# Patient Record
Sex: FEMALE | Race: BLACK OR AFRICAN AMERICAN | Employment: OTHER | URBAN - METROPOLITAN AREA
[De-identification: names, ages, dates, MRNs, and addresses within clinical notes are randomized per-mention and may not be internally consistent; named-entity substitution may affect disease eponyms.]

---

## 2017-02-20 RX ORDER — METOLAZONE 10 MG/1
TABLET ORAL
Qty: 30 TAB | Refills: 5 | Status: SHIPPED | OUTPATIENT
Start: 2017-02-20 | End: 2017-03-09 | Stop reason: SDUPTHER

## 2017-02-21 RX ORDER — AMIODARONE HYDROCHLORIDE 200 MG/1
TABLET ORAL
Qty: 30 TAB | Refills: 3 | Status: SHIPPED | OUTPATIENT
Start: 2017-02-21 | End: 2017-03-09 | Stop reason: SDUPTHER

## 2017-02-21 RX ORDER — METOLAZONE 10 MG/1
TABLET ORAL
Qty: 30 TAB | Refills: 5 | Status: SHIPPED | OUTPATIENT
Start: 2017-02-21 | End: 2017-03-09 | Stop reason: SDUPTHER

## 2017-03-09 ENCOUNTER — OFFICE VISIT (OUTPATIENT)
Dept: CARDIOLOGY CLINIC | Age: 82
End: 2017-03-09

## 2017-03-09 VITALS — SYSTOLIC BLOOD PRESSURE: 122 MMHG | DIASTOLIC BLOOD PRESSURE: 59 MMHG | HEART RATE: 81 BPM | HEIGHT: 65 IN

## 2017-03-09 DIAGNOSIS — I11.0 HYPERTENSIVE HEART DISEASE WITH CHF (CONGESTIVE HEART FAILURE) (HCC): ICD-10-CM

## 2017-03-09 DIAGNOSIS — Z95.0 S/P PLACEMENT OF CARDIAC PACEMAKER: ICD-10-CM

## 2017-03-09 DIAGNOSIS — I50.32 DIASTOLIC CHF, CHRONIC (HCC): Primary | ICD-10-CM

## 2017-03-09 DIAGNOSIS — Z79.899 HIGH RISK MEDICATION USE: ICD-10-CM

## 2017-03-09 DIAGNOSIS — I48.92 PAROXYSMAL ATRIAL FLUTTER (HCC): ICD-10-CM

## 2017-03-09 NOTE — PROGRESS NOTES
1. Have you been to the ER, urgent care clinic since your last visit? Hospitalized since your last visit? No    2. Have you seen or consulted any other health care providers outside of the [address] [place] [address] since your last visit? Include any pap smears or colon screening. No    3. Since your last visit, have you had any of the following symptoms? None    4. Have you had any blood work, X-rays or cardiac testing? T4 and LFT no completed     5. Where do you normally have your labs drawn?   [facility]    Medications confirmed by patient's medications bottles    Patient did not bring carelink box today to check

## 2017-03-09 NOTE — MR AVS SNAPSHOT
Visit Information Date & Time Provider Department Dept. Phone Encounter #  
 3/9/2017 11:30 AM Jim Shaikh MD [facility] [address] [place] [address] [id number] Follow-up Instructions Return in about 3 months (around 6/9/2017). Your Appointments 6/12/2017 10:30 AM  
ESTABLISHED PATIENT with Jim Shaikh MD  
[facility]) Appt Note: 3 months with medtronic ck [address] [facility], [address], [address] [place] [address] Upcoming Health Maintenance Date Due DTaP/Tdap/Td series (1 - Tdap) 6/26/1952 ZOSTER VACCINE AGE 60> 6/26/1991 GLAUCOMA SCREENING Q2Y 6/26/1996 OSTEOPOROSIS SCREENING (DEXA) 6/26/1996 Pneumococcal 65+ Low/Medium Risk (1 of 2 - PCV13) 6/26/1996 MEDICARE YEARLY EXAM 6/26/1996 INFLUENZA AGE 9 TO ADULT 8/1/2016 Allergies as of 3/9/2017  Review Complete On: 3/9/2017 By: Jim Shaikh MD  
 No Known Allergies Current Immunizations  Reviewed on 5/9/2016 No immunizations on file. Not reviewed this visit You Were Diagnosed With   
  
 Codes Comments Diastolic CHF, chronic (HCC)    -  Primary ICD-10-CM: I50.32 
ICD-9-CM: 428.32, 428.0 stable 
edema better Paroxysmal atrial flutter (HCC)     ICD-10-CM: I48.92 
ICD-9-CM: 427.32 stable 
on asa and amiodarone Hypertensive heart disease with CHF (congestive heart failure) ([facility] [facility]     ICD-10-CM: I11.0 ICD-9-CM: 402.91, 428.0 stable High risk medication use     ICD-10-CM: Z79.899 ICD-9-CM: V58.69 amiodarone for af  
 S/P placement of cardiac pacemaker     ICD-10-CM: Z95.0 ICD-9-CM: V45.01 needs check-discussed with son Vitals BP Pulse Height(growth percentile) Smoking Status 122/59 81 5' 5\" (1.651 m) Never Smoker Vitals History Preferred Pharmacy Pharmacy Name Phone [facility] [address] [phone] Your Updated Medication List  
  
   
This list is accurate as of: 3/9/17 11:34 AM.  Always use your most recent med list.  
  
  
  
  
 amiodarone 200 mg tablet Commonly known as:  CORDARONE Take 1 Tab by mouth daily. amitriptyline 25 mg tablet Commonly known as:  ELAVIL Take  by mouth nightly. aspirin 81 mg tablet Take 81 mg by mouth daily. furosemide 40 mg tablet Commonly known as:  LASIX Take 1 Tab by mouth two (2) times a day. isosorbide mononitrate ER 60 mg CR tablet Commonly known as:  IMDUR Take 1 Tab by mouth every morning. metOLazone 10 mg tablet Commonly known as:  Laurian Anil Take 1 Tab by mouth daily. Follow-up Instructions Return in about 3 months (around 6/9/2017). To-Do List   
 03/09/2017 Lab:  HEPATIC FUNCTION PANEL   
  
 03/09/2017 Lab:  T4, FREE   
  
 03/16/2017 Lab:  TSH 3RD GENERATION Please provide this summary of care documentation to your next provider. Your primary care clinician is listed as Ronak Hernandez. If you have any questions after today's visit, please call [phone].

## 2017-03-09 NOTE — LETTER
Hernandez Pages 
6/26/1931 
 
3/9/2017 Dear Boaz Serrano MD 
 
I had the pleasure of evaluating  Ms. Carmelita Krueger in office today. Below are the relevant portions of my assessment and plan of care. ICD-10-CM ICD-9-CM 1. Diastolic CHF, chronic (HCC) I50.32 428.32   
  428.0   
 stable 
edema better 2. Paroxysmal atrial flutter (HCC) I48.92 427.32   
 stable 
on asa and amiodarone 3. Hypertensive heart disease with CHF (congestive heart failure) (HCC) I11.0 402.91   
  428.0   
 stable 4. High risk medication use Z79.899 V58.69 TSH 3RD GENERATION  
   T4, FREE  
   HEPATIC FUNCTION PANEL  
 amiodarone for af  
5. S/P placement of cardiac pacemaker Z95.0 V45.01   
 needs check-discussed with son Current Outpatient Prescriptions Medication Sig Dispense Refill  metOLazone (ZAROXOLYN) 10 mg tablet Take 1 Tab by mouth daily. 30 Tab 6  furosemide (LASIX) 40 mg tablet Take 1 Tab by mouth two (2) times a day. 60 Tab 6  
 amitriptyline (ELAVIL) 25 mg tablet Take  by mouth nightly.  isosorbide mononitrate ER (IMDUR) 60 mg CR tablet Take 1 Tab by mouth every morning. 30 Tab 5  
 amiodarone (CORDARONE) 200 mg tablet Take 1 Tab by mouth daily. 90 Tab 4  
 aspirin 81 mg tablet Take 81 mg by mouth daily. Orders Placed This Encounter  TSH 3RD GENERATION Standing Status:   Future Standing Expiration Date:   4/8/2017  T4, FREE Standing Status:   Future Standing Expiration Date:   3/10/2018  
 HEPATIC FUNCTION PANEL Standing Status:   Future Standing Expiration Date:   9/7/2017 If you have questions, please do not hesitate to call me. I look forward to following Ms. Carmelita Krueger along with you. Sincerely, Maggie Graff MD

## 2017-03-09 NOTE — PROGRESS NOTES
HISTORY OF PRESENT ILLNESS  Mike Kessler is a 80 y.o. female. HPI Comments: Patient with r chf,had persistent junctional ryhtm ,atrial flutter ,pacemaker,feels better   Sob better    Palpitations    The history is provided by the patient. This is a chronic problem. The problem occurs constantly. Associated symptoms include lower extremity edema and shortness of breath. Pertinent negatives include no fever, no chest pain, no claudication, no orthopnea, no PND, no nausea, no vomiting, no dizziness, no weakness, no cough, no hemoptysis and no sputum production. Her past medical history is significant for hypertension. CHF   The history is provided by the patient. This is a chronic problem. The problem occurs constantly. The problem has been gradually improving. Associated symptoms include shortness of breath. Pertinent negatives include no chest pain. The symptoms are aggravated by exertion. Hypertension   The history is provided by the patient. This is a chronic problem. The problem occurs constantly. The problem has not changed since onset. Associated symptoms include shortness of breath. Pertinent negatives include no chest pain. Shortness of Breath   The history is provided by the patient. This is a recurrent problem. The problem occurs intermittently. The problem has been gradually improving. Pertinent negatives include no fever, no cough, no sputum production, no hemoptysis, no wheezing, no PND, no orthopnea, no chest pain, no vomiting, no rash, no leg swelling and no claudication. Precipitated by: exertion. Associated medical issues include heart failure. Leg Swelling   The history is provided by the patient. This is a chronic problem. The problem occurs daily. The problem has been gradually improving. Associated symptoms include shortness of breath. Pertinent negatives include no chest pain. Review of Systems   Constitutional: Negative for chills and fever. HENT: Negative for nosebleeds. Eyes: Negative for blurred vision and double vision. Respiratory: Positive for shortness of breath. Negative for cough, hemoptysis, sputum production and wheezing. Cardiovascular: Negative for chest pain, palpitations, orthopnea, claudication, leg swelling and PND. Gastrointestinal: Negative for heartburn, nausea and vomiting. Musculoskeletal: Negative for myalgias. Skin: Negative for rash. Neurological: Negative for dizziness and weakness. Endo/Heme/Allergies: Does not bruise/bleed easily. Family History   Problem Relation Age of Onset    Arrhythmia Neg Hx     Asthma Neg Hx     Clotting Disorder Neg Hx     Fainting Neg Hx     Heart Attack Neg Hx     High Cholesterol Neg Hx     Pacemaker Neg Hx     Stroke Neg Hx        Past Medical History:   Diagnosis Date    Acute on chronic diastolic heart failure (HCC)     Benign hypertensive heart disease without heart failure     Better controlled, stable    Chronic diastolic heart failure (HCC)     Breathing and edema is improving    Congestive heart failure (HCC)     HTN (hypertension)     Hypercholesteremia     [facility] 6/18/2014    followed by dr Yue Pierce     Obesity, unspecified     Patient has weight loss Discussed diet ad fluid restriction    Other and unspecified hyperlipidemia     F/u per pmd    Tricuspid valve disorders, 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    tr with moderate pulmonary htn        Past Surgical History:   Procedure Laterality Date    HX HYSTERECTOMY         No Known Allergies    Current Outpatient Prescriptions   Medication Sig    metOLazone (ZAROXOLYN) 10 mg tablet Take 1 Tab by mouth daily.  furosemide (LASIX) 40 mg tablet Take 1 Tab by mouth two (2) times a day.  amitriptyline (ELAVIL) 25 mg tablet Take  by mouth nightly.  isosorbide mononitrate ER (IMDUR) 60 mg CR tablet Take 1 Tab by mouth every morning.     amiodarone (CORDARONE) 200 mg tablet Take 1 Tab by mouth daily.    aspirin 81 mg tablet Take 81 mg by mouth daily. No current facility-administered medications for this visit. Visit Vitals    /59    Pulse 81    Ht 5' 5\" (1.651 m)         Physical Exam   Constitutional: She is oriented to person, place, and time. She appears well-developed and well-nourished. HENT:   Head: Normocephalic and atraumatic. Eyes: Conjunctivae are normal.   Neck: Neck supple. No JVD present. No tracheal deviation present. No thyromegaly present. Cardiovascular: Normal rate and regular rhythm. PMI is not displaced. Exam reveals no gallop and no decreased pulses. Murmur heard. Holosystolic murmur is present  at the lower left sternal border  Pulmonary/Chest: No respiratory distress. She has no wheezes. She has no rales. She exhibits no tenderness. Abdominal: Soft. There is no tenderness. Musculoskeletal: She exhibits edema (mild). Neurological: She is alert and oriented to person, place, and time. Skin: Skin is warm. Psychiatric: She has a normal mood and affect. CARDIOLOGY STUDIES 7/1/2011 1/1/2008 1/1/2005   Myocardial Perfusion Scan Result - - PEST, NORMAL   Echocardiogram - Complete Result EF 53% LVH, NORMAL EF, DD ENLARGED LA -   Ms. Kevan Kocher has a reminder for a \"due or due soon\" health maintenance. I have asked that she contact her primary care provider for follow-up on this health maintenance. SUMMARY:4/2014  Left ventricle: Ejection fraction was estimated to be 60 %. No obvious  wall motion abnormalities identified in the views obtained. There was mild  concentric hypertrophy. Features were consistent with a pseudonormal left  ventricular filling pattern, with concomitant abnormal relaxation and  increased filling pressure (grade 2 diastolic dysfunction). Tricuspid valve: There was moderate regurgitation. Pulmonary artery  systolic pressure: 50 mmHg. 11/2015:stress test  1. Normal perfusion scan.   2. Normal wall motion and ejection fraction. 3. Gated images reveal normal wall motion and ejection fraction is  calculated at 59%. 12/07/2015  Pacer check   Noted with A Flutter. D/w daughter on phone  Assessment       ICD-10-CM AHB-0-OL    1. Diastolic CHF, chronic (HCC) I50.32 428.32      428.0     stable  edema better   2. Paroxysmal atrial flutter (HCC) I48.92 427.32     stable  on asa and amiodarone   3. Hypertensive heart disease with CHF (congestive heart failure) (HCC) I11.0 402.91      428.0     stable   4. High risk medication use Z79.899 V58.69 TSH 3RD GENERATION      T4, FREE      HEPATIC FUNCTION PANEL    amiodarone for af   5. S/P placement of cardiac pacemaker Z95.0 V45.01     needs check-discussed with son   continue amiodarone to maintain sr,risk of anticoagulation high and would not anticoagulate-discussed with family  I have discussed risk benefit and option of use of amiodarone for arrythmia. Risk of toxicity with medication are informed. Patient will require careful monitoring. Lasix and metolazone used as needed-  Medications Discontinued During This Encounter   Medication Reason    amiodarone (CORDARONE) 200 mg tablet Duplicate Order    metOLazone (ZAROXOLYN) 10 mg tablet Duplicate Order    metOLazone (ZAROXOLYN) 10 mg tablet Duplicate Order    potassium chloride (KLOR-CON) 20 mEq packet Not A Current Medication    hydroxychloroquine (PLAQUENIL) 200 mg tablet Not A Current Medication    meloxicam (MOBIC) 7.5 mg tablet Not A Current Medication       Orders Placed This Encounter    TSH 3RD GENERATION     Standing Status:   Future     Standing Expiration Date:   4/8/2017    T4, FREE     Standing Status:   Future     Standing Expiration Date:   3/10/2018    HEPATIC FUNCTION PANEL     Standing Status:   Future     Standing Expiration Date:   9/7/2017       Follow-up Disposition:  Return in about 3 months (around 6/9/2017).

## 2017-03-20 RX ORDER — ISOSORBIDE MONONITRATE 60 MG/1
TABLET, EXTENDED RELEASE ORAL
Qty: 30 TAB | Refills: 3 | Status: SHIPPED | OUTPATIENT
Start: 2017-03-20 | End: 2017-03-22 | Stop reason: SDUPTHER

## 2017-03-22 RX ORDER — ISOSORBIDE MONONITRATE 60 MG/1
TABLET, EXTENDED RELEASE ORAL
Qty: 30 TAB | Refills: 3 | Status: SHIPPED | OUTPATIENT
Start: 2017-03-22 | End: 2017-06-12 | Stop reason: SDUPTHER

## 2017-06-12 ENCOUNTER — OFFICE VISIT (OUTPATIENT)
Dept: CARDIOLOGY CLINIC | Age: 82
End: 2017-06-12

## 2017-06-12 VITALS — HEART RATE: 75 BPM | DIASTOLIC BLOOD PRESSURE: 68 MMHG | SYSTOLIC BLOOD PRESSURE: 117 MMHG

## 2017-06-12 DIAGNOSIS — I11.0 HYPERTENSIVE HEART DISEASE WITH CHF (CONGESTIVE HEART FAILURE) (HCC): ICD-10-CM

## 2017-06-12 DIAGNOSIS — I48.92 PAROXYSMAL ATRIAL FLUTTER (HCC): ICD-10-CM

## 2017-06-12 DIAGNOSIS — I50.32 DIASTOLIC CHF, CHRONIC (HCC): Primary | ICD-10-CM

## 2017-06-12 DIAGNOSIS — Z95.0 S/P PLACEMENT OF CARDIAC PACEMAKER: ICD-10-CM

## 2017-06-12 DIAGNOSIS — Z79.899 HIGH RISK MEDICATION USE: ICD-10-CM

## 2017-06-12 RX ORDER — METOLAZONE 10 MG/1
10 TABLET ORAL DAILY
Qty: 30 TAB | Refills: 6 | Status: SHIPPED | OUTPATIENT
Start: 2017-06-12 | End: 2018-01-16 | Stop reason: SDUPTHER

## 2017-06-12 RX ORDER — FUROSEMIDE 40 MG/1
40 TABLET ORAL 2 TIMES DAILY
Qty: 60 TAB | Refills: 6 | Status: SHIPPED | OUTPATIENT
Start: 2017-06-12 | End: 2018-04-04 | Stop reason: SDUPTHER

## 2017-06-12 NOTE — MR AVS SNAPSHOT
Visit Information Date & Time Provider Department Dept. Phone Encounter #  
 6/12/2017 10:30 AM Katie Hdz MD [facility] [address] [place] [address] [id number] Follow-up Instructions Return in about 6 months (around 12/12/2017). Your Appointments 6/12/2017 10:30 AM  
ESTABLISHED PATIENT with Katie Hdz MD  
[facility]) Appt Note: 3 months with medtronic ck/ post labs [address] [facility], [address] [facility] [address]  
[address], [address] 9/18/2017  8:00 AM  
[facility] [place] [facility] Appt Note: carelink [address] [facility], [address] [facility] [address]  
[phone]  
  
   
 [address] [facility], [address] [place] [address]  
  
    
 12/11/2017  9:15 AM  
Office Visit with Katie Hdz MD  
[facility]) Appt Note: [address] [facility], [address] [facility] [address]  
[address], [address] Upcoming Health Maintenance Date Due DTaP/Tdap/Td series (1 - Tdap) 6/26/1952 ZOSTER VACCINE AGE 60> 6/26/1991 GLAUCOMA SCREENING Q2Y 6/26/1996 OSTEOPOROSIS SCREENING (DEXA) 6/26/1996 Pneumococcal 65+ Low/Medium Risk (1 of 2 - PCV13) 6/26/1996 MEDICARE YEARLY EXAM 6/26/1996 INFLUENZA AGE 9 TO ADULT 8/1/2017 Allergies as of 6/12/2017  Review Complete On: 6/12/2017 By: Katie Hdz MD  
 No Known Allergies Current Immunizations  Reviewed on 5/9/2016 No immunizations on file. Not reviewed this visit You Were Diagnosed With   
  
 Codes Comments Diastolic CHF, chronic (HCC)    -  Primary ICD-10-CM: I50.32 
ICD-9-CM: 428.32, 428.0 inctease edema 
advised restarting furosemide Paroxysmal atrial flutter (HCC)     ICD-10-CM: I48.92 
ICD-9-CM: 427.32 stable Hypertensive heart disease with CHF (congestive heart failure) ([facility]     ICD-10-CM: I11.0 ICD-9-CM: 402.91, 428.0 bp stable S/P placement of cardiac pacemaker     ICD-10-CM: Z95.0 ICD-9-CM: V45.01 normal function High risk medication use     ICD-10-CM: Z79.899 ICD-9-CM: V58.69 on amiodarone Vitals BP Pulse Smoking Status 117/68 75 Never Smoker Preferred Pharmacy Pharmacy Name Phone [facility] [address], [address] [phone] Your Updated Medication List  
  
   
This list is accurate as of: 6/12/17 10:09 AM.  Always use your most recent med list.  
  
  
  
  
 amiodarone 200 mg tablet Commonly known as:  CORDARONE Take 1 Tab by mouth daily. amitriptyline 25 mg tablet Commonly known as:  ELAVIL Take  by mouth nightly. aspirin 81 mg tablet Take 81 mg by mouth daily. furosemide 40 mg tablet Commonly known as:  LASIX Take 1 Tab by mouth two (2) times a day. isosorbide mononitrate ER 60 mg CR tablet Commonly known as:  IMDUR Take 1 Tab by mouth every morning. metOLazone 10 mg tablet Commonly known as:  Lavenia Kussmaul Take 1 Tab by mouth daily. Prescriptions Sent to Pharmacy Refills  
 furosemide (LASIX) 40 mg tablet 6 Sig: Take 1 Tab by mouth two (2) times a day. Class: Normal  
 Pharmacy: [facility], [facility] Ph #: [phone] Route: Oral  
 metOLazone (ZAROXOLYN) 10 mg tablet 6 Sig: Take 1 Tab by mouth daily. Class: Normal  
 Pharmacy: [facility], [address] Ph #: [phone] Route: Oral  
  
We Performed the Following INTERROGATION DEV ELISSA,IN PERSON, WITH DR ROMORPT [78559 CPT(R)] Follow-up Instructions Return in about 6 months (around 12/12/2017). Please provide this summary of care documentation to your next provider. Your primary care clinician is listed as Carmelita Paige. If you have any questions after today's visit, please call [phone].

## 2017-06-12 NOTE — LETTER
Daniela Camara 
6/26/1931 6/12/2017 Dear Alden Guardado MD 
 
I had the pleasure of evaluating  Ms. Carol Pablo in office today. Below are the relevant portions of my assessment and plan of care. ICD-10-CM ICD-9-CM 1. Diastolic CHF, chronic (HCC) I50.32 428.32   
  428.0   
 inctease edema 
advised restarting furosemide 2. Paroxysmal atrial flutter (HCC) I48.92 427.32   
 stable 3. Hypertensive heart disease with CHF (congestive heart failure) (HCC) I11.0 402.91   
  428.0   
 bp stable 4. S/P placement of cardiac pacemaker Z95.0 V45.01 INTERROGATION DEV EVAL,IN PERSON, WITH DR ROMO,RPT  
 normal function 5. High risk medication use Z79.899 V58.69   
 on amiodarone Current Outpatient Prescriptions Medication Sig Dispense Refill  furosemide (LASIX) 40 mg tablet Take 1 Tab by mouth two (2) times a day. 60 Tab 6  
 metOLazone (ZAROXOLYN) 10 mg tablet Take 1 Tab by mouth daily. 30 Tab 6  
 amitriptyline (ELAVIL) 25 mg tablet Take  by mouth nightly.  isosorbide mononitrate ER (IMDUR) 60 mg CR tablet Take 1 Tab by mouth every morning. 30 Tab 5  
 amiodarone (CORDARONE) 200 mg tablet Take 1 Tab by mouth daily. 90 Tab 4  
 aspirin 81 mg tablet Take 81 mg by mouth daily. Orders Placed This Encounter  INTERROGATION DEV EVAL,IN PERSON, WITH DR ROMO,RPT Order Specific Question:   Reason for Exam: Answer:   ppm  
 furosemide (LASIX) 40 mg tablet Sig: Take 1 Tab by mouth two (2) times a day. Dispense:  60 Tab Refill:  6  
 metOLazone (ZAROXOLYN) 10 mg tablet Sig: Take 1 Tab by mouth daily. Dispense:  30 Tab Refill:  6 If you have questions, please do not hesitate to call me. I look forward to following Ms. Carol Pablo along with you. Sincerely, Ana Srivastava MD

## 2017-06-12 NOTE — PROGRESS NOTES
HISTORY OF PRESENT ILLNESS  Jackson Smith is a 80 y.o. female. HPI Comments: Patient with r chf,had persistent junctional ryhtm ,atrial flutter ,pacemaker,feels better   Sob better    Palpitations    The history is provided by the patient. This is a chronic problem. The problem occurs constantly. Associated symptoms include lower extremity edema and shortness of breath. Pertinent negatives include no fever, no chest pain, no claudication, no orthopnea, no PND, no nausea, no vomiting, no dizziness, no weakness, no cough, no hemoptysis and no sputum production. Her past medical history is significant for hypertension. CHF   The history is provided by the patient. This is a chronic problem. The problem occurs constantly. The problem has been gradually improving. Associated symptoms include shortness of breath. Pertinent negatives include no chest pain. The symptoms are aggravated by exertion. Hypertension   The history is provided by the patient. This is a chronic problem. The problem occurs constantly. The problem has not changed since onset. Associated symptoms include shortness of breath. Pertinent negatives include no chest pain. Shortness of Breath   The history is provided by the patient. This is a recurrent problem. The problem occurs intermittently. The problem has been gradually improving. Pertinent negatives include no fever, no cough, no sputum production, no hemoptysis, no wheezing, no PND, no orthopnea, no chest pain, no vomiting, no rash, no leg swelling and no claudication. Precipitated by: exertion. Associated medical issues include heart failure. Leg Swelling   The history is provided by the patient. This is a chronic problem. The problem occurs daily. The problem has been gradually improving. Associated symptoms include shortness of breath. Pertinent negatives include no chest pain. Review of Systems   Constitutional: Negative for chills and fever. HENT: Negative for nosebleeds. Eyes: Negative for blurred vision and double vision. Respiratory: Positive for shortness of breath. Negative for cough, hemoptysis, sputum production and wheezing. Cardiovascular: Negative for chest pain, palpitations, orthopnea, claudication, leg swelling and PND. Gastrointestinal: Negative for heartburn, nausea and vomiting. Musculoskeletal: Negative for myalgias. Skin: Negative for rash. Neurological: Negative for dizziness and weakness. Endo/Heme/Allergies: Does not bruise/bleed easily. Family History   Problem Relation Age of Onset    Arrhythmia Neg Hx     Asthma Neg Hx     Clotting Disorder Neg Hx     Fainting Neg Hx     Heart Attack Neg Hx     High Cholesterol Neg Hx     Pacemaker Neg Hx     Stroke Neg Hx        Past Medical History:   Diagnosis Date    Acute on chronic diastolic heart failure (HCC)     Benign hypertensive heart disease without heart failure     Better controlled, stable    Chronic diastolic heart failure (HCC)     Breathing and edema is improving    Congestive heart failure (HCC)     HTN (hypertension)     Hypercholesteremia     Lupus (systemic lupus erythematosus) ([facility] 6/18/2014    followed by dr Med Floyd     Obesity, unspecified     Patient has weight loss Discussed diet ad fluid restriction    Other and unspecified hyperlipidemia     F/u per pmd    Tricuspid valve disorders, 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  metOLazone (ZAROXOLYN) 10 mg tablet Take 1 Tab by mouth daily.  amitriptyline (ELAVIL) 25 mg tablet Take  by mouth nightly.  isosorbide mononitrate ER (IMDUR) 60 mg CR tablet Take 1 Tab by mouth every morning.     amiodarone (CORDARONE) 200 mg tablet Take 1 Tab by mouth daily.    aspirin 81 mg tablet Take 81 mg by mouth daily. No current facility-administered medications for this visit. Visit Vitals    /68    Pulse 75         Physical Exam   Constitutional: She is oriented to person, place, and time. She appears well-developed and well-nourished. HENT:   Head: Normocephalic and atraumatic. Eyes: Conjunctivae are normal.   Neck: Neck supple. No JVD present. No tracheal deviation present. No thyromegaly present. Cardiovascular: Normal rate and regular rhythm. PMI is not displaced. Exam reveals no gallop and no decreased pulses. Murmur heard. Holosystolic murmur is present  at the lower left sternal border  Pulmonary/Chest: No respiratory distress. She has no wheezes. She has no rales. She exhibits no tenderness. Abdominal: Soft. There is no tenderness. Musculoskeletal: She exhibits edema (mild). Neurological: She is alert and oriented to person, place, and time. Skin: Skin is warm. Psychiatric: She has a normal mood and affect. CARDIOLOGY STUDIES 7/1/2011 1/1/2008 1/1/2005   Myocardial Perfusion Scan Result - - PEST, NORMAL   Echocardiogram - Complete Result EF 53% LVH, NORMAL EF, DD ENLARGED LA -   Ms. Raeann Jones has a reminder for a \"due or due soon\" health maintenance. I have asked that she contact her primary care provider for follow-up on this health maintenance. SUMMARY:4/2014  Left ventricle: Ejection fraction was estimated to be 60 %. No obvious  wall motion abnormalities identified in the views obtained. There was mild  concentric hypertrophy. Features were consistent with a pseudonormal left  ventricular filling pattern, with concomitant abnormal relaxation and  increased filling pressure (grade 2 diastolic dysfunction). Tricuspid valve: There was moderate regurgitation. Pulmonary artery  systolic pressure: 50 mmHg. 11/2015:stress test  1. Normal perfusion scan. 2. Normal wall motion and ejection fraction.   3. Gated images reveal normal wall motion and ejection fraction is  calculated at 59%. 12/07/2015  Pacer check   Noted with A Flutter. D/w daughter on phone  Assessment       ICD-10-CM [id number]    1. Diastolic CHF, chronic (HCC) I50.32 428.32      428.0     inctease edema  advised restarting furosemide   2. Paroxysmal atrial flutter (HCC) I48.92 427.32     stable   3. Hypertensive heart disease with CHF (congestive heart failure) (HCC) I11.0 402.91      428.0     bp stable   4. S/P placement of cardiac pacemaker Z95.0 V45.01 INTERROGATION CALEB BOWERS,IN PERSON, WITH DR ROMO,RPT    normal function   5. High risk medication use Z79.899 V58.69     on amiodarone   continue amiodarone to maintain sr,risk of anticoagulation high and would not anticoagulate-discussed with family  I have discussed risk benefit and option of use of amiodarone for arrythmia. Risk of toxicity with medication are informed. Patient will require careful monitoring. Lasix and metolazone used as needed-  Medications Discontinued During This Encounter   Medication Reason    isosorbide mononitrate ER (IMDUR) 60 mg CR tablet Duplicate Order    furosemide (LASIX) 40 mg tablet Reorder    metOLazone (ZAROXOLYN) 10 mg tablet Reorder       Orders Placed This Encounter    INTERROGATION CALEB BOWERS,IN PERSON, WITH DR ROMO,RPT     Order Specific Question:   Reason for Exam:     Answer:   ppm    furosemide (LASIX) 40 mg tablet     Sig: Take 1 Tab by mouth two (2) times a day. Dispense:  60 Tab     Refill:  6    metOLazone (ZAROXOLYN) 10 mg tablet     Sig: Take 1 Tab by mouth daily. Dispense:  30 Tab     Refill:  6       Follow-up Disposition:  Return in about 6 months (around 12/12/2017).

## 2017-06-22 DIAGNOSIS — I11.0 HYPERTENSIVE HEART DISEASE WITH CHF (CONGESTIVE HEART FAILURE) (HCC): ICD-10-CM

## 2017-06-22 DIAGNOSIS — I10 ESSENTIAL HYPERTENSION: ICD-10-CM

## 2017-06-22 DIAGNOSIS — E78.00 HYPERCHOLESTEREMIA: Primary | ICD-10-CM

## 2017-06-22 DIAGNOSIS — I48.92 PAROXYSMAL ATRIAL FLUTTER (HCC): ICD-10-CM

## 2017-06-22 RX ORDER — AMIODARONE HYDROCHLORIDE 200 MG/1
TABLET ORAL
Qty: 30 TAB | Refills: 6 | Status: SHIPPED | OUTPATIENT
Start: 2017-06-22 | End: 2018-01-02 | Stop reason: SDUPTHER

## 2017-06-23 ENCOUNTER — HOSPITAL ENCOUNTER (OUTPATIENT)
Dept: LAB | Age: 82
Discharge: HOME | End: 2017-06-23
Payer: MEDICARE

## 2017-06-23 DIAGNOSIS — N08 LUPOID NEPHRITIS (HCC): ICD-10-CM

## 2017-06-23 DIAGNOSIS — L93.0 LUPUS ERYTHEMATOSUS: ICD-10-CM

## 2017-06-23 DIAGNOSIS — M32.10 LUPOID NEPHRITIS (HCC): ICD-10-CM

## 2017-06-23 DIAGNOSIS — M13.0 UNSPECIFIED POLYARTHROPATHY OR POLYARTHRITIS, SITE UNSPECIFIED: ICD-10-CM

## 2017-06-23 LAB
ALBUMIN SERPL BCP-MCNC: 3.6 G/DL (ref 3.4–5)
ALBUMIN/GLOB SERPL: 1.2 {RATIO} (ref 0.8–1.7)
ALP SERPL-CCNC: 69 U/L (ref 45–117)
ALT SERPL-CCNC: 19 U/L (ref 13–56)
ANION GAP BLD CALC-SCNC: 5 MMOL/L (ref 3–18)
APPEARANCE UR: CLEAR
AST SERPL W P-5'-P-CCNC: 26 U/L (ref 15–37)
BACTERIA URNS QL MICRO: ABNORMAL /HPF
BILIRUB SERPL-MCNC: 0.7 MG/DL (ref 0.2–1)
BILIRUB UR QL: NEGATIVE
BUN SERPL-MCNC: 51 MG/DL (ref 7–18)
BUN/CREAT SERPL: 26 (ref 12–20)
CALCIUM SERPL-MCNC: 8.7 MG/DL (ref 8.5–10.1)
CHLORIDE SERPL-SCNC: 96 MMOL/L (ref 100–108)
CK SERPL-CCNC: 315 U/L (ref 26–192)
CO2 SERPL-SCNC: 40 MMOL/L (ref 21–32)
COLOR UR: YELLOW
CREAT SERPL-MCNC: 1.96 MG/DL (ref 0.6–1.3)
CRP SERPL-MCNC: <0.3 MG/DL (ref 0–0.3)
EPITH CASTS URNS QL MICRO: ABNORMAL /LPF (ref 0–5)
ERYTHROCYTE [SEDIMENTATION RATE] IN BLOOD: 19 MM/HR (ref 0–30)
GLOBULIN SER CALC-MCNC: 3 G/DL (ref 2–4)
GLUCOSE SERPL-MCNC: 90 MG/DL (ref 74–99)
GLUCOSE UR STRIP.AUTO-MCNC: NEGATIVE MG/DL
HGB UR QL STRIP: NEGATIVE
KETONES UR QL STRIP.AUTO: NEGATIVE MG/DL
LEUKOCYTE ESTERASE UR QL STRIP.AUTO: ABNORMAL
NITRITE UR QL STRIP.AUTO: NEGATIVE
PH UR STRIP: 8 [PH] (ref 5–8)
PLATELET # BLD AUTO: 156 K/UL (ref 135–420)
POTASSIUM SERPL-SCNC: 2.8 MMOL/L (ref 3.5–5.5)
PROT SERPL-MCNC: 6.6 G/DL (ref 6.4–8.2)
PROT UR STRIP-MCNC: NEGATIVE MG/DL
RBC #/AREA URNS HPF: ABNORMAL /HPF (ref 0–5)
SODIUM SERPL-SCNC: 141 MMOL/L (ref 136–145)
SP GR UR REFRACTOMETRY: 1.01 (ref 1–1.03)
URATE SERPL-MCNC: 9.7 MG/DL (ref 2.6–7.2)
UROBILINOGEN UR QL STRIP.AUTO: 1 EU/DL (ref 0.2–1)
WBC URNS QL MICRO: ABNORMAL /HPF (ref 0–4)

## 2017-06-23 PROCEDURE — 85652 RBC SED RATE AUTOMATED: CPT | Performed by: SPECIALIST

## 2017-06-23 PROCEDURE — 85049 AUTOMATED PLATELET COUNT: CPT | Performed by: SPECIALIST

## 2017-06-23 PROCEDURE — 86140 C-REACTIVE PROTEIN: CPT | Performed by: SPECIALIST

## 2017-06-23 PROCEDURE — 36415 COLL VENOUS BLD VENIPUNCTURE: CPT | Performed by: SPECIALIST

## 2017-06-23 PROCEDURE — 81001 URINALYSIS AUTO W/SCOPE: CPT | Performed by: SPECIALIST

## 2017-06-23 PROCEDURE — 84550 ASSAY OF BLOOD/URIC ACID: CPT | Performed by: SPECIALIST

## 2017-06-23 PROCEDURE — 86225 DNA ANTIBODY NATIVE: CPT | Performed by: SPECIALIST

## 2017-06-23 PROCEDURE — 82550 ASSAY OF CK (CPK): CPT | Performed by: SPECIALIST

## 2017-06-23 PROCEDURE — 80053 COMPREHEN METABOLIC PANEL: CPT | Performed by: SPECIALIST

## 2017-06-24 LAB
CENTROMERE B AB SER-ACNC: <0.2 AI (ref 0–0.9)
CHROMATIN AB SERPL-ACNC: <0.2 AI (ref 0–0.9)
DSDNA AB SER-ACNC: <1 IU/ML (ref 0–9)
ENA JO1 AB SER-ACNC: <0.2 AI (ref 0–0.9)
ENA RNP AB SER-ACNC: <0.2 AI (ref 0–0.9)
ENA SCL70 AB SER-ACNC: <0.2 AI (ref 0–0.9)
ENA SM AB SER-ACNC: <0.2 AI (ref 0–0.9)
ENA SS-A AB SER-ACNC: <0.2 AI (ref 0–0.9)
ENA SS-B AB SER-ACNC: <0.2 AI (ref 0–0.9)
SEE BELOW, 164869: NORMAL

## 2017-08-24 RX ORDER — ISOSORBIDE MONONITRATE 60 MG/1
60 TABLET, EXTENDED RELEASE ORAL
Qty: 30 TAB | Refills: 6 | Status: SHIPPED | OUTPATIENT
Start: 2017-08-24 | End: 2018-03-19 | Stop reason: SDUPTHER

## 2018-01-03 RX ORDER — AMIODARONE HYDROCHLORIDE 200 MG/1
200 TABLET ORAL DAILY
Qty: 15 TAB | Refills: 0 | Status: SHIPPED | OUTPATIENT
Start: 2018-01-03 | End: 2018-01-22 | Stop reason: SDUPTHER

## 2018-01-16 RX ORDER — METOLAZONE 10 MG/1
TABLET ORAL
Qty: 30 TAB | Refills: 5 | Status: SHIPPED | OUTPATIENT
Start: 2018-01-16 | End: 2018-07-16 | Stop reason: SDUPTHER

## 2018-01-22 ENCOUNTER — TELEPHONE (OUTPATIENT)
Dept: CARDIOLOGY CLINIC | Age: 83
End: 2018-01-22

## 2018-01-22 RX ORDER — AMIODARONE HYDROCHLORIDE 200 MG/1
TABLET ORAL
Qty: 15 TAB | Refills: 0 | Status: SHIPPED | OUTPATIENT
Start: 2018-01-22 | End: 2018-02-06 | Stop reason: SDUPTHER

## 2018-01-22 NOTE — TELEPHONE ENCOUNTER
Tried to contact patient to set up an appt and to inform her that she needs to have some blood work done. Patient's phone number has been disconnected. Will try to call pharmacy to see if they have current number.

## 2018-01-22 NOTE — TELEPHONE ENCOUNTER
[facility] gave us phone number for son [phone]    We were able to make an appt for a follow up to be seen in office 1/24/18 at 9:30

## 2018-01-24 ENCOUNTER — OFFICE VISIT (OUTPATIENT)
Dept: CARDIOLOGY CLINIC | Age: 83
End: 2018-01-24

## 2018-01-24 VITALS
HEIGHT: 65 IN | WEIGHT: 132 LBS | DIASTOLIC BLOOD PRESSURE: 58 MMHG | HEART RATE: 73 BPM | BODY MASS INDEX: 21.99 KG/M2 | SYSTOLIC BLOOD PRESSURE: 116 MMHG

## 2018-01-24 DIAGNOSIS — I10 ESSENTIAL HYPERTENSION: ICD-10-CM

## 2018-01-24 DIAGNOSIS — I48.92 PAROXYSMAL ATRIAL FLUTTER (HCC): ICD-10-CM

## 2018-01-24 DIAGNOSIS — I50.32 DIASTOLIC CHF, CHRONIC (HCC): Primary | ICD-10-CM

## 2018-01-24 DIAGNOSIS — E78.00 HYPERCHOLESTEREMIA: ICD-10-CM

## 2018-01-24 DIAGNOSIS — Z95.0 S/P PLACEMENT OF CARDIAC PACEMAKER: ICD-10-CM

## 2018-01-24 DIAGNOSIS — Z79.899 HIGH RISK MEDICATION USE: ICD-10-CM

## 2018-01-24 NOTE — MR AVS SNAPSHOT
[address] [facility] [address] 
 
 
 [address] [facility], [address] [facility] [address] 
[phone] Patient: Evans Jones MRN: [id number] Visit Information Date & Time Provider Department Dept. Phone Encounter #  
 1/24/2018 12:15 PM Zena Martínez MD [facility] [address] [place] [address] [id number] Follow-up Instructions Return in about 6 months (around 7/24/2018) for carelink setup/transmission. Your Appointments 1/24/2018 12:15 PM  
ESTABLISHED PATIENT with Zena Martínez MD  
[facility]) Appt Note: 7 month up follow up/ appt made with son/NEED PH#; r/s; insurance confirmed, did not have cards [address] [facility] [facility] [address]  
[address], [address] [address]  
  
    
 6/25/2018  9:30 AM  
PROCEDURE with Zena Martínez MD  
[facility]) Appt Note: medtronic ck [address] [facility] [facility] [address]  
[address], [address] [address] [facility] Upcoming Health Maintenance Date Due DTaP/Tdap/Td series (1 - Tdap) 6/26/1952 ZOSTER VACCINE AGE 60> 4/26/1991 GLAUCOMA SCREENING Q2Y 6/26/1996 OSTEOPOROSIS SCREENING (DEXA) 6/26/1996 Pneumococcal 65+ Low/Medium Risk (1 of 2 - PCV13) 6/26/1996 MEDICARE YEARLY EXAM 6/26/1996 Influenza Age 5 to Adult 8/1/2017 Allergies as of 1/24/2018  Review Complete On: 1/24/2018 By: Zena Martínez MD  
 No Known Allergies Current Immunizations  Reviewed on 5/9/2016 No immunizations on file. Not reviewed this visit You Were Diagnosed With   
  
 Codes Comments Diastolic CHF, chronic (HCC)    -  Primary ICD-10-CM: I50.32 
ICD-9-CM: 428.32, 428.0 stable 
limited activity Essential hypertension     ICD-10-CM: I10 
ICD-9-CM: 401.9 controlled  Hypercholesteremia     ICD-10-CM: E78.00 
 ICD-9-CM: 272.0 stable Paroxysmal atrial flutter (HCC)     ICD-10-CM: I48.92 
ICD-9-CM: 427.32 stable S/P placement of cardiac pacemaker     ICD-10-CM: Z95.0 ICD-9-CM: V45.01 needs carelink High risk medication use     ICD-10-CM: Z79.899 ICD-9-CM: V58.69 Vitals BP Pulse Height(growth percentile) Weight(growth percentile) BMI Smoking Status 116/58 73 5' 5\" (1.651 m) 132 lb (59.9 kg) 21.97 kg/m2 Never Smoker Vitals History BMI and BSA Data Body Mass Index Body Surface Area  
 21.97 kg/m 2 1.66 m 2 Preferred Pharmacy Pharmacy Name Phone [facility], [address] [phone] Your Updated Medication List  
  
   
This list is accurate as of: 1/24/18 11:36 AM.  Always use your most recent med list.  
  
  
  
  
 amiodarone 200 mg tablet Commonly known as:  CORDARONE  
TAKE ONE TABLET EVERY DAY (PLEASE MAKE APPOINTMENT PAST DUE FOR FOLLOWUP)  
  
 amitriptyline 25 mg tablet Commonly known as:  ELAVIL Take  by mouth nightly. aspirin 81 mg tablet Take 81 mg by mouth daily. furosemide 40 mg tablet Commonly known as:  LASIX Take 1 Tab by mouth two (2) times a day. isosorbide mononitrate ER 60 mg CR tablet Commonly known as:  IMDUR Take 1 Tab by mouth every morning. metOLazone 10 mg tablet Commonly known as:  ZAROXOLYN  
TAKE ONE TABLET EVERY DAY Follow-up Instructions Return in about 6 months (around 7/24/2018) for carelink setup/transmission. To-Do List   
 01/24/2018 Lab:  HEPATIC FUNCTION PANEL   
  
 01/24/2018 Lab:  T4, FREE   
  
 01/31/2018 Lab:  TSH 3RD GENERATION Introducing \[facility]\"" & HEALTH SERVICES! Dear Oziel Medina: Thank you for requesting a [employer] account. Our records indicate that you have previously registered for a [employer] account but its currently inactive. Please call our [employer] support line at [phone]. Additional Information If you have questions, please visit the Frequently Asked Questions section of the [email or web address] website at [email or web address] Remember, [email or web address] is NOT to be used for urgent needs. For medical emergencies, dial 911. Now available from your iPhone and Android! Please provide this summary of care documentation to your next provider. Your primary care clinician is listed as Ceci. If you have any questions after today's visit, please call [phone].

## 2018-01-24 NOTE — LETTER
Matt Ledezma 
6/26/1931 1/24/2018 Dear Fabi Simmons MD 
 
I had the pleasure of evaluating  Ms. Jf Benitez in office today. Below are the relevant portions of my assessment and plan of care. ICD-10-CM ICD-9-CM 1. Diastolic CHF, chronic (HCC) I50.32 428.32   
  428.0   
 stable 
limited activity 2. Essential hypertension I10 401.9   
 controlled 3. Hypercholesteremia E78.00 272.0   
 stable 4. Paroxysmal atrial flutter (HCC) I48.92 427.32   
 stable 5. S/P placement of cardiac pacemaker Z95.0 V45.01   
 needs carelink 6. High risk medication use Z79.899 V58.69 HEPATIC FUNCTION PANEL  
   T4, FREE  
   TSH 3RD GENERATION Current Outpatient Prescriptions Medication Sig Dispense Refill  amiodarone (CORDARONE) 200 mg tablet TAKE ONE TABLET EVERY DAY (PLEASE MAKE APPOINTMENT PAST DUE FOR FOLLOWUP) 15 Tab 0  
 metOLazone (ZAROXOLYN) 10 mg tablet TAKE ONE TABLET EVERY DAY 30 Tab 5  
 isosorbide mononitrate ER (IMDUR) 60 mg CR tablet Take 1 Tab by mouth every morning. 30 Tab 6  furosemide (LASIX) 40 mg tablet Take 1 Tab by mouth two (2) times a day. 60 Tab 6  
 aspirin 81 mg tablet Take 81 mg by mouth daily.  amitriptyline (ELAVIL) 25 mg tablet Take  by mouth nightly. Orders Placed This Encounter  HEPATIC FUNCTION PANEL Standing Status:   Future Standing Expiration Date:   7/25/2018  T4, FREE Standing Status:   Future Standing Expiration Date:   1/25/2019  
 TSH 3RD GENERATION Standing Status:   Future Standing Expiration Date:   2/23/2018 If you have questions, please do not hesitate to call me. I look forward to following Ms. Jf Benitez along with you. Sincerely, Judy Og MD

## 2018-01-24 NOTE — PROGRESS NOTES
HISTORY OF PRESENT ILLNESS  Damaso Jimenez is a 80 y.o. female. HPI Comments: Patient with r chf,had persistent junctional ryhtm ,atrial flutter ,pacemaker,feels better   Sob better    Palpitations    The history is provided by the patient. This is a chronic problem. The problem occurs constantly. Associated symptoms include lower extremity edema. Pertinent negatives include no fever, no chest pain, no claudication, no orthopnea, no PND, no abdominal pain, no nausea, no vomiting, no headaches, no dizziness, no weakness, no cough, no hemoptysis, no shortness of breath and no sputum production. Her past medical history is significant for hypertension. CHF   The history is provided by the patient. This is a chronic problem. The problem occurs constantly. The problem has been gradually improving. Pertinent negatives include no chest pain, no abdominal pain, no headaches and no shortness of breath. The symptoms are aggravated by exertion. Hypertension   The history is provided by the patient. This is a chronic problem. The problem occurs constantly. The problem has not changed since onset. Pertinent negatives include no chest pain, no abdominal pain, no headaches and no shortness of breath. Shortness of Breath   The history is provided by the patient. This is a recurrent problem. The problem occurs intermittently. The problem has been gradually improving. Associated symptoms include leg swelling. Pertinent negatives include no fever, no headaches, no cough, no sputum production, no hemoptysis, no wheezing, no PND, no orthopnea, no chest pain, no vomiting, no abdominal pain, no rash and no claudication. Precipitated by: exertion. Associated medical issues include heart failure. Leg Swelling   The history is provided by the patient. This is a chronic problem. The problem occurs daily. The problem has been gradually improving.  Pertinent negatives include no chest pain, no abdominal pain, no headaches and no shortness of breath. Review of Systems   Constitutional: Negative for chills and fever. HENT: Negative for nosebleeds. Eyes: Negative for blurred vision and double vision. Respiratory: Negative for cough, hemoptysis, sputum production, shortness of breath and wheezing. Cardiovascular: Positive for leg swelling. Negative for chest pain, palpitations, orthopnea, claudication and PND. Gastrointestinal: Negative for abdominal pain, heartburn, nausea and vomiting. Musculoskeletal: Negative for myalgias. Skin: Negative for rash. Neurological: Negative for dizziness, weakness and headaches. Endo/Heme/Allergies: Does not bruise/bleed easily.      Family History   Problem Relation Age of Onset    Arrhythmia Neg Hx     Asthma Neg Hx     Clotting Disorder Neg Hx     Fainting Neg Hx     Heart Attack Neg Hx     High Cholesterol Neg Hx     Pacemaker Neg Hx     Stroke Neg Hx        Past Medical History:   Diagnosis Date    Acute on chronic diastolic heart failure (HCC)     Benign hypertensive heart disease without heart failure     Better controlled, stable    Chronic diastolic heart failure (HCC)     Breathing and edema is improving    Congestive heart failure (HCC)     HTN (hypertension)     Hypercholesteremia     [facility]18/2014    followed by dr Gabi Andino     Obesity, unspecified     Patient has weight loss Discussed diet ad fluid restriction    Other and unspecified hyperlipidemia     F/u per pmd    Tricuspid valve disorders, 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  furosemide (LASIX) 40 mg tablet Take 1 Tab by mouth two (2) times a day.  aspirin 81 mg tablet Take 81 mg by mouth daily.  amitriptyline (ELAVIL) 25 mg tablet Take  by mouth nightly. No current facility-administered medications for this visit. Visit Vitals    /58    Pulse 73    Ht 5' 5\" (1.651 m)    Wt 59.9 kg (132 lb)    BMI 21.97 kg/m2         Physical Exam   Constitutional: She is oriented to person, place, and time. She appears well-developed and well-nourished. HENT:   Head: Normocephalic and atraumatic. Eyes: Conjunctivae are normal.   Neck: Neck supple. No JVD present. No tracheal deviation present. No thyromegaly present. Cardiovascular: Normal rate and regular rhythm. PMI is not displaced. Exam reveals no gallop and no decreased pulses. Murmur heard. Holosystolic murmur is present  at the lower left sternal border  Pulmonary/Chest: No respiratory distress. She has no wheezes. She has no rales. She exhibits no tenderness. Abdominal: Soft. There is no tenderness. Musculoskeletal: She exhibits edema (mild). Neurological: She is alert and oriented to person, place, and time. Skin: Skin is warm. Psychiatric: She has a normal mood and affect. CARDIOLOGY STUDIES 7/1/2011 1/1/2008 1/1/2005   Myocardial Perfusion Scan Result - - PEST, NORMAL   Echocardiogram - Complete Result EF 53% LVH, NORMAL EF, DD ENLARGED LA -   Some recent data might be hidden   Ms. Austin Aguirre has a reminder for a \"due or due soon\" health maintenance. I have asked that she contact her primary care provider for follow-up on this health maintenance. SUMMARY:4/2014  Left ventricle: Ejection fraction was estimated to be 60 %. No obvious  wall motion abnormalities identified in the views obtained. There was mild  concentric hypertrophy.  Features were consistent with a pseudonormal left  ventricular filling pattern, with concomitant abnormal relaxation and  increased filling pressure (grade 2 diastolic dysfunction). Tricuspid valve: There was moderate regurgitation. Pulmonary artery  systolic pressure: 50 mmHg. 11/2015:stress test  1. Normal perfusion scan. 2. Normal wall motion and ejection fraction. 3. Gated images reveal normal wall motion and ejection fraction is  calculated at 59%. 12/07/2015  Pacer check   Noted with A Flutter. D/w daughter on phone  Assessment       ICD-10-CM [id number]    1. Diastolic CHF, chronic (HCC) I50.32 428.32      428.0     stable  limited activity   2. Essential hypertension I10 401.9     controlled   3. Hypercholesteremia E78.00 272.0     stable   4. Paroxysmal atrial flutter (HCC) I48.92 427.32     stable   5. S/P placement of cardiac pacemaker Z95.0 V45.01     needs carelink   6. High risk medication use Z79.899 V58.69 HEPATIC FUNCTION PANEL      T4, FREE      TSH 3RD GENERATION   continue amiodarone to maintain sr,risk of anticoagulation high and would not anticoagulate-discussed with family  I have discussed risk benefit and option of use of amiodarone for arrythmia. Risk of toxicity with medication are informed. Patient will require careful monitoring. Lasix and metolazone used as needed-  There are no discontinued medications. Orders Placed This Encounter    HEPATIC FUNCTION PANEL     Standing Status:   Future     Standing Expiration Date:   7/25/2018    T4, FREE     Standing Status:   Future     Standing Expiration Date:   1/25/2019    TSH 3RD GENERATION     Standing Status:   Future     Standing Expiration Date:   2/23/2018       Follow-up Disposition:  Return in about 6 months (around 7/24/2018) for carelink setup/transmission.

## 2018-01-24 NOTE — PROGRESS NOTES
1. Have you been to the ER, urgent care clinic since your last visit? Hospitalized since your last visit? No    2. Have you seen or consulted any other health care providers outside of the [address] [place] [address] since your last visit? Include any pap smears or colon screening.  Yes Where: PCP Routine  / Dr Tono Price Routine

## 2018-02-07 RX ORDER — AMIODARONE HYDROCHLORIDE 200 MG/1
TABLET ORAL
Qty: 15 TAB | Refills: 0 | Status: SHIPPED | OUTPATIENT
Start: 2018-02-07 | End: 2018-02-17 | Stop reason: SDUPTHER

## 2018-02-19 RX ORDER — AMIODARONE HYDROCHLORIDE 200 MG/1
TABLET ORAL
Qty: 15 TAB | Refills: 0 | Status: SHIPPED | OUTPATIENT
Start: 2018-02-19 | End: 2018-11-02 | Stop reason: SDUPTHER

## 2018-02-19 RX ORDER — AMIODARONE HYDROCHLORIDE 200 MG/1
TABLET ORAL
Qty: 15 TAB | Refills: 0 | Status: SHIPPED | OUTPATIENT
Start: 2018-02-19 | End: 2018-03-08 | Stop reason: SDUPTHER

## 2018-02-20 ENCOUNTER — HOSPITAL ENCOUNTER (OUTPATIENT)
Dept: LAB | Age: 83
Discharge: HOME | End: 2018-02-20
Payer: MEDICARE

## 2018-02-20 DIAGNOSIS — M13.0 POLYARTHROPATHY: ICD-10-CM

## 2018-02-20 DIAGNOSIS — L93.0 LUPUS ERYTHEMATOSUS: ICD-10-CM

## 2018-02-20 DIAGNOSIS — M32.0 DRUG-INDUCED SYSTEMIC LUPUS ERYTHEMATOSUS (HCC): ICD-10-CM

## 2018-02-20 LAB
ALBUMIN SERPL-MCNC: 3.9 G/DL (ref 3.4–5)
ALBUMIN/GLOB SERPL: 1.3 {RATIO} (ref 0.8–1.7)
ALP SERPL-CCNC: 63 U/L (ref 45–117)
ALT SERPL-CCNC: 16 U/L (ref 13–56)
ANION GAP SERPL CALC-SCNC: 8 MMOL/L (ref 3–18)
APPEARANCE UR: CLEAR
AST SERPL-CCNC: 27 U/L (ref 15–37)
BACTERIA URNS QL MICRO: ABNORMAL /HPF
BASOPHILS # BLD: 0 K/UL (ref 0–0.1)
BASOPHILS NFR BLD: 0 % (ref 0–2)
BILIRUB SERPL-MCNC: 0.5 MG/DL (ref 0.2–1)
BILIRUB UR QL: NEGATIVE
BUN SERPL-MCNC: 66 MG/DL (ref 7–18)
BUN/CREAT SERPL: 32 (ref 12–20)
CALCIUM SERPL-MCNC: 9.3 MG/DL (ref 8.5–10.1)
CHLORIDE SERPL-SCNC: 95 MMOL/L (ref 100–108)
CK SERPL-CCNC: 379 U/L (ref 26–192)
CO2 SERPL-SCNC: 39 MMOL/L (ref 21–32)
COLOR UR: YELLOW
CREAT SERPL-MCNC: 2.08 MG/DL (ref 0.6–1.3)
CRP SERPL-MCNC: <0.3 MG/DL (ref 0–0.3)
DIFFERENTIAL METHOD BLD: ABNORMAL
EOSINOPHIL # BLD: 0.2 K/UL (ref 0–0.4)
EOSINOPHIL NFR BLD: 4 % (ref 0–5)
EPITH CASTS URNS QL MICRO: ABNORMAL /LPF (ref 0–5)
ERYTHROCYTE [DISTWIDTH] IN BLOOD BY AUTOMATED COUNT: 14 % (ref 11.6–14.5)
ERYTHROCYTE [SEDIMENTATION RATE] IN BLOOD: 36 MM/HR (ref 0–30)
GLOBULIN SER CALC-MCNC: 2.9 G/DL (ref 2–4)
GLUCOSE SERPL-MCNC: 89 MG/DL (ref 74–99)
GLUCOSE UR STRIP.AUTO-MCNC: NEGATIVE MG/DL
HCT VFR BLD AUTO: 33 % (ref 35–45)
HGB BLD-MCNC: 10.7 G/DL (ref 12–16)
HGB UR QL STRIP: NEGATIVE
KETONES UR QL STRIP.AUTO: NEGATIVE MG/DL
LEUKOCYTE ESTERASE UR QL STRIP.AUTO: ABNORMAL
LYMPHOCYTES # BLD: 1.6 K/UL (ref 0.9–3.6)
LYMPHOCYTES NFR BLD: 29 % (ref 21–52)
MCH RBC QN AUTO: 29.6 PG (ref 24–34)
MCHC RBC AUTO-ENTMCNC: 32.4 G/DL (ref 31–37)
MCV RBC AUTO: 91.4 FL (ref 74–97)
MONOCYTES # BLD: 0.8 K/UL (ref 0.05–1.2)
MONOCYTES NFR BLD: 14 % (ref 3–10)
NEUTS SEG # BLD: 2.8 K/UL (ref 1.8–8)
NEUTS SEG NFR BLD: 53 % (ref 40–73)
NITRITE UR QL STRIP.AUTO: NEGATIVE
PH UR STRIP: 7 [PH] (ref 5–8)
PLATELET # BLD AUTO: 149 K/UL (ref 135–420)
PMV BLD AUTO: 9.3 FL (ref 9.2–11.8)
POTASSIUM SERPL-SCNC: 2.7 MMOL/L (ref 3.5–5.5)
PROT SERPL-MCNC: 6.8 G/DL (ref 6.4–8.2)
PROT UR STRIP-MCNC: NEGATIVE MG/DL
RBC # BLD AUTO: 3.61 M/UL (ref 4.2–5.3)
RBC #/AREA URNS HPF: NEGATIVE /HPF (ref 0–5)
SODIUM SERPL-SCNC: 142 MMOL/L (ref 136–145)
SP GR UR REFRACTOMETRY: 1.01 (ref 1–1.03)
UROBILINOGEN UR QL STRIP.AUTO: 0.2 EU/DL (ref 0.2–1)
WBC # BLD AUTO: 5.4 K/UL (ref 4.6–13.2)
WBC URNS QL MICRO: ABNORMAL /HPF (ref 0–4)

## 2018-02-20 PROCEDURE — 85025 COMPLETE CBC W/AUTO DIFF WBC: CPT | Performed by: SPECIALIST

## 2018-02-20 PROCEDURE — 85652 RBC SED RATE AUTOMATED: CPT | Performed by: SPECIALIST

## 2018-02-20 PROCEDURE — 81001 URINALYSIS AUTO W/SCOPE: CPT | Performed by: SPECIALIST

## 2018-02-20 PROCEDURE — 86140 C-REACTIVE PROTEIN: CPT | Performed by: SPECIALIST

## 2018-02-20 PROCEDURE — 82550 ASSAY OF CK (CPK): CPT | Performed by: SPECIALIST

## 2018-02-20 PROCEDURE — 80053 COMPREHEN METABOLIC PANEL: CPT | Performed by: SPECIALIST

## 2018-02-20 PROCEDURE — 36415 COLL VENOUS BLD VENIPUNCTURE: CPT | Performed by: SPECIALIST

## 2018-02-20 RX ORDER — AMIODARONE HYDROCHLORIDE 200 MG/1
TABLET ORAL
Qty: 15 TAB | Refills: 0 | Status: SHIPPED | OUTPATIENT
Start: 2018-02-20 | End: 2018-07-23 | Stop reason: SDUPTHER

## 2018-03-20 RX ORDER — ISOSORBIDE MONONITRATE 60 MG/1
TABLET, EXTENDED RELEASE ORAL
Qty: 30 TAB | Refills: 5 | Status: SHIPPED | OUTPATIENT
Start: 2018-03-20 | End: 2018-10-03 | Stop reason: SDUPTHER

## 2018-04-04 RX ORDER — FUROSEMIDE 40 MG/1
TABLET ORAL
Qty: 60 TAB | Refills: 3 | Status: SHIPPED | OUTPATIENT
Start: 2018-04-04 | End: 2018-10-12 | Stop reason: SDUPTHER

## 2018-04-17 ENCOUNTER — HOSPITAL ENCOUNTER (OUTPATIENT)
Dept: LAB | Age: 83
Discharge: HOME | End: 2018-04-17
Payer: MEDICARE

## 2018-04-17 DIAGNOSIS — M13.0 POLYARTHROPATHY: ICD-10-CM

## 2018-04-17 DIAGNOSIS — M65.9 SYNOVITIS AND TENOSYNOVITIS, UNSPECIFIED: ICD-10-CM

## 2018-04-17 DIAGNOSIS — M12.219: ICD-10-CM

## 2018-04-17 LAB
APPEARANCE UR: CLEAR
BACTERIA URNS QL MICRO: ABNORMAL /HPF
BASOPHILS # BLD: 0 K/UL (ref 0–0.06)
BASOPHILS NFR BLD: 0 % (ref 0–2)
BILIRUB UR QL: NEGATIVE
COLOR UR: YELLOW
DIFFERENTIAL METHOD BLD: ABNORMAL
EOSINOPHIL # BLD: 0.1 K/UL (ref 0–0.4)
EOSINOPHIL NFR BLD: 2 % (ref 0–5)
EPITH CASTS URNS QL MICRO: ABNORMAL /LPF (ref 0–5)
ERYTHROCYTE [DISTWIDTH] IN BLOOD BY AUTOMATED COUNT: 14.1 % (ref 11.6–14.5)
ERYTHROCYTE [SEDIMENTATION RATE] IN BLOOD: 31 MM/HR (ref 0–30)
GLUCOSE UR STRIP.AUTO-MCNC: NEGATIVE MG/DL
HCT VFR BLD AUTO: 34 % (ref 35–45)
HGB BLD-MCNC: 11.2 G/DL (ref 12–16)
HGB UR QL STRIP: NEGATIVE
KETONES UR QL STRIP.AUTO: NEGATIVE MG/DL
LEUKOCYTE ESTERASE UR QL STRIP.AUTO: ABNORMAL
LYMPHOCYTES # BLD: 1.2 K/UL (ref 0.9–3.6)
LYMPHOCYTES NFR BLD: 14 % (ref 21–52)
MCH RBC QN AUTO: 29.6 PG (ref 24–34)
MCHC RBC AUTO-ENTMCNC: 32.9 G/DL (ref 31–37)
MCV RBC AUTO: 89.7 FL (ref 74–97)
MONOCYTES # BLD: 1.1 K/UL (ref 0.05–1.2)
MONOCYTES NFR BLD: 12 % (ref 3–10)
NEUTS SEG # BLD: 6.3 K/UL (ref 1.8–8)
NEUTS SEG NFR BLD: 72 % (ref 40–73)
NITRITE UR QL STRIP.AUTO: NEGATIVE
PH UR STRIP: 7 [PH] (ref 5–8)
PLATELET # BLD AUTO: 152 K/UL (ref 135–420)
PMV BLD AUTO: 9.4 FL (ref 9.2–11.8)
PROT UR STRIP-MCNC: NEGATIVE MG/DL
RBC # BLD AUTO: 3.79 M/UL (ref 4.2–5.3)
RBC #/AREA URNS HPF: NEGATIVE /HPF (ref 0–5)
SP GR UR REFRACTOMETRY: 1.01 (ref 1–1.03)
UROBILINOGEN UR QL STRIP.AUTO: 0.2 EU/DL (ref 0.2–1)
WBC # BLD AUTO: 8.7 K/UL (ref 4.6–13.2)
WBC URNS QL MICRO: ABNORMAL /HPF (ref 0–4)

## 2018-04-17 PROCEDURE — 85025 COMPLETE CBC W/AUTO DIFF WBC: CPT | Performed by: SPECIALIST

## 2018-04-17 PROCEDURE — 36415 COLL VENOUS BLD VENIPUNCTURE: CPT | Performed by: SPECIALIST

## 2018-04-17 PROCEDURE — 86140 C-REACTIVE PROTEIN: CPT | Performed by: SPECIALIST

## 2018-04-17 PROCEDURE — 85652 RBC SED RATE AUTOMATED: CPT | Performed by: SPECIALIST

## 2018-04-17 PROCEDURE — 81001 URINALYSIS AUTO W/SCOPE: CPT | Performed by: SPECIALIST

## 2018-04-18 LAB — CRP SERPL-MCNC: 3.2 MG/DL (ref 0–0.3)

## 2018-04-28 ENCOUNTER — HOSPITAL ENCOUNTER (OUTPATIENT)
Dept: GENERAL RADIOLOGY | Age: 83
Discharge: HOME | End: 2018-04-28
Payer: MEDICARE

## 2018-04-28 DIAGNOSIS — M12.219: ICD-10-CM

## 2018-04-28 DIAGNOSIS — M79.609 PAIN IN LIMB: ICD-10-CM

## 2018-04-28 DIAGNOSIS — M13.0 POLYARTHROPATHY: ICD-10-CM

## 2018-04-28 DIAGNOSIS — L93.0 LUPUS ERYTHEMATOSUS: ICD-10-CM

## 2018-04-28 PROCEDURE — 73030 X-RAY EXAM OF SHOULDER: CPT

## 2018-05-04 ENCOUNTER — HOSPITAL ENCOUNTER (OUTPATIENT)
Dept: VASCULAR SURGERY | Age: 83
Discharge: HOME | End: 2018-05-04
Attending: SPECIALIST
Payer: MEDICARE

## 2018-05-04 DIAGNOSIS — M79.609 PAIN IN LIMB: ICD-10-CM

## 2018-05-04 PROCEDURE — 93970 EXTREMITY STUDY: CPT

## 2018-05-04 NOTE — PROCEDURES
[facility]  *** FINAL REPORT ***    Name: Joe Charles  MRN: [id number]    Outpatient  : 1931  HIS Order #: [id number]  [address] [facility] Visit #: [id number]  Date: 04 May 2018    TYPE OF TEST: Peripheral Venous Testing    REASON FOR TEST  Pain in limb, Limb swelling    Right Leg:-  Deep venous thrombosis:           No  Superficial venous thrombosis:    No  Deep venous insufficiency:        Not examined  Superficial venous insufficiency: Not examined    Left Leg:-  Deep venous thrombosis:           No  Superficial venous thrombosis:    No  Deep venous insufficiency:        Not examined  Superficial venous insufficiency: Not examined      INTERPRETATION/FINDINGS  Duplex images were obtained using 2-D gray scale, color flow, and  spectral Doppler analysis. Right leg :  1. Deep veins visualized include the common femoral, femoral,  popliteal, posterior tibial and peroneal veins. 2. No evidence of deep venous thrombosis detected in the veins  visualized. 3. Superficial veins visualized include the great saphenous vein. 4. No evidence of superficial thrombosis detected. 5. Normal multiphasic flow in the posterior tibial artery. Left leg :  1. Deep veins visualized include the common femoral, femoral,  popliteal, posterior tibial and peroneal veins. 2. No evidence of deep venous thrombosis detected in the veins  visualized. 3. Superficial veins visualized include the great saphenous vein. 4. No evidence of superficial thrombosis detected. 5. Normal multiphasic flow in the posterior tibial artery. ADDITIONAL COMMENTS    I have personally reviewed the data relevant to the interpretation of  this  study. TECHNOLOGIST: VINEET Holbrook RVS  Signed: 2018 05:22 PM    PHYSICIAN: Tristan Forman.  Maria Esther Victoria MD  Signed: 2018 08:57 AM

## 2018-07-16 RX ORDER — METOLAZONE 10 MG/1
10 TABLET ORAL DAILY
Qty: 30 TAB | Refills: 2 | Status: SHIPPED | OUTPATIENT
Start: 2018-07-16 | End: 2018-09-22 | Stop reason: SDUPTHER

## 2018-07-17 ENCOUNTER — TELEPHONE (OUTPATIENT)
Dept: CARDIOLOGY CLINIC | Age: 83
End: 2018-07-17

## 2018-07-17 DIAGNOSIS — I50.22 CHRONIC SYSTOLIC CONGESTIVE HEART FAILURE (HCC): Primary | ICD-10-CM

## 2018-07-20 ENCOUNTER — OFFICE VISIT (OUTPATIENT)
Dept: SURGERY | Age: 83
End: 2018-07-20

## 2018-07-20 VITALS
TEMPERATURE: 98.1 F | SYSTOLIC BLOOD PRESSURE: 122 MMHG | DIASTOLIC BLOOD PRESSURE: 60 MMHG | RESPIRATION RATE: 18 BRPM | HEART RATE: 70 BPM | OXYGEN SATURATION: 98 %

## 2018-07-20 DIAGNOSIS — R10.33 PERIUMBILICAL ABDOMINAL PAIN: Primary | ICD-10-CM

## 2018-07-20 DIAGNOSIS — K43.9 VENTRAL HERNIA WITHOUT OBSTRUCTION OR GANGRENE: ICD-10-CM

## 2018-07-20 RX ORDER — MELOXICAM 7.5 MG/1
TABLET ORAL
COMMUNITY
Start: 2018-07-09 | End: 2018-12-31

## 2018-07-20 RX ORDER — PREDNISONE 5 MG/1
TABLET ORAL
COMMUNITY
End: 2018-12-31

## 2018-07-20 RX ORDER — MEMANTINE HYDROCHLORIDE 10 MG/1
10 TABLET ORAL 2 TIMES DAILY
COMMUNITY
Start: 2018-07-10

## 2018-07-20 NOTE — PROGRESS NOTES
General Surgery Consult      Tabby Ross  Admit date: (Not on file)    MRN: [id number]     : 1931     Age: 80 y.o. Attending Physician: Luis Gaffney MD [facility]      History of Present Illness: Tabby Ross is a 80 y.o. female was referred for evaluation of a large ventral hernia the patient had been present for many years, probably 2-3 decades if not more according to her son and daughter. They stated that over the last couple years the hernia has been increasing in size. the patient has multiple comorbidities including congestive heart failure, atrial fibrillation, cardiac pacemaker, congestive heart failure and anasarca. So it was thought at one point that the swelling is secondary to fluids. The patient is complaining of some abdominal pain. The pain is on and off. The patient is using a wheelchair for movement. Her pain is worse with movement. There is no history of strangulation or incarceration. There is no history of nausea and vomiting. She had a CT scan in  that did show a small ventral and umbilical hernia.      Patient Active Problem List    Diagnosis Date Noted    Diastolic CHF, chronic ([address] 2016    High risk medication use 2016    Acute on chronic diastolic (congestive) heart failure ([address] 2016    Paroxysmal atrial flutter ([address] 2016    S/P placement of cardiac pacemaker 2015    Hypertensive heart disease with CHF (congestive heart failure) ([address] 2015    Anasarca 2015    Tricuspid valve disorders, non-rheumatic 2014    Lupus (systemic lupus erythematosus) ([address] 2014    HTN (hypertension)     Hypercholesteremia      Past Medical History:   Diagnosis Date    Acute on chronic diastolic heart failure (HCC)     Arthritis     Benign hypertensive heart disease without heart failure     Better controlled, stable    Chronic diastolic heart failure (HCC)     Breathing and edema is improving    [facility]     HTN (hypertension)     Hypercholesteremia     Lup[facility]ic lupus [facility]mat[facility]) [facility] 6/18/2014    followed by dr Patricia Stack     Obesity, unspecified     Patient has weight loss Discussed diet ad fluid restriction    Other and unspecified hyperlipidemia     F/u per pmd    Tricuspid valve disorders, 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    tr with moderate pulmonary htn       Past Surgical History:   Procedure Laterality Date    HX HYSTERECTOMY      PACEMAKER PROCEDURE        Social History   Substance Use Topics    Smoking status: Never Smoker    Smokeless tobacco: Never Used    Alcohol use No      History   Smoking Status    Never Smoker   Smokeless Tobacco    Never Used     Family History   Problem Relation Age of Onset    Arrhythmia Neg Hx     Asthma Neg Hx     Clotting Disorder Neg Hx     Fainting Neg Hx     Heart Attack Neg Hx     High Cholesterol Neg Hx     Pacemaker Neg Hx     Stroke Neg Hx       Current Outpatient Prescriptions   Medication Sig    meloxicam (MOBIC) 7.5 mg tablet     memantine (NAMENDA) 10 mg tablet     predniSONE (DELTASONE) 5 mg tablet Take  by mouth.  metOLazone (ZAROXOLYN) 10 mg tablet Take 1 Tab by mouth daily.  furosemide (LASIX) 40 mg tablet TAKE ONE TABLET TWO TIMES A DAY    isosorbide mononitrate ER (IMDUR) 60 mg CR tablet TAKE ONE TABLET EVERY MORNING    amiodarone (CORDARONE) 200 mg tablet TAKE ONE TABLET EVERY DAY (MAKE AN APPT)    amitriptyline (ELAVIL) 25 mg tablet Take  by mouth nightly.  amiodarone (CORDARONE) 200 mg tablet TAKE ONE TABLET BY MOUTH EVERY DAY PLEASE MAKE APPOINTMENT    amiodarone (CORDARONE) 200 mg tablet TAKE ONE TABLET BY MOUTH EVERY DAY PLEASE MAKE APPOINTMENT    aspirin 81 mg tablet Take 81 mg by mouth daily. No current facility-administered medications for this visit.        No Known Allergies     Review of Systems:  Constitutional: negative  Eyes: negative  Ears, Nose, Mouth, Throat, and Face: negative  Respiratory: negative  Cardiovascular: negative  Gastrointestinal: positive for abdominal pain  Genitourinary:negative  Integument/Breast: negative  Hematologic/Lymphatic: negative  Musculoskeletal:negative  Neurological: negative  Behavioral/Psychiatric: negative  Endocrine: negative  Allergic/Immunologic: negative    Objective:     Visit Vitals    /60    Pulse 70    Temp 98.1 °F (36.7 °C)    Resp 18    SpO2 98%       Physical Exam:      General:  in no apparent distress, alert, normal vitals, anicteric and cooperative   Eyes:  conjunctivae and sclerae normal, pupils equal, round, reactive to light   Throat & Neck: no erythema or exudates noted and neck supple and symmetrical; no palpable masses   Lungs:   clear to auscultation bilaterally   Heart:  Regular rate and rhythm   Abdomen:   rounded, soft, nontender, nondistended, no masses or organomegaly. There is a large ventral hernia that is mildly tender to palpation but reducible.     Extremities: extremities normal, atraumatic, no cyanosis or edema   Skin: Normal.       Imaging and Lab Review:     CBC:   Lab Results   Component Value Date/Time    WBC 8.7 04/17/2018 10:41 AM    RBC 3.79 (L) 04/17/2018 10:41 AM    HGB 11.2 (L) 04/17/2018 10:41 AM    HCT 34.0 (L) 04/17/2018 10:41 AM    PLATELET 466 35/41/3271 10:41 AM     BMP:   Lab Results   Component Value Date/Time    Glucose 89 02/20/2018 12:18 PM    Glucose 88 03/17/2016 11:15 AM    Sodium 142 02/20/2018 12:18 PM    Potassium 2.7 (LL) 02/20/2018 12:18 PM    Chloride 95 (L) 02/20/2018 12:18 PM    CO2 39 (H) 02/20/2018 12:18 PM    BUN 66 (H) 02/20/2018 12:18 PM    Creatinine 2.08 (H) 02/20/2018 12:18 PM    Calcium 9.3 02/20/2018 12:18 PM     CMP:  Lab Results   Component Value Date/Time    Glucose 89 02/20/2018 12:18 PM    Glucose 88 03/17/2016 11:15 AM    Sodium 142 02/20/2018 12:18 PM    Potassium 2.7 (LL) 02/20/2018 12:18 PM    Chloride 95 (L) 02/20/2018 12:18 PM    CO2 39 (H) 02/20/2018 12:18 PM    BUN 66 (H) 02/20/2018 12:18 PM    Creatinine 2.08 (H) 02/20/2018 12:18 PM    Calcium 9.3 02/20/2018 12:18 PM    Anion gap 8 02/20/2018 12:18 PM    BUN/Creatinine ratio 32 (H) 02/20/2018 12:18 PM    Alk. phosphatase 63 02/20/2018 12:18 PM    Protein, total 6.8 02/20/2018 12:18 PM    Albumin 3.9 02/20/2018 12:18 PM    Globulin 2.9 02/20/2018 12:18 PM    A-G Ratio 1.3 02/20/2018 12:18 PM       No results found for this or any previous visit (from the past 24 hour(s)). images and reports reviewed    Assessment:   Luda Dang is a 80 y.o. female is presenting with a picture of large ventral hernia. I Discussed the possibility of incarceration, strangulation, enlargement in size over time, and the risk of emergency surgery in the face of strangulation. I also discussed the use of prosthetic materials (mesh), including the risk of infection. Also discussed the risk of surgery including recurrence and the possible need for reoperation and removal of mesh if used, possibility of postoperative small bowel injury, obstruction or ileus, and the risks of general anesthetic. I explained to the the patient, her son and her daughter that she is at very high risk for surgery and giving her medical condition and her age, I am advising against the surgery. The family agreed to hold on the surgery. Plan:     Patient is currently too high risk for the surgery. The risk/benefit is better for observation vs. Surgery.   If the cardiology and PCP team decide that she is not at risk for major complication from the general anesthesia, then I will consider robotic ventral hernia repair with mesh  No need for any current surgical intervention  Follow-up as needed    Please call me if you have any questions (cell phone: [phone])     Signed By: Jono Vang MD     July 20, 2018

## 2018-07-23 ENCOUNTER — OFFICE VISIT (OUTPATIENT)
Dept: CARDIOLOGY CLINIC | Age: 83
End: 2018-07-23

## 2018-07-23 VITALS — HEIGHT: 65 IN | HEART RATE: 70 BPM | SYSTOLIC BLOOD PRESSURE: 122 MMHG | DIASTOLIC BLOOD PRESSURE: 56 MMHG

## 2018-07-23 DIAGNOSIS — Z79.899 HIGH RISK MEDICATION USE: ICD-10-CM

## 2018-07-23 DIAGNOSIS — I48.92 PAROXYSMAL ATRIAL FLUTTER (HCC): ICD-10-CM

## 2018-07-23 DIAGNOSIS — Z95.0 S/P PLACEMENT OF CARDIAC PACEMAKER: ICD-10-CM

## 2018-07-23 DIAGNOSIS — I36.9 TRICUSPID VALVE DISORDERS, NON-RHEUMATIC: ICD-10-CM

## 2018-07-23 DIAGNOSIS — I50.32 DIASTOLIC CHF, CHRONIC (HCC): Primary | ICD-10-CM

## 2018-07-23 DIAGNOSIS — I50.32 HYPERTENSIVE HEART DISEASE WITH CHRONIC DIASTOLIC CONGESTIVE HEART FAILURE (HCC): ICD-10-CM

## 2018-07-23 DIAGNOSIS — I11.0 HYPERTENSIVE HEART DISEASE WITH CHRONIC DIASTOLIC CONGESTIVE HEART FAILURE (HCC): ICD-10-CM

## 2018-07-23 NOTE — PROGRESS NOTES
HISTORY OF PRESENT ILLNESS  Oliverio Oconnell is a 80 y.o. female. HPI Comments: Patient with  chf,had persistent junctional ryhtm ,atrial flutter , s/p pacemaker,feels better   Sob better    Pacemaker Check   The history is provided by the patient. This is a new problem. Pertinent negatives include no chest pain, no abdominal pain, no headaches and no shortness of breath. CHF   The history is provided by the patient. This is a chronic problem. The problem occurs constantly. The problem has been gradually improving. Pertinent negatives include no chest pain, no abdominal pain, no headaches and no shortness of breath. The symptoms are aggravated by exertion. Palpitations    The history is provided by the patient. This is a chronic problem. The problem occurs constantly. Associated symptoms include lower extremity edema. Pertinent negatives include no fever, no chest pain, no claudication, no orthopnea, no PND, no abdominal pain, no nausea, no vomiting, no headaches, no dizziness, no weakness, no cough, no hemoptysis, no shortness of breath and no sputum production. Her past medical history is significant for hypertension. Hypertension   The history is provided by the patient. This is a chronic problem. The problem occurs constantly. The problem has not changed since onset. Pertinent negatives include no chest pain, no abdominal pain, no headaches and no shortness of breath. Shortness of Breath   The history is provided by the patient. This is a recurrent problem. The problem occurs intermittently. The problem has been gradually improving. Associated symptoms include leg swelling. Pertinent negatives include no fever, no headaches, no cough, no sputum production, no hemoptysis, no wheezing, no PND, no orthopnea, no chest pain, no vomiting, no abdominal pain, no rash and no claudication. Precipitated by: exertion. Associated medical issues include heart failure.    Leg Swelling   The history is provided by the patient. This is a chronic problem. The problem occurs daily. The problem has been gradually improving. Pertinent negatives include no chest pain, no abdominal pain, no headaches and no shortness of breath. Review of Systems   Constitutional: Negative for chills and fever. HENT: Negative for nosebleeds. Eyes: Negative for blurred vision and double vision. Respiratory: Negative for cough, hemoptysis, sputum production, shortness of breath and wheezing. Cardiovascular: Positive for leg swelling. Negative for chest pain, palpitations, orthopnea, claudication and PND. Gastrointestinal: Negative for abdominal pain, heartburn, nausea and vomiting. Musculoskeletal: Negative for myalgias. Skin: Negative for rash. Neurological: Negative for dizziness, weakness and headaches. Endo/Heme/Allergies: Does not bruise/bleed easily.      Family History   Problem Relation Age of Onset    Arrhythmia Neg Hx     Asthma Neg Hx     Clotting Disorder Neg Hx     Fainting Neg Hx     Heart Attack Neg Hx     High Cholesterol Neg Hx     Pacemaker Neg Hx     Stroke Neg Hx        Past Medical History:   Diagnosis Date    Acute on chronic diastolic heart failure (HCC)     Arthritis     Benign hypertensive heart disease without heart failure     Better controlled, stable    Chronic diastolic heart failure (HCC)     Breathing and edema is improving    Congestive heart failure (HCC)     HTN (hypertension)     Hypercholesteremia     Lupus [facility]ic lupus erythematosus) [facility] 6/18/2014    followed by dr Ayaka Torres     Obesity, unspecified     Patient has weight loss Discussed diet ad fluid restriction    Other and unspecified hyperlipidemia     F/u per pmd    Tricuspid valve disorders, 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    tr with moderate pulmonary htn        Past Surgical History:   Procedure Laterality Date    HX HYSTERECTOMY      PACEMAKER PROCEDURE         No Known Allergies    Current Outpatient Prescriptions   Medication Sig    meloxicam (MOBIC) 7.5 mg tablet     memantine (NAMENDA) 10 mg tablet     predniSONE (DELTASONE) 5 mg tablet Take  by mouth.  metOLazone (ZAROXOLYN) 10 mg tablet Take 1 Tab by mouth daily.  furosemide (LASIX) 40 mg tablet TAKE ONE TABLET TWO TIMES A DAY    isosorbide mononitrate ER (IMDUR) 60 mg CR tablet TAKE ONE TABLET EVERY MORNING    amiodarone (CORDARONE) 200 mg tablet TAKE ONE TABLET BY MOUTH EVERY DAY PLEASE MAKE APPOINTMENT    amitriptyline (ELAVIL) 25 mg tablet Take  by mouth nightly.  aspirin 81 mg tablet Take 81 mg by mouth daily. No current facility-administered medications for this visit. Visit Vitals    /56    Pulse 70    Ht 5' 5\" (1.651 m)         Physical Exam   Constitutional: She is oriented to person, place, and time. She appears well-developed and well-nourished. HENT:   Head: Normocephalic and atraumatic. Eyes: Conjunctivae are normal.   Neck: Neck supple. No JVD present. No tracheal deviation present. No thyromegaly present. Cardiovascular: Normal rate and regular rhythm. PMI is not displaced. Exam reveals no gallop and no decreased pulses. Murmur heard. Holosystolic murmur is present  at the lower left sternal border  Pulmonary/Chest: No respiratory distress. She has no wheezes. She has no rales. She exhibits no tenderness. Abdominal: Soft. There is no tenderness. Musculoskeletal: She exhibits edema (mild). Neurological: She is alert and oriented to person, place, and time. Skin: Skin is warm. Psychiatric: She has a normal mood and affect. CARDIOLOGY STUDIES 7/1/2011 1/1/2008 1/1/2005   Myocardial Perfusion Scan Result - - PEST, NORMAL   Echocardiogram - Complete Result EF 53% LVH, NORMAL EF, DD ENLARGED LA -   Some recent data might be hidden   Ms. Jayda Mayen has a reminder for a \"due or due soon\" health maintenance.  I have asked that she contact her primary care provider for follow-up on this health maintenance. SUMMARY:4/2014  Left ventricle: Ejection fraction was estimated to be 60 %. No obvious  wall motion abnormalities identified in the views obtained. There was mild  concentric hypertrophy. Features were consistent with a pseudonormal left  ventricular filling pattern, with concomitant abnormal relaxation and  increased filling pressure (grade 2 diastolic dysfunction). Tricuspid valve: There was moderate regurgitation. Pulmonary artery  systolic pressure: 50 mmHg. 11/2015:stress test  1. Normal perfusion scan. 2. Normal wall motion and ejection fraction. 3. Gated images reveal normal wall motion and ejection fraction is  calculated at 59%. 12/07/2015  Pacer check   Noted with A Flutter. D/w daughter on phone  07/23/18  Pacemaker check normal function. No A. fib. Atrial heart rate up to 159. Assessment       ICD-10-CM [facility]-[id number]    1. Diastolic CHF, chronic (HCC) I50.32 428.32      428.0     Stable. Continue monitoring and treatment. 2. Paroxysmal atrial flutter (HCC) I48.92 427.32     Stable. On amiodarone. Stable on recent pacemaker check. 3. Hypertensive heart disease with chronic diastolic congestive heart failure (HCC) I11.0 402.91     I50.32 428.32     Stable. 4. Tricuspid valve disorders, 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 424.2    5. S/P placement of cardiac pacemaker Z95.0 V45.01 INTERROGATION DEV ELISSA,IN PERSON, WITH DR ROMO,RPT    Normal function. Checked in office today   6. High risk medication use Z79.899 V58.69 TSH 3RD GENERATION      T4, FREE      HEPATIC FUNCTION PANEL    Amiodarone for A. fib. continue amiodarone to maintain sr,risk of anticoagulation high and would not anticoagulate-discussed with family  I have discussed risk benefit and option of use of amiodarone for arrythmia. Risk of toxicity with medication are informed. Patient will require careful monitoring.   Lasix and metolazone used as needed-  Medications Discontinued During This Encounter   Medication Reason    amiodarone (CORDARONE) 652 mg tablet Duplicate Order    amiodarone (CORDARONE) 797 mg tablet Duplicate Order       Orders Placed This Encounter    TSH 3RD GENERATION     Standing Status:   Future     Standing Expiration Date:   8/22/2018    T4, FREE     Standing Status:   Future     Standing Expiration Date:   7/24/2019    HEPATIC FUNCTION PANEL     Standing Status:   Future     Standing Expiration Date:   1/21/2019    INTERROGATION DEV ELISSA,IN PERSON, WITH DR ROMO,RPT     Order Specific Question:   Reason for Exam:     Answer:   ppm       Follow-up Disposition:  Return in about 6 months (around 1/23/2019).

## 2018-07-23 NOTE — PROGRESS NOTES
1. Have you been to the ER, urgent care clinic since your last visit? Hospitalized since your last visit?     no    2. Have you seen or consulted any other health care providers outside of the [facility] since your last visit? Include any pap smears or colon screening. Yes Where: pcp     3. Since your last visit, have you had any of the following symptoms?  no

## 2018-07-23 NOTE — LETTER
Luda Dang 
6/26/1931 7/23/2018 Dear Kane Colvin MD 
 
I had the pleasure of evaluating  Ms. Nubia Barrientos in office today. Below are the relevant portions of my assessment and plan of care. ICD-10-CM ICD-9-CM 1. Diastolic CHF, chronic (HCC) I50.32 428.32   
  428.0 Stable. Continue monitoring and treatment. 2. Paroxysmal atrial flutter (HCC) I48.92 427.32 Stable. On amiodarone. Stable on recent pacemaker check. 3. Hypertensive heart disease with chronic diastolic congestive heart failure (HCC) I11.0 402.91 I50.32 428.32 Stable. 4. Tricuspid valve disorders, 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 424.2 5. S/P placement of cardiac pacemaker Z95.0 V45.01 INTERROGATION DEV ELISSA,IN PERSON, WITH SHAKA STROUD Normal function. Checked in office today 6. High risk medication use Z79.899 V58.69 TSH 3RD GENERATION  
   T4, FREE  
   HEPATIC FUNCTION PANEL Amiodarone for A. fib. Current Outpatient Prescriptions Medication Sig Dispense Refill  meloxicam (MOBIC) 7.5 mg tablet  memantine (NAMENDA) 10 mg tablet  predniSONE (DELTASONE) 5 mg tablet Take  by mouth.  metOLazone (ZAROXOLYN) 10 mg tablet Take 1 Tab by mouth daily. 30 Tab 2  
 furosemide (LASIX) 40 mg tablet TAKE ONE TABLET TWO TIMES A DAY 60 Tab 3  
 isosorbide mononitrate ER (IMDUR) 60 mg CR tablet TAKE ONE TABLET EVERY MORNING 30 Tab 5  
 amiodarone (CORDARONE) 200 mg tablet TAKE ONE TABLET BY MOUTH EVERY DAY PLEASE MAKE APPOINTMENT 15 Tab 0  
 amitriptyline (ELAVIL) 25 mg tablet Take  by mouth nightly.  aspirin 81 mg tablet Take 81 mg by mouth daily. Orders Placed This Encounter  TSH 3RD GENERATION Standing Status:   Future Standing Expiration Date:   8/22/2018  T4, FREE Standing Status:   Future Standing Expiration Date:   7/24/2019  
 HEPATIC FUNCTION PANEL Standing Status:   Future Standing Expiration Date:   1/21/2019  INTERROGATION CALEB BOWERS,IN PERSON, WITH DR ROMORPT Order Specific Question:   Reason for Exam: Answer:   ppm  
 
If you have questions, please do not hesitate to call me. I look forward to following Ms. Juni Dejesus along with you. Sincerely, Maria Guadalupe Ramirez MD

## 2018-07-23 NOTE — MR AVS SNAPSHOT
Dion Luis 
 
 
 [address], [address] [facility] [address] [phone] Patient: Dianna Frias MRN: [id number] QOV:[id number] Visit Information Date & Time Provider Department Dept. Phone Encounter #  
 7/23/2018  9:45 AM Virgie Luna MD [facility] [address] [place] [address] [id number] Follow-up Instructions Return in about 6 months (around 1/23/2019). Your Appointments 1/21/2019 10:30 AM  
Office Visit with Virgie Luna MD  
[facility]) Appt Note: 6 months with [employer] ck [address], [address] [facility] [address] [facility], [address] [place], [place] [address]a [place] Upcoming Health Maintenance Date Due DTaP/Tdap/Td series (1 - Tdap) 6/26/1952 ZOSTER VACCINE AGE 60> 4/26/1991 GLAUCOMA SCREENING Q2Y 6/26/1996 Bone Densitometry (Dexa) Screening 6/26/1996 Pneumococcal 65+ Low/Medium Risk (1 of 2 - PCV13) 6/26/1996 MEDICARE YEARLY EXAM 3/14/2018 Influenza Age 5 to Adult 8/1/2018 Allergies as of 7/23/2018  Review Complete On: 7/23/2018 By: Anil Zavala No Known Allergies Current Immunizations  Reviewed on 5/9/2016 No immunizations on file. Not reviewed this visit You Were Diagnosed With   
  
 Codes Comments Diastolic CHF, chronic (HCC)    -  Primary ICD-10-CM: I50.32 
ICD-9-CM: 428.32, 428.0 Stable. Continue monitoring and treatment. Paroxysmal atrial flutter (HCC)     ICD-10-CM: I48.92 
ICD-9-CM: 427.32 Stable. On amiodarone. Stable on recent pacemaker check. Hypertensive heart disease with chronic diastolic congestive heart failure (HCC)     ICD-10-CM: I11.0, I50.32 
ICD-9-CM: 402.91, 428.32 Stable. Tricuspid valve disorders, non-rheumatic     ICD-10-CM: I36.9 ICD-9-CM: 424.2 S/P placement of cardiac pacemaker     ICD-10-CM: Z95.0 ICD-9-CM: V45.01 Normal function. Checked in office today High risk medication use     ICD-10-CM: Z79.899 ICD-9-CM: V58.69 Amiodarone for A. fib. Vitals BP Pulse Height(growth percentile) OB Status Smoking Status 122/56 79 5' 5\" (1.651 m) Hysterectomy Never Smoker Vitals History Preferred Pharmacy Pharmacy Name Phone [facility], [address] [phone] Your Updated Medication List  
  
   
This list is accurate as of 7/23/18 11:22 AM.  Always use your most recent med list.  
  
  
  
  
 amiodarone 200 mg tablet Commonly known as:  CORDARONE  
TAKE ONE TABLET BY MOUTH EVERY DAY PLEASE MAKE APPOINTMENT  
  
 amitriptyline 25 mg tablet Commonly known as:  ELAVIL Take  by mouth nightly. aspirin 81 mg tablet Take 81 mg by mouth daily. furosemide 40 mg tablet Commonly known as:  LASIX TAKE ONE TABLET TWO TIMES A DAY  
  
 isosorbide mononitrate ER 60 mg CR tablet Commonly known as:  IMDUR  
TAKE ONE TABLET EVERY MORNING  
  
 meloxicam 7.5 mg tablet Commonly known as:  MOBIC  
  
 memantine 10 mg tablet Commonly known as:  NAMENDA  
  
 metOLazone 10 mg tablet Commonly known as:  Colin Rana Take 1 Tab by mouth daily. predniSONE 5 mg tablet Commonly known as:  Ailyn Papo Take  by mouth. We Performed the Following INTERROGATION DEV ELISSA,IN PERSON, WITH DR ROMORPT [11966 CPT(R)] Follow-up Instructions Return in about 6 months (around 1/23/2019). To-Do List   
 07/23/2018 Lab:  HEPATIC FUNCTION PANEL   
  
 07/23/2018 Lab:  T4, FREE   
  
 07/30/2018 Lab:  TSH 3RD GENERATION Please provide this summary of care documentation to your next provider. Your primary care clinician is listed as Ceci. If you have any questions after today's visit, please call [phone].

## 2018-09-24 RX ORDER — METOLAZONE 10 MG/1
TABLET ORAL
Qty: 30 TAB | Refills: 0 | Status: SHIPPED | OUTPATIENT
Start: 2018-09-24 | End: 2018-11-05 | Stop reason: SDUPTHER

## 2018-10-15 RX ORDER — FUROSEMIDE 40 MG/1
TABLET ORAL
Qty: 60 TAB | Refills: 5 | Status: SHIPPED | OUTPATIENT
Start: 2018-10-15 | End: 2019-01-09

## 2018-11-02 RX ORDER — AMIODARONE HYDROCHLORIDE 200 MG/1
TABLET ORAL
Qty: 30 TAB | Refills: 5 | Status: SHIPPED | OUTPATIENT
Start: 2018-11-02

## 2018-11-05 RX ORDER — METOLAZONE 10 MG/1
10 TABLET ORAL DAILY
Qty: 30 TAB | Refills: 0 | Status: SHIPPED | OUTPATIENT
Start: 2018-11-05 | End: 2018-12-31

## 2018-12-04 ENCOUNTER — APPOINTMENT (OUTPATIENT)
Dept: GENERAL RADIOLOGY | Age: 83
End: 2018-12-04
Attending: PHYSICIAN ASSISTANT
Payer: MEDICARE

## 2018-12-04 ENCOUNTER — APPOINTMENT (OUTPATIENT)
Dept: CT IMAGING | Age: 83
End: 2018-12-04
Attending: PHYSICIAN ASSISTANT
Payer: MEDICARE

## 2018-12-04 ENCOUNTER — HOSPITAL ENCOUNTER (EMERGENCY)
Age: 83
Discharge: HOME | End: 2018-12-04
Attending: EMERGENCY MEDICINE
Payer: MEDICARE

## 2018-12-04 VITALS
HEIGHT: 67 IN | HEART RATE: 71 BPM | OXYGEN SATURATION: 98 % | WEIGHT: 167 LBS | RESPIRATION RATE: 18 BRPM | TEMPERATURE: 97 F | DIASTOLIC BLOOD PRESSURE: 60 MMHG | SYSTOLIC BLOOD PRESSURE: 119 MMHG | BODY MASS INDEX: 26.21 KG/M2

## 2018-12-04 DIAGNOSIS — M25.461 PAIN AND SWELLING OF RIGHT KNEE: ICD-10-CM

## 2018-12-04 DIAGNOSIS — S89.91XA INJURY OF RIGHT KNEE, INITIAL ENCOUNTER: ICD-10-CM

## 2018-12-04 DIAGNOSIS — S09.90XA CLOSED HEAD INJURY, INITIAL ENCOUNTER: ICD-10-CM

## 2018-12-04 DIAGNOSIS — S05.11XA: ICD-10-CM

## 2018-12-04 DIAGNOSIS — Z23 IMMUNIZATION, TETANUS-DIPHTHERIA: ICD-10-CM
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
## 2018-12-04 DIAGNOSIS — M25.561 PAIN AND SWELLING OF RIGHT KNEE: ICD-10-CM

## 2018-12-04 PROCEDURE — 70450 CT HEAD/BRAIN W/O DYE: CPT

## 2018-12-04 PROCEDURE — 99282 EMERGENCY DEPT VISIT SF MDM: CPT

## 2018-12-04 PROCEDURE — 96372 THER/PROPH/DIAG INJ SC/IM: CPT

## 2018-12-04 PROCEDURE — 90471 IMMUNIZATION ADMIN: CPT

## 2018-12-04 PROCEDURE — 90715 TDAP VACCINE 7 YRS/> IM: CPT | Performed by: PHYSICIAN ASSISTANT

## 2018-12-04 PROCEDURE — 74011250636 HC RX REV CODE- 250/636: Performed by: PHYSICIAN ASSISTANT

## 2018-12-04 PROCEDURE — 73564 X-RAY EXAM KNEE 4 OR MORE: CPT

## 2018-12-04 RX ORDER — LORAZEPAM 2 MG/ML
0.5 INJECTION INTRAMUSCULAR
Status: COMPLETED | OUTPATIENT
Start: 2018-12-04 | End: 2018-12-04

## 2018-12-04 RX ORDER — DEXTROMETHORPHAN HYDROBROMIDE, GUAIFENESIN 5; 100 MG/5ML; MG/5ML
650 LIQUID ORAL EVERY 8 HOURS
Qty: 15 TAB | Refills: 0 | Status: SHIPPED | OUTPATIENT
Start: 2018-12-04

## 2018-12-04 RX ORDER — LIDOCAINE 40 MG/G
CREAM TOPICAL
Qty: 15 G | Refills: 0 | Status: SHIPPED | OUTPATIENT
Start: 2018-12-04 | End: 2019-01-09

## 2018-12-04 RX ADMIN — LORAZEPAM 0.5 MG: 2 INJECTION INTRAMUSCULAR; INTRAVENOUS at 12:52

## 2018-12-04 RX ADMIN — TETANUS TOXOID, REDUCED DIPHTHERIA TOXOID AND ACELLULAR PERTUSSIS VACCINE, ADSORBED 0.5 ML: 5; 2.5; 8; 8; 2.5 SUSPENSION INTRAMUSCULAR at 12:52

## 2018-12-04 NOTE — ED PROVIDER NOTES
EMERGENCY DEPARTMENT HISTORY AND PHYSICAL EXAM 
 
Date: 12/4/2018 Patient Name: [facility] History of Presenting Illness Chief Complaint Patient presents with  Fall History Provided By: Patient's Son Chief Complaint: Davis Mention Duration:  Hours Timing:  Acute Location: Face, right knee Quality: N/A Severity: N/A Modifying Factors: N/A Associated Symptoms: denies any other associated signs or symptoms Additional History (Context): [place] is a 80 y.o. female with PMHX of Alzheimer's disease, HTN, CHF who presents to the emergency department C/O with son for evaluation of fall. Per son, patient tripped and fell getting off the toilet, striking head and right knee. No LOC or vomiting since. Acting her normal self, which is pleasantly demented at baseline. Has ambulated with walker (baseline) since fall. Unsure of last tetanus. Pt currently denies CP, pelvis or hip pain, BUE pain. Pt denies any other sxs or complaints. PCP: Leon Wilson MD 
 
Current Outpatient Medications Medication Sig Dispense Refill  acetaminophen (TYLENOL ARTHRITIS PAIN) 650 mg TbER Take 1 Tab by mouth every eight (8) hours. 15 Tab 0  
 lidocaine (XYLOCAINE) 4 % topical cream Apply  to affected area three (3) times daily as needed for Pain. 15 g 0  
 metOLazone (ZAROXOLYN) 10 mg tablet Take 1 Tab by mouth daily. 30 Tab 0  
 amiodarone (CORDARONE) 200 mg tablet TAKE ONE TABLET EVERY DAY (MAKE AN APPT) 30 Tab 5  furosemide (LASIX) 40 mg tablet TAKE ONE TABLET TWO TIMES A DAY 60 Tab 5  
 isosorbide mononitrate ER (IMDUR) 60 mg CR tablet TAKE ONE TABLET EVERY MORNING 30 Tab 6  
 meloxicam (MOBIC) 7.5 mg tablet  memantine (NAMENDA) 10 mg tablet  predniSONE (DELTASONE) 5 mg tablet Take  by mouth.  amitriptyline (ELAVIL) 25 mg tablet Take  by mouth nightly.  aspirin 81 mg tablet Take 81 mg by mouth daily. Past History Past Medical History: Past Medical History:  
Diagnosis Date  Acute on chronic diastolic heart failure ([facility] [address]  Arthritis  Benign hypertensive heart disease without heart failure Better controlled, stable  Chronic diastolic heart failure ([facility] [address] Breathing and edema is improving  Congestive heart failure ([facility] [address]  HTN (hypertension)  Hypercholesteremia  Lupus (systemic lupus erythematosus) ([facility] [address] 6/18/2014  
 followed by dr Zahra Alba  Obesity, unspecified Patient has weight loss Discussed diet ad fluid restriction  Other and unspecified hyperlipidemia F/u per pmd  
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 
Past Surgical History:  
Procedure Laterality Date  HX HYSTERECTOMY  PACEMAKER PROCEDURE Family History: 
Family History Problem Relation Age of Onset  Arrhythmia Neg Hx  Asthma Neg Hx  Clotting Disorder Neg Hx  Fainting Neg Hx   
 Heart Attack Neg Hx  High Cholesterol Neg Hx  Pacemaker Neg Hx  Stroke Neg Hx Social History: 
Social History Tobacco Use  Smoking status: Never Smoker  Smokeless tobacco: Never Used Substance Use Topics  Alcohol use: No  
 Drug use: No  
 
 
Allergies: 
No Known Allergies Review of Systems Review of Systems Respiratory: Negative for shortness of breath. Cardiovascular: Negative for chest pain. Musculoskeletal: Positive for arthralgias. Skin: Positive for wound. Neurological: Negative for syncope. All other systems reviewed and are negative. Physical Exam  
 
Vitals:  
 12/04/18 1117 BP: 119/60 Pulse: 71 Resp: 18 Temp: 97 °F (36.1 °C) SpO2: 98% Weight: 75.8 kg (167 lb) Height: 5' 7\" (1.702 m) Physical Exam  
Constitutional: She appears well-developed and well-nourished. No distress. HENT:  
Head: Normocephalic.   
Right Ear: External ear normal.  
 Left Ear: External ear normal.  
Nose: Nose normal.  
Mouth/Throat: Oropharynx is clear and moist. No oropharyngeal exudate. Neg hemotympanum bilat. Speaking, moving jaw with no obvious deformity or pain. Eyes: Conjunctivae and EOM are normal. Right eye exhibits no discharge. Left eye exhibits no discharge. Right infraorbital hematoma, ecchymosis and slight abrasion. No ptosis or proptosis appreciated. Pupils reactive to light. Neck: Normal range of motion. No midline TTP. Normal ROM of the neck. Cardiovascular: Normal rate, regular rhythm and normal heart sounds. Pulmonary/Chest: Effort normal and breath sounds normal. No respiratory distress. She has no wheezes. Musculoskeletal:  
Edema, ecchymosis and TTP (states \"ow\") with palpation of anteromedial aspect of the right knee. Normal ROM. Moves all extremities without difficulty. Neurological: She is alert. No cranial nerve deficit. Able to bear weight with assistance (baseline per son) Skin: She is not diaphoretic. Abrasion noted to right second finger. Vitals reviewed. Diagnostic Study Results Labs - No results found for this or any previous visit (from the past 12 hour(s)). Radiologic Studies -  
CT HEAD WO CONT Final Result XR KNEE RT MIN 4 V Final Result CT Results  (Last 48 hours) 12/04/18 1348  CT HEAD WO CONT Final result Impression:  IMPRESSION:   
   
Severely limited study by motion artifact. No large intracranial hemorrhage is  
seen. No large parenchymal defects are noted. Probable soft tissue contusion inferior orbital rim and malar region on the  
right.  
   
   
=================== Note: [facility] maintains that all CT scans at their facilities  
are performed by using dose optimization technique as appropriate to a performed  
examination, to include automated exposure control, adjustment of the mAs and/or kVp according to patient size (including appropriate matching for site specific  
examinations) or use of iterative reconstruction technique. Narrative:  EXAM: CT BRAIN  CPT code: 76336 CLINICAL INDICATION/HISTORY: Head trauma in fall. Right eye swelling and  
bruising. COMPARISON: None. TECHNIQUE: Contiguous noncontrast axial images of the brain are obtained from  
the foramen magnum to the vertex and reviewed in brain and bone windows. FINDINGS: Significant motion artifact is seen throughout the study including  
repeat imaging. No gross evidence of intracranial hemorrhage is seen. No large  
parenchymal abnormalities are noted. The cerebrospinal fluid spaces are not  
markedly dilated. No significant extra-axial abnormalities are seen. The  
pituitary fossa is unremarkable. The mastoids and visualized paranasal sinuses  
are clear. The visualized orbits look grossly normal.  No significant bony  
abnormalities are seen. There is significant soft tissue thickening and density  
along the inferior orbital rim and malar region consistent with a soft tissue  
contusion. CXR Results  (Last 48 hours) None Medications given in the ED- Medications diph,Pertuss(AC),Tet Vac-PF (BOOSTRIX) suspension 0.5 mL (0.5 mL IntraMUSCular Given 12/4/18 1252) LORazepam (ATIVAN) injection 0.5 mg (0.5 mg IntraMUSCular Given 12/4/18 1252) Medical Decision Making I am the first provider for this patient. I reviewed the vital signs, available nursing notes, past medical history, past surgical history, family history and social history. Vital Signs-Reviewed the patient's vital signs. Pulse Oximetry Analysis - 98% on RA Records Reviewed: Nursing Notes Provider Notes (Medical Decision Making): Appears well and non-toxic, presenting with right periorbital injury, head strike, and right knee signs of injury after fall. Pt is on blood thinners, elderly, head CT was ordered to further assess. Head CT without evidence of large bleed noted, likely soft tissue injury/contusion of right periorbital region, right knee x-ray with OA and effusion. Procedures: 
Procedures ED Course:  
Initial assessment performed. The patients presenting problems have been discussed, and they are in agreement with the care plan formulated and outlined with them. I have encouraged them to ask questions as they arise throughout their visit. 12:40 Pt was unable to lay still for CT head and face. IM Ativan ordered to facilitate getting images. 1:47 PM Pt taken back to CT at this time to re-attempt getting images. Per CT Tech, pt unable to lay flat for CT maxillofacial. She was able to get CT head but there was motion artifact. Images sent to radiologist for review. Diagnosis and Disposition DISCHARGE NOTE: 
2:56 PM 
Franky  results have been reviewed with her. She has been counseled regarding her diagnosis, treatment, and plan. She verbally conveys understanding and agreement of the signs, symptoms, diagnosis, treatment and prognosis and additionally agrees to follow up as discussed. She also agrees with the care-plan and conveys that all of her questions have been answered. I have also provided discharge instructions for her that include: educational information regarding their diagnosis and treatment, and list of reasons why they would want to return to the ED prior to their follow-up appointment, should her condition change. She has been provided with education for proper emergency department utilization. CLINICAL IMPRESSION: 
 
1. Fall, initial encounter 2. Contusion, periorbital, right, initial encounter 3. Closed head injury, initial encounter 4. Immunization, tetanus-diphtheria 5. Injury of right knee, initial encounter 6. Pain and swelling of right knee PLAN: 
 1. D/C Home 2. Current Discharge Medication List  
  
START taking these medications Details  
acetaminophen (TYLENOL ARTHRITIS PAIN) 650 mg TbER Take 1 Tab by mouth every eight (8) hours. Qty: 15 Tab, Refills: 0  
  
lidocaine (XYLOCAINE) 4 % topical cream Apply  to affected area three (3) times daily as needed for Pain. Qty: 15 g, Refills: 0  
  
  
 
3. Follow-up Information Follow up With Specialties Details Why Contact Info Joe Heredia MD Internal Medicine Schedule an appointment as soon as possible for a visit  [address] [place] [address] 
[phone] [id number] [facility] EMERGENCY DEPT Emergency Medicine  As needed, If symptoms worsen [id number] [facility] Signs [id number] [phone]

## 2018-12-04 NOTE — DISCHARGE INSTRUCTIONS
1. Tylenol Arthritis for aches and pains. 2. Lidocaine cream for knee pain. 3. Ice to eye for pain and swelling. 4. Return for persistent or worsening symptoms, gait abnormality, facial drooping, not acting her normal self or any other concerns. 5. Follow up with PCP for further evaluation of falls. Preventing Falls: Care Instructions  Your Care Instructions    Getting around your home safely can be a challenge if you have injuries or health problems that make it easy for you to fall. Loose rugs and furniture in walkways are among the dangers for many older people who have problems walking or who have poor eyesight. People who have conditions such as arthritis, osteoporosis, or dementia also have to be careful not to fall. You can make your home safer with a few simple measures. Follow-up care is a key part of your treatment and safety. Be sure to make and go to all appointments, and call your doctor if you are having problems. It's also a good idea to know your test results and keep a list of the medicines you take. How can you care for yourself at home? Taking care of yourself  · You may get dizzy if you do not drink enough water. To prevent dehydration, drink plenty of fluids, enough so that your urine is light yellow or clear like water. Choose water and other caffeine-free clear liquids. If you have kidney, heart, or liver disease and have to limit fluids, talk with your doctor before you increase the amount of fluids you drink. · Exercise regularly to improve your strength, muscle tone, and balance. Walk if you can. Swimming may be a good choice if you cannot walk easily. · Have your vision and hearing checked each year or any time you notice a change. If you have trouble seeing and hearing, you might not be able to avoid objects and could lose your balance. · Know the side effects of the medicines you take.  Ask your doctor or pharmacist whether the medicines you take can affect your balance. Sleeping pills or sedatives can affect your balance. · Limit the amount of alcohol you drink. Alcohol can impair your balance and other senses. · Ask your doctor whether calluses or corns on your feet need to be removed. If you wear loose-fitting shoes because of calluses or corns, you can lose your balance and fall. · Talk to your doctor if you have numbness in your feet. Preventing falls at home  · Remove raised doorway thresholds, throw rugs, and clutter. Repair loose carpet or raised areas in the floor. · Move furniture and electrical cords to keep them out of walking paths. · Use nonskid floor wax, and wipe up spills right away, especially on ceramic tile floors. · If you use a walker or cane, put rubber tips on it. If you use crutches, clean the bottoms of them regularly with an abrasive pad, such as steel wool. · Keep your house well lit, especially [facility], and outside walkways. Use night-lights in areas such as hallways and bathrooms. Add extra light switches or use remote switches (such as switches that go on or off when you clap your hands) to make it easier to turn lights on if you have to get up during the night. · Install sturdy handrails on stairways. · Move items in your cabinets so that the things you use a lot are on the lower shelves (about waist level). · Keep a cordless phone and a flashlight with new batteries by your bed. If possible, put a phone in each of the main rooms of your house, or carry a cell phone in case you fall and cannot reach a phone. Or, you can wear a device around your neck or wrist. You push a button that sends a signal for help. · Wear low-heeled shoes that fit well and give your feet good support. Use footwear with nonskid soles. Check the heels and soles of your shoes for wear. Repair or replace worn heels or soles. · Do not wear socks without shoes on wood floors. · Walk on the grass when the sidewalks are slippery.  If you live in an area that gets snow and ice in the winter, sprinkle salt on slippery steps and sidewalks. Preventing falls in the bath  · Install grab bars and nonskid mats inside and outside your shower or tub and near the toilet and sinks. · Use shower chairs and bath benches. · Use a hand-held shower head that will allow you to sit while showering. · Get into a tub or shower by putting the weaker leg in first. Get out of a tub or shower with your strong side first.  · Repair loose toilet seats and consider installing a raised toilet seat to make getting on and off the toilet easier. · Keep your bathroom door unlocked while you are in the shower. Where can you learn more? Go to http://renata-antonieta.info/. Enter [phone] in the search box to learn more about \"Preventing Falls: Care Instructions. \"  Current as of: March 16, 2018  Content Version: 11.8  © 8052-4308 [employer]. Care instructions adapted under license by [employer] (which disclaims liability or warranty for this information). If you have questions about a medical condition or this instruction, always ask your healthcare professional. [place] [address] any warranty or liability for your use of this information. DTaP (Diphtheria, Tetanus, Pertussis) Vaccine: What You Need to Know  Why get vaccinated? Diphtheria, tetanus, and pertussis are serious diseases caused by bacteria. Diphtheria and pertussis are spread from person to person. Tetanus enters the body through cuts or wounds. DIPHTHERIA causes a thick covering in the back of the throat. · It can lead to breathing problems, paralysis, heart failure, and even death. TETANUS (Lockjaw) causes painful tightening of the muscles, usually all over the body. · It can lead to \"locking\" of the jaw so the victim cannot open his mouth or swallow. Tetanus leads to death in up to 2 out of 10 cases.   PERTUSSIS (Whooping Cough) causes coughing spells so bad that it is hard for infants to eat, drink, or breathe. These spells can last for weeks. · It can lead to pneumonia, seizures (jerking and staring spells), brain damage, and death. Diphtheria, tetanus, and pertussis vaccine (DTaP) can help prevent these diseases. Most children who are vaccinated with DTaP will be protected throughout childhood. Many more children would get these diseases if we stopped vaccinating. DTaP is a safer version of an older vaccine called DTP. DTP is no longer used in the [place]. Who should get DTaP vaccine and when? Children should get 5 doses of DTaP vaccine, one dose at each of the following ages:  · 2 months  · 4 months  · 6 months  · 15-18 months  · 4-6 years  DTaP may be given at the same time as other vaccines. Some children should not get DTaP vaccine or should wait. · Children with minor illnesses, such as a cold, may be vaccinated. But children who are moderately or severely ill should usually wait until they recover before getting DTaP vaccine. · Any child who had a life-threatening allergic reaction after a dose of DTaP should not get another dose. · Any child who suffered a brain or nervous system disease within 7 days after a dose of DTaP should not get another dose. · Talk with your doctor if your child:  ? Had a seizure or collapsed after a dose of DTaP. ? Cried non-stop for 3 hours or more after a dose of DTaP. ? Had a fever over 105°F after a dose of DTaP. Ask your doctor for more information. Some of these children should not get another dose of pertussis vaccine, but may get a vaccine without pertussis, called DT. Older children and adults  DTaP is not licensed for adolescents, adults, or children 9years of age and older. But older people still need protection. A vaccine called Tdap is similar to DTaP. A single dose of Tdap is recommended for people 11 through 59years of age.  Another vaccine, called Td, protects against tetanus and diphtheria, but not pertussis. It is recommended every 10 years. There are separate Vaccine Information Statements for these vaccines. What are the risks from DTaP vaccine? Getting diphtheria, tetanus, or pertussis disease is much riskier than getting DTaP vaccine. However, a vaccine, like any medicine, is capable of causing serious problems, such as severe allergic reactions. The risk of DTaP vaccine causing serious harm, or death, is extremely small. Mild Problems (Common)  · Fever (up to about 1 child in 4)  · Redness or swelling where the shot was given (up to about 1 child in 4)  · Soreness or tenderness where the shot was given (up to about 1 child in 4)  These problems occur more often after the 4th and 5th doses of the DTaP series than after earlier doses. Sometimes the 4th or 5th dose of DTaP vaccine is followed by swelling of the entire arm or leg in which the shot was given, lasting 1-7 days (up to about 1 child in 27). Other mild problems include:  · Fussiness (up to about 1 child in 3)  · Tiredness or poor appetite (up to about 1 child in 10)  · Vomiting (up to about 1 child in 48)  These problems generally occur 1-3 days after the shot. Moderate Problems (Uncommon)  · Seizure (jerking or staring) (about 1 child out of 14,000)  · Non-stop crying, for 3 hours or more (up to about 1 child out of 1,000)  · High fever, over 105°F (about 1 child out of 16,000)  Severe Problems (Very Rare)  · Serious allergic reaction (less than 1 out of a million doses)  · Several other severe problems have been reported after DTaP vaccine. These include:  ? Long-term seizures, coma, or lowered consciousness. ? Permanent brain damage. These are so rare it is hard to tell if they are caused by the vaccine. Controlling fever is especially important for children who have had seizures, for any reason. It is also important if another family member has had seizures.  You can reduce fever and pain by giving your child an aspirin-free pain reliever when the shot is given, and for the next 24 hours, following the package instructions. What if there is a serious reaction? What should I look for? · Look for anything that concerns you, such as signs of a severe allergic reaction, very high fever, or behavior changes. Signs of a severe allergic reaction can include hives, swelling of the face and throat, difficulty breathing, a fast heartbeat, dizziness, and weakness. These would start a few minutes to a few hours after the vaccination. What should I do? · If you think it is a severe allergic reaction or other emergency that can't wait, call 9-1-1 or get the person to the nearest hospital. Otherwise, call your doctor. · Afterward, the reaction should be reported to the Vaccine Adverse Event Reporting System (VAERS). Your doctor might file this report, or you can do it yourself through the VAERS web site at www.vaers. [place].gov, or by calling 2-732.186.8648. VAERS is only for reporting reactions. They do not give medical advice. The National Vaccine Injury Compensation Program  The National Vaccine Injury Compensation Program (VICP) is a federal program that was created to compensate people who may have been injured by certain vaccines. Persons who believe they may have been injured by a vaccine can learn about the program and about filing a claim by calling 0-139.160.1660 or visiting the [employer] website at www.[facility].gov/vaccinecompensation. How can I learn more? · Ask your doctor. · Call your local or state health department. · Contact the Centers for Disease Control and Prevention (CDC):  ? Call 6-237.786.1240 (1-800-CDC-INFO) or  ? Visit CDC's website at www.cdc.gov/vaccines  Vaccine Information Statement  DTaP (Tetanus, Diphtheria, Pertussis ) Vaccine  (5/17/2007)  [address] [id number]-56  Department of Health and Human Services  Centers for Disease Control and Prevention  Many Vaccine Information Statements are available in Syriac and other languages. See www.immunize.org/vis. Muchas hojas de información sobre vacunas están disponibles en español y en otros idiomas. Visite www.immunize.org/vis. Care instructions adapted under license by [employer] (which disclaims liability or warranty for this information). If you have questions about a medical condition or this instruction, always ask your healthcare professional. [place] [address] any warranty or liability for your use of this information.

## 2018-12-04 NOTE — ED NOTES
CT tech reported she was unable to complete scan due to patient moving around and crying. Requesting patient to be placed on a stretcher. Provider aware, Ativan ordered.

## 2018-12-04 NOTE — ED NOTES
Edmond Malcolm is a 80 y.o. female that was discharged in stable condition. The patients diagnosis, condition and treatment were explained to  caregiver and aftercare instructions were given. The caregiver verbalized understanding. Patient armband removed and shredded.

## 2018-12-04 NOTE — ED TRIAGE NOTES
Pt fell this morning while she was getting up from the toilet. Pt with right eye swelling & bruising noted. Pt's son states she lost her balance & hit her face on floor. Son denies loss of consciousness.

## 2018-12-27 ENCOUNTER — APPOINTMENT (OUTPATIENT)
Dept: GENERAL RADIOLOGY | Age: 83
DRG: 683 | End: 2018-12-27
Attending: EMERGENCY MEDICINE
Payer: MEDICARE

## 2018-12-27 ENCOUNTER — HOSPITAL ENCOUNTER (INPATIENT)
Age: 83
LOS: 4 days | Discharge: HOME HEALTH | DRG: 683 | End: 2018-12-31
Attending: EMERGENCY MEDICINE | Admitting: HOSPITALIST
Payer: MEDICARE

## 2018-12-27 DIAGNOSIS — I50.9 ACUTE ON CHRONIC CONGESTIVE HEART FAILURE, UNSPECIFIED HEART FAILURE TYPE (HCC): ICD-10-CM

## 2018-12-27 DIAGNOSIS — N17.9 AKI (ACUTE KIDNEY INJURY) (HCC): Primary | ICD-10-CM

## 2018-12-27 LAB
ALBUMIN SERPL-MCNC: 3.3 G/DL (ref 3.4–5)
ALBUMIN/GLOB SERPL: 1.2 {RATIO} (ref 0.8–1.7)
ALP SERPL-CCNC: 67 U/L (ref 45–117)
ALT SERPL-CCNC: 19 U/L (ref 13–56)
ANION GAP SERPL CALC-SCNC: 10 MMOL/L (ref 3–18)
AST SERPL-CCNC: 36 U/L (ref 15–37)
ATRIAL RATE: 37 BPM
BASOPHILS # BLD: 0 K/UL (ref 0–0.1)
BASOPHILS NFR BLD: 0 % (ref 0–2)
BILIRUB SERPL-MCNC: 0.4 MG/DL (ref 0.2–1)
BNP SERPL-MCNC: 6198 PG/ML (ref 0–1800)
BUN SERPL-MCNC: 96 MG/DL (ref 7–18)
BUN/CREAT SERPL: 29 (ref 12–20)
CALCIUM SERPL-MCNC: 8.6 MG/DL (ref 8.5–10.1)
CALCULATED R AXIS, ECG10: -51 DEGREES
CALCULATED T AXIS, ECG11: 106 DEGREES
CHLORIDE SERPL-SCNC: 96 MMOL/L (ref 100–108)
CK MB CFR SERPL CALC: 1.4 % (ref 0–4)
CK MB SERPL-MCNC: 3.6 NG/ML (ref 5–25)
CK SERPL-CCNC: 257 U/L (ref 26–192)
CO2 SERPL-SCNC: 35 MMOL/L (ref 21–32)
CREAT SERPL-MCNC: 3.35 MG/DL (ref 0.6–1.3)
DIAGNOSIS, 93000: NORMAL
DIFFERENTIAL METHOD BLD: ABNORMAL
EOSINOPHIL # BLD: 0.1 K/UL (ref 0–0.4)
EOSINOPHIL NFR BLD: 1 % (ref 0–5)
ERYTHROCYTE [DISTWIDTH] IN BLOOD BY AUTOMATED COUNT: 13.8 % (ref 11.6–14.5)
GLOBULIN SER CALC-MCNC: 2.8 G/DL (ref 2–4)
GLUCOSE SERPL-MCNC: 93 MG/DL (ref 74–99)
HCT VFR BLD AUTO: 32.7 % (ref 35–45)
HGB BLD-MCNC: 10.3 G/DL (ref 12–16)
LYMPHOCYTES # BLD: 1.7 K/UL (ref 0.9–3.6)
LYMPHOCYTES NFR BLD: 24 % (ref 21–52)
MAGNESIUM SERPL-MCNC: 2.7 MG/DL (ref 1.6–2.6)
MCH RBC QN AUTO: 28.7 PG (ref 24–34)
MCHC RBC AUTO-ENTMCNC: 31.5 G/DL (ref 31–37)
MCV RBC AUTO: 91.1 FL (ref 74–97)
MONOCYTES # BLD: 0.5 K/UL (ref 0.05–1.2)
MONOCYTES NFR BLD: 7 % (ref 3–10)
NEUTS SEG # BLD: 5 K/UL (ref 1.8–8)
NEUTS SEG NFR BLD: 68 % (ref 40–73)
PLATELET # BLD AUTO: 146 K/UL (ref 135–420)
PMV BLD AUTO: 8.9 FL (ref 9.2–11.8)
POTASSIUM SERPL-SCNC: 3.1 MMOL/L (ref 3.5–5.5)
PROT SERPL-MCNC: 6.1 G/DL (ref 6.4–8.2)
Q-T INTERVAL, ECG07: 554 MS
QRS DURATION, ECG06: 220 MS
QTC CALCULATION (BEZET), ECG08: 623 MS
RBC # BLD AUTO: 3.59 M/UL (ref 4.2–5.3)
SODIUM SERPL-SCNC: 141 MMOL/L (ref 136–145)
TROPONIN I SERPL-MCNC: 0.32 NG/ML (ref 0–0.04)
VENTRICULAR RATE, ECG03: 76 BPM
WBC # BLD AUTO: 7.2 K/UL (ref 4.6–13.2)

## 2018-12-27 PROCEDURE — 99284 EMERGENCY DEPT VISIT MOD MDM: CPT

## 2018-12-27 PROCEDURE — 85025 COMPLETE CBC W/AUTO DIFF WBC: CPT

## 2018-12-27 PROCEDURE — 74011250636 HC RX REV CODE- 250/636: Performed by: EMERGENCY MEDICINE

## 2018-12-27 PROCEDURE — 71045 X-RAY EXAM CHEST 1 VIEW: CPT

## 2018-12-27 PROCEDURE — 82550 ASSAY OF CK (CPK): CPT

## 2018-12-27 PROCEDURE — 80053 COMPREHEN METABOLIC PANEL: CPT

## 2018-12-27 PROCEDURE — 83880 ASSAY OF NATRIURETIC PEPTIDE: CPT

## 2018-12-27 PROCEDURE — 83735 ASSAY OF MAGNESIUM: CPT

## 2018-12-27 PROCEDURE — 93005 ELECTROCARDIOGRAM TRACING: CPT

## 2018-12-27 PROCEDURE — 96360 HYDRATION IV INFUSION INIT: CPT

## 2018-12-27 PROCEDURE — 65660000004 HC RM CVT STEPDOWN

## 2018-12-27 PROCEDURE — 74011250637 HC RX REV CODE- 250/637: Performed by: EMERGENCY MEDICINE

## 2018-12-27 RX ORDER — FUROSEMIDE 10 MG/ML
40 INJECTION INTRAMUSCULAR; INTRAVENOUS
Status: COMPLETED | OUTPATIENT
Start: 2018-12-27 | End: 2018-12-27

## 2018-12-27 RX ORDER — GUAIFENESIN 100 MG/5ML
324 LIQUID (ML) ORAL
Status: COMPLETED | OUTPATIENT
Start: 2018-12-27 | End: 2018-12-27

## 2018-12-27 RX ADMIN — FUROSEMIDE 40 MG: 10 INJECTION, SOLUTION INTRAMUSCULAR; INTRAVENOUS at 18:27

## 2018-12-27 RX ADMIN — SODIUM CHLORIDE 1000 ML: 900 INJECTION, SOLUTION INTRAVENOUS at 16:45

## 2018-12-27 RX ADMIN — ASPIRIN 81 MG 324 MG: 81 TABLET ORAL at 16:45

## 2018-12-27 NOTE — ED PROVIDER NOTES
EMERGENCY DEPARTMENT HISTORY AND PHYSICAL EXAM    1:55 PM    Date: 12/27/2018  Patient Name: Michelle Zuñiga    History of Presenting Illness     Chief Complaint: Leg swelling    History Provided By: Patient and Patient's Daughter        Additional History (Context): Michelle Zuñiga is a 80 y.o. female with CHF, hypertension, hypercholesteremia, lupus who presents with worsening, moderate, bilateral leg swelling onset about 1 week ago. Associated symptoms include worsening shortness of breath with exertion. She reports seeing Dr. Rei Ray (Cardiology) over the summer. She states she has been taking lasix BID. She denies abd pain, fever, cough, bleeding, other symptoms, or complaints.     PCP: Muriel Villagomez MD        Duration: 1 Weeks  Timing:  Worsening  Location: bilateral legs  Quality: N/A  Severity: Moderate  Modifying Factors: lasix  Associated Symptoms: shortness of breath    Past History     Past Medical History:  Past Medical History:   Diagnosis Date    Acute on chronic diastolic heart failure (HCC)     Arthritis     Benign hypertensive heart disease without heart failure     Better controlled, stable    Chronic diastolic heart failure (HCC)     Breathing and edema is improving    Congestive heart failure (HCC)     HTN (hypertension)     Hypercholesteremia     [facility] 6/18/2014    followed by dr Camille Shepard     Obesity, unspecified     Patient has weight loss Discussed diet ad fluid restriction    Other and unspecified hyperlipidemia     F/u per pmd    Tricuspid valve disorders, 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    tr with moderate pulmonary htn        Past Surgical History:  Past Surgical History:   Procedure Laterality Date    HX HYSTERECTOMY      PACEMAKER PROCEDURE         Family History:  Family History   Problem Relation Age of Onset    Arrhythmia Neg Hx     Asthma Neg Hx     Clotting Disorder Neg Hx     Fainting Neg Hx     Heart Attack Neg Hx     High Cholesterol Neg Hx     Pacemaker Neg Hx     Stroke Neg Hx        Social History:  Social History     Tobacco Use    Smoking status: Never Smoker    Smokeless tobacco: Never Used   Substance Use Topics    Alcohol use: No    Drug use: No       Allergies:  No Known Allergies      Review of Systems       Review of Systems   Constitutional: Negative for fever. Respiratory: Positive for shortness of breath. Negative for cough. Cardiovascular: Positive for leg swelling. Gastrointestinal: Negative for abdominal pain and blood in stool. Genitourinary: Negative for hematuria and vaginal bleeding. All other systems reviewed and are negative. Physical Exam     Patient Vitals for the past 12 hrs:   Temp Pulse Resp BP SpO2   12/27/18 1852 98.3 °F (36.8 °C) 74 15 123/53 99 %   12/27/18 1800  72 12 97/77 99 %   12/27/18 1730  70 13 117/47 97 %   12/27/18 1700  70 15 105/50 98 %   12/27/18 1500  73 14 121/49 98 %   12/27/18 1402 98 °F (36.7 °C) 78 14 107/52 100 %   12/27/18 1400  74 14 107/52 100 %           Physical Exam   Constitutional: She is oriented to person, place, and time. She appears well-developed. HENT:   Head: Normocephalic and atraumatic. Eyes: Conjunctivae and EOM are normal.   Neck: Normal range of motion. No JVD present. Cardiovascular: Normal heart sounds. Exam reveals no gallop and no friction rub. No murmur heard. Pulmonary/Chest: Effort normal. No stridor. She has decreased breath sounds (mild, at bilateral bases). Abdominal: Soft. She exhibits no distension. There is no tenderness. Musculoskeletal: Normal range of motion. She exhibits no tenderness. Mild pitting edema in bilateral LE. Neurological: She is alert and oriented to person, place, and time. Skin: Skin is warm and dry. She is not diaphoretic. Psychiatric: She has a normal mood and affect. Her behavior is normal.   Nursing note and vitals reviewed.         Diagnostic Study Results     Labs -  Recent Results (from the past 12 hour(s))   EKG, 12 LEAD, INITIAL    Collection Time: 12/27/18  1:57 PM   Result Value Ref Range    Ventricular Rate 76 BPM    Atrial Rate 37 BPM    QRS Duration 220 ms    Q-T Interval 554 ms    QTC Calculation (Bezet) 623 ms    Calculated R Axis -51 degrees    Calculated T Axis 106 degrees    Diagnosis       AV sequential or dual chamber electronic pacemaker  When compared with ECG of 11-MAY-2016 14:44,  Vent. rate has decreased BY   4 BPM     CARDIAC PANEL,(CK, CKMB & TROPONIN)    Collection Time: 12/27/18  2:43 PM   Result Value Ref Range     (H) 26 - 192 U/L    CK - MB 3.6 (H) <3.6 ng/ml    CK-MB Index 1.4 0.0 - 4.0 %    Troponin-I, QT 0.32 (H) 0.0 - 0.045 NG/ML   CBC WITH AUTOMATED DIFF    Collection Time: 12/27/18  2:43 PM   Result Value Ref Range    WBC 7.2 4.6 - 13.2 K/uL    RBC 3.59 (L) 4.20 - 5.30 M/uL    HGB 10.3 (L) 12.0 - 16.0 g/dL    HCT 32.7 (L) 35.0 - 45.0 %    MCV 91.1 74.0 - 97.0 FL    MCH 28.7 24.0 - 34.0 PG    MCHC 31.5 31.0 - 37.0 g/dL    RDW 13.8 11.6 - 14.5 %    PLATELET 764 276 - 407 K/uL    MPV 8.9 (L) 9.2 - 11.8 FL    NEUTROPHILS 68 40 - 73 %    LYMPHOCYTES 24 21 - 52 %    MONOCYTES 7 3 - 10 %    EOSINOPHILS 1 0 - 5 %    BASOPHILS 0 0 - 2 %    ABS. NEUTROPHILS 5.0 1.8 - 8.0 K/UL    ABS. LYMPHOCYTES 1.7 0.9 - 3.6 K/UL    ABS. MONOCYTES 0.5 0.05 - 1.2 K/UL    ABS. EOSINOPHILS 0.1 0.0 - 0.4 K/UL    ABS.  BASOPHILS 0.0 0.0 - 0.1 K/UL    DF AUTOMATED     MAGNESIUM    Collection Time: 12/27/18  2:43 PM   Result Value Ref Range    Magnesium 2.7 (H) 1.6 - 2.6 mg/dL   NT-PRO BNP    Collection Time: 12/27/18  2:43 PM   Result Value Ref Range    NT pro-BNP 6,198 (H) 0 - 0,962 PG/ML   METABOLIC PANEL, COMPREHENSIVE    Collection Time: 12/27/18  2:43 PM   Result Value Ref Range    Sodium 141 136 - 145 mmol/L    Potassium 3.1 (L) 3.5 - 5.5 mmol/L    Chloride 96 (L) 100 - 108 mmol/L    CO2 35 (H) 21 - 32 mmol/L    Anion gap 10 3.0 - 18 mmol/L    Glucose 93 74 - 99 mg/dL BUN 96 (H) 7.0 - 18 MG/DL    Creatinine 3.35 (H) 0.6 - 1.3 MG/DL    BUN/Creatinine ratio 29 (H) 12 - 20      GFR est AA 16 (L) >60 ml/min/1.73m2    GFR est non-AA 13 (L) >60 ml/min/1.73m2    Calcium 8.6 8.5 - 10.1 MG/DL    Bilirubin, total 0.4 0.2 - 1.0 MG/DL    ALT (SGPT) 19 13 - 56 U/L    AST (SGOT) 36 15 - 37 U/L    Alk. phosphatase 67 45 - 117 U/L    Protein, total 6.1 (L) 6.4 - 8.2 g/dL    Albumin 3.3 (L) 3.4 - 5.0 g/dL    Globulin 2.8 2.0 - 4.0 g/dL    A-G Ratio 1.2 0.8 - 1.7         Radiologic Studies -   XR CHEST PORT   Final Result   IMPRESSION:      1. ICD in place. 2.  Left lung base opacity. Possibly related to infiltrate vs. atelectasis vs.   combination of atelectasis and pleural effusion. Additional streaky densities   in the right lower lung zone, suggestive of atelectatic changes vs. less likely   infiltrate. 3.  Mild prominence of the cardiac silhouette. Medical Decision Making     ED Course: Progress Notes, Reevaluation, and Consults:  4:17 PM: Consult:  Discussed care with Viri Alejandra (Cardiology) Standard discussion; including history of patients chief complaint, available diagnostic results, and treatment course. Agrees with plan so far. Thinks elevated troponin is related to ARIEL. Recommends admission for rehydration. Also recommends holding off anticoagulants for now.    5:22 PM: Consult:  Discussed care with Jose, Specialty: Hospitalist  Standard discussion; including history of patients chief complaint, available diagnostic results, and treatment course. Accepts patient admission. Provider Notes (Medical Decision Making): Pt presents here with progressive RODRIGUEZ in the setting of CHF. She appears to be dehydrated and had not taken PO intake recently. Here, she has steadily worsening ARIEL on CKD and an elevated trop of 0.32 with BNP elevation. With a paced rhythm on EKG, unclear if having anginal equivalent or trop leak from CHF.  Plan to admit to follow trop, hydrate for her ARIEL, and for cards eval. She may develop cardiorenal syndrome and require stress dose lasix. Current Facility-Administered Medications   Medication Dose Route Frequency Provider Last Rate Last Dose    sodium chloride 0.9 % bolus infusion 1,000 mL  1,000 mL IntraVENous Gerry Thompson MD 1,000 mL/hr at 12/27/18 1645 1,000 mL at 12/27/18 1645    furosemide (LASIX) injection 40 mg  40 mg IntraVENous NOW Tawanda Tompkins MD         Current Outpatient Medications   Medication Sig Dispense Refill    acetaminophen (TYLENOL ARTHRITIS PAIN) 650 mg TbER Take 1 Tab by mouth every eight (8) hours. 15 Tab 0    lidocaine (XYLOCAINE) 4 % topical cream Apply  to affected area three (3) times daily as needed for Pain. 15 g 0    metOLazone (ZAROXOLYN) 10 mg tablet Take 1 Tab by mouth daily. 30 Tab 0    amiodarone (CORDARONE) 200 mg tablet TAKE ONE TABLET EVERY DAY (MAKE AN APPT) 30 Tab 5    furosemide (LASIX) 40 mg tablet TAKE ONE TABLET TWO TIMES A DAY 60 Tab 5    isosorbide mononitrate ER (IMDUR) 60 mg CR tablet TAKE ONE TABLET EVERY MORNING 30 Tab 6    meloxicam (MOBIC) 7.5 mg tablet       memantine (NAMENDA) 10 mg tablet       predniSONE (DELTASONE) 5 mg tablet Take  by mouth.  amitriptyline (ELAVIL) 25 mg tablet Take  by mouth nightly.  aspirin 81 mg tablet Take 81 mg by mouth daily. Vital Signs-Reviewed the patient's vital signs. Pulse Oximetry Analysis -  100% on room air, nml  Cardiac Monitor:  Rate: 78 BPM  Rhythm: AV Paced  EKG: Interpreted by the EP. Time Interpreted: 13:57   Rate: 76 BPM   Rhythm: AV Paced   Interpretation: Nonspecific ST changes,         Records Reviewed: Nursing Notes and Old Medical Records (Time of Review: 1:55 PM)  -I am the first provider for this patient.  -I reviewed the vital signs, available nursing notes, past medical history, past surgical history, family history and social history.     Critical Care Time:  The services I provided to this patient were to treat and/or prevent clinically significant deterioration that could result in the failure of one or more body systems and/or organ systems due to ARIEL, elevated troponin/possible NSTEMI. Services included the following:  -reviewing nursing notes and old charts  -vital sign assessments  -direct patient care  -medication orders and management  -interpreting and reviewing diagnostic studies/labs  -re-evaluations  -documentation time    Aggregate critical care time was 35 minutes, which includes only time during which I was engaged in work directly related to the patient's care as described above, whether I was at bedside or elsewhere in the Emergency Department. It did not include time spent performing other reported procedures or the services of residents, students, nurses, or advance practice providers. Diagnosis     Clinical Impression:   1. ARIEL (acute kidney injury) ([facility]    2. Acute on chronic congestive heart failure, unspecified heart failure type ([facility]        Disposition: Admit    Follow-up Information    None             Medication List      ASK your doctor about these medications    acetaminophen 650 mg Tber  Commonly known as:  TYLENOL ARTHRITIS PAIN  Take 1 Tab by mouth every eight (8) hours. amiodarone 200 mg tablet  Commonly known as:  CORDARONE  TAKE ONE TABLET EVERY DAY (MAKE AN APPT)     amitriptyline 25 mg tablet  Commonly known as:  ELAVIL     aspirin 81 mg tablet     furosemide 40 mg tablet  Commonly known as:  LASIX  TAKE ONE TABLET TWO TIMES A DAY     isosorbide mononitrate ER 60 mg CR tablet  Commonly known as:  IMDUR  TAKE ONE TABLET EVERY MORNING     lidocaine 4 % topical cream  Commonly known as:  XYLOCAINE  Apply  to affected area three (3) times daily as needed for Pain.     meloxicam 7.5 mg tablet  Commonly known as:  MOBIC     memantine 10 mg tablet  Commonly known as:  NAMENDA     metOLazone 10 mg tablet  Commonly known as:  ZAROXOLYN  Take 1 Tab by mouth daily. predniSONE 5 mg tablet  Commonly known as:  Ancel Clock          _______________________________    Attestations:  Scribe Attestation     Cris Méndez acting as a scribe for and in the presence of Lisa Baca MD      December 27, 2018 at 2:40 PM       Provider Attestation:      I personally performed the services described in the documentation, reviewed the documentation, as recorded by the scribe in my presence, and it accurately and completely records my words and actions.  December 27, 2018 at 2:40 PM - Lisa Baca MD    _______________________________

## 2018-12-28 ENCOUNTER — APPOINTMENT (OUTPATIENT)
Dept: NON INVASIVE DIAGNOSTICS | Age: 83
DRG: 683 | End: 2018-12-28
Attending: NURSE PRACTITIONER
Payer: MEDICARE

## 2018-12-28 LAB
ALBUMIN SERPL-MCNC: 3.1 G/DL (ref 3.4–5)
ALBUMIN/GLOB SERPL: 1 {RATIO} (ref 0.8–1.7)
ALP SERPL-CCNC: 60 U/L (ref 45–117)
ALT SERPL-CCNC: 19 U/L (ref 13–56)
ANION GAP SERPL CALC-SCNC: 9 MMOL/L (ref 3–18)
AST SERPL-CCNC: 32 U/L (ref 15–37)
BILIRUB SERPL-MCNC: 0.6 MG/DL (ref 0.2–1)
BUN SERPL-MCNC: 86 MG/DL (ref 7–18)
BUN/CREAT SERPL: 33 (ref 12–20)
CALCIUM SERPL-MCNC: 8.6 MG/DL (ref 8.5–10.1)
CHLORIDE SERPL-SCNC: 98 MMOL/L (ref 100–108)
CK MB CFR SERPL CALC: 0.9 % (ref 0–4)
CK MB CFR SERPL CALC: 1 % (ref 0–4)
CK MB SERPL-MCNC: 1.8 NG/ML (ref 5–25)
CK MB SERPL-MCNC: 2 NG/ML (ref 5–25)
CK SERPL-CCNC: 207 U/L (ref 26–192)
CK SERPL-CCNC: 207 U/L (ref 26–192)
CO2 SERPL-SCNC: 37 MMOL/L (ref 21–32)
CREAT SERPL-MCNC: 2.6 MG/DL (ref 0.6–1.3)
ECHO AO ROOT DIAM: 3.47 CM
ECHO LV E' LATERAL VELOCITY: 9.5 CM/S
ECHO LV E' SEPTAL VELOCITY: 5.75 CM/S
ECHO LV INTERNAL DIMENSION DIASTOLIC: 4.08 CM (ref 3.9–5.3)
ECHO LV INTERNAL DIMENSION SYSTOLIC: 2.74 CM
ECHO LV IVSD: 2.09 CM (ref 0.6–0.9)
ECHO LV MASS 2D: 349.1 G (ref 67–162)
ECHO LV MASS INDEX 2D: 193.5 G/M2 (ref 43–95)
ECHO LV POSTERIOR WALL DIASTOLIC: 1.3 CM (ref 0.6–0.9)
ECHO LVOT DIAM: 1.92 CM
ECHO LVOT PEAK GRADIENT: 3.3 MMHG
ECHO LVOT PEAK VELOCITY: 91.12 CM/S
ECHO LVOT VTI: 17.34 CM
ECHO MV A VELOCITY: 31.97 CM/S
ECHO MV E DECELERATION TIME (DT): 282.9 MS
ECHO MV E VELOCITY: 0.84 CM/S
ECHO MV E/A RATIO: 0.03
ECHO MV E/E' LATERAL: 0.09
ECHO MV E/E' RATIO (AVERAGED): 0.12
ECHO MV E/E' SEPTAL: 0.15
ECHO MV REGURGITANT PEAK GRADIENT: 75.4 MMHG
ECHO MV REGURGITANT PEAK VELOCITY: 434.04 CM/S
ECHO MV REGURGITANT VTIA: 119.73 CM
ECHO PULMONARY ARTERY SYSTOLIC PRESSURE (PASP): 38 MMHG
ECHO TV REGURGITANT MAX VELOCITY: 294.32 CM/S
ECHO TV REGURGITANT PEAK GRADIENT: 34.6 MMHG
ERYTHROCYTE [DISTWIDTH] IN BLOOD BY AUTOMATED COUNT: 13.8 % (ref 11.6–14.5)
GLOBULIN SER CALC-MCNC: 3.2 G/DL (ref 2–4)
GLUCOSE SERPL-MCNC: 92 MG/DL (ref 74–99)
HCT VFR BLD AUTO: 31.7 % (ref 35–45)
HGB BLD-MCNC: 10.3 G/DL (ref 12–16)
MAGNESIUM SERPL-MCNC: 2.6 MG/DL (ref 1.6–2.6)
MCH RBC QN AUTO: 29.6 PG (ref 24–34)
MCHC RBC AUTO-ENTMCNC: 32.5 G/DL (ref 31–37)
MCV RBC AUTO: 91.1 FL (ref 74–97)
PHOSPHATE SERPL-MCNC: 3.4 MG/DL (ref 2.5–4.9)
PLATELET # BLD AUTO: 128 K/UL (ref 135–420)
PMV BLD AUTO: 8.9 FL (ref 9.2–11.8)
POTASSIUM SERPL-SCNC: 2.6 MMOL/L (ref 3.5–5.5)
PROT SERPL-MCNC: 6.3 G/DL (ref 6.4–8.2)
RBC # BLD AUTO: 3.48 M/UL (ref 4.2–5.3)
SODIUM SERPL-SCNC: 144 MMOL/L (ref 136–145)
TROPONIN I SERPL-MCNC: 0.35 NG/ML (ref 0–0.04)
TROPONIN I SERPL-MCNC: 0.39 NG/ML (ref 0–0.04)
WBC # BLD AUTO: 7.9 K/UL (ref 4.6–13.2)

## 2018-12-28 PROCEDURE — 85027 COMPLETE CBC AUTOMATED: CPT

## 2018-12-28 PROCEDURE — 74011250637 HC RX REV CODE- 250/637: Performed by: INTERNAL MEDICINE

## 2018-12-28 PROCEDURE — 83735 ASSAY OF MAGNESIUM: CPT

## 2018-12-28 PROCEDURE — 84100 ASSAY OF PHOSPHORUS: CPT

## 2018-12-28 PROCEDURE — 82550 ASSAY OF CK (CPK): CPT

## 2018-12-28 PROCEDURE — 94762 N-INVAS EAR/PLS OXIMTRY CONT: CPT

## 2018-12-28 PROCEDURE — 74011250636 HC RX REV CODE- 250/636: Performed by: HOSPITALIST

## 2018-12-28 PROCEDURE — 74011250637 HC RX REV CODE- 250/637: Performed by: HOSPITALIST

## 2018-12-28 PROCEDURE — 65660000004 HC RM CVT STEPDOWN

## 2018-12-28 PROCEDURE — 74011250636 HC RX REV CODE- 250/636: Performed by: INTERNAL MEDICINE

## 2018-12-28 PROCEDURE — 80053 COMPREHEN METABOLIC PANEL: CPT

## 2018-12-28 PROCEDURE — 74011250637 HC RX REV CODE- 250/637: Performed by: NURSE PRACTITIONER

## 2018-12-28 PROCEDURE — 93306 TTE W/DOPPLER COMPLETE: CPT

## 2018-12-28 PROCEDURE — 36415 COLL VENOUS BLD VENIPUNCTURE: CPT

## 2018-12-28 RX ORDER — HEPARIN SODIUM 5000 [USP'U]/ML
5000 INJECTION, SOLUTION INTRAVENOUS; SUBCUTANEOUS EVERY 8 HOURS
Status: DISCONTINUED | OUTPATIENT
Start: 2018-12-28 | End: 2018-12-31 | Stop reason: HOSPADM

## 2018-12-28 RX ORDER — POTASSIUM CHLORIDE 1.5 G/1.77G
40 POWDER, FOR SOLUTION ORAL 2 TIMES DAILY WITH MEALS
Status: DISPENSED | OUTPATIENT
Start: 2018-12-28 | End: 2018-12-29

## 2018-12-28 RX ORDER — SODIUM CHLORIDE 9 MG/ML
75 INJECTION, SOLUTION INTRAVENOUS CONTINUOUS
Status: DISCONTINUED | OUTPATIENT
Start: 2018-12-28 | End: 2018-12-28

## 2018-12-28 RX ORDER — SODIUM CHLORIDE 9 MG/ML
50 INJECTION, SOLUTION INTRAVENOUS CONTINUOUS
Status: DISPENSED | OUTPATIENT
Start: 2018-12-28 | End: 2018-12-28

## 2018-12-28 RX ORDER — CARVEDILOL 3.12 MG/1
3.12 TABLET ORAL EVERY 12 HOURS
Status: DISCONTINUED | OUTPATIENT
Start: 2018-12-28 | End: 2018-12-31 | Stop reason: HOSPADM

## 2018-12-28 RX ORDER — ISOSORBIDE MONONITRATE 30 MG/1
30 TABLET, EXTENDED RELEASE ORAL DAILY
Status: DISCONTINUED | OUTPATIENT
Start: 2018-12-28 | End: 2018-12-28

## 2018-12-28 RX ORDER — AMIODARONE HYDROCHLORIDE 200 MG/1
200 TABLET ORAL DAILY
Status: DISCONTINUED | OUTPATIENT
Start: 2018-12-28 | End: 2018-12-28 | Stop reason: SDUPTHER

## 2018-12-28 RX ORDER — AMITRIPTYLINE HYDROCHLORIDE 25 MG/1
25 TABLET, FILM COATED ORAL
Status: DISCONTINUED | OUTPATIENT
Start: 2018-12-28 | End: 2018-12-31 | Stop reason: HOSPADM

## 2018-12-28 RX ORDER — ACETAMINOPHEN 500 MG
500 TABLET ORAL EVERY 6 HOURS
Status: DISCONTINUED | OUTPATIENT
Start: 2018-12-28 | End: 2018-12-31 | Stop reason: HOSPADM

## 2018-12-28 RX ORDER — GUAIFENESIN 100 MG/5ML
81 LIQUID (ML) ORAL DAILY
Status: DISCONTINUED | OUTPATIENT
Start: 2018-12-28 | End: 2018-12-31 | Stop reason: HOSPADM

## 2018-12-28 RX ORDER — AMIODARONE HYDROCHLORIDE 200 MG/1
200 TABLET ORAL DAILY
Status: DISCONTINUED | OUTPATIENT
Start: 2018-12-28 | End: 2018-12-31 | Stop reason: HOSPADM

## 2018-12-28 RX ADMIN — HEPARIN SODIUM 5000 UNITS: 5000 INJECTION, SOLUTION INTRAVENOUS; SUBCUTANEOUS at 19:59

## 2018-12-28 RX ADMIN — HEPARIN SODIUM 5000 UNITS: 5000 INJECTION, SOLUTION INTRAVENOUS; SUBCUTANEOUS at 09:46

## 2018-12-28 RX ADMIN — AMIODARONE HYDROCHLORIDE 200 MG: 200 TABLET ORAL at 09:48

## 2018-12-28 RX ADMIN — ACETAMINOPHEN 500 MG: 500 TABLET ORAL at 09:48

## 2018-12-28 RX ADMIN — SODIUM CHLORIDE 50 ML/HR: 900 INJECTION, SOLUTION INTRAVENOUS at 10:00

## 2018-12-28 RX ADMIN — POTASSIUM CHLORIDE 40 MEQ: 1.5 POWDER, FOR SOLUTION ORAL at 17:05

## 2018-12-28 RX ADMIN — ASPIRIN 81 MG 81 MG: 81 TABLET ORAL at 09:48

## 2018-12-28 RX ADMIN — ACETAMINOPHEN 500 MG: 500 TABLET ORAL at 17:05

## 2018-12-28 RX ADMIN — CARVEDILOL 3.12 MG: 3.12 TABLET, FILM COATED ORAL at 09:48

## 2018-12-28 NOTE — PROGRESS NOTES
2000-  Admission assessment completed with daughter at bedside. Pt medication list unavailable and current family at bedside are not aware of pt's medications. Pt's next of kin is . Pt has dementia and was unable to participate in admission process. Pt incontinent of bladder. Lasix given in ED prior to transfer to floor. VSS. Will monitor. 2200-  Pt incontinent of urine. 0000-  Pt incontinent of urine. 0400-  Pt resting. 0700-  Bedside and Verbal shift change report given to CVTSD RN (oncoming nurse) by Bertrand Crenshaw RN (offgoing nurse). Report included the following information SBAR, Kardex, Intake/Output, MAR and Cardiac Rhythm Paced.

## 2018-12-28 NOTE — ROUTINE PROCESS
Bedside and Verbal shift change report given to Asher Rodríguez RN (oncoming nurse) by Gilford Peer, RN 
 (offgoing nurse). Report included the following information SBAR, Kardex, ED Summary, Procedure Summary, Intake/Output, MAR, Recent Results, Med Rec Status and Cardiac Rhythm paced.

## 2018-12-28 NOTE — H&P
History & Physical    Patient: Carole Urban MRN: [id number]  CSN: [id number]    YOB: 1931  Age: 80 y.o. Sex: female      DOA: 12/27/2018    No chief complaint on file. HPI:     Carole Urban is a 80 y.o. female who has a history of congestive HF with last known EF 30 % s/p ICD, pacemaker, CKD, Dementia, HTN. She was brought to the hospital for reduced exercise tolerance. Her daughter at the bedside said that she noticed that her mother was having difficulty climbing the steps at home and was breathing hard. No SOB at rest. Patient is not able to give history. Her last Echo in 2016 showed  Left ventricle: Systolic function was moderately to markedly reduced by  visual assessment. Ejection fraction was estimated to be 30 %. There was  severe hypokinesis of the mid-apical anterior, basal-mid anteroseptal,  basal-mid inferoseptal, apical septal, apical lateral, and apical wall(s). Wall thickness was mildly increased.     She is on Lasix and Metolazone at home    Past Medical History:   Diagnosis Date    Acute on chronic diastolic heart failure (HCC)     Arthritis     Benign hypertensive heart disease without heart failure     Better controlled, stable    Chronic diastolic heart failure (HCC)     Breathing and edema is improving    Congestive heart failure (HCC)     HTN (hypertension)     Hypercholesteremia     [facility]18[facility]    followed by dr Kay Davenport     Obesity, unspecified     Patient has weight loss Discussed diet ad fluid restriction    Other and unspecified hyperlipidemia     F/u per pmd    Tricuspid valve disorders, 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    tr with moderate pulmonary htn        Past Surgical History:   Procedure Laterality Date    HX HYSTERECTOMY      PACEMAKER PROCEDURE         Family History   Problem Relation Age of Onset    Arrhythmia Neg Hx     Asthma Neg Hx     Clotting Disorder Neg Hx     Fainting Neg Hx     Heart Attack Neg Hx     High Cholesterol Neg Hx     Pacemaker Neg Hx     Stroke Neg Hx        Social History     Socioeconomic History    Marital status:      Spouse name: Not on file    Number of children: Not on file    Years of education: Not on file    Highest education level: Not on file   Tobacco Use    Smoking status: Never Smoker    Smokeless tobacco: Never Used   Substance and Sexual Activity    Alcohol use: No    Drug use: No       Prior to Admission medications    Medication Sig Start Date End Date Taking? Authorizing Provider   acetaminophen (TYLENOL ARTHRITIS PAIN) 650 mg TbER Take 1 Tab by mouth every eight (8) hours. 18   Tramaine METCALF PA-C   lidocaine (XYLOCAINE) 4 % topical cream Apply  to affected area three (3) times daily as needed for Pain. 18   Tramaine METCALF PA-C   metOLazone (ZAROXOLYN) 10 mg tablet Take 1 Tab by mouth daily. 18   Tasneem Payne NP   amiodarone (CORDARONE) 200 mg tablet TAKE ONE TABLET EVERY DAY (MAKE AN APPT) 18   Becki Roca NP   furosemide (LASIX) 40 mg tablet TAKE ONE TABLET TWO TIMES A DAY 10/15/18   Beatris Bunch MD   isosorbide mononitrate ER (IMDUR) 60 mg CR tablet TAKE ONE TABLET EVERY MORNING 10/4/18   Beatris Bunch MD   meloxicam [facility]) 7.5 mg tablet  18   Provider, Historical   memantine (NAMENDA) 10 mg tablet  7/10/18   Provider, Historical   predniSONE (DELTASONE) 5 mg tablet Take  by mouth. Provider, Historical   amitriptyline (ELAVIL) 25 mg tablet Take  by mouth nightly. Provider, Historical   aspirin 81 mg tablet Take 81 mg by mouth daily. Provider, Historical       No Known Allergies    Review of Systems    Unable to obtain    Physical Exam:     Physical Exam:  Visit Vitals  /53   Pulse 74   Temp 98.3 °F (36.8 °C)   Resp 15   Ht 5' 5\" (1.651 m)   Wt 72.6 kg (160 lb)   SpO2 99%   Breastfeeding?  No   BMI 26.63 kg/m²      O2 Device: Room air    Temp (24hrs), Av.2 °F (36.8 °C), Min:98 °F (36.7 °C), Max:98.3 °F (36.8 °C)       No intake/output data recorded. No intake/output data recorded. General:  Alert, no distress, appears stated age. Head:  Normocephalic, without obvious abnormality, atraumatic. Eyes:  Conjunctivae/corneas clear. PERRL, EOMs intact. Nose: Nares normal. No drainage or sinus tenderness. Throat: Lips, mucosa, and tongue normal. Teeth and gums normal.   Neck: Supple, symmetrical, trachea midline, no adenopathy, thyroid: no enlargement/tenderness/nodules, no carotid bruit and no JVD. Back:   ROM normal. No CVA tenderness. Lungs:   Clear to auscultation bilaterally. Chest wall:  CTAB   Heart:  Paced rhythm on monitor, S1, S2 normal, LLSB systolic murmur, click, rub or gallop. Abdomen: Soft, non-tender. Bowel sounds normal. No masses,  No organomegaly. Extremities: Extremities normal, atraumatic, no cyanosis. trace edema. Pulses: 2+ and symmetric all extremities. Skin: Skin color, texture, turgor normal. No rashes or lesions   Neurologic: CNII-XII intact. No focal motor or sensory deficit. Labs Reviewed:    Recent Results (from the past 24 hour(s))   EKG, 12 LEAD, INITIAL    Collection Time: 12/27/18  1:57 PM   Result Value Ref Range    Ventricular Rate 76 BPM    Atrial Rate 37 BPM    QRS Duration 220 ms    Q-T Interval 554 ms    QTC Calculation (Bezet) 623 ms    Calculated R Axis -51 degrees    Calculated T Axis 106 degrees    Diagnosis       AV sequential or dual chamber electronic pacemaker  When compared with ECG of 11-MAY-2016 14:44,  Vent.  rate has decreased BY   4 BPM  Confirmed by Aly Pringle ([id number]) on 12/27/2018 6:34:55 PM     CARDIAC PANEL,(CK, CKMB & TROPONIN)    Collection Time: 12/27/18  2:43 PM   Result Value Ref Range     (H) 26 - 192 U/L    CK - MB 3.6 (H) <3.6 ng/ml    CK-MB Index 1.4 0.0 - 4.0 %    Troponin-I, QT 0.32 (H) 0.0 - 0.045 NG/ML   CBC WITH AUTOMATED DIFF    Collection Time: 12/27/18  2:43 PM Result Value Ref Range    WBC 7.2 4.6 - 13.2 K/uL    RBC 3.59 (L) 4.20 - 5.30 M/uL    HGB 10.3 (L) 12.0 - 16.0 g/dL    HCT 32.7 (L) 35.0 - 45.0 %    MCV 91.1 74.0 - 97.0 FL    MCH 28.7 24.0 - 34.0 PG    MCHC 31.5 31.0 - 37.0 g/dL    RDW 13.8 11.6 - 14.5 %    PLATELET 924 790 - 652 K/uL    MPV 8.9 (L) 9.2 - 11.8 FL    NEUTROPHILS 68 40 - 73 %    LYMPHOCYTES 24 21 - 52 %    MONOCYTES 7 3 - 10 %    EOSINOPHILS 1 0 - 5 %    BASOPHILS 0 0 - 2 %    ABS. NEUTROPHILS 5.0 1.8 - 8.0 K/UL    ABS. LYMPHOCYTES 1.7 0.9 - 3.6 K/UL    ABS. MONOCYTES 0.5 0.05 - 1.2 K/UL    ABS. EOSINOPHILS 0.1 0.0 - 0.4 K/UL    ABS. BASOPHILS 0.0 0.0 - 0.1 K/UL    DF AUTOMATED     MAGNESIUM    Collection Time: 12/27/18  2:43 PM   Result Value Ref Range    Magnesium 2.7 (H) 1.6 - 2.6 mg/dL   NT-PRO BNP    Collection Time: 12/27/18  2:43 PM   Result Value Ref Range    NT pro-BNP 6,198 (H) 0 - 2,470 PG/ML   METABOLIC PANEL, COMPREHENSIVE    Collection Time: 12/27/18  2:43 PM   Result Value Ref Range    Sodium 141 136 - 145 mmol/L    Potassium 3.1 (L) 3.5 - 5.5 mmol/L    Chloride 96 (L) 100 - 108 mmol/L    CO2 35 (H) 21 - 32 mmol/L    Anion gap 10 3.0 - 18 mmol/L    Glucose 93 74 - 99 mg/dL    BUN 96 (H) 7.0 - 18 MG/DL    Creatinine 3.35 (H) 0.6 - 1.3 MG/DL    BUN/Creatinine ratio 29 (H) 12 - 20      GFR est AA 16 (L) >60 ml/min/1.73m2    GFR est non-AA 13 (L) >60 ml/min/1.73m2    Calcium 8.6 8.5 - 10.1 MG/DL    Bilirubin, total 0.4 0.2 - 1.0 MG/DL    ALT (SGPT) 19 13 - 56 U/L    AST (SGOT) 36 15 - 37 U/L    Alk. phosphatase 67 45 - 117 U/L    Protein, total 6.1 (L) 6.4 - 8.2 g/dL    Albumin 3.3 (L) 3.4 - 5.0 g/dL    Globulin 2.8 2.0 - 4.0 g/dL    A-G Ratio 1.2 0.8 - 1.7         Procedures/imaging: see electronic medical records for all procedures/Xrays and details which were not copied into this note but were reviewed prior to creation of Plan        Assessment/Plan     1. ARIEL on CKD 3  2.  Congestive HF with EF of 30%, No features of acute decompensation present  3. HTN  4. Paced Rhythm  5. Anemia  6. Dementia    Plan    - By my assessment, patient does not appear to be in volume overload, on the contrary she appears dry  - worsening of creatinine appears to be a prerenal picture in the setting of dehydration and diuresis, Cr is 3.35 yet BUN to Cr is 28.6  - elevation of troponin and proBNP is in the setting of renal dysfunction  - for now will gently hydrate and monitor output and renal function, will hold diuretics until cardiology eval in the morning  - nephrology consult placed.    - Patient has large cardiac silhouette and rotated to the left, I can't clear the left base on CXR, however other areas don't show features of congestion    DVt ppx - heparin    Full code    Active Problems:    Acute exacerbation of CHF (congestive heart failure) ([facility] (12/27/2018)      [facility]EL [facility] kidney [facility]) [facility] (12/27/2018)        Francy Haynes MD  December 27, 2018

## 2018-12-28 NOTE — PROGRESS NOTES
[facility] Hospitalist Group Progress Note Patient: Jorge Castillo Age: 80 y.o. : 1931 MR#: [id number] SSN: [id number] Date/Time: 2018 Subjective:  
 
Review of systems Patient is alert and awake but confused - family in room with her According to family off and on she is like this No distress No behavioral issues Assessment/Plan:  
Patient with dementia , chronic systolic heart failure EF 30% / s/p ICD placement  / h/p lupus / HTN / brought in due to increasing SOB and decreasing exercise tolerance 1. ARIEL on CKD 4 
 
2 HTN  
 
3 Systolic heart failure chronic - EF 30% in past  
 
4 Lupus history 5 Hypokalemia 6 Dementia  
 
7 High trop - not related to ACS - likely from CKD  
 
9 Afib - s/p Pace maker( h/o Bradycardia )  . paced rhythm - not on [facility] - see cardiology note (  Dr Viramontes Oar - EP recommended  upgrade to biventricular pacemaker as an outpatient.  Patient's family refused  ) PLAN  
- ARIEL resulting from poor po intake and diuresis , on gentle hydration, replace lytes  - follow with renal  
 
- Follow rpt ECHO  
 
- patient's symptoms can not be explained by current medical conditions. - ARIEL and Dehydration . Why she is SOB and ET is decreasing - recheck ECHO Her general condition is deteriorating Case discussed with:  []Patient  [x] Family ( In room with patient )    []Nursing  []Case Management DVT Prophylaxis:  []Lovenox  []Hep SQ  []SCDs  []Coumadin   []On Heparin gtt Objective:  
VS:  
Visit Vitals /77 Pulse 70 Temp 97.7 °F (36.5 °C) Resp 16 Ht 5' 5\" (1.651 m) Wt 73 kg (161 lb) SpO2 95% Breastfeeding? No  
BMI 26.79 kg/m² Tmax/24hrs: Temp (24hrs), Av.7 °F (36.5 °C), Min:96.6 °F (35.9 °C), Max:98.3 °F (36.8 °C) IOBRIEF Intake/Output Summary (Last 24 hours) at 2018 1049 Last data filed at 2018 0300 Gross per 24 hour Intake 200 ml Output 3 ml Net 197 ml General:  Alert, cooperative, no acute distress /  Confused Cardiovascular: S1S2 - regular , No Murmur Pulmonary: Equal expansion , No Use of accessory muscles , No Rales No Rhonchi GI:  +BS in all four quadrants, soft, non-tender Extremities:  No edema; 2+ dorsalis pedis pulses bilaterally Neuro: Alert and oriented X 2. Medications:  
Current Facility-Administered Medications Medication Dose Route Frequency  acetaminophen (TYLENOL) tablet 500 mg  500 mg Oral Q6H  
 amiodarone (CORDARONE) tablet 200 mg  200 mg Oral DAILY  amitriptyline (ELAVIL) tablet 25 mg  25 mg Oral QHS  aspirin chewable tablet 81 mg  81 mg Oral DAILY  heparin (porcine) injection 5,000 Units  5,000 Units SubCUTAneous Q8H  
 carvedilol (COREG) tablet 3.125 mg  3.125 mg Oral Q12H  
 0.9% sodium chloride infusion  50 mL/hr IntraVENous CONTINUOUS  
 potassium chloride (KLOR-CON) packet for solution 40 mEq  40 mEq Oral BID WITH MEALS Labs:   
Recent Labs  
  12/28/18 
0925 12/27/18 
1443 WBC 7.9 7.2 HGB 10.3* 10.3* HCT 31.7* 32.7*  
* 146 Recent Labs  
  12/28/18 
0925 12/27/18 
1443  141  
K 2.6* 3.1*  
CL 98* 96* CO2 37* 35* GLU 92 93 BUN 86* 96* CREA 2.60* 3.35* CA 8.6 8.6 MG 2.6 2.7* PHOS 3.4  --   
ALB 3.1* 3.3* SGOT 32 36 ALT 19 19 Signed By: Isaac Kenney MD   
 December 28, 2018

## 2018-12-28 NOTE — PROGRESS NOTES
Reason for Admission:  ARIEL (acute kidney injury) ([facility] Acute exacerbation of CHF (congestive heart failure) ([facility] RRAT Score:    18 Plan for utilizing home health:    No home health orders at this time. The family asks that if home health is needed that an aide is also ordered since the patient needs help with her ADL/IADLs. Likelihood of Readmission:   Moderate Do you (patient/family) have any concerns for transition/discharge?  no 
 
Transition of Care Plan:    
Patient with dementia. Initial assessment completed with Wesley Rogers, son and Sandy Sanders, daughter. Face sheet information confirmed:  yes. This patient lives in a 2 story home with her spouse. They are getting a stair lift for her in the next few days. She was dependent of her ADL/IADL's prior to admission; her son: Vanessa Ruiz helps her, but the family feels that she will need more help. This NN asked if they had considered applying for Medicaid; the patient's daughter states that her father did not want to in the past because it appears that they are over resources, and they would likely need to spend down. The family stated that if covered they would like the patient discharged home with home health, and an aide. They understand that it's temporary, and that the only way to obtain long term personal care services is through [place]. The family will provide transportation upon discharge. She ambulates with a walker; she fell 3 weeks ago. She has a walker at home. Ho home oxygen or nebulizer at home. The patient's son states that they can obtain her medications from the pharmacy, and that she takes her medications as directed. They have no concerns for discharge. Patient is not currently active with home health. Patient has not stayed in a skilled nursing facility or rehab recently. Currently, the anticipated discharge plan is Home with [address] [place] [address] Abdiel Chappell. Care Management Interventions PCP Verified by CM: Yes(Last seen by PCP in June 2018.) Mode of Transport at Discharge: Self Transition of Care Consult (CM Consult): Discharge Planning Discharge Durable Medical Equipment: (She has a RW at home.) Current Support Network: Lives with Spouse, Family Lives [facility] Follow Up Transport: Family Plan discussed with Pt/Family/Caregiver: Yes Discharge Location Discharge Placement: Home with home health Aba López MSN, RN, [facility] Care Management [phone]

## 2018-12-28 NOTE — CONSULTS
Consult Note Consult requested by: Edi Correa MD 
 
ADMIT DATE: 12/27/2018  CONSULT DATE: December 28, 2018 Admission diagnosis: weakness Reason for Nephrology Consultation: ARIEL on CKD Assessment:  
1) ARIEL on CKD4 ( baseline in 2 range ) , likely from prerenal azotemia v/s ATN in setting of diuresis and poor intake , Volume status does looks dry, CXR with normal lung fields but significant cardiomegaly , EF 30% Agree with gentle hydration 50ml/hrs , encourage po intake 2) CKD4 , with no proteinuria , etiology HTN nephrosclerosis /ischaemic nephropathy , 
3) Chronic systolic and diastolic CHF, volume status on dry side, compensated , cardiology consulted 4) HTN , avoid extremes of BP , cardiology adding low dose coreg 5) hypokalemia , recheck 6) metabolic alkalosis , d/t volume contraction 7) anemia , at goal  
 
Please call with questions, 
 
Rosi Espinosa MD [facility] Cell [phone] Pager: [phone] HPI: Arlyn Hilario is a 80 y.o. female [address] with CKD3 , HTN , CHF with EF 30% who presented with reduced effort tolerance. Her mentions  that he noticed that his mother was having difficulty climbing the steps at home and was breathing hard. Patient not able to verbalize any symptoms, does have baseline dementia  
  
 
 
 
  
Past Medical History:  
Diagnosis Date  Acute on chronic diastolic heart failure ([address]  Arthritis  Benign hypertensive heart disease without heart failure Better controlled, stable  Chronic diastolic heart failure ([address] Breathing and edema is improving  Congestive heart failure ([address]  HTN (hypertension)  Hypercholesteremia  Lupus (systemic lupus erythematosus) ([address] 6/18/2014  
 followed by dr Kenn High  Obesity, unspecified Patient has weight loss Discussed diet ad fluid restriction  Other and unspecified hyperlipidemia  F/u per pmd  
 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  
Procedure Laterality Date  HX HYSTERECTOMY  PACEMAKER PROCEDURE Social History Socioeconomic History  Marital status:  Spouse name: Not on file  Number of children: Not on file  Years of education: Not on file  Highest education level: Not on file Social Needs  Financial resource strain: Not on file  Food insecurity - worry: Not on file  Food insecurity - inability: Not on file  Transportation needs - medical: Not on file  Transportation needs - non-medical: Not on file Occupational History  Not on file Tobacco Use  Smoking status: Never Smoker  Smokeless tobacco: Never Used Substance and Sexual Activity  Alcohol use: No  
 Drug use: No  
 Sexual activity: Not on file Other Topics Concern  Not on file Social History Narrative  Not on file Family History Problem Relation Age of Onset  Arrhythmia Neg Hx  Asthma Neg Hx  Clotting Disorder Neg Hx  Fainting Neg Hx   
 Heart Attack Neg Hx  High Cholesterol Neg Hx  Pacemaker Neg Hx  Stroke Neg Hx No Known Allergies Home Medications:  
 
Medications Prior to Admission Medication Sig  
 acetaminophen (TYLENOL ARTHRITIS PAIN) 650 mg TbER Take 1 Tab by mouth every eight (8) hours.  lidocaine (XYLOCAINE) 4 % topical cream Apply  to affected area three (3) times daily as needed for Pain.  metOLazone (ZAROXOLYN) 10 mg tablet Take 1 Tab by mouth daily.  amiodarone (CORDARONE) 200 mg tablet TAKE ONE TABLET EVERY DAY (MAKE AN APPT)  furosemide (LASIX) 40 mg tablet TAKE ONE TABLET TWO TIMES A DAY  isosorbide mononitrate ER (IMDUR) 60 mg CR tablet TAKE ONE TABLET EVERY MORNING  meloxicam (MOBIC) 7.5 mg tablet  memantine (NAMENDA) 10 mg tablet  predniSONE (DELTASONE) 5 mg tablet Take  by mouth.  amitriptyline (ELAVIL) 25 mg tablet Take  by mouth nightly.  aspirin 81 mg tablet Take 81 mg by mouth daily. Current Inpatient Medications:  
 
Current Facility-Administered Medications Medication Dose Route Frequency  acetaminophen (TYLENOL) tablet 500 mg  500 mg Oral Q6H  
 amiodarone (CORDARONE) tablet 200 mg  200 mg Oral DAILY  amitriptyline (ELAVIL) tablet 25 mg  25 mg Oral QHS  aspirin chewable tablet 81 mg  81 mg Oral DAILY  heparin (porcine) injection 5,000 Units  5,000 Units SubCUTAneous Q8H  
 carvedilol (COREG) tablet 3.125 mg  3.125 mg Oral Q12H  potassium chloride (KLOR-CON) packet for solution 40 mEq  40 mEq Oral BID WITH MEALS Review of Systems:  
Unable to provide ROS d/t dementia Physical Assessment:  
 
Vitals:  
 12/28/18 1109 12/28/18 1217 12/28/18 1654 12/28/18 2000 BP: 117/77 108/68 117/63 97/53 Pulse:  71 73 70 Resp:  18 18 18 Temp:  99.9 °F (37.7 °C) 98.9 °F (37.2 °C) 100.4 °F (38 °C) SpO2:  93% 93% 95% Weight: 73 kg (161 lb) Height: 5' 5\" (1.651 m) Last 3 Recorded Weights in this Encounter 12/27/18 2000 12/28/18 0300 12/28/18 1109 Weight: 72.6 kg (160 lb) 73 kg (161 lb) 73 kg (161 lb) Admission weight: Weight: 72.6 kg (160 lb) (12/27/18 1402) Intake/Output Summary (Last 24 hours) at 12/28/2018 2238 Last data filed at 12/28/2018 2000 Gross per 24 hour Intake 340 ml Output 1 ml Net 339 ml Patient is in no apparent distress. HEENT:dry mucosa. Lungs: good air entry, clear to auscultation bilaterally. Cardiovascular system: S1, S2, regular rate and rhythm. No JVD Abdomen: soft, non tender, non distended. Extremities: no edema Integumentary: skin is grossly intact. Data Review: 
 
Labs: Results:  
   
Chemistry Recent Labs  
  12/28/18 
0679 12/27/18 
1443 GLU 92 93  141  
K 2.6* 3.1*  
CL 98* 96* CO2 37* 35* BUN 86* 96* CREA 2.60* 3.35* CA 8.6 8.6 AGAP 9 10 BUCR 33* 29* AP 60 67  
TP 6.3* 6.1* ALB 3.1* 3.3*  
GLOB 3.2 2.8 AGRAT 1.0 1.2 PHOS 3.4  --   
  
  
CBC w/Diff Recent Labs  
  12/28/18 
0925 12/27/18 
1443 WBC 7.9 7.2 RBC 3.48* 3.59* HGB 10.3* 10.3* HCT 31.7* 32.7*  
* 146 GRANS  --  68  
LYMPH  --  24 EOS  --  1 Iron/Ferritin No results for input(s): IRON in the last 72 hours. No lab exists for component: TIBCCALC  
PTH/VIT D No results for input(s): PTH in the last 72 hours. No lab exists for component: VITD Yaquelin Ponce MD 
12/28/2018 10:38 PM 
 
 
December 28, 2018

## 2018-12-28 NOTE — CONSULTS
[facility] - Consult Note Date of  Admission: 12/27/2018  1:43 PM 
  
Primary Care Physician: Xochitl Valles MD 
 
 Plan: 1. Chronic combined systolic and diastolic heart failure-has elevated NT pro BNP no significant peripheral.edema. Will monitor. Medical started on ivf. Echo 2015 EF% 30% 2. Indeterminate troponin- likely demand ischemia in the setting ARIEL, chronic CHF. Will monitor continue asa 3. Atrial fibrillation - currently paced on amiodarone. Not on anticoagulation due to high bleeding risk. Continue asa. 4. ARIEL on CKD- with elevated BUN she was on lasix and metolazone at home now on hold. Renal was consulted. 5. Hx of symptomatic bradycardia- S/P PPM  2015 Dr Joselito Jensen - EP recommended  upgrade to biventricular pacemaker as an outpatient.  Patient's family refused 6. Lupus- follow by Dr. Jaden Floyd as out patient 7. Hypertension- with normal low bp. Added low dose Coreg per parameters will monitor 8. Dehydration- possible related to poor po intake 9. Hypokalemia- follow bmp today Guarded prognosis. D/w family and considering her age and memory problems, would recommend conservative med rx only. DNR discussion also needs to  Started. Patient admitted with ARIEL- likely from over diuresis. Continue gentle hydration- will resume diuretics as needed. Will consider dobutamine if needed. Echo 5/16 Left ventricle: Systolic function was moderately to markedly reduced by 
visual assessment. Ejection fraction was estimated to be 30 %. There was 
severe hypokinesis of the mid-apical anterior, basal-mid anteroseptal, 
basal-mid inferoseptal, apical septal, apical lateral, and apical wall(s). Wall thickness was mildly increased. Ventricular septum: There was \"bounce\" motion. Right ventricle: Systolic function was mildly reduced. Systolic pressure 
was mildly increased. Estimated peak pressure was 45 mmHg. Left atrium: The atrium was severely dilated. Right atrium: The atrium was mildly dilated. Mitral valve: There was moderate regurgitation. Aortic valve: There was trivial regurgitation. Tricuspid valve: There was mild regurgitation. Inferior vena cava, hepatic veins: The inferior vena cava was mildly 
dilated. Respirophasic changes were blunted (less than 50% variation). Pericardium: There was a left pleural effusion. Assessment:  
 
Hospital Problems  Date Reviewed: 7/23/2018 Codes Class Noted POA Acute exacerbation of CHF (congestive heart failure) ([facility]) ICD-10-CM: I50.9 ICD-9-CM: 428.0  12/27/2018 Unknown ARIEL (acute kidney injury) ([facility] ICD-10-CM: N17.9 ICD-9-CM: 584.9  12/27/2018 Unknown History of Present Illness: This is a 80 y.o. female admitted for ARIEL (acute kidney injury) ([facility] [address] Acute exacerbation of CHF (congestive heart failure) ([facility] Patient with PMHx of atrial fibrillation CHF, s/p PPM, dementia the patient was brought to the ED due to decreasing in excessive tolerance and  breathing hard. No SOB at rest. Patient is not able to give history. Has dementia. In the ED was noted to have elevated creatinine and NT pro BNP. Patient initial troponin 0.32 The patient is confused  She is resting comfortable no distress noted. Past Medical History:  
 
Past Medical History:  
Diagnosis Date  Acute on chronic diastolic heart failure ([facility]  Arthritis  Benign hypertensive heart disease without heart failure Better controlled, stable  Chronic diastolic heart failure ([facility] Breathing and edema is improving  Congestive heart failure ([address]  HTN (hypertension)  Hypercholesteremia  Lupus (systemic lupus erythematosus) ([facility] 6/18/2014  
 followed by dr Kay Davenport  Obesity, unspecified Patient has weight loss Discussed diet ad fluid restriction  Other and unspecified hyperlipidemia F/u per pmd  
 Tricuspid valve disorders, 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 tr with moderate pulmonary htn Social History:  
 
Social History Socioeconomic History  Marital status:  Spouse name: Not on file  Number of children: Not on file  Years of education: Not on file  Highest education level: Not on file Tobacco Use  Smoking status: Never Smoker  Smokeless tobacco: Never Used Substance and Sexual Activity  Alcohol use: No  
 Drug use: No  
 
 
 Family History:  
 
Family History Problem Relation Age of Onset  Arrhythmia Neg Hx  Asthma Neg Hx  Clotting Disorder Neg Hx  Fainting Neg Hx   
 Heart Attack Neg Hx  High Cholesterol Neg Hx  Pacemaker Neg Hx  Stroke Neg Hx Medications:  
No Known Allergies No current facility-administered medications for this encounter. Review Of Systems:  
 
Unable to obtain ROS patient with severe dementia. Physical Exam:  
 
Visit Vitals /77 Pulse 70 Temp 97.7 °F (36.5 °C) Resp 16 Ht 5' 5\" (1.651 m) Wt 73 kg (161 lb) SpO2 95% Breastfeeding? No  
BMI 26.79 kg/m² BP Readings from Last 3 Encounters:  
12/28/18 117/77  
12/04/18 119/60  
07/23/18 122/56 Pulse Readings from Last 3 Encounters:  
12/28/18 70  
12/04/18 71  
07/23/18 70 Wt Readings from Last 3 Encounters:  
12/28/18 73 kg (161 lb) 12/04/18 75.8 kg (167 lb)  
01/24/18 59.9 kg (132 lb) General:  alert, cooperative, no distress, appears stated age. Confused. Skin: Warm and dry, acyanotic, normal color. Head: Normocephalic, atraumatic. Eyes: Sclerae anicteric, conjunctivae without injection. Neck:  nontender, no nuchal rigidity, no masses, no stridor, no carotid bruit, + JVD Lungs:  clear to auscultation bilaterally. Bilateral breath sounds reduced Heart:  regularly irregular rhythm, S1, S2 normal, no click, no rub Abdomen:  abdomen is soft without significant tenderness, masses, organomegaly or guarding Extremities:  extremities normal, atraumatic, no cyanosis or edema. Peripheral pulses Neurological: grossly intact. No focal abnormalities, moves all extremities well. Psychiatric Affect: The patient is awake, and confused Data Review:  
 
Recent Results (from the past 48 hour(s)) EKG, 12 LEAD, INITIAL Collection Time: 12/27/18  1:57 PM  
Result Value Ref Range Ventricular Rate 76 BPM  
 Atrial Rate 37 BPM  
 QRS Duration 220 ms  
 Q-T Interval 554 ms QTC Calculation (Bezet) 623 ms Calculated R Axis -51 degrees Calculated T Axis 106 degrees Diagnosis AV sequential or dual chamber electronic pacemaker When compared with ECG of 11-MAY-2016 14:44, 
Vent. rate has decreased BY   4 BPM 
Confirmed by Yumi Solares ([id number]) on 12/27/2018 6:34:55 PM 
  
CARDIAC PANEL,(CK, CKMB & TROPONIN) Collection Time: 12/27/18  2:43 PM  
Result Value Ref Range  (H) 26 - 192 U/L  
 CK - MB 3.6 (H) <3.6 ng/ml CK-MB Index 1.4 0.0 - 4.0 % Troponin-I, QT 0.32 (H) 0.0 - 0.045 NG/ML  
CBC WITH AUTOMATED DIFF Collection Time: 12/27/18  2:43 PM  
Result Value Ref Range WBC 7.2 4.6 - 13.2 K/uL  
 RBC 3.59 (L) 4.20 - 5.30 M/uL  
 HGB 10.3 (L) 12.0 - 16.0 g/dL HCT 32.7 (L) 35.0 - 45.0 % MCV 91.1 74.0 - 97.0 FL  
 MCH 28.7 24.0 - 34.0 PG  
 MCHC 31.5 31.0 - 37.0 g/dL  
 RDW 13.8 11.6 - 14.5 % PLATELET 386 591 - 075 K/uL MPV 8.9 (L) 9.2 - 11.8 FL  
 NEUTROPHILS 68 40 - 73 % LYMPHOCYTES 24 21 - 52 % MONOCYTES 7 3 - 10 % EOSINOPHILS 1 0 - 5 % BASOPHILS 0 0 - 2 %  
 ABS. NEUTROPHILS 5.0 1.8 - 8.0 K/UL  
 ABS. LYMPHOCYTES 1.7 0.9 - 3.6 K/UL  
 ABS. MONOCYTES 0.5 0.05 - 1.2 K/UL  
 ABS. EOSINOPHILS 0.1 0.0 - 0.4 K/UL  
 ABS. BASOPHILS 0.0 0.0 - 0.1 K/UL  
 DF AUTOMATED MAGNESIUM Collection Time: 12/27/18  2:43 PM  
Result Value Ref Range Magnesium 2.7 (H) 1.6 - 2.6 mg/dL NT-PRO BNP Collection Time: 12/27/18  2:43 PM  
Result Value Ref Range NT pro-BNP 6,198 (H) 0 - 9,113 PG/ML  
METABOLIC PANEL, COMPREHENSIVE Collection Time: 12/27/18  2:43 PM  
Result Value Ref Range Sodium 141 136 - 145 mmol/L Potassium 3.1 (L) 3.5 - 5.5 mmol/L Chloride 96 (L) 100 - 108 mmol/L  
 CO2 35 (H) 21 - 32 mmol/L Anion gap 10 3.0 - 18 mmol/L Glucose 93 74 - 99 mg/dL BUN 96 (H) 7.0 - 18 MG/DL Creatinine 3.35 (H) 0.6 - 1.3 MG/DL  
 BUN/Creatinine ratio 29 (H) 12 - 20 GFR est AA 16 (L) >60 ml/min/1.73m2 GFR est non-AA 13 (L) >60 ml/min/1.73m2 Calcium 8.6 8.5 - 10.1 MG/DL Bilirubin, total 0.4 0.2 - 1.0 MG/DL  
 ALT (SGPT) 19 13 - 56 U/L  
 AST (SGOT) 36 15 - 37 U/L Alk. phosphatase 67 45 - 117 U/L Protein, total 6.1 (L) 6.4 - 8.2 g/dL Albumin 3.3 (L) 3.4 - 5.0 g/dL Globulin 2.8 2.0 - 4.0 g/dL A-G Ratio 1.2 0.8 - 1.7 Intake/Output Summary (Last 24 hours) at 12/28/2018 0840 Last data filed at 12/28/2018 0300 Gross per 24 hour Intake 200 ml Output 3 ml Net 197 ml  
 
 
Cardiographics:  
 
ECG: Vpaced Echocardiogram: ordered Signed By: Santos RIVAS Phone [phone] supervised December 28, 2018 I have independently evaluated and examined the patient. All relevant labs and testing data's are reviewed. Care plan discussed and updated after review.  
 
Marlon Bal MD

## 2018-12-28 NOTE — ROUTINE PROCESS
Bedside and Verbal shift change report given to Ronak   (oncoming nurse) by Monica Mcwilliams RN   (offgoing nurse).  Report included the following information SBAR, Kardex, ED Summary, Intake/Output, MAR, Recent Results, Med Rec Status and Cardiac Rhythm SR.

## 2018-12-28 NOTE — PROGRESS NOTES
Received report and assumed pt care from ED nurse Noreen Ocasio. Pt gown changed, linens changed for urine incontinence. Skin checked with Paulo, Ana Pichardo and Italo Patel, skin is warm/dry and intact. Family at bedside. Blood pressure 123/53, pulse 74, temperature 98.3 °F (36.8 °C), resp. rate 15, height 5' 5\" (1.651 m), weight 72.6 kg (160 lb), SpO2 99 %.     Visit Vitals  /53   Pulse 74   Temp 98.3 °F (36.8 °C)   Resp 15   Ht 5' 5\" (1.651 m)   Wt 72.6 kg (160 lb)   SpO2 99%   BMI 26.63 kg/m²

## 2018-12-29 LAB
ALBUMIN SERPL-MCNC: 2.8 G/DL (ref 3.4–5)
ALBUMIN/GLOB SERPL: 0.8 {RATIO} (ref 0.8–1.7)
ALP SERPL-CCNC: 59 U/L (ref 45–117)
ALT SERPL-CCNC: 17 U/L (ref 13–56)
ANION GAP SERPL CALC-SCNC: 10 MMOL/L (ref 3–18)
AST SERPL-CCNC: 30 U/L (ref 15–37)
BILIRUB DIRECT SERPL-MCNC: 0.2 MG/DL (ref 0–0.2)
BILIRUB SERPL-MCNC: 0.7 MG/DL (ref 0.2–1)
BUN SERPL-MCNC: 85 MG/DL (ref 7–18)
BUN/CREAT SERPL: 33 (ref 12–20)
CALCIUM SERPL-MCNC: 8.6 MG/DL (ref 8.5–10.1)
CHLORIDE SERPL-SCNC: 95 MMOL/L (ref 100–108)
CO2 SERPL-SCNC: 37 MMOL/L (ref 21–32)
CREAT SERPL-MCNC: 2.61 MG/DL (ref 0.6–1.3)
GLOBULIN SER CALC-MCNC: 3.4 G/DL (ref 2–4)
GLUCOSE SERPL-MCNC: 96 MG/DL (ref 74–99)
POTASSIUM SERPL-SCNC: 2.4 MMOL/L (ref 3.5–5.5)
POTASSIUM SERPL-SCNC: 3 MMOL/L (ref 3.5–5.5)
PROT SERPL-MCNC: 6.2 G/DL (ref 6.4–8.2)
SODIUM SERPL-SCNC: 142 MMOL/L (ref 136–145)

## 2018-12-29 PROCEDURE — 74011250637 HC RX REV CODE- 250/637: Performed by: NURSE PRACTITIONER

## 2018-12-29 PROCEDURE — 74011250636 HC RX REV CODE- 250/636

## 2018-12-29 PROCEDURE — 74011250636 HC RX REV CODE- 250/636: Performed by: HOSPITALIST

## 2018-12-29 PROCEDURE — 65660000004 HC RM CVT STEPDOWN

## 2018-12-29 PROCEDURE — 74011000258 HC RX REV CODE- 258: Performed by: HOSPITALIST

## 2018-12-29 PROCEDURE — 77030038269 HC DRN EXT URIN PURWCK BARD -A

## 2018-12-29 PROCEDURE — 84132 ASSAY OF SERUM POTASSIUM: CPT

## 2018-12-29 PROCEDURE — 80076 HEPATIC FUNCTION PANEL: CPT

## 2018-12-29 PROCEDURE — 36415 COLL VENOUS BLD VENIPUNCTURE: CPT

## 2018-12-29 PROCEDURE — 74011250637 HC RX REV CODE- 250/637: Performed by: HOSPITALIST

## 2018-12-29 RX ORDER — POTASSIUM CHLORIDE 7.45 MG/ML
10 INJECTION INTRAVENOUS
Status: DISCONTINUED | OUTPATIENT
Start: 2018-12-29 | End: 2018-12-29 | Stop reason: SDUPTHER

## 2018-12-29 RX ORDER — POTASSIUM CHLORIDE 7.45 MG/ML
10 INJECTION INTRAVENOUS
Status: DISCONTINUED | OUTPATIENT
Start: 2018-12-29 | End: 2018-12-29

## 2018-12-29 RX ORDER — POTASSIUM CHLORIDE 7.45 MG/ML
INJECTION INTRAVENOUS
Status: DISCONTINUED
Start: 2018-12-29 | End: 2018-12-29

## 2018-12-29 RX ORDER — POTASSIUM CHLORIDE 20 MEQ/1
40 TABLET, EXTENDED RELEASE ORAL 3 TIMES DAILY
Status: DISCONTINUED | OUTPATIENT
Start: 2018-12-29 | End: 2018-12-29

## 2018-12-29 RX ORDER — POTASSIUM CHLORIDE 14.9 MG/ML
INJECTION INTRAVENOUS
Status: DISCONTINUED
Start: 2018-12-29 | End: 2018-12-29 | Stop reason: WASHOUT

## 2018-12-29 RX ADMIN — POTASSIUM CHLORIDE: 2 INJECTION, SOLUTION, CONCENTRATE INTRAVENOUS at 06:32

## 2018-12-29 RX ADMIN — HEPARIN SODIUM 5000 UNITS: 5000 INJECTION, SOLUTION INTRAVENOUS; SUBCUTANEOUS at 03:25

## 2018-12-29 RX ADMIN — POTASSIUM CHLORIDE: 2 INJECTION, SOLUTION, CONCENTRATE INTRAVENOUS at 09:43

## 2018-12-29 RX ADMIN — POTASSIUM CHLORIDE 10 MEQ: 7.45 INJECTION INTRAVENOUS at 10:41

## 2018-12-29 RX ADMIN — HEPARIN SODIUM 5000 UNITS: 5000 INJECTION, SOLUTION INTRAVENOUS; SUBCUTANEOUS at 18:02

## 2018-12-29 RX ADMIN — AMITRIPTYLINE HYDROCHLORIDE 25 MG: 25 TABLET, FILM COATED ORAL at 21:54

## 2018-12-29 RX ADMIN — POTASSIUM CHLORIDE: 2 INJECTION, SOLUTION, CONCENTRATE INTRAVENOUS at 07:51

## 2018-12-29 RX ADMIN — POTASSIUM CHLORIDE 10 MEQ: 7.46 INJECTION, SOLUTION INTRAVENOUS at 10:41

## 2018-12-29 RX ADMIN — POTASSIUM CHLORIDE: 2 INJECTION, SOLUTION, CONCENTRATE INTRAVENOUS at 08:51

## 2018-12-29 RX ADMIN — ACETAMINOPHEN 500 MG: 500 TABLET ORAL at 21:51

## 2018-12-29 RX ADMIN — HEPARIN SODIUM 5000 UNITS: 5000 INJECTION, SOLUTION INTRAVENOUS; SUBCUTANEOUS at 08:00

## 2018-12-29 RX ADMIN — CARVEDILOL 3.12 MG: 3.12 TABLET, FILM COATED ORAL at 21:51

## 2018-12-29 NOTE — ROUTINE PROCESS
1919 Bedside verbal shift change report received from Luke Gomes RN(offgoing nurse) given to Rock Joan RN(oncoming nurse). [id number] Potassium w/lidocaine received from the pharmacy

## 2018-12-29 NOTE — PROGRESS NOTES
Problem: Falls - Risk of 
Goal: *Absence of Falls Document Raqueljb Nam Fall Risk and appropriate interventions in the flowsheet. Outcome: Progressing Towards Goal 
Fall Risk Interventions: 
Mobility Interventions: Patient to call before getting OOB Mentation Interventions: Adequate sleep, hydration, pain control Elimination Interventions: Call light in reach History of Falls Interventions: Evaluate medications/consider consulting pharmacy Problem: Pressure Injury - Risk of 
Goal: *Prevention of pressure injury Document Rodríguez Scale and appropriate interventions in the flowsheet. Outcome: Progressing Towards Goal 
Pressure Injury Interventions: 
  
 
Moisture Interventions: Maintain skin hydration (lotion/cream) Activity Interventions: Pressure redistribution bed/mattress(bed type) Mobility Interventions: Pressure redistribution bed/mattress (bed type) Nutrition Interventions: Document food/fluid/supplement intake

## 2018-12-29 NOTE — PROGRESS NOTES
In Patient Progress note Admit Date: 12/27/2018 Impression:  
 
1) ARIEL on CKD4 ( baseline in 2 range ) d/t prerenal azotemia , improved briskly to her baseline which  
seems to be~ mid 2s ( has progressed from last year) CXR with normal lung fields but significant cardiomegaly , EF 30% Volume status looks po, appetite improved, will d/c IVF 2) CKD4 , with no proteinuria , etiology HTN nephrosclerosis /ischaemic nephropathy , 
3) Chronic systolic and diastolic CHF, volume status on dry side, compensated , cardiology consulted 4) HTN , avoid extremes of BP, low dose coreg 5) hypokalemia , replace and recheck 6) metabolic alkalosis , seems chronic , may be compensation to respiratory acidoses Will check a VBG to confirm with next blood draw 7) anemia , at goal  
  
Please call with questions, 
  
Melanie Flores MD FASN Cell [phone] Pager: [phone] Subjective: Intake has improved Current Facility-Administered Medications:  
  acetaminophen (TYLENOL) tablet 500 mg, 500 mg, Oral, Q6H, Cindy Garcia MD, Stopped at 12/29/18 1000 
  amiodarone (CORDARONE) tablet 200 mg, 200 mg, Oral, DAILY, Lillie Ponce MD, Stopped at 12/29/18 0900 
  amitriptyline (ELAVIL) tablet 25 mg, 25 mg, Oral, QHS, Cindy Garcia MD 
  aspirin chewable tablet 81 mg, 81 mg, Oral, DAILY, Lillie Ponce MD, Stopped at 12/29/18 0900 
  heparin (porcine) injection 5,000 Units, 5,000 Units, SubCUTAneous, Q8H, Lillie Ponce MD, 5,000 Units at 12/29/18 0800   carvedilol (COREG) tablet 3.125 mg, 3.125 mg, Oral, Q12H, Becki Roca NP, Stopped at 12/28/18 2146   potassium chloride (KLOR-CON) packet for solution 40 mEq, 40 mEq, Oral, BID WITH MEALS, Eloy Kirby MD, 40 mEq at 12/28/18 1705 Objective:  
 
Visit Vitals /65 Pulse 70 Temp 98.4 °F (36.9 °C) Resp 18 Ht 5' 5\" (1.651 m) Wt 72.5 kg (159 lb 12.8 oz) SpO2 97% Breastfeeding?  No  
 BMI 26.59 kg/m² Intake/Output Summary (Last 24 hours) at 12/29/2018 1307 Last data filed at 12/29/2018 7057 Gross per 24 hour Intake 360 ml Output 300 ml Net 60 ml Physical Exam:  
Patient is in no apparent distress. HEENT:dry mucosa. Lungs: good air entry, clear to auscultation bilaterally. Cardiovascular system: S1, S2, regular rate and rhythm. No JVD Abdomen: soft, non tender, non distended. Extremities: no edema Integumentary: skin is grossly intact. Data Review: 
 
Recent Labs  
  12/28/18 
9246 WBC 7.9  
RBC 3.48* HCT 31.7*  
MCV 91.1 MCH 29.6 MCHC 32.5  
RDW 13.8 Recent Labs  
  12/29/18 
0243 12/28/18 
0925 12/27/18 
1443 BUN 85* 86* 96* CREA 2.61* 2.60* 3.35* CA 8.6 8.6 8.6 ALB 2.8* 3.1* 3.3*  
K 2.4* 2.6* 3.1*  144 141 CL 95* 98* 96* CO2 37* 37* 35* PHOS  --  3.4  --   
GLU 96 92 93 Harshal Ceja MD

## 2018-12-29 NOTE — PROGRESS NOTES
Problem: Falls - Risk of 
Goal: *Absence of Falls Document Channing Birch Fall Risk and appropriate interventions in the flowsheet. Outcome: Progressing Towards Goal 
Fall Risk Interventions: 
Mobility Interventions: Patient to call before getting OOB Mentation Interventions: Adequate sleep, hydration, pain control Elimination Interventions: Call light in reach History of Falls Interventions: Evaluate medications/consider consulting pharmacy

## 2018-12-29 NOTE — ROUTINE PROCESS
Bedside shift change report given to Ray Roberts RN (oncoming nurse) by Dar Duncan RN (offgoing nurse). Report included the following information SBAR, Intake/Output, MAR and Recent Results.

## 2018-12-29 NOTE — PROGRESS NOTES
Patient is not available to be assessed at this time. [address] [place] [address] Care  
[phone]

## 2018-12-29 NOTE — PROGRESS NOTES
[facility] Hospitalist Group Progress Note Patient: Eduarda Potts Age: 80 y.o. : 1931 MR#: [id number] SSN: [id number] Date/Time: 2018 Subjective:  
 
Review of systems Continues to have AMS No distress Assessment/Plan:  
Patient with dementia , chronic systolic heart failure EF 30% / s/p ICD placement  / h/p lupus / HTN / brought in due to increasing SOB and decreasing exercise tolerance 1. ARIEL on CKD 4 
 
2 HTN  
 
3 Systolic heart failure chronic - EF 30% in past  
 
4 Lupus history 5 Hypokalemia 6 Dementia  
 
7 High trop - not related to ACS - likely from CKD  
 
9 Afib - s/p Pace maker( h/o Bradycardia )  . paced rhythm - [facility] - see cardiology note (  Dr Tulio Gong - EP recommended  upgrade to biventricular pacemaker as an outpatient.  Patient's family refused  ) PLAN  
- Replace K+ IV and po maintainance - Renal follow up - D/W cardiology continue current management - avoid excess IVF  
-patient appears to have developed advance dementia with out any behavioral issues ? Placement Case discussed with:  []Patient  [x] Family ( In room with patient )    []Nursing  []Case Management DVT Prophylaxis:  []Lovenox  []Hep SQ  []SCDs  []Coumadin   []On Heparin gtt Objective:  
VS:  
Visit Vitals /66 Pulse 76 Temp 99.4 °F (37.4 °C) Resp 18 Ht 5' 5\" (1.651 m) Wt 72.5 kg (159 lb 12.8 oz) SpO2 92% Breastfeeding? No  
BMI 26.59 kg/m² Tmax/24hrs: Temp (24hrs), Av.5 °F (37.5 °C), Min:98.9 °F (37.2 °C), Max:100.4 °F (38 °C) IOBRIEF Intake/Output Summary (Last 24 hours) at 2018 1018 Last data filed at 2018 1212 Gross per 24 hour Intake 360 ml Output 300 ml Net 60 ml General:  Alert, cooperative, no acute distress /  Confused Cardiovascular: S1S2 - regular , No Murmur Pulmonary: Equal expansion , No Use of accessory muscles , No Rales No Rhonchi GI:  +BS in all four quadrants, soft, non-tender Extremities:  No edema; 2+ dorsalis pedis pulses bilaterally Neuro: Alert and oriented X 2. Medications:  
Current Facility-Administered Medications Medication Dose Route Frequency  acetaminophen (TYLENOL) tablet 500 mg  500 mg Oral Q6H  
 amiodarone (CORDARONE) tablet 200 mg  200 mg Oral DAILY  amitriptyline (ELAVIL) tablet 25 mg  25 mg Oral QHS  aspirin chewable tablet 81 mg  81 mg Oral DAILY  heparin (porcine) injection 5,000 Units  5,000 Units SubCUTAneous Q8H  
 carvedilol (COREG) tablet 3.125 mg  3.125 mg Oral Q12H  potassium chloride (KLOR-CON) packet for solution 40 mEq  40 mEq Oral BID WITH MEALS Labs:   
Recent Labs  
  12/28/18 
0925 12/27/18 
1443 WBC 7.9 7.2 HGB 10.3* 10.3* HCT 31.7* 32.7*  
* 146 Recent Labs  
  12/29/18 
0243 12/28/18 
0925 12/27/18 
1443  144 141  
K 2.4* 2.6* 3.1*  
CL 95* 98* 96* CO2 37* 37* 35* GLU 96 92 93 BUN 85* 86* 96* CREA 2.61* 2.60* 3.35* CA 8.6 8.6 8.6 MG  --  2.6 2.7* PHOS  --  3.4  --   
ALB 2.8* 3.1* 3.3* SGOT 30 32 36 ALT 17 19 19 Signed By: Eula Reyes MD   
 December 29, 2018

## 2018-12-29 NOTE — PROGRESS NOTES
Assumed pt's care with her son at bedside feeding her dinner. Pt is alert and oriented to self, in no apparent discomfort, call light within pt's reach. 2039: per CNA, temp 100.4, cup of ice water offered and room temp controlled, will recheck temp

## 2018-12-29 NOTE — PROGRESS NOTES
[facility] 
 
 
CARDIOLOGY PROGRESS NOTE 
RECS: 
 
1. Chronic combined systolic and diastolic heart failure-has elevated NT pro BNP no significant peripheral.edema. Will monitor for s/s fluid overload as patient receiving ivf. Recent echo EF% 31%-35% 2. Indeterminate troponin- likely demand ischemia in the setting ARIEL, chronic CHF. Will monitor continue asa 3. Atrial fibrillation - currently paced on amiodarone. Not on anticoagulation due to high bleeding risk. Continue asa. 4. ARIEL on CKD- creatinine and BUN slowly improving on gently hydration 5. Hx of symptomatic bradycardia- S/P PPM  2015 Dr Nader Guillaume - EP recommended  upgrade to biventricular pacemaker as an outpatient.  Patient's family refused 6. Lupus- follow by Dr. Camille Shepard as out patient 7. Hypertension- with intermittent  low bp. Continue Coreg. 8. Dehydration- possible related to poor po intake 9. Hypokalemia- on iv  K+ replacement  
  
 Guarded prognosis. D/w family and considering her age and memory problems, would recommend conservative med rx only. DNR discussion also needs to started. Renal indices improving with gentle hydration Echo 12/18 · Technically difficult study due to patient compliance. Unable to obtain on-axis apical images. Good parasternals, poor subcostal images. · Left ventricular moderate-to-severely decreased global systolic function. Estimated left ventricular ejection fraction is 31 - 35%. Visually measured ejection fraction. Left ventricular severe sigmoid septum hypertrophy. Abnormal left ventricular wall motion as described on the wall scoring diagram below. Abnormal left ventricular septal motion. Interventricular septal \"bounce\". Severe (grade 3) left ventricular diastolic dysfunction. · Moderate tricuspid valve regurgitation is present. Mild pulmonary hypertension is present. PASP 38mmHg · Mild aortic valve regurgitation is present. · Mild to moderate mitral valve regurgitation. · Pacing wire present ASSESSMENT: 
Hospital Problems  Date Reviewed: 7/23/2018 Codes Class Noted POA Acute exacerbation of CHF (congestive heart failure) ([facility]) ICD-10-CM: I50.9 ICD-9-CM: 428.0  12/27/2018 Unknown ARIEL (acute kidney injury) ([facility] [address] ICD-10-CM: N17.9 ICD-9-CM: 584.9  12/27/2018 Unknown SUBJECTIVE: 
 
No CP No SOB Confused OBJECTIVE: 
 
VS:  
Visit Vitals /66 Pulse 76 Temp 99.4 °F (37.4 °C) Resp 18 Ht 5' 5\" (1.651 m) Wt 72.5 kg (159 lb 12.8 oz) SpO2 92% Breastfeeding? No  
BMI 26.59 kg/m² Intake/Output Summary (Last 24 hours) at 12/29/2018 3591 Last data filed at 12/29/2018 5691 Gross per 24 hour Intake 240 ml Output 300 ml Net -60 ml  
 
TELE: paced. General: alert, well developed, pleasant and in no apparent distress. confused HENT: Normocephalic, atraumatic. Normal external eye. Neck :  no JVD Cardiac:  regularly irregular rhythm, S1, S2 normal, no click, no rub Lungs: clear to auscultation bilaterally Abdomen: Soft, nontender, no masses Extremities:  No c/c/e, peripheral pulses present Labs: Results:  
   
Chemistry Recent Labs  
  12/29/18 
0243 12/28/18 
0925 12/27/18 
1443 GLU 96 92 93  144 141  
K 2.4* 2.6* 3.1*  
CL 95* 98* 96* CO2 37* 37* 35* BUN 85* 86* 96* CREA 2.61* 2.60* 3.35* CA 8.6 8.6 8.6 AGAP 10 9 10 BUCR 33* 33* 29* AP 59 60 67  
TP 6.2* 6.3* 6.1* ALB 2.8* 3.1* 3.3*  
GLOB 3.4 3.2 2.8 AGRAT 0.8 1.0 1.2  
  
CBC w/Diff Recent Labs  
  12/28/18 
0925 12/27/18 
1443 WBC 7.9 7.2 RBC 3.48* 3.59* HGB 10.3* 10.3* HCT 31.7* 32.7*  
* 146 GRANS  --  68  
LYMPH  --  24 EOS  --  1 Cardiac Enzymes Recent Labs  
  12/28/18 
1321 12/28/18 
0925 * 207* CKND1 0.9 1.0 Coagulation No results for input(s): PTP, INR, APTT in the last 72 hours.  
 
No lab exists for component: INREXT   
 Lipid Panel Lab Results Component Value Date/Time Cholesterol, total 134 05/12/2016 01:42 AM  
 HDL Cholesterol 70 (H) 05/12/2016 01:42 AM  
 LDL, calculated 55.6 05/12/2016 01:42 AM  
 VLDL, calculated 8.4 05/12/2016 01:42 AM  
 Triglyceride 42 05/12/2016 01:42 AM  
 CHOL/HDL Ratio 1.9 05/12/2016 01:42 AM  
  
BNP No results for input(s): BNPP in the last 72 hours. Liver Enzymes Recent Labs  
  12/29/18 
0243 TP 6.2* ALB 2.8* AP 59 SGOT 30 Thyroid Studies Lab Results Component Value Date/Time TSH 8.86 (H) 11/15/2015 01:45 PM  
    
 
 
 
Becki RIVAS Sarah:[phone] supervised I have independently evaluated and examined the patient. All relevant labs and testing data's are reviewed. Care plan discussed and updated after review.  
 
Viri Ray MD

## 2018-12-29 NOTE — PROGRESS NOTES
Problem: Pressure Injury - Risk of 
Goal: *Prevention of pressure injury Document Rodríguez Scale and appropriate interventions in the flowsheet. Outcome: Progressing Towards Goal 
Pressure Injury Interventions: 
  
 
Moisture Interventions: Maintain skin hydration (lotion/cream) Activity Interventions: Pressure redistribution bed/mattress(bed type) Mobility Interventions: Pressure redistribution bed/mattress (bed type) Nutrition Interventions: Document food/fluid/supplement intake

## 2018-12-30 ENCOUNTER — APPOINTMENT (OUTPATIENT)
Dept: GENERAL RADIOLOGY | Age: 83
DRG: 683 | End: 2018-12-30
Attending: INTERNAL MEDICINE
Payer: MEDICARE

## 2018-12-30 LAB
ANION GAP SERPL CALC-SCNC: 9 MMOL/L (ref 3–18)
BUN SERPL-MCNC: 86 MG/DL (ref 7–18)
BUN/CREAT SERPL: 35 (ref 12–20)
CALCIUM SERPL-MCNC: 8.4 MG/DL (ref 8.5–10.1)
CHLORIDE SERPL-SCNC: 100 MMOL/L (ref 100–108)
CO2 SERPL-SCNC: 33 MMOL/L (ref 21–32)
CREAT SERPL-MCNC: 2.47 MG/DL (ref 0.6–1.3)
GLUCOSE SERPL-MCNC: 84 MG/DL (ref 74–99)
MAGNESIUM SERPL-MCNC: 2.4 MG/DL (ref 1.6–2.6)
POTASSIUM SERPL-SCNC: 2.7 MMOL/L (ref 3.5–5.5)
POTASSIUM SERPL-SCNC: 2.8 MMOL/L (ref 3.5–5.5)
SODIUM SERPL-SCNC: 142 MMOL/L (ref 136–145)

## 2018-12-30 PROCEDURE — 83735 ASSAY OF MAGNESIUM: CPT

## 2018-12-30 PROCEDURE — 74011250636 HC RX REV CODE- 250/636: Performed by: INTERNAL MEDICINE

## 2018-12-30 PROCEDURE — 65660000004 HC RM CVT STEPDOWN

## 2018-12-30 PROCEDURE — 77030037878 HC DRSG MEPILEX >48IN BORD MOLN -B

## 2018-12-30 PROCEDURE — 74011250637 HC RX REV CODE- 250/637: Performed by: NURSE PRACTITIONER

## 2018-12-30 PROCEDURE — 74011000258 HC RX REV CODE- 258: Performed by: INTERNAL MEDICINE

## 2018-12-30 PROCEDURE — 97535 SELF CARE MNGMENT TRAINING: CPT

## 2018-12-30 PROCEDURE — 74011250637 HC RX REV CODE- 250/637: Performed by: HOSPITALIST

## 2018-12-30 PROCEDURE — 97530 THERAPEUTIC ACTIVITIES: CPT

## 2018-12-30 PROCEDURE — 84132 ASSAY OF SERUM POTASSIUM: CPT

## 2018-12-30 PROCEDURE — 97112 NEUROMUSCULAR REEDUCATION: CPT

## 2018-12-30 PROCEDURE — 73100 X-RAY EXAM OF WRIST: CPT

## 2018-12-30 PROCEDURE — 97165 OT EVAL LOW COMPLEX 30 MIN: CPT

## 2018-12-30 PROCEDURE — 74011250636 HC RX REV CODE- 250/636: Performed by: HOSPITALIST

## 2018-12-30 PROCEDURE — 77030038269 HC DRN EXT URIN PURWCK BARD -A

## 2018-12-30 PROCEDURE — 97162 PT EVAL MOD COMPLEX 30 MIN: CPT

## 2018-12-30 PROCEDURE — 36415 COLL VENOUS BLD VENIPUNCTURE: CPT

## 2018-12-30 RX ORDER — POTASSIUM CHLORIDE 1.5 G/1.77G
40 POWDER, FOR SOLUTION ORAL ONCE
Status: DISCONTINUED | OUTPATIENT
Start: 2018-12-30 | End: 2018-12-30

## 2018-12-30 RX ORDER — POTASSIUM CHLORIDE 1.5 G/1.77G
40 POWDER, FOR SOLUTION ORAL
Status: COMPLETED | OUTPATIENT
Start: 2018-12-30 | End: 2018-12-30

## 2018-12-30 RX ORDER — POTASSIUM CHLORIDE 1.5 G/1.77G
20 POWDER, FOR SOLUTION ORAL ONCE
Status: DISCONTINUED | OUTPATIENT
Start: 2018-12-30 | End: 2018-12-30

## 2018-12-30 RX ADMIN — AMITRIPTYLINE HYDROCHLORIDE 25 MG: 25 TABLET, FILM COATED ORAL at 22:11

## 2018-12-30 RX ADMIN — POTASSIUM CHLORIDE: 2 INJECTION, SOLUTION, CONCENTRATE INTRAVENOUS at 15:00

## 2018-12-30 RX ADMIN — ACETAMINOPHEN 500 MG: 500 TABLET ORAL at 05:57

## 2018-12-30 RX ADMIN — HEPARIN SODIUM 5000 UNITS: 5000 INJECTION, SOLUTION INTRAVENOUS; SUBCUTANEOUS at 03:05

## 2018-12-30 RX ADMIN — POTASSIUM CHLORIDE: 2 INJECTION, SOLUTION, CONCENTRATE INTRAVENOUS at 15:09

## 2018-12-30 RX ADMIN — HEPARIN SODIUM 5000 UNITS: 5000 INJECTION, SOLUTION INTRAVENOUS; SUBCUTANEOUS at 19:01

## 2018-12-30 RX ADMIN — POTASSIUM CHLORIDE 40 MEQ: 1.5 POWDER, FOR SOLUTION ORAL at 10:15

## 2018-12-30 RX ADMIN — HEPARIN SODIUM 5000 UNITS: 5000 INJECTION, SOLUTION INTRAVENOUS; SUBCUTANEOUS at 10:16

## 2018-12-30 RX ADMIN — POTASSIUM CHLORIDE: 2 INJECTION, SOLUTION, CONCENTRATE INTRAVENOUS at 14:00

## 2018-12-30 RX ADMIN — POTASSIUM CHLORIDE: 2 INJECTION, SOLUTION, CONCENTRATE INTRAVENOUS at 13:00

## 2018-12-30 RX ADMIN — CARVEDILOL 3.12 MG: 3.12 TABLET, FILM COATED ORAL at 22:11

## 2018-12-30 RX ADMIN — ACETAMINOPHEN 500 MG: 500 TABLET ORAL at 22:11

## 2018-12-30 NOTE — PROGRESS NOTES
Problem: Mobility Impaired (Adult and Pediatric) Goal: *Acute Goals and Plan of Care (Insert Text) Physical Therapy Goals Initiated 12/30/2018 and to be accomplished within 7 day(s) 1. Patient will move from supine to sit and sit to supine , scoot up and down and roll side to side in bed with minimal assistance/contact guard assist.    
2.  Patient will transfer from bed to chair and chair to bed with minimal assistance using the least restrictive device. 3.  Patient will perform sit to stand with minimal assistance. 4.  Patient will ambulate with minimal assistance assist for 10 feet with the least restrictive device. Outcome: Progressing Towards Goal 
physical Therapy EVALUATION Patient: Becca Serrano (80 y.o. female) Date: 12/30/2018 Primary Diagnosis: ARIEL (acute kidney injury) ([facility] [address] Acute exacerbation of CHF (congestive heart failure) ([facility] [address] Precautions:   Fall, Skin ASSESSMENT : 
PT orders received and patient cleared by nursing to participate with therapy. Patient is a 80 y.o. female admitted to the hospital due to decreased exercise tolerance. Patient consents to PT evaluation and treatment. OT cotx for increased safety and functional mobility. Pt is confused and only oriented to person. Pt is a poor historian and some information provided was from family. Pt complains of R UE pain and is tender to palpation to R wrist and hand. Pt will use her R wrist and hand during some activities but has pain with therapist assisting or touching the wrist/hand. Pt does have some swelling and bruising noted. OT assisted pt to keep NWB during activities for increased safety throughout therapy. Pt will benefit from imaging possible Xray for wrist and hand (nursing informed). Pt required max A x2 with HOB raised for supine to sit with increased time and cues for safety. Pt's sitting balance static fair to fair- and dynamic is poor.  Neuro armida provided for balance and weight shifts in sitting. Pt attempted sit to stands with total Ax2 with pt unable to complete a full stand. Pt ended therapy supine in bed laying more on L side with all needs met. Recommend turn/positioning program with nursing informed. Patient will benefit from skilled intervention to address the above impairments and increase functional independence. Patients rehabilitation potential is considered to be Fair Factors which may influence rehabilitation potential include:  
[]         None noted 
[x]         Mental ability/status []         Medical condition 
[]         Home/family situation and support systems 
[]         Safety awareness 
[]         Pain tolerance/management 
[]         Other: PLAN : 
Recommendations and Planned Interventions: 
[x]           Bed Mobility Training             [x]    Neuromuscular Re-Education 
[x]           Transfer Training                   []    Orthotic/Prosthetic Training 
[x]           Gait Training                          []    Modalities [x]           Therapeutic Exercises          []    Edema Management/Control 
[x]           Therapeutic Activities            [x]    Patient and Family Training/Education 
[]           Other (comment): Frequency/Duration: Patient will be followed by physical therapy 1-2 times per day to address goals. Discharge Recommendations: Andrey Ma Further Equipment Recommendations for Discharge: N/A  
 
SUBJECTIVE:  
Patient stated You're beautiful.  OBJECTIVE DATA SUMMARY:  
 
Past Medical History:  
Diagnosis Date  Acute on chronic diastolic heart failure ([address]  Arthritis  Benign hypertensive heart disease without heart failure Better controlled, stable  Chronic diastolic heart failure ([address] Breathing and edema is improving  Congestive heart failure ([address]  HTN (hypertension)  Hypercholesteremia  Lupus (systemic lupus erythematosus) ([address] 6/18/2014  
 followed by dr Vero Hutton  Obesity, unspecified Patient has weight loss Discussed diet ad fluid restriction  Other and unspecified hyperlipidemia F/u per pmd  
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  
Procedure Laterality Date  HX HYSTERECTOMY  PACEMAKER PROCEDURE Barriers to Learning/Limitations: yes;  cognitive and altered mental status (i.e.Sedation, Confusion) Compensate with: Visual Cues, Verbal Cues and Tactile Cues Prior Level of Function/Home Situation: Independent with mobility but has assistance as needed. She uses a RW for ambulation. Family is in the process of getting a stair lift. Home Situation Home Environment: Private residence # Steps to Enter: 3 Rails to Enter: Yes Hand Rails : Bilateral 
One/Two Story Residence: Two story # of Interior Steps: (getting a chair lift) Living Alone: No 
Support Systems: Family member(s) Patient Expects to be Discharged to[de-identified] Unknown Current DME Used/Available at Home: Walker, rolling Tub or Shower Type: (sponge bathes with daughter assistance)Critical Behavior: 
Neurologic State: Alert;Confused Psychosocial 
Patient Behaviors: Confused Family  Behaviors: Cooperative;Calm;SupportiveStrength:   
Strength: Generally decreased, functional(B LE) Tone & Sensation:  
Tone: Normal(B LE) Range Of Motion: 
AROM: Generally decreased, functional(B LE) Functional Mobility: 
Bed Mobility: 
Rolling: Total assistance;Assist x2 Supine to Sit: Maximum assistance;Assist x2 Sit to Supine: Moderate assistance;Maximum assistance;Assist x2 Scooting: Maximum assistance;Assist x2 Transfers: 
Sit to Stand: Total assistance;Assist x2 Balance:  
Sitting: Impaired; With support Sitting - Static: Fair (occasional) Sitting - Dynamic: Poor (constant support) Standing: Impaired; With support Standing - Static: PoorAmbulation/Gait Training: 
 not performed today due to safety Neuro armida: Performed balance training in sitting including weight shifting lateral and anterior/posterior (focus on more anterior weight shift to increase sitting balance and sit to stands). Therapeutic Exercises:  
Reviewed ankle pumps Pain: 
Pre: moderate to severe R hand/wrist 
Post: moderate to severe R hand/wrist 
Activity Tolerance:  
poor Please refer to the flowsheet for vital signs taken during this treatment. After treatment:  
[] Patient left in no apparent distress sitting up in chair 
[] Patient left sitting on EOB [x] Patient left in no apparent distress in bed 
[] Patient declined to be OOB at this time due to   
[x] Call bell left within reach [x] Nursing notified(Willa) [x] Caregiver present 
[] Bed alarm activated 
[x] Personal items in reach COMMUNICATION/EDUCATION:  
[x]         Fall prevention education was provided and the patient/caregiver indicated understanding. [x]         Patient/family have participated as able in goal setting and plan of care. [x]         Patient/family agree to work toward stated goals and plan of care. []         Patient understands intent and goals of therapy, but is neutral about his/her participation. []         Patient is unable to participate in goal setting and plan of care. Thank you for this referral. 
Talia Vázquez, PT, DPT Time Calculation: 42 mins Mobility [id number] Current  CM= 80-99%   Goal  CK= 40-59%. The severity rating is based on the Level of Assistance required for Functional Mobility and ADLs.  
 
Eval Complexity: History: MEDIUM  Complexity : 1-2 comorbidities / personal factors will impact the outcome/ POC Exam:HIGH Complexity : 4+ Standardized tests and measures addressing body structure, function, activity limitation and / or participation in recreation  Presentation: MEDIUM Complexity : Evolving with changing characteristics  Clinical Decision Making:Medium Complexity   Overall Complexity:MEDIUM

## 2018-12-30 NOTE — PROGRESS NOTES
[facility] 
 
 
CARDIOLOGY PROGRESS NOTE 
RECS: 
1. Chronic combined systolic and diastolic heart failure-has elevated NT pro BNP no significant peripheral.edema. Will monitor for s/s fluid overload as patient receiving ivf. Recent echo EF% 31%-35% 2. Indeterminate troponin- likely demand ischemia in the setting ARIEL, chronic CHF. Will monitor continue asa 3. Atrial fibrillation - currently paced on amiodarone. Not on anticoagulation due to high bleeding risk. 4. ARIEL on CKD- creatinine and BUN slowly improving on gently hydration 5. Hx of symptomatic bradycardia- S/P PPM  2015 Dr Laith Mcbride - EP recommended  upgrade to biventricular pacemaker as an outpatient.  Patient's family refused 6. Lupus- follow by Dr. Kenn High as out patient 7. Hypertension- with intermittent  low bp. Continue Coreg. 8. Dehydration- possible related to poor po intake 9. Hypokalemia- given 40 K+ now and additional 40 K+ in 4 hours  
  
 Guarded prognosis. D/w family and considering her age and memory problems, would recommend conservative med rx only. DNR discussion also needs to started. Renal indices improving with gentle hydration Echo 12/18 · Technically difficult study due to patient compliance. Unable to obtain on-axis apical images. Good parasternals, poor subcostal images. · Left ventricular moderate-to-severely decreased global systolic function. Estimated left ventricular ejection fraction is 31 - 35%. Visually measured ejection fraction. Left ventricular severe sigmoid septum hypertrophy. Abnormal left ventricular wall motion as described on the wall scoring diagram below. Abnormal left ventricular septal motion. Interventricular septal \"bounce\". Severe (grade 3) left ventricular diastolic dysfunction. · Moderate tricuspid valve regurgitation is present. Mild pulmonary hypertension is present. PASP 38mmHg · Mild aortic valve regurgitation is present. · Mild to moderate mitral valve regurgitation. · Pacing wire present ASSESSMENT: 
Hospital Problems  Date Reviewed: 7/23/2018 Codes Class Noted POA Acute exacerbation of CHF (congestive heart failure) ([facility]) ICD-10-CM: I50.9 ICD-9-CM: 428.0  12/27/2018 Unknown ARIEL (acute kidney injury) ([facility] [address] ICD-10-CM: N17.9 ICD-9-CM: 584.9  12/27/2018 Unknown SUBJECTIVE: 
 
No CP No SOB Confused OBJECTIVE: 
 
VS:  
Visit Vitals /51 Pulse 74 Temp 99.7 °F (37.6 °C) Resp 20 Ht 5' 5\" (1.651 m) Wt 71.6 kg (157 lb 12.8 oz) SpO2 93% Breastfeeding? No  
BMI 26.26 kg/m² Intake/Output Summary (Last 24 hours) at 12/30/2018 3911 Last data filed at 12/29/2018 1820 Gross per 24 hour Intake 240 ml Output 700 ml Net -460 ml  
 
TELE: paced. General: alert, well developed, pleasant and in no apparent distress. confused HENT: Normocephalic, atraumatic. Normal external eye. Neck :  no JVD Cardiac:  regularly irregular rhythm, S1, S2 normal, no click, no rub Lungs: clear to auscultation bilaterally Abdomen: Soft, nontender, no masses Extremities:  No c/c/e, peripheral pulses present Labs: Results:  
   
Chemistry Recent Labs 12/30/18 
4058 12/29/18 
1729 12/29/18 
0243 12/28/18 
3484 12/27/18 
1443 GLU 84  --  96 92 93   --  142 144 141  
K 2.7* 3.0* 2.4* 2.6* 3.1*  
  --  95* 98* 96* CO2 33*  --  37* 37* 35* BUN 86*  --  85* 86* 96* CREA 2.47*  --  2.61* 2.60* 3.35* CA 8.4*  --  8.6 8.6 8.6 AGAP 9  --  10 9 10 BUCR 35*  --  33* 33* 29* AP  --   --  59 60 67 TP  --   --  6.2* 6.3* 6.1* ALB  --   --  2.8* 3.1* 3.3*  
GLOB  --   --  3.4 3.2 2.8 AGRAT  --   --  0.8 1.0 1.2  
  
CBC w/Diff Recent Labs  
  12/28/18 
0925 12/27/18 
1443 WBC 7.9 7.2 RBC 3.48* 3.59* HGB 10.3* 10.3* HCT 31.7* 32.7*  
* 146 GRANS  --  68  
LYMPH  --  24 EOS  --  1 Cardiac Enzymes Recent Labs  
  12/28/18 [phone] 12/28/18 
9937 * 207* CKND1 0.9 1.0 Coagulation No results for input(s): PTP, INR, APTT in the last 72 hours. No lab exists for component: INREXT, INREXT Lipid Panel Lab Results Component Value Date/Time Cholesterol, total 134 05/12/2016 01:42 AM  
 HDL Cholesterol 70 (H) 05/12/2016 01:42 AM  
 LDL, calculated 55.6 05/12/2016 01:42 AM  
 VLDL, calculated 8.4 05/12/2016 01:42 AM  
 Triglyceride 42 05/12/2016 01:42 AM  
 CHOL/HDL Ratio 1.9 05/12/2016 01:42 AM  
  
BNP No results for input(s): BNPP in the last 72 hours. Liver Enzymes Recent Labs  
  12/29/18 
0243 TP 6.2* ALB 2.8* AP 59 SGOT 30 Thyroid Studies Lab Results Component Value Date/Time TSH 8.86 (H) 11/15/2015 01:45 PM  
    
 
 
 
Becki Roca NP-C supervised I have independently evaluated and examined the patient. All relevant labs and testing data's are reviewed. Care plan discussed and updated after review.  
 
Nely Oneill MD

## 2018-12-30 NOTE — PROGRESS NOTES
Problem: Falls - Risk of 
Goal: *Absence of Falls Document Eliceo Dust Fall Risk and appropriate interventions in the flowsheet. Outcome: Progressing Towards Goal 
Fall Risk Interventions: 
Mobility Interventions: Bed/chair exit alarm, Patient to call before getting OOB Mentation Interventions: Adequate sleep, hydration, pain control, Door open when patient unattended, Bed/chair exit alarm Medication Interventions: Patient to call before getting OOB, Bed/chair exit alarm Elimination Interventions: Bed/chair exit alarm, Call light in reach, Patient to call for help with toileting needs History of Falls Interventions: Bed/chair exit alarm, Door open when patient unattended Problem: Pressure Injury - Risk of 
Goal: *Prevention of pressure injury Document Rodríguez Scale and appropriate interventions in the flowsheet. Outcome: Progressing Towards Goal 
Pressure Injury Interventions: 
Sensory Interventions: Assess changes in LOC Moisture Interventions: Apply protective barrier, creams and emollients, Check for incontinence Q2 hours and as needed Activity Interventions: Pressure redistribution bed/mattress(bed type) Mobility Interventions: Pressure redistribution bed/mattress (bed type), HOB 30 degrees or less Nutrition Interventions: Document food/fluid/supplement intake, Offer support with meals,snacks and hydration Friction and Shear Interventions: Apply protective barrier, creams and emollients, HOB 30 degrees or less

## 2018-12-30 NOTE — PROGRESS NOTES
Assumed pt's care with her son at bedside, pt is alert and oriented to self, recognizes familiar faces and in no apparent discomfort. Pt not in restraint at the assumption of this shift. Bed alarm device initiated to prevent fall. Pt encouraged to utilize call light device to seek staff assistance for her needs. 2308: Pt has delay swallowing medications, pt encouraged to swallow med, took awhile before she swallowed her meds. No choking or coughing sensation observed. 6328: Critical potassium result of 2.7 received, Dr. Hilliard Meigs notified, will recheck potassium level 0745: Bedside shift change report given to Analy De La O RN (oncoming nurse) by Nick Forbes RN (offgoing nurse). Report included the following information SBAR, Intake/Output, MAR and Recent Results.

## 2018-12-30 NOTE — PROGRESS NOTES
Problem: Self Care Deficits Care Plan (Adult) Goal: *Acute Goals and Plan of Care (Insert Text) Occupational Therapy Goals Initiated 12/30/2018 within 7 day(s). 1.  Patient will perform self-feeding with minimal assistance/contact guard assist  
2. Patient will perform grooming with minimal assistance/contact guard assist. 
3.  Patient will perform upper body dressing with minimal assistance/contact guard assist. 
4.  Patient will maneuver in bed with min A in preparation for EOB ADLs. Outcome: Progressing Towards Goal 
Occupational Therapy EVALUATION Patient: Becca Serrano (80 y.o. female) Date: 12/30/2018 Primary Diagnosis: ARIEL (acute kidney injury) ([facility] [place] [address] Acute exacerbation of CHF (congestive heart failure) ([facility] [place] [address] Precautions:   Fall, Skin ASSESSMENT : 
Based on the objective data described below, the patient seen for OT evaluation, family present at this time. Patient is oriented to person only, with cues and additional time needed to follow one step commands. Patient noted to have increased edema and pain along R wrist/hand, nurse notified and aware. RUE not formally assessed and treated as NWB, secondary to patients' pain. Patient needed max A x2 for bed mobility with additional time needed and max cues. Patient initially needed max A for sitting balance, with periods of CGA. Patient needed mod A to wash face with LUE sitting on EOB. Attempted to stand with total A x2 in preparation for [facility] transfer, patient unable to stand and resistive to standing. Assisted back to supine with mod/max A x2 and repositioned. RUE elevated on a pillow. Patient will benefit from skilled intervention to address the above impairments. Patients rehabilitation potential is considered to be Good Factors which may influence rehabilitation potential include:  
[]             None noted []             Mental ability/status [x]             Medical condition []             Home/family situation and support systems [x]             Safety awareness []             Pain tolerance/management 
[]             Other: PLAN : 
Recommendations and Planned Interventions: 
[x]               Self Care Training                  [x]        Therapeutic Activities [x]               Functional Mobility Training    []        Cognitive Retraining 
[x]               Therapeutic Exercises           [x]        Endurance Activities [x]               Balance Training                   []        Neuromuscular Re-Education []               Visual/Perceptual Training     [x]   Home Safety Training 
[x]               Patient Education                 []        Family Training/Education []               Other (comment): Frequency/Duration: Patient will be followed by occupational therapy 1-2 times per day/4-7 days per week to address goals. Discharge Recommendations: Andrey Ma Further Equipment Recommendations for Discharge: TBD Barriers to Learning/Limitations: yes;  cognitive and physical 
Compensate with: visual, verbal, tactile, kinesthetic cues/model PATIENT COMPLEXITY Eval Complexity: History: LOW Complexity : Brief history review ; Examination: MEDIUM Complexity : 3-5 performance deficits relating to physical, cognitive , or psychosocial skils that result in activity limitations and / or participation restrictions; Decision Making:MEDIUM Complexity : Patient may present with comorbidities that affect occupational performnce. Miniml to moderate modification of tasks or assistance (eg, physical or verbal ) with assesment(s) is necessary to enable patient to complete evaluation  Assessment: Low Complexity G-CODES:  
 
Self Care  Current  CM= 80-99%  Goal  CK= 40-59%. The severity rating is based on the Level of Assistance required for Functional Mobility and ADLs. SUBJECTIVE:  
Patient stated It hurts. (referring to right hand) OBJECTIVE DATA SUMMARY:  
 
Past Medical History:  
Diagnosis Date  Acute on chronic diastolic heart failure ([facility] [address]  Arthritis  Benign hypertensive heart disease without heart failure Better controlled, stable  Chronic diastolic heart failure ([address] Breathing and edema is improving  Congestive heart failure ([address]  HTN (hypertension)  Hypercholesteremia  Lupus (systemic lupus erythematosus) ([facility] [address] 6/18/2014  
 followed by dr Dash Query  Obesity, unspecified Patient has weight loss Discussed diet ad fluid restriction  Other and unspecified hyperlipidemia F/u per pmd  
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  
Procedure Laterality Date  HX HYSTERECTOMY  PACEMAKER PROCEDURE Prior Level of Function/Home Situation: Patients' family reported PLOF- patient was able to ambulate with a rolling walker PTA. Patient needed assistance with bathing and some dressing tasks from her daughter PTA. Home Situation Home Environment: Private residence One/Two Story Residence: One story Living Alone: No 
Support Systems: Family member(s) Patient Expects to be Discharged to[de-identified] Unknown Current DME Used/Available at Home: Walker, rolling Tub or Shower Type: (sponge bathes with daughter assistance) [x]  Right hand dominant   []  Left hand dominantCognitive/Behavioral Status: 
Neurologic State: Alert;Confused Orientation Level: Oriented to person;Disoriented to time;Disoriented to situation;Disoriented to place Cognition: Poor safety awareness;Decreased command following;Decreased attention/concentration Skin: No skin changes noted Edema: 1+ edema R hand/forearm Vision/Perceptual:   
Acuity: [facility]) Coordination: 
Coordination: Generally decreased, functional(LUE) Balance: 
Sitting: Impaired; With support Sitting - Static: Fair (occasional) Sitting - Dynamic: Poor (constant support) Standing: Impaired; With support Standing - Static: Poor Strength: 
Strength: Generally decreased, functional(LUE, pain in R wrist/hand, deferred formal assessment) Tone & Sensation: 
Tone: Normal(LUE) Range of Motion: 
AROM: Generally decreased, functional(LUE, pain in R wrist/hand, deferred formal assessment) Functional Mobility and Transfers for ADLs: 
Bed Mobility: 
Rolling: Total assistance;Assist x2 Supine to Sit: Maximum assistance;Assist x2 Sit to Supine: Moderate assistance;Maximum assistance;Assist x2 Scooting: Maximum assistance;Assist x2 Transfers: 
Sit to Stand: Total assistance;Assist x2(unable to stand ) ADL Assessment:(clinical judgement) Feeding: Maximum assistance Oral Facial Hygiene/Grooming: Moderate assistance(wash face with LUE and washcloth) Bathing: Total assistance Upper Body Dressing: Maximum assistance Lower Body Dressing: Total assistance Toileting: Total assistance Pain: 
Pt reports pain in R wrist/hand, does not report with a number Activity Tolerance:  
Poor Please refer to the flowsheet for vital signs taken during this treatment. After treatment:  
[] Patient left in no apparent distress sitting up in chair 
[x] Patient left in no apparent distress in bed 
[x] Call bell left within reach [x] Nursing notified, R wrist/hand pain 
[x] Caregiver present 
[] Bed alarm activated COMMUNICATION/EDUCATION:  
[] Home safety education was provided and the patient/caregiver indicated understanding. [x] Patient/family have participated as able in goal setting and plan of care. [x] Patient/family agree to work toward stated goals and plan of care. [] Patient understands intent and goals of therapy, but is neutral about his/her participation. [] Patient is unable to participate in goal setting and plan of care. Thank you for this referral. 
Mis Guzman, OTR/L Time Calculation: 38 mins

## 2018-12-30 NOTE — ROUTINE PROCESS
3234 Pt received after bedside shift report from Le Bailey RN. Pt resting in bed with no distress noted. Bed locked and in low position. Call bell in reach. [id number]  at bedside 1107 Pt and OT at bedside [address] to doctor HAMM about sw 
 
1600 Pt off unit for xray 1727 Pt back from xray. Family at bedside. Denies need at this time. Call bell in reach. Bedside shift change report given to Jesús Kwon RN (oncoming nurse) by Darrius Nye RN (offgoing nurse). Report included the following information SBAR, MAR, KARDEX AND RECENT RESULTS.

## 2018-12-30 NOTE — PROGRESS NOTES
[facility] Hospitalist Group Progress Note Patient: Dorothye Curling Age: 80 y.o. : 1931 MR#: [id number] SSN: [id number] Date/Time: 2018 Subjective:  
 
Review of systems Continues to have AMS No distress Assessment/Plan:  
Patient with dementia , chronic systolic heart failure EF 30% / s/p ICD placement  / h/p lupus / HTN / brought in due to increasing SOB and decreasing exercise tolerance 1. ARIEL on CKD 4 
 
2 HTN  
 
3 Systolic heart failure chronic - EF 30% in past  
 
4 Lupus history 5 Hypokalemia 6 Dementia  
 
7 High trop - not related to ACS - likely from CKD  
 
9 Afib - s/p Pace maker( h/o Bradycardia )  . paced rhythm - [facility] - see cardiology note (  Dr José Busch - EP recommended  upgrade to biventricular pacemaker as an outpatient.  Patient's family refused  ) 10 Right wrist pain/swelling - X lucero ordered PLAN  
- Replace lytes and Monitor  
- CHF stable - ARIEL - improved to baseline - follow with renal  
- Check wrist x ray - Family refusing placement - home with Home health Case discussed with:  []Patient  [x] Family ( In room with patient )    []Nursing  []Case Management DVT Prophylaxis:  []Lovenox  []Hep SQ  []SCDs  []Coumadin   []On Heparin gtt Objective:  
VS:  
Visit Vitals /60 (BP 1 Location: Left arm, BP Patient Position: At rest) Pulse 71 Temp 98.3 °F (36.8 °C) Resp 20 Ht 5' 5\" (1.651 m) Wt 71.6 kg (157 lb 12.8 oz) SpO2 97% Breastfeeding? No  
BMI 26.26 kg/m² Tmax/24hrs: Temp (24hrs), Av.3 °F (36.8 °C), Min:97.1 °F (36.2 °C), Max:99.7 °F (37.6 °C) IOBRIEF Intake/Output Summary (Last 24 hours) at 2018 1726 Last data filed at 2018 1509 Gross per 24 hour Intake 760 ml Output 900 ml Net -140 ml General:  Alert, cooperative, no acute distress /  Confused Cardiovascular: S1S2 - regular , No Murmur Pulmonary: Equal expansion , No Use of accessory muscles , No Rales No Rhonchi GI:  +BS in all four quadrants, soft, non-tender Extremities:  No edema; 2+ dorsalis pedis pulses bilaterally Neuro: Alert and oriented X 2. Medications:  
Current Facility-Administered Medications Medication Dose Route Frequency  acetaminophen (TYLENOL) tablet 500 mg  500 mg Oral Q6H  
 amiodarone (CORDARONE) tablet 200 mg  200 mg Oral DAILY  amitriptyline (ELAVIL) tablet 25 mg  25 mg Oral QHS  aspirin chewable tablet 81 mg  81 mg Oral DAILY  heparin (porcine) injection 5,000 Units  5,000 Units SubCUTAneous Q8H  
 carvedilol (COREG) tablet 3.125 mg  3.125 mg Oral Q12H Labs:   
Recent Labs  
  12/28/18 
4551 WBC 7.9 HGB 10.3* HCT 31.7* * Recent Labs 12/30/18 
3437 12/30/18 
3367 12/29/18 
1729 12/29/18 
0243 12/28/18 
1421 NA  --  142  --  142 144  
K 2.8* 2.7* 3.0* 2.4* 2.6*  
CL  --  100  --  95* 98* CO2  --  33*  --  37* 37* GLU  --  84  --  96 92 BUN  --  86*  --  85* 86* CREA  --  2.47*  --  2.61* 2.60* CA  --  8.4*  --  8.6 8.6 MG  --  2.4  --   --  2.6 PHOS  --   --   --   --  3.4 ALB  --   --   --  2.8* 3.1*  
SGOT  --   --   --  30 32 ALT  --   --   --  17 19 Signed By: Anahy Christiansen MD   
 December 30, 2018

## 2018-12-30 NOTE — PROGRESS NOTES
In Patient Progress note Admit Date: 12/27/2018 Impression:  
 
1) ARIEL on CKD4 ( baseline in 2 range ) d/t prerenal azotemia , improved briskly to her baseline which  
seems to be~ mid 2s ( has progressed from last year) CXR with normal lung fields but significant cardiomegaly , EF 30% Volume status looks po, appetite improved, off IVF 2) CKD4 , with no proteinuria , etiology HTN nephrosclerosis /ischaemic nephropathy , 
3) Chronic systolic and diastolic CHF, volume status ok , compensated ,continue to hold diuretics , monitor for volume overload 4) HTN , avoid extremes of BP, low dose coreg 5) hypokalemia , 40 meq BID today , should be on K supplement daily , especially while being on lasix 6) metabolic alkalosis , seems chronic , may be compensation to respiratory acidoses Will check a VBG to confirm with next blood draw 7) anemia , at goal  
 
Considering patients multiple medical problems including dementia , cardiomyopathy she is not a long term candidate for dialysis and family agrees.  
  
Please call with questions, 
  
Kiersten Carranza MD FASN Cell [phone] Pager: [phone] Subjective: Intake has improved Current Facility-Administered Medications:  
  potassium chloride (KLOR-CON) packet for solution 40 mEq, 40 mEq, Oral, ONCE, Becki Roca, NP 
  acetaminophen (TYLENOL) tablet 500 mg, 500 mg, Oral, Q6H, Darrin Garcia MD, 500 mg at 12/30/18 1015 
  amiodarone (CORDARONE) tablet 200 mg, 200 mg, Oral, DAILY, Darrin Garcia MD, 200 mg at 12/30/18 1016 
  amitriptyline (ELAVIL) tablet 25 mg, 25 mg, Oral, QHS, Екатерина Galloway MD, 25 mg at 12/29/18 2154   aspirin chewable tablet 81 mg, 81 mg, Oral, DAILY, Darrin Garcia MD, 81 mg at 12/30/18 1016 
  heparin (porcine) injection 5,000 Units, 5,000 Units, SubCUTAneous, Q8H, Darrin Garcia MD, 5,000 Units at 12/30/18 1016   carvedilol (COREG) tablet 3.125 mg, 3.125 mg, Oral, Q12H, Chanelle, Becki E, NP, 3.125 mg at 12/30/18 1015 Objective:  
 
Visit Vitals /51 Pulse 74 Temp 99.7 °F (37.6 °C) Resp 20 Ht 5' 5\" (1.651 m) Wt 71.6 kg (157 lb 12.8 oz) SpO2 93% Breastfeeding? No  
BMI 26.26 kg/m² Intake/Output Summary (Last 24 hours) at 12/30/2018 1026 Last data filed at 12/29/2018 1820 Gross per 24 hour Intake 120 ml Output 700 ml Net -580 ml Physical Exam:  
Patient is in no apparent distress. HEENT:dry mucosa. Lungs: good air entry, clear to auscultation bilaterally. Cardiovascular system: S1, S2, regular rate and rhythm. No JVD Abdomen: soft, non tender, non distended. Extremities: no edema Integumentary: skin is grossly intact. Data Review: 
 
Recent Labs  
  12/28/18 
8623 WBC 7.9  
RBC 3.48* HCT 31.7*  
MCV 91.1 MCH 29.6 MCHC 32.5  
RDW 13.8 Recent Labs 12/30/18 
8487 12/29/18 
1729 12/29/18 
0243 12/28/18 
2817 12/27/18 
1443 BUN 86*  --  85* 86* 96* CREA 2.47*  --  2.61* 2.60* 3.35* CA 8.4*  --  8.6 8.6 8.6 ALB  --   --  2.8* 3.1* 3.3*  
K 2.7* 3.0* 2.4* 2.6* 3.1*   --  142 144 141   --  95* 98* 96* CO2 33*  --  37* 37* 35* PHOS  --   --   --  3.4  --   
GLU 84  --  96 92 93 Vladimir Fernandez MD

## 2018-12-31 VITALS
BODY MASS INDEX: 26.41 KG/M2 | DIASTOLIC BLOOD PRESSURE: 50 MMHG | HEIGHT: 65 IN | RESPIRATION RATE: 20 BRPM | SYSTOLIC BLOOD PRESSURE: 101 MMHG | WEIGHT: 158.5 LBS | HEART RATE: 70 BPM | OXYGEN SATURATION: 96 % | TEMPERATURE: 97.3 F

## 2018-12-31 LAB
ANION GAP SERPL CALC-SCNC: 9 MMOL/L (ref 3–18)
BUN SERPL-MCNC: 91 MG/DL (ref 7–18)
BUN/CREAT SERPL: 34 (ref 12–20)
CALCIUM SERPL-MCNC: 8.5 MG/DL (ref 8.5–10.1)
CHLORIDE SERPL-SCNC: 99 MMOL/L (ref 100–108)
CO2 SERPL-SCNC: 32 MMOL/L (ref 21–32)
CREAT SERPL-MCNC: 2.67 MG/DL (ref 0.6–1.3)
ERYTHROCYTE [DISTWIDTH] IN BLOOD BY AUTOMATED COUNT: 13.9 % (ref 11.6–14.5)
GLUCOSE SERPL-MCNC: 113 MG/DL (ref 74–99)
HCT VFR BLD AUTO: 30.6 % (ref 35–45)
HGB BLD-MCNC: 10 G/DL (ref 12–16)
MCH RBC QN AUTO: 29.5 PG (ref 24–34)
MCHC RBC AUTO-ENTMCNC: 32.7 G/DL (ref 31–37)
MCV RBC AUTO: 90.3 FL (ref 74–97)
PLATELET # BLD AUTO: 140 K/UL (ref 135–420)
PMV BLD AUTO: 9.4 FL (ref 9.2–11.8)
POTASSIUM SERPL-SCNC: 3.3 MMOL/L (ref 3.5–5.5)
POTASSIUM UR-SCNC: 51 MMOL/L (ref 12–62)
RBC # BLD AUTO: 3.39 M/UL (ref 4.2–5.3)
SODIUM SERPL-SCNC: 140 MMOL/L (ref 136–145)
WBC # BLD AUTO: 9.6 K/UL (ref 4.6–13.2)

## 2018-12-31 PROCEDURE — 74011000258 HC RX REV CODE- 258: Performed by: INTERNAL MEDICINE

## 2018-12-31 PROCEDURE — 80048 BASIC METABOLIC PNL TOTAL CA: CPT

## 2018-12-31 PROCEDURE — 74011250636 HC RX REV CODE- 250/636: Performed by: HOSPITALIST

## 2018-12-31 PROCEDURE — 36415 COLL VENOUS BLD VENIPUNCTURE: CPT

## 2018-12-31 PROCEDURE — 74011250636 HC RX REV CODE- 250/636: Performed by: INTERNAL MEDICINE

## 2018-12-31 PROCEDURE — 84133 ASSAY OF URINE POTASSIUM: CPT

## 2018-12-31 PROCEDURE — 74011250637 HC RX REV CODE- 250/637: Performed by: HOSPITALIST

## 2018-12-31 PROCEDURE — 74011636637 HC RX REV CODE- 636/637: Performed by: INTERNAL MEDICINE

## 2018-12-31 PROCEDURE — 85027 COMPLETE CBC AUTOMATED: CPT

## 2018-12-31 RX ORDER — PREDNISONE 10 MG/1
TABLET ORAL
Qty: 50 TAB | Refills: 0 | Status: SHIPPED | OUTPATIENT
Start: 2018-12-31 | End: 2019-01-09

## 2018-12-31 RX ORDER — CARVEDILOL 3.12 MG/1
3.12 TABLET ORAL EVERY 12 HOURS
Qty: 60 TAB | Refills: 0 | Status: SHIPPED | OUTPATIENT
Start: 2018-12-31

## 2018-12-31 RX ORDER — PREDNISONE 20 MG/1
20 TABLET ORAL 2 TIMES DAILY WITH MEALS
Status: DISCONTINUED | OUTPATIENT
Start: 2018-12-31 | End: 2018-12-31 | Stop reason: HOSPADM

## 2018-12-31 RX ADMIN — PREDNISONE 20 MG: 20 TABLET ORAL at 12:14

## 2018-12-31 RX ADMIN — POTASSIUM CHLORIDE: 149 INJECTION, SOLUTION, CONCENTRATE INTRAVENOUS at 15:00

## 2018-12-31 RX ADMIN — POTASSIUM CHLORIDE: 149 INJECTION, SOLUTION, CONCENTRATE INTRAVENOUS at 14:00

## 2018-12-31 RX ADMIN — HEPARIN SODIUM 5000 UNITS: 5000 INJECTION, SOLUTION INTRAVENOUS; SUBCUTANEOUS at 09:01

## 2018-12-31 RX ADMIN — POTASSIUM CHLORIDE: 149 INJECTION, SOLUTION, CONCENTRATE INTRAVENOUS at 13:00

## 2018-12-31 RX ADMIN — HEPARIN SODIUM 5000 UNITS: 5000 INJECTION, SOLUTION INTRAVENOUS; SUBCUTANEOUS at 02:35

## 2018-12-31 RX ADMIN — POTASSIUM CHLORIDE: 149 INJECTION, SOLUTION, CONCENTRATE INTRAVENOUS at 12:14

## 2018-12-31 NOTE — DISCHARGE INSTRUCTIONS
Heart Failure: Care Instructions  Your Care Instructions    Heart failure occurs when your heart does not pump as much blood as the body needs. Failure does not mean that the heart has stopped pumping but rather that it is not pumping as well as it should. Over time, this causes fluid buildup in your lungs and other parts of your body. Fluid buildup can cause shortness of breath, fatigue, swollen ankles, and other problems. By taking medicines regularly, reducing sodium (salt) in your diet, checking your weight every day, and making lifestyle changes, you can feel better and live longer. Follow-up care is a key part of your treatment and safety. Be sure to make and go to all appointments, and call your doctor if you are having problems. It's also a good idea to know your test results and keep a list of the medicines you take. How can you care for yourself at home? Medicines    · Be safe with medicines. Take your medicines exactly as prescribed. Call your doctor if you think you are having a problem with your medicine.     · Do not take any vitamins, over-the-counter medicine, or herbal products without talking to your doctor first. Estevan Bolder not take ibuprofen (Advil or Motrin) and naproxen (Aleve) without talking to your doctor first. They could make your heart failure worse.     · You may be taking some of the following medicine. ? Beta-blockers can slow heart rate, decrease blood pressure, and improve your condition. Taking a beta-blocker may lower your chance of needing to be hospitalized. ? Angiotensin-converting enzyme inhibitors (ACEIs) reduce the heart's workload, lower blood pressure, and reduce swelling. Taking an ACEI may lower your chance of needing to be hospitalized again. ? Angiotensin II receptor blockers (ARBs) work like ACEIs. Your doctor may prescribe them instead of ACEIs. ? Diuretics, also called water pills, reduce swelling. ?  Potassium supplements replace this important mineral, which is sometimes lost with diuretics. ? Aspirin and other blood thinners prevent blood clots, which can cause a stroke or heart attack.    You will get more details on the specific medicines your doctor prescribes. Diet    · Your doctor may suggest that you limit sodium to 2,000 milligrams (mg) a day or less. That is less than 1 teaspoon of salt a day, including all the salt you eat in cooking or in packaged foods. People get most of their sodium from processed foods. Fast food and restaurant meals also tend to be very high in sodium.     · Ask your doctor how much liquid you can drink each day. You may have to limit liquids.    Weight    · Weigh yourself without clothing at the same time each day. Record your weight. Call your doctor if you have a sudden weight gain, such as more than 2 to 3 pounds in a day or 5 pounds in a week. (Your doctor may suggest a different range of weight gain.) A sudden weight gain may mean that your heart failure is getting worse.    Activity level    · Start light exercise (if your doctor says it is okay). Even if you can only do a small amount, exercise will help you get stronger, have more energy, and manage your weight and your stress. Walking is an easy way to get exercise. Start out by walking a little more than you did before. Bit by bit, increase the amount you walk.     · When you exercise, watch for signs that your heart is working too hard. You are pushing yourself too hard if you cannot talk while you are exercising. If you become short of breath or dizzy or have chest pain, stop, sit down, and rest.     · If you feel \"wiped out\" the day after you exercise, walk slower or for a shorter distance until you can work up to a better pace.     · Get enough rest at night. Sleeping with 1 or 2 pillows under your upper body and head may help you breathe easier.    Lifestyle changes    · Do not smoke. Smoking can make a heart condition worse.  If you need help quitting, talk to your doctor about stop-smoking programs and medicines. These can increase your chances of quitting for good. Quitting smoking may be the most important step you can take to protect your heart.     · Limit alcohol to 2 drinks a day for men and 1 drink a day for women. Too much alcohol can cause health problems.     · Avoid getting sick from colds and the flu. Get a pneumococcal vaccine shot. If you have had one before, ask your doctor whether you need another dose. Get a flu shot each year. If you must be around people with colds or the flu, wash your hands often. When should you call for help? Call 911 if you have symptoms of sudden heart failure such as:    · You have severe trouble breathing.     · You cough up pink, foamy mucus.     · You have a new irregular or rapid heartbeat.    Call your doctor now or seek immediate medical care if:    · You have new or increased shortness of breath.     · You are dizzy or lightheaded, or you feel like you may faint.     · You have sudden weight gain, such as more than 2 to 3 pounds in a day or 5 pounds in a week. (Your doctor may suggest a different range of weight gain.)     · You have increased swelling in your legs, ankles, or feet.     · You are suddenly so tired or weak that you cannot do your usual activities.    Watch closely for changes in your health, and be sure to contact your doctor if you develop new symptoms. Where can you learn more? Go to http://renata-antonieta.info/. Enter F242 in the search box to learn more about \"Heart Failure: Care Instructions. \"  Current as of: December 6, 2017  Content Version: 11.8  © 0931-2537 [employer]. Care instructions adapted under license by [employer] (which disclaims liability or warranty for this information).  If you have questions about a medical condition or this instruction, always ask your healthcare professional. [place] [address] any warranty or liability for your use of this information. Hypokalemia: Care Instructions  Your Care Instructions    Hypokalemia (say \"sc-pq-ceh-MICKIE-kaelyn-uh\") is a low level of potassium. The heart, muscles, kidneys, and nervous system all need potassium to work well. This problem has many different causes. Kidney problems, diet, and medicines like diuretics and laxatives can cause it. So can vomiting or diarrhea. In some cases, cancer is the cause. Your doctor may do tests to find the cause of your low potassium levels. You may need medicines to bring your potassium levels back to normal. You may also need regular blood tests to check your potassium. If you have very low potassium, you may need intravenous (IV) medicines. You also may need tests to check the electrical activity of your heart. Heart problems caused by low potassium levels can be very serious. Follow-up care is a key part of your treatment and safety. Be sure to make and go to all appointments, and call your doctor if you are having problems. It's also a good idea to know your test results and keep a list of the medicines you take. How can you care for yourself at home? · If your doctor recommends it, eat foods that have a lot of potassium. These include fresh fruits, juices, and vegetables. They also include nuts, beans, and milk. · Be safe with medicines. If your doctor prescribes medicines or potassium supplements, take them exactly as directed. Call your doctor if you have any problems with your medicines. · Get your potassium levels tested as often as your doctor tells you. When should you call for help? Call 911 anytime you think you may need emergency care. For example, call if:    · You feel like your heart is missing beats.  Heart problems caused by low potassium can cause death.     · You passed out (lost consciousness).     · You have a seizure.    Call your doctor now or seek immediate medical care if:    · You feel weak or unusually tired.     · You have severe arm or leg cramps.     · You have tingling or numbness.     · You feel sick to your stomach, or you vomit.     · You have belly cramps.     · You feel bloated or constipated.     · You have to urinate a lot.     · You feel very thirsty most of the time.     · You are dizzy or lightheaded, or you feel like you may faint.     · You feel depressed, or you lose touch with reality.    Watch closely for changes in your health, and be sure to contact your doctor if:    · You do not get better as expected. Where can you learn more? Go to http://renata-antonieta.info/. Enter G358 in the search box to learn more about \"Hypokalemia: Care Instructions. \"  Current as of: March 15, 2018  Content Version: 11.8  © 8343-9828 [employer]. Care instructions adapted under license by "[employer] (which disclaims liability or warranty for this information). If you have questions about a medical condition or this instruction, always ask your healthcare professional. [address] any warranty or liability for your use of this information. DISCHARGE SUMMARY from Nurse    These are general instructions for a healthy lifestyle:    No smoking/ No tobacco products/ Avoid exposure to second hand smoke  Surgeon General's Warning:  Quitting smoking now greatly reduces serious risk to your health. Obesity, smoking, and sedentary lifestyle greatly increases your risk for illness    A healthy diet, regular physical exercise & weight monitoring are important for maintaining a healthy lifestyle    You may be retaining fluid if you have a history of heart failure or if you experience any of the following symptoms:  Weight gain of 3 pounds or more overnight or 5 pounds in a week, increased swelling in our hands or feet or shortness of breath while lying flat in bed.   Please call your doctor as soon as you notice any of these symptoms; do not wait until your next office visit. Recognize signs and symptoms of STROKE:    F-face looks uneven    A-arms unable to move or move unevenly    S-speech slurred or non-existent    T-time-call 911 as soon as signs and symptoms begin-DO NOT go       Back to bed or wait to see if you get better-TIME IS BRAIN. Warning Signs of HEART ATTACK     Call 911 if you have these symptoms:   Chest discomfort. Most heart attacks involve discomfort in the center of the chest that lasts more than a few minutes, or that goes away and comes back. It can feel like uncomfortable pressure, squeezing, fullness, or pain.  Discomfort in other areas of the upper body. Symptoms can include pain or discomfort in one or both arms, the back, neck, jaw, or stomach.  Shortness of breath with or without chest discomfort.  Other signs may include breaking out in a cold sweat, nausea, or lightheadedness. Don't wait more than five minutes to call 911 - MINUTES MATTER! Fast action can save your life. Calling 911 is almost always the fastest way to get lifesaving treatment. Emergency Medical Services staff can begin treatment when they arrive -- up to an hour sooner than if someone gets to the hospital by car. The discharge information has been reviewed with the {PATIENT PARENT GUARDIAN:65992}. The {PATIENT PARENT GUARDIAN:83791} verbalized understanding. Discharge medications reviewed with the {Dishcarge meds reviewed WROO:64684} and appropriate educational materials and side effects teaching were provided.   ___________________________________________________________________________________________________________________________________

## 2018-12-31 NOTE — PROGRESS NOTES
Per [employer]) CMS does not need to review and explain Important Message from Medicare with patient at bedside, patient was informed of Important Message with CM. A copy provided to patient left at bedside. CMS did not obtain signature from patient.

## 2018-12-31 NOTE — DISCHARGE SUMMARY
Discharge Summary Patient ID: Monie Leigh 
[id number] 
18 y.o. 
6/26/1931 Body mass index is 26.38 kg/m². PCP on record: Consuelo Dent MD 
 
Admit date: 12/27/2018 Discharge date and time: 12/31/2018 Discharge Diagnoses:                                          
1 ARIEL on CKD 4  
2 HTN  
3 Right wrist - severe arthritis  
4 H/o Lupus 5 Hypokalemia 6 Anemia of chronic illness 7 Chronic Systolic ( Estimated left ventricular ejection fraction is 31 - 26%) and diastolic heart failure 8 Dementia Consults: Cardiology Hospital Course by problems: 
Patient with Dementia , good home /family support brought to us with h/o worsening ET /SOB . Initially thought of Heart failure - however her labs suggested ARIEL - pre renal - diuresis was on hold . Further her ET is reduced due to physical deconditioning due to her multiple medical conditions. Patient now has stable Renal function at [facility] - s/b Dr Allegra Ag from Nephrology and he will follow her as out patient . Diuretics decreased - continue lasix , hold zaroxolyn Right wrist was swollen and painful - xray showed severe arthritis - Can not give NSAIDS so I will give steroids with out patient follow up with her rheumatologist . Prednisone - 40 mg po daily for 6 days then 10 mg po daily for 6 days then 5 mg po daily - mantenence    dose Wrist support provided Home health ordered [address] NOT ORDERED - NEEDS OUT PATIENT PERIODIC FOLLOW UP WITH PCP TO MONITOR ELECTROLYTE LEVELS Other issues were stable Patient family wants Home with home health - do not want placement Full CODE Patient seen and examined by me on discharge day. Pertinent Findings: 
Alert but ? orientation - moderate to severe dementia HEENT - NAD   
RS - Clear , no rales no rhonchi CVS - regular rhythm and rate acceptable abd - benign, BS present , no Distension EXT - no edema , no calf tenderness , Right wrist swollen and tender Neuro - alert and awake , grossly motor and sensory intact Significant Diagnostic Studies: 
Results XR WRIST RT AP/LAT (Accession [id number]) (Order [id number]) Allergies     
 
No Known Allergies Result Information Status: Final result (Exam End: 12/30/2018 16:11) Provider Status: Open Study Result EXAM: Right wrist x-ray 
  INDICATION: Wrist is painful and swollen. 
  
TECHNIQUE: 2 views of the right wrist obtained. 
  
COMPARISON:  None. 
  
FINDINGS: Changes consistent with scapholunate advanced collapse are noted with 
severe secondary osteoarthritic changes of the radiocarpal joint with 
intercalation of the capitate between the lunate and scaphoid. Degenerative 
changes of the distal radioulnar joint are noted with subchondral sclerosis and 
subchondral cystic changes. No evidence of acute fracture or dislocation. Bone-on-bone contact of the radiocarpal joint is noted. There is no evidence of 
erosions or periostitis. No lytic or blastic focus appreciated. Diffuse soft 
tissue edema is present. 
  
IMPRESSION IMPRESSION: 
1. 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 
osteoarthritic changes.  
  
2. No evidence of acute fracture or dislocation. 
  
3. Diffuse soft tissue edema. Pertinent Lab Data: 
Recent Labs 12/31/18 
0540 WBC 9.6 HGB 10.0* HCT 30.6*  Recent Labs 12/31/18 
[phone] 12/30/18 
0246 12/30/18 
0555  12/29/18 
3384   --  142  --  142  
K 3.3* 2.8* 2.7*   < > 2.4*  
CL 99*  --  100  --  95* CO2 32  --  33*  --  37* *  --  84  --  96 BUN 91*  --  86*  --  85* CREA 2.67*  --  2.47*  --  2.61* CA 8.5  --  8.4*  --  8.6 MG  --   --  2.4  --   --   
ALB  --   --   --   --  2.8* SGOT  --   --   --   --  30 ALT  --   --   --   --  17  
 < > = values in this interval not displayed. DISCHARGE MEDICATIONS:  
@ Current Discharge Medication List  
  
 
 
 
My Recommended Diet, Activity, Wound Care, and follow-up labs are listed in the patient's Discharge Insturctions which I have personally completed and reviewed. Disposition:    
[x] Home with family     [] [facility] PT/RN   [] SNF/NH   [] Inpatient Rehab/EDUARDO Condition at Discharge:  Stable Follow up with: PCP : Rosaura Mccall MD 
 
 
Please follow-up tests/labs that are still pendin. None 2. 
 
>30 minutes spent coordinating this discharge (review instructions/follow-up, prescriptions, preparing report for sign off) Signed: 
Jazz Huertas MD 
2018 10:55 AM

## 2018-12-31 NOTE — PROGRESS NOTES
Met with pt and son, Khushbu Guthrie at bedside who also called his 2 sisters and placed on speaker phone. Discussed PT/OT recommendations of SNF and family was 100% against sending pt to SNF. Want to go home with [facility]. FOC obtained. Khushbu Guthrie stated he will transport pt home. Rosy Hadley, [phone]

## 2018-12-31 NOTE — ROUTINE PROCESS
Rec'd pt alert and oriented to name only. Breathing regular and non labored. Call bell within reach. Pt is confused. Will continue to monitor pt. 
2230 Pt is pocketing meds. HOB elevated and on aspiration precautions. 0530 Pt is resting quietly in bed. Breathing regular and non labored. 0700 Bedside shift change report given to [facility] (oncoming nurse) by Herson Reis (offgoing nurse). Report given with SBAR, Kardex, Intake/Output, MAR and Recent Results.

## 2018-12-31 NOTE — PROGRESS NOTES
D/C order noted for today. Pt has been accepted to [facility] agency. Met with pt and son Jermaine Garzon and are agreeable to the transition plan today. Transport has been arranged through family. Pt's discharge summary and [facility] orders have been forwarded to [facility]. Important Message from [address]" reviewed and explained with the patient and/or representative at bedside and signature was obtained. A signed copy provided to patient/representative. Original signed document placed in patient's chart. Larisa Knight, MSHARSHAD [phone]

## 2018-12-31 NOTE — PROGRESS NOTES
In Patient Progress note Admit Date: 12/27/2018 Impression:  
 
1) ARIEL on CKD4 ( baseline in 2 range ) d/t prerenal azotemia , improved briskly to her baseline which  
seems to be~ mid 2s ( has progressed from last year) CXR with normal lung fields but significant cardiomegaly , EF 30% Volume status looks po, appetite improved, off IVF 2) CKD4 , with no proteinuria , etiology HTN nephrosclerosis /ischaemic nephropathy , 
3) Chronic systolic and diastolic CHF, volume status ok , compensated ,continue to hold diuretics , monitor for volume overload 4) HTN , avoid extremes of BP, low dose coreg 5) hypokalemia , 40 meq BID today , should be on K supplement daily , especially while being on lasix 6) metabolic alkalosis , seems chronic , may be compensation to respiratory acidoses Will check a VBG to confirm with next blood draw 7) anemia , at goal  
 
Considering patients multiple medical problems including dementia , cardiomyopathy she is not a long term candidate for dialysis and family agrees. Have recommended office f/u for her , need to see her in 4-6 weeks after discharge  
  
Please call with questions, 
  
Perla William MD FASN Cell [phone] Pager: [phone] Subjective: Intake has improved Current Facility-Administered Medications:  
  potassium chloride 10 mEq, lidocaine (PF) (XYLOCAINE) 10 mg/mL (1 %) 1 mL in 0.9% sodium chloride 100 mL IVPB, , IntraVENous, Q1H, Kirill Fabian MD 
  predniSONE (DELTASONE) tablet 20 mg, 20 mg, Oral, BID WITH MEALS, Kirill Fabian MD, 20 mg at 12/31/18 1214   acetaminophen (TYLENOL) tablet 500 mg, 500 mg, Oral, Q6H, Camille Garcia MD, 500 mg at 12/30/18 2211 
  amiodarone (CORDARONE) tablet 200 mg, 200 mg, Oral, DAILY, Jeana Jacobs MD, Stopped at 12/29/18 0900 
  amitriptyline (ELAVIL) tablet 25 mg, 25 mg, Oral, QHS, Jeana Jacobs MD, 25 mg at 12/30/18 2211   aspirin chewable tablet 81 mg, 81 mg, Oral, DAILY, Ahmedtaha, Ulysses Decree, MD, Stopped at 12/29/18 0900 
  heparin (porcine) injection 5,000 Units, 5,000 Units, SubCUTAneous, Q8H, Ahmedtaha, Ulysses Decree, MD, 5,000 Units at 12/31/18 9770   carvedilol (COREG) tablet 3.125 mg, 3.125 mg, Oral, Q12H, Becki Roca NP, Stopped at 12/31/18 2899 Objective:  
 
Visit Vitals /50 (BP 1 Location: Right arm, BP Patient Position: At rest) Pulse 70 Temp 97.3 °F (36.3 °C) Resp 20 Ht 5' 5\" (1.651 m) Wt 71.9 kg (158 lb 8 oz) SpO2 96% Breastfeeding? No  
BMI 26.38 kg/m² Intake/Output Summary (Last 24 hours) at 12/31/2018 1557 Last data filed at 12/31/2018 1406 Gross per 24 hour Intake 350 ml Output  Net 350 ml Physical Exam:  
Patient is in no apparent distress. HEENT:dry mucosa. Lungs: good air entry, clear to auscultation bilaterally. Cardiovascular system: S1, S2, regular rate and rhythm. No JVD Abdomen: soft, non tender, non distended. Extremities: no edema Integumentary: skin is grossly intact. Data Review: 
 
Recent Labs 12/31/18 
0540 WBC 9.6  
RBC 3.39* HCT 30.6* MCV 90.3 MCH 29.5 MCHC 32.7 RDW 13.9 Recent Labs 12/31/18 
0540 12/30/18 
8957 12/30/18 
0555 12/29/18 
1729 12/29/18 
8839 BUN 91*  --  86*  --  85* CREA 2.67*  --  2.47*  --  2.61* CA 8.5  --  8.4*  --  8.6 ALB  --   --   --   --  2.8*  
K 3.3* 2.8* 2.7* 3.0* 2.4*   --  142  --  142 CL 99*  --  100  --  95* CO2 32  --  33*  --  37* *  --  84  --  96 Bayron Stewart MD

## 2019-01-01 ENCOUNTER — HOME CARE VISIT (OUTPATIENT)
Dept: SCHEDULING | Facility: HOME HEALTH | Age: 84
End: 2019-01-01
Payer: MEDICARE

## 2019-01-01 ENCOUNTER — PATIENT OUTREACH (OUTPATIENT)
Dept: CARDIOLOGY CLINIC | Age: 84
End: 2019-01-01

## 2019-01-01 ENCOUNTER — HOME CARE VISIT (OUTPATIENT)
Dept: HOME HEALTH SERVICES | Facility: HOME HEALTH | Age: 84
End: 2019-01-01
Payer: MEDICARE

## 2019-01-01 ENCOUNTER — HOSPITAL ENCOUNTER (OUTPATIENT)
Dept: LAB | Age: 84
Discharge: HOME | End: 2019-08-29
Payer: MEDICARE

## 2019-01-01 ENCOUNTER — TELEPHONE (OUTPATIENT)
Dept: CARDIOLOGY CLINIC | Age: 84
End: 2019-01-01

## 2019-01-01 ENCOUNTER — HOME CARE VISIT (OUTPATIENT)
Dept: HOME HEALTH SERVICES | Facility: HOME HEALTH | Age: 84
End: 2019-01-01

## 2019-01-01 ENCOUNTER — OFFICE VISIT (OUTPATIENT)
Dept: CARDIOLOGY CLINIC | Age: 84
End: 2019-01-01

## 2019-01-01 ENCOUNTER — HOME CARE VISIT (OUTPATIENT)
Dept: SCHEDULING | Facility: HOME HEALTH | Age: 84
End: 2019-01-01

## 2019-01-01 ENCOUNTER — HOSPITAL ENCOUNTER (EMERGENCY)
Age: 84
Discharge: HOME | End: 2019-10-18
Attending: EMERGENCY MEDICINE
Payer: MEDICARE

## 2019-01-01 ENCOUNTER — HOME HEALTH ADMISSION (OUTPATIENT)
Dept: HOME HEALTH SERVICES | Facility: HOME HEALTH | Age: 84
End: 2019-01-01
Payer: MEDICARE

## 2019-01-01 ENCOUNTER — TELEPHONE (OUTPATIENT)
Dept: VASCULAR SURGERY | Age: 84
End: 2019-01-01

## 2019-01-01 ENCOUNTER — APPOINTMENT (OUTPATIENT)
Dept: GENERAL RADIOLOGY | Age: 84
End: 2019-01-01
Attending: STUDENT IN AN ORGANIZED HEALTH CARE EDUCATION/TRAINING PROGRAM
Payer: MEDICARE

## 2019-01-01 ENCOUNTER — HOSPITAL ENCOUNTER (OUTPATIENT)
Dept: LAB | Age: 84
Discharge: HOME | End: 2019-11-06
Payer: MEDICARE

## 2019-01-01 ENCOUNTER — HOSPITAL ENCOUNTER (EMERGENCY)
Age: 84
Discharge: HOME | End: 2019-10-14
Attending: EMERGENCY MEDICINE
Payer: MEDICARE

## 2019-01-01 ENCOUNTER — HOSPITAL ENCOUNTER (OUTPATIENT)
Dept: LAB | Age: 84
Discharge: HOME | End: 2019-09-19
Payer: MEDICARE

## 2019-01-01 VITALS
RESPIRATION RATE: 20 BRPM | TEMPERATURE: 97.5 F | HEART RATE: 72 BPM | OXYGEN SATURATION: 98 % | DIASTOLIC BLOOD PRESSURE: 70 MMHG | SYSTOLIC BLOOD PRESSURE: 110 MMHG

## 2019-01-01 VITALS
SYSTOLIC BLOOD PRESSURE: 110 MMHG | RESPIRATION RATE: 20 BRPM | TEMPERATURE: 97.5 F | OXYGEN SATURATION: 98 % | DIASTOLIC BLOOD PRESSURE: 70 MMHG | HEART RATE: 72 BPM

## 2019-01-01 VITALS
HEART RATE: 74 BPM | DIASTOLIC BLOOD PRESSURE: 68 MMHG | HEART RATE: 71 BPM | SYSTOLIC BLOOD PRESSURE: 120 MMHG | DIASTOLIC BLOOD PRESSURE: 60 MMHG | RESPIRATION RATE: 16 BRPM | TEMPERATURE: 97.3 F | HEIGHT: 65 IN | OXYGEN SATURATION: 98 % | RESPIRATION RATE: 20 BRPM | WEIGHT: 165 LBS | SYSTOLIC BLOOD PRESSURE: 118 MMHG | OXYGEN SATURATION: 98 % | TEMPERATURE: 97.6 F | BODY MASS INDEX: 27.49 KG/M2

## 2019-01-01 VITALS
SYSTOLIC BLOOD PRESSURE: 100 MMHG | TEMPERATURE: 97 F | DIASTOLIC BLOOD PRESSURE: 60 MMHG | OXYGEN SATURATION: 96 % | RESPIRATION RATE: 20 BRPM | HEART RATE: 82 BPM

## 2019-01-01 VITALS
RESPIRATION RATE: 16 BRPM | OXYGEN SATURATION: 96 % | HEART RATE: 70 BPM | DIASTOLIC BLOOD PRESSURE: 60 MMHG | SYSTOLIC BLOOD PRESSURE: 110 MMHG | TEMPERATURE: 96.9 F

## 2019-01-01 VITALS
RESPIRATION RATE: 14 BRPM | SYSTOLIC BLOOD PRESSURE: 140 MMHG | TEMPERATURE: 97.6 F | OXYGEN SATURATION: 95 % | HEART RATE: 81 BPM | DIASTOLIC BLOOD PRESSURE: 52 MMHG

## 2019-01-01 VITALS
RESPIRATION RATE: 20 BRPM | TEMPERATURE: 97 F | DIASTOLIC BLOOD PRESSURE: 64 MMHG | HEART RATE: 76 BPM | OXYGEN SATURATION: 90 % | SYSTOLIC BLOOD PRESSURE: 110 MMHG

## 2019-01-01 VITALS
DIASTOLIC BLOOD PRESSURE: 70 MMHG | OXYGEN SATURATION: 95 % | HEART RATE: 68 BPM | TEMPERATURE: 98.6 F | SYSTOLIC BLOOD PRESSURE: 121 MMHG

## 2019-01-01 VITALS
HEART RATE: 70 BPM | DIASTOLIC BLOOD PRESSURE: 58 MMHG | RESPIRATION RATE: 16 BRPM | TEMPERATURE: 97.6 F | OXYGEN SATURATION: 97 % | SYSTOLIC BLOOD PRESSURE: 110 MMHG

## 2019-01-01 VITALS
HEART RATE: 82 BPM | TEMPERATURE: 97.7 F | OXYGEN SATURATION: 97 % | SYSTOLIC BLOOD PRESSURE: 116 MMHG | RESPIRATION RATE: 16 BRPM | DIASTOLIC BLOOD PRESSURE: 66 MMHG

## 2019-01-01 VITALS
TEMPERATURE: 98.1 F | DIASTOLIC BLOOD PRESSURE: 68 MMHG | RESPIRATION RATE: 18 BRPM | HEART RATE: 88 BPM | SYSTOLIC BLOOD PRESSURE: 112 MMHG | OXYGEN SATURATION: 99 %

## 2019-01-01 VITALS
SYSTOLIC BLOOD PRESSURE: 124 MMHG | OXYGEN SATURATION: 96 % | HEART RATE: 70 BPM | TEMPERATURE: 97.1 F | RESPIRATION RATE: 16 BRPM | DIASTOLIC BLOOD PRESSURE: 68 MMHG

## 2019-01-01 VITALS
OXYGEN SATURATION: 96 % | DIASTOLIC BLOOD PRESSURE: 88 MMHG | RESPIRATION RATE: 20 BRPM | SYSTOLIC BLOOD PRESSURE: 128 MMHG | TEMPERATURE: 97.2 F | HEART RATE: 88 BPM

## 2019-01-01 VITALS
SYSTOLIC BLOOD PRESSURE: 135 MMHG | HEART RATE: 69 BPM | OXYGEN SATURATION: 97 % | DIASTOLIC BLOOD PRESSURE: 68 MMHG | RESPIRATION RATE: 18 BRPM | TEMPERATURE: 97.8 F

## 2019-01-01 VITALS
TEMPERATURE: 97.7 F | HEART RATE: 61 BPM | RESPIRATION RATE: 14 BRPM | OXYGEN SATURATION: 94 % | DIASTOLIC BLOOD PRESSURE: 70 MMHG | SYSTOLIC BLOOD PRESSURE: 106 MMHG

## 2019-01-01 VITALS
TEMPERATURE: 98.2 F | DIASTOLIC BLOOD PRESSURE: 74 MMHG | HEART RATE: 66 BPM | OXYGEN SATURATION: 97 % | SYSTOLIC BLOOD PRESSURE: 118 MMHG

## 2019-01-01 VITALS
HEART RATE: 62 BPM | OXYGEN SATURATION: 99 % | DIASTOLIC BLOOD PRESSURE: 66 MMHG | TEMPERATURE: 98.1 F | SYSTOLIC BLOOD PRESSURE: 121 MMHG

## 2019-01-01 VITALS
HEIGHT: 67 IN | OXYGEN SATURATION: 99 % | TEMPERATURE: 97.2 F | WEIGHT: 140 LBS | HEART RATE: 77 BPM | RESPIRATION RATE: 20 BRPM | DIASTOLIC BLOOD PRESSURE: 60 MMHG | SYSTOLIC BLOOD PRESSURE: 128 MMHG | BODY MASS INDEX: 21.97 KG/M2

## 2019-01-01 VITALS
OXYGEN SATURATION: 96 % | DIASTOLIC BLOOD PRESSURE: 72 MMHG | RESPIRATION RATE: 18 BRPM | HEART RATE: 70 BPM | TEMPERATURE: 97.8 F | SYSTOLIC BLOOD PRESSURE: 110 MMHG

## 2019-01-01 VITALS
SYSTOLIC BLOOD PRESSURE: 110 MMHG | DIASTOLIC BLOOD PRESSURE: 56 MMHG | TEMPERATURE: 98.4 F | HEART RATE: 62 BPM | OXYGEN SATURATION: 98 %

## 2019-01-01 VITALS
OXYGEN SATURATION: 95 % | DIASTOLIC BLOOD PRESSURE: 68 MMHG | RESPIRATION RATE: 16 BRPM | HEART RATE: 68 BPM | TEMPERATURE: 99 F | SYSTOLIC BLOOD PRESSURE: 112 MMHG

## 2019-01-01 VITALS
HEART RATE: 73 BPM | SYSTOLIC BLOOD PRESSURE: 97 MMHG | WEIGHT: 155 LBS | HEIGHT: 67 IN | DIASTOLIC BLOOD PRESSURE: 44 MMHG | BODY MASS INDEX: 24.33 KG/M2

## 2019-01-01 VITALS
DIASTOLIC BLOOD PRESSURE: 60 MMHG | HEART RATE: 70 BPM | TEMPERATURE: 98.2 F | SYSTOLIC BLOOD PRESSURE: 110 MMHG | OXYGEN SATURATION: 95 %

## 2019-01-01 VITALS
DIASTOLIC BLOOD PRESSURE: 70 MMHG | OXYGEN SATURATION: 98 % | HEART RATE: 71 BPM | TEMPERATURE: 97.9 F | SYSTOLIC BLOOD PRESSURE: 140 MMHG | RESPIRATION RATE: 16 BRPM

## 2019-01-01 VITALS
SYSTOLIC BLOOD PRESSURE: 110 MMHG | RESPIRATION RATE: 20 BRPM | HEART RATE: 78 BPM | OXYGEN SATURATION: 98 % | DIASTOLIC BLOOD PRESSURE: 78 MMHG | TEMPERATURE: 97 F

## 2019-01-01 VITALS
OXYGEN SATURATION: 97 % | TEMPERATURE: 98.1 F | RESPIRATION RATE: 15 BRPM | SYSTOLIC BLOOD PRESSURE: 124 MMHG | DIASTOLIC BLOOD PRESSURE: 62 MMHG | HEART RATE: 76 BPM

## 2019-01-01 VITALS
DIASTOLIC BLOOD PRESSURE: 78 MMHG | TEMPERATURE: 98.7 F | OXYGEN SATURATION: 97 % | RESPIRATION RATE: 16 BRPM | SYSTOLIC BLOOD PRESSURE: 112 MMHG | HEART RATE: 72 BPM

## 2019-01-01 VITALS
SYSTOLIC BLOOD PRESSURE: 117 MMHG | DIASTOLIC BLOOD PRESSURE: 78 MMHG | TEMPERATURE: 97.4 F | HEART RATE: 60 BPM | OXYGEN SATURATION: 98 %

## 2019-01-01 VITALS
HEART RATE: 72 BPM | SYSTOLIC BLOOD PRESSURE: 132 MMHG | OXYGEN SATURATION: 97 % | DIASTOLIC BLOOD PRESSURE: 70 MMHG | TEMPERATURE: 97.7 F

## 2019-01-01 DIAGNOSIS — E55.9 VITAMIN D DEFICIENCY: ICD-10-CM

## 2019-01-01 DIAGNOSIS — N18.30 CHRONIC KIDNEY DISEASE, STAGE III (MODERATE) (HCC): ICD-10-CM

## 2019-01-01 DIAGNOSIS — N18.4 CKD (CHRONIC KIDNEY DISEASE) STAGE 4, GFR 15-29 ML/MIN (HCC): Primary | ICD-10-CM

## 2019-01-01 DIAGNOSIS — I50.22 CHRONIC SYSTOLIC CONGESTIVE HEART FAILURE (HCC): ICD-10-CM

## 2019-01-01 DIAGNOSIS — Z95.0 S/P PLACEMENT OF CARDIAC PACEMAKER: ICD-10-CM

## 2019-01-01 DIAGNOSIS — Z79.899 HIGH RISK MEDICATION USE: ICD-10-CM

## 2019-01-01 DIAGNOSIS — M35.9 DIFFUSE DISEASE OF CONNECTIVE TISSUE (HCC): ICD-10-CM

## 2019-01-01 DIAGNOSIS — N18.4 CKD (CHRONIC KIDNEY DISEASE) STAGE 4, GFR 15-29 ML/MIN (HCC): ICD-10-CM

## 2019-01-01 DIAGNOSIS — I48.92 PAROXYSMAL ATRIAL FLUTTER (HCC): ICD-10-CM

## 2019-01-01 DIAGNOSIS — L89.629 PRESSURE INJURY OF SKIN OF LEFT HEEL, UNSPECIFIED INJURY STAGE: Primary | ICD-10-CM

## 2019-01-01 DIAGNOSIS — I50.22 CHRONIC SYSTOLIC CONGESTIVE HEART FAILURE (HCC): Primary | ICD-10-CM

## 2019-01-01 DIAGNOSIS — L89.159 PRESSURE INJURY OF SKIN OF SACRAL REGION, UNSPECIFIED INJURY STAGE: ICD-10-CM

## 2019-01-01 DIAGNOSIS — I10 ESSENTIAL HYPERTENSION: ICD-10-CM

## 2019-01-01 DIAGNOSIS — M79.609 PAIN IN LIMB: ICD-10-CM

## 2019-01-01 DIAGNOSIS — R60.9 PERIPHERAL EDEMA: Primary | ICD-10-CM

## 2019-01-01 DIAGNOSIS — M13.0 POLYARTHROPATHY: ICD-10-CM

## 2019-01-01 LAB
25(OH)D3 SERPL-MCNC: 23.9 NG/ML (ref 30–100)
ALBUMIN SERPL-MCNC: 3.2 G/DL (ref 3.4–5)
ALBUMIN SERPL-MCNC: 3.5 G/DL (ref 3.4–5)
ALBUMIN/GLOB SERPL: 1 {RATIO} (ref 0.8–1.7)
ALBUMIN/GLOB SERPL: 1.1 {RATIO} (ref 0.8–1.7)
ALP SERPL-CCNC: 56 U/L (ref 45–117)
ALP SERPL-CCNC: 66 U/L (ref 45–117)
ALT SERPL-CCNC: 11 U/L (ref 13–56)
ALT SERPL-CCNC: 11 U/L (ref 13–56)
ANION GAP SERPL CALC-SCNC: 1 MMOL/L (ref 3–18)
ANION GAP SERPL CALC-SCNC: 3 MMOL/L (ref 3–18)
ANION GAP SERPL CALC-SCNC: 4 MMOL/L (ref 3–18)
ANION GAP SERPL CALC-SCNC: 4 MMOL/L (ref 3–18)
ANION GAP SERPL CALC-SCNC: 6 MMOL/L (ref 3–18)
APPEARANCE UR: CLEAR
AST SERPL-CCNC: 15 U/L (ref 10–38)
AST SERPL-CCNC: 16 U/L (ref 10–38)
BASOPHILS # BLD: 0 K/UL (ref 0–0.06)
BASOPHILS # BLD: 0 K/UL (ref 0–0.1)
BASOPHILS NFR BLD: 0 % (ref 0–2)
BASOPHILS NFR BLD: 0 % (ref 0–3)
BILIRUB SERPL-MCNC: 0.3 MG/DL (ref 0.2–1)
BILIRUB SERPL-MCNC: 0.4 MG/DL (ref 0.2–1)
BILIRUB UR QL: NEGATIVE
BNP SERPL-MCNC: 4527 PG/ML (ref 0–1800)
BUN SERPL-MCNC: 55 MG/DL (ref 7–18)
BUN SERPL-MCNC: 55 MG/DL (ref 7–18)
BUN SERPL-MCNC: 60 MG/DL (ref 7–18)
BUN SERPL-MCNC: 61 MG/DL (ref 7–18)
BUN SERPL-MCNC: 71 MG/DL (ref 7–18)
BUN/CREAT SERPL: 29 (ref 12–20)
BUN/CREAT SERPL: 31 (ref 12–20)
BUN/CREAT SERPL: 32 (ref 12–20)
BUN/CREAT SERPL: 33 (ref 12–20)
BUN/CREAT SERPL: 35 (ref 12–20)
CALCIUM SERPL-MCNC: 8.5 MG/DL (ref 8.5–10.1)
CALCIUM SERPL-MCNC: 8.9 MG/DL (ref 8.5–10.1)
CALCIUM SERPL-MCNC: 8.9 MG/DL (ref 8.5–10.1)
CALCIUM SERPL-MCNC: 9 MG/DL (ref 8.5–10.1)
CALCIUM SERPL-MCNC: 9.1 MG/DL (ref 8.5–10.1)
CALCIUM SERPL-MCNC: 9.5 MG/DL (ref 8.5–10.1)
CHLORIDE SERPL-SCNC: 101 MMOL/L (ref 100–111)
CHLORIDE SERPL-SCNC: 103 MMOL/L (ref 100–111)
CHLORIDE SERPL-SCNC: 103 MMOL/L (ref 100–111)
CHLORIDE SERPL-SCNC: 105 MMOL/L (ref 100–111)
CHLORIDE SERPL-SCNC: 106 MMOL/L (ref 100–111)
CO2 SERPL-SCNC: 30 MMOL/L (ref 21–32)
CO2 SERPL-SCNC: 30 MMOL/L (ref 21–32)
CO2 SERPL-SCNC: 31 MMOL/L (ref 21–32)
CO2 SERPL-SCNC: 33 MMOL/L (ref 21–32)
CO2 SERPL-SCNC: 34 MMOL/L (ref 21–32)
COLOR UR: YELLOW
CREAT SERPL-MCNC: 1.56 MG/DL (ref 0.6–1.3)
CREAT SERPL-MCNC: 1.7 MG/DL (ref 0.6–1.3)
CREAT SERPL-MCNC: 1.98 MG/DL (ref 0.6–1.3)
CREAT SERPL-MCNC: 2.06 MG/DL (ref 0.6–1.3)
CREAT SERPL-MCNC: 2.18 MG/DL (ref 0.6–1.3)
CRP SERPL-MCNC: 0.4 MG/DL (ref 0–0.3)
DIFFERENTIAL METHOD BLD: ABNORMAL
DIFFERENTIAL METHOD BLD: ABNORMAL
EOSINOPHIL # BLD: 0.3 K/UL (ref 0–0.4)
EOSINOPHIL # BLD: 0.4 K/UL (ref 0–0.4)
EOSINOPHIL NFR BLD: 3 % (ref 0–5)
EOSINOPHIL NFR BLD: 6 % (ref 0–5)
ERYTHROCYTE [DISTWIDTH] IN BLOOD BY AUTOMATED COUNT: 15.2 % (ref 11.6–14.5)
ERYTHROCYTE [DISTWIDTH] IN BLOOD BY AUTOMATED COUNT: 15.3 % (ref 11.6–14.5)
ERYTHROCYTE [DISTWIDTH] IN BLOOD BY AUTOMATED COUNT: 15.8 % (ref 11.6–14.5)
ERYTHROCYTE [SEDIMENTATION RATE] IN BLOOD: 31 MM/HR (ref 0–30)
GLOBULIN SER CALC-MCNC: 2.9 G/DL (ref 2–4)
GLOBULIN SER CALC-MCNC: 3.3 G/DL (ref 2–4)
GLUCOSE SERPL-MCNC: 107 MG/DL (ref 74–99)
GLUCOSE SERPL-MCNC: 78 MG/DL (ref 74–99)
GLUCOSE SERPL-MCNC: 82 MG/DL (ref 74–99)
GLUCOSE SERPL-MCNC: 84 MG/DL (ref 74–99)
GLUCOSE SERPL-MCNC: 87 MG/DL (ref 74–99)
GLUCOSE UR STRIP.AUTO-MCNC: NEGATIVE MG/DL
HCT VFR BLD AUTO: 26.7 % (ref 35–45)
HCT VFR BLD AUTO: 29.3 % (ref 35–45)
HCT VFR BLD AUTO: 29.8 % (ref 35–45)
HGB BLD-MCNC: 8.5 G/DL (ref 12–16)
HGB BLD-MCNC: 9.1 G/DL (ref 12–16)
HGB BLD-MCNC: 9.3 G/DL (ref 12–16)
HGB UR QL STRIP: NEGATIVE
KETONES UR QL STRIP.AUTO: NEGATIVE MG/DL
LEUKOCYTE ESTERASE UR QL STRIP.AUTO: NEGATIVE
LYMPHOCYTES # BLD: 1.8 K/UL (ref 0.9–3.6)
LYMPHOCYTES # BLD: 2.6 K/UL (ref 0.8–3.5)
LYMPHOCYTES NFR BLD: 25 % (ref 21–52)
LYMPHOCYTES NFR BLD: 37 % (ref 20–51)
MCH RBC QN AUTO: 28 PG (ref 24–34)
MCH RBC QN AUTO: 28.1 PG (ref 24–34)
MCH RBC QN AUTO: 28.2 PG (ref 24–34)
MCHC RBC AUTO-ENTMCNC: 31.1 G/DL (ref 31–37)
MCHC RBC AUTO-ENTMCNC: 31.2 G/DL (ref 31–37)
MCHC RBC AUTO-ENTMCNC: 31.8 G/DL (ref 31–37)
MCV RBC AUTO: 88.4 FL (ref 74–97)
MCV RBC AUTO: 89.8 FL (ref 74–97)
MCV RBC AUTO: 90.7 FL (ref 74–97)
MONOCYTES # BLD: 0.5 K/UL (ref 0–1)
MONOCYTES # BLD: 1 K/UL (ref 0.05–1.2)
MONOCYTES NFR BLD: 13 % (ref 3–10)
MONOCYTES NFR BLD: 7 % (ref 2–9)
NEUTS BAND NFR BLD MANUAL: 1 % (ref 0–5)
NEUTS SEG # BLD: 3.5 K/UL (ref 1.8–8)
NEUTS SEG # BLD: 4.3 K/UL (ref 1.8–8)
NEUTS SEG NFR BLD: 49 % (ref 42–75)
NEUTS SEG NFR BLD: 59 % (ref 40–73)
NITRITE UR QL STRIP.AUTO: NEGATIVE
PH UR STRIP: 7 [PH] (ref 5–8)
PHOSPHATE SERPL-MCNC: 3 MG/DL (ref 2.5–4.9)
PHOSPHATE SERPL-MCNC: 3.6 MG/DL (ref 2.5–4.9)
PLATELET # BLD AUTO: 152 K/UL (ref 135–420)
PLATELET # BLD AUTO: 170 K/UL (ref 135–420)
PLATELET # BLD AUTO: 185 K/UL (ref 135–420)
PLATELET COMMENTS,PCOM: ABNORMAL
PMV BLD AUTO: 8.4 FL (ref 9.2–11.8)
PMV BLD AUTO: 9.2 FL (ref 9.2–11.8)
PMV BLD AUTO: 9.3 FL (ref 9.2–11.8)
POTASSIUM SERPL-SCNC: 4.4 MMOL/L (ref 3.5–5.5)
POTASSIUM SERPL-SCNC: 4.6 MMOL/L (ref 3.5–5.5)
POTASSIUM SERPL-SCNC: 5.1 MMOL/L (ref 3.5–5.5)
PROT SERPL-MCNC: 6.1 G/DL (ref 6.4–8.2)
PROT SERPL-MCNC: 6.5 G/DL (ref 6.4–8.2)
PROT UR STRIP-MCNC: NEGATIVE MG/DL
PTH-INTACT SERPL-MCNC: 47.9 PG/ML (ref 18.4–88)
RBC # BLD AUTO: 3.02 M/UL (ref 4.2–5.3)
RBC # BLD AUTO: 3.23 M/UL (ref 4.2–5.3)
RBC # BLD AUTO: 3.32 M/UL (ref 4.2–5.3)
RBC MORPH BLD: ABNORMAL
SODIUM SERPL-SCNC: 137 MMOL/L (ref 136–145)
SODIUM SERPL-SCNC: 138 MMOL/L (ref 136–145)
SODIUM SERPL-SCNC: 138 MMOL/L (ref 136–145)
SODIUM SERPL-SCNC: 139 MMOL/L (ref 136–145)
SODIUM SERPL-SCNC: 142 MMOL/L (ref 136–145)
SP GR UR REFRACTOMETRY: 1.01 (ref 1–1.03)
T4 FREE SERPL-MCNC: 1.3 NG/DL (ref 0.7–1.5)
TSH SERPL DL<=0.05 MIU/L-ACNC: 12.1 UIU/ML (ref 0.36–3.74)
UROBILINOGEN UR QL STRIP.AUTO: 0.2 EU/DL (ref 0.2–1)
WBC # BLD AUTO: 6.7 K/UL (ref 4.6–13.2)
WBC # BLD AUTO: 7 K/UL (ref 4.6–13.2)
WBC # BLD AUTO: 7.4 K/UL (ref 4.6–13.2)

## 2019-01-01 PROCEDURE — 3331090002 HH PPS REVENUE DEBIT

## 2019-01-01 PROCEDURE — 3331090001 HH PPS REVENUE CREDIT

## 2019-01-01 PROCEDURE — 81003 URINALYSIS AUTO W/O SCOPE: CPT

## 2019-01-01 PROCEDURE — 85027 COMPLETE CBC AUTOMATED: CPT

## 2019-01-01 PROCEDURE — 77030011256 HC DRSG MEPILEX <16IN NO BORD MOLN -A

## 2019-01-01 PROCEDURE — G0299 HHS/HOSPICE OF RN EA 15 MIN: HCPCS

## 2019-01-01 PROCEDURE — 36415 COLL VENOUS BLD VENIPUNCTURE: CPT

## 2019-01-01 PROCEDURE — A6212 FOAM DRG <=16 SQ IN W/BORDER: HCPCS

## 2019-01-01 PROCEDURE — G0300 HHS/HOSPICE OF LPN EA 15 MIN: HCPCS

## 2019-01-01 PROCEDURE — A6456 ZINC PASTE BAND W >=3"<5"/YD: HCPCS

## 2019-01-01 PROCEDURE — 80069 RENAL FUNCTION PANEL: CPT

## 2019-01-01 PROCEDURE — A6216 NON-STERILE GAUZE<=16 SQ IN: HCPCS

## 2019-01-01 PROCEDURE — A6197 ALGINATE DRSG >16 <=48 SQ IN: HCPCS

## 2019-01-01 PROCEDURE — A4930 STERILE, GLOVES PER PAIR: HCPCS

## 2019-01-01 PROCEDURE — A6223 GAUZE >16<=48 NO W/SAL W/O B: HCPCS

## 2019-01-01 PROCEDURE — 99285 EMERGENCY DEPT VISIT HI MDM: CPT

## 2019-01-01 PROCEDURE — A6454 SELF-ADHER BAND W>=3" <5"/YD: HCPCS

## 2019-01-01 PROCEDURE — A6260 WOUND CLEANSER ANY TYPE/SIZE: HCPCS

## 2019-01-01 PROCEDURE — 80053 COMPREHEN METABOLIC PANEL: CPT

## 2019-01-01 PROCEDURE — 84439 ASSAY OF FREE THYROXINE: CPT

## 2019-01-01 PROCEDURE — 400013 HH SOC

## 2019-01-01 PROCEDURE — 74011250636 HC RX REV CODE- 250/636: Performed by: STUDENT IN AN ORGANIZED HEALTH CARE EDUCATION/TRAINING PROGRAM

## 2019-01-01 PROCEDURE — 99283 EMERGENCY DEPT VISIT LOW MDM: CPT

## 2019-01-01 PROCEDURE — 84443 ASSAY THYROID STIM HORMONE: CPT

## 2019-01-01 PROCEDURE — A6250 SKIN SEAL PROTECT MOISTURIZR: HCPCS

## 2019-01-01 PROCEDURE — A6196 ALGINATE DRESSING <=16 SQ IN: HCPCS

## 2019-01-01 PROCEDURE — 96374 THER/PROPH/DIAG INJ IV PUSH: CPT

## 2019-01-01 PROCEDURE — 83880 ASSAY OF NATRIURETIC PEPTIDE: CPT

## 2019-01-01 PROCEDURE — 85025 COMPLETE CBC W/AUTO DIFF WBC: CPT

## 2019-01-01 PROCEDURE — 85652 RBC SED RATE AUTOMATED: CPT

## 2019-01-01 PROCEDURE — 86140 C-REACTIVE PROTEIN: CPT

## 2019-01-01 PROCEDURE — 29581 APPL MULTLAYER CMPRN SYS LEG: CPT

## 2019-01-01 PROCEDURE — 400014 HH F/U

## 2019-01-01 PROCEDURE — 71045 X-RAY EXAM CHEST 1 VIEW: CPT

## 2019-01-01 PROCEDURE — 77030011255 HC DSG AQUACEL AG BMS -A

## 2019-01-01 PROCEDURE — 82306 VITAMIN D 25 HYDROXY: CPT

## 2019-01-01 PROCEDURE — A6213 FOAM DRG >16<=48 SQ IN W/BDR: HCPCS

## 2019-01-01 PROCEDURE — A6446 CONFORM BAND S W>=3" <5"/YD: HCPCS

## 2019-01-01 PROCEDURE — A4927 NON-STERILE GLOVES: HCPCS

## 2019-01-01 PROCEDURE — 83970 ASSAY OF PARATHORMONE: CPT

## 2019-01-01 RX ORDER — LEVOTHYROXINE SODIUM 25 UG/1
25 TABLET ORAL
Qty: 30 TAB | Refills: 3 | Status: SHIPPED | OUTPATIENT
Start: 2019-01-01 | End: 2020-01-01

## 2019-01-01 RX ORDER — FUROSEMIDE 10 MG/ML
20 INJECTION INTRAMUSCULAR; INTRAVENOUS ONCE
Status: COMPLETED | OUTPATIENT
Start: 2019-01-01 | End: 2019-01-01

## 2019-01-01 RX ADMIN — FUROSEMIDE 20 MG: 10 INJECTION, SOLUTION INTRAMUSCULAR; INTRAVENOUS at 11:27

## 2019-01-05 ENCOUNTER — APPOINTMENT (OUTPATIENT)
Dept: GENERAL RADIOLOGY | Age: 84
DRG: 291 | End: 2019-01-05
Attending: INTERNAL MEDICINE
Payer: MEDICARE

## 2019-01-05 ENCOUNTER — APPOINTMENT (OUTPATIENT)
Dept: GENERAL RADIOLOGY | Age: 84
DRG: 291 | End: 2019-01-05
Attending: EMERGENCY MEDICINE
Payer: MEDICARE

## 2019-01-05 ENCOUNTER — APPOINTMENT (OUTPATIENT)
Dept: VASCULAR SURGERY | Age: 84
DRG: 291 | End: 2019-01-05
Attending: EMERGENCY MEDICINE
Payer: MEDICARE

## 2019-01-05 ENCOUNTER — HOSPITAL ENCOUNTER (INPATIENT)
Age: 84
LOS: 3 days | Discharge: NURSING, REHAB OR LONG-TERM CARE | DRG: 291 | End: 2019-01-09
Attending: EMERGENCY MEDICINE | Admitting: INTERNAL MEDICINE
Payer: MEDICARE

## 2019-01-05 DIAGNOSIS — N19 UREMIA: ICD-10-CM

## 2019-01-05 DIAGNOSIS — N18.9 CHRONIC KIDNEY DISEASE, UNSPECIFIED CKD STAGE: ICD-10-CM

## 2019-01-05 DIAGNOSIS — R53.81 DEBILITY: Primary | ICD-10-CM

## 2019-01-05 PROBLEM — M25.552 LEFT HIP PAIN: Status: ACTIVE | Noted: 2019-01-05

## 2019-01-05 LAB
ALBUMIN SERPL-MCNC: 2.5 G/DL (ref 3.4–5)
ALBUMIN/GLOB SERPL: 0.8 {RATIO} (ref 0.8–1.7)
ALP SERPL-CCNC: 54 U/L (ref 45–117)
ALT SERPL-CCNC: 29 U/L (ref 13–56)
ANION GAP SERPL CALC-SCNC: 9 MMOL/L (ref 3–18)
APTT PPP: 26.7 SEC (ref 23–36.4)
AST SERPL-CCNC: 59 U/L (ref 15–37)
BASOPHILS # BLD: 0 K/UL (ref 0–0.1)
BASOPHILS NFR BLD: 0 % (ref 0–2)
BILIRUB SERPL-MCNC: 0.5 MG/DL (ref 0.2–1)
BNP SERPL-MCNC: 6405 PG/ML (ref 0–1800)
BUN SERPL-MCNC: 109 MG/DL (ref 7–18)
BUN/CREAT SERPL: 45 (ref 12–20)
CALCIUM SERPL-MCNC: 8.3 MG/DL (ref 8.5–10.1)
CHLORIDE SERPL-SCNC: 98 MMOL/L (ref 100–108)
CK MB CFR SERPL CALC: 0.6 % (ref 0–4)
CK MB SERPL-MCNC: 4.7 NG/ML (ref 5–25)
CK SERPL-CCNC: 798 U/L (ref 26–192)
CO2 SERPL-SCNC: 32 MMOL/L (ref 21–32)
CREAT SERPL-MCNC: 2.41 MG/DL (ref 0.6–1.3)
DIFFERENTIAL METHOD BLD: ABNORMAL
EOSINOPHIL # BLD: 0.2 K/UL (ref 0–0.4)
EOSINOPHIL NFR BLD: 2 % (ref 0–5)
ERYTHROCYTE [DISTWIDTH] IN BLOOD BY AUTOMATED COUNT: 14.3 % (ref 11.6–14.5)
GLOBULIN SER CALC-MCNC: 3 G/DL (ref 2–4)
GLUCOSE SERPL-MCNC: 90 MG/DL (ref 74–99)
HCT VFR BLD AUTO: 27.7 % (ref 35–45)
HGB BLD-MCNC: 9.1 G/DL (ref 12–16)
INR PPP: 1.1 (ref 0.8–1.2)
LYMPHOCYTES # BLD: 1.6 K/UL (ref 0.9–3.6)
LYMPHOCYTES NFR BLD: 20 % (ref 21–52)
MCH RBC QN AUTO: 29.5 PG (ref 24–34)
MCHC RBC AUTO-ENTMCNC: 32.9 G/DL (ref 31–37)
MCV RBC AUTO: 89.9 FL (ref 74–97)
MONOCYTES # BLD: 1.2 K/UL (ref 0.05–1.2)
MONOCYTES NFR BLD: 15 % (ref 3–10)
NEUTS SEG # BLD: 5.1 K/UL (ref 1.8–8)
NEUTS SEG NFR BLD: 63 % (ref 40–73)
PLATELET # BLD AUTO: 238 K/UL (ref 135–420)
PMV BLD AUTO: 8.8 FL (ref 9.2–11.8)
POTASSIUM SERPL-SCNC: 4 MMOL/L (ref 3.5–5.5)
PROT SERPL-MCNC: 5.5 G/DL (ref 6.4–8.2)
PROTHROMBIN TIME: 14 SEC (ref 11.5–15.2)
RBC # BLD AUTO: 3.08 M/UL (ref 4.2–5.3)
SODIUM SERPL-SCNC: 139 MMOL/L (ref 136–145)
TROPONIN I SERPL-MCNC: 0.17 NG/ML (ref 0–0.04)
VALPROATE SERPL-MCNC: 6 UG/ML (ref 50–100)
WBC # BLD AUTO: 8.1 K/UL (ref 4.6–13.2)

## 2019-01-05 PROCEDURE — 83880 ASSAY OF NATRIURETIC PEPTIDE: CPT

## 2019-01-05 PROCEDURE — 82550 ASSAY OF CK (CPK): CPT

## 2019-01-05 PROCEDURE — 85025 COMPLETE CBC W/AUTO DIFF WBC: CPT

## 2019-01-05 PROCEDURE — 99218 HC RM OBSERVATION: CPT

## 2019-01-05 PROCEDURE — 99285 EMERGENCY DEPT VISIT HI MDM: CPT

## 2019-01-05 PROCEDURE — 71045 X-RAY EXAM CHEST 1 VIEW: CPT

## 2019-01-05 PROCEDURE — 80053 COMPREHEN METABOLIC PANEL: CPT

## 2019-01-05 PROCEDURE — 93971 EXTREMITY STUDY: CPT

## 2019-01-05 PROCEDURE — 85730 THROMBOPLASTIN TIME PARTIAL: CPT

## 2019-01-05 PROCEDURE — 73590 X-RAY EXAM OF LOWER LEG: CPT

## 2019-01-05 PROCEDURE — 73552 X-RAY EXAM OF FEMUR 2/>: CPT

## 2019-01-05 PROCEDURE — 85610 PROTHROMBIN TIME: CPT

## 2019-01-05 PROCEDURE — 80164 ASSAY DIPROPYLACETIC ACD TOT: CPT

## 2019-01-05 RX ORDER — FUROSEMIDE 40 MG/1
40 TABLET ORAL DAILY
Status: DISCONTINUED | OUTPATIENT
Start: 2019-01-06 | End: 2019-01-05

## 2019-01-05 RX ORDER — FUROSEMIDE 10 MG/ML
40 INJECTION INTRAMUSCULAR; INTRAVENOUS
Status: DISCONTINUED | OUTPATIENT
Start: 2019-01-05 | End: 2019-01-05

## 2019-01-05 RX ORDER — HEPARIN SODIUM 5000 [USP'U]/ML
5000 INJECTION, SOLUTION INTRAVENOUS; SUBCUTANEOUS EVERY 8 HOURS
Status: DISCONTINUED | OUTPATIENT
Start: 2019-01-05 | End: 2019-01-09 | Stop reason: HOSPADM

## 2019-01-05 RX ORDER — PREDNISONE 10 MG/1
10 TABLET ORAL
Status: DISCONTINUED | OUTPATIENT
Start: 2019-01-06 | End: 2019-01-06

## 2019-01-05 RX ORDER — ISOSORBIDE MONONITRATE 60 MG/1
60 TABLET, EXTENDED RELEASE ORAL DAILY
Status: DISCONTINUED | OUTPATIENT
Start: 2019-01-06 | End: 2019-01-07

## 2019-01-05 RX ORDER — ASPIRIN 81 MG/1
81 TABLET ORAL DAILY
Status: DISCONTINUED | OUTPATIENT
Start: 2019-01-06 | End: 2019-01-09 | Stop reason: HOSPADM

## 2019-01-05 RX ORDER — AMITRIPTYLINE HYDROCHLORIDE 25 MG/1
25 TABLET, FILM COATED ORAL
Status: DISCONTINUED | OUTPATIENT
Start: 2019-01-05 | End: 2019-01-09 | Stop reason: HOSPADM

## 2019-01-05 RX ORDER — CARVEDILOL 3.12 MG/1
3.12 TABLET ORAL EVERY 12 HOURS
Status: DISCONTINUED | OUTPATIENT
Start: 2019-01-06 | End: 2019-01-09 | Stop reason: HOSPADM

## 2019-01-05 RX ORDER — MEMANTINE HYDROCHLORIDE 10 MG/1
10 TABLET ORAL DAILY
Status: DISCONTINUED | OUTPATIENT
Start: 2019-01-06 | End: 2019-01-09 | Stop reason: HOSPADM

## 2019-01-05 RX ORDER — ACETAMINOPHEN 325 MG/1
650 TABLET ORAL
Status: DISCONTINUED | OUTPATIENT
Start: 2019-01-05 | End: 2019-01-09 | Stop reason: HOSPADM

## 2019-01-05 RX ORDER — LIDOCAINE 50 MG/G
OINTMENT TOPICAL AS NEEDED
Status: DISCONTINUED | OUTPATIENT
Start: 2019-01-05 | End: 2019-01-09 | Stop reason: HOSPADM

## 2019-01-05 RX ORDER — FUROSEMIDE 10 MG/ML
20 INJECTION INTRAMUSCULAR; INTRAVENOUS 2 TIMES DAILY
Status: DISCONTINUED | OUTPATIENT
Start: 2019-01-05 | End: 2019-01-08

## 2019-01-05 RX ORDER — MORPHINE SULFATE 4 MG/ML
3 INJECTION INTRAVENOUS
Status: DISCONTINUED | OUTPATIENT
Start: 2019-01-05 | End: 2019-01-07

## 2019-01-05 RX ORDER — AMIODARONE HYDROCHLORIDE 200 MG/1
200 TABLET ORAL DAILY
Status: DISCONTINUED | OUTPATIENT
Start: 2019-01-05 | End: 2019-01-09 | Stop reason: HOSPADM

## 2019-01-05 NOTE — ED NOTES
Healthy choice meal provided to the pt. Pts son at bedside, pt resting in bed no current complaints at this time.

## 2019-01-05 NOTE — ED NOTES
Per EMS, family is on the way. Family is requesting that patient be placed into long term care facility.

## 2019-01-05 NOTE — ED NOTES
TRANSFER - OUT REPORT: 
 
Verbal report given to Judy Ferrara  on [place]  being transferred to [facility] for routine progression of care Report consisted of patients Situation, Background, Assessment and  
Recommendations(SBAR). Information from the following report(s) ED Summary was reviewed with the receiving nurse. Lines:  
Peripheral IV 01/05/19 Left Hand (Active) Site Assessment Clean, dry, & intact 1/5/2019  1:57 PM  
Phlebitis Assessment 0 1/5/2019  1:57 PM  
Infiltration Assessment 0 1/5/2019  1:57 PM  
Dressing Status Clean, dry, & intact 1/5/2019  1:57 PM  
Dressing Type Transparent 1/5/2019  1:57 PM  
Hub Color/Line Status Pink 1/5/2019  1:57 PM  
  
 
Opportunity for questions and clarification was provided. Patient transported with: 
 Monitor Registered Nurse

## 2019-01-05 NOTE — ED NOTES
Pt arrives alert to self. Pt's son at bedside reports this is baseline as the pt has dementia. Pt was recently discharged from hospital for CHF and was recommended to go to rehab pts son states that \"we ,made a mistake and didn't take her there but we will this time\". Pt has swelling to R lower leg x 4 days. pt son states she cant bear weight denies any falls. Pt has bruising to r knee and a hard spot to lower R chin. No obvious deformities that aren't normal to her arthritis per pts son. Pt has not had her morning medication.

## 2019-01-05 NOTE — ED TRIAGE NOTES
Pt arrived via EMS c/o bilateral leg swelling, per EMS pt was recently discharged for fluid retention in legs, hx of dementia.

## 2019-01-05 NOTE — ED PROVIDER NOTES
EMERGENCY DEPARTMENT HISTORY AND PHYSICAL EXAM 
 
11:11 AM 
 
 
Date: 1/5/2019 Patient Name: [facility] History of Presenting Illness Chief Complaint Patient presents with  Leg Swelling History Provided By: Wesley Hunt Chief Complaint: leg swelling Duration: 4 days Timing:  Worsening Location: bilateral legs Quality: swelling Severity: n/a Modifying Factors: patient discharged 1 week ago and was supposed to go to rehab afterwards but family did not bring her. Associated Symptoms: unable to bear weight on bilateral legs without crying in pain. Right hand swelling that son states has had since inpatient stay last week. Son denies new shortness of breath. Additional History (Context): [place] [facility] is [facility] 80 y.o. female arthritis, CHF, HTN, Lupus presenting to the ED for the evaluation of constant bilateral leg swelling that began 4 days ago and has been worsening since. Patient's son explains that the patient was inpatient and discharged last week. Patient was supposed to go to rehab afterwards but the son explains that they decided as a family not bring her. He further notes that 2 days later, patient had developed bilateral leg swelling and was unable to bear weight on her leg without crying in pain. Since discharge from inpatient, patient has been unable to ambulate. He also notes that the patient had some right hand swelling since inpatient stay last week from pulling out her IV's. Sob also denies any new shortness of breath. Home Health has been following up with the patient since her discharge and they informed the family to bring the patient to the ED for further evaluation. No recent falls or injury. No other complaints or concerns at this time. PCP: Franky Tijerina MD 
 
Current Outpatient Medications Medication Sig Dispense Refill  predniSONE (DELTASONE) 10 mg tablet 4 tabs po daily with meals for 6 days then 1 tab po daily for 6 days then half tab po daily until discontinued 50 Tab 0  carvedilol (COREG) 3.125 mg tablet Take 1 Tab by mouth every twelve (12) hours. 60 Tab 0  
 acetaminophen (TYLENOL ARTHRITIS PAIN) 650 mg TbER Take 1 Tab by mouth every eight (8) hours. 15 Tab 0  
 lidocaine (XYLOCAINE) 4 % topical cream Apply  to affected area three (3) times daily as needed for Pain. 15 g 0  
 amiodarone (CORDARONE) 200 mg tablet TAKE ONE TABLET EVERY DAY (MAKE AN APPT) 30 Tab 5  furosemide (LASIX) 40 mg tablet TAKE ONE TABLET TWO TIMES A DAY 60 Tab 5  
 isosorbide mononitrate ER (IMDUR) 60 mg CR tablet TAKE ONE TABLET EVERY MORNING 30 Tab 6  
 memantine (NAMENDA) 10 mg tablet  amitriptyline (ELAVIL) 25 mg tablet Take  by mouth nightly.  aspirin 81 mg tablet Take 81 mg by mouth daily. Past History Past Medical History: 
Past Medical History:  
Diagnosis Date  Acute on chronic diastolic heart failure ([address]  Arthritis  Benign hypertensive heart disease without heart failure Better controlled, stable  Chronic diastolic heart failure ([address] Breathing and edema is improving  Congestive heart failure ([address]  HTN (hypertension)  Hypercholesteremia  Lupus (systemic lupus erythematosus) ([address] 6/18/2014  
 followed by dr Ned Peterson  Obesity, unspecified Patient has weight loss Discussed diet ad fluid restriction  Other and unspecified hyperlipidemia F/u per pmd  
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 
Past Surgical History:  
Procedure Laterality Date  HX HYSTERECTOMY  PACEMAKER PROCEDURE Family History: 
Family History Problem Relation Age of Onset  Arrhythmia Neg Hx  Asthma Neg Hx  Clotting Disorder Neg Hx  Fainting Neg Hx   
 Heart Attack Neg Hx  High Cholesterol Neg Hx  Pacemaker Neg Hx  Stroke Neg Hx Social History: 
Social History Tobacco Use  Smoking status: Never Smoker  Smokeless tobacco: Never Used Substance Use Topics  Alcohol use: No  
 Drug use: No  
 
 
Allergies: 
No Known Allergies Review of Systems Review of Systems Unable to perform ROS: Acuity of condition Cardiovascular: Positive for leg swelling. Musculoskeletal: Positive for arthralgias, gait problem and joint swelling. Physical Exam  
 
Visit Vitals BP 98/55 (BP 1 Location: Left arm) Pulse 71 Temp 97.6 °F (36.4 °C) Resp 11 SpO2 100% Physical Exam  
Constitutional: She is oriented to person, place, and time. She appears well-developed and well-nourished. No distress. Tearful HENT:  
Head: Normocephalic and atraumatic. Right Ear: External ear normal.  
Left Ear: External ear normal.  
Nose: Nose normal.  
Mouth/Throat: Oropharynx is clear and moist.  
Eyes: Conjunctivae and EOM are normal. Pupils are equal, round, and reactive to light. No scleral icterus. Neck: Normal range of motion. Neck supple. No JVD present. No tracheal deviation present. No thyromegaly present. Cardiovascular: Normal rate, regular rhythm, normal heart sounds and intact distal pulses. Exam reveals no gallop and no friction rub. No murmur heard. Pacer noted Pulmonary/Chest: Effort normal and breath sounds normal. She exhibits no tenderness. Abdominal: Soft. Bowel sounds are normal. She exhibits no distension. There is no tenderness. There is no rebound and no guarding. Musculoskeletal: Normal range of motion. She exhibits tenderness. She exhibits no edema. R wrist with pain and swelling, mild R forearm pain, R LE with mild shortening, diffuse edema, 3 bruises to the anterior tibial area, calf and thigh pain noted, distal pulses are equal, limited ROM due to pain Lymphadenopathy:  
  She has no cervical adenopathy. Neurological: She is alert and oriented to person, place, and time.  No cranial nerve deficit. Coordination normal.  
No sensory loss, Gait normal, Motor 5/5 Skin: Skin is warm and dry. Psychiatric: She has a normal mood and affect. Her behavior is normal. Judgment and thought content normal.  
Nursing note and vitals reviewed. Diagnostic Study Results Labs - Recent Results (from the past 12 hour(s)) CBC WITH AUTOMATED DIFF Collection Time: 01/05/19  1:32 PM  
Result Value Ref Range WBC 8.1 4.6 - 13.2 K/uL  
 RBC 3.08 (L) 4.20 - 5.30 M/uL HGB 9.1 (L) 12.0 - 16.0 g/dL HCT 27.7 (L) 35.0 - 45.0 % MCV 89.9 74.0 - 97.0 FL  
 MCH 29.5 24.0 - 34.0 PG  
 MCHC 32.9 31.0 - 37.0 g/dL  
 RDW 14.3 11.6 - 14.5 % PLATELET 180 996 - 973 K/uL MPV 8.8 (L) 9.2 - 11.8 FL  
 NEUTROPHILS 63 40 - 73 % LYMPHOCYTES 20 (L) 21 - 52 % MONOCYTES 15 (H) 3 - 10 % EOSINOPHILS 2 0 - 5 % BASOPHILS 0 0 - 2 %  
 ABS. NEUTROPHILS 5.1 1.8 - 8.0 K/UL  
 ABS. LYMPHOCYTES 1.6 0.9 - 3.6 K/UL  
 ABS. MONOCYTES 1.2 0.05 - 1.2 K/UL  
 ABS. EOSINOPHILS 0.2 0.0 - 0.4 K/UL  
 ABS. BASOPHILS 0.0 0.0 - 0.1 K/UL  
 DF AUTOMATED METABOLIC PANEL, COMPREHENSIVE Collection Time: 01/05/19  1:32 PM  
Result Value Ref Range Sodium 139 136 - 145 mmol/L Potassium 4.0 3.5 - 5.5 mmol/L Chloride 98 (L) 100 - 108 mmol/L  
 CO2 32 21 - 32 mmol/L Anion gap 9 3.0 - 18 mmol/L Glucose 90 74 - 99 mg/dL  (H) 7.0 - 18 MG/DL Creatinine 2.41 (H) 0.6 - 1.3 MG/DL  
 BUN/Creatinine ratio 45 (H) 12 - 20 GFR est AA 23 (L) >60 ml/min/1.73m2 GFR est non-AA 19 (L) >60 ml/min/1.73m2 Calcium 8.3 (L) 8.5 - 10.1 MG/DL Bilirubin, total 0.5 0.2 - 1.0 MG/DL  
 ALT (SGPT) 29 13 - 56 U/L  
 AST (SGOT) 59 (H) 15 - 37 U/L Alk. phosphatase 54 45 - 117 U/L Protein, total 5.5 (L) 6.4 - 8.2 g/dL Albumin 2.5 (L) 3.4 - 5.0 g/dL Globulin 3.0 2.0 - 4.0 g/dL A-G Ratio 0.8 0.8 - 1.7 PROTHROMBIN TIME + INR Collection Time: 01/05/19  1:32 PM  
Result Value Ref Range Prothrombin time 14.0 11.5 - 15.2 sec INR 1.1 0.8 - 1.2 PTT Collection Time: 01/05/19  1:32 PM  
Result Value Ref Range aPTT 26.7 23.0 - 36.4 SEC NT-PRO BNP Collection Time: 01/05/19  1:32 PM  
Result Value Ref Range NT pro-BNP 6,405 (H) 0 - 1,800 PG/ML  
CARDIAC PANEL,(CK, CKMB & TROPONIN) Collection Time: 01/05/19  1:32 PM  
Result Value Ref Range  (H) 26 - 192 U/L  
 CK - MB 4.7 (H) <3.6 ng/ml CK-MB Index 0.6 0.0 - 4.0 % Troponin-I, QT 0.17 (H) 0.0 - 0.045 NG/ML  
VALPROIC ACID Collection Time: 01/05/19  1:32 PM  
Result Value Ref Range Valproic acid 6 (L) 50 - 100 ug/ml Radiologic Studies -  
XR TIB/FIB RT Final Result IMPRESSION:  
1. No fracture or dislocation. 2.  Diffuse soft tissue edema. Severe right knee osteoarthritis XR FEMUR RT 2 VS  
Final Result IMPRESSION:  
1. Severe right knee osteoarthritis. XR CHEST SNGL V Final Result IMPRESSION:   
  
Interval increase in aeration at the left lung base with residual opacity as  
discussed above. Pulmonary vascular congestion. Cardiomegaly. CXR interpreted by EP at 1:51 PM and showed cardiomegaly. Cannot rule out effusion in the left lower lobe. XR Femur interpreted by EP at 1:51 PM and showed no fracture. Extensive DJD in the knee. XR Tib/Fib interpreted by EP at 1:51 PM and showed DJD but no fracture. 3:03 PM - Patient's vascular study negative. Medical Decision Making I am the first provider for this patient. I reviewed the vital signs, available nursing notes, past medical history, past surgical history, family history and social history. Vital Signs-Reviewed the patient's vital signs. Pulse Oximetry Analysis -  100% RA (Interpretation) non-hypoxic Cardiac Monitor: 
Rate: 71 bpm  
Rhythm: regular Records Reviewed: Nursing Notes (Time of Review: 11:11 AM) Provider Notes (Medical Decision Making): RICCO 
 Number of Diagnoses or Management Options Diagnosis management comments: Pt is an 81yo female with a hx with ARIEL, HTN, R wrist severe OA, SLE, anemia, systolic heart failure, dementia presents one week after discharge with worsening pain and edema. Pt cannot ambulate and the family feels that rehab is needed although refused after discharge. Pt has R sided edema and suspect the wrist is chronic but the R leg is diffusely swollen with bruises. Will evaluate for trauma vs DVT vs dependent edema due to laying on one side. Will trend her renal function, cardiac labs, CXR, duplex R, XR the R leg then reevaluate. Jaclyn Parrish,  12:02 PM 
 
 
 
 
ED Course: Progress Notes, Reevaluation, and Consults: 
12:18 PM - Consult:  Discussed care with . Standard discussion; including history of patients chief complaint, available diagnostic results, and treatment course. Will look into the patient's chart and see if she qualifies for placement. 2:45 PM - Patient was seen by . Patient does qualify for SNF but needs repeat PT evaluation. Trying to get in touch with them now and see if the evaluation can be done the ED. 2:55 PM - Case manger states that there is no PT today so OT will come to evaluate the patient now. 3:37 PM - Patient's labs are essentially unchanged. Patient has persistent renal insufficiency. No evidence of DVT. Cardiac labs are at her baseline. Will give evaluation by OT for possible admission to SNF.  
 
3:44 PM - Consult:  Discussed care with Dr. Diana Asif. Standard discussion; including history of patients chief complaint, available diagnostic results, and treatment course. States that the patient is not a good candidate for hemodialysis. Recommends not changing any medication as long as she is not short of breath or markedly overloaded.  States that just keep medications as they are and he will continue to follow up with patient for her renal function as an out patient. Pt evaluation could not be done and will not be done until the AM.  Will discuss admission with the hospitalist. Manuel HollowayDO camacho 4:49 PM 
 
4:52 PM - Consult:  Discussed care with Dr. Wilmar Xiao, Hospitalist. Standard discussion; including history of patients chief complaint, available diagnostic results, and treatment course. Will accept the patient for admission to observation. Diagnosis Clinical Impression: 1. Debility 2. Uremia 3. Chronic kidney disease, unspecified CKD stage Disposition: Admit Follow-up Information None Medication List  
  
ASK your doctor about these medications   
acetaminophen 650 mg Tber Commonly known as:  TYLENOL ARTHRITIS PAIN Take 1 Tab by mouth every eight (8) hours. amiodarone 200 mg tablet Commonly known as:  CORDARONE 
TAKE ONE TABLET EVERY DAY (MAKE AN APPT) 
  
amitriptyline 25 mg tablet Commonly known as:  ELAVIL 
  
aspirin 81 mg tablet 
  
carvedilol 3.125 mg tablet Commonly known as:  Jerl Specter Take 1 Tab by mouth every twelve (12) hours. furosemide 40 mg tablet Commonly known as:  LASIX TAKE ONE TABLET TWO TIMES A DAY 
  
isosorbide mononitrate ER 60 mg CR tablet Commonly known as:  IMDUR 
TAKE ONE TABLET EVERY MORNING 
  
lidocaine 4 % topical cream 
Commonly known as:  XYLOCAINE Apply  to affected area three (3) times daily as needed for Pain. memantine 10 mg tablet Commonly known as:  NAMENDA 
  
predniSONE 10 mg tablet Commonly known as:  DELTASONE 
4 tabs po daily with meals for 6 days then 1 tab po daily for 6 days then half tab po daily until discontinued 
  
  
 
_______________________________ Attestations: 
Scribe Attestation Nory Vázquez acting as a scribe for and in the presence of Lenore Mortensen,      
January 05, 2019 at 11:11 AM 
    
Provider Attestation: I personally performed the services described in the documentation, reviewed the documentation, as recorded by the scribe in my presence, and it accurately and completely records my words and actions. January 05, 2019 at [address], DO  
_______________________________

## 2019-01-05 NOTE — PROGRESS NOTES
This writer spoke with patient's sonRuben at [phone] who wants his mother to go to [facility]. He does not want her to go to [facility]. He said the [facility] PT also recommended SNF. [facility] for [facility] placed on chart. Message left with Camilo's on call nurse Sammy who stated Elysia Hollis is doing admissions and she will contact her. Jemal Price did not answer her cell of [phone] and VM was not working.

## 2019-01-06 LAB
ALBUMIN SERPL-MCNC: 2.4 G/DL (ref 3.4–5)
ALBUMIN/GLOB SERPL: 0.7 {RATIO} (ref 0.8–1.7)
ALP SERPL-CCNC: 51 U/L (ref 45–117)
ALT SERPL-CCNC: 25 U/L (ref 13–56)
ANION GAP SERPL CALC-SCNC: 5 MMOL/L (ref 3–18)
AST SERPL-CCNC: 42 U/L (ref 15–37)
BILIRUB SERPL-MCNC: 0.5 MG/DL (ref 0.2–1)
BUN SERPL-MCNC: 102 MG/DL (ref 7–18)
BUN/CREAT SERPL: 45 (ref 12–20)
CALCIUM SERPL-MCNC: 8.3 MG/DL (ref 8.5–10.1)
CHLORIDE SERPL-SCNC: 99 MMOL/L (ref 100–108)
CO2 SERPL-SCNC: 35 MMOL/L (ref 21–32)
CREAT SERPL-MCNC: 2.26 MG/DL (ref 0.6–1.3)
GLOBULIN SER CALC-MCNC: 3.4 G/DL (ref 2–4)
GLUCOSE SERPL-MCNC: 101 MG/DL (ref 74–99)
POTASSIUM SERPL-SCNC: 2.9 MMOL/L (ref 3.5–5.5)
PROT SERPL-MCNC: 5.8 G/DL (ref 6.4–8.2)
SODIUM SERPL-SCNC: 139 MMOL/L (ref 136–145)
TROPONIN I SERPL-MCNC: 0.25 NG/ML (ref 0–0.04)
TROPONIN I SERPL-MCNC: 0.25 NG/ML (ref 0–0.04)

## 2019-01-06 PROCEDURE — 74011250637 HC RX REV CODE- 250/637: Performed by: INTERNAL MEDICINE

## 2019-01-06 PROCEDURE — 74011250637 HC RX REV CODE- 250/637: Performed by: HOSPITALIST

## 2019-01-06 PROCEDURE — 74011636637 HC RX REV CODE- 636/637: Performed by: NURSE PRACTITIONER

## 2019-01-06 PROCEDURE — 65270000029 HC RM PRIVATE

## 2019-01-06 PROCEDURE — 84484 ASSAY OF TROPONIN QUANT: CPT

## 2019-01-06 PROCEDURE — 99218 HC RM OBSERVATION: CPT

## 2019-01-06 PROCEDURE — 80053 COMPREHEN METABOLIC PANEL: CPT

## 2019-01-06 PROCEDURE — 74011250636 HC RX REV CODE- 250/636: Performed by: INTERNAL MEDICINE

## 2019-01-06 PROCEDURE — 74011250637 HC RX REV CODE- 250/637: Performed by: NURSE PRACTITIONER

## 2019-01-06 PROCEDURE — 36415 COLL VENOUS BLD VENIPUNCTURE: CPT

## 2019-01-06 PROCEDURE — 74011250636 HC RX REV CODE- 250/636: Performed by: HOSPITALIST

## 2019-01-06 PROCEDURE — 74011000258 HC RX REV CODE- 258: Performed by: HOSPITALIST

## 2019-01-06 RX ORDER — FAMOTIDINE 20 MG/1
20 TABLET, FILM COATED ORAL 2 TIMES DAILY
Status: DISCONTINUED | OUTPATIENT
Start: 2019-01-06 | End: 2019-01-07

## 2019-01-06 RX ORDER — PREDNISONE 20 MG/1
20 TABLET ORAL 2 TIMES DAILY WITH MEALS
Status: DISCONTINUED | OUTPATIENT
Start: 2019-01-06 | End: 2019-01-09

## 2019-01-06 RX ORDER — POTASSIUM CHLORIDE 20 MEQ/1
20 TABLET, EXTENDED RELEASE ORAL 2 TIMES DAILY
Status: DISCONTINUED | OUTPATIENT
Start: 2019-01-06 | End: 2019-01-09 | Stop reason: HOSPADM

## 2019-01-06 RX ORDER — OXYCODONE AND ACETAMINOPHEN 5; 325 MG/1; MG/1
1 TABLET ORAL
Status: DISCONTINUED | OUTPATIENT
Start: 2019-01-06 | End: 2019-01-07

## 2019-01-06 RX ORDER — DOCUSATE SODIUM 100 MG/1
100 CAPSULE, LIQUID FILLED ORAL 2 TIMES DAILY
Status: DISCONTINUED | OUTPATIENT
Start: 2019-01-06 | End: 2019-01-09 | Stop reason: HOSPADM

## 2019-01-06 RX ORDER — POTASSIUM CHLORIDE 1.5 G/1.77G
40 POWDER, FOR SOLUTION ORAL 2 TIMES DAILY WITH MEALS
Status: DISCONTINUED | OUTPATIENT
Start: 2019-01-06 | End: 2019-01-06

## 2019-01-06 RX ADMIN — PREDNISONE 20 MG: 20 TABLET ORAL at 19:16

## 2019-01-06 RX ADMIN — CARVEDILOL 3.12 MG: 3.12 TABLET, FILM COATED ORAL at 23:36

## 2019-01-06 RX ADMIN — POTASSIUM CHLORIDE: 2 INJECTION, SOLUTION, CONCENTRATE INTRAVENOUS at 07:39

## 2019-01-06 RX ADMIN — FAMOTIDINE 20 MG: 20 TABLET, FILM COATED ORAL at 12:16

## 2019-01-06 RX ADMIN — AMITRIPTYLINE HYDROCHLORIDE 25 MG: 25 TABLET, FILM COATED ORAL at 23:36

## 2019-01-06 RX ADMIN — POTASSIUM CHLORIDE 20 MEQ: 20 TABLET, EXTENDED RELEASE ORAL at 19:16

## 2019-01-06 RX ADMIN — FAMOTIDINE 20 MG: 20 TABLET, FILM COATED ORAL at 19:16

## 2019-01-06 RX ADMIN — CARVEDILOL 3.12 MG: 3.12 TABLET, FILM COATED ORAL at 09:42

## 2019-01-06 RX ADMIN — ACETAMINOPHEN 650 MG: 325 TABLET ORAL at 00:58

## 2019-01-06 RX ADMIN — POTASSIUM CHLORIDE 40 MEQ: 1.5 POWDER, FOR SOLUTION ORAL at 04:03

## 2019-01-06 RX ADMIN — POTASSIUM CHLORIDE: 2 INJECTION, SOLUTION, CONCENTRATE INTRAVENOUS at 05:28

## 2019-01-06 RX ADMIN — AMIODARONE HYDROCHLORIDE 200 MG: 200 TABLET ORAL at 00:59

## 2019-01-06 RX ADMIN — HEPARIN SODIUM 5000 UNITS: 5000 INJECTION INTRAVENOUS; SUBCUTANEOUS at 15:02

## 2019-01-06 RX ADMIN — FUROSEMIDE 20 MG: 10 INJECTION, SOLUTION INTRAMUSCULAR; INTRAVENOUS at 09:42

## 2019-01-06 RX ADMIN — ISOSORBIDE MONONITRATE 60 MG: 60 TABLET, EXTENDED RELEASE ORAL at 09:42

## 2019-01-06 RX ADMIN — HEPARIN SODIUM 5000 UNITS: 5000 INJECTION INTRAVENOUS; SUBCUTANEOUS at 01:08

## 2019-01-06 RX ADMIN — HEPARIN SODIUM 5000 UNITS: 5000 INJECTION INTRAVENOUS; SUBCUTANEOUS at 21:37

## 2019-01-06 RX ADMIN — OXYCODONE AND ACETAMINOPHEN 1 TABLET: 5; 325 TABLET ORAL at 23:36

## 2019-01-06 RX ADMIN — POTASSIUM CHLORIDE 20 MEQ: 20 TABLET, EXTENDED RELEASE ORAL at 12:16

## 2019-01-06 RX ADMIN — ASPIRIN 81 MG: 81 TABLET, COATED ORAL at 09:42

## 2019-01-06 RX ADMIN — FUROSEMIDE 20 MG: 10 INJECTION, SOLUTION INTRAMUSCULAR; INTRAVENOUS at 01:00

## 2019-01-06 RX ADMIN — MEMANTINE 10 MG: 10 TABLET ORAL at 09:43

## 2019-01-06 RX ADMIN — AMITRIPTYLINE HYDROCHLORIDE 25 MG: 25 TABLET, FILM COATED ORAL at 00:59

## 2019-01-06 RX ADMIN — FUROSEMIDE 20 MG: 10 INJECTION, SOLUTION INTRAMUSCULAR; INTRAVENOUS at 19:16

## 2019-01-06 RX ADMIN — POTASSIUM CHLORIDE: 2 INJECTION, SOLUTION, CONCENTRATE INTRAVENOUS at 06:32

## 2019-01-06 RX ADMIN — DOCUSATE SODIUM 100 MG: 100 CAPSULE, LIQUID FILLED ORAL at 19:16

## 2019-01-06 NOTE — H&P
History & Physical 
 
Patient: Kirsten Chandler MRN: [id number]  CSN: [id number] YOB: 1931  Age: 80 y.o. Sex: female DOA: 1/5/2019 Chief Complaint Patient presents with  Leg Swelling HPI:  
 
Kirsten Chandler is a 80 y.o. female with h/o arthritis, CHF, HTN, Lupus, dementia presenting to the ED for the evaluation of constant bilateral leg swelling that began 4 days ago and has been worsening since. Patient's son explains that the patient was inpatient and discharged last week. Patient was supposed to go to rehab afterwards but the son explains that they decided as a family not bring her. He further notes that 2 days later, patient had developed bilateral leg swelling and was unable to bear weight on her leg without crying in pain. Since discharge from inpatient, patient has been unable to ambulate. He also notes that the patient had some right hand swelling since inpatient stay last week from pulling out her IV's. Son also denies any new shortness of breath. Home Health has been following up with the patient since her discharge and they informed the family to bring the patient to the ED for further evaluation. No recent falls or injury. On my examination I found the left leg to be externally rotated. She had pain on the movement of left leg. Past Medical History:  
Diagnosis Date  Acute on chronic diastolic heart failure ([address]  Arthritis  Benign hypertensive heart disease without heart failure Better controlled, stable  Chronic diastolic heart failure ([address] Breathing and edema is improving  Congestive heart failure ([address]  HTN (hypertension)  Hypercholesteremia  Lupus (systemic lupus erythematosus) ([address] 6/18/2014  
 followed by dr Cleo Samano  Obesity, unspecified Patient has weight loss Discussed diet ad fluid restriction  Other and unspecified hyperlipidemia  F/u per pmd  
  Tricuspid valve disorders, specified as nonrheumatic 2014  
 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  
Procedure Laterality Date  HX HYSTERECTOMY  PACEMAKER PROCEDURE Family History Problem Relation Age of Onset  Arrhythmia Neg Hx  Asthma Neg Hx  Clotting Disorder Neg Hx  Fainting Neg Hx   
 Heart Attack Neg Hx  High Cholesterol Neg Hx  Pacemaker Neg Hx  Stroke Neg Hx Social History Socioeconomic History  Marital status:  Spouse name: Not on file  Number of children: Not on file  Years of education: Not on file  Highest education level: Not on file Tobacco Use  Smoking status: Never Smoker  Smokeless tobacco: Never Used Substance and Sexual Activity  Alcohol use: No  
 Drug use: No  
 
 
Prior to Admission medications Medication Sig Start Date End Date Taking? Authorizing Provider  
predniSONE (DELTASONE) 10 mg tablet 4 tabs po daily with meals for 6 days then 1 tab po daily for 6 days then half tab po daily until discontinued 18   Twana Apgar, MD  
carvedilol (COREG) 3.125 mg tablet Take 1 Tab by mouth every twelve (12) hours. 18   Twana Apgar, MD  
acetaminophen (TYLENOL ARTHRITIS PAIN) 650 mg TbER Take 1 Tab by mouth every eight (8) hours. 18   Scooby METCALF PA-C  
lidocaine (XYLOCAINE) 4 % topical cream Apply  to affected area three (3) times daily as needed for Pain. 18   Scooby METCALF PA-C  
amiodarone (CORDARONE) 200 mg tablet TAKE ONE TABLET EVERY DAY (MAKE AN APPT) 18   Becki Roca NP  
furosemide (LASIX) 40 mg tablet TAKE ONE TABLET TWO TIMES A DAY 10/15/18   Afua Velasquez MD  
isosorbide mononitrate ER (IMDUR) 60 mg CR tablet TAKE ONE TABLET EVERY MORNING 10/4/18   Afua Velasquez MD  
memantine [facility]) 10 mg tablet  7/10/18   Provider, Historical  
amitriptyline (ELAVIL) 25 mg tablet Take  by mouth nightly.     Provider, Historical  
aspirin 81 mg tablet Take 81 mg by mouth daily. Provider, Historical  
 
 
No Known Allergies Review of Systems Unable to perform due to dementia. Physical Exam:  
 
Physical Exam: 
Visit Vitals /59 (BP 1 Location: Left arm, BP Patient Position: At rest) Pulse 71 Temp 97.6 °F (36.4 °C) Resp 18 SpO2 98% O2 Device: Room air Temp (24hrs), Av.7 °F (36.5 °C), Min:97.6 °F (36.4 °C), Max:98 °F (36.7 °C) No intake/output data recorded. No intake/output data recorded. General:  Alert, confused, in distress, appears stated age. Head:  Normocephalic, without obvious abnormality, atraumatic. Eyes:  Conjunctivae/corneas clear. PERRL, EOMs intact. Nose: Nares normal. No drainage or sinus tenderness. Throat: Lips, mucosa, and tongue normal. Teeth and gums normal.  
Neck: Supple, symmetrical, trachea midline, no adenopathy, thyroid: no enlargement/tenderness/nodules, no carotid bruit and no JVD. Back:   ROM normal. No CVA tenderness. Lungs:   Clear to auscultation bilaterally. Chest wall:  No tenderness or deformity. Heart:  Regular rate and rhythm, S1, S2 normal, no murmur, click, rub or gallop. Abdomen: Soft, non-tender. Bowel sounds normal. No masses,  No organomegaly. Extremities: Extremities showed left leg externally rotated. Gillie Drones Pulses: 2+ and symmetric all extremities. Skin: Skin color, texture, turgor normal. No rashes or lesions Neurologic: CNII-XII intact. No focal motor or sensory deficit. Labs Reviewed: 
 
Recent Results (from the past 24 hour(s)) CBC WITH AUTOMATED DIFF Collection Time: 19  1:32 PM  
Result Value Ref Range WBC 8.1 4.6 - 13.2 K/uL  
 RBC 3.08 (L) 4.20 - 5.30 M/uL HGB 9.1 (L) 12.0 - 16.0 g/dL HCT 27.7 (L) 35.0 - 45.0 % MCV 89.9 74.0 - 97.0 FL  
 MCH 29.5 24.0 - 34.0 PG  
 MCHC 32.9 31.0 - 37.0 g/dL  
 RDW 14.3 11.6 - 14.5 % PLATELET 009 391 - 153 K/uL  MPV 8.8 (L) 9.2 - 11.8 FL  
 NEUTROPHILS 63 40 - 73 % LYMPHOCYTES 20 (L) 21 - 52 % MONOCYTES 15 (H) 3 - 10 % EOSINOPHILS 2 0 - 5 % BASOPHILS 0 0 - 2 %  
 ABS. NEUTROPHILS 5.1 1.8 - 8.0 K/UL  
 ABS. LYMPHOCYTES 1.6 0.9 - 3.6 K/UL  
 ABS. MONOCYTES 1.2 0.05 - 1.2 K/UL  
 ABS. EOSINOPHILS 0.2 0.0 - 0.4 K/UL  
 ABS. BASOPHILS 0.0 0.0 - 0.1 K/UL  
 DF AUTOMATED METABOLIC PANEL, COMPREHENSIVE Collection Time: 01/05/19  1:32 PM  
Result Value Ref Range Sodium 139 136 - 145 mmol/L Potassium 4.0 3.5 - 5.5 mmol/L Chloride 98 (L) 100 - 108 mmol/L  
 CO2 32 21 - 32 mmol/L Anion gap 9 3.0 - 18 mmol/L Glucose 90 74 - 99 mg/dL  (H) 7.0 - 18 MG/DL Creatinine 2.41 (H) 0.6 - 1.3 MG/DL  
 BUN/Creatinine ratio 45 (H) 12 - 20 GFR est AA 23 (L) >60 ml/min/1.73m2 GFR est non-AA 19 (L) >60 ml/min/1.73m2 Calcium 8.3 (L) 8.5 - 10.1 MG/DL Bilirubin, total 0.5 0.2 - 1.0 MG/DL  
 ALT (SGPT) 29 13 - 56 U/L  
 AST (SGOT) 59 (H) 15 - 37 U/L Alk. phosphatase 54 45 - 117 U/L Protein, total 5.5 (L) 6.4 - 8.2 g/dL Albumin 2.5 (L) 3.4 - 5.0 g/dL Globulin 3.0 2.0 - 4.0 g/dL A-G Ratio 0.8 0.8 - 1.7 PROTHROMBIN TIME + INR Collection Time: 01/05/19  1:32 PM  
Result Value Ref Range Prothrombin time 14.0 11.5 - 15.2 sec INR 1.1 0.8 - 1.2 PTT Collection Time: 01/05/19  1:32 PM  
Result Value Ref Range aPTT 26.7 23.0 - 36.4 SEC NT-PRO BNP Collection Time: 01/05/19  1:32 PM  
Result Value Ref Range NT pro-BNP 6,405 (H) 0 - 1,800 PG/ML  
CARDIAC PANEL,(CK, CKMB & TROPONIN) Collection Time: 01/05/19  1:32 PM  
Result Value Ref Range  (H) 26 - 192 U/L  
 CK - MB 4.7 (H) <3.6 ng/ml CK-MB Index 0.6 0.0 - 4.0 % Troponin-I, QT 0.17 (H) 0.0 - 0.045 NG/ML  
VALPROIC ACID Collection Time: 01/05/19  1:32 PM  
Result Value Ref Range Valproic acid 6 (L) 50 - 100 ug/ml Procedures/imaging: see electronic medical records for all procedures/Xrays and details which were not copied into this note but were reviewed prior to creation of Plan Assessment/Plan Active Problems: 1. Left hip pain (1/5/2019) - check xrays. Suspicion for a fracture. - analgesia. 2. CKD- stahe III 
- nephrology consult. 3. CHF 
- diurese the patient. 4. Dementia 
- supportive care 5. Code status 
- full code 6. DVT prophylaxis 
- SQ heparin. Benitez Holt MD 
January 5, 2019

## 2019-01-06 NOTE — CONSULTS
Consult Note ADMIT DATE: 1/5/2019  CONSULT DATE: January 6, 2019 Admission diagnosis: lethargy, leg swelling Reason for Nephrology Consultation: edema  
 
  
Assessment:  
1) CKD4 ( baseline in 2 range ) ,creat at baseline, etiology HTN nephrosclerosis /ischaemic nephropathy , 
Volume status does looks ok, EF 30% , lasix 20 mg IV BID  
encourage po intake 2) edema , looks minimal , does have rt foot and ankle swelling which seems localized, may need to be further evaluated per IM 3) HTN , avoid extremes of BP , cardiology adding low dose coreg 4) hypokalemia ,replace and  recheck 5) metabolic alkalosis ,contraction 7) anemia , check iron  
  
Please call with questions, 
  
Stevo Pagan MD FASN Cell [phone] Pager: [phone] 
  
  
HPI: Rick Sams is a 80 y.o. female [address] with CKD3 , HTN , CHF with EF 30% with recent admission for CHF and ARIEL ,  who presented with lethargy and leg swelling. SOn mentions that her water pills stopped working and she developed ankle swelling. He  mentions  that he noticed that his mother was having difficulty weight bearing , climbing the steps at home . Lenore Escalona Patient not able to verbalize any symptoms, does have baseline dementia  
  
 
  
Past Medical History:  
Diagnosis Date  Acute on chronic diastolic heart failure ([address]  Arthritis  Benign hypertensive heart disease without heart failure Better controlled, stable  Chronic diastolic heart failure ([address] Breathing and edema is improving  Congestive heart failure ([address]  HTN (hypertension)  Hypercholesteremia  Lupus (systemic lupus erythematosus) ([address] 6/18/2014  
 followed by dr Leonardo Liu  Obesity, unspecified Patient has weight loss Discussed diet ad fluid restriction  Other and unspecified hyperlipidemia F/u per pmd  
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  
Procedure Laterality Date  HX HYSTERECTOMY  PACEMAKER PROCEDURE Social History Socioeconomic History  Marital status:  Spouse name: Not on file  Number of children: Not on file  Years of education: Not on file  Highest education level: Not on file Social Needs  Financial resource strain: Not on file  Food insecurity - worry: Not on file  Food insecurity - inability: Not on file  Transportation needs - medical: Not on file  Transportation needs - non-medical: Not on file Occupational History  Not on file Tobacco Use  Smoking status: Never Smoker  Smokeless tobacco: Never Used Substance and Sexual Activity  Alcohol use: No  
 Drug use: No  
 Sexual activity: Not on file Other Topics Concern  Not on file Social History Narrative  Not on file Family History Problem Relation Age of Onset  Arrhythmia Neg Hx  Asthma Neg Hx  Clotting Disorder Neg Hx  Fainting Neg Hx   
 Heart Attack Neg Hx  High Cholesterol Neg Hx  Pacemaker Neg Hx  Stroke Neg Hx No Known Allergies Home Medications:  
 
Medications Prior to Admission Medication Sig  
 amitriptyline (ELAVIL) 25 mg tablet Take  by mouth nightly.  predniSONE (DELTASONE) 10 mg tablet 4 tabs po daily with meals for 6 days then 1 tab po daily for 6 days then half tab po daily until discontinued  carvedilol (COREG) 3.125 mg tablet Take 1 Tab by mouth every twelve (12) hours.  acetaminophen (TYLENOL ARTHRITIS PAIN) 650 mg TbER Take 1 Tab by mouth every eight (8) hours.  lidocaine (XYLOCAINE) 4 % topical cream Apply  to affected area three (3) times daily as needed for Pain.  amiodarone (CORDARONE) 200 mg tablet TAKE ONE TABLET EVERY DAY (MAKE AN APPT)  furosemide (LASIX) 40 mg tablet TAKE ONE TABLET TWO TIMES A DAY  isosorbide mononitrate ER (IMDUR) 60 mg CR tablet TAKE ONE TABLET EVERY MORNING  
  memantine (NAMENDA) 10 mg tablet  aspirin 81 mg tablet Take 81 mg by mouth daily. Current Inpatient Medications:  
 
Current Facility-Administered Medications Medication Dose Route Frequency  predniSONE (DELTASONE) tablet 20 mg  20 mg Oral BID WITH MEALS  famotidine (PEPCID) tablet 20 mg  20 mg Oral BID  potassium chloride (K-DUR, KLOR-CON) SR tablet 20 mEq  20 mEq Oral BID  
 oxyCODONE-acetaminophen (PERCOCET) 5-325 mg per tablet 1 Tab  1 Tab Oral Q4H PRN  
 docusate sodium (COLACE) capsule 100 mg  100 mg Oral BID  aspirin delayed-release tablet 81 mg  81 mg Oral DAILY  amitriptyline (ELAVIL) tablet 25 mg  25 mg Oral QHS  memantine (NAMENDA) tablet 10 mg  10 mg Oral DAILY  isosorbide mononitrate ER (IMDUR) tablet 60 mg  60 mg Oral DAILY  amiodarone (CORDARONE) tablet 200 mg  200 mg Oral DAILY  acetaminophen (TYLENOL) tablet 650 mg  650 mg Oral Q8H PRN  
 lidocaine (XYLOCAINE) 5 % ointment   Topical PRN  
 carvedilol (COREG) tablet 3.125 mg  3.125 mg Oral Q12H  
 morphine injection 3 mg  3 mg IntraVENous Q4H PRN  
 heparin (porcine) injection 5,000 Units  5,000 Units SubCUTAneous Q8H  
 furosemide (LASIX) injection 20 mg  20 mg IntraVENous BID Review of Systems:  
Could not be obtained as patient nonverbal  
 
Physical Assessment:  
 
Vitals:  
 01/06/19 0342 01/06/19 0733 01/06/19 1203 01/06/19 1617 BP: 121/65 107/66 96/59 92/55 Pulse: 75 99 72 70 Resp: 18 18 18 19 Temp: 98.3 °F (36.8 °C) 97.4 °F (36.3 °C) 97.4 °F (36.3 °C) 97.2 °F (36.2 °C) SpO2: 96% 99% Weight:      
 
Last 3 Recorded Weights in this Encounter 01/06/19 0235 Weight: 70.3 kg (155 lb) Admission weight: Weight: 70.3 kg (155 lb) (01/06/19 0235) No intake or output data in the 24 hours ending 01/06/19 1744 NAD HEENT: dry Neck: no cervical lymphadenopathy or thyromegaly. Lungs: good air entry, clear to auscultation bilaterally. Cardiovascular system: S1, S2, regular rate and rhythm Abdomen: soft, non tender, non distended. Extremities: rt ankle and foot swelling , rest LE showed trace edema Data Review: 
 
Labs: Results:  
   
Chemistry Recent Labs 01/06/19 
0135 01/05/19 
1332 * 90  
 139  
K 2.9* 4.0  
CL 99* 98* CO2 35* 32 * 109* CREA 2.26* 2.41* CA 8.3* 8.3* AGAP 5 9 BUCR 45* 45* AP 51 54  
TP 5.8* 5.5* ALB 2.4* 2.5*  
GLOB 3.4 3.0 AGRAT 0.7* 0.8 CBC w/Diff Recent Labs 01/05/19 
1332 WBC 8.1  
RBC 3.08* HGB 9.1*  
HCT 27.7*  
 GRANS 63 LYMPH 20* EOS 2 Iron/Ferritin No results for input(s): IRON in the last 72 hours. No lab exists for component: TIBCCALC  
PTH/VIT D No results for input(s): PTH in the last 72 hours. No lab exists for component: VITD Дмитрий Lorenzana MD 
1/6/2019 
5:44 PM 
 
 
January 6, 2019

## 2019-01-06 NOTE — PROGRESS NOTES
PT orders received and chart reviewed. Pt currently awaiting LE duplex due to suspected DVT. Will hold evaluation until medically stable. Will continue to follow.   
 
Thank you for this referral.  
 
Josi Delcid PT DPT

## 2019-01-06 NOTE — CONSULTS
[facility] Paulina Silvestre 
MR#: [id number] : 1931 ACCOUNT #: [de-identified] DATE OF SERVICE: 2019 REASON FOR EVALUATION:  Shortness of breath, edema. HISTORY OF PRESENT ILLNESS:  The patient is an 75-year-old patient with a history of cardiomyopathy, congestive heart failure, permanent pacemaker implant and multiple other medical problems including dementia. She came into the hospital with complaint of increasing shortness of breath and swelling with orthopnea. Patient is unable to provide any comprehensive history. She denies any ongoing chest pain, dizziness, palpitation or other symptoms at present time. There was no fever or chills reported. PAST MEDICAL HISTORY:  Significant for congestive heart failure with systolic dysfunction, hypertension, permanent pacemaker for bradycardia, tricuspid regurgitation, lupus, hyperlipidemia. PAST SURGICAL HISTORY:  Pacemaker and hysterectomy. FAMILY HISTORY:  Unremarkable. SOCIAL HISTORY:  No history of smoking or drinking. REVIEW OF SYSTEMS:  Unable to obtain more history from the patient, except as described above, which was obtained from family. MEDICATIONS:  Prior to admission included prednisone, Coreg, Tylenol, Cordarone, Lasix, Imdur, Namenda, Elavil, aspirin. ALLERGIES:  NONE REPORTED. PHYSICAL EXAMINATION: 
GENERAL:  Elderly patient in bed, pleasant with dementia. No distress. VITAL SIGNS:  Blood pressure 107/66, pulse 99, respiration rate 18, afebrile. HEENT:  Normocephalic. No pallor or jaundice. NECK:  Increased JVD. Carotids are full. Neck is supple. LUNGS:  Diminished air entry at bases. Mild rales, no wheezing. HEART:  S1, S2 normal.  No clear gallop. Soft systolic murmur at left sternal border. ABDOMEN:  Soft, nontender, no mass. EXTREMITIES:  Mild edema. NEUROLOGIC:  Alert, but dementia. Mood is normal. 
SKIN:  No bruise or rash. LABORATORY DATA:  White count 8, hemoglobin 9.1, platelet 875,109. Potassium admission 4.0, current 2.9, BUN of 102, creatinine 2.26, which is lower than admission. Troponin 0.17 and 0.25. NT-BNP 6400. Chest x-ray on admission, diffuse soft tissue edema. Telemetry shows AV paced rhythm. Echocardiogram exam from 12/2018 shows ejection fraction of 31-35%. Moderate tricuspid regurgitation with mild pulmonary hypertension. IMPRESSION: 
1. Shortness of breath and edema, multifactorial including acute on chronic decompensated systolic heart failure with underlying cardiomyopathy. 2.  Severe cardiomyopathy, progressive, unclear etiology considering age. No further workup for that. 3.  Status post permanent pacemaker, currently AV paced on telemetry. 4.  History of paroxysmal atrial fibrillation, maintained on amiodarone, currently no AFib noted. 5.  Hypokalemia. Continue supplement. 6.  Severe renal insufficiency, will require diuresis and adjustment of medication with close monitoring. 7.  Dementia. RECOMMENDATION:  Continue current therapy as is being done including IV Lasix, potassium supplement. Continue amiodarone and other medication. No ACE inhibitor or ARB due to severe renal insufficiency. Continue hydralazine, Isordil as being done. Further plans based on clinical course. Many thanks for allowing me to participate in care of this patient. Gavi Golden MD 
  
 
AMP / AN 
D: 01/06/2019 10:36    
T: 01/06/2019 11:22 
JOB #: [id number]

## 2019-01-06 NOTE — PROGRESS NOTES
[facility] Hospitalist Group Progress Note Patient: Maribell Cleaning Age: 80 y.o. : 1931 MR#: [id number] SSN: [id number] Date/Time: 2019 Subjective:  
 
Review of systems No SOB  NO Cough No distress but obviously in pain when she tries to move Detail ROS difficult due to severe dementia Assessment/Plan:  
Patient with severe dementia / SLE/ Severe OA affecting many major joints . / HTN/ CKD4 / recurrent hypokalemia /  
 
1. Acute OA - both Knee 2 Severe hypokalemia  
 
3 HTN  
 
4 SLE  
 
5 Dementia  
 
6 Ataxia - due to pain  
 
7 CKD 4 PLAN  
 
- X arys reviewed - no fractures - likely from acute OA  
- Orthopedic consult  
- Pain management - add percocet - avoid NSAIDS due to CKD - Increase dose of steroids for inflammation - Joint injections likely not possible due to multiple joint involvement -  Replace and Monitor K+ Family meeting - D/W patient's daughter here at bedside and over telephone Jaydon Littlejohn was contacted with patient's other daughter's cell phone )   
 
- long term care plan discussed including comfort measures Full CODE Case discussed with:  [x]Patient  [x] Family ( In room with patient )    []Nursing  []Case Management DVT Prophylaxis:  []Lovenox  []Hep SQ  []SCDs  []Coumadin   []On Heparin gtt Objective:  
VS:  
Visit Vitals BP 96/59 (BP 1 Location: Left leg, BP Patient Position: At rest) Pulse 72 Temp 97.4 °F (36.3 °C) Resp 18 Wt 70.3 kg (155 lb) SpO2 99% Breastfeeding? No  
BMI 25.79 kg/m² Tmax/24hrs: Temp (24hrs), Av.7 °F (36.5 °C), Min:97.2 °F (36.2 °C), Max:98.3 °F (36.8 °C) IOBRIEFNo intake or output data in the 24 hours ending 19 1523 General:  Alert, cooperative, no acute distress / orientation can not be judged , in pain when she moves Cardiovascular: S1S2 - regular , No Murmur Pulmonary: Equal expansion , No Use of accessory muscles , No Rales No Rhonchi GI:  +BS in all four quadrants, soft, non-tender Extremities:  No edema; 2+ dorsalis pedis pulses bilaterally. Both knee large , no fluctuations , right knee worm and tender Neuro: No focal deficit / ROM restricted due to pain Medications:  
Current Facility-Administered Medications Medication Dose Route Frequency  predniSONE (DELTASONE) tablet 20 mg  20 mg Oral BID WITH MEALS  famotidine (PEPCID) tablet 20 mg  20 mg Oral BID  potassium chloride (K-DUR, KLOR-CON) SR tablet 20 mEq  20 mEq Oral BID  
 oxyCODONE-acetaminophen (PERCOCET) 5-325 mg per tablet 1 Tab  1 Tab Oral Q4H PRN  
 docusate sodium (COLACE) capsule 100 mg  100 mg Oral BID  aspirin delayed-release tablet 81 mg  81 mg Oral DAILY  amitriptyline (ELAVIL) tablet 25 mg  25 mg Oral QHS  memantine (NAMENDA) tablet 10 mg  10 mg Oral DAILY  isosorbide mononitrate ER (IMDUR) tablet 60 mg  60 mg Oral DAILY  amiodarone (CORDARONE) tablet 200 mg  200 mg Oral DAILY  acetaminophen (TYLENOL) tablet 650 mg  650 mg Oral Q8H PRN  
 lidocaine (XYLOCAINE) 5 % ointment   Topical PRN  
 carvedilol (COREG) tablet 3.125 mg  3.125 mg Oral Q12H  
 morphine injection 3 mg  3 mg IntraVENous Q4H PRN  
 heparin (porcine) injection 5,000 Units  5,000 Units SubCUTAneous Q8H  
 furosemide (LASIX) injection 20 mg  20 mg IntraVENous BID Labs:   
Recent Labs 01/05/19 
1332 WBC 8.1 HGB 9.1*  
HCT 27.7*  
 Recent Labs 01/06/19 
0135 01/05/19 
1332  139  
K 2.9* 4.0  
CL 99* 98* CO2 35* 32 * 90 * 109* CREA 2.26* 2.41* CA 8.3* 8.3* ALB 2.4* 2.5* SGOT 42* 59* ALT 25 29 INR  --  1.1 Signed By: Dina Casas MD   
 January 6, 2019

## 2019-01-06 NOTE — ED NOTES
KAREEM Navarrete [facility] didn't receive report. Pt had a bed change, this RN unaware, report not given to accepting nurse. Liss Schaefer RN gave report.

## 2019-01-07 ENCOUNTER — APPOINTMENT (OUTPATIENT)
Dept: GENERAL RADIOLOGY | Age: 84
DRG: 291 | End: 2019-01-07
Attending: INTERNAL MEDICINE
Payer: MEDICARE

## 2019-01-07 LAB
ANION GAP SERPL CALC-SCNC: 5 MMOL/L (ref 3–18)
BASOPHILS # BLD: 0 K/UL (ref 0–0.1)
BASOPHILS NFR BLD: 0 % (ref 0–2)
BUN SERPL-MCNC: 93 MG/DL (ref 7–18)
BUN/CREAT SERPL: 42 (ref 12–20)
CALCIUM SERPL-MCNC: 8.1 MG/DL (ref 8.5–10.1)
CHLORIDE SERPL-SCNC: 101 MMOL/L (ref 100–108)
CO2 SERPL-SCNC: 34 MMOL/L (ref 21–32)
CREAT SERPL-MCNC: 2.2 MG/DL (ref 0.6–1.3)
DIFFERENTIAL METHOD BLD: ABNORMAL
EOSINOPHIL # BLD: 0 K/UL (ref 0–0.4)
EOSINOPHIL NFR BLD: 0 % (ref 0–5)
ERYTHROCYTE [DISTWIDTH] IN BLOOD BY AUTOMATED COUNT: 14.2 % (ref 11.6–14.5)
GLUCOSE SERPL-MCNC: 146 MG/DL (ref 74–99)
HCT VFR BLD AUTO: 28 % (ref 35–45)
HGB BLD-MCNC: 8.9 G/DL (ref 12–16)
LYMPHOCYTES # BLD: 0.9 K/UL (ref 0.9–3.6)
LYMPHOCYTES NFR BLD: 13 % (ref 21–52)
MCH RBC QN AUTO: 28.8 PG (ref 24–34)
MCHC RBC AUTO-ENTMCNC: 31.8 G/DL (ref 31–37)
MCV RBC AUTO: 90.6 FL (ref 74–97)
MONOCYTES # BLD: 0.2 K/UL (ref 0.05–1.2)
MONOCYTES NFR BLD: 3 % (ref 3–10)
NEUTS SEG # BLD: 5.9 K/UL (ref 1.8–8)
NEUTS SEG NFR BLD: 84 % (ref 40–73)
PLATELET # BLD AUTO: 272 K/UL (ref 135–420)
PMV BLD AUTO: 8.4 FL (ref 9.2–11.8)
POTASSIUM SERPL-SCNC: 4.4 MMOL/L (ref 3.5–5.5)
RBC # BLD AUTO: 3.09 M/UL (ref 4.2–5.3)
SODIUM SERPL-SCNC: 140 MMOL/L (ref 136–145)
WBC # BLD AUTO: 7.1 K/UL (ref 4.6–13.2)

## 2019-01-07 PROCEDURE — 65270000029 HC RM PRIVATE

## 2019-01-07 PROCEDURE — 74011250637 HC RX REV CODE- 250/637: Performed by: INTERNAL MEDICINE

## 2019-01-07 PROCEDURE — 85025 COMPLETE CBC W/AUTO DIFF WBC: CPT

## 2019-01-07 PROCEDURE — 74011250636 HC RX REV CODE- 250/636: Performed by: INTERNAL MEDICINE

## 2019-01-07 PROCEDURE — 36415 COLL VENOUS BLD VENIPUNCTURE: CPT

## 2019-01-07 PROCEDURE — 73502 X-RAY EXAM HIP UNI 2-3 VIEWS: CPT

## 2019-01-07 PROCEDURE — 74011636637 HC RX REV CODE- 636/637: Performed by: NURSE PRACTITIONER

## 2019-01-07 PROCEDURE — 80048 BASIC METABOLIC PNL TOTAL CA: CPT

## 2019-01-07 RX ORDER — FAMOTIDINE 20 MG/1
20 TABLET, FILM COATED ORAL DAILY
Status: DISCONTINUED | OUTPATIENT
Start: 2019-01-08 | End: 2019-01-09 | Stop reason: HOSPADM

## 2019-01-07 RX ORDER — ISOSORBIDE MONONITRATE 30 MG/1
30 TABLET, EXTENDED RELEASE ORAL DAILY
Status: DISCONTINUED | OUTPATIENT
Start: 2019-01-08 | End: 2019-01-08

## 2019-01-07 RX ADMIN — HEPARIN SODIUM 5000 UNITS: 5000 INJECTION INTRAVENOUS; SUBCUTANEOUS at 05:00

## 2019-01-07 RX ADMIN — PREDNISONE 20 MG: 20 TABLET ORAL at 18:37

## 2019-01-07 RX ADMIN — HEPARIN SODIUM 5000 UNITS: 5000 INJECTION INTRAVENOUS; SUBCUTANEOUS at 15:11

## 2019-01-07 RX ADMIN — FUROSEMIDE 20 MG: 10 INJECTION, SOLUTION INTRAMUSCULAR; INTRAVENOUS at 18:37

## 2019-01-07 RX ADMIN — POTASSIUM CHLORIDE 20 MEQ: 20 TABLET, EXTENDED RELEASE ORAL at 18:37

## 2019-01-07 RX ADMIN — DOCUSATE SODIUM 100 MG: 100 CAPSULE, LIQUID FILLED ORAL at 18:37

## 2019-01-07 RX ADMIN — CARVEDILOL 3.12 MG: 3.12 TABLET, FILM COATED ORAL at 21:57

## 2019-01-07 RX ADMIN — FUROSEMIDE 20 MG: 10 INJECTION, SOLUTION INTRAMUSCULAR; INTRAVENOUS at 10:29

## 2019-01-07 RX ADMIN — HEPARIN SODIUM 5000 UNITS: 5000 INJECTION INTRAVENOUS; SUBCUTANEOUS at 21:54

## 2019-01-07 RX ADMIN — AMITRIPTYLINE HYDROCHLORIDE 25 MG: 25 TABLET, FILM COATED ORAL at 21:57

## 2019-01-07 NOTE — PROGRESS NOTES
DISCHARGE PLANNING: 
Reason for Admission:  UREMIA LEFT HIP  
               
RRAT Score:   22 Do you (patient/family) have any concerns for transition/discharge? NO PT' S FAMILY IS REQUESTING REHAB, PRIOR TO THIS PT'S RETURN Plan for utilizing home health:    NOT ORDERED Likelihood of readmission? LOW Transition of Care Plan:    [facility] Care Management Interventions PCP Verified by CM: Yes(DR. Adilson Mcghee) Palliative Care Criteria Met (RRAT>21 & CHF Dx)?: No 
Mode of Transport at Discharge: S Transition of Care Consult (CM Consult): Discharge Planning Physical Therapy Consult: Yes Occupational Therapy Consult: Yes Current Support Network: Lives with Spouse, Own Home, Family Lives Nearby(LIVES WITH , AND ADULT SON) Confirm Follow Up Transport: Family Plan discussed with Pt/Family/Caregiver: Yes(PT WILL REQUIRE A PERIOD OF REHAB PRIOR TO HER RETURNING HOME) [place] of Choice Offered: Yes [employer] Information Provided?: No 
Discharge Location Discharge Placement: Skilled nursing facility · [facility] PROVIDED FOR THIS PT, WHOSE FAMILY CHOSE [facility] FOR REHAB PLACEMENT. Referral for this pt's request placed in e-discharge. · Family  for this pt is Trace Gupta [phone], who assist with all medical deicisions for this pt. ·  This pt was accepted by [facility] for rehab admission, pending bed availability. [place] [phone] Care Manager

## 2019-01-07 NOTE — PROGRESS NOTES
PT eval order received and chart reviewed. Unable to see patient at this time as patient is awaiting results of LE venous duplex for possible DVT. Will follow up as patient schedule allows. Thank you for this referral. Tiffani Vasquez, PT, DPT.

## 2019-01-07 NOTE — PROGRESS NOTES
OT order received and chart reviewed. Patient currently awaiting an x-ray of her hip. Will continue to follow and see patient when results are available.   Thank you for the referral.  Shirley Ruiz, MS OTR/L

## 2019-01-07 NOTE — PROGRESS NOTES
[facility] 
 
 
CARDIOLOGY PROGRESS NOTE 
RECS: 
 
1. Shortness of breath and edema, multifactorial including acute on chronic decompensated systolic heart failure with underlying cardiomyopathy. - improved on lasix 20 iv. Bid.  
2.  Severe cardiomyopathy, progressive, unclear etiology considering age. No further workup for that. 3.  Status post permanent pacemaker, currently AV paced on telemetry. 4.  History of paroxysmal atrial fibrillation, maintained on amiodarone, currently no AFib noted. 5.  Hypokalemia. Continue supplement. 6.  Severe renal insufficiency, will require diuresis and adjustment of medication with close monitoring. 7.  Dementia. ASSESSMENT: 
Hospital Problems  Date Reviewed: 7/23/2018 Codes Class Noted POA Uremia ICD-10-CM: N19 
ICD-9-CM: 598  1/5/2019 Unknown Left hip pain ICD-10-CM: M25.552 ICD-9-CM: 719.45  1/5/2019 Unknown SUBJECTIVE: 
confused OBJECTIVE: 
 
VS:  
Visit Vitals /69 (BP 1 Location: Left arm, BP Patient Position: At rest) Pulse 74 Temp 97.4 °F (36.3 °C) Resp 15 Wt 70.5 kg (155 lb 6.4 oz) SpO2 97% Breastfeeding? No  
BMI 25.86 kg/m² No intake or output data in the 24 hours ending 01/07/19 1335 TELE: paced General: alert, well developed, pleasant and in no apparent distress HENT: Normocephalic, atraumatic. Normal external eye. Neck :  no JVD Cardiac:  regularly irregular rhythm, S1, S2 normal, no click, no rub Lungs: clear to auscultation bilaterally Abdomen: Soft, nontender, no masses Extremities:  Trace ankle edema  peripheral pulses present Labs: Results:  
   
Chemistry Recent Labs 01/07/19 
0247 01/06/19 
0135 01/05/19 
1332 * 101* 90  
 139 139  
K 4.4 2.9* 4.0  
 99* 98* CO2 34* 35* 32 BUN 93* 102* 109* CREA 2.20* 2.26* 2.41* CA 8.1* 8.3* 8.3* AGAP 5 5 9 BUCR 42* 45* 45* AP  --  51 54 TP  --  5.8* 5.5*  
 ALB  --  2.4* 2.5*  
GLOB  --  3.4 3.0 AGRAT  --  0.7* 0.8 CBC w/Diff Recent Labs 01/07/19 
0247 01/05/19 
1332 WBC 7.1 8.1  
RBC 3.09* 3.08* HGB 8.9* 9.1*  
HCT 28.0* 27.7*  
 238 GRANS 84* 63  
LYMPH 13* 20* EOS 0 2 Cardiac Enzymes Recent Labs 01/05/19 
1332 * CKND1 0.6 Coagulation Recent Labs 01/05/19 
1332 PTP 14.0 INR 1.1 APTT 26.7 Lipid Panel Lab Results Component Value Date/Time Cholesterol, total 134 05/12/2016 01:42 AM  
 HDL Cholesterol 70 (H) 05/12/2016 01:42 AM  
 LDL, calculated 55.6 05/12/2016 01:42 AM  
 VLDL, calculated 8.4 05/12/2016 01:42 AM  
 Triglyceride 42 05/12/2016 01:42 AM  
 CHOL/HDL Ratio 1.9 05/12/2016 01:42 AM  
  
BNP No results for input(s): BNPP in the last 72 hours. Liver Enzymes Recent Labs 01/06/19 
0135 TP 5.8* ALB 2.4* AP 51 SGOT 42* Thyroid Studies Lab Results Component Value Date/Time TSH 8.86 (H) 11/15/2015 01:45 PM  
    
 
 
 
[address] 55 I have independently evaluated taken history and examined the patient. All relevant labs and testing data's are reviewed. Care plan discussed and updated after review.  
Mari Smith MD

## 2019-01-07 NOTE — PROGRESS NOTES
attempted to visit patient and was not able to do a spiritual assessment. Patient was asleep. Talked briefly with son that was in room. Will follow up with patient as needed. Amy Mcbride MA  Spiritual Care Department 
[phone]

## 2019-01-07 NOTE — PROGRESS NOTES
[facility] Hospitalist Group Progress Note Patient: Shahida Dorsey Age: 80 y.o. : 1931 MR#: [id number] SSN: [id number] Date/Time: 2019 Subjective:  
 
Review of systems No SOB  NO Cough; no distress at rest but obviously in pain when she tries to move Difficult ROS d/t fatigue and sleepy following pain medication Assessment/Plan:  
Patient with severe dementia / SLE/ Severe OA affecting many major joints . / HTN/ CKD4 / recurrent hypokalemia /  
 
1. Acute OA - both Knee 2 Severe hypokalemia  
 
3 HTN  
 
4 SLE  
 
5 Dementia  
 
6 Ataxia - due to pain  
 
7 CKD 4 PLAN  
 
- X ray hip no fracture - no fractures - likely from acute OA and lupus 
- Pain management - add percocet - avoid NSAIDS due to CKD  
- Cont steroids for inflammation - Joint injections likely not possible due to multiple joint involvement -  Replace and Monitor K+ Family meeting - D/W patient's daughter here at bedside and over telephone Deisy Chiu was contacted with patient's other daughter's cell phone )   
 
- long term care plan discussed including comfort measures, remains Full CODE for now Case discussed with:  [x]Patient  [x] Family ( In room with patient )    [x]Nursing  []Case Management DVT Prophylaxis:  []Lovenox  [x]Hep SQ  []SCDs  []Coumadin   []On Heparin gtt Objective:  
VS:  
Visit Vitals /69 (BP 1 Location: Left arm, BP Patient Position: At rest) Pulse 74 Temp 97.4 °F (36.3 °C) Resp 15 Wt 70.5 kg (155 lb 6.4 oz) SpO2 97% Breastfeeding? No  
BMI 25.86 kg/m² Tmax/24hrs: Temp (24hrs), Av.5 °F (36.4 °C), Min:97.2 °F (36.2 °C), Max:98 °F (36.7 °C) IOBRIEF Intake/Output Summary (Last 24 hours) at 2019 1600 Last data filed at 2019 1346 Gross per 24 hour Intake 240 ml Output  Net 240 ml General:  Drowsy; cooperative, no acute distress / orientation can not be judged , in pain when she moves Cardiovascular: S1S2 - regular , No Murmur Pulmonary: Equal expansion , No Use of accessory muscles , No Rales No Rhonchi GI:  +BS in all four quadrants, soft, non-tender Extremities:  No edema; 2+ dorsalis pedis pulses bilaterally. Both knee large, no fluctuations , right knee warm and tender Neuro: No focal deficit / ROM restricted due to pain Medications:  
Current Facility-Administered Medications Medication Dose Route Frequency  [START ON 1/8/2019] famotidine (PEPCID) tablet 20 mg  20 mg Oral DAILY  [START ON 1/8/2019] isosorbide mononitrate ER (IMDUR) tablet 30 mg  30 mg Oral DAILY  predniSONE (DELTASONE) tablet 20 mg  20 mg Oral BID WITH MEALS  potassium chloride (K-DUR, KLOR-CON) SR tablet 20 mEq  20 mEq Oral BID  docusate sodium (COLACE) capsule 100 mg  100 mg Oral BID  aspirin delayed-release tablet 81 mg  81 mg Oral DAILY  amitriptyline (ELAVIL) tablet 25 mg  25 mg Oral QHS  memantine (NAMENDA) tablet 10 mg  10 mg Oral DAILY  amiodarone (CORDARONE) tablet 200 mg  200 mg Oral DAILY  acetaminophen (TYLENOL) tablet 650 mg  650 mg Oral Q8H PRN  
 lidocaine (XYLOCAINE) 5 % ointment   Topical PRN  
 carvedilol (COREG) tablet 3.125 mg  3.125 mg Oral Q12H  
 heparin (porcine) injection 5,000 Units  5,000 Units SubCUTAneous Q8H  
 furosemide (LASIX) injection 20 mg  20 mg IntraVENous BID Labs:   
Recent Labs 01/07/19 
0247 01/05/19 
1332 WBC 7.1 8.1 HGB 8.9* 9.1*  
HCT 28.0* 27.7*  
 238 Recent Labs 01/07/19 
0247 01/06/19 
0135 01/05/19 
1332  139 139  
K 4.4 2.9* 4.0  
 99* 98* CO2 34* 35* 32 * 101* 90  
BUN 93* 102* 109* CREA 2.20* 2.26* 2.41* CA 8.1* 8.3* 8.3* ALB  --  2.4* 2.5* SGOT  --  42* 59* ALT  --  25 29 INR  --   --  1.1 Signed By: Pérez Kent NP   
 January 7, 2019

## 2019-01-07 NOTE — PROGRESS NOTES
PT order received and chart reviewed. Patient currently awaiting x-ray of hip. Will continue to follow and see patient when results are available.  Thank you for the referral.  Zeeshan Escobar, PT, DPT

## 2019-01-08 LAB
ANION GAP SERPL CALC-SCNC: 5 MMOL/L (ref 3–18)
BUN SERPL-MCNC: 93 MG/DL (ref 7–18)
BUN/CREAT SERPL: 40 (ref 12–20)
CALCIUM SERPL-MCNC: 8.6 MG/DL (ref 8.5–10.1)
CHLORIDE SERPL-SCNC: 101 MMOL/L (ref 100–108)
CO2 SERPL-SCNC: 36 MMOL/L (ref 21–32)
CREAT SERPL-MCNC: 2.31 MG/DL (ref 0.6–1.3)
GLUCOSE SERPL-MCNC: 105 MG/DL (ref 74–99)
POTASSIUM SERPL-SCNC: 3.7 MMOL/L (ref 3.5–5.5)
SODIUM SERPL-SCNC: 142 MMOL/L (ref 136–145)

## 2019-01-08 PROCEDURE — 74011250637 HC RX REV CODE- 250/637: Performed by: NURSE PRACTITIONER

## 2019-01-08 PROCEDURE — 36415 COLL VENOUS BLD VENIPUNCTURE: CPT

## 2019-01-08 PROCEDURE — 74011250637 HC RX REV CODE- 250/637: Performed by: INTERNAL MEDICINE

## 2019-01-08 PROCEDURE — 97161 PT EVAL LOW COMPLEX 20 MIN: CPT

## 2019-01-08 PROCEDURE — 74011636637 HC RX REV CODE- 636/637: Performed by: NURSE PRACTITIONER

## 2019-01-08 PROCEDURE — 77030038269 HC DRN EXT URIN PURWCK BARD -A

## 2019-01-08 PROCEDURE — 74011250636 HC RX REV CODE- 250/636: Performed by: INTERNAL MEDICINE

## 2019-01-08 PROCEDURE — 65270000029 HC RM PRIVATE

## 2019-01-08 PROCEDURE — 97166 OT EVAL MOD COMPLEX 45 MIN: CPT

## 2019-01-08 PROCEDURE — 80048 BASIC METABOLIC PNL TOTAL CA: CPT

## 2019-01-08 RX ADMIN — HEPARIN SODIUM 5000 UNITS: 5000 INJECTION INTRAVENOUS; SUBCUTANEOUS at 05:32

## 2019-01-08 RX ADMIN — CARVEDILOL 3.12 MG: 3.12 TABLET, FILM COATED ORAL at 22:32

## 2019-01-08 RX ADMIN — ASPIRIN 81 MG: 81 TABLET, COATED ORAL at 09:29

## 2019-01-08 RX ADMIN — ACETAMINOPHEN 650 MG: 325 TABLET ORAL at 09:40

## 2019-01-08 RX ADMIN — DOCUSATE SODIUM 100 MG: 100 CAPSULE, LIQUID FILLED ORAL at 17:03

## 2019-01-08 RX ADMIN — HEPARIN SODIUM 5000 UNITS: 5000 INJECTION INTRAVENOUS; SUBCUTANEOUS at 22:32

## 2019-01-08 RX ADMIN — POTASSIUM CHLORIDE 20 MEQ: 20 TABLET, EXTENDED RELEASE ORAL at 09:29

## 2019-01-08 RX ADMIN — FAMOTIDINE 20 MG: 20 TABLET, FILM COATED ORAL at 09:29

## 2019-01-08 RX ADMIN — DOCUSATE SODIUM 100 MG: 100 CAPSULE, LIQUID FILLED ORAL at 09:29

## 2019-01-08 RX ADMIN — AMITRIPTYLINE HYDROCHLORIDE 25 MG: 25 TABLET, FILM COATED ORAL at 22:32

## 2019-01-08 RX ADMIN — AMIODARONE HYDROCHLORIDE 200 MG: 200 TABLET ORAL at 09:29

## 2019-01-08 RX ADMIN — PREDNISONE 20 MG: 20 TABLET ORAL at 09:28

## 2019-01-08 RX ADMIN — POTASSIUM CHLORIDE 20 MEQ: 20 TABLET, EXTENDED RELEASE ORAL at 17:03

## 2019-01-08 RX ADMIN — HEPARIN SODIUM 5000 UNITS: 5000 INJECTION INTRAVENOUS; SUBCUTANEOUS at 14:13

## 2019-01-08 RX ADMIN — PREDNISONE 20 MG: 20 TABLET ORAL at 17:03

## 2019-01-08 RX ADMIN — MEMANTINE 10 MG: 10 TABLET ORAL at 09:29

## 2019-01-08 NOTE — PROGRESS NOTES
Problem: Pressure Injury - Risk of 
Goal: *Prevention of pressure injury Document Rodríguez Scale and appropriate interventions in the flowsheet. Outcome: Progressing Towards Goal 
Pressure Injury Interventions: 
  
 
Moisture Interventions: Absorbent underpads, Assess need for specialty bed, Check for incontinence Q2 hours and as needed, Internal/External urinary devices, Minimize layers Activity Interventions: Pressure redistribution bed/mattress(bed type) Mobility Interventions: Float heels, HOB 30 degrees or less, Assess need for specialty bed Nutrition Interventions: Document food/fluid/supplement intake Friction and Shear Interventions: HOB 30 degrees or less, Lift sheet, Minimize layers Problem: Falls - Risk of 
Goal: *Absence of Falls Document Dilcia Shows Fall Risk and appropriate interventions in the flowsheet. Outcome: Progressing Towards Goal 
Fall Risk Interventions: 
  
 
Mentation Interventions: Adequate sleep, hydration, pain control, Bed/chair exit alarm, Door open when patient unattended, More frequent rounding, Reorient patient, Room close to nurse's station, Toileting rounds Medication Interventions: Bed/chair exit alarm Elimination Interventions: Bed/chair exit alarm, Call light in reach, Toilet paper/wipes in reach History of Falls Interventions: Bed/chair exit alarm, Door open when patient unattended, Room close to nurse's station

## 2019-01-08 NOTE — PROGRESS NOTES
Problem: Pressure Injury - Risk of 
Goal: *Prevention of pressure injury Document Rodríguez Scale and appropriate interventions in the flowsheet. Outcome: Progressing Towards Goal 
Pressure Injury Interventions: 
  
 
Moisture Interventions: Absorbent underpads, Assess need for specialty bed, Check for incontinence Q2 hours and as needed, Internal/External urinary devices, Minimize layers Activity Interventions: Pressure redistribution bed/mattress(bed type) Mobility Interventions: Float heels, HOB 30 degrees or less, Assess need for specialty bed Nutrition Interventions: Document food/fluid/supplement intake Friction and Shear Interventions: HOB 30 degrees or less, Lift sheet, Minimize layers Problem: Falls - Risk of 
Goal: *Absence of Falls Document Chela Way Fall Risk and appropriate interventions in the flowsheet. Outcome: Progressing Towards Goal 
Fall Risk Interventions: 
  
 
Mentation Interventions: Adequate sleep, hydration, pain control, Bed/chair exit alarm, Door open when patient unattended, More frequent rounding, Reorient patient, Room close to nurse's station, Toileting rounds Medication Interventions: Bed/chair exit alarm Elimination Interventions: Bed/chair exit alarm, Call light in reach, Toilet paper/wipes in reach History of Falls Interventions: Bed/chair exit alarm, Door open when patient unattended, Room close to nurse's station

## 2019-01-08 NOTE — ROUTINE PROCESS
Bedside and Verbal shift change report given to Derick Norman RN (oncoming nurse) by Stephanie Palmer RN (offgoing nurse). Report included the following information SBAR, Kardex, MAR and Recent Results. SITUATION: 
Code Status: Full Code Reason for Admission: Uremia Left hip pain Uremia Hospital day: 2 Problem List:  
   
Hospital Problems  Date Reviewed: 7/23/2018 Codes Class Noted POA Uremia ICD-10-CM: N19 
ICD-9-CM: 499  1/5/2019 Unknown Left hip pain ICD-10-CM: M25.552 ICD-9-CM: 719.45  1/5/2019 Unknown BACKGROUND: 
 Past Medical History:  
Past Medical History:  
Diagnosis Date  Acute on chronic diastolic heart failure ([facility] [address]  Arthritis  Benign hypertensive heart disease without heart failure Better controlled, stable  Chronic diastolic heart failure ([facility] [address] Breathing and edema is improving  Congestive heart failure ([facility] [address]  HTN (hypertension)  Hypercholesteremia  Lupus (systemic lupus erythematosus) ([facility] [address] 6/18/2014  
 followed by dr Julia Milian  Obesity, unspecified Patient has weight loss Discussed diet ad fluid restriction  Other and unspecified hyperlipidemia F/u per pmd  
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  
 tr with moderate pulmonary htn Patient taking anticoagulants yes Patient has a defibrillator: no  
 History of shots NO for example, flu, pneumonia, tetanus Isolation History NO for example, MRSA, CDiff ASSESSMENT: 
Changes in Assessment Throughout Shift: NONE Significant Changes in 24 hours (for example, RR/code, fall) Patient has Central Line: no  
Patient has Mendiola Cath: no  
Mobility Issues PT 
IV Patency OR Checklist 
Pending Tests Last Vitals: 
Vitals w/ MEWS Score (last day) Date/Time MEWS Score Pulse Resp Temp BP Level of Consciousness SpO2  
 01/08/19 0731            Alert    
 01/08/19 0408          102/59     01/08/19 0344  2  68  18  98.4 °F (36.9 °C)  87/43  (Abnormal)   Alert  98 % 01/07/19 2358  1  94  18  98.3 °F (36.8 °C)  117/54  Alert  94 % 01/07/19 2025  1  74  18  99.6 °F (37.6 °C)  107/56  Alert  100 % 01/07/19 1738  1  70  17  98.8 °F (37.1 °C)  106/61  Alert  96 % 01/07/19 1100  1  74  15  97.4 °F (36.3 °C)  108/69  Alert  97 % 01/07/19 0844  1  79  18  97.2 °F (36.2 °C)  122/81  Alert  95 % 01/07/19 0511  1  81  18  97.6 °F (36.4 °C)  110/78  Alert  97 % 01/07/19 0045  1  65  19  98 °F (36.7 °C)  105/74  Alert  98 % PAIN Pain Assessment Pain Intensity 1: 0 (01/08/19 0022) Patient Stated Pain Goal: 0 Intervention effective: N/A Time of last intervention: N/A Reassessment Completed: yes Other actions taken for pain: Distraction Last 3 Weights: 
Last 3 Recorded Weights in this Encounter 01/06/19 0235 01/07/19 2955 Weight: 70.3 kg (155 lb) 70.5 kg (155 lb 6.4 oz) Weight change: INTAKE/OUPUT Current Shift: No intake/output data recorded. Last three shifts: 01/06 1901 - 01/08 0700 In: 250 [P.O.:250] Out: 650 [Urine:650] RECOMMENDATIONS AND DISCHARGE PLANNING Patient needs and requests: Assistance with ADL's 
 
Pending tests/procedures: labs Discharge plan for patient: Home Discharge planning Needs or Barriers: none Estimated Discharge Date: 1/11/2019 Posted on Whiteboard in [facility] Room: yes \"HEALS\" SAFETY CHECK A safety check occurred in the patient's room between off going nurse and oncoming nurse listed above. The safety check included the below items: 
 
H High Alert Medications Verify all high alert medication drips (heparin, PCA, etc.) E Equipment Suction is set up for ALL patients (with alfredoker) Red plugs utilized for all equipment (IV pumps, etc.) WOWs wiped down at end of shift. Room stocked with oxygen, suction, and other unit-specific supplies A Alarms Bed alarm is set for fall risk patients Ensure chair alarm is in place and activated if patient is up in a chair L Lines Check IV for any infiltration Mendiola bag is empty if patient has a Mendiola Tubing and IV bags are labeled Beverly Byrne Safety Room is clean, patient is clean, and equipment is clean. Hallways are clear from equipment besides carts. Fall bracelet on for fall risk patients Ensure room is clear and free of clutter Suction is set up for ALL patients (with yanker) Hallways are clear from equipment besides carts. Isolation precautions followed, supplies available outside room, sign posted Tracy Case RN

## 2019-01-08 NOTE — PROGRESS NOTES
RENAL PROGRESS NOTE Rossi Archibald    
 
 
Assessment/Plan: · CKD 4, stable. Monitor with diuresis. · Decompensated HFrEF/volume overload. Improving, continue ivf lasix. Will soon change it to po.  
· HTN. Controlled, continue current regimen. Subjective: 
Patient complaints off: feels better. No SOB at rest, no CP/N/V. Appetite is better but overall appetite has been poor over past couple of month per daughter, lost weight. Patient Active Problem List  
Diagnosis Code  
 HTN (hypertension) I10  
 Hypercholesteremia E78.00  Tricuspid valve disorders, 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  Lupus (systemic lupus erythematosus) ([facility]) M32.9  Anasarca R60.1  Hypertensive heart disease with CHF (congestive heart failure) ([facility]) I11.0  
 S/P placement of cardiac pacemaker Z95.0  Paroxysmal atrial flutter ([facility]) I48.92  
 Acute on chronic diastolic (congestive) heart failure ([facility]) S23.41  
 Diastolic CHF, chronic ([facility]) I50.32  
 High risk medication use Z79.899  Acute exacerbation of CHF (congestive heart failure) ([facility]) I50.9  ARIEL (acute kidney injury) ([facility] [address] N17.9  Uremia N19  Left hip pain M25.552 Current Facility-Administered Medications Medication Dose Route Frequency Provider Last Rate Last Dose  [START ON 1/8/2019] famotidine (PEPCID) tablet 20 mg  20 mg Oral DAILY Tran Juarez NP      
 [START ON 1/8/2019] isosorbide mononitrate ER (IMDUR) tablet 30 mg  30 mg Oral DAILY Becki Roca NP      
 predniSONE (DELTASONE) tablet 20 mg  20 mg Oral BID WITH MEALS Willam LOZADA NP   20 mg at 01/07/19 8422  potassium chloride (K-DUR, KLOR-CON) SR tablet 20 mEq  20 mEq Oral BID Robyn Hassan MD   20 mEq at 01/07/19 1837  
 docusate sodium (COLACE) capsule 100 mg  100 mg Oral BID Anna Balderas MD   100 mg at 01/07/19 1837  aspirin delayed-release tablet 81 mg  81 mg Oral DAILY Garcia Hanna MD   81 mg at 01/06/19 0942  
 amitriptyline (ELAVIL) tablet 25 mg  25 mg Oral QHS Garcia Hanna MD   25 mg at 01/06/19 2336  memantine (NAMENDA) tablet 10 mg  10 mg Oral DAILY Garcia Hanna MD   10 mg at 01/06/19 0943  
 amiodarone (CORDARONE) tablet 200 mg  200 mg Oral DAILY Garcia Hanna MD   200 mg at 01/06/19 3922  acetaminophen (TYLENOL) tablet 650 mg  650 mg Oral Q8H PRN Christian Griffin MD   650 mg at 01/06/19 1775  lidocaine (XYLOCAINE) 5 % ointment   Topical PRN Garcia Hanna MD      
 carvedilol (COREG) tablet 3.125 mg  3.125 mg Oral Q12H Garcia Hanna MD   3.125 mg at 01/06/19 2336  
 heparin (porcine) injection 5,000 Units  5,000 Units SubCUTAneous Clifton Kline MD   5,000 Units at 01/07/19 1511  furosemide (LASIX) injection 20 mg  20 mg IntraVENous BID Garcia Hanna MD   20 mg at 01/07/19 1837 Objective Vitals:  
 01/07/19 0511 01/07/19 0844 01/07/19 1100 01/07/19 1738 BP: 110/78 122/81 108/69 106/61 Pulse: 81 79 74 70 Resp: 18 18 15 17 Temp: 97.6 °F (36.4 °C) 97.2 °F (36.2 °C) 97.4 °F (36.3 °C) 98.8 °F (37.1 °C) SpO2: 97% 95% 97% 96% Weight:  70.5 kg (155 lb 6.4 oz) Intake/Output Summary (Last 24 hours) at 1/7/2019 1947 Last data filed at 1/7/2019 1900 Gross per 24 hour Intake 240 ml Output 200 ml Net 40 ml Admission weight: Weight: 70.3 kg (155 lb) (01/06/19 0235) Last Weight Metrics: 
Weight Loss Metrics 1/7/2019 12/31/2018 12/4/2018 1/24/2018 11/30/2016 8/26/2016 7/13/2016 Today's Wt 155 lb 6.4 oz 158 lb 8 oz 167 lb 132 lb 147 lb 125 lb 178 lb BMI 25.86 kg/m2 26.38 kg/m2 26.16 kg/m2 21.97 kg/m2 24.46 kg/m2 20.8 kg/m2 29.62 kg/m2 Physical Assessment:  
 
General: NAD, alert and oriented. Neck: No jvd. LUNGS: Clear to Auscultation, No rales, rhonchi or wheezes. CVS EXM: S1, S2  RRR, no murmurs/gallops/rubs. Abdomen: soft, non tender. Lower Extremities:  1+ bl ankle edema. Lab CBC w/Diff Recent Labs 01/07/19 
0247 01/05/19 
1332 WBC 7.1 8.1  
RBC 3.09* 3.08* HGB 8.9* 9.1*  
HCT 28.0* 27.7*  
 238 GRANS 84* 63  
LYMPH 13* 20* EOS 0 2 Chemistry Recent Labs 01/07/19 
0247 01/06/19 
0135 01/05/19 
1332 * 101* 90  
 139 139  
K 4.4 2.9* 4.0  
 99* 98* CO2 34* 35* 32 BUN 93* 102* 109* CREA 2.20* 2.26* 2.41* CA 8.1* 8.3* 8.3* AGAP 5 5 9 BUCR 42* 45* 45* AP  --  51 54 TP  --  5.8* 5.5* ALB  --  2.4* 2.5*  
GLOB  --  3.4 3.0 AGRAT  --  0.7* 0.8 No results found for: IRON, FE, TIBC, IBCT, PSAT, FERR Lab Results Component Value Date/Time Calcium 8.1 (L) 01/07/2019 02:47 AM  
 Phosphorus 3.4 12/28/2018 09:25 AM  
  
 
Murtaza Zarco M.D. [facility] Phone [phone] Pager [phone]

## 2019-01-08 NOTE — PROGRESS NOTES
RENAL PROGRESS NOTE Becca Serrano    
 
 
Assessment/Plan: · CKD 4, stable. Scr is up slightly with diuresis. · Decompensated HFrEF/volume overload. Improving, hold lasix for 1-2 days and then switch to po. · Met alkalosis due to diuresis. Hold lasix for 1-2 days. · HTN. Controlled, continue current regimen. Subjective: 
Patient complaints off: feels better. No SOB at rest, no CP/N/V. Appetite is fair per family. Patient Active Problem List  
Diagnosis Code  
 HTN (hypertension) I10  
 Hypercholesteremia E78.00  Tricuspid valve disorders, 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  Lupus (systemic lupus erythematosus) ([facility]) M32.9  Anasarca R60.1  Hypertensive heart disease with CHF (congestive heart failure) ([facility]) I11.0  
 S/P placement of cardiac pacemaker Z95.0  Paroxysmal atrial flutter ([facility]) I48.92  
 Acute on chronic diastolic (congestive) heart failure ([facility]) B63.63  
 Diastolic CHF, chronic ([facility]) I50.32  
 High risk medication use Z79.899  Acute exacerbation of CHF (congestive heart failure) ([facility]) I50.9  ARIEL (acute kidney injury) ([facility] [address] N17.9  Uremia N19  Left hip pain M25.552 Current Facility-Administered Medications Medication Dose Route Frequency Provider Last Rate Last Dose  famotidine (PEPCID) tablet 20 mg  20 mg Oral DAILY [facility] B, NP   20 mg at 01/08/19 1938  
 isosorbide mononitrate ER (IMDUR) tablet 30 mg  30 mg Oral DAILY Becki Roca, NP   Stopped at 01/08/19 4850  predniSONE (DELTASONE) tablet 20 mg  20 mg Oral BID WITH MEALS [facility] B, NP   20 mg at 01/08/19 2700  potassium chloride (K-DUR, KLOR-CON) SR tablet 20 mEq  20 mEq Oral BID Hector Noble MD   20 mEq at 01/08/19 0948  
 docusate sodium (COLACE) capsule 100 mg  100 mg Oral BID Radha Pringle MD   100 mg at 01/08/19 6502  aspirin delayed-release tablet 81 mg  81 mg Oral DAILY En Hanna MD   81 mg at 01/08/19 0929  
 amitriptyline (ELAVIL) tablet 25 mg  25 mg Oral QHS En Hanna MD   25 mg at 01/07/19 2157  memantine (NAMENDA) tablet 10 mg  10 mg Oral DAILY nE Hanna MD   10 mg at 01/08/19 1341  
 amiodarone (CORDARONE) tablet 200 mg  200 mg Oral DAILY En Hanna MD   200 mg at 01/08/19 4014  acetaminophen (TYLENOL) tablet 650 mg  650 mg Oral Q8H PRN En Hanna MD   650 mg at 01/08/19 0940  lidocaine (XYLOCAINE) 5 % ointment   Topical PRN En Hanna MD      
 carvedilol (COREG) tablet 3.125 mg  3.125 mg Oral Q12H En Hanna MD   Stopped at 01/08/19 0929  
 heparin (porcine) injection 5,000 Units  5,000 Units SubCUTAneous Q8H En Hanna MD   5,000 Units at 01/08/19 1413 Objective Vitals:  
 01/08/19 0758 01/08/19 5767 01/08/19 1211 01/08/19 1549 BP: 119/61  105/52 120/66 Pulse: 70  70 74 Resp: 18  18 17 Temp: 97.7 °F (36.5 °C)   98 °F (36.7 °C) SpO2: 97%   97% Weight:  69.6 kg (153 lb 7 oz) Intake/Output Summary (Last 24 hours) at 1/8/2019 1630 Last data filed at 1/8/2019 1247 Gross per 24 hour Intake 350 ml Output 650 ml Net -300 ml Admission weight: Weight: 70.3 kg (155 lb) (01/06/19 0235) Last Weight Metrics: 
Weight Loss Metrics 1/8/2019 12/31/2018 12/4/2018 1/24/2018 11/30/2016 8/26/2016 7/13/2016 Today's Wt 153 lb 7 oz 158 lb 8 oz 167 lb 132 lb 147 lb 125 lb 178 lb BMI 25.53 kg/m2 26.38 kg/m2 26.16 kg/m2 21.97 kg/m2 24.46 kg/m2 20.8 kg/m2 29.62 kg/m2 Physical Assessment:  
 
General: NAD, alert and oriented. Neck: No jvd. LUNGS: Clear to Auscultation, No rales, rhonchi or wheezes. CVS EXM: S1, S2  RRR, no murmurs/gallops/rubs. Abdomen: soft, non tender. Lower Extremities:  1+ bl ankle edema. Lab CBC w/Diff Recent Labs 01/07/19 
0247 WBC 7.1 RBC 3.09* HGB 8.9* HCT 28.0*  
 GRANS 84* LYMPH 13* EOS 0 Chemistry Recent Labs 01/08/19 
1115 01/07/19 
0247 01/06/19 
4583 * 146* 101*  140 139  
K 3.7 4.4 2.9*  
 101 99* CO2 36* 34* 35* BUN 93* 93* 102* CREA 2.31* 2.20* 2.26* CA 8.6 8.1* 8.3* AGAP 5 5 5 BUCR 40* 42* 45* AP  --   --  51  
TP  --   --  5.8* ALB  --   --  2.4*  
GLOB  --   --  3.4 AGRAT  --   --  0.7* No results found for: IRON, FE, TIBC, IBCT, PSAT, FERR Lab Results Component Value Date/Time Calcium 8.6 01/08/2019 03:05 AM  
 Phosphorus 3.4 12/28/2018 09:25 AM  
  
 
Court Schaeefr M.D. [facility] Phone [phone] Pager [phone]

## 2019-01-08 NOTE — PROGRESS NOTES
Problem: Self Care Deficits Care Plan (Adult) Goal: *Acute Goals and Plan of Care (Insert Text) Occupational Therapy Goals Initiated 1/8/2019 within 7 day(s). 1.  Patient will perform grooming with supervision/set-up. 2.  Patient will perform upper body dressing with minimal assistance/contact guard assist. 
3.  Patient will perform functional task for 3 minutes while seated on EOB with supervision for balance. 4.  Patient will perform toilet transfers with moderate assistance . 5. Patient will participate in upper extremity therapeutic exercise/activities with supervision/set-up for 8 minutes to increase strength/endurance for ADLs. Outcome: Progressing Towards Goal 
Occupational Therapy EVALUATION Patient: Paul Robins (80 y.o. female) Date: 1/8/2019 Primary Diagnosis: Uremia Left hip pain Uremia Precautions:   Fall, Skin ASSESSMENT : 
Based on the objective data described below, the patient presents with decreased ADLs, decreased functional mobility and muscle weakness, complicated by confusion. Patient asleep at start of session but easily aroused by daughter in room. Daughter continued to provide encouragement for patient to participate throughout session although patient resistant. Patient able to sit on EOB with max assist x2 secondary to agreeing to sit up for self feeding but then declining to complete task. She was returned to supine at end of session. She is appropriate for a 3 day trial of skilled OT to assess ability to participate and benefit from services. Patient will benefit from skilled intervention to address the above impairments. Patients rehabilitation potential is considered to be Fair Factors which may influence rehabilitation potential include:  
[]             None noted [x]             Mental ability/status [x]             Medical condition []             Home/family situation and support systems []             Safety awareness []             Pain tolerance/management 
[]             Other: PLAN : 
Recommendations and Planned Interventions: 
[x]               Self Care Training                  [x]        Therapeutic Activities [x]               Functional Mobility Training    []        Cognitive Retraining 
[x]               Therapeutic Exercises           [x]        Endurance Activities [x]               Balance Training                   []        Neuromuscular Re-Education []               Visual/Perceptual Training     [x]   Home Safety Training 
[x]               Patient Education                 [x]        Family Training/Education []               Other (comment): Frequency/Duration: Patient will be followed by occupational therapy 1-2 times per day/4-7 days per week to address goals. Discharge Recommendations: Andrey Ma Further Equipment Recommendations for Discharge: N/A Barriers to Learning/Limitations: yes;  altered mental status (i.e.Sedation, Confusion) Compensate with: visual, verbal, tactile, kinesthetic cues/model PATIENT COMPLEXITY Eval Complexity: History: MEDIUM Complexity : Expanded review of history including physical, cognitive and psychosocial  history ; Examination: MEDIUM Complexity : 3-5 performance deficits relating to physical, cognitive , or psychosocial skils that result in activity limitations and / or participation restrictions; Decision Making:MEDIUM Complexity : Patient may present with comorbidities that affect occupational performnce. Miniml to moderate modification of tasks or assistance (eg, physical or verbal ) with assesment(s) is necessary to enable patient to complete evaluation  Assessment: Moderate Complexity G-CODES:  
 
Self Care  Current  CM= 80-99%  Goal  CL= 60-79%. The severity rating is based on the Level of Assistance required for Functional Mobility and ADLs. SUBJECTIVE:  
Patient stated No! OBJECTIVE DATA SUMMARY:  
 
 Past Medical History:  
Diagnosis Date  Acute on chronic diastolic heart failure ([facility] [address]  Arthritis  Benign hypertensive heart disease without heart failure Better controlled, stable  Chronic diastolic heart failure ([facility] [address] Breathing and edema is improving  Congestive heart failure ([facility] [address]  HTN (hypertension)  Hypercholesteremia  Lupus (systemic lupus erythematosus) ([facility] [address] 6/18/2014  
 followed by dr Sudhakar Quinn  Obesity, unspecified Patient has weight loss Discussed diet ad fluid restriction  Other and unspecified hyperlipidemia F/u per pmd  
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  
Procedure Laterality Date  HX HYSTERECTOMY  PACEMAKER PROCEDURE Prior Level of Function/Home Situation: Pt receiving increased assist over time with basic self care tasks from family and used a RW for functional mobility PTA. Home Situation Home Environment: Private residence One/Two Story Residence: One story Living Alone: No 
Support Systems: Child(marty), Family member(s), Friends \ neighbors, Adline Grooms / [facility] community Patient Expects to be Discharged to[de-identified] Skilled nursing facility Current DME Used/Available at Home: Rosalind Gallo Tub or Shower Type: (Pt sponge bathes at home.) [x]  Right hand dominant   []  Left hand dominantCognitive/Behavioral Status: 
Neurologic State: Confused; Eyes open to stimulus Orientation Level: Oriented to person Cognition: Decreased command following; Impaired decision making Safety/Judgement: Fall prevention Skin: Intact on UEs Edema: None noted in UEs Vision/Perceptual:   
Acuity: Within Defined Limits Coordination: 
Fine Motor Skills-Upper: Left Intact; Right Intact Gross Motor Skills-Upper: Left Intact; Right Intact Balance: 
Sitting: Impaired Strength: 
Strength: Generally decreased, functional(UEs; through observation) Tone & Sensation: Tone: Normal(UEs) Sensation: Intact(UEs) Range of Motion: 
AROM: Generally decreased, functional(UEs; through observation) Functional Mobility and Transfers for ADLs: 
Bed Mobility: 
Supine to Sit: Maximum assistance;Assist x2(Pt resisting movement.) Sit to Supine: Maximum assistance;Assist x2 Transfers: Toilet Transfer : (NT) 
  
ADL Assessment: 
Feeding: Setup;Supervision(per family report) Oral Facial Hygiene/Grooming: Maximum assistance Bathing: Total assistance Upper Body Dressing: Total assistance Lower Body Dressing: Total assistance Toileting: Total assistance Pain: 
Pt reports 0/10 pain or discomfort prior to treatment.   
Pt reports 0/10 pain or discomfort post treatment. Activity Tolerance:  
Fair Please refer to the flowsheet for vital signs taken during this treatment. After treatment:  
[] Patient left in no apparent distress sitting up in chair 
[x] Patient left in no apparent distress in bed 
[x] Call bell left within reach 
[] Nursing notified 
[x] Caregiver (daughter) present 
[] Bed alarm activated COMMUNICATION/EDUCATION:  
[x] Home safety education was provided and the patient/caregiver indicated understanding. [x] Patient/family have participated as able in goal setting and plan of care. [x] Patient/family agree to work toward stated goals and plan of care. [] Patient understands intent and goals of therapy, but is neutral about his/her participation. [] Patient is unable to participate in goal setting and plan of care. Thank you for this referral. 
Elvia Skiff, MS OTR/L Time Calculation: 8 mins

## 2019-01-08 NOTE — PROGRESS NOTES
Discharge Planning: · Pt has been accepted for admission to [facility] · Pt's family is requesting a UAI and a medicaid application · This writer contacted [employer] to start the medicaid application to the process for this pt · This writer will complete a UAI on this pt's behalf today · Pt' has active medicaid · Family chose [facility] for personal care services [employer] Private [facility] Manager [phone]

## 2019-01-08 NOTE — ROUTINE PROCESS
Bedside shift change report given to [facility] (oncoming nurse) by Raiza Evans RN (offgoing nurse). Report included the following information SBAR, Intake/Output, MAR, Accordion, Recent Results and Cardiac Rhythm paced.

## 2019-01-08 NOTE — PROGRESS NOTES
Problem: Mobility Impaired (Adult and Pediatric) Goal: *Acute Goals and Plan of Care (Insert Text) Physical Therapy Goals Initiated 1/8/2019 and to be accomplished within 3 day(s) 1. Patient will move from supine to sit and sit to supine  in bed with minimal assistance/contact guard assist.    
2.  Patient will transfer from bed to chair and chair to bed with minimal assistance/contact guard assist using the least restrictive device. 3.  Patient will perform sit to stand with minimal assistance/contact guard assist. 
 
Outcome: Progressing Towards Goal 
physical Therapy EVALUATION (3 day triaL) Patient: Kayla Jimenez (80 y.o. female) Date: 1/8/2019 Primary Diagnosis: Uremia Left hip pain Uremia Precautions:   Fall, Skin OBJECTIVE/ASSESSMENT : 
Based on the objective data described below, the patient presents with impaired functional mobility including bed mobility, transfers, ambulation, and general activity tolerance following admission for uremia. Pt was recently admitted to the hospital and discharged home, but has been unable to ambulate for the past 2 weeks per family. Patient presented today semi-reclined in bed, family at bedside. Pt disoriented to situation and agitated when attempts made to assist pt to edge of bed. Patient transferred supine-partial sit with max A but then began actively resisting therapist.  Pt returned to supine position with max A. At conclusion of session, patient left resting comfortably in bed with call bell in reach, needs met, and nurse notified. Education:  
[x]         Bed mobility []         Transfers 
[]         Ambulation 
[]         Assistive device management 
[]         Stairs [x]         Body mechanics [x]         Position change 
[]         Activity pacing/energy conservation 
[]         Other: 
 
Patient will benefit from skilled intervention on a 3 day trial basis  to address the above impairments. Patients rehabilitation potential is considered to be Fair Factors which may influence rehabilitation potential include:  
[]         None noted 
[]         Mental ability/status []         Medical condition 
[]         Home/family situation and support systems 
[]         Safety awareness 
[]         Pain tolerance/management 
[]         Other: PLAN : 
Recommendations and Planned Interventions: 
[x]           Bed Mobility Training             [x]    Neuromuscular Re-Education 
[x]           Transfer Training                   []    Orthotic/Prosthetic Training 
[x]           Gait Training                          []    Modalities [x]           Therapeutic Exercises          []    Edema Management/Control 
[x]           Therapeutic Activities            [x]    Patient and Family Training/Education 
[]           Other (comment): Frequency/Duration: Patient will be followed by physical therapy 1-2 times per day for 3 days and will be re-evaluated at that time. Discharge Recommendations: Andrey Ma Further Equipment Recommendations for Discharge: N/A  
 
SUBJECTIVE:  
Patient stated I wish you'd stop worrying me!  OBJECTIVE DATA SUMMARY:  
 
Past Medical History:  
Diagnosis Date  Acute on chronic diastolic heart failure ([facility] [address]  Arthritis  Benign hypertensive heart disease without heart failure Better controlled, stable  Chronic diastolic heart failure ([address] Breathing and edema is improving  Congestive heart failure ([address]  HTN (hypertension)  Hypercholesteremia  Lupus (systemic lupus erythematosus) ([facility] [address] 6/18/2014  
 followed by dr Leonardo Liu  Obesity, unspecified Patient has weight loss Discussed diet ad fluid restriction  Other and unspecified hyperlipidemia F/u per pmd  
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  
Procedure Laterality Date  HX HYSTERECTOMY  PACEMAKER PROCEDURE Barriers to Learning/Limitations: yes;  cognitive Compensate with: Visual Cues, Verbal Cues and Tactile Cues Prior Level of Function/Home Situation: Pt lives with  in a 2 story home with 3 steps to enter and a chair lift to access the 2nd floor of home. Pt's family reports that pt was able to ambulate in her home with a RW and negotiate the stairs up until her recent hospitalization approximately 2 weeks ago, after which pt has been non-ambulatory. Home Situation Home Environment: Private residence # Steps to Enter: 3 Rails to Enter: Yes Hand Rails : Bilateral 
One/Two Story Residence: Two story Lift Chair Available: Yes Living Alone: No 
Support Systems: Family member(s), Spouse/Significant Other/Partner Patient Expects to be Discharged to[de-identified] Private residence Current DME Used/Available at Home: Walker, rolling Tub or Shower Type: (Pt sponge bathes at home. )Critical Behavior: 
Neurologic State: Confused; Eyes open to stimulus Psychosocial 
Patient Behaviors: Agitated;Confused; Uncooperative Family  Behaviors: Calm;SupportiveStrength:   
Strength: Generally decreased, functional(UEs; through observation) Tone & Sensation:  
Tone: Normal(UEs) Sensation: Intact(UEs) Range Of Motion: 
AROM: Generally decreased, functional(UEs; through observation) Functional Mobility: 
Bed Mobility: 
Supine to Sit: Maximum assistance;Assist x2(Pt resisting movement.) Sit to Supine: Maximum assistance;Assist x2 Balance:  
Sitting: Impaired Pain: 
Pre session: did not report Post session: did not report Activity Tolerance:  
fair Please refer to the flowsheet for vital signs taken during this treatment. After treatment:  
[] Patient left in no apparent distress sitting up in chair 
[] Patient left sitting on EOB [x] Patient left in no apparent distress in bed 
[] Patient declined to be OOB at this time due to  
[x] Call bell left within reach [x] Nursing notified 
[x] Caregiver present 
[] Bed alarm activated 
[] SCDs in place COMMUNICATION/EDUCATION:  
[x]         Fall prevention education was provided and the patient/caregiver indicated understanding. [x]         Patient/family have participated as able in goal setting and plan of care. []         Patient/family agree to work toward stated goals and plan of care. []         Patient understands intent and goals of therapy, but is neutral about his/her participation. []         Patient is unable to participate in goal setting and plan of care. Thank you for this referral. 
Fazal Sainz, PT Time Calculation: 8 mins Mobility [id number] Current  CM= 80-99%   Goal  CJ= 20-39%. The severity rating is based on the Level of Assistance required for Functional Mobility and ADLs. Eval Complexity: History: MEDIUM  Complexity : 1-2 comorbidities / personal factors will impact the outcome/ POC Exam:LOW Complexity : 1-2 Standardized tests and measures addressing body structure, function, activity limitation and / or participation in recreation  Presentation: LOW Complexity : Stable, uncomplicated  Clinical Decision Making:Low Complexity   Overall Complexity:LOW

## 2019-01-08 NOTE — PROGRESS NOTES
[facility] Hospitalist Group Progress Note Patient: Chetan Ornelas Age: 80 y.o. : 1931 MR#: [id number] SSN: [id number] Date: 2019 Subjective:  
 
Pt states she is doing okay. No chest pain or SOB. D/w pt and son at bedside. PT/OT for placement recs. Assessment/Plan:  
Patient with severe dementia / SLE/ Severe OA affecting many major joints . / HTN/ CKD4 / recurrent hypokalemia /  
  
1. Acute OA - both Knee 2. Severe hypokalemia 3. HTN 4. SLE  
5. Dementia 6. Ataxia - due to pain 7. CKD 4  
8. History of paroxysmal atrial fibrillation, maintained on amiodarone 9. Shortness of breath and edema, multifactorial including acute on chronic decompensated systolic heart failure with underlying cardiomyopathy PLAN  
- X ray hip no fracture - likely from acute OA and lupus 
- Pain management - cont percocet - avoid NSAIDS due to CKD. - Cont steroids for inflammation - Joint injections likely not possible due to multiple joint involvement   
-  monitor electrolytes. Goals of care: full code Disposition:  [x]PT/OT ordered   [x] Case management referral 
 
Case discussed with:  [x]Patient  []Family  [x]Nursing  []Case Management DVT Prophylaxis:  []Lovenox  [x]Hep SQ  []SCDs  []Coumadin   []On Heparin gtt Objective:  
VS:  
Visit Vitals /61 Pulse 70 Temp 97.7 °F (36.5 °C) Resp 18 Wt 69.6 kg (153 lb 7 oz) SpO2 97% Breastfeeding? No  
BMI 25.53 kg/m² Tmax/24hrs: Temp (24hrs), Av.6 °F (37 °C), Min:97.7 °F (36.5 °C), Max:99.6 °F (37.6 °C) Intake/Output Summary (Last 24 hours) at 2019 1257 Last data filed at 2019 0630 Gross per 24 hour Intake 250 ml Output 650 ml Net -400 ml General:  Awake, NAD Cardiovascular:  RRR Pulmonary: CTA  
GI:  NT, normal BS Extremities:  No edema or cyanosis Neuro: AAOx0 Labs:   
Recent Results (from the past 24 hour(s)) METABOLIC PANEL, BASIC  
 Collection Time: 01/08/19  3:05 AM  
Result Value Ref Range Sodium 142 136 - 145 mmol/L Potassium 3.7 3.5 - 5.5 mmol/L Chloride 101 100 - 108 mmol/L  
 CO2 36 (H) 21 - 32 mmol/L Anion gap 5 3.0 - 18 mmol/L Glucose 105 (H) 74 - 99 mg/dL BUN 93 (H) 7.0 - 18 MG/DL Creatinine 2.31 (H) 0.6 - 1.3 MG/DL  
 BUN/Creatinine ratio 40 (H) 12 - 20 GFR est AA 24 (L) >60 ml/min/1.73m2 GFR est non-AA 20 (L) >60 ml/min/1.73m2 Calcium 8.6 8.5 - 10.1 MG/DL Signed By: Presley Bautista PA-C  January 8, 2019 12:57 PM

## 2019-01-08 NOTE — PROGRESS NOTES
Orders received, chart reviewed. Attempted to see pt for PT evaluation but pt refused. She appears confused and became combatitive when encouraged by PT and family to participate. Will attempt again later this afternoon as schedule allows.  
 
Thank you for this referral. 
 
 
Luciana Lomeli, PT, DPT

## 2019-01-08 NOTE — PROGRESS NOTES
Attempted to see patient for skilled OT evaluation. She was sleeping and declined to open her eyes to speak to me. When daughter and therapist moved her UEs, she pulled them back and said, \"Don't touch my arm. \"  Will continue to follow and see patient when appropriate/as available. Thank you.   Shanel Hensley, MS OTR/L

## 2019-01-08 NOTE — PROGRESS NOTES
[facility] 
 
 
CARDIOLOGY PROGRESS NOTE 
RECS: 
 
1. Shortness of breath and edema,- improved well. Use lasix prn only. multifactorial including acute on chronic decompensated systolic heart failure with underlying cardiomyopathy. Hold imdur and let BP rise for better renal and peripheral perfusion. 2.  Severe cardiomyopathy, progressive, unclear etiology considering age. No further workup for that. 3.  Status post permanent pacemaker, currently AV paced on telemetry. 4.  History of paroxysmal atrial fibrillation, maintained on amiodarone, currently no AFib noted. 5.  Hypokalemia. Continue supplement. 6.  Severe renal insufficiency, close monitoring. 7.  Dementia. ASSESSMENT: 
Hospital Problems  Date Reviewed: 7/23/2018 Codes Class Noted POA Uremia ICD-10-CM: N19 
ICD-9-CM: 314  1/5/2019 Unknown Left hip pain ICD-10-CM: M25.552 ICD-9-CM: 719.45  1/5/2019 Unknown SUBJECTIVE: 
confused OBJECTIVE: 
 
VS:  
Visit Vitals /66 Pulse 74 Temp 98 °F (36.7 °C) Resp 17 Wt 69.6 kg (153 lb 7 oz) SpO2 97% Breastfeeding? No  
BMI 25.53 kg/m² Intake/Output Summary (Last 24 hours) at 1/8/2019 1843 Last data filed at 1/8/2019 1247 Gross per 24 hour Intake 350 ml Output 650 ml Net -300 ml TELE: paced General: alert, well developed, pleasant and in no apparent distress HENT: Normocephalic, atraumatic. Normal external eye. Neck :  no JVD Cardiac:  regularly irregular rhythm, S1, S2 normal, no click, no rub Lungs: clear to auscultation bilaterally Abdomen: Soft, nontender, no masses Extremities:  Trace ankle edema  peripheral pulses present Labs: Results:  
   
Chemistry Recent Labs 01/08/19 
2764 01/07/19 
0247 01/06/19 
6366 * 146* 101*  140 139  
K 3.7 4.4 2.9*  
 101 99* CO2 36* 34* 35* BUN 93* 93* 102* CREA 2.31* 2.20* 2.26* CA 8.6 8.1* 8.3* AGAP 5 5 5 BUCR 40* 42* 45* AP  --   --  51  
TP  --   --  5.8* ALB  --   --  2.4*  
GLOB  --   --  3.4 AGRAT  --   --  0.7* CBC w/Diff Recent Labs 01/07/19 
0247 WBC 7.1 RBC 3.09* HGB 8.9* HCT 28.0*  
 GRANS 84* LYMPH 13* EOS 0 Cardiac Enzymes No results for input(s): CPK, CKND1, YAN in the last 72 hours. No lab exists for component: Milinda Diver Coagulation No results for input(s): PTP, INR, APTT in the last 72 hours. No lab exists for component: INREXT, INREXT Lipid Panel Lab Results Component Value Date/Time Cholesterol, total 134 05/12/2016 01:42 AM  
 HDL Cholesterol 70 (H) 05/12/2016 01:42 AM  
 LDL, calculated 55.6 05/12/2016 01:42 AM  
 VLDL, calculated 8.4 05/12/2016 01:42 AM  
 Triglyceride 42 05/12/2016 01:42 AM  
 CHOL/HDL Ratio 1.9 05/12/2016 01:42 AM  
  
BNP No results for input(s): BNPP in the last 72 hours. Liver Enzymes Recent Labs 01/06/19 
0135 TP 5.8* ALB 2.4* AP 51 SGOT 42* Thyroid Studies Lab Results Component Value Date/Time TSH 8.86 (H) 11/15/2015 01:45 PM  
    
 
 
 
Candace 55 I have independently evaluated taken history and examined the patient. All relevant labs and testing data's are reviewed. Care plan discussed and updated after review.  
Mirella Choi MD

## 2019-01-09 VITALS
BODY MASS INDEX: 25.53 KG/M2 | SYSTOLIC BLOOD PRESSURE: 98 MMHG | DIASTOLIC BLOOD PRESSURE: 43 MMHG | OXYGEN SATURATION: 93 % | TEMPERATURE: 97.4 F | WEIGHT: 153.44 LBS | RESPIRATION RATE: 17 BRPM | HEART RATE: 73 BPM

## 2019-01-09 PROCEDURE — 77030038269 HC DRN EXT URIN PURWCK BARD -A

## 2019-01-09 PROCEDURE — 74011250637 HC RX REV CODE- 250/637: Performed by: NURSE PRACTITIONER

## 2019-01-09 PROCEDURE — 74011250637 HC RX REV CODE- 250/637: Performed by: INTERNAL MEDICINE

## 2019-01-09 PROCEDURE — 74011250636 HC RX REV CODE- 250/636: Performed by: INTERNAL MEDICINE

## 2019-01-09 PROCEDURE — 74011636637 HC RX REV CODE- 636/637: Performed by: INTERNAL MEDICINE

## 2019-01-09 PROCEDURE — 97530 THERAPEUTIC ACTIVITIES: CPT

## 2019-01-09 RX ORDER — FUROSEMIDE 20 MG/1
TABLET ORAL
Qty: 30 TAB | Refills: 0 | Status: SHIPPED
Start: 2019-01-09 | End: 2019-02-10

## 2019-01-09 RX ORDER — FAMOTIDINE 20 MG/1
20 TABLET, FILM COATED ORAL DAILY
Qty: 20 TAB | Refills: 0 | Status: SHIPPED | OUTPATIENT
Start: 2019-01-10 | End: 2019-02-10

## 2019-01-09 RX ORDER — POTASSIUM CHLORIDE 20 MEQ/1
20 TABLET, EXTENDED RELEASE ORAL 2 TIMES DAILY
Qty: 30 TAB | Refills: 0 | Status: SHIPPED | OUTPATIENT
Start: 2019-01-09 | End: 2019-02-10

## 2019-01-09 RX ORDER — DOCUSATE SODIUM 100 MG/1
100 CAPSULE, LIQUID FILLED ORAL 2 TIMES DAILY
Qty: 60 CAP | Refills: 2 | Status: SHIPPED
Start: 2019-01-09 | End: 2019-03-27

## 2019-01-09 RX ORDER — PREDNISONE 10 MG/1
TABLET ORAL
Qty: 30 TAB | Refills: 0 | Status: SHIPPED
Start: 2019-01-09 | End: 2019-02-10

## 2019-01-09 RX ADMIN — AMIODARONE HYDROCHLORIDE 200 MG: 200 TABLET ORAL at 09:52

## 2019-01-09 RX ADMIN — DOCUSATE SODIUM 100 MG: 100 CAPSULE, LIQUID FILLED ORAL at 09:51

## 2019-01-09 RX ADMIN — FAMOTIDINE 20 MG: 20 TABLET, FILM COATED ORAL at 09:51

## 2019-01-09 RX ADMIN — CARVEDILOL 3.12 MG: 3.12 TABLET, FILM COATED ORAL at 09:51

## 2019-01-09 RX ADMIN — ASPIRIN 81 MG: 81 TABLET, COATED ORAL at 09:52

## 2019-01-09 RX ADMIN — ACETAMINOPHEN 650 MG: 325 TABLET ORAL at 09:51

## 2019-01-09 RX ADMIN — MEMANTINE 10 MG: 10 TABLET ORAL at 09:52

## 2019-01-09 RX ADMIN — PREDNISONE 30 MG: 20 TABLET ORAL at 12:24

## 2019-01-09 RX ADMIN — POTASSIUM CHLORIDE 20 MEQ: 20 TABLET, EXTENDED RELEASE ORAL at 09:51

## 2019-01-09 RX ADMIN — HEPARIN SODIUM 5000 UNITS: 5000 INJECTION INTRAVENOUS; SUBCUTANEOUS at 12:28

## 2019-01-09 RX ADMIN — HEPARIN SODIUM 5000 UNITS: 5000 INJECTION INTRAVENOUS; SUBCUTANEOUS at 05:40

## 2019-01-09 NOTE — DISCHARGE INSTRUCTIONS
DISCHARGE SUMMARY from Nurse    PATIENT INSTRUCTIONS:    After general anesthesia or intravenous sedation, for 24 hours or while taking prescription Narcotics:  · Limit your activities  · Do not drive and operate hazardous machinery  · Do not make important personal or business decisions  · Do  not drink alcoholic beverages  · If you have not urinated within 8 hours after discharge, please contact your surgeon on call. Report the following to your surgeon:  · Excessive pain, swelling, redness or odor of or around the surgical area  · Temperature over 100.5  · Nausea and vomiting lasting longer than 4 hours or if unable to take medications  · Any signs of decreased circulation or nerve impairment to extremity: change in color, persistent  numbness, tingling, coldness or increase pain  · Any questions    What to do at Home:  Recommended activity: Activity as tolerated,     If you experience any of the following symptoms persistent fever, nausea, vomiting, diarrhea, please follow up with facility RN/MD.    *  Please give a list of your current medications to your Primary Care Provider. *  Please update this list whenever your medications are discontinued, doses are      changed, or new medications (including over-the-counter products) are added. *  Please carry medication information at all times in case of emergency situations. These are general instructions for a healthy lifestyle:    No smoking/ No tobacco products/ Avoid exposure to second hand smoke  Surgeon General's Warning:  Quitting smoking now greatly reduces serious risk to your health.     Obesity, smoking, and sedentary lifestyle greatly increases your risk for illness    A healthy diet, regular physical exercise & weight monitoring are important for maintaining a healthy lifestyle    You may be retaining fluid if you have a history of heart failure or if you experience any of the following symptoms:  Weight gain of 3 pounds or more overnight or 5 pounds in a week, increased swelling in our hands or feet or shortness of breath while lying flat in bed. Please call your doctor as soon as you notice any of these symptoms; do not wait until your next office visit. Recognize signs and symptoms of STROKE:    F-face looks uneven    A-arms unable to move or move unevenly    S-speech slurred or non-existent    T-time-call 911 as soon as signs and symptoms begin-DO NOT go       Back to bed or wait to see if you get better-TIME IS BRAIN. Warning Signs of HEART ATTACK     Call 911 if you have these symptoms:   Chest discomfort. Most heart attacks involve discomfort in the center of the chest that lasts more than a few minutes, or that goes away and comes back. It can feel like uncomfortable pressure, squeezing, fullness, or pain.  Discomfort in other areas of the upper body. Symptoms can include pain or discomfort in one or both arms, the back, neck, jaw, or stomach.  Shortness of breath with or without chest discomfort.  Other signs may include breaking out in a cold sweat, nausea, or lightheadedness. Don't wait more than five minutes to call 911 - MINUTES MATTER! Fast action can save your life. Calling 911 is almost always the fastest way to get lifesaving treatment. Emergency Medical Services staff can begin treatment when they arrive -- up to an hour sooner than if someone gets to the hospital by car. The discharge information has been reviewed with the caregiver, receiving Chris BRADFORD. The caregiver verbalized understanding. Discharge medications reviewed with the caregiver and appropriate educational materials and side effects teaching were provided.   Patient armband removed and shredded

## 2019-01-09 NOTE — DISCHARGE SUMMARY
Discharge Summary Patient ID: [facility] 
[id number] 
15 y.o. 
6/26/1931 Body mass index is 25.53 kg/m². PCP on record: Nelson Hernandez MD 
 
Admit date: 1/5/2019 Discharge date and time: 1/9/2019 Discharge Diagnoses:                                          
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 
3 Dementia 4 Severe DJD involving all major joints 5 SLE  
6 Anemia - of chronic illness 7 Severe cardiomyopathy 8 Status post permanent pacemaker, currently AV paced on telemetry 
9 H/o paroxysmal afib 10 Recurrent hypokalemia 11 paroxysmal afib Consults: Cardiology and Nephrology Hospital Course by problems: 
 
Patient who was discharged recently and readmitted for systolic heart failure which improved with IV lasix . Her family's main concern was patient's bilateral leg swelling and right Knee pains . Knee and hip xrays suggested OA - no fracture . With CKD patient was started on steroids for anti inflammatory  effect . Between treatment of Systolic Heart failure and CKD - use of lasix is very difficult to balance . Advised to use lasix for prn symptoms relief only likely SOB / Leg edema Overall severe general health deterioration , this was d/w patient's family members at multiple times . Long term plan care was also discussed . They will have their own discussion and will decide at later date. Patient's dementia is very advanced and she has deep sedating effects with narcotic analgesics , family does not want narcotic analgesics . Recurrent admission are expected - Patient seen and examined by me on discharge day. Pertinent Findings: 
Sleepy ate good lunch Patient is Alert Awake and oriented HEENT - NAD   
RS - Clear , no rales no rhonchi CVS - regular rhythm and rate acceptable   
abd - benign, BS present , no Distension EXT - no edema , no calf tenderness Neuro - alert and awake , grossly motor and sensory intact Significant Diagnostic Studies: 
 
[place] ECHO ADULT COMPLETE Order# [id number] Reading physician: Annelise Caldwell MD Ordering physician: Rich Peck NP Study date: 18 Patient Information Patient Name [facility] MRN 
[id number] Sex Female    
1931 (80 y.o.) Reason For Exam  
Priority: Routine Heart Failure, Left Vitals Weight Height BSA (calculated - sq m) BP  
73 kg (161 lb) 5' 5\" (1.651 m) 1.83 sq meters 117/77 Interpretation Summary · Technically difficult study due to patient compliance. Unable to obtain on-axis apical images. Good parasternals, poor subcostal images. · Left ventricular moderate-to-severely decreased global systolic function. Estimated left ventricular ejection fraction is 31 - 35%. Visually measured ejection fraction. Left ventricular severe sigmoid septum hypertrophy. Abnormal left ventricular wall motion as described on the wall scoring diagram below. Abnormal left ventricular septal motion. Interventricular septal \"bounce\". Severe (grade 3) left ventricular diastolic dysfunction. · Moderate tricuspid valve regurgitation is present. Mild pulmonary hypertension is present. PASP 38mmHg · Mild aortic valve regurgitation is present. · Mild to moderate mitral valve regurgitation. · Pacing wire present Myocardial Findings Left Ventricle Normal cavity size. Severe sigmoid septum hypertrophy observed. There is global moderate-to-severely decreased systolic function. The estimated ejection fraction is 31 - 35%. Visually measured ejection fraction. Abnormal wall motion as described on the wall scoring diagram below. Abnormal left ventricular septal motion. Interventricular septal \"bounce\". There is severe (grade 3) left ventricular diastolic dysfunction. Left Atrium Unable to calculate index. Visually appears dilated. Right Ventricle Normal global systolic function. Visually appears dilated. . Pacing wire present . Right Atrium Pacemaker wire present in the right atrium. Aortic Valve Normal, trileaflet aortic valve structure. No stenosis. Mild aortic valve regurgitation. Mitral Valve Normal valve structure and no stenosis. Mild to moderate regurgitation. Tricuspid Valve Normal valve structure and no stenosis. Moderate tricuspid valve regurgitation. Pulmonary arterial systolic pressure is 57.2 mmHg. Mild pulmonary hypertension. Pulmonic Valve The pulmonic valve was not well visualized. Aorta Normal aortic root. IVC/Hepatic Veins Inferior vena cava not well visualized. Pericardium Insignificant pericardial effusion or fat pad. Wall Scoring Score Index: 1.41 The following segments are severely hypokinetic: basal anteroseptal, basal inferoseptal, mid anteroseptal, mid inferoseptal, apical anterior, apical septal and apex. All other segments are normal.  
  
  
  
  
TTE procedure Findings TTE Procedure Information Image quality: technically difficult. Technically difficult study due to patient compliance. Unable to obtain on-axis apical images. Good parasternals, poor subcostal images. Procedure Staff Technologist/Clinician: Lauro Del Rosario Supporting Staff: None Performing Physician/Midlevel: None 2D/M-Mode Measurements Dimensions Measurement Value (Range) LVIDs 2.74 cm LVIDd 4.08 cm (3.9 - 5.3) IVSd 2.09 cm (0.6 - 0.9) Abnormal   
  
LVPWd 1.3 cm (0.6 - 0.9) Abnormal   
  
LV Mass .1 g (67 - 162) Abnormal   
  
LV Mass AL Index 193.5 g/m2 (43 - 95) Abnormal   
  
   
  
  
Aorta Measurements Aorta Measurement Value (Range) Ao Root D 3.47 cm Aortic Valve Measurements LVOT  
LVOT Peak Velocity 91.12 cm/s LVOT Peak Gradient 3.3 mmHg LVOT VTI 17.34 cm  
  
LVOT d 1.92 cm Mitral Valve Measurements Stenosis Mitral Regurgitant Velocity Time Integral 119.73 cm Regurgitation Mitral Regurgitant Peak Velocity 434.04 cm/s Mitral Regurgitant Velocity Time Integral 119.73 cm  
  
MR Peak Gradient 75.4 mmHg Tricuspid Valve Measurements Stenosis PASP 38 mmHg Regurgitation Triscuspid Valve Regurgitation Peak Gradient 34.6 mmHg  
  
TR Max Velocity 294.32 cm/s Diastolic Filling/Shunts Diastolic Filling LV E' Septal Velocity 5.75 cm/s  
  
E/E' septal 0.15 LV E' Lateral Velocity 9.5 cm/s  
  
E/E' lateral 0.09   
  
E/E' ratio (averaged) 0.12 Mitral Valve E Wave Deceleration Time 282.9 ms  
  
MV E Alex 0.84 cm/s  
  
MV A Alex 31.97 cm/s  
  
MV E/A 0.03 PACS Images  
 
 Show images for ECHO ADULT COMPLETE Signed Electronically signed by White Swan MD Rayray on 12/28/18 at [id number] EST Printable Results Report Open Printable Results Report Encounter View Encounter Pertinent Lab Data: 
Recent Labs 01/07/19 
0247 WBC 7.1 HGB 8.9* HCT 28.0*  
 Recent Labs 01/08/19 
0305 01/07/19 
0247  140  
K 3.7 4.4  101 CO2 36* 34* * 146* BUN 93* 93* CREA 2.31* 2.20* CA 8.6 8.1* DISCHARGE MEDICATIONS:  
@ Current Discharge Medication List  
  
START taking these medications Details  
docusate sodium (COLACE) 100 mg capsule Take 1 Cap by mouth two (2) times a day for 90 days. Qty: 60 Cap, Refills: 2  
  
famotidine (PEPCID) 20 mg tablet Take 1 Tab by mouth daily. Qty: 20 Tab, Refills: 0  
  
potassium chloride (K-DUR, KLOR-CON) 20 mEq tablet Take 1 Tab by mouth two (2) times a day. Qty: 30 Tab, Refills: 0 Comments: Check k+ levels every week CONTINUE these medications which have CHANGED Details  
furosemide (LASIX) 20 mg tablet 1 -2 tabs po daily prn for leg swelling or SOB Qty: 30 Tab, Refills: 0 predniSONE (DELTASONE) 10 mg tablet 1 tab po tid for 1 week , and 1 tab po bid until discontinued Qty: 30 Tab, Refills: 0 CONTINUE these medications which have NOT CHANGED Details  
amitriptyline (ELAVIL) 25 mg tablet Take  by mouth nightly. carvedilol (COREG) 3.125 mg tablet Take 1 Tab by mouth every twelve (12) hours. Qty: 60 Tab, Refills: 0  
  
acetaminophen (TYLENOL ARTHRITIS PAIN) 650 mg TbER Take 1 Tab by mouth every eight (8) hours. Qty: 15 Tab, Refills: 0  
  
amiodarone (CORDARONE) 200 mg tablet TAKE ONE TABLET EVERY DAY (MAKE AN APPT) Qty: 30 Tab, Refills: 5  
  
isosorbide mononitrate ER (IMDUR) 60 mg CR tablet TAKE ONE TABLET EVERY MORNING Qty: 30 Tab, Refills: 6  
  
memantine (NAMENDA) 10 mg tablet   
  
aspirin 81 mg tablet Take 81 mg by mouth daily. STOP taking these medications  
  
 lidocaine (XYLOCAINE) 4 % topical cream Comments:  
Reason for Stopping: My Recommended Diet, Activity, Wound Care, and follow-up labs are listed in the patient's Discharge Insturctions which I have personally completed and reviewed. Disposition:    
[x] Home with family     [] [place] PT/RN   [] SNF/NH   [] Inpatient Rehab/EDUARDO Condition at Discharge:  Stable Follow up with: PCP : Jenness Galeazzi, MD 
 
 
Please follow-up tests/labs that are still pendin. None 2. 
 
>30 minutes spent coordinating this discharge (review instructions/follow-up, prescriptions, preparing report for sign off) Signed: 
Saqib Clement MD 
2019 
1:34 PM

## 2019-01-09 NOTE — PROGRESS NOTES
[facility] 
 
 
CARDIOLOGY PROGRESS NOTE 
RECS: 
 
1. Shortness of breath and edema,- improved well. Use lasix prn only. multifactorial including acute on chronic decompensated systolic heart failure with underlying cardiomyopathy. Hold imdur and let BP rise for better renal and peripheral perfusion. 2.  Severe cardiomyopathy, progressive, unclear etiology considering age. No further workup for that. 3.  Status post permanent pacemaker, currently AV paced on telemetry. 4.  History of paroxysmal atrial fibrillation, maintained on amiodarone, currently no AFib noted. 5.  Hypokalemia. Continue supplement. 6.  Severe renal insufficiency- slightly increased creatine levels and JOHN- lasix on hold 7. Dementia. Discharge planning per medical team 
D/w pt & family ASSESSMENT: 
Hospital Problems  Date Reviewed: 7/23/2018 Codes Class Noted POA Uremia ICD-10-CM: N19 
ICD-9-CM: 117  1/5/2019 Unknown Left hip pain ICD-10-CM: M25.552 ICD-9-CM: 719.45  1/5/2019 Unknown SUBJECTIVE: 
confused Pleasant OBJECTIVE: 
 
VS:  
Visit Vitals /72 (BP 1 Location: Left leg) Pulse 89 Temp 98.1 °F (36.7 °C) Resp 21 Wt 69.6 kg (153 lb 7 oz) SpO2 96% Breastfeeding? No  
BMI 25.53 kg/m² Intake/Output Summary (Last 24 hours) at 1/9/2019 1143 Last data filed at 1/9/2019 9021 Gross per 24 hour Intake 410 ml Output 300 ml Net 110 ml  
 
TELE: paced General: alert, well developed, pleasant and in no apparent distress HENT: Normocephalic, atraumatic. Normal external eye. Neck :  no JVD Cardiac:  regularly irregular rhythm, S1, S2 normal, no click, no rub Lungs: clear to auscultation bilaterally Abdomen: Soft, nontender, no masses Extremities: no  edema  peripheral pulses present Labs: Results:  
   
Chemistry Recent Labs 01/08/19 
0305 01/07/19 
0247 * 146*  140  
K 3.7 4.4  101 CO2 36* 34* BUN 93* 93* CREA 2.31* 2.20* CA 8.6 8.1* AGAP 5 5 BUCR 40* 42* CBC w/Diff Recent Labs 01/07/19 
0247 WBC 7.1 RBC 3.09* HGB 8.9* HCT 28.0*  
 GRANS 84* LYMPH 13* EOS 0 Cardiac Enzymes No results for input(s): CPK, CKND1, YAN in the last 72 hours. No lab exists for component: Luis E Brighter Coagulation No results for input(s): PTP, INR, APTT in the last 72 hours. No lab exists for component: INREXT, INREXT Lipid Panel Lab Results Component Value Date/Time Cholesterol, total 134 05/12/2016 01:42 AM  
 HDL Cholesterol 70 (H) 05/12/2016 01:42 AM  
 LDL, calculated 55.6 05/12/2016 01:42 AM  
 VLDL, calculated 8.4 05/12/2016 01:42 AM  
 Triglyceride 42 05/12/2016 01:42 AM  
 CHOL/HDL Ratio 1.9 05/12/2016 01:42 AM  
  
BNP No results for input(s): BNPP in the last 72 hours. Liver Enzymes No results for input(s): TP, ALB, TBIL, AP, SGOT, GPT in the last 72 hours. No lab exists for component: DBIL Thyroid Studies Lab Results Component Value Date/Time TSH 8.86 (H) 11/15/2015 01:45 PM  
    
 
 
 
Candace 55 I have independently evaluated taken history and examined the patient. All relevant labs and testing data's are reviewed. Care plan discussed and updated after review.  
Doug Kurtz MD

## 2019-01-09 NOTE — PROGRESS NOTES
Problem: Pressure Injury - Risk of 
Goal: *Prevention of pressure injury Document Rodríguez Scale and appropriate interventions in the flowsheet. Outcome: Progressing Towards Goal 
Pressure Injury Interventions: 
Sensory Interventions: Assess changes in LOC Moisture Interventions: Absorbent underpads, Internal/External urinary devices Activity Interventions: Pressure redistribution bed/mattress(bed type) Mobility Interventions: Float heels Nutrition Interventions: Document food/fluid/supplement intake Friction and Shear Interventions: HOB 30 degrees or less Problem: Falls - Risk of 
Goal: *Absence of Falls Document Dorina Benítez Fall Risk and appropriate interventions in the flowsheet. Outcome: Progressing Towards Goal 
Fall Risk Interventions: 
  
 
Mentation Interventions: Bed/chair exit alarm Medication Interventions: Bed/chair exit alarm Elimination Interventions: Bed/chair exit alarm, Call light in reach History of Falls Interventions: Bed/chair exit alarm

## 2019-01-09 NOTE — PROGRESS NOTES
Problem: Pressure Injury - Risk of 
Goal: *Prevention of pressure injury Document Rodríguez Scale and appropriate interventions in the flowsheet. Outcome: Progressing Towards Goal 
Pressure Injury Interventions: 
Sensory Interventions: Assess changes in LOC, Assess need for specialty bed, Check visual cues for pain, Float heels, Keep linens dry and wrinkle-free, Minimize linen layers, Turn and reposition approx. every two hours (pillows and wedges if needed) Moisture Interventions: Absorbent underpads, Check for incontinence Q2 hours and as needed, Assess need for specialty bed, Internal/External urinary devices, Minimize layers, Moisture barrier Activity Interventions: Pressure redistribution bed/mattress(bed type) Mobility Interventions: Float heels, Assess need for specialty bed, HOB 30 degrees or less, Turn and reposition approx. every two hours(pillow and wedges) Nutrition Interventions: Document food/fluid/supplement intake Friction and Shear Interventions: HOB 30 degrees or less, Lift sheet, Minimize layers Problem: Falls - Risk of 
Goal: *Absence of Falls Document Emani Caballero Fall Risk and appropriate interventions in the flowsheet. Outcome: Progressing Towards Goal 
Fall Risk Interventions: 
  
 
Mentation Interventions: Adequate sleep, hydration, pain control, Bed/chair exit alarm, Door open when patient unattended, More frequent rounding, Reorient patient, Room close to nurse's station, Toileting rounds Medication Interventions: Bed/chair exit alarm Elimination Interventions: Bed/chair exit alarm, Call light in reach, Patient to call for help with toileting needs, Toilet paper/wipes in reach, Toileting schedule/hourly rounds History of Falls Interventions: Bed/chair exit alarm, Door open when patient unattended, Room close to nurse's station

## 2019-01-09 NOTE — PROGRESS NOTES
Decrease dose of steroids Decrease to 20 mg in 1 week and continue 20 mg po daily until discontinued ( Chronic steroid therapy ) Add Vit D for osteoporosis treatment/prevention Out patient work up and management of Osteoporosis If place available for placement discharge to day Patient interviewed and examined independently . Management plan reviewed APC's note reviewed , agreed with exam and A&P

## 2019-01-09 NOTE — ROUTINE PROCESS
Bedside and Verbal shift change report given to Fernanda Donald, RN and Aydin Corona, RN (oncoming nurse) by Renzo Grimes RN (offgoing nurse). Report included the following information SBAR, Kardex, MAR and Recent Results. SITUATION: 
Code Status: Full Code Reason for Admission: Uremia Left hip pain Uremia Hospital day: 3 Problem List:  
   
Hospital Problems  Date Reviewed: 7/23/2018 Codes Class Noted POA Uremia ICD-10-CM: N19 
ICD-9-CM: 080  1/5/2019 Unknown Left hip pain ICD-10-CM: M25.552 ICD-9-CM: 719.45  1/5/2019 Unknown BACKGROUND: 
 Past Medical History:  
Past Medical History:  
Diagnosis Date  Acute on chronic diastolic heart failure ([facility]  Arthritis  Benign hypertensive heart disease without heart failure Better controlled, stable  Chronic diastolic heart failure ([facility] Breathing and edema is improving  Congestive heart failure ([facility]  HTN (hypertension)  Hypercholesteremia  Lupus (systemic lupus erythematosus) ([facility] 6/18/2014  
 followed by dr Romayne Ingles  Obesity, unspecified Patient has weight loss Discussed diet ad fluid restriction  Other and unspecified hyperlipidemia F/u per pmd  
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  
 tr with moderate pulmonary htn Patient taking anticoagulants yes Patient has a defibrillator: no  
 History of shots NO for example, flu, pneumonia, tetanus Isolation History NO for example, MRSA, CDiff ASSESSMENT: 
Changes in Assessment Throughout Shift: NONE Significant Changes in 24 hours (for example, RR/code, fall) Patient has Central Line: no  
Patient has Mendiola Cath: no  
Mobility Issues PT 
IV Patency OR Checklist 
Pending Tests Last Vitals: 
Vitals w/ MEWS Score (last day) Date/Time MEWS Score Pulse Resp Temp BP Level of Consciousness SpO2  
 01/09/19 0353  1  72  16  98.1 °F (36.7 °C)  133/89  Alert  96 % 01/09/19 0042  1  73  16  97.5 °F (36.4 °C)  124/72  Alert  100 % 01/08/19 2040  1  71  16  97.9 °F (36.6 °C)  124/72  Alert  96 % 01/08/19 1549  1  74  17  98 °F (36.7 °C)  120/66  Alert  97 % 01/08/19 1211    70  18    105/52  Alert    
 01/08/19 0758  1  70  18  97.7 °F (36.5 °C)  119/61  Alert  97 % 01/08/19 0408          102/59      
 01/08/19 0344  2  68  18  98.4 °F (36.9 °C)  87/43  (Abnormal)   Alert  98 % PAIN Pain Assessment Pain Intensity 1: 0 (01/09/19 0353) Pain Location 1: Generalized Pain Intervention(s) 1: Medication (see MAR) Patient Stated Pain Goal: 0 Intervention effective: N/A Time of last intervention: N/A Reassessment Completed: yes Other actions taken for pain: Distraction Last 3 Weights: 
Last 3 Recorded Weights in this Encounter 01/06/19 8728 01/07/19 4654 01/08/19 3378 Weight: 70.3 kg (155 lb) 70.5 kg (155 lb 6.4 oz) 69.6 kg (153 lb 7 oz) Weight change: -0.889 kg (-15.4 oz) INTAKE/OUPUT Current Shift: No intake/output data recorded. Last three shifts: 01/07 1901 - 01/09 0700 In: 0 [P.O.:590] Out: 750 [Urine:750] RECOMMENDATIONS AND DISCHARGE PLANNING Patient needs and requests: Assistance with ADL's 
 
Pending tests/procedures: labs Discharge plan for patient: Home Discharge planning Needs or Barriers: none Estimated Discharge Date: 1/11/2019 Posted on Whiteboard in [facility] Room: yes \"HEALS\" SAFETY CHECK A safety check occurred in the patient's room between off going nurse and oncoming nurse listed above. The safety check included the below items: 
 
H High Alert Medications Verify all high alert medication drips (heparin, PCA, etc.) E Equipment Suction is set up for ALL patients (with yanker) Red plugs utilized for all equipment (IV pumps, etc.) WOWs wiped down at end of shift. Room stocked with oxygen, suction, and other unit-specific supplies A 
 Alarms Bed alarm is set for fall risk patients Ensure chair alarm is in place and activated if patient is up in a chair L Lines Check IV for any infiltration Mendiola bag is empty if patient has a Mendiola Tubing and IV bags are labeled Laurel Thomas Safety Room is clean, patient is clean, and equipment is clean. Hallways are clear from equipment besides carts. Fall bracelet on for fall risk patients Ensure room is clear and free of clutter Suction is set up for ALL patients (with vi) Hallways are clear from equipment besides carts. Isolation precautions followed, supplies available outside room, sign posted Sterling Olivier RN

## 2019-01-09 NOTE — ROUTINE PROCESS
Bedside and Verbal shift change report given to Alice Hanson (oncoming nurse) by Pedro Argueta RN (offgoing nurse). Report included the following information SBAR, Kardex, MAR and Recent Results. SITUATION:  
? Code Status: Full Code 
? Reason for Admission: Uremia ? Left hip pain ? Uremia 
 
? Hospital day: 2 
? Problem List:  
   
Hospital Problems  Date Reviewed: 7/23/2018 Codes Class Noted POA Uremia ICD-10-CM: N19 
ICD-9-CM: 954  1/5/2019 Unknown Left hip pain ICD-10-CM: M25.552 ICD-9-CM: 719.45  1/5/2019 Unknown BACKGROUND:  
 Past Medical History:  
Past Medical History:  
Diagnosis Date  Acute on chronic diastolic heart failure ([facility])  Arthritis  Benign hypertensive heart disease without heart failure Better controlled, stable  Chronic diastolic heart failure ([facility]) Breathing and edema is improving  Congestive heart failure ([facility])  HTN (hypertension)  Hypercholesteremia  Lupus (systemic lupus erythematosus) ([facility]) 6/18/2014  
 followed by dr Laxmi Luo  Obesity, unspecified Patient has weight loss Discussed diet ad fluid restriction  Other and unspecified hyperlipidemia F/u per pmd  
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  
 tr with moderate pulmonary htn Patient taking anticoagulants yes ASSESSMENT:  
? Changes in Assessment Throughout Shift: yes, became very drowsy ? Patient has Central Line: no Reasons if yes: n/a 
? Patient has Mendiola Cath: no Reasons if yes: n/a  
 
? Last Vitals: 
  
Vitals:  
 01/08/19 2819 01/08/19 5798 01/08/19 1211 01/08/19 1549 BP: 119/61  105/52 120/66 Pulse: 70  70 74 Resp: 18  18 17 Temp: 97.7 °F (36.5 °C)   98 °F (36.7 °C) SpO2: 97%   97% Weight:  69.6 kg (153 lb 7 oz) ? IV and DRAINS (will only show if present) Peripheral IV 01/05/19 Left Hand-Site Assessment: Clean, dry, & intact ? WOUND (if present) Wound Type:  none Dressing present Dressing Present : No 
 Wound Concerns/Notes:  none ? PAIN Pain Assessment Pain Intensity 1: 0 (01/08/19 1554) Pain Location 1: Generalized Pain Intervention(s) 1: Medication (see MAR) Patient Stated Pain Goal: 0 
o Interventions for Pain: tylenol 
o Intervention effective: yes 
o Time of last intervention: 0940  
o Reassessment Completed: yes ? Last 3 Weights: 
Last 3 Recorded Weights in this Encounter 01/06/19 9179 01/07/19 0136 01/08/19 5199 Weight: 70.3 kg (155 lb) 70.5 kg (155 lb 6.4 oz) 69.6 kg (153 lb 7 oz) Weight change: ? INTAKE/OUPUT Current Shift: No intake/output data recorded. Last three shifts: 01/07 0701 - 01/08 1900 In: 0 [P.O.:590] Out: 650 [Urine:650] ? LAB RESULTS Recent Labs 01/07/19 
0247 WBC 7.1 HGB 8.9* HCT 28.0*  
 Recent Labs 01/08/19 
2571 01/07/19 
0247 01/06/19 
2248  140 139  
K 3.7 4.4 2.9*  
* 146* 101* BUN 93* 93* 102* CREA 2.31* 2.20* 2.26* CA 8.6 8.1* 8.3*  
 
 
RECOMMENDATIONS AND DISCHARGE PLANNING 1. Pending tests/procedures/ Plan of Care or Other Needs: Discharge 2. Discharge plan for patient and Needs/Barriers: SNF 3. Estimated Discharge Date: 01/10/19 Posted on Whiteboard in [facility] Room: yes 4. The patient's care plan was reviewed with the oncoming nurse. \"HEALS\" SAFETY CHECK Fall Risk Total Score: 4 Safety Measures: Safety Measures: Bed/Chair alarm on, Bed/Chair-Wheels locked, Bed in low position, Call light within reach, Fall prevention (comment), Gripper socks, Side rails X 3 A safety check occurred in the patient's room between off going nurse and oncoming nurse listed above. The safety check included the below items Area Items H High Alert Medications ? Verify all high alert medication drips (heparin, PCA, etc.) E Equipment ? Suction is set up for ALL patients (with vi) ? Red plugs utilized for all equipment (IV pumps, etc.) ? WOWs wiped down at end of shift. ? Room stocked with oxygen, suction, and other unit-specific supplies A Alarms ? Bed alarm is set for fall risk patients ? Ensure chair alarm is in place and activated if patient is up in a chair L Lines ? Check IV for any infiltration ? Mendiola bag is empty if patient has a Mendiola ? Tubing and IV bags are labeled [facility] ? Room is clean, patient is clean, and equipment is clean. ? Hallways are clear from equipment besides carts. ? Fall bracelet on for fall risk patients ? Ensure room is clear and free of clutter ? Suction is set up for ALL patients (with vi) ? Hallways are clear from equipment besides carts. ? Isolation precautions followed, supplies available outside room, sign posted Cem Cutler RN

## 2019-01-09 NOTE — PROGRESS NOTES
Problem: Mobility Impaired (Adult and Pediatric) Goal: *Acute Goals and Plan of Care (Insert Text) Physical Therapy Goals Initiated 1/8/2019 and to be accomplished within 3 day(s) 1. Patient will move from supine to sit and sit to supine  in bed with minimal assistance/contact guard assist.    
2.  Patient will transfer from bed to chair and chair to bed with minimal assistance/contact guard assist using the least restrictive device. 3.  Patient will perform sit to stand with minimal assistance/contact guard assist. 
  
Outcome: Progressing Towards Goal 
physical Therapy TREATMENT Patient: Kayla Jimenez (80 y.o. female) Date: 1/9/2019 Diagnosis: Uremia Left hip pain Uremia <principal problem not specified> Precautions: Fall, Skin Chart, physical therapy assessment, plan of care and goals were reviewed. OBJECTIVE/ ASSESSMENT: 
Nurse Kaur Soria reported to this LPTA that pt's family is currently in pt's room attempting to assist pt OOB. Upon entry pt's family is pushing upward on pt's torso while pt is laying supine with LEs dangling off EOB, pt is actively resisting movement and yelling back and fourth with family in room. This LPTA attempted to assist pt safely into sitting, but pt continues to resist. Pt was allowed to return to supine, which she did with SBA and additional time. Pt then scooted toward [facility] with 2 person draw sheet transfers. Educated family in LEs therex and hand placement to assist pt. Pt performed hel slide x 10 bilaterally and SLR x 10 with AAROM. Advised family that standing is likely unsafe when pt is actively resisting and that chance of falling is increased. Left pt supine with needs in reach and pt's family assisiting pt with LE therex. Education: 
[x]         Bed mobility []         Transfers 
[]         Ambulation / gait []         Assistive device management 
[]         Stairs 
[]         Body mechanics []         Position change [x]         Therapeutic exercise [x]         Activity pacing / energy conservation 
[]         Other: 
 
Progression toward goals: 
[]      Improving appropriately and progressing toward goals [x]      Improving slowly and progressing toward goals 
[]      Not making progress toward goals and plan of care will be adjusted PLAN: 
Patient continues to benefit from skilled intervention to address the above impairments. Continue treatment per established plan of care. Discharge Recommendations:  Andrey Ma Further Equipment Recommendations for Discharge:  rolling walker / TBD SUBJECTIVE:  
Patient stated I just came in here yesterday.  OBJECTIVE DATA SUMMARY:  
Critical Behavior: 
Neurologic State: Alert, Confused, Eyes open spontaneously Orientation Level: Oriented to person, Disoriented to time, Disoriented to situation, Disoriented to place Cognition: Impaired decision making, Poor safety awareness, Follows commands Safety/Judgement: Fall prevention Functional Mobility Training: 
Bed Mobility: 
Sit to Supine: Stand-by assistance Scooting: Maximum assistance;Assist x2 Balance: 
Sitting: Impaired Sitting - Static: Poor (constant support)Therapeutic Exercises:  
 
 
EXERCISE Sets Reps Active Active Assist  
Passive Self- assited ROM Comments Ankle Pumps    [] [] [] [] Quad Sets   [] [] [] [] Glut Sets   [] [] [] [] Short Arc Quads   [] [] [] [] Heel Slides 1 10 [] [x] [] [] Bilaterally Straight Leg Raises 1 10 [] [x] [] [] Bilaterally Hip Abd   [] [] [] [] Long Arc Quads   [] [] [] [] Seated Marching   [] [] [] [] Seated Knee Flexion   [] [] [] []   
Standing Marching   [] [] [] []   
 
Pain: 
Pre tx pain: not ratedPost tx pain: not ratedPain Scale 1: Numeric (0 - 10) Pain Intensity 1: 0 Activity Tolerance:  
Fair Please refer to the flowsheet for vital signs taken during this treatment. After treatment: [] Patient left in no apparent distress sitting up in chair 
[x] Patient left in no apparent distress in bed 
[x] Call bell left within reach 
[] Nursing notified 
[] Caregiver present 
[] Bed alarm activated 
[] SCDs applied 
[] Ice applied Daryl Yolande, PTA Time Calculation: 12 mins Mobility [id number] Current  CM= 80-99%. The severity rating is based on the Level of Assistance required for Functional Mobility and ADLs. Mobility   Goal  CJ= 20-39%. The severity rating is based on the Level of Assistance required for Functional Mobility and ADLs.

## 2019-01-10 ENCOUNTER — PATIENT OUTREACH (OUTPATIENT)
Dept: CARDIOLOGY CLINIC | Age: 84
End: 2019-01-10

## 2019-01-10 NOTE — PROGRESS NOTES
Transition of Care Coordination/Hospital to Post Acute Facility: 
  
Date/Time:  1/10/2019 3:44 PM 
 
Patient was admitted to [facility] on 19 and discharged on 19 for acute on chronic systolic heart failure . Patient was transferred to [facility] for continuation of care. Inpatient RRAT score: 22 Top Challenges reviewed 1 Acute on chronic systolic heart failure 2 CKD3  
3 Dementia Method of communication with care team :phone Nurse Navigator(NN) spoke with Renetta Leon LPN to provide introduction to self and explanation of the Nurse Navigator Role. Verified name and  as patient identifiers. Discussed and reviewed  diet restrictions, discharge summary, follow up appointments, medication reconciliation ACP:  
Does the patient have a current ACP (including DDNR):  no 
Does the post acute facility have a copy of the patients ACP:  no 
 
Medication(s):  
New Medications at Discharge:  
docusate sodium (COLACE) 100 mg capsule Take 1 Cap by mouth two (2) times a day for 90 days. Qty: 60 Cap, Refills: 2  
   
famotidine (PEPCID) 20 mg tablet Take 1 Tab by mouth daily. Qty: 20 Tab, Refills: 0  
   
potassium chloride (K-DUR, KLOR-CON) 20 mEq tablet Take 1 Tab by mouth two (2) times a day. Qty: 30 Tab, Refills: 0 Comments: Check k+ levels every week Changed Medications at Discharge: 
furosemide (LASIX) 20 mg tablet 1 -2 tabs po daily prn for leg swelling or SOB Qty: 30 Tab, Refills: 0  
   
predniSONE (DELTASONE) 10 mg tablet 1 tab po tid for 1 week , and 1 tab po bid until discontinued Qty: 30 Tab, Discontinued Medications at Discharge:  
 lidocaine (XYLOCAINE) 4 % topical cream  
 
  
PCP/Specialist follow up:  
Future Appointments Date Time Provider Win Mukherjee 2019 10:30 AM Georges Glynn MD [facility] [address] Opportunity to ask questions was provided.  Contact information was provided for future reference or further questions. Will continue to monitor.

## 2019-01-21 ENCOUNTER — OFFICE VISIT (OUTPATIENT)
Dept: CARDIOLOGY CLINIC | Age: 84
End: 2019-01-21

## 2019-01-21 VITALS
HEART RATE: 91 BPM | SYSTOLIC BLOOD PRESSURE: 129 MMHG | BODY MASS INDEX: 31.16 KG/M2 | HEIGHT: 65 IN | WEIGHT: 187 LBS | DIASTOLIC BLOOD PRESSURE: 57 MMHG

## 2019-01-21 DIAGNOSIS — I50.22 CHRONIC SYSTOLIC CONGESTIVE HEART FAILURE (HCC): Primary | ICD-10-CM

## 2019-01-21 DIAGNOSIS — I50.32 HYPERTENSIVE HEART DISEASE WITH CHRONIC DIASTOLIC CONGESTIVE HEART FAILURE (HCC): ICD-10-CM

## 2019-01-21 DIAGNOSIS — N18.4 CKD (CHRONIC KIDNEY DISEASE) STAGE 4, GFR 15-29 ML/MIN (HCC): ICD-10-CM

## 2019-01-21 DIAGNOSIS — I48.92 PAROXYSMAL ATRIAL FLUTTER (HCC): ICD-10-CM

## 2019-01-21 DIAGNOSIS — Z95.0 S/P PLACEMENT OF CARDIAC PACEMAKER: ICD-10-CM

## 2019-01-21 DIAGNOSIS — Z79.899 HIGH RISK MEDICATION USE: ICD-10-CM

## 2019-01-21 DIAGNOSIS — I36.9 TRICUSPID VALVE DISORDERS, NON-RHEUMATIC: ICD-10-CM

## 2019-01-21 DIAGNOSIS — I11.0 HYPERTENSIVE HEART DISEASE WITH CHRONIC DIASTOLIC CONGESTIVE HEART FAILURE (HCC): ICD-10-CM

## 2019-01-21 RX ORDER — RANITIDINE HCL 75 MG
TABLET ORAL 2 TIMES DAILY
COMMUNITY
End: 2019-03-27

## 2019-01-21 RX ORDER — MENTHOL/ZINC OXIDE 0.44-20.6%
1 OINTMENT (GRAM) TOPICAL AS NEEDED
COMMUNITY

## 2019-01-21 RX ORDER — DICLOFENAC SODIUM 10 MG/G
GEL TOPICAL 4 TIMES DAILY
COMMUNITY
End: 2019-02-10

## 2019-01-21 NOTE — PROGRESS NOTES
HISTORY OF PRESENT ILLNESS Madeline Hernandez is a 80 y.o. female. Patient with  chf,had persistent junctional ryhtm ,atrial flutter , s/p pacemaker,feels better Sob better Admitted to hospital 1/2019 with acute CHF. Low ejection fraction with systolic heart failure. Feels less short of breath since discharge. Pacemaker Check The history is provided by the patient. This is a new problem. Pertinent negatives include no chest pain, no abdominal pain, no headaches and no shortness of breath. CHF The history is provided by the patient. This is a chronic problem. The problem occurs constantly. The problem has been gradually improving. Pertinent negatives include no chest pain, no abdominal pain, no headaches and no shortness of breath. The symptoms are aggravated by exertion. Palpitations The history is provided by the patient. This is a chronic problem. The problem occurs constantly. Associated symptoms include lower extremity edema. Pertinent negatives include no fever, no chest pain, no claudication, no orthopnea, no PND, no abdominal pain, no nausea, no vomiting, no headaches, no dizziness, no weakness, no cough, no hemoptysis, no shortness of breath and no sputum production. Her past medical history is significant for hypertension. Hypertension The history is provided by the patient. This is a chronic problem. The problem occurs constantly. The problem has not changed since onset. Pertinent negatives include no chest pain, no abdominal pain, no headaches and no shortness of breath. Shortness of Breath The history is provided by the patient. This is a recurrent problem. The problem occurs intermittently. The problem has been gradually improving. Associated symptoms include leg swelling.  Pertinent negatives include no fever, no headaches, no cough, no sputum production, no hemoptysis, no wheezing, no PND, no orthopnea, no chest pain, no vomiting, no abdominal pain, no rash and no claudication. Precipitated by: exertion. Associated medical issues include heart failure. Leg Swelling The history is provided by the patient. This is a chronic problem. The problem occurs daily. The problem has been gradually improving. Pertinent negatives include no chest pain, no abdominal pain, no headaches and no shortness of breath. Review of Systems Constitutional: Negative for chills and fever. HENT: Negative for nosebleeds. Eyes: Negative for blurred vision and double vision. Respiratory: Negative for cough, hemoptysis, sputum production, shortness of breath and wheezing. Cardiovascular: Positive for leg swelling. Negative for chest pain, palpitations, orthopnea, claudication and PND. Gastrointestinal: Negative for abdominal pain, heartburn, nausea and vomiting. Musculoskeletal: Negative for myalgias. Skin: Negative for rash. Neurological: Negative for dizziness, weakness and headaches. Endo/Heme/Allergies: Does not bruise/bleed easily. Family History Problem Relation Age of Onset  Arrhythmia Neg Hx  Asthma Neg Hx  Clotting Disorder Neg Hx  Fainting Neg Hx   
 Heart Attack Neg Hx  High Cholesterol Neg Hx  Pacemaker Neg Hx  Stroke Neg Hx Past Medical History:  
Diagnosis Date  Acute on chronic diastolic heart failure ([address]  Arthritis  Benign hypertensive heart disease without heart failure Better controlled, stable  Chronic diastolic heart failure ([address] Breathing and edema is improving  Congestive heart failure ([address]  HTN (hypertension)  Hypercholesteremia  Lupus (systemic lupus erythematosus) ([address] 6/18/2014  
 followed by dr Kay Davenport  Obesity, unspecified Patient has weight loss Discussed diet ad fluid restriction  Other and unspecified hyperlipidemia F/u per pmd  
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  
Procedure Laterality Date  HX HYSTERECTOMY  PACEMAKER PROCEDURE No Known Allergies Current Outpatient Medications Medication Sig  
 menthol-zinc oxide (CALMOSEPTINE) 0.44-20.6 % oint Apply  to affected area.  raNITIdine (ZANTAC) 75 mg tab Take  by mouth two (2) times a day.  diclofenac (VOLTAREN) 1 % gel Apply  to affected area four (4) times daily.  furosemide (LASIX) 20 mg tablet 1 -2 tabs po daily prn for leg swelling or SOB  predniSONE (DELTASONE) 10 mg tablet 1 tab po tid for 1 week , and 1 tab po bid until discontinued  docusate sodium (COLACE) 100 mg capsule Take 1 Cap by mouth two (2) times a day for 90 days.  potassium chloride (K-DUR, KLOR-CON) 20 mEq tablet Take 1 Tab by mouth two (2) times a day.  carvedilol (COREG) 3.125 mg tablet Take 1 Tab by mouth every twelve (12) hours.  acetaminophen (TYLENOL ARTHRITIS PAIN) 650 mg TbER Take 1 Tab by mouth every eight (8) hours.  amiodarone (CORDARONE) 200 mg tablet TAKE ONE TABLET EVERY DAY (MAKE AN APPT)  isosorbide mononitrate ER (IMDUR) 60 mg CR tablet TAKE ONE TABLET EVERY MORNING  
 memantine (NAMENDA) 10 mg tablet  amitriptyline (ELAVIL) 25 mg tablet Take  by mouth nightly.  aspirin 81 mg tablet Take 81 mg by mouth daily.  famotidine (PEPCID) 20 mg tablet Take 1 Tab by mouth daily. No current facility-administered medications for this visit. Visit Vitals /57 Pulse 91 Ht 5' 5\" (1.651 m) Wt 84.8 kg (187 lb) BMI 31.12 kg/m² Physical Exam  
Constitutional: She is oriented to person, place, and time. She appears well-developed and well-nourished. HENT:  
Head: Normocephalic and atraumatic. Eyes: Conjunctivae are normal.  
Neck: Neck supple. No JVD present. No tracheal deviation present. No thyromegaly present. Cardiovascular: Normal rate and regular rhythm. PMI is not displaced. Exam reveals no gallop and no decreased pulses. Murmur heard. Holosystolic murmur is present at the lower left sternal border. Pulmonary/Chest: No respiratory distress. She has no wheezes. She has no rales. She exhibits no tenderness. Abdominal: Soft. There is no tenderness. Musculoskeletal: She exhibits edema (mild). Neurological: She is alert and oriented to person, place, and time. Skin: Skin is warm. Psychiatric: She has a normal mood and affect. No flowsheet data found. Ms. Lucero Goode has a reminder for a \"due or due soon\" health maintenance. I have asked that she contact her primary care provider for follow-up on this health maintenance. SUMMARY:4/2014 Left ventricle: Ejection fraction was estimated to be 60 %. No obvious 
wall motion abnormalities identified in the views obtained. There was mild 
concentric hypertrophy. Features were consistent with a pseudonormal left 
ventricular filling pattern, with concomitant abnormal relaxation and 
increased filling pressure (grade 2 diastolic dysfunction). Tricuspid valve: There was moderate regurgitation. Pulmonary artery 
systolic pressure: 50 mmHg. 11/2015:stress test 
1. Normal perfusion scan. 2. Normal wall motion and ejection fraction. 3. Gated images reveal normal wall motion and ejection fraction is 
calculated at 59%. 12/07/2015 Pacer check Noted with A Flutter. D/w daughter on phone 7/2019 Pacemaker check normal function. No A. fib. Atrial heart rate up to 159. Interpretation Summary 12/2018 · Technically difficult study due to patient compliance. Unable to obtain on-axis apical images. Good parasternals, poor subcostal images. · Left ventricular moderate-to-severely decreased global systolic function. Estimated left ventricular ejection fraction is 31 - 35%. Visually measured ejection fraction. Left ventricular severe sigmoid septum hypertrophy. Abnormal left ventricular wall motion as described on the wall scoring diagram below. Abnormal left ventricular septal motion. Interventricular septal \"bounce\". Severe (grade 3) left ventricular diastolic dysfunction. · Moderate tricuspid valve regurgitation is present. Mild pulmonary hypertension is present. PASP 38mmHg · Mild aortic valve regurgitation is present. · Mild to moderate mitral valve regurgitation. Assessment ICD-10-CM ICD-9-CM 1. Chronic systolic congestive heart failure (HCC) I50.22 428.22   
  428.0 Class III. Recent discharge. Medications reviewed. Increase edema. Lasix increased to 40 mg tablet 1-2 a day 2. Paroxysmal atrial flutter (HCC) I48.92 427.32 Stable continue with amiodarone 3. Hypertensive heart disease with chronic diastolic congestive heart failure (HCC) I11.0 402.91 I50.32 428.32 Stable 4. Tricuspid valve disorders, 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 424.2 5. S/P placement of cardiac pacemaker Z95.0 V45.01 INTERROGATION DEV EVAL,IN PERSON, WITH DR ROMO,RPT Stable normal function checked in office 6. High risk medication use Z79.899 V58.69 Amiodarone for atrial arrhythmia 7. CKD (chronic kidney disease) stage 4, GFR 15-29 ml/min ([facility]) N18.4 585.4   
continue amiodarone to maintain sr,risk of anticoagulation high and would not anticoagulate-discussed with family I have discussed risk benefit and option of use of amiodarone for arrythmia. Risk of toxicity with medication are informed. Patient will require careful monitoring. Lasix and metolazone used as needed- 
1/2019 Recent admission with CHF. Continue to monitor. Due to dementia would not be a candidate for ICD upgrade. Patient has increased edema since discharge. I have increase Lasix from 20 mg to 40 mg 1-2 tablets a day. Patient currently nursing home There are no discontinued medications. Orders Placed This Encounter  INTERROGATION DEV EVAL,IN PERSON, WITH DR ROMO,RPT Order Specific Question:   Reason for Exam: Answer:   ppm  
 
 
Follow-up Disposition: Return in about 3 months (around 4/21/2019).

## 2019-01-21 NOTE — LETTER
Shahida Dorsey 
6/26/1931 1/21/2019 Dear Curtis Ambrose MD 
 
I had the pleasure of evaluating  Ms. Haritha Morfin in office today. Below are the relevant portions of my assessment and plan of care. ICD-10-CM ICD-9-CM 1. Chronic systolic congestive heart failure (HCC) I50.22 428.22   
  428.0 Class III. Recent discharge. Medications reviewed. Increase edema. Lasix increased to 40 mg tablet 1-2 a day 2. Paroxysmal atrial flutter (HCC) I48.92 427.32 Stable continue with amiodarone 3. Hypertensive heart disease with chronic diastolic congestive heart failure (HCC) I11.0 402.91 I50.32 428.32 Stable 4. Tricuspid valve disorders, 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 424.2 5. S/P placement of cardiac pacemaker Z95.0 V45.01 Stable normal function checked in office 6. High risk medication use Z79.899 V58.69 Amiodarone for atrial arrhythmia 7. CKD (chronic kidney disease) stage 4, GFR 15-29 ml/min ([facility]) N18.4 585.4 Current Outpatient Medications Medication Sig Dispense Refill  menthol-zinc oxide (CALMOSEPTINE) 0.44-20.6 % oint Apply  to affected area.  raNITIdine (ZANTAC) 75 mg tab Take  by mouth two (2) times a day.  diclofenac (VOLTAREN) 1 % gel Apply  to affected area four (4) times daily.  furosemide (LASIX) 20 mg tablet 1 -2 tabs po daily prn for leg swelling or SOB 30 Tab 0  predniSONE (DELTASONE) 10 mg tablet 1 tab po tid for 1 week , and 1 tab po bid until discontinued 30 Tab 0  
 docusate sodium (COLACE) 100 mg capsule Take 1 Cap by mouth two (2) times a day for 90 days. 60 Cap 2  potassium chloride (K-DUR, KLOR-CON) 20 mEq tablet Take 1 Tab by mouth two (2) times a day. 30 Tab 0  carvedilol (COREG) 3.125 mg tablet Take 1 Tab by mouth every twelve (12) hours. 60 Tab 0  
 acetaminophen (TYLENOL ARTHRITIS PAIN) 650 mg TbER Take 1 Tab by mouth every eight (8) hours.  15 Tab 0  
 amiodarone (CORDARONE) 200 mg tablet TAKE ONE TABLET EVERY DAY (MAKE AN APPT) 30 Tab 5  
 isosorbide mononitrate ER (IMDUR) 60 mg CR tablet TAKE ONE TABLET EVERY MORNING 30 Tab 6  
 memantine (NAMENDA) 10 mg tablet  amitriptyline (ELAVIL) 25 mg tablet Take  by mouth nightly.  aspirin 81 mg tablet Take 81 mg by mouth daily.  famotidine (PEPCID) 20 mg tablet Take 1 Tab by mouth daily. 20 Tab 0 Orders Placed This Encounter  menthol-zinc oxide (CALMOSEPTINE) 0.44-20.6 % oint Sig: Apply  to affected area.  raNITIdine (ZANTAC) 75 mg tab Sig: Take  by mouth two (2) times a day.  diclofenac (VOLTAREN) 1 % gel Sig: Apply  to affected area four (4) times daily. If you have questions, please do not hesitate to call me. I look forward to following Ms. Abiola Colvin along with you. Sincerely, Dee Mason MD

## 2019-01-30 ENCOUNTER — APPOINTMENT (OUTPATIENT)
Dept: CT IMAGING | Age: 84
DRG: 280 | End: 2019-01-30
Attending: EMERGENCY MEDICINE
Payer: MEDICARE

## 2019-01-30 ENCOUNTER — APPOINTMENT (OUTPATIENT)
Dept: GENERAL RADIOLOGY | Age: 84
DRG: 280 | End: 2019-01-30
Attending: EMERGENCY MEDICINE
Payer: MEDICARE

## 2019-01-30 ENCOUNTER — HOSPITAL ENCOUNTER (INPATIENT)
Age: 84
LOS: 11 days | Discharge: HOME HEALTH | DRG: 280 | End: 2019-02-10
Attending: EMERGENCY MEDICINE | Admitting: HOSPITALIST
Payer: MEDICARE

## 2019-01-30 DIAGNOSIS — F03.90 DEMENTIA WITHOUT BEHAVIORAL DISTURBANCE, UNSPECIFIED DEMENTIA TYPE: ICD-10-CM

## 2019-01-30 DIAGNOSIS — R06.09 DYSPNEA ON EXERTION: ICD-10-CM

## 2019-01-30 DIAGNOSIS — Z71.89 ADVANCED CARE PLANNING/COUNSELING DISCUSSION: ICD-10-CM

## 2019-01-30 DIAGNOSIS — L98.9 SKIN LESIONS: ICD-10-CM

## 2019-01-30 DIAGNOSIS — I50.43 ACUTE ON CHRONIC COMBINED SYSTOLIC AND DIASTOLIC CONGESTIVE HEART FAILURE (HCC): ICD-10-CM

## 2019-01-30 DIAGNOSIS — R53.81 DEBILITY: ICD-10-CM

## 2019-01-30 DIAGNOSIS — I21.4 NSTEMI (NON-ST ELEVATED MYOCARDIAL INFARCTION) (HCC): Primary | ICD-10-CM

## 2019-01-30 LAB
ALBUMIN SERPL-MCNC: 2.9 G/DL (ref 3.4–5)
ALBUMIN/GLOB SERPL: 0.9 {RATIO} (ref 0.8–1.7)
ALP SERPL-CCNC: 77 U/L (ref 45–117)
ALT SERPL-CCNC: 19 U/L (ref 13–56)
AMORPH CRY URNS QL MICRO: ABNORMAL
ANION GAP SERPL CALC-SCNC: 6 MMOL/L (ref 3–18)
APPEARANCE UR: ABNORMAL
APTT PPP: 25.3 SEC (ref 23–36.4)
ARTERIAL PATENCY WRIST A: ABNORMAL
AST SERPL-CCNC: 32 U/L (ref 15–37)
ATRIAL RATE: 74 BPM
BACTERIA URNS QL MICRO: NEGATIVE /HPF
BASE EXCESS BLD CALC-SCNC: 5 MMOL/L
BASOPHILS # BLD: 0 K/UL (ref 0–0.06)
BASOPHILS # BLD: 0 K/UL (ref 0–0.1)
BASOPHILS NFR BLD: 0 % (ref 0–2)
BASOPHILS NFR BLD: 0 % (ref 0–3)
BDY SITE: ABNORMAL
BILIRUB SERPL-MCNC: 0.3 MG/DL (ref 0.2–1)
BILIRUB UR QL: NEGATIVE
BNP SERPL-MCNC: 4856 PG/ML (ref 0–1800)
BUN SERPL-MCNC: 66 MG/DL (ref 7–18)
BUN/CREAT SERPL: 32 (ref 12–20)
CALCIUM SERPL-MCNC: 8.4 MG/DL (ref 8.5–10.1)
CALCULATED R AXIS, ECG10: -41 DEGREES
CALCULATED T AXIS, ECG11: 125 DEGREES
CHLORIDE SERPL-SCNC: 107 MMOL/L (ref 100–108)
CK MB CFR SERPL CALC: 4.7 % (ref 0–4)
CK MB CFR SERPL CALC: 4.9 % (ref 0–4)
CK MB SERPL-MCNC: 7.4 NG/ML (ref 5–25)
CK MB SERPL-MCNC: 7.5 NG/ML (ref 5–25)
CK SERPL-CCNC: 153 U/L (ref 26–192)
CK SERPL-CCNC: 156 U/L (ref 26–192)
CO2 SERPL-SCNC: 28 MMOL/L (ref 21–32)
COLOR UR: YELLOW
CREAT SERPL-MCNC: 2.08 MG/DL (ref 0.6–1.3)
DIAGNOSIS, 93000: NORMAL
DIFFERENTIAL METHOD BLD: ABNORMAL
DIFFERENTIAL METHOD BLD: ABNORMAL
EOSINOPHIL # BLD: 0.3 K/UL (ref 0–0.4)
EOSINOPHIL # BLD: 0.4 K/UL (ref 0–0.4)
EOSINOPHIL NFR BLD: 3 % (ref 0–5)
EOSINOPHIL NFR BLD: 5 % (ref 0–5)
EPITH CASTS URNS QL MICRO: ABNORMAL /LPF (ref 0–5)
ERYTHROCYTE [DISTWIDTH] IN BLOOD BY AUTOMATED COUNT: 17 % (ref 11.6–14.5)
ERYTHROCYTE [DISTWIDTH] IN BLOOD BY AUTOMATED COUNT: 17 % (ref 11.6–14.5)
GAS FLOW.O2 O2 DELIVERY SYS: ABNORMAL L/MIN
GLOBULIN SER CALC-MCNC: 3.4 G/DL (ref 2–4)
GLUCOSE SERPL-MCNC: 99 MG/DL (ref 74–99)
GLUCOSE UR STRIP.AUTO-MCNC: NEGATIVE MG/DL
HCO3 BLD-SCNC: 30.2 MMOL/L (ref 22–26)
HCT VFR BLD AUTO: 24.8 % (ref 35–45)
HCT VFR BLD AUTO: 27.6 % (ref 35–45)
HGB BLD-MCNC: 8 G/DL (ref 12–16)
HGB BLD-MCNC: 8.8 G/DL (ref 12–16)
HGB UR QL STRIP: NEGATIVE
KETONES UR QL STRIP.AUTO: NEGATIVE MG/DL
LEUKOCYTE ESTERASE UR QL STRIP.AUTO: ABNORMAL
LYMPHOCYTES # BLD: 1.8 K/UL (ref 0.8–3.5)
LYMPHOCYTES # BLD: 1.9 K/UL (ref 0.9–3.6)
LYMPHOCYTES NFR BLD: 23 % (ref 20–51)
LYMPHOCYTES NFR BLD: 24 % (ref 21–52)
MCH RBC QN AUTO: 29.9 PG (ref 24–34)
MCH RBC QN AUTO: 30 PG (ref 24–34)
MCHC RBC AUTO-ENTMCNC: 31.9 G/DL (ref 31–37)
MCHC RBC AUTO-ENTMCNC: 32.3 G/DL (ref 31–37)
MCV RBC AUTO: 92.9 FL (ref 74–97)
MCV RBC AUTO: 93.9 FL (ref 74–97)
MONOCYTES # BLD: 1.3 K/UL (ref 0.05–1.2)
MONOCYTES # BLD: 1.3 K/UL (ref 0–1)
MONOCYTES NFR BLD: 15 % (ref 3–10)
MONOCYTES NFR BLD: 16 % (ref 2–9)
NEUTS SEG # BLD: 4.4 K/UL (ref 1.8–8)
NEUTS SEG # BLD: 4.7 K/UL (ref 1.8–8)
NEUTS SEG NFR BLD: 56 % (ref 42–75)
NEUTS SEG NFR BLD: 58 % (ref 40–73)
NITRITE UR QL STRIP.AUTO: NEGATIVE
O2/TOTAL GAS SETTING VFR VENT: 0.21 %
P-R INTERVAL, ECG05: 220 MS
PCO2 BLD: 50.4 MMHG (ref 35–45)
PH BLD: 7.39 [PH] (ref 7.35–7.45)
PH UR STRIP: 5 [PH] (ref 5–8)
PLATELET # BLD AUTO: 164 K/UL (ref 135–420)
PLATELET # BLD AUTO: 165 K/UL (ref 135–420)
PLATELET COMMENTS,PCOM: ABNORMAL
PMV BLD AUTO: 9 FL (ref 9.2–11.8)
PMV BLD AUTO: 9.2 FL (ref 9.2–11.8)
PO2 BLD: 62 MMHG (ref 80–100)
POTASSIUM SERPL-SCNC: 4.8 MMOL/L (ref 3.5–5.5)
PROT SERPL-MCNC: 6.3 G/DL (ref 6.4–8.2)
PROT UR STRIP-MCNC: NEGATIVE MG/DL
Q-T INTERVAL, ECG07: 476 MS
QRS DURATION, ECG06: 190 MS
QTC CALCULATION (BEZET), ECG08: 528 MS
RBC # BLD AUTO: 2.67 M/UL (ref 4.2–5.3)
RBC # BLD AUTO: 2.94 M/UL (ref 4.2–5.3)
RBC #/AREA URNS HPF: NEGATIVE /HPF (ref 0–5)
RBC MORPH BLD: ABNORMAL
SAO2 % BLD: 90 % (ref 92–97)
SERVICE CMNT-IMP: ABNORMAL
SODIUM SERPL-SCNC: 141 MMOL/L (ref 136–145)
SP GR UR REFRACTOMETRY: 1.02 (ref 1–1.03)
SPECIMEN TYPE: ABNORMAL
TOTAL RESP. RATE, ITRR: 17
TROPONIN I SERPL-MCNC: 2.21 NG/ML (ref 0–0.04)
TROPONIN I SERPL-MCNC: 3.03 NG/ML (ref 0–0.04)
UROBILINOGEN UR QL STRIP.AUTO: 1 EU/DL (ref 0.2–1)
VENTRICULAR RATE, ECG03: 74 BPM
WBC # BLD AUTO: 7.9 K/UL (ref 4.6–13.2)
WBC # BLD AUTO: 8.2 K/UL (ref 4.6–13.2)
WBC URNS QL MICRO: ABNORMAL /HPF (ref 0–4)

## 2019-01-30 PROCEDURE — 85730 THROMBOPLASTIN TIME PARTIAL: CPT

## 2019-01-30 PROCEDURE — 36600 WITHDRAWAL OF ARTERIAL BLOOD: CPT

## 2019-01-30 PROCEDURE — 70450 CT HEAD/BRAIN W/O DYE: CPT

## 2019-01-30 PROCEDURE — 99285 EMERGENCY DEPT VISIT HI MDM: CPT

## 2019-01-30 PROCEDURE — 74011250637 HC RX REV CODE- 250/637: Performed by: EMERGENCY MEDICINE

## 2019-01-30 PROCEDURE — 80053 COMPREHEN METABOLIC PANEL: CPT

## 2019-01-30 PROCEDURE — 71045 X-RAY EXAM CHEST 1 VIEW: CPT

## 2019-01-30 PROCEDURE — 85025 COMPLETE CBC W/AUTO DIFF WBC: CPT

## 2019-01-30 PROCEDURE — 82550 ASSAY OF CK (CPK): CPT

## 2019-01-30 PROCEDURE — 65660000000 HC RM CCU STEPDOWN

## 2019-01-30 PROCEDURE — 93005 ELECTROCARDIOGRAM TRACING: CPT

## 2019-01-30 PROCEDURE — 82803 BLOOD GASES ANY COMBINATION: CPT

## 2019-01-30 PROCEDURE — 83880 ASSAY OF NATRIURETIC PEPTIDE: CPT

## 2019-01-30 PROCEDURE — 74011250636 HC RX REV CODE- 250/636: Performed by: HOSPITALIST

## 2019-01-30 PROCEDURE — 81001 URINALYSIS AUTO W/SCOPE: CPT

## 2019-01-30 PROCEDURE — 74011250637 HC RX REV CODE- 250/637: Performed by: HOSPITALIST

## 2019-01-30 RX ORDER — AMIODARONE HYDROCHLORIDE 200 MG/1
200 TABLET ORAL DAILY
Status: DISCONTINUED | OUTPATIENT
Start: 2019-01-30 | End: 2019-02-10 | Stop reason: HOSPADM

## 2019-01-30 RX ORDER — SODIUM CHLORIDE 0.9 % (FLUSH) 0.9 %
5-40 SYRINGE (ML) INJECTION EVERY 8 HOURS
Status: DISCONTINUED | OUTPATIENT
Start: 2019-01-30 | End: 2019-02-10 | Stop reason: HOSPADM

## 2019-01-30 RX ORDER — FUROSEMIDE 10 MG/ML
20 INJECTION INTRAMUSCULAR; INTRAVENOUS DAILY
Status: DISCONTINUED | OUTPATIENT
Start: 2019-01-31 | End: 2019-01-31

## 2019-01-30 RX ORDER — GUAIFENESIN 100 MG/5ML
324 LIQUID (ML) ORAL
Status: COMPLETED | OUTPATIENT
Start: 2019-01-30 | End: 2019-01-30

## 2019-01-30 RX ORDER — FAMOTIDINE 20 MG/1
20 TABLET, FILM COATED ORAL DAILY
Status: DISCONTINUED | OUTPATIENT
Start: 2019-01-31 | End: 2019-02-10 | Stop reason: HOSPADM

## 2019-01-30 RX ORDER — MEMANTINE HYDROCHLORIDE 10 MG/1
10 TABLET ORAL DAILY
Status: DISCONTINUED | OUTPATIENT
Start: 2019-01-31 | End: 2019-02-10 | Stop reason: HOSPADM

## 2019-01-30 RX ORDER — POTASSIUM CHLORIDE 20 MEQ/1
20 TABLET, EXTENDED RELEASE ORAL 2 TIMES DAILY
Status: DISCONTINUED | OUTPATIENT
Start: 2019-01-31 | End: 2019-02-02

## 2019-01-30 RX ORDER — DOCUSATE SODIUM 100 MG/1
100 CAPSULE, LIQUID FILLED ORAL 2 TIMES DAILY
Status: DISCONTINUED | OUTPATIENT
Start: 2019-01-31 | End: 2019-02-10 | Stop reason: HOSPADM

## 2019-01-30 RX ORDER — ISOSORBIDE MONONITRATE 60 MG/1
60 TABLET, EXTENDED RELEASE ORAL DAILY
Status: DISCONTINUED | OUTPATIENT
Start: 2019-01-31 | End: 2019-02-10 | Stop reason: HOSPADM

## 2019-01-30 RX ORDER — CARVEDILOL 3.12 MG/1
3.12 TABLET ORAL EVERY 12 HOURS
Status: DISCONTINUED | OUTPATIENT
Start: 2019-01-30 | End: 2019-02-10 | Stop reason: HOSPADM

## 2019-01-30 RX ORDER — AMIODARONE HYDROCHLORIDE 200 MG/1
200 TABLET ORAL DAILY
Status: DISCONTINUED | OUTPATIENT
Start: 2019-01-31 | End: 2019-01-30 | Stop reason: SDUPTHER

## 2019-01-30 RX ORDER — AMITRIPTYLINE HYDROCHLORIDE 50 MG/1
25 TABLET, FILM COATED ORAL
Status: DISCONTINUED | OUTPATIENT
Start: 2019-01-30 | End: 2019-02-10 | Stop reason: HOSPADM

## 2019-01-30 RX ORDER — ONDANSETRON 2 MG/ML
4 INJECTION INTRAMUSCULAR; INTRAVENOUS
Status: DISCONTINUED | OUTPATIENT
Start: 2019-01-30 | End: 2019-02-10 | Stop reason: HOSPADM

## 2019-01-30 RX ORDER — ASPIRIN 81 MG/1
81 TABLET ORAL DAILY
Status: DISCONTINUED | OUTPATIENT
Start: 2019-01-31 | End: 2019-02-10 | Stop reason: HOSPADM

## 2019-01-30 RX ORDER — HEPARIN SODIUM 10000 [USP'U]/100ML
12-25 INJECTION, SOLUTION INTRAVENOUS
Status: DISCONTINUED | OUTPATIENT
Start: 2019-01-30 | End: 2019-02-01

## 2019-01-30 RX ORDER — SODIUM CHLORIDE 0.9 % (FLUSH) 0.9 %
5-40 SYRINGE (ML) INJECTION AS NEEDED
Status: DISCONTINUED | OUTPATIENT
Start: 2019-01-30 | End: 2019-02-10 | Stop reason: HOSPADM

## 2019-01-30 RX ORDER — ACETAMINOPHEN 325 MG/1
650 TABLET ORAL
Status: DISCONTINUED | OUTPATIENT
Start: 2019-01-30 | End: 2019-02-10 | Stop reason: HOSPADM

## 2019-01-30 RX ORDER — FLUCONAZOLE 100 MG/1
150 TABLET ORAL
Status: COMPLETED | OUTPATIENT
Start: 2019-01-30 | End: 2019-01-30

## 2019-01-30 RX ADMIN — ASPIRIN 81 MG 324 MG: 81 TABLET ORAL at 16:18

## 2019-01-30 RX ADMIN — FLUCONAZOLE 150 MG: 100 TABLET ORAL at 13:38

## 2019-01-30 RX ADMIN — HEPARIN SODIUM 12.01 UNITS/KG/HR: 10000 INJECTION, SOLUTION INTRAVENOUS at 20:24

## 2019-01-30 RX ADMIN — Medication 10 ML: at 23:02

## 2019-01-30 NOTE — ED PROVIDER NOTES
EMERGENCY DEPARTMENT HISTORY AND PHYSICAL EXAM 
 
12:57 PM 
 
 
Date: 1/30/2019 Patient Name: [facility] History of Presenting Illness Chief Complaint Patient presents with  Altered mental status History Provided By: Patient and EMS Chief Complaint: Altered Mental Status Duration: 3 Days Timing:  Progressive Location: N/A Quality: N/A Severity: Moderate Modifying Factors: No modifying or aggravating factors were reported. Associated Symptoms: speech difficulty, SOB, weakness History limited due to patient's mental status change. Additional History (Context): [place] is a 80 y.o. female with systemic lupus erythematosus, hypertension, hypercholesterolemia, and CHF who presents via EMS due to progressively altered mental status since about 3 days ago. She reports associated generalized weakness and SOB. EMS reports her family states she is normally alert and oriented and speaking well. EMS states her family states that her symptoms have gradually worsened and they noticed she is not answering questions she normally would. She denies other pain. No other concerns or symptoms at this time. PCP: Nelson Hernandez MD 
 
Current Outpatient Medications Medication Sig Dispense Refill  menthol-zinc oxide (CALMOSEPTINE) 0.44-20.6 % oint Apply  to affected area.  raNITIdine (ZANTAC) 75 mg tab Take  by mouth two (2) times a day.  diclofenac (VOLTAREN) 1 % gel Apply  to affected area four (4) times daily.  furosemide (LASIX) 20 mg tablet 1 -2 tabs po daily prn for leg swelling or SOB 30 Tab 0  predniSONE (DELTASONE) 10 mg tablet 1 tab po tid for 1 week , and 1 tab po bid until discontinued 30 Tab 0  
 docusate sodium (COLACE) 100 mg capsule Take 1 Cap by mouth two (2) times a day for 90 days. 60 Cap 2  
 famotidine (PEPCID) 20 mg tablet Take 1 Tab by mouth daily.  20 Tab 0  
 potassium chloride (K-DUR, KLOR-CON) 20 mEq tablet Take 1 Tab by mouth two (2) times a day. 30 Tab 0  carvedilol (COREG) 3.125 mg tablet Take 1 Tab by mouth every twelve (12) hours. 60 Tab 0  
 acetaminophen (TYLENOL ARTHRITIS PAIN) 650 mg TbER Take 1 Tab by mouth every eight (8) hours. 15 Tab 0  
 amiodarone (CORDARONE) 200 mg tablet TAKE ONE TABLET EVERY DAY (MAKE AN APPT) 30 Tab 5  
 isosorbide mononitrate ER (IMDUR) 60 mg CR tablet TAKE ONE TABLET EVERY MORNING 30 Tab 6  
 memantine (NAMENDA) 10 mg tablet  amitriptyline (ELAVIL) 25 mg tablet Take  by mouth nightly.  aspirin 81 mg tablet Take 81 mg by mouth daily. Past History Past Medical History: 
Past Medical History:  
Diagnosis Date  Acute on chronic diastolic heart failure ([facility] [address]  Arthritis  Benign hypertensive heart disease without heart failure Better controlled, stable  Chronic diastolic heart failure ([facility] [address] Breathing and edema is improving  Congestive heart failure ([facility] [address]  HTN (hypertension)  Hypercholesteremia  Lupus (systemic lupus erythematosus) ([facility] [address] 6/18/2014  
 followed by dr Sj Santiago  Obesity, unspecified Patient has weight loss Discussed diet ad fluid restriction  Other and unspecified hyperlipidemia F/u per pmd  
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 
Past Surgical History:  
Procedure Laterality Date  HX HYSTERECTOMY  PACEMAKER PROCEDURE Family History: 
Family History Problem Relation Age of Onset  Arrhythmia Neg Hx  Asthma Neg Hx  Clotting Disorder Neg Hx  Fainting Neg Hx   
 Heart Attack Neg Hx  High Cholesterol Neg Hx  Pacemaker Neg Hx  Stroke Neg Hx Social History: 
Social History Tobacco Use  Smoking status: Never Smoker  Smokeless tobacco: Never Used Substance Use Topics  Alcohol use: No  
 Drug use: No  
 
 
Allergies: 
No Known Allergies Review of Systems Review of Systems Unable to perform ROS: Mental status change Physical Exam  
 
Visit Vitals /70 (BP 1 Location: Left arm, BP Patient Position: At rest;Supine) Pulse 80 Temp 97.3 °F (36.3 °C) Resp 19 Wt 81.6 kg (180 lb) SpO2 100% BMI 29.95 kg/m² Physical Exam  
Constitutional: She is oriented to person, place, and time. She appears well-developed and well-nourished. No distress. Drowsy. HENT:  
Head: Normocephalic and atraumatic. Eyes: Conjunctivae and EOM are normal. Right eye exhibits no discharge. Left eye exhibits no discharge. No scleral icterus. Neck: Normal range of motion. Neck supple. No tracheal deviation present. Cardiovascular: Normal rate, regular rhythm and normal heart sounds. No murmur heard. Pulmonary/Chest: Effort normal and breath sounds normal. No respiratory distress. She has no wheezes. She has no rales. Abdominal: Soft. She exhibits no distension. There is no tenderness. There is no rebound and no guarding. Large ventral wall hernia that is easily reducible. Musculoskeletal: Normal range of motion. She exhibits no edema or deformity. Neurological: She is oriented to person, place, and time. No cranial nerve deficit. Answers questions intermittently with one word answers. Generally weak with no focal neurologic deficit. Skin: Skin is warm and dry. She is not diaphoretic. Psychiatric: She has a normal mood and affect. Her behavior is normal. Judgment and thought content normal.  
 
 
 
Diagnostic Study Results Labs - Recent Results (from the past 12 hour(s)) CBC WITH AUTOMATED DIFF Collection Time: 01/30/19  1:15 PM  
Result Value Ref Range WBC 8.2 4.6 - 13.2 K/uL  
 RBC 2.94 (L) 4.20 - 5.30 M/uL HGB 8.8 (L) 12.0 - 16.0 g/dL HCT 27.6 (L) 35.0 - 45.0 % MCV 93.9 74.0 - 97.0 FL  
 MCH 29.9 24.0 - 34.0 PG  
 MCHC 31.9 31.0 - 37.0 g/dL  
 RDW 17.0 (H) 11.6 - 14.5 % PLATELET 862 781 - 642 K/uL MPV 9.0 (L) 9.2 - 11.8 FL  
 NEUTROPHILS 58 40 - 73 % LYMPHOCYTES 24 21 - 52 % MONOCYTES 15 (H) 3 - 10 % EOSINOPHILS 3 0 - 5 % BASOPHILS 0 0 - 2 %  
 ABS. NEUTROPHILS 4.7 1.8 - 8.0 K/UL  
 ABS. LYMPHOCYTES 1.9 0.9 - 3.6 K/UL  
 ABS. MONOCYTES 1.3 (H) 0.05 - 1.2 K/UL  
 ABS. EOSINOPHILS 0.3 0.0 - 0.4 K/UL  
 ABS. BASOPHILS 0.0 0.0 - 0.1 K/UL  
 DF AUTOMATED METABOLIC PANEL, COMPREHENSIVE Collection Time: 01/30/19  1:15 PM  
Result Value Ref Range Sodium 141 136 - 145 mmol/L Potassium 4.8 3.5 - 5.5 mmol/L Chloride 107 100 - 108 mmol/L  
 CO2 28 21 - 32 mmol/L Anion gap 6 3.0 - 18 mmol/L Glucose 99 74 - 99 mg/dL BUN 66 (H) 7.0 - 18 MG/DL Creatinine 2.08 (H) 0.6 - 1.3 MG/DL  
 BUN/Creatinine ratio 32 (H) 12 - 20 GFR est AA 27 (L) >60 ml/min/1.73m2 GFR est non-AA 23 (L) >60 ml/min/1.73m2 Calcium 8.4 (L) 8.5 - 10.1 MG/DL Bilirubin, total 0.3 0.2 - 1.0 MG/DL  
 ALT (SGPT) 19 13 - 56 U/L  
 AST (SGOT) 32 15 - 37 U/L Alk. phosphatase 77 45 - 117 U/L Protein, total 6.3 (L) 6.4 - 8.2 g/dL Albumin 2.9 (L) 3.4 - 5.0 g/dL Globulin 3.4 2.0 - 4.0 g/dL A-G Ratio 0.9 0.8 - 1.7 URINALYSIS W/ RFLX MICROSCOPIC Collection Time: 01/30/19  1:15 PM  
Result Value Ref Range Color YELLOW Appearance CLOUDY Specific gravity 1.016 1.005 - 1.030    
 pH (UA) 5.0 5.0 - 8.0 Protein NEGATIVE  NEG mg/dL Glucose NEGATIVE  NEG mg/dL Ketone NEGATIVE  NEG mg/dL Bilirubin NEGATIVE  NEG Blood NEGATIVE  NEG Urobilinogen 1.0 0.2 - 1.0 EU/dL Nitrites NEGATIVE  NEG Leukocyte Esterase TRACE (A) NEG    
CARDIAC PANEL,(CK, CKMB & TROPONIN) Collection Time: 01/30/19  1:15 PM  
Result Value Ref Range  26 - 192 U/L  
 CK - MB 7.4 (H) <3.6 ng/ml CK-MB Index 4.7 (H) 0.0 - 4.0 % Troponin-I, QT 3.03 (HH) 0.0 - 0.045 NG/ML  
URINE MICROSCOPIC ONLY Collection Time: 01/30/19  1:15 PM  
Result Value Ref Range WBC 0 to 3 0 - 4 /hpf  
 RBC NEGATIVE  0 - 5 /hpf Epithelial cells FEW 0 - 5 /lpf Bacteria NEGATIVE  NEG /hpf Amorphous Crystals 1+ (A) NEG  
EKG, 12 LEAD, INITIAL Collection Time: 01/30/19  1:41 PM  
Result Value Ref Range Ventricular Rate 74 BPM  
 Atrial Rate 74 BPM  
 P-R Interval 220 ms QRS Duration 190 ms Q-T Interval 476 ms QTC Calculation (Bezet) 528 ms Calculated R Axis -41 degrees Calculated T Axis 125 degrees Diagnosis Atrial-paced rhythm with prolonged AV conduction Left axis deviation Nonspecific intraventricular block Inferior infarct , age undetermined Abnormal ECG Confirmed by Ivy Gutierrez MD, Quinten Rondon ([id number]) on 1/30/2019 4:40:19 PM 
  
POC G3 Collection Time: 01/30/19  4:26 PM  
Result Value Ref Range Device: ROOM AIR    
 FIO2 (POC) 0.21 % pH (POC) 7.386 7.35 - 7.45    
 pCO2 (POC) 50.4 (H) 35.0 - 45.0 MMHG  
 pO2 (POC) 62 (L) 80 - 100 MMHG  
 HCO3 (POC) 30.2 (H) 22 - 26 MMOL/L  
 sO2 (POC) 90 (L) 92 - 97 % Base excess (POC) 5 mmol/L Allens test (POC) N/A Total resp. rate 17 Site LEFT RADIAL Specimen type (POC) ARTERIAL [facility] CARDIAC PANEL,(CK, CKMB & TROPONIN) Collection Time: 01/30/19  6:12 PM  
Result Value Ref Range  26 - 192 U/L  
 CK - MB 7.5 (H) <3.6 ng/ml CK-MB Index 4.9 (H) 0.0 - 4.0 % Troponin-I, QT 2.21 (HH) 0.0 - 0.045 NG/ML  
NT-PRO BNP Collection Time: 01/30/19  6:12 PM  
Result Value Ref Range NT pro-BNP 4,856 (H) 0 - 1,800 PG/ML Radiologic Studies -  
XR CHEST PORT Final Result IMPRESSION:  
  
Moderately enlarged cardiac silhouette with perihilar vascular congestion versus  
infiltrate, radiographically similar to 1/5/2019. See additional details above. CT HEAD WO CONT Final Result IMPRESSION:  
              
1.  No acute intracranial process. 2.  Chronic small vessel ischemic changes. 3.  No significant interval change. Medical Decision Making It should be noted that Diogenes Escobar MD will be the provider of record for this patient. I reviewed the vital signs, available nursing notes, past medical history, past surgical history, family history and social history. Vital Signs-Reviewed the patient's vital signs. Pulse Oximetry Analysis -  99% on room air , normal 
 
Cardiac Monitor: 
Rate: 77 BPM 
Rhythm:  AV dual-paced EKG: Interpreted by the EP. Time Interpreted: 13:43 Rate: 75 BPM 
 Rhythm: AV dual-Paced Interpretation: Normal axis, no acute ST or T-wave changes suggestive of acute ischemia. Records Reviewed: Nursing Notes and Old Medical Records (Time of Review: 12:57 PM) ED Course: Progress Notes, Reevaluation, and Consults: 
1:23 PM: Per Nurse, Pt has yeast infection. Will give Diflucan. 
 
5:55 PM: Patient re-evaluated. She is complaining of chest pain. Spoke with her son and he said for the past 4 days, she has been more short of breath and has been complaining of chest pain. She has been getting winded after walking 2 steps. Spoke with Dr. Hi Johnson [facility]) he will see the patient. Provider Notes (Medical Decision Making): Pt with generalized weakness, likely 2/2 NSTEMI. Cardiology consulted and pt started on heparin ggt and admitted. For Hospitalized Patients: 
 
1. Hospitalization Decision Time: The decision to hospitalize the patient was made by Dr. Toyin Valle at 5:55 PM on 1/30/2019 2. Aspirin: Aspirin was given Diagnosis Clinical Impression: 1. NSTEMI (non-ST elevated myocardial infarction) ([facility] [address] 2. Dyspnea on exertion Disposition: Admit Follow-up Information None Medication List  
  
ASK your doctor about these medications   
acetaminophen 650 mg Tber Commonly known as:  TYLENOL ARTHRITIS PAIN Take 1 Tab by mouth every eight (8) hours. amiodarone 200 mg tablet Commonly known as:  CORDARONE 
TAKE ONE TABLET EVERY DAY (MAKE AN APPT) 
  
amitriptyline 25 mg tablet Commonly known as:  ELAVIL 
  
aspirin 81 mg tablet CALMOSEPTINE 0.44-20.6 % Oint Generic drug:  menthol-zinc oxide 
  
carvedilol 3.125 mg tablet Commonly known as:  Herrera Swift Take 1 Tab by mouth every twelve (12) hours. diclofenac 1 % Gel Commonly known as:  VOLTAREN 
  
docusate sodium 100 mg capsule Commonly known as:  Francine Rout Take 1 Cap by mouth two (2) times a day for 90 days. famotidine 20 mg tablet Commonly known as:  PEPCID Take 1 Tab by mouth daily. furosemide 20 mg tablet Commonly known as:  LASIX 
1 -2 tabs po daily prn for leg swelling or SOB 
  
isosorbide mononitrate ER 60 mg CR tablet Commonly known as:  IMDUR 
TAKE ONE TABLET EVERY MORNING 
  
memantine 10 mg tablet Commonly known as:  NAMENDA 
  
potassium chloride 20 mEq tablet Commonly known as:  K-DUR KLOR-CON Take 1 Tab by mouth two (2) times a day. predniSONE 10 mg tablet Commonly known as:  DELTASONE 
1 tab po tid for 1 week , and 1 tab po bid until discontinued 
  
raNITIdine 75 mg Tab Commonly known as:  ZANTAC 
  
  
 
_______________________________ Scribe Attestation Tatiana Leonard acting as a scribe for and in the presence of Anderson Jacob MD     
January 30, 2019 at 5:58 PM 
    
Provider Attestation:     
I personally performed the services described in the documentation, reviewed the documentation, as recorded by the scribe in my presence, and it accurately and completely records my words and actions. January 30, 2019 at 5:58 PM - Anderson Jacob MD  
 
 
_______________________________

## 2019-01-30 NOTE — ED TRIAGE NOTES
Patient arrives via medic unit for evaluation of altered mental status. Per Medics, patient's family states she had a change in her mental status approximately 3 days ago, became aphasic, and staring off into space.  Patient alert, oriented to person upon arrival.

## 2019-01-30 NOTE — ED NOTES
Resp paged for ABG, family requesting water for patient and has ASA ordered, will complete dysphagia screening prior to admin

## 2019-01-31 LAB
ALBUMIN SERPL-MCNC: 2.6 G/DL (ref 3.4–5)
ALBUMIN/GLOB SERPL: 0.9 {RATIO} (ref 0.8–1.7)
ALP SERPL-CCNC: 66 U/L (ref 45–117)
ALT SERPL-CCNC: 18 U/L (ref 13–56)
ANION GAP SERPL CALC-SCNC: 6 MMOL/L (ref 3–18)
APTT PPP: 108.6 SEC (ref 23–36.4)
AST SERPL-CCNC: 26 U/L (ref 15–37)
ATRIAL RATE: 79 BPM
BASOPHILS # BLD: 0 K/UL (ref 0–0.1)
BASOPHILS NFR BLD: 0 % (ref 0–2)
BILIRUB SERPL-MCNC: 0.4 MG/DL (ref 0.2–1)
BUN SERPL-MCNC: 64 MG/DL (ref 7–18)
BUN/CREAT SERPL: 35 (ref 12–20)
CALCIUM SERPL-MCNC: 8.1 MG/DL (ref 8.5–10.1)
CALCULATED R AXIS, ECG10: 4 DEGREES
CALCULATED T AXIS, ECG11: 121 DEGREES
CHLORIDE SERPL-SCNC: 106 MMOL/L (ref 100–108)
CHOLEST SERPL-MCNC: 142 MG/DL
CO2 SERPL-SCNC: 30 MMOL/L (ref 21–32)
CREAT SERPL-MCNC: 1.84 MG/DL (ref 0.6–1.3)
DIAGNOSIS, 93000: NORMAL
DIFFERENTIAL METHOD BLD: ABNORMAL
EOSINOPHIL # BLD: 0.2 K/UL (ref 0–0.4)
EOSINOPHIL NFR BLD: 4 % (ref 0–5)
ERYTHROCYTE [DISTWIDTH] IN BLOOD BY AUTOMATED COUNT: 16.8 % (ref 11.6–14.5)
GLOBULIN SER CALC-MCNC: 2.9 G/DL (ref 2–4)
GLUCOSE SERPL-MCNC: 98 MG/DL (ref 74–99)
HCT VFR BLD AUTO: 23.4 % (ref 35–45)
HDLC SERPL-MCNC: 68 MG/DL (ref 40–60)
HDLC SERPL: 2.1 {RATIO} (ref 0–5)
HGB BLD-MCNC: 7.4 G/DL (ref 12–16)
LDLC SERPL CALC-MCNC: 66.4 MG/DL (ref 0–100)
LIPID PROFILE,FLP: ABNORMAL
LYMPHOCYTES # BLD: 1.8 K/UL (ref 0.9–3.6)
LYMPHOCYTES NFR BLD: 31 % (ref 21–52)
MCH RBC QN AUTO: 29.4 PG (ref 24–34)
MCHC RBC AUTO-ENTMCNC: 31.6 G/DL (ref 31–37)
MCV RBC AUTO: 92.9 FL (ref 74–97)
MONOCYTES # BLD: 0.6 K/UL (ref 0.05–1.2)
MONOCYTES NFR BLD: 10 % (ref 3–10)
NEUTS SEG # BLD: 3.2 K/UL (ref 1.8–8)
NEUTS SEG NFR BLD: 55 % (ref 40–73)
P-R INTERVAL, ECG05: 220 MS
PLATELET # BLD AUTO: 161 K/UL (ref 135–420)
PMV BLD AUTO: 8.9 FL (ref 9.2–11.8)
POTASSIUM SERPL-SCNC: 4.1 MMOL/L (ref 3.5–5.5)
PROT SERPL-MCNC: 5.5 G/DL (ref 6.4–8.2)
Q-T INTERVAL, ECG07: 466 MS
QRS DURATION, ECG06: 188 MS
QTC CALCULATION (BEZET), ECG08: 534 MS
RBC # BLD AUTO: 2.52 M/UL (ref 4.2–5.3)
SODIUM SERPL-SCNC: 142 MMOL/L (ref 136–145)
TRIGL SERPL-MCNC: 38 MG/DL (ref ?–150)
TROPONIN I SERPL-MCNC: 1.51 NG/ML (ref 0–0.04)
TROPONIN I SERPL-MCNC: 1.93 NG/ML (ref 0–0.04)
VENTRICULAR RATE, ECG03: 79 BPM
VLDLC SERPL CALC-MCNC: 7.6 MG/DL
WBC # BLD AUTO: 5.7 K/UL (ref 4.6–13.2)

## 2019-01-31 PROCEDURE — 85025 COMPLETE CBC W/AUTO DIFF WBC: CPT

## 2019-01-31 PROCEDURE — 80061 LIPID PANEL: CPT

## 2019-01-31 PROCEDURE — 77030038269 HC DRN EXT URIN PURWCK BARD -A

## 2019-01-31 PROCEDURE — 80053 COMPREHEN METABOLIC PANEL: CPT

## 2019-01-31 PROCEDURE — 84484 ASSAY OF TROPONIN QUANT: CPT

## 2019-01-31 PROCEDURE — 74011250637 HC RX REV CODE- 250/637: Performed by: HOSPITALIST

## 2019-01-31 PROCEDURE — 36415 COLL VENOUS BLD VENIPUNCTURE: CPT

## 2019-01-31 PROCEDURE — 65660000000 HC RM CCU STEPDOWN

## 2019-01-31 PROCEDURE — 85730 THROMBOPLASTIN TIME PARTIAL: CPT

## 2019-01-31 PROCEDURE — 74011250636 HC RX REV CODE- 250/636: Performed by: HOSPITALIST

## 2019-01-31 RX ORDER — FUROSEMIDE 10 MG/ML
20 INJECTION INTRAMUSCULAR; INTRAVENOUS ONCE
Status: DISPENSED | OUTPATIENT
Start: 2019-01-31 | End: 2019-01-31

## 2019-01-31 RX ORDER — FUROSEMIDE 10 MG/ML
40 INJECTION INTRAMUSCULAR; INTRAVENOUS DAILY
Status: DISCONTINUED | OUTPATIENT
Start: 2019-02-01 | End: 2019-01-31

## 2019-01-31 RX ORDER — FUROSEMIDE 10 MG/ML
40 INJECTION INTRAMUSCULAR; INTRAVENOUS DAILY
Status: DISCONTINUED | OUTPATIENT
Start: 2019-02-01 | End: 2019-02-01

## 2019-01-31 RX ADMIN — Medication 10 ML: at 06:51

## 2019-01-31 RX ADMIN — ASPIRIN 81 MG: 81 TABLET, COATED ORAL at 09:27

## 2019-01-31 RX ADMIN — POTASSIUM CHLORIDE 20 MEQ: 20 TABLET, EXTENDED RELEASE ORAL at 09:27

## 2019-01-31 RX ADMIN — Medication 10 ML: at 15:45

## 2019-01-31 RX ADMIN — CARVEDILOL 3.12 MG: 3.12 TABLET, FILM COATED ORAL at 09:27

## 2019-01-31 RX ADMIN — FAMOTIDINE 20 MG: 20 TABLET ORAL at 09:27

## 2019-01-31 RX ADMIN — POTASSIUM CHLORIDE 20 MEQ: 20 TABLET, EXTENDED RELEASE ORAL at 18:31

## 2019-01-31 RX ADMIN — Medication 10 ML: at 21:41

## 2019-01-31 RX ADMIN — DOCUSATE SODIUM 100 MG: 100 CAPSULE, LIQUID FILLED ORAL at 18:31

## 2019-01-31 RX ADMIN — HEPARIN SODIUM 12.01 UNITS/KG/HR: 10000 INJECTION, SOLUTION INTRAVENOUS at 19:39

## 2019-01-31 RX ADMIN — AMITRIPTYLINE HYDROCHLORIDE 25 MG: 50 TABLET, FILM COATED ORAL at 21:35

## 2019-01-31 RX ADMIN — ISOSORBIDE MONONITRATE 60 MG: 60 TABLET, EXTENDED RELEASE ORAL at 09:27

## 2019-01-31 RX ADMIN — DOCUSATE SODIUM 100 MG: 100 CAPSULE, LIQUID FILLED ORAL at 09:27

## 2019-01-31 RX ADMIN — FUROSEMIDE 20 MG: 10 INJECTION, SOLUTION INTRAVENOUS at 09:28

## 2019-01-31 RX ADMIN — MEMANTINE 10 MG: 10 TABLET ORAL at 09:27

## 2019-01-31 NOTE — ROUTINE PROCESS
TRANSFER - IN REPORT: 
 
Verbal report received from [facility]) on [place]  being received from ED(unit) for routine progression of care Report consisted of patients Situation, Background, Assessment and  
Recommendations(SBAR). Information from the following report(s) SBAR, Kardex and MAR was reviewed with the receiving nurse. Opportunity for questions and clarification was provided. Assessment completed upon patients arrival to unit and care assumed. Patient refused to take 2300 medications, patient is confused, oriented to person, made three attempts to persuade the patient, patient repeated several times, \" I will take them in the morning, not tonight. \" 
 
Patient has a purewick, unable to get accurate output before purewick. Gave bedside report to [employer], using SBAR, MAR, and Kardex.

## 2019-01-31 NOTE — PROGRESS NOTES
met with patient's two sons, completed the initial Spiritual Assessment of the patient, and offered Pastoral Care, see flow sheets for interventions. Patient did not participate in the  visit. Sons were supportive. They said they were waiting for a physician to see patient so they can determine the medical plan. Pastoral support provided. Patient does not have any [place]/cultural needs that will affect patients preferences in health care. Chart reviewed. Chaplains will continue to follow and will provide pastoral care on an as needed/as requested basis. Torres Reed MDiv. Board Certified [employer] [facility] [phone]

## 2019-01-31 NOTE — PROGRESS NOTES
Bedside and Verbal shift change report given to this RN (oncoming nurse) by [facility] RN (offgoing nurse). Report included the following information SBAR and Kardex. Spoke with cardiologist and Dr. Fina Pimentel over the phone,  Informed of bump below pt left knee, raised,firm, inflamed, red, and tight, but with no head. Also informed Dr. Fina Pimentel of bruising with small bump on right leg on back side of the calf. Pt also has a hernia on her stomach. Bedside and Verbal shift change report given to Danny (oncoming nurse) by this RN (offgoing nurse). Report included the following information SBAR, Kardex and Cardiac Rhythm PACED rhythm.

## 2019-01-31 NOTE — PROGRESS NOTES
Hospitalist Progress NotePatient: Dorothye Curling MRN: [id number]  CSN: [id number] YOB: 1931  Age: 80 y.o. Sex: female DOA: 1/30/2019 LOS:  LOS: 1 day Assessment/Plan Active Problems: 
  NSTEMI (non-ST elevated myocardial infarction) ([address] (1/30/2019) Dementia (1/30/2019) Interval History Pt admitted last evening for NSTEMI Appreciate Cardiology consult Plan -  
 
1. NSTEMI - medical management , appreciate Cardiology consult , continue heparin gtt , Trop trending down,  Echo reviewed , no further cardiac work up at this time 2. Chronic combined CHF - IV lasix, monitor creatinine 3. Baseline Dementia - continue namenda 4. H/o CKD 4 - monitor creatinine since she is being diuresed 5. H/o AOCD - monitor hgb 6. H/o SLE - chronic - f/u with Dr Mignon Allen as out pt Continue current medical mx Will discuss with family regarding DNR status given current multiple medical problems Case discussed with:  []Patient  []Family  []Nursing  []Case Management DVT Prophylaxis:  []Lovenox  []Hep SQ  []SCDs  []Coumadin   []On Heparin gtt Subjective:  
 
CC: No new events overnigth Objective:  
  
Visit Vitals /42 (BP 1 Location: Left arm, BP Patient Position: At rest) Pulse 70 Temp 96.4 °F (35.8 °C) Resp 16 Wt 81.6 kg (180 lb) SpO2 98% Breastfeeding? No  
BMI 29.95 kg/m² Physical Exam: 
 
Gen: In general, this is a poorly nourished female in no acute distress HEENT: Sclerae nonicteric. Oral mucous membranes moist. Dentition poor Neck: Supple with midline trachea. CV: RRR without murmur or rub appreciated. Resp:Respirations are unlabored without use of accessory muscles. Lung fields bilaterally without wheezes or rhonchi. Abd: Soft, nontender, nondistended. Extrem: Extremities are warm, without cyanosis or clubbing. No pitting pretibial edema. Skin: Warm, no visible rashes. Neuro: Patient has baseline dementia Intake and Output: 
Current Shift:  01/31 0701 - 01/31 1900 In: 300 [P.O.:300] Out: - Last three shifts:  No intake/output data recorded. Recent Results (from the past 24 hour(s)) POC G3 Collection Time: 01/30/19  4:26 PM  
Result Value Ref Range Device: ROOM AIR    
 FIO2 (POC) 0.21 % pH (POC) 7.386 7.35 - 7.45    
 pCO2 (POC) 50.4 (H) 35.0 - 45.0 MMHG  
 pO2 (POC) 62 (L) 80 - 100 MMHG  
 HCO3 (POC) 30.2 (H) 22 - 26 MMOL/L  
 sO2 (POC) 90 (L) 92 - 97 % Base excess (POC) 5 mmol/L Allens test (POC) N/A Total resp. rate 17 Site LEFT RADIAL Specimen type (POC) ARTERIAL Performed by Grace Sears CARDIAC PANEL,(CK, CKMB & TROPONIN) Collection Time: 01/30/19  6:12 PM  
Result Value Ref Range  26 - 192 U/L  
 CK - MB 7.5 (H) <3.6 ng/ml CK-MB Index 4.9 (H) 0.0 - 4.0 % Troponin-I, QT 2.21 (HH) 0.0 - 0.045 NG/ML  
NT-PRO BNP Collection Time: 01/30/19  6:12 PM  
Result Value Ref Range NT pro-BNP 4,856 (H) 0 - 1,800 PG/ML  
CBC WITH AUTOMATED DIFF Collection Time: 01/30/19  6:43 PM  
Result Value Ref Range WBC 7.9 4.6 - 13.2 K/uL  
 RBC 2.67 (L) 4.20 - 5.30 M/uL HGB 8.0 (L) 12.0 - 16.0 g/dL HCT 24.8 (L) 35.0 - 45.0 % MCV 92.9 74.0 - 97.0 FL  
 MCH 30.0 24.0 - 34.0 PG  
 MCHC 32.3 31.0 - 37.0 g/dL  
 RDW 17.0 (H) 11.6 - 14.5 % PLATELET 185 589 - 329 K/uL MPV 9.2 9.2 - 11.8 FL  
 NEUTROPHILS 56 42 - 75 % LYMPHOCYTES 23 20 - 51 % MONOCYTES 16 (H) 2 - 9 % EOSINOPHILS 5 0 - 5 % BASOPHILS 0 0 - 3 %  
 ABS. NEUTROPHILS 4.4 1.8 - 8.0 K/UL  
 ABS. LYMPHOCYTES 1.8 0.8 - 3.5 K/UL  
 ABS. MONOCYTES 1.3 (H) 0 - 1.0 K/UL  
 ABS. EOSINOPHILS 0.4 0.0 - 0.4 K/UL  
 ABS. BASOPHILS 0.0 0.0 - 0.06 K/UL  
 DF MANUAL PLATELET COMMENTS ADEQUATE PLATELETS    
 RBC COMMENTS LUÍS CELLS 
FEW 
SCHISTOCYTES 
FEW 
    
PTT Collection Time: 01/30/19  6:43 PM  
Result Value Ref Range aPTT 25.3 23.0 - 36.4 SEC  
CBC WITH AUTOMATED DIFF Collection Time: 01/31/19  2:24 AM  
Result Value Ref Range WBC 5.7 4.6 - 13.2 K/uL  
 RBC 2.52 (L) 4.20 - 5.30 M/uL HGB 7.4 (L) 12.0 - 16.0 g/dL HCT 23.4 (L) 35.0 - 45.0 % MCV 92.9 74.0 - 97.0 FL  
 MCH 29.4 24.0 - 34.0 PG  
 MCHC 31.6 31.0 - 37.0 g/dL  
 RDW 16.8 (H) 11.6 - 14.5 % PLATELET 039 981 - 008 K/uL MPV 8.9 (L) 9.2 - 11.8 FL  
 NEUTROPHILS 55 40 - 73 % LYMPHOCYTES 31 21 - 52 % MONOCYTES 10 3 - 10 % EOSINOPHILS 4 0 - 5 % BASOPHILS 0 0 - 2 %  
 ABS. NEUTROPHILS 3.2 1.8 - 8.0 K/UL  
 ABS. LYMPHOCYTES 1.8 0.9 - 3.6 K/UL  
 ABS. MONOCYTES 0.6 0.05 - 1.2 K/UL  
 ABS. EOSINOPHILS 0.2 0.0 - 0.4 K/UL  
 ABS. BASOPHILS 0.0 0.0 - 0.1 K/UL  
 DF AUTOMATED METABOLIC PANEL, COMPREHENSIVE Collection Time: 01/31/19  2:24 AM  
Result Value Ref Range Sodium 142 136 - 145 mmol/L Potassium 4.1 3.5 - 5.5 mmol/L Chloride 106 100 - 108 mmol/L  
 CO2 30 21 - 32 mmol/L Anion gap 6 3.0 - 18 mmol/L Glucose 98 74 - 99 mg/dL BUN 64 (H) 7.0 - 18 MG/DL Creatinine 1.84 (H) 0.6 - 1.3 MG/DL  
 BUN/Creatinine ratio 35 (H) 12 - 20 GFR est AA 31 (L) >60 ml/min/1.73m2 GFR est non-AA 26 (L) >60 ml/min/1.73m2 Calcium 8.1 (L) 8.5 - 10.1 MG/DL Bilirubin, total 0.4 0.2 - 1.0 MG/DL  
 ALT (SGPT) 18 13 - 56 U/L  
 AST (SGOT) 26 15 - 37 U/L Alk. phosphatase 66 45 - 117 U/L Protein, total 5.5 (L) 6.4 - 8.2 g/dL Albumin 2.6 (L) 3.4 - 5.0 g/dL Globulin 2.9 2.0 - 4.0 g/dL A-G Ratio 0.9 0.8 - 1.7 LIPID PANEL Collection Time: 01/31/19  2:24 AM  
Result Value Ref Range LIPID PROFILE Cholesterol, total 142 <200 MG/DL Triglyceride 38 <150 MG/DL  
 HDL Cholesterol 68 (H) 40 - 60 MG/DL  
 LDL, calculated 66.4 0 - 100 MG/DL VLDL, calculated 7.6 MG/DL  
 CHOL/HDL Ratio 2.1 0 - 5.0    
PTT Collection Time: 01/31/19  2:24 AM  
Result Value Ref Range  aPTT 108.6 (H) 23.0 - 36.4 SEC  
TROPONIN I  
 Collection Time: 01/31/19  2:24 AM  
Result Value Ref Range Troponin-I, QT 1.93 (HH) 0.0 - 0.045 NG/ML  
TROPONIN I Collection Time: 01/31/19 11:12 AM  
Result Value Ref Range Troponin-I, QT 1.51 (HH) 0.0 - 0.045 NG/ML Current Meds - Reviewed Lab Results Component Value Date/Time  Glucose 98 01/31/2019 02:24 AM  
 Glucose 99 01/30/2019 01:15 PM  
 Glucose 105 (H) 01/08/2019 03:05 AM  
 Glucose 146 (H) 01/07/2019 02:47 AM  
 Glucose 101 (H) 01/06/2019 01:35 AM  
 Glucose 88 03/17/2016 11:15 AM  
  
 
 
 
Tiny Rojas MD 
1/31/2019, 2:26 PM

## 2019-01-31 NOTE — ED NOTES
Report given to Hussein Stallworth, [employer]. Awaiting heparin orders from Dr Christi Blankenship.

## 2019-01-31 NOTE — H&P
HISTORY & PHYSICAL Patient: Kayla Jimenez MRN: [id number]  CSN: [id number] YOB: 1931  Age: 80 y.o. Sex: female DOA: 1/30/2019 LOS:  LOS: 0 days DOA: 1/30/2019 Assessment/Plan Active Problems: 
  NSTEMI (non-ST elevated myocardial infarction) ([address] (1/30/2019) Plan: 1. NSTEMI - IV heparin gtt, serial cardiac enzymes , cardiology consult 2. H/o CAD - resume home meds, monitor BP  
3. H/o CHF - combined - Last Echo EF 31-35% 4. Dementia - continue namenda 5. H/o CKD 4 - monitor creatinine 6. H/o SLE  
7. H/o AOCD - monitor Hgb 8. Severe CMO - EF 31-35% DVT Px - heparin gtt FC Severity of Signs & Symptoms -- Moderate Risk of adverse events -- Moderate Current Medical Rx Plan - As Above Patient history & comorbidities - Per HPI Discharge Plan -- SNF Risk for Readmission -- High HPI:  
 
Kayla Jimenez is a 80 y.o. female who is being admitted for NSTEMI Pt has baseline dementia so unable to provide any info Pt was recently admitted tot he hosp in jan 2019 - was discharged to rehab - per son at bedside she was just discharged from rehab a few days ago & this Am she started to complain of weakness , couldn't stand up - they also felt a change in mental status so pt brought to the ER  
ER eval - pt noted to have an elevated trop - started on heparin gtt , cardiology consulted , likely will undergo medical management Will admit for further eval  
 
Past Medical History:  
Diagnosis Date  Acute on chronic diastolic heart failure ([address]  Arthritis  Benign hypertensive heart disease without heart failure Better controlled, stable  Chronic diastolic heart failure ([address] Breathing and edema is improving  Congestive heart failure ([address]  HTN (hypertension)  Hypercholesteremia  Lupus (systemic lupus erythematosus) ([address] 6/18/2014  
 followed by dr Micki Hedrick  Obesity, unspecified Patient has weight loss Discussed diet ad fluid restriction  Other and unspecified hyperlipidemia F/u per pmd  
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  
Procedure Laterality Date  HX HYSTERECTOMY  PACEMAKER PROCEDURE Family History Problem Relation Age of Onset  Arrhythmia Neg Hx  Asthma Neg Hx  Clotting Disorder Neg Hx  Fainting Neg Hx   
 Heart Attack Neg Hx  High Cholesterol Neg Hx  Pacemaker Neg Hx  Stroke Neg Hx Social History Socioeconomic History  Marital status:  Spouse name: Not on file  Number of children: Not on file  Years of education: Not on file  Highest education level: Not on file Tobacco Use  Smoking status: Never Smoker  Smokeless tobacco: Never Used Substance and Sexual Activity  Alcohol use: No  
 Drug use: No  
 
 
Prior to Admission medications Medication Sig Start Date End Date Taking? Authorizing Provider  
menthol-zinc oxide (CALMOSEPTINE) 0.44-20.6 % oint Apply  to affected area. Provider, Historical  
raNITIdine (ZANTAC) 75 mg tab Take  by mouth two (2) times a day. Provider, Historical  
diclofenac (VOLTAREN) 1 % gel Apply  to affected area four (4) times daily. Provider, Historical  
furosemide (LASIX) 20 mg tablet 1 -2 tabs po daily prn for leg swelling or SOB 1/9/19   Homa Dela Cruz MD  
predniSONE (DELTASONE) 10 mg tablet 1 tab po tid for 1 week , and 1 tab po bid until discontinued 1/9/19   Hoam Dela Cruz MD  
docusate sodium (COLACE) 100 mg capsule Take 1 Cap by mouth two (2) times a day for 90 days. 1/9/19 4/9/19  Homa Dela Cruz MD  
famotidine (PEPCID) 20 mg tablet Take 1 Tab by mouth daily. 1/10/19   Homa Dela Cruz MD  
potassium chloride (K-DUR, KLOR-CON) 20 mEq tablet Take 1 Tab by mouth two (2) times a day.  1/9/19   Homa Dela Cruz MD  
 carvedilol (COREG) 3.125 mg tablet Take 1 Tab by mouth every twelve (12) hours. 12/31/18   Radha Pringle MD  
acetaminophen (TYLENOL ARTHRITIS PAIN) 650 mg TbER Take 1 Tab by mouth every eight (8) hours. 12/4/18   Nati METCALF PA-C  
amiodarone (CORDARONE) 200 mg tablet TAKE ONE TABLET EVERY DAY (MAKE AN APPT) 11/2/18   Becki Roca NP  
isosorbide mononitrate ER (IMDUR) 60 mg CR tablet TAKE ONE TABLET EVERY MORNING 10/4/18   Hector Noble MD  
memantine [facility]) 10 mg tablet  7/10/18   Provider, Historical  
amitriptyline (ELAVIL) 25 mg tablet Take  by mouth nightly. Provider, Historical  
aspirin 81 mg tablet Take 81 mg by mouth daily. Provider, Historical  
 
 
No Known Allergies Review of Systems Review of systems not obtained due to patient factors. Physical Exam:  
  
Visit Vitals /60 Pulse 70 Temp 97.3 °F (36.3 °C) Resp 19 Wt 81.6 kg (180 lb) SpO2 100% BMI 29.95 kg/m² Physical Exam: 
 
Gen: In general, this is a thinly built female in no acute distress HEENT: Sclerae nonicteric. Oral mucous membranes moist. Dentition poor Neck: Supple with midline trachea. CV: RRR without murmur or rub appreciated. Resp:Respirations are unlabored without use of accessory muscles. Lung fields B/L without wheezes or rhonchi. Abd: Soft, nontender, nondistended. Extrem: Extremities are warm, without cyanosis or clubbing. No pitting pretibial edema. Skin: Warm, no visible rashes. Neuro: Patient has baseline dementia Labs Reviewed: 
 
Recent Results (from the past 24 hour(s)) CBC WITH AUTOMATED DIFF Collection Time: 01/30/19  1:15 PM  
Result Value Ref Range WBC 8.2 4.6 - 13.2 K/uL  
 RBC 2.94 (L) 4.20 - 5.30 M/uL HGB 8.8 (L) 12.0 - 16.0 g/dL HCT 27.6 (L) 35.0 - 45.0 % MCV 93.9 74.0 - 97.0 FL  
 MCH 29.9 24.0 - 34.0 PG  
 MCHC 31.9 31.0 - 37.0 g/dL  
 RDW 17.0 (H) 11.6 - 14.5 % PLATELET 730 122 - 691 K/uL MPV 9.0 (L) 9.2 - 11.8 FL  
 NEUTROPHILS 58 40 - 73 % LYMPHOCYTES 24 21 - 52 % MONOCYTES 15 (H) 3 - 10 % EOSINOPHILS 3 0 - 5 % BASOPHILS 0 0 - 2 %  
 ABS. NEUTROPHILS 4.7 1.8 - 8.0 K/UL  
 ABS. LYMPHOCYTES 1.9 0.9 - 3.6 K/UL  
 ABS. MONOCYTES 1.3 (H) 0.05 - 1.2 K/UL  
 ABS. EOSINOPHILS 0.3 0.0 - 0.4 K/UL  
 ABS. BASOPHILS 0.0 0.0 - 0.1 K/UL  
 DF AUTOMATED METABOLIC PANEL, COMPREHENSIVE Collection Time: 01/30/19  1:15 PM  
Result Value Ref Range Sodium 141 136 - 145 mmol/L Potassium 4.8 3.5 - 5.5 mmol/L Chloride 107 100 - 108 mmol/L  
 CO2 28 21 - 32 mmol/L Anion gap 6 3.0 - 18 mmol/L Glucose 99 74 - 99 mg/dL BUN 66 (H) 7.0 - 18 MG/DL Creatinine 2.08 (H) 0.6 - 1.3 MG/DL  
 BUN/Creatinine ratio 32 (H) 12 - 20 GFR est AA 27 (L) >60 ml/min/1.73m2 GFR est non-AA 23 (L) >60 ml/min/1.73m2 Calcium 8.4 (L) 8.5 - 10.1 MG/DL Bilirubin, total 0.3 0.2 - 1.0 MG/DL  
 ALT (SGPT) 19 13 - 56 U/L  
 AST (SGOT) 32 15 - 37 U/L Alk. phosphatase 77 45 - 117 U/L Protein, total 6.3 (L) 6.4 - 8.2 g/dL Albumin 2.9 (L) 3.4 - 5.0 g/dL Globulin 3.4 2.0 - 4.0 g/dL A-G Ratio 0.9 0.8 - 1.7 URINALYSIS W/ RFLX MICROSCOPIC Collection Time: 01/30/19  1:15 PM  
Result Value Ref Range Color YELLOW Appearance CLOUDY Specific gravity 1.016 1.005 - 1.030    
 pH (UA) 5.0 5.0 - 8.0 Protein NEGATIVE  NEG mg/dL Glucose NEGATIVE  NEG mg/dL Ketone NEGATIVE  NEG mg/dL Bilirubin NEGATIVE  NEG Blood NEGATIVE  NEG Urobilinogen 1.0 0.2 - 1.0 EU/dL Nitrites NEGATIVE  NEG Leukocyte Esterase TRACE (A) NEG    
CARDIAC PANEL,(CK, CKMB & TROPONIN) Collection Time: 01/30/19  1:15 PM  
Result Value Ref Range  26 - 192 U/L  
 CK - MB 7.4 (H) <3.6 ng/ml CK-MB Index 4.7 (H) 0.0 - 4.0 % Troponin-I, QT 3.03 (HH) 0.0 - 0.045 NG/ML  
URINE MICROSCOPIC ONLY Collection Time: 01/30/19  1:15 PM  
Result Value Ref Range WBC 0 to 3 0 - 4 /hpf  
 RBC NEGATIVE  0 - 5 /hpf Epithelial cells FEW 0 - 5 /lpf Bacteria NEGATIVE  NEG /hpf Amorphous Crystals 1+ (A) NEG  
EKG, 12 LEAD, INITIAL Collection Time: 01/30/19  1:41 PM  
Result Value Ref Range Ventricular Rate 74 BPM  
 Atrial Rate 74 BPM  
 P-R Interval 220 ms QRS Duration 190 ms Q-T Interval 476 ms QTC Calculation (Bezet) 528 ms Calculated R Axis -41 degrees Calculated T Axis 125 degrees Diagnosis Atrial-paced rhythm with prolonged AV conduction Left axis deviation Nonspecific intraventricular block Inferior infarct , age undetermined Abnormal ECG Confirmed by Aletha Valverde MD, Colletta Kapur ([id number]) on 1/30/2019 4:40:19 PM 
  
POC G3 Collection Time: 01/30/19  4:26 PM  
Result Value Ref Range Device: ROOM AIR    
 FIO2 (POC) 0.21 % pH (POC) 7.386 7.35 - 7.45    
 pCO2 (POC) 50.4 (H) 35.0 - 45.0 MMHG  
 pO2 (POC) 62 (L) 80 - 100 MMHG  
 HCO3 (POC) 30.2 (H) 22 - 26 MMOL/L  
 sO2 (POC) 90 (L) 92 - 97 % Base excess (POC) 5 mmol/L Allens test (POC) N/A Total resp. rate 17 Site LEFT RADIAL Specimen type (POC) ARTERIAL Performed by Lucy Sauceda CARDIAC PANEL,(CK, CKMB & TROPONIN) Collection Time: 01/30/19  6:12 PM  
Result Value Ref Range  26 - 192 U/L  
 CK - MB 7.5 (H) <3.6 ng/ml CK-MB Index 4.9 (H) 0.0 - 4.0 % Troponin-I, QT 2.21 (HH) 0.0 - 0.045 NG/ML  
NT-PRO BNP Collection Time: 01/30/19  6:12 PM  
Result Value Ref Range NT pro-BNP 4,856 (H) 0 - 1,800 PG/ML  
CBC WITH AUTOMATED DIFF Collection Time: 01/30/19  6:43 PM  
Result Value Ref Range WBC 7.9 4.6 - 13.2 K/uL  
 RBC 2.67 (L) 4.20 - 5.30 M/uL HGB 8.0 (L) 12.0 - 16.0 g/dL HCT 24.8 (L) 35.0 - 45.0 % MCV 92.9 74.0 - 97.0 FL  
 MCH 30.0 24.0 - 34.0 PG  
 MCHC 32.3 31.0 - 37.0 g/dL  
 RDW 17.0 (H) 11.6 - 14.5 % PLATELET 716 899 - 589 K/uL MPV 9.2 9.2 - 11.8 FL  
 NEUTROPHILS PENDING % LYMPHOCYTES PENDING % MONOCYTES PENDING % EOSINOPHILS PENDING % BASOPHILS PENDING %  
 ABS. NEUTROPHILS PENDING K/UL  
 ABS. LYMPHOCYTES PENDING K/UL  
 ABS. MONOCYTES PENDING K/UL  
 ABS. EOSINOPHILS PENDING K/UL  
 ABS. BASOPHILS PENDING K/UL  
 DF PENDING   
PTT Collection Time: 01/30/19  6:43 PM  
Result Value Ref Range aPTT 25.3 23.0 - 36.4 SEC Imaging Reviewed: 
 
CT head IMPRESSION: 
             
1.  No acute intracranial process. 
  
2. Chronic small vessel ischemic changes. 
  
3. No significant interval change. Zoey Romberg IMPRESSION: 
  
Moderately enlarged cardiac silhouette with perihilar vascular congestion versus 
infiltrate, radiographically similar to 1/5/2019.  See additional details above. 
  
 
Izabela Minaya MD 
1/30/2019, 7:47 PM

## 2019-01-31 NOTE — CONSULTS
[facility] - Consult Note Date of  Admission: 1/30/2019 12:44 PM 
  
Primary Care Physician: Franklyn Lang MD 
 
 Plan: 1. Elevated troponin- the patient is chest pain free. Her SOB is improving  troponin indy trending- continue heparin drip for 24-48 hours- continue medical management for CAD.  asa,bb,statin . No further cardiac w/u needed at this time 2. Chronic combined systolic and diastolic heart failure- decompensated. Increased lasix iv to 40 mg daily. Monitor I&, kandy function and electrolytes 3. Atrial fibrillation - currently paced on amiodarone. Not on anticoagulation due to high bleeding risk. Continue asa. 4. CKD- monitor kandy indices with diuresis consider renal consult 5. Hx of symptomatic bradycardia- S/P PPM  2015 Dr José Busch - EP recommended  upgrade to biventricular pacemaker as an outpatient.  Patient's family refused 6. [place] H&H dropping monitor closely now on heparin drip. If any significant bleeding can hold heparin drip. 7. Lupus- follow by Dr. Mignon Allen as out patient 8. Hypertension- with normal low bp. Continue low dose Coreg  will monitor 9. Dementia.  
 
  
 Guarded prognosis. D/w family and considering her age and memory problems, would recommend conservative med rx only. DNR discussion also needs to  Started. Patient with possible NSTEMI- on heparin- continue for 48 hrs and then discontinue Agree with medical management 
 
  
Echo 12/28/18 · Technically difficult study due to patient compliance. Unable to obtain on-axis apical images. Good parasternals, poor subcostal images. · Left ventricular moderate-to-severely decreased global systolic function. Estimated left ventricular ejection fraction is 31 - 35%. Visually measured ejection fraction. Left ventricular severe · sigmoid septum hypertrophy. Abnormal left ventricular wall motion as described on the wall scoring diagram below.  Abnormal left ventricular septal motion. Interventricular septal \"bounce\". · Severe (grade 3) left ventricular diastolic dysfunction. · Moderate tricuspid valve regurgitation is present. Mild pulmonary hypertension is present. PASP 38mmHg · Mild aortic valve regurgitation is present. · Mild to moderate mitral valve regurgitation. · Pacing wire present Assessment:  
 
Hospital Problems  Date Reviewed: 1/21/2019 Codes Class Noted POA  
 NSTEMI (non-ST elevated myocardial infarction) ([facility] ICD-10-CM: I21.4 ICD-9-CM: 410.70  1/30/2019 Unknown Dementia ICD-10-CM: F03.90 ICD-9-CM: 294.20  1/30/2019 Unknown History of Present Illness: This is a 80 y.o. female admitted for NSTEMI (non-ST elevated myocardial infarction) ([facility] NSTEMI (non-ST elevated myocardial infarction) ([facility] patient with PMHx of CMO, hypertension, s/p PPM, dementia, CHF and lupus. The patient  was recently admitted tot [facility] in jan 2019 - was discharged to rehab - per son at bedside she was just discharged from rehab a few days ago. Yesterday she started to complain of SOB,  weakness , couldn't stand up - they also felt a change in mental status so pt brought to the ER . In the ED she was found to have elevated troponin. The patient has dementia she can not provide any detail history. On my exam  patient is resting comfortable. No distress noted. denies chest pain, no  chest pressure. No SOB Past Medical History:  
 
Past Medical History:  
Diagnosis Date  Acute on chronic diastolic heart failure ([address]  Arthritis  Benign hypertensive heart disease without heart failure Better controlled, stable  Chronic diastolic heart failure ([address] Breathing and edema is improving  Congestive heart failure ([address]  HTN (hypertension)  Hypercholesteremia  Lupus (systemic lupus erythematosus) ([address] 6/18/2014  
 followed by dr Dash Query  Obesity, unspecified Patient has weight loss Discussed diet ad fluid restriction  Other and unspecified hyperlipidemia F/u per pmd  
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  
 tr with moderate pulmonary htn Social History:  
 
Social History Socioeconomic History  Marital status:  Spouse name: Not on file  Number of children: Not on file  Years of education: Not on file  Highest education level: Not on file Tobacco Use  Smoking status: Never Smoker  Smokeless tobacco: Never Used Substance and Sexual Activity  Alcohol use: No  
 Drug use: No  
 
 
 Family History:  
 
Family History Problem Relation Age of Onset  Arrhythmia Neg Hx  Asthma Neg Hx  Clotting Disorder Neg Hx  Fainting Neg Hx   
 Heart Attack Neg Hx  High Cholesterol Neg Hx  Pacemaker Neg Hx  Stroke Neg Hx Medications:  
No Known Allergies Current Facility-Administered Medications Medication Dose Route Frequency  aspirin delayed-release tablet 81 mg  81 mg Oral DAILY  amitriptyline (ELAVIL) tablet 25 mg  25 mg Oral QHS  memantine (NAMENDA) tablet 10 mg  10 mg Oral DAILY  isosorbide mononitrate ER (IMDUR) tablet 60 mg  60 mg Oral DAILY  carvedilol (COREG) tablet 3.125 mg  3.125 mg Oral Q12H  
 docusate sodium (COLACE) capsule 100 mg  100 mg Oral BID  famotidine (PEPCID) tablet 20 mg  20 mg Oral DAILY  potassium chloride (K-DUR, KLOR-CON) SR tablet 20 mEq  20 mEq Oral BID  acetaminophen (TYLENOL) tablet 650 mg  650 mg Oral Q6H PRN  
 ondansetron (ZOFRAN) injection 4 mg  4 mg IntraVENous Q6H PRN  
 sodium chloride (NS) flush 5-40 mL  5-40 mL IntraVENous Q8H  
 sodium chloride (NS) flush 5-40 mL  5-40 mL IntraVENous PRN  
 furosemide (LASIX) injection 20 mg  20 mg IntraVENous DAILY  heparin 25,000 units in D5W 250 ml infusion  12-25 Units/kg/hr IntraVENous TITRATE  amiodarone (CORDARONE) tablet 200 mg  200 mg Oral DAILY Review Of Systems:  
 
Unable to obtain detail ROS. Patient denies chest pain chest pressure. No SOB. Physical Exam:  
 
Visit Vitals /66 (BP 1 Location: Left arm, BP Patient Position: At rest) Pulse 71 Temp 97.7 °F (36.5 °C) Resp 18 Wt 81.6 kg (180 lb) SpO2 97% BMI 29.95 kg/m² BP Readings from Last 3 Encounters:  
01/31/19 138/66  
01/21/19 129/57  
01/09/19 98/43 Pulse Readings from Last 3 Encounters:  
01/31/19 71  
01/21/19 91  
01/09/19 73 Wt Readings from Last 3 Encounters:  
01/30/19 81.6 kg (180 lb)  
01/21/19 84.8 kg (187 lb) 01/08/19 69.6 kg (153 lb 7 oz) General: alert confused no distress noted. Skin: Warm and dry, acyanotic, normal color. Head: Normocephalic, atraumatic. Eyes: Sclerae anicteric, conjunctivae without injection. Neck:  nontender, no nuchal rigidity, no masses, no stridor, no carotid bruit. +  JVD Lungs: rales lung bases b/l diminished breath sounds b/l. Heart:  regular rate and rhythm, S1, S2 normal, no click, no rub Abdomen:  abdomen is soft without significant tenderness, masses, organomegaly or guarding Extremities:  extremities normal, atraumatic, no cyanosis. BLE edema +2  Peripheral pulses present Neurological: grossly intact. No focal abnormalities, moves all extremities well. Psychiatric Affect: unable to obtain the patient is confused. Data Review:  
 
Recent Results (from the past 48 hour(s)) CBC WITH AUTOMATED DIFF Collection Time: 01/30/19  1:15 PM  
Result Value Ref Range WBC 8.2 4.6 - 13.2 K/uL  
 RBC 2.94 (L) 4.20 - 5.30 M/uL HGB 8.8 (L) 12.0 - 16.0 g/dL HCT 27.6 (L) 35.0 - 45.0 % MCV 93.9 74.0 - 97.0 FL  
 MCH 29.9 24.0 - 34.0 PG  
 MCHC 31.9 31.0 - 37.0 g/dL  
 RDW 17.0 (H) 11.6 - 14.5 % PLATELET 046 072 - 846 K/uL MPV 9.0 (L) 9.2 - 11.8 FL  
 NEUTROPHILS 58 40 - 73 % LYMPHOCYTES 24 21 - 52 % MONOCYTES 15 (H) 3 - 10 % EOSINOPHILS 3 0 - 5 % BASOPHILS 0 0 - 2 %  
 ABS. NEUTROPHILS 4.7 1.8 - 8.0 K/UL  
 ABS. LYMPHOCYTES 1.9 0.9 - 3.6 K/UL  
 ABS. MONOCYTES 1.3 (H) 0.05 - 1.2 K/UL  
 ABS. EOSINOPHILS 0.3 0.0 - 0.4 K/UL  
 ABS. BASOPHILS 0.0 0.0 - 0.1 K/UL  
 DF AUTOMATED METABOLIC PANEL, COMPREHENSIVE Collection Time: 01/30/19  1:15 PM  
Result Value Ref Range Sodium 141 136 - 145 mmol/L Potassium 4.8 3.5 - 5.5 mmol/L Chloride 107 100 - 108 mmol/L  
 CO2 28 21 - 32 mmol/L Anion gap 6 3.0 - 18 mmol/L Glucose 99 74 - 99 mg/dL BUN 66 (H) 7.0 - 18 MG/DL Creatinine 2.08 (H) 0.6 - 1.3 MG/DL  
 BUN/Creatinine ratio 32 (H) 12 - 20 GFR est AA 27 (L) >60 ml/min/1.73m2 GFR est non-AA 23 (L) >60 ml/min/1.73m2 Calcium 8.4 (L) 8.5 - 10.1 MG/DL Bilirubin, total 0.3 0.2 - 1.0 MG/DL  
 ALT (SGPT) 19 13 - 56 U/L  
 AST (SGOT) 32 15 - 37 U/L Alk. phosphatase 77 45 - 117 U/L Protein, total 6.3 (L) 6.4 - 8.2 g/dL Albumin 2.9 (L) 3.4 - 5.0 g/dL Globulin 3.4 2.0 - 4.0 g/dL A-G Ratio 0.9 0.8 - 1.7 URINALYSIS W/ RFLX MICROSCOPIC Collection Time: 01/30/19  1:15 PM  
Result Value Ref Range Color YELLOW Appearance CLOUDY Specific gravity 1.016 1.005 - 1.030    
 pH (UA) 5.0 5.0 - 8.0 Protein NEGATIVE  NEG mg/dL Glucose NEGATIVE  NEG mg/dL Ketone NEGATIVE  NEG mg/dL Bilirubin NEGATIVE  NEG Blood NEGATIVE  NEG Urobilinogen 1.0 0.2 - 1.0 EU/dL Nitrites NEGATIVE  NEG Leukocyte Esterase TRACE (A) NEG    
CARDIAC PANEL,(CK, CKMB & TROPONIN) Collection Time: 01/30/19  1:15 PM  
Result Value Ref Range  26 - 192 U/L  
 CK - MB 7.4 (H) <3.6 ng/ml CK-MB Index 4.7 (H) 0.0 - 4.0 % Troponin-I, QT 3.03 (HH) 0.0 - 0.045 NG/ML  
URINE MICROSCOPIC ONLY Collection Time: 01/30/19  1:15 PM  
Result Value Ref Range WBC 0 to 3 0 - 4 /hpf  
 RBC NEGATIVE  0 - 5 /hpf Epithelial cells FEW 0 - 5 /lpf Bacteria NEGATIVE  NEG /hpf Amorphous Crystals 1+ (A) NEG  
EKG, 12 LEAD, INITIAL Collection Time: 01/30/19  1:41 PM  
Result Value Ref Range Ventricular Rate 74 BPM  
 Atrial Rate 74 BPM  
 P-R Interval 220 ms QRS Duration 190 ms Q-T Interval 476 ms QTC Calculation (Bezet) 528 ms Calculated R Axis -41 degrees Calculated T Axis 125 degrees Diagnosis Atrial-paced rhythm with prolonged AV conduction Left axis deviation Nonspecific intraventricular block Inferior infarct , age undetermined Abnormal ECG Confirmed by Ling Owusu MD, Jamila Medina ([id number]) on 1/30/2019 4:40:19 PM 
  
EKG, 12 LEAD, SUBSEQUENT Collection Time: 01/30/19  1:48 PM  
Result Value Ref Range Ventricular Rate 79 BPM  
 Atrial Rate 79 BPM  
 P-R Interval 220 ms QRS Duration 188 ms Q-T Interval 466 ms  
 QTC Calculation (Bezet) 534 ms Calculated R Axis 4 degrees Calculated T Axis 121 degrees Diagnosis Atrial-paced rhythm with prolonged AV conduction Nonspecific intraventricular block Inferior infarct , age undetermined Abnormal ECG When compared with ECG of 30-JAN-2019 13:43, Electronic atrial pacemaker has replaced Electronic ventricular pacemaker POC G3 Collection Time: 01/30/19  4:26 PM  
Result Value Ref Range Device: ROOM AIR    
 FIO2 (POC) 0.21 % pH (POC) 7.386 7.35 - 7.45    
 pCO2 (POC) 50.4 (H) 35.0 - 45.0 MMHG  
 pO2 (POC) 62 (L) 80 - 100 MMHG  
 HCO3 (POC) 30.2 (H) 22 - 26 MMOL/L  
 sO2 (POC) 90 (L) 92 - 97 % Base excess (POC) 5 mmol/L Allens test (POC) N/A Total resp. rate 17 Site LEFT RADIAL Specimen type (POC) ARTERIAL Performed by Angelique Valdez CARDIAC PANEL,(CK, CKMB & TROPONIN) Collection Time: 01/30/19  6:12 PM  
Result Value Ref Range  26 - 192 U/L  
 CK - MB 7.5 (H) <3.6 ng/ml CK-MB Index 4.9 (H) 0.0 - 4.0 % Troponin-I, QT 2.21 (HH) 0.0 - 0.045 NG/ML  
NT-PRO BNP  Collection Time: 01/30/19  6:12 PM  
 Result Value Ref Range NT pro-BNP 4,856 (H) 0 - 1,800 PG/ML  
CBC WITH AUTOMATED DIFF Collection Time: 01/30/19  6:43 PM  
Result Value Ref Range WBC 7.9 4.6 - 13.2 K/uL  
 RBC 2.67 (L) 4.20 - 5.30 M/uL HGB 8.0 (L) 12.0 - 16.0 g/dL HCT 24.8 (L) 35.0 - 45.0 % MCV 92.9 74.0 - 97.0 FL  
 MCH 30.0 24.0 - 34.0 PG  
 MCHC 32.3 31.0 - 37.0 g/dL  
 RDW 17.0 (H) 11.6 - 14.5 % PLATELET 904 685 - 407 K/uL MPV 9.2 9.2 - 11.8 FL  
 NEUTROPHILS 56 42 - 75 % LYMPHOCYTES 23 20 - 51 % MONOCYTES 16 (H) 2 - 9 % EOSINOPHILS 5 0 - 5 % BASOPHILS 0 0 - 3 %  
 ABS. NEUTROPHILS 4.4 1.8 - 8.0 K/UL  
 ABS. LYMPHOCYTES 1.8 0.8 - 3.5 K/UL  
 ABS. MONOCYTES 1.3 (H) 0 - 1.0 K/UL  
 ABS. EOSINOPHILS 0.4 0.0 - 0.4 K/UL  
 ABS. BASOPHILS 0.0 0.0 - 0.06 K/UL  
 DF MANUAL PLATELET COMMENTS ADEQUATE PLATELETS    
 RBC COMMENTS LUÍS CELLS 
FEW 
SCHISTOCYTES 
FEW 
    
PTT Collection Time: 01/30/19  6:43 PM  
Result Value Ref Range aPTT 25.3 23.0 - 36.4 SEC  
CBC WITH AUTOMATED DIFF Collection Time: 01/31/19  2:24 AM  
Result Value Ref Range WBC 5.7 4.6 - 13.2 K/uL  
 RBC 2.52 (L) 4.20 - 5.30 M/uL HGB 7.4 (L) 12.0 - 16.0 g/dL HCT 23.4 (L) 35.0 - 45.0 % MCV 92.9 74.0 - 97.0 FL  
 MCH 29.4 24.0 - 34.0 PG  
 MCHC 31.6 31.0 - 37.0 g/dL  
 RDW 16.8 (H) 11.6 - 14.5 % PLATELET 934 315 - 832 K/uL MPV 8.9 (L) 9.2 - 11.8 FL  
 NEUTROPHILS 55 40 - 73 % LYMPHOCYTES 31 21 - 52 % MONOCYTES 10 3 - 10 % EOSINOPHILS 4 0 - 5 % BASOPHILS 0 0 - 2 %  
 ABS. NEUTROPHILS 3.2 1.8 - 8.0 K/UL  
 ABS. LYMPHOCYTES 1.8 0.9 - 3.6 K/UL  
 ABS. MONOCYTES 0.6 0.05 - 1.2 K/UL  
 ABS. EOSINOPHILS 0.2 0.0 - 0.4 K/UL  
 ABS. BASOPHILS 0.0 0.0 - 0.1 K/UL  
 DF AUTOMATED METABOLIC PANEL, COMPREHENSIVE Collection Time: 01/31/19  2:24 AM  
Result Value Ref Range Sodium 142 136 - 145 mmol/L Potassium 4.1 3.5 - 5.5 mmol/L  Chloride 106 100 - 108 mmol/L  
 CO2 30 21 - 32 mmol/L  
 Anion gap 6 3.0 - 18 mmol/L Glucose 98 74 - 99 mg/dL BUN 64 (H) 7.0 - 18 MG/DL Creatinine 1.84 (H) 0.6 - 1.3 MG/DL  
 BUN/Creatinine ratio 35 (H) 12 - 20 GFR est AA 31 (L) >60 ml/min/1.73m2 GFR est non-AA 26 (L) >60 ml/min/1.73m2 Calcium 8.1 (L) 8.5 - 10.1 MG/DL Bilirubin, total 0.4 0.2 - 1.0 MG/DL  
 ALT (SGPT) 18 13 - 56 U/L  
 AST (SGOT) 26 15 - 37 U/L Alk. phosphatase 66 45 - 117 U/L Protein, total 5.5 (L) 6.4 - 8.2 g/dL Albumin 2.6 (L) 3.4 - 5.0 g/dL Globulin 2.9 2.0 - 4.0 g/dL A-G Ratio 0.9 0.8 - 1.7 LIPID PANEL Collection Time: 01/31/19  2:24 AM  
Result Value Ref Range LIPID PROFILE Cholesterol, total 142 <200 MG/DL Triglyceride 38 <150 MG/DL  
 HDL Cholesterol 68 (H) 40 - 60 MG/DL  
 LDL, calculated 66.4 0 - 100 MG/DL VLDL, calculated 7.6 MG/DL  
 CHOL/HDL Ratio 2.1 0 - 5.0    
PTT Collection Time: 01/31/19  2:24 AM  
Result Value Ref Range aPTT 108.6 (H) 23.0 - 36.4 SEC  
TROPONIN I Collection Time: 01/31/19  2:24 AM  
Result Value Ref Range Troponin-I, QT 1.93 (HH) 0.0 - 0.045 NG/ML No intake or output data in the 24 hours ending 01/31/19 0901 Cardiographics:  
 
ECG: AV dual-paced rhythm. When compared with ECG of 30-JAN-2019 13:41,  No significant change was found . Signed By: Gemini RIVAS Phone [phone] supervised January 31, 2019 I have independently evaluated and examined the patient. All relevant labs and testing data's are reviewed. Care plan discussed and updated after review.  
 
Ilir Doherty MD

## 2019-01-31 NOTE — PROGRESS NOTES
Reason for Admission:   NSTEMI (non-ST elevated myocardial infarction) ([facility] NSTEMI (non-ST elevated myocardial infarction) ([facility] [address] [facility] RRAT Score:     40 Resources/supports as identified by patient/family:   Patient has lots of family supports and she is active with [facility]. Top Challenges facing patient (as identified by patient/family and CM): Family would like to apply for medicaid. Finances/Medication cost?    NO Transportation     David Gay) Support system or lack thereof? Lots of family supports and home health ([facility]) Living arrangements? Lives with her  and her son Sophy Chowdhury). Self-care/ADLs/Cognition? Impaired cognition. Needs assistance with ADLs and IADLs Current Advanced Directive/Advance Care Plan:   no 
                       
Plan for utilizing home health:    She is active with [facility]. Home health services will resume once she discharges. Likelihood of readmission:   HIGH Transition of Care Plan:    Patient plans to return home with help from her family and have home health restarted. Home health is through [facility]. Initial assessment completed with patient and her sons Deidra and Sheila Johnson). Cognitive status of patient: Impaired cognition. Face sheet information confirmed:  yes. The patient designates her sons Senthil Corrigan and Elie Sanchez) and her  Karyna Gill) to participate in his/her discharge plan and to receive any needed information. This patient lives in a house with her  and her son Sheila Johnson. Patient is not able to navigate steps as needed. Prior to hospitalization, patient was considered to be independent with ADLs/IADLS : no . If not independent,  patient needs assist with : ADLs and IADLs.  
 
Patient has a current ACP document on file: no  
 
Patient's son Deidra) will be available to transport patient home upon discharge. The patient already has a walker and a wheelchair available in the home. Patient is currently active with home health. If active, agency name is [facility]. Patient has not stayed in a skilled nursing facility or rehab. [place] of choice signed: yes, for [facility]. Currently, the discharge plan is to return home with help from her family. Family will assist with care, as needed, post discharge. She is also active with [facility]. [facility] will restart services once she discharges from [facility]. Patient's son Hunter More) will transport home at the time of discharge. Patient's  is Hansa Lerma, VJ#[phone]). Patient's son is Andrew Trinidad, ES#[phone]). Patient's other son is Lola Alcala). Patient's PCP is Dr. Tal Nascimento. Patient is insured through Medicare and she also has [employer]. The patient's family states that she can obtain her medications from the pharmacy, and take her medications as directed, with family assistance. This writer will continue to closely monitor for discharge planning to ensure a safe discharge home from [facility]. Care Management Interventions PCP Verified by CM: Yes(Dr. Tal Nascimento) Palliative Care Criteria Met (RRAT>21 & CHF Dx)?: No 
Mode of Transport at Discharge: Other (see comment)(Son will transport home Andrew Trinidad). ) Transition of Care Consult (CM Consult): Discharge Planning, Home Health [facility] - INPATIENT: No 
Reason Outside [place]: Patient already serviced by other home care/hospice agency Current Support Network: Lives with Spouse, Lives with Caregiver, Family Lives [place] Confirm Follow Up Transport: Family Plan discussed with Pt/Family/Caregiver: Yes Freedom of Choice Offered: Yes Discharge Location Discharge Placement: Home with home health Lamonte Leonardo. [facility] Care Manager Pager#: [phone]

## 2019-01-31 NOTE — PROGRESS NOTES
RENAL PROGRESS NOTE Arlyn Hilario    
 
 
Assessment/Plan: · CKD 4, stable. Scr is up slightly with diuresis. · Decompensated HFrEF/volume overload. Improving, hold lasix for 1-2 days and then switch to po. · Met alkalosis due to diuresis. Hold lasix for 1-2 days. · HTN. Controlled, continue current regimen. Ok to discharge from renal standpoint Please call with questions Rosi Espinosa MD FASN Cell [phone] Pager: [phone] Subjective: 
Patient complaints off: feels better. No SOB at rest, no CP/N/V. Appetite is fair per family. Patient Active Problem List  
Diagnosis Code  
 HTN (hypertension) I10  
 Hypercholesteremia E78.00  Tricuspid valve disorders, 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  Lupus (systemic lupus erythematosus) ([facility]) M32.9  Anasarca R60.1  Hypertensive heart disease with CHF (congestive heart failure) ([facility]) I11.0  
 S/P placement of cardiac pacemaker Z95.0  Paroxysmal atrial flutter ([facility]) I48.92  
 Acute on chronic diastolic (congestive) heart failure ([facility]) I87.61  
 Diastolic CHF, chronic ([facility]) I50.32  
 High risk medication use Z79.899  Acute exacerbation of CHF (congestive heart failure) ([facility]) I50.9  ARIEL (acute kidney injury) ([facility] N17.9  Uremia N19  Left hip pain M25.552  CKD (chronic kidney disease) stage 4, GFR 15-29 ml/min ([facility]) N18.4  NSTEMI (non-ST elevated myocardial infarction) ([facility] I21.4  Dementia F03.90 Facility-Administered Medications Ordered in Other Encounters Medication Dose Route Frequency Provider Last Rate Last Dose  aspirin delayed-release tablet 81 mg  81 mg Oral DAILY Juan Coffey MD      
 amitriptyline (ELAVIL) tablet 25 mg  25 mg Oral QHS Juan Coffey MD      
 memantine [facility]) tablet 10 mg  10 mg Oral DAILY Juan Coffey MD      
  isosorbide mononitrate ER (IMDUR) tablet 60 mg  60 mg Oral DAILY Cathleen Navarro MD      
 carvedilol (COREG) tablet 3.125 mg  3.125 mg Oral Q12H Cathleen Navarro MD      
 docusate sodium (COLACE) capsule 100 mg  100 mg Oral BID Cathleen Navarro MD      
 famotidine (PEPCID) tablet 20 mg  20 mg Oral DAILY Cathleen Navarro MD      
 potassium chloride (K-DUR, KLOR-CON) SR tablet 20 mEq  20 mEq Oral BID Cathleen Navarro MD      
 acetaminophen (TYLENOL) tablet 650 mg  650 mg Oral Q6H PRN Cathleen Navarro MD      
 ondansetron [facility]) injection 4 mg  4 mg IntraVENous Q6H PRN Cathleen Navarro MD      
 sodium chloride (NS) flush 5-40 mL  5-40 mL IntraVENous Q8H Cathleen Navarro MD      
 sodium chloride (NS) flush 5-40 mL  5-40 mL IntraVENous PRN Cathleen Navarro MD      
 furosemide (LASIX) injection 20 mg  20 mg IntraVENous DAILY Cathleen Navarro MD      
 heparin 25,000 units in D5W 250 ml infusion  12-25 Units/kg/hr IntraVENous TITRATE Cathleen Navarro MD 9.8 mL/hr at 01/30/19 2223 980 Units/hr at 01/30/19 2223  
 amiodarone (CORDARONE) tablet 200 mg  200 mg Oral DAILY Cathleen Navarro MD      
 
 
Objective Vitals:  
 01/09/19 0353 01/09/19 0830 01/09/19 1154 01/09/19 1608 BP: 133/89 133/72 114/62 98/43 Pulse: 72 89 80 73 Resp: 16 21 18 17 Temp: 98.1 °F (36.7 °C)  97.4 °F (36.3 °C) 97.4 °F (36.3 °C) SpO2: 96%  98% 93% Weight:      
 
 
No intake or output data in the 24 hours ending 01/31/19 7785 Admission weight: Weight: 70.3 kg (155 lb) (01/06/19 0235) Last Weight Metrics: 
Weight Loss Metrics 1/30/2019 1/21/2019 1/8/2019 12/31/2018 12/4/2018 1/24/2018 11/30/2016 Today's Wt 180 lb 187 lb 153 lb 7 oz 158 lb 8 oz 167 lb 132 lb 147 lb BMI 29.95 kg/m2 31.12 kg/m2 25.53 kg/m2 26.38 kg/m2 26.16 kg/m2 21.97 kg/m2 24.46 kg/m2 Physical Assessment:  
 
General: NAD, alert and oriented. Neck: No jvd. LUNGS: Clear to Auscultation, No rales, rhonchi or wheezes. CVS EXM: S1, S2  RRR, no murmurs/gallops/rubs. Abdomen: soft, non tender. Lower Extremities:  1+ bl ankle edema. Lab CBC w/Diff Recent Labs  
  01/31/19 0224 01/30/19 
1843 01/30/19 
1315 WBC 5.7 7.9 8.2  
RBC 2.52* 2.67* 2.94* HGB 7.4* 8.0* 8.8* HCT 23.4* 24.8* 27.6*  
 165 164 GRANS 55 56 58 LYMPH 31 23 24 EOS 4 5 3 Chemistry Recent Labs  
  01/31/19 0224 01/30/19 
1315 GLU 98 99  141  
K 4.1 4.8  
 107 CO2 30 28 BUN 64* 66* CREA 1.84* 2.08* CA 8.1* 8.4* AGAP 6 6 BUCR 35* 32* AP 66 77  
TP 5.5* 6.3* ALB 2.6* 2.9*  
GLOB 2.9 3.4 AGRAT 0.9 0.9 No results found for: IRON, FE, TIBC, IBCT, PSAT, FERR Lab Results Component Value Date/Time Calcium 8.1 (L) 01/31/2019 02:24 AM  
 Phosphorus 3.4 12/28/2018 09:25 AM  
  
 
Robyn Ta M.D. [facility] Phone [phone] Pager [phone]

## 2019-02-01 LAB
APTT PPP: 35.8 SEC (ref 23–36.4)
APTT PPP: >180 SEC (ref 23–36.4)
BASOPHILS # BLD: 0 K/UL (ref 0–0.1)
BASOPHILS NFR BLD: 0 % (ref 0–2)
DIFFERENTIAL METHOD BLD: ABNORMAL
EOSINOPHIL # BLD: 0.2 K/UL (ref 0–0.4)
EOSINOPHIL NFR BLD: 2 % (ref 0–5)
ERYTHROCYTE [DISTWIDTH] IN BLOOD BY AUTOMATED COUNT: 16.8 % (ref 11.6–14.5)
HCT VFR BLD AUTO: 19 % (ref 35–45)
HCT VFR BLD AUTO: 21.5 % (ref 35–45)
HGB BLD-MCNC: 6.2 G/DL (ref 12–16)
HGB BLD-MCNC: 6.8 G/DL (ref 12–16)
INR PPP: 1.2 (ref 0.8–1.2)
LYMPHOCYTES # BLD: 1.9 K/UL (ref 0.9–3.6)
LYMPHOCYTES NFR BLD: 20 % (ref 21–52)
MCH RBC QN AUTO: 29.4 PG (ref 24–34)
MCHC RBC AUTO-ENTMCNC: 31.6 G/DL (ref 31–37)
MCV RBC AUTO: 93.1 FL (ref 74–97)
MONOCYTES # BLD: 1.4 K/UL (ref 0.05–1.2)
MONOCYTES NFR BLD: 14 % (ref 3–10)
NEUTS SEG # BLD: 5.9 K/UL (ref 1.8–8)
NEUTS SEG NFR BLD: 64 % (ref 40–73)
PLATELET # BLD AUTO: 154 K/UL (ref 135–420)
PMV BLD AUTO: 8.8 FL (ref 9.2–11.8)
PROTHROMBIN TIME: 14.9 SEC (ref 11.5–15.2)
RBC # BLD AUTO: 2.31 M/UL (ref 4.2–5.3)
WBC # BLD AUTO: 9.4 K/UL (ref 4.6–13.2)

## 2019-02-01 PROCEDURE — 65660000000 HC RM CCU STEPDOWN

## 2019-02-01 PROCEDURE — 86923 COMPATIBILITY TEST ELECTRIC: CPT

## 2019-02-01 PROCEDURE — 36430 TRANSFUSION BLD/BLD COMPNT: CPT

## 2019-02-01 PROCEDURE — 74011250636 HC RX REV CODE- 250/636: Performed by: HOSPITALIST

## 2019-02-01 PROCEDURE — 85610 PROTHROMBIN TIME: CPT

## 2019-02-01 PROCEDURE — 85025 COMPLETE CBC W/AUTO DIFF WBC: CPT

## 2019-02-01 PROCEDURE — P9016 RBC LEUKOCYTES REDUCED: HCPCS

## 2019-02-01 PROCEDURE — 74011250637 HC RX REV CODE- 250/637: Performed by: HOSPITALIST

## 2019-02-01 PROCEDURE — 85730 THROMBOPLASTIN TIME PARTIAL: CPT

## 2019-02-01 PROCEDURE — 36415 COLL VENOUS BLD VENIPUNCTURE: CPT

## 2019-02-01 PROCEDURE — 85018 HEMOGLOBIN: CPT

## 2019-02-01 PROCEDURE — 30233N1 TRANSFUSION OF NONAUTOLOGOUS RED BLOOD CELLS INTO PERIPHERAL VEIN, PERCUTANEOUS APPROACH: ICD-10-PCS | Performed by: EMERGENCY MEDICINE

## 2019-02-01 PROCEDURE — 86900 BLOOD TYPING SEROLOGIC ABO: CPT

## 2019-02-01 RX ORDER — HEPARIN SODIUM 10000 [USP'U]/100ML
12-25 INJECTION, SOLUTION INTRAVENOUS
Status: DISCONTINUED | OUTPATIENT
Start: 2019-02-01 | End: 2019-02-02

## 2019-02-01 RX ORDER — HEPARIN SODIUM 5000 [USP'U]/ML
3000 INJECTION, SOLUTION INTRAVENOUS; SUBCUTANEOUS ONCE
Status: COMPLETED | OUTPATIENT
Start: 2019-02-01 | End: 2019-02-01

## 2019-02-01 RX ORDER — SODIUM CHLORIDE 9 MG/ML
250 INJECTION, SOLUTION INTRAVENOUS AS NEEDED
Status: DISCONTINUED | OUTPATIENT
Start: 2019-02-01 | End: 2019-02-02

## 2019-02-01 RX ADMIN — FAMOTIDINE 20 MG: 20 TABLET ORAL at 09:11

## 2019-02-01 RX ADMIN — AMITRIPTYLINE HYDROCHLORIDE 25 MG: 50 TABLET, FILM COATED ORAL at 21:30

## 2019-02-01 RX ADMIN — Medication 10 ML: at 17:25

## 2019-02-01 RX ADMIN — ISOSORBIDE MONONITRATE 60 MG: 60 TABLET, EXTENDED RELEASE ORAL at 09:11

## 2019-02-01 RX ADMIN — CARVEDILOL 3.12 MG: 3.12 TABLET, FILM COATED ORAL at 09:12

## 2019-02-01 RX ADMIN — Medication 10 ML: at 06:10

## 2019-02-01 RX ADMIN — MEMANTINE 10 MG: 10 TABLET ORAL at 09:11

## 2019-02-01 RX ADMIN — POTASSIUM CHLORIDE 20 MEQ: 20 TABLET, EXTENDED RELEASE ORAL at 09:11

## 2019-02-01 RX ADMIN — HEPARIN SODIUM 3000 UNITS: 5000 INJECTION INTRAVENOUS; SUBCUTANEOUS at 18:44

## 2019-02-01 RX ADMIN — AMIODARONE HYDROCHLORIDE 200 MG: 200 TABLET ORAL at 09:11

## 2019-02-01 RX ADMIN — HEPARIN SODIUM AND DEXTROSE 580 UNITS/HR: 10000; 5 INJECTION INTRAVENOUS at 18:45

## 2019-02-01 RX ADMIN — DOCUSATE SODIUM 100 MG: 100 CAPSULE, LIQUID FILLED ORAL at 17:23

## 2019-02-01 RX ADMIN — DOCUSATE SODIUM 100 MG: 100 CAPSULE, LIQUID FILLED ORAL at 09:11

## 2019-02-01 RX ADMIN — Medication 10 ML: at 21:30

## 2019-02-01 RX ADMIN — ASPIRIN 81 MG: 81 TABLET, COATED ORAL at 09:11

## 2019-02-01 RX ADMIN — POTASSIUM CHLORIDE 20 MEQ: 20 TABLET, EXTENDED RELEASE ORAL at 17:22

## 2019-02-01 NOTE — PROGRESS NOTES
[facility] 
 
 
CARDIOLOGY PROGRESS NOTE 
RECS: 
1. Elevated troponin- the patient is chest pain free. Her SOB is improving  troponin indy trending- continue heparin drip for 24-48 hours- continue medical management for CAD.  asa,bb,statin . No further cardiac w/u needed at this time 2. Chronic combined systolic and diastolic heart failure- decompensated. use Lasix as needed for now as blood pressure may reduce in case she is bleeding. 3. Atrial fibrillation - currently paced on amiodarone. Not on anticoagulation due to high bleeding risk. Continue asa. 4. CKD- monitor kandy indices with diuresis consider renal consult 5. Hx of symptomatic bradycardia- S/P PPM  3300 Dr Beckman - EP recommended  upgrade to biventricular pacemaker as an outpatient.  Patient's family refused   
10. Anemia: H&H dropping. Discontinue heparin drip. Check stool guaiacs and may need to hold aspirin also if stool guaiacs are positive. 7. Lupus- follow by Dr. Sj Santiago as out patient 8. Hypertension- with normal low bp. Continue low dose Coreg  will monitor 9. Dementia. 10. CODE STATUS: Consider palliative care consult. 
  
 
 
ASSESSMENT: 
Hospital Problems  Date Reviewed: 1/21/2019 Codes Class Noted POA  
 NSTEMI (non-ST elevated myocardial infarction) ([facility] [address] ICD-10-CM: I21.4 ICD-9-CM: 410.70  1/30/2019 Unknown Dementia ICD-10-CM: F03.90 ICD-9-CM: 294.20  1/30/2019 Unknown SUBJECTIVE: 
No CP No SOB OBJECTIVE: 
 
VS:  
Visit Vitals /61 (BP 1 Location: Left arm, BP Patient Position: At rest) Pulse 71 Temp 99.5 °F (37.5 °C) Resp 20 Wt 83.4 kg (183 lb 14.4 oz) SpO2 99% Breastfeeding? No  
BMI 30.60 kg/m² Intake/Output Summary (Last 24 hours) at 2/1/2019 8020 Last data filed at 2/1/2019 4723 Gross per 24 hour Intake 300 ml Output 650 ml Net -350 ml  
 
TELE: paced General: alert and in no apparent distress, lying flat HENT: Normocephalic, atraumatic. Normal external eye. Neck :  no bruit, increased JVP Cardiac:  regular rate and rhythm, systolic murmur: early systolic 3/6, crescendo at 2nd left intercostal space, at 2nd right intercostal space: Second systolic murmur which is along mid to late systolic murmur at apex grade 3/6 Chest/Lungs:chest clear, no wheezing, rales, normal symmetric air entry Abdomen: Soft, nontender, no masses Extremities:  No c/c/edema, peripheral pulses present Labs: Results:  
   
Chemistry Recent Labs  
  01/31/19 0224 01/30/19 
1315 GLU 98 99  141  
K 4.1 4.8  
 107 CO2 30 28 BUN 64* 66* CREA 1.84* 2.08* CA 8.1* 8.4* AGAP 6 6 BUCR 35* 32* AP 66 77  
TP 5.5* 6.3* ALB 2.6* 2.9*  
GLOB 2.9 3.4 AGRAT 0.9 0.9  
  
CBC w/Diff Recent Labs 02/01/19 
0435 01/31/19 0224 01/30/19 
1843 WBC 9.4 5.7 7.9  
RBC 2.31* 2.52* 2.67* HGB 6.8* 7.4* 8.0*  
HCT 21.5* 23.4* 24.8*  
 161 165 GRANS 64 55 56 LYMPH 20* 31 23 EOS 2 4 5 Cardiac Enzymes Recent Labs  
  01/30/19 
1812 01/30/19 
1315  156 CKND1 4.9* 4.7* Coagulation Recent Labs 02/01/19 
7444 01/31/19 
2078 APTT >180.0* 108.6* Lipid Panel Lab Results Component Value Date/Time Cholesterol, total 142 01/31/2019 02:24 AM  
 HDL Cholesterol 68 (H) 01/31/2019 02:24 AM  
 LDL, calculated 66.4 01/31/2019 02:24 AM  
 VLDL, calculated 7.6 01/31/2019 02:24 AM  
 Triglyceride 38 01/31/2019 02:24 AM  
 CHOL/HDL Ratio 2.1 01/31/2019 02:24 AM  
  
BNP No results for input(s): BNPP in the last 72 hours. Liver Enzymes Recent Labs  
  01/31/19 0224 TP 5.5* ALB 2.6* AP 66 SGOT 26  
  
Digoxin Thyroid Studies Lab Results Component Value Date/Time TSH 8.86 (H) 11/15/2015 01:45 PM  
    
 
 
 
Consuelo Morrow MD   Pager # [phone]

## 2019-02-01 NOTE — CONSULTS
[facility] General Surgery Subjective: HPI: Patient is an [age]ogenarian who was admitted for myocardial infarction. She has extensive past medical history. I was asked to see her by the hospitalist for evaluation and management of skin and soft tissue lesions of the right and left leg. There is concern for an abscess. Patient's white blood cell count is normal.  Patient's temperature is normal.  She has been afebrile. Patient Active Problem List  
 Diagnosis Date Noted  Anasarca 06/08/2015 Priority: 1 - One  
 NSTEMI (non-ST elevated myocardial infarction) ([address] 01/30/2019  Dementia 01/30/2019  CKD (chronic kidney disease) stage 4, GFR 15-29 ml/min ([facility]) 01/21/2019  Uremia 01/05/2019  Left hip pain 01/05/2019  Acute exacerbation of CHF (congestive heart failure) ([address] 12/27/2018  ARIEL (acute kidney injury) ([address] 12/27/2018  Diastolic CHF, chronic ([address] 11/30/2016  High risk medication use 11/30/2016  Acute on chronic diastolic (congestive) heart failure ([address] 05/09/2016  Paroxysmal atrial flutter ([address] 01/11/2016  S/P placement of cardiac pacemaker 11/22/2015  Hypertensive heart disease with CHF (congestive heart failure) ([address] 11/17/2015  Tricuspid valve disorders, non-rheumatic 06/18/2014  Lupus (systemic lupus erythematosus) ([address] 06/18/2014  
 HTN (hypertension)  Hypercholesteremia Past Medical History:  
Diagnosis Date  Acute on chronic diastolic heart failure ([address]  Arthritis  Benign hypertensive heart disease without heart failure Better controlled, stable  Chronic diastolic heart failure ([address] Breathing and edema is improving  Congestive heart failure ([address]  HTN (hypertension)  Hypercholesteremia  Lupus (systemic lupus erythematosus) ([address] 6/18/2014  
 followed by dr Mignon Allen  Obesity, unspecified Patient has weight loss Discussed diet ad fluid restriction  Other and unspecified hyperlipidemia F/u per pmd  
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  
Procedure Laterality Date  HX HYSTERECTOMY  PACEMAKER PROCEDURE Family History Problem Relation Age of Onset  Arrhythmia Neg Hx  Asthma Neg Hx  Clotting Disorder Neg Hx  Fainting Neg Hx   
 Heart Attack Neg Hx  High Cholesterol Neg Hx  Pacemaker Neg Hx  Stroke Neg Hx Social History Tobacco Use  Smoking status: Never Smoker  Smokeless tobacco: Never Used Substance Use Topics  Alcohol use: No  
  
No Known Allergies Prior to Admission medications Medication Sig Start Date End Date Taking? Authorizing Provider  
menthol-zinc oxide (CALMOSEPTINE) 0.44-20.6 % oint Apply  to affected area. Yes Provider, Historical  
raNITIdine (ZANTAC) 75 mg tab Take  by mouth two (2) times a day. Yes Provider, Historical  
diclofenac (VOLTAREN) 1 % gel Apply  to affected area four (4) times daily. Yes Provider, Historical  
furosemide (LASIX) 20 mg tablet 1 -2 tabs po daily prn for leg swelling or SOB 1/9/19  Yes Aide Laird MD  
docusate sodium (COLACE) 100 mg capsule Take 1 Cap by mouth two (2) times a day for 90 days. 1/9/19 4/9/19 Yes Aide Laird MD  
potassium chloride (K-DUR, KLOR-CON) 20 mEq tablet Take 1 Tab by mouth two (2) times a day. 1/9/19  Yes Aide Laird MD  
carvedilol (COREG) 3.125 mg tablet Take 1 Tab by mouth every twelve (12) hours. 12/31/18  Yes Aide Laird MD  
acetaminophen (TYLENOL ARTHRITIS PAIN) 650 mg TbER Take 1 Tab by mouth every eight (8) hours.  12/4/18  Yes Slezak, Donis Sicard D, PA-C  
amiodarone (CORDARONE) 200 mg tablet TAKE ONE TABLET EVERY DAY (MAKE AN APPT) 11/2/18  Yes Becki Roca, NP  
isosorbide mononitrate ER (IMDUR) 60 mg CR tablet TAKE ONE TABLET EVERY MORNING 10/4/18  Yes Stanton Klein MD  
 memantine (NAMENDA) 10 mg tablet  7/10/18  Yes Provider, Historical  
amitriptyline (ELAVIL) 25 mg tablet Take  by mouth nightly. Yes Provider, Historical  
aspirin 81 mg tablet Take 81 mg by mouth daily. Yes Provider, Historical  
predniSONE (DELTASONE) 10 mg tablet 1 tab po tid for 1 week , and 1 tab po bid until discontinued 1/9/19   Aide Laird MD  
famotidine (PEPCID) 20 mg tablet Take 1 Tab by mouth daily. 1/10/19   Aide Laird MD  
 
 
Review of Systems:   
14 systems were reviewed. The results are as above in the HPI and otherwise negative. Objective:  
 
Vitals:  
 02/01/19 0010 02/01/19 5725 02/01/19 3126 02/01/19 1107 BP: 115/68 105/61 106/76 106/59 Pulse: 74 71 69 74 Resp: 20 20 19 18 Temp: 99.3 °F (37.4 °C) 99.5 °F (37.5 °C) 98.7 °F (37.1 °C) 98.3 °F (36.8 °C) SpO2: 98% 99% 97% 99% Weight:  83.4 kg (183 lb 14.4 oz) Physical Exam: 
GENERAL: alert, cooperative, no distress, appears stated age, SKIN: Right lower extremity anterior lateral surface of the leg there is a 2 x 1.5 cm raised lesion of the skin and soft tissue which has no surrounding cellulitis but is very tender to touch. This is clear to inflamed lesion but it does not appear to be infected. Left lower extremity anterior surface of the leg overlying the tibial tuberosity there is a 4 x 2.5 cm tender raised lesion which again has no surrounding overlying erythema no purulence. No ulcerated areas. If this is clearly again inflamed lesion but no evidence of infection. NEUROLOGIC: Awake. Cranial nerves 2-12 and sensation grossly intact. ,  
 
Data Review:   
Recent Results (from the past 24 hour(s)) CBC WITH AUTOMATED DIFF Collection Time: 02/01/19  4:35 AM  
Result Value Ref Range WBC 9.4 4.6 - 13.2 K/uL  
 RBC 2.31 (L) 4.20 - 5.30 M/uL HGB 6.8 (L) 12.0 - 16.0 g/dL HCT 21.5 (L) 35.0 - 45.0 % MCV 93.1 74.0 - 97.0 FL  
 MCH 29.4 24.0 - 34.0 PG  
 MCHC 31.6 31.0 - 37.0 g/dL RDW 16.8 (H) 11.6 - 14.5 % PLATELET 321 950 - 772 K/uL MPV 8.8 (L) 9.2 - 11.8 FL  
 NEUTROPHILS 64 40 - 73 % LYMPHOCYTES 20 (L) 21 - 52 % MONOCYTES 14 (H) 3 - 10 % EOSINOPHILS 2 0 - 5 % BASOPHILS 0 0 - 2 %  
 ABS. NEUTROPHILS 5.9 1.8 - 8.0 K/UL  
 ABS. LYMPHOCYTES 1.9 0.9 - 3.6 K/UL  
 ABS. MONOCYTES 1.4 (H) 0.05 - 1.2 K/UL  
 ABS. EOSINOPHILS 0.2 0.0 - 0.4 K/UL  
 ABS. BASOPHILS 0.0 0.0 - 0.1 K/UL  
 DF AUTOMATED    
PTT Collection Time: 02/01/19  4:35 AM  
Result Value Ref Range aPTT >180.0 (HH) 23.0 - 36.4 SEC Ms. Dejan Childress has a reminder for a \"due or due soon\" health maintenance. I have asked that she contact her primary care provider for follow-up on this health maintenance. Impression: · Pt with bilateral lower extremity skin and soft tissue inflammatory lesions of unclear etiology. Plan:  
 
· Lesions on the legs-wrap the lesions with 4 x 4 gauze and Kerlix daily · I would continue to follow along to see if these blossom into abscesses. Signed By: Zheng Kirkpatrick MD   
 February 1, 2019

## 2019-02-01 NOTE — PROGRESS NOTES
Hospitalist Progress NotePatient: Kaylene Prado MRN: [id number]  CSN: [id number] YOB: 1931  Age: 80 y.o. Sex: female DOA: 1/30/2019 LOS:  LOS: 2 days Assessment/Plan Active Problems: 
  NSTEMI (non-ST elevated myocardial infarction) ([address] (1/30/2019) Dementia (1/30/2019) Interval History Pt admitted last evening for NSTEMI Appreciate Cardiology consult Plan -  
 
1. NSTEMI - medical management , appreciate Cardiology consult & input -- Heparin gtt stopped 2y to Anemia  , Trop trending down,  Echo reviewed , no further cardiac work up at this time - conservative management , did speak with sons about palliative care , son said he will speak with his sister & update 2. Anemia - worsened - heparin gtt stopped , will type & cross match & transfuse 1 unit PRBC 3. Chronic combined CHF - IV lasix, monitor creatinine 4. Baseline Dementia - continue namenda 5. H/o CKD 4 - monitor creatinine since she is being diuresed 6. H/o AOCD - monitor hgb 7. H/o SLE - chronic - f/u with Dr Lyndon Marin as out pt 8. Legs - ? Abscesses noted - discussed with Dr Chanelle Lockhart on call - will see pt Continue current medical mx Will discuss with family regarding DNR status given current multiple medical problems Case discussed with:  []Patient  [x]Family  []Nursing  []Case Management DVT Prophylaxis:  []Lovenox  []Hep SQ  [x]SCDs  []Coumadin   []On Heparin gtt Subjective:  
 
CC: No new events overnight Objective:  
  
Visit Vitals /59 (BP 1 Location: Right arm, BP Patient Position: At rest) Pulse 74 Temp 98.3 °F (36.8 °C) Resp 18 Wt 83.4 kg (183 lb 14.4 oz) SpO2 99% Breastfeeding? No  
BMI 30.60 kg/m² Physical Exam: 
 
Gen: In general, this is a poorly nourished female in no acute distress HEENT: Sclerae nonicteric. Oral mucous membranes moist. Dentition poor Neck: Supple with midline trachea. CV: RRR without murmur or rub appreciated. Resp:Respirations are unlabored without use of accessory muscles. Lung fields bilaterally without wheezes or rhonchi. Abd: Soft, nontender, nondistended. Extrem: Extremities are warm, without cyanosis or clubbing. No pitting pretibial edema. Skin: Warm, no visible rashes. Neuro: Patient has baseline dementia Intake and Output: 
Current Shift:  02/01 0701 - 02/01 1900 In: 600 [P.O.:600] Out: 800 [Urine:800] Last three shifts:  01/30 1901 - 02/01 0700 In: 300 [P.O.:300] Out: 650 [Urine:650] Recent Results (from the past 24 hour(s)) CBC WITH AUTOMATED DIFF Collection Time: 02/01/19  4:35 AM  
Result Value Ref Range WBC 9.4 4.6 - 13.2 K/uL  
 RBC 2.31 (L) 4.20 - 5.30 M/uL HGB 6.8 (L) 12.0 - 16.0 g/dL HCT 21.5 (L) 35.0 - 45.0 % MCV 93.1 74.0 - 97.0 FL  
 MCH 29.4 24.0 - 34.0 PG  
 MCHC 31.6 31.0 - 37.0 g/dL  
 RDW 16.8 (H) 11.6 - 14.5 % PLATELET 791 476 - 363 K/uL MPV 8.8 (L) 9.2 - 11.8 FL  
 NEUTROPHILS 64 40 - 73 % LYMPHOCYTES 20 (L) 21 - 52 % MONOCYTES 14 (H) 3 - 10 % EOSINOPHILS 2 0 - 5 % BASOPHILS 0 0 - 2 %  
 ABS. NEUTROPHILS 5.9 1.8 - 8.0 K/UL  
 ABS. LYMPHOCYTES 1.9 0.9 - 3.6 K/UL  
 ABS. MONOCYTES 1.4 (H) 0.05 - 1.2 K/UL  
 ABS. EOSINOPHILS 0.2 0.0 - 0.4 K/UL  
 ABS. BASOPHILS 0.0 0.0 - 0.1 K/UL  
 DF AUTOMATED    
PTT Collection Time: 02/01/19  4:35 AM  
Result Value Ref Range aPTT >180.0 (HH) 23.0 - 36.4 SEC  
HGB & HCT Collection Time: 02/01/19 12:49 PM  
Result Value Ref Range HGB 6.2 (L) 12.0 - 16.0 g/dL HCT 19.0 (L) 35.0 - 45.0 % Current Meds - Reviewed Lab Results Component Value Date/Time  Glucose 98 01/31/2019 02:24 AM  
 Glucose 99 01/30/2019 01:15 PM  
 Glucose 105 (H) 01/08/2019 03:05 AM  
 Glucose 146 (H) 01/07/2019 02:47 AM  
 Glucose 101 (H) 01/06/2019 01:35 AM  
 Glucose 88 03/17/2016 11:15 AM  
  
 
 
 
Delon Soto MD 
2/1/2019, 2:26 PM

## 2019-02-02 LAB
APTT PPP: 77.3 SEC (ref 23–36.4)
HCT VFR BLD AUTO: 20 % (ref 35–45)
HCT VFR BLD AUTO: 20.5 % (ref 35–45)
HCT VFR BLD AUTO: 22.4 % (ref 35–45)
HGB BLD-MCNC: 6.6 G/DL (ref 12–16)
HGB BLD-MCNC: 6.7 G/DL (ref 12–16)
HGB BLD-MCNC: 7.1 G/DL (ref 12–16)

## 2019-02-02 PROCEDURE — 87040 BLOOD CULTURE FOR BACTERIA: CPT

## 2019-02-02 PROCEDURE — 74011000258 HC RX REV CODE- 258: Performed by: EMERGENCY MEDICINE

## 2019-02-02 PROCEDURE — 85730 THROMBOPLASTIN TIME PARTIAL: CPT

## 2019-02-02 PROCEDURE — 36415 COLL VENOUS BLD VENIPUNCTURE: CPT

## 2019-02-02 PROCEDURE — 74011250637 HC RX REV CODE- 250/637: Performed by: HOSPITALIST

## 2019-02-02 PROCEDURE — 65660000000 HC RM CCU STEPDOWN

## 2019-02-02 PROCEDURE — 74011250636 HC RX REV CODE- 250/636: Performed by: EMERGENCY MEDICINE

## 2019-02-02 PROCEDURE — 36430 TRANSFUSION BLD/BLD COMPNT: CPT

## 2019-02-02 PROCEDURE — 85018 HEMOGLOBIN: CPT

## 2019-02-02 PROCEDURE — P9016 RBC LEUKOCYTES REDUCED: HCPCS

## 2019-02-02 RX ORDER — VANCOMYCIN HYDROCHLORIDE
1250
Status: DISCONTINUED | OUTPATIENT
Start: 2019-02-04 | End: 2019-02-03

## 2019-02-02 RX ORDER — VANCOMYCIN 2 GRAM/500 ML IN 0.9 % SODIUM CHLORIDE INTRAVENOUS
2000 ONCE
Status: COMPLETED | OUTPATIENT
Start: 2019-02-02 | End: 2019-02-02

## 2019-02-02 RX ORDER — SODIUM CHLORIDE 9 MG/ML
250 INJECTION, SOLUTION INTRAVENOUS AS NEEDED
Status: DISCONTINUED | OUTPATIENT
Start: 2019-02-02 | End: 2019-02-04

## 2019-02-02 RX ADMIN — ACETAMINOPHEN 650 MG: 325 TABLET ORAL at 04:52

## 2019-02-02 RX ADMIN — ASPIRIN 81 MG: 81 TABLET, COATED ORAL at 10:01

## 2019-02-02 RX ADMIN — CARVEDILOL 3.12 MG: 3.12 TABLET, FILM COATED ORAL at 21:24

## 2019-02-02 RX ADMIN — VANCOMYCIN HYDROCHLORIDE 2000 MG: 10 INJECTION, POWDER, LYOPHILIZED, FOR SOLUTION INTRAVENOUS at 18:15

## 2019-02-02 RX ADMIN — CARVEDILOL 3.12 MG: 3.12 TABLET, FILM COATED ORAL at 10:01

## 2019-02-02 RX ADMIN — Medication 10 ML: at 17:40

## 2019-02-02 RX ADMIN — Medication 10 ML: at 05:25

## 2019-02-02 RX ADMIN — AMITRIPTYLINE HYDROCHLORIDE 25 MG: 50 TABLET, FILM COATED ORAL at 21:24

## 2019-02-02 RX ADMIN — Medication 10 ML: at 21:25

## 2019-02-02 RX ADMIN — DOCUSATE SODIUM 100 MG: 100 CAPSULE, LIQUID FILLED ORAL at 10:01

## 2019-02-02 RX ADMIN — ISOSORBIDE MONONITRATE 60 MG: 60 TABLET, EXTENDED RELEASE ORAL at 10:02

## 2019-02-02 RX ADMIN — MEMANTINE 10 MG: 10 TABLET ORAL at 10:01

## 2019-02-02 RX ADMIN — DOCUSATE SODIUM 100 MG: 100 CAPSULE, LIQUID FILLED ORAL at 17:37

## 2019-02-02 RX ADMIN — AMIODARONE HYDROCHLORIDE 200 MG: 200 TABLET ORAL at 10:02

## 2019-02-02 RX ADMIN — POTASSIUM CHLORIDE 20 MEQ: 20 TABLET, EXTENDED RELEASE ORAL at 10:01

## 2019-02-02 RX ADMIN — PIPERACILLIN SODIUM,TAZOBACTAM SODIUM 2.25 G: 2; .25 INJECTION, POWDER, FOR SOLUTION INTRAVENOUS at 17:39

## 2019-02-02 RX ADMIN — FAMOTIDINE 20 MG: 20 TABLET ORAL at 10:01

## 2019-02-02 NOTE — PROGRESS NOTES
Rn spoke with Dr. Doretha Batista per Dr. Kehinde Barroso request regarding the new lesion found on patient's left upper thigh. He is also notified of her blood transfusion and fever over night. Orders given for blood cultures.

## 2019-02-02 NOTE — PROGRESS NOTES
[facility] 
 
 
CARDIOLOGY PROGRESS NOTE 
RECS:1. Elevated troponin- the patient is chest pain free. Her SOB is improving  troponin indy trending- continue heparin drip for 24-48 hours- continue medical management for CAD.  asa,bb,statin . No further cardiac w/u needed at this time 2. Chronic combined systolic and diastolic heart failure- decompensated. use Lasix as needed for now as blood pressure may reduce in case she is bleeding. 3. Atrial fibrillation - currently paced on amiodarone. Not on anticoagulation due to high bleeding risk. Continue asa. 4. CKD- monitor kandy indices with diuresis consider renal consult 5. Hx of symptomatic bradycardia- S/P PPM  4358 Dr Jony Toribio- EP recommended  upgrade to biventricular pacemaker as an outpatient.  Patient's family refused   
10. Anemia: H&H dropping. Discontinue heparin drip. Check stool guaiacs and may need to hold aspirin also if stool guaiacs are positive. 7. Lupus- follow by Dr. Camille Shepard as out patient 8. Hypertension- with normal low bp. Continue low dose Coreg  will monitor 9. Dementia. 10. CODE STATUS: Consider palliative care consult. 
  
 
Discussed with daughter who has come from [place]. Had discussion about palliative care but they are going to decide his family and let us know. In the meantime, medical treatment for CAD. ASSESSMENT: 
Hospital Problems  Date Reviewed: 1/21/2019 Codes Class Noted POA  
 NSTEMI (non-ST elevated myocardial infarction) ([facility] [address] ICD-10-CM: I21.4 ICD-9-CM: 410.70  1/30/2019 Unknown Dementia ICD-10-CM: F03.90 ICD-9-CM: 294.20  1/30/2019 Unknown SUBJECTIVE: 
No CP No SOB OBJECTIVE: 
 
VS:  
Visit Vitals /60 (BP 1 Location: Right arm, BP Patient Position: At rest) Pulse 71 Temp 99.3 °F (37.4 °C) Resp 20 Wt 82 kg (180 lb 12.4 oz) SpO2 96% Breastfeeding? No  
BMI 30.08 kg/m² Intake/Output Summary (Last 24 hours) at 2/2/2019 5620 Last data filed at 2/2/2019 0400 Gross per 24 hour Intake 720 ml Output 650 ml Net 70 ml TELE: paced General: alert and in no apparent distress, lying flat HENT: Normocephalic, atraumatic. Normal external eye. Neck :  no bruit, increased JVP Cardiac:  regular rate and rhythm, systolic murmur: early systolic 3/6, crescendo at 2nd left intercostal space, at 2nd right intercostal space: Second systolic murmur which is along mid to late systolic murmur at apex grade 3/6 Chest/Lungs:chest clear, no wheezing, rales, normal symmetric air entry Abdomen: Soft, nontender, no masses Extremities:  No c/c/edema, peripheral pulses present Labs: Results:  
   
Chemistry Recent Labs  
  01/31/19 0224 01/30/19 
1315 GLU 98 99  141  
K 4.1 4.8  
 107 CO2 30 28 BUN 64* 66* CREA 1.84* 2.08* CA 8.1* 8.4* AGAP 6 6 BUCR 35* 32* AP 66 77  
TP 5.5* 6.3* ALB 2.6* 2.9*  
GLOB 2.9 3.4 AGRAT 0.9 0.9  
  
CBC w/Diff Recent Labs 02/02/19 
9414 02/02/19 
0101 02/01/19 
1249 02/01/19 
0435 01/31/19 
0224 01/30/19 
1843 WBC  --   --   --  9.4 5.7 7.9  
RBC  --   --   --  2.31* 2.52* 2.67* HGB 6.6* 6.7* 6.2* 6.8* 7.4* 8.0*  
HCT 20.0* 20.5* 19.0* 21.5* 23.4* 24.8* PLT  --   --   --  154 161 165 GRANS  --   --   --  64 55 56 LYMPH  --   --   --  20* 31 23 EOS  --   --   --  2 4 5 Cardiac Enzymes Recent Labs  
  01/30/19 
1812 01/30/19 
1315  156 CKND1 4.9* 4.7* Coagulation Recent Labs 02/02/19 
0101 02/01/19 
1720 PTP  --  14.9 INR  --  1.2 APTT 77.3* 35.8 Lipid Panel Lab Results Component Value Date/Time  Cholesterol, total 142 01/31/2019 02:24 AM  
 HDL Cholesterol 68 (H) 01/31/2019 02:24 AM  
 LDL, calculated 66.4 01/31/2019 02:24 AM  
 VLDL, calculated 7.6 01/31/2019 02:24 AM  
 Triglyceride 38 01/31/2019 02:24 AM  
 CHOL/HDL Ratio 2.1 01/31/2019 02:24 AM  
  
 BNP No results for input(s): BNPP in the last 72 hours. Liver Enzymes Recent Labs  
  01/31/19 
0224 TP 5.5* ALB 2.6* AP 66 SGOT 26  
  
Digoxin Thyroid Studies Lab Results Component Value Date/Time TSH 8.86 (H) 11/15/2015 01:45 PM  
    
 
 
 
Bard Kaylynn MD   Pager # [phone]

## 2019-02-02 NOTE — PROGRESS NOTES
Reason for Renal Dosing:  Prescriber consult Patient clinical status and labs ordered/reviewed. Pt Weight Weight: 82 kg (180 lb 12.4 oz) Serum Creatinine Lab Results Component Value Date/Time Creatinine 1.84 (H) 01/31/2019 02:24 AM  
   
Creatinine Clearance Estimated Creatinine Clearance: 22.8 mL/min (A) (based on SCr of 1.84 mg/dL (H)). BUN Lab Results Component Value Date/Time BUN 64 (H) 01/31/2019 02:24 AM  
   
WBC Lab Results Component Value Date/Time WBC 9.4 02/01/2019 04:35 AM  
  
Temperature Temp: 98.9 °F (37.2 °C) HR Pulse (Heart Rate): 71 BP BP: 108/66 Drug type: antibiotic; ssti, empric Drug/dose: for naïve patient was initiated at: zosyn 2.25gm q6h Continue to monitor. Signed VASHTI Noyola [facility] Date 2/2/2019 Time 1:19 PM

## 2019-02-02 NOTE — PROGRESS NOTES
RN assessed patient's skin and noticed another large, swollen, red, purple, and painful tender to patient's right upper, inner thigh. Previously there were similar lesions to her left leg and right lower leg. Upon assessing patient's IV sites, her left arm is more swollen, red, bruised, and purple. Heparin was infusing through this line, and apparently last night the patient had a unit of blood infusing through this line. Dr. Regis Garvey has been notified via telephone, and wants Dr. James Watt to be notified. Dr. Regis Garvey will assess patient upon arrival to the unit, per his statement. He is also notified that the patient's hemoglobin is still below 7. No new orders at this time.

## 2019-02-02 NOTE — PROGRESS NOTES
Problem: Falls - Risk of 
Goal: *Absence of Falls Document Jaylyn Primes Fall Risk and appropriate interventions in the flowsheet. Fall Risk Interventions: 
  
 
Mentation Interventions: Bed/chair exit alarm, Door open when patient unattended Medication Interventions: Bed/chair exit alarm Elimination Interventions: Bed/chair exit alarm, Call light in reach

## 2019-02-02 NOTE — PROGRESS NOTES
Kinetic Dosing- Initial Progress Note Pharmacy Consult ordered by Dr. Severiano Brush  
 
Indication: ssti Patient clinical status and labs ordered/reviewed. Pt Weight Weight: 82 kg (180 lb 12.4 oz) Serum Creatinine Lab Results Component Value Date/Time Creatinine 1.84 (H) 01/31/2019 02:24 AM  
   
Creatinine Clearance Estimated Creatinine Clearance: 22.8 mL/min (A) (based on SCr of 1.84 mg/dL (H)). BUN Lab Results Component Value Date/Time BUN 64 (H) 01/31/2019 02:24 AM  
   
WBC Lab Results Component Value Date/Time WBC 9.4 02/01/2019 04:35 AM  
  
Temperature Temp: 98.9 °F (37.2 °C) HR Pulse (Heart Rate): 71 BP BP: 108/66 Kinetic Dosing Parameters: 
 Vd = 68 L 
   K = .014 hr-1 
            t ½ = 49 hr 
 
Drug Levels:  
Vancomycin No results for input(s): VANCP, VANCT, VANCR, VANRA in the last 72 hours. Gentamicin No results for input(s): GENP, GENT in the last 72 hours. No lab exists for component:  GENR Tobramycin No results for input(s): TOBP, TOBT, TOBR in the last 72 hours. Amikacin No results for input(s): Coletta Pupa in the last 72 hours. No lab exists for component:  Boby Bryant Dose for naïve patient was initiated at: Vancomycin 2000mg ivpb x1 then 1250mg q48h Continue to monitor Sign: VASHTI Me[facility] [place] Date: 2/2/2019 Time: 1:23 PM

## 2019-02-02 NOTE — PROGRESS NOTES
Hospitalist Progress NotePatient: Carlos Lamar MRN: [id number]  CSN: [id number] YOB: 1931  Age: 80 y.o. Sex: female DOA: 1/30/2019 LOS:  LOS: 3 days Patient lying in bed in NAD, has dementia. Son at bedside Assessment/Plan Active Problems: 
  NSTEMI (non-ST elevated myocardial infarction) ([facility] [address] [facility] (1/30/2019) Dementia (1/30/2019) Plan -  
 
1. NSTEMI - medical management , per cardio, off heparin drip 2. Anemia - worsened - off heparin, monitor, transfuse as per guidelines 3. Chronic combined CHF - off lasix per cardio 4. Baseline Dementia - continue namenda 5. H/o CKD 4 - monitor 6. H/o AOCD - monitor hgb , check stool hemoccult, no over bleeding 7. H/o SLE - chronic - f/u with Dr Thea Rodríguez as out pt 8-Legs - ? Abscesses noted - saw patient wit Dr Gabino Macdonald, add empiric abx, cjhcek cx. Will consult ID on Monday Check dopplers of left arm for swelling. D/w son at bedside at length and he connected me to daughter Monroe Solomon over his cellphone. Discussed patients medical care and options of palliative care. Family is considering but wishes to continue current care and full code D/w RN 
 
D/w DR Lizette Cochran Case discussed with:  []Patient  [x]Family  []Nursing  []Case Management DVT Prophylaxis:  []Lovenox  []Hep SQ  [x]SCDs  []Coumadin   []On Heparin gtt Subjective:  
 
 
 
Objective:  
  
Visit Vitals /66 (BP 1 Location: Right arm, BP Patient Position: At rest) Pulse 71 Temp 98.9 °F (37.2 °C) Resp 20 Wt 82 kg (180 lb 12.4 oz) SpO2 98% Breastfeeding? No  
BMI 30.08 kg/m² Physical Exam: 
General:  Awake, has dementia Cardiovascular:  S1S2+, RRR Pulmonary:  CTA b/l GI:  Soft, BS+, NT, ND Extremities:  trace edema, ?adscess like lesions noted on both legs. Pulses 1+ Intake and Output: 
Current Shift:  02/02 0701 - 02/02 1900 In: 240 [P.O.:240] Out: 200 [Urine:200] Last three shifts:  01/31 1901 - 02/02 0700 In: 720 [P.O.:720] Out: 1900 [[id number]] Recent Results (from the past 24 hour(s)) HGB & HCT Collection Time: 02/01/19 12:49 PM  
Result Value Ref Range HGB 6.2 (L) 12.0 - 16.0 g/dL HCT 19.0 (L) 35.0 - 45.0 % PTT Collection Time: 02/01/19  5:20 PM  
Result Value Ref Range aPTT 35.8 23.0 - 36.4 SEC  
TYPE + CROSSMATCH Collection Time: 02/01/19  5:20 PM  
Result Value Ref Range Crossmatch Expiration 02/04/2019 ABO/Rh(D) O POSITIVE Antibody screen NEG   
 CALLED TO: ANGELITO TALLEY ON 02/01/2019 @1836 BY [facility] Unit number [id number] Blood component type RC LR,1 Unit division 00 Status of unit TRANSFUSED Crossmatch result Compatible PROTHROMBIN TIME + INR Collection Time: 02/01/19  5:20 PM  
Result Value Ref Range Prothrombin time 14.9 11.5 - 15.2 sec INR 1.2 0.8 - 1.2 PTT Collection Time: 02/02/19  1:01 AM  
Result Value Ref Range aPTT 77.3 (H) 23.0 - 36.4 SEC  
HGB & HCT Collection Time: 02/02/19  1:01 AM  
Result Value Ref Range HGB 6.7 (L) 12.0 - 16.0 g/dL HCT 20.5 (L) 35.0 - 45.0 % HGB & HCT Collection Time: 02/02/19  6:38 AM  
Result Value Ref Range HGB 6.6 (L) 12.0 - 16.0 g/dL HCT 20.0 (L) 35.0 - 45.0 % Current Meds - Reviewed Lab Results Component Value Date/Time Glucose 98 01/31/2019 02:24 AM  
 Glucose 99 01/30/2019 01:15 PM  
 Glucose 105 (H) 01/08/2019 03:05 AM  
 Glucose 146 (H) 01/07/2019 02:47 AM  
 Glucose 101 (H) 01/06/2019 01:35 AM  
 Glucose 88 03/17/2016 11:15 AM  
  
I spent 35 minutes with the patient in face-to-face consultation, of which greater than 50% was spent in counseling and coordination of care as described above Satya Valladares MD 
2/2/2019,

## 2019-02-02 NOTE — PROGRESS NOTES
Abdiel Kaufman M.D. FACS PROGRESS NOTE Name: Cordelia Snellen MRN: [id number] : 1931 [facility] Ad[facility] Date: 2019 12:44 PM  
 
Hospital Day: 4 Subjective: 
Patient with another lesion noted in the posterior medial right upper thigh. No change in the lesion on the left leg or right leg. Fever overnight temperature of 100.5. Objective: 
Vitals:  
 19 0000 19 0400 19 0728 19 1116 BP: 104/54 105/56 105/60 108/66 Pulse: 74 81 71 71 Resp: 20 20 20 20 Temp: 99 °F (37.2 °C) (!) 100.5 °F (38.1 °C) 99.3 °F (37.4 °C) 98.9 °F (37.2 °C) SpO2: 98% 95% 96% 98% Weight:  82 kg (180 lb 12.4 oz) Date 19 - 19 9361 19 - 19 9127 Shift 7328-6719 1341-2902 24 Hour Total 0976-6618 4894-3420 24 Hour Total  
INTAKE  
P.O. 600 120 720 P. O. 600 120 720 Shift Total(mL/kg) 600(7.2) 120(1.5) 720(8.8) OUTPUT Urine(mL/kg/hr) 800(0.8) 450(0.5) 1250(0.6) Urine Voided  450 450 Urine Output (mL) (External Female Catheter 19) 800  800 Shift Total(mL/kg) 800(9.6) 450(5.5) [id number]) NET -200 -330 -530 Weight (kg) 83.4 82 82 82 82 82 Physical Exam:  
 General: Awake and alert. Extremities: Inflammatory lesions as noted yesterday on the right leg and left leg. There is a newly recognized lesion in the medial posterior aspect of the right mid thigh which is also tender to touch and indurated measuring approximately 4 x 3 cm. Labs: 
Recent Results (from the past 24 hour(s)) HGB & HCT Collection Time: 19 12:49 PM  
Result Value Ref Range HGB 6.2 (L) 12.0 - 16.0 g/dL HCT 19.0 (L) 35.0 - 45.0 % PTT Collection Time: 19  5:20 PM  
Result Value Ref Range aPTT 35.8 23.0 - 36.4 SEC  
TYPE + CROSSMATCH Collection Time: 19  5:20 PM  
Result Value Ref Range Crossmatch Expiration 2019  ABO/Rh(D) O POSITIVE   
 Antibody screen NEG   
 CALLED TO: ANGELITO TALLEY ON 02/01/2019 @1836 BY [facility] Unit number [id number] Blood component type RC LR,1 Unit division 00 Status of unit TRANSFUSED Crossmatch result Compatible PROTHROMBIN TIME + INR Collection Time: 02/01/19  5:20 PM  
Result Value Ref Range Prothrombin time 14.9 11.5 - 15.2 sec INR 1.2 0.8 - 1.2 PTT Collection Time: 02/02/19  1:01 AM  
Result Value Ref Range aPTT 77.3 (H) 23.0 - 36.4 SEC  
HGB & HCT Collection Time: 02/02/19  1:01 AM  
Result Value Ref Range HGB 6.7 (L) 12.0 - 16.0 g/dL HCT 20.5 (L) 35.0 - 45.0 % HGB & HCT Collection Time: 02/02/19  6:38 AM  
Result Value Ref Range HGB 6.6 (L) 12.0 - 16.0 g/dL HCT 20.0 (L) 35.0 - 45.0 % All Micro Results Procedure Component Value Units Date/Time CULTURE, BLOOD [[id number]] Order Status:  Sent Specimen:  Blood Current Medications: 
Current Facility-Administered Medications Medication Dose Route Frequency Provider Last Rate Last Dose  
 0.9% sodium chloride infusion 250 mL  250 mL IntraVENous PRN Raphael Hough MD      
 aspirin delayed-release tablet 81 mg  81 mg Oral DAILY Raphael Hough MD   81 mg at 02/02/19 1001  
 amitriptyline (ELAVIL) tablet 25 mg  25 mg Oral QHS Raphael Hough MD   25 mg at 02/01/19 2130  memantine (NAMENDA) tablet 10 mg  10 mg Oral DAILY Raphael Hough MD   10 mg at 02/02/19 1001  isosorbide mononitrate ER (IMDUR) tablet 60 mg  60 mg Oral DAILY Raphael Hough MD   60 mg at 02/02/19 1002  carvedilol (COREG) tablet 3.125 mg  3.125 mg Oral Q12H Raphael Hough MD   3.125 mg at 02/02/19 1001  
 docusate sodium (COLACE) capsule 100 mg  100 mg Oral BID Raphael Hough MD   100 mg at 02/02/19 1001  famotidine (PEPCID) tablet 20 mg  20 mg Oral DAILY Raphael Hough MD   20 mg at 02/02/19 1001  potassium chloride (K-DUR, KLOR-CON) SR tablet 20 mEq  20 mEq Oral BID Raphael Hough MD   20 mEq at 02/02/19 1001  acetaminophen (TYLENOL) tablet 650 mg  650 mg Oral Q6H PRN Raphael Hough MD   650 mg at 02/02/19 2317  ondansetron (ZOFRAN) injection 4 mg  4 mg IntraVENous Q6H PRN Raphael Hough MD      
 sodium chloride (NS) flush 5-40 mL  5-40 mL IntraVENous Q8H Raphael Hough MD   10 mL at 02/02/19 8959  sodium chloride (NS) flush 5-40 mL  5-40 mL IntraVENous PRN Raphael Hough MD      
 amiodarone (CORDARONE) tablet 200 mg  200 mg Oral DAILY Raphael Hough MD   200 mg at 02/02/19 1002 Chart and notes reviewed. Data reviewed. I have evaluated and examined the patient. IMPRESSION:  
· Patient with lesions of the legs of unclear etiology possibly inflammatory versus infectious. PLAN:/DISCUSION:  
· Blood culture x2 peripheral 
· Empiric antibiotics to cover for MRSA · No surgical intervention at present Kevin De Anda MD

## 2019-02-02 NOTE — PROGRESS NOTES
Noted MEWS=4. Due to pt being confused. Pt with stable vital signs. At times pt is cooperative and lucid. Then other times pt is aggressive and confused. siderailsx3 up, and bed alarm on. Daughter at bedside

## 2019-02-03 LAB
ANION GAP SERPL CALC-SCNC: 3 MMOL/L (ref 3–18)
BUN SERPL-MCNC: 66 MG/DL (ref 7–18)
BUN/CREAT SERPL: 31 (ref 12–20)
CALCIUM SERPL-MCNC: 8.1 MG/DL (ref 8.5–10.1)
CHLORIDE SERPL-SCNC: 105 MMOL/L (ref 100–108)
CO2 SERPL-SCNC: 30 MMOL/L (ref 21–32)
CREAT SERPL-MCNC: 2.12 MG/DL (ref 0.6–1.3)
GLUCOSE SERPL-MCNC: 89 MG/DL (ref 74–99)
HCT VFR BLD AUTO: 20.6 % (ref 35–45)
HGB BLD-MCNC: 6.7 G/DL (ref 12–16)
POTASSIUM SERPL-SCNC: 4.7 MMOL/L (ref 3.5–5.5)
SODIUM SERPL-SCNC: 138 MMOL/L (ref 136–145)

## 2019-02-03 PROCEDURE — 36415 COLL VENOUS BLD VENIPUNCTURE: CPT

## 2019-02-03 PROCEDURE — 36430 TRANSFUSION BLD/BLD COMPNT: CPT

## 2019-02-03 PROCEDURE — 74011000258 HC RX REV CODE- 258: Performed by: EMERGENCY MEDICINE

## 2019-02-03 PROCEDURE — 80048 BASIC METABOLIC PNL TOTAL CA: CPT

## 2019-02-03 PROCEDURE — 74011250636 HC RX REV CODE- 250/636: Performed by: EMERGENCY MEDICINE

## 2019-02-03 PROCEDURE — 77030038269 HC DRN EXT URIN PURWCK BARD -A

## 2019-02-03 PROCEDURE — P9016 RBC LEUKOCYTES REDUCED: HCPCS

## 2019-02-03 PROCEDURE — 74011250637 HC RX REV CODE- 250/637: Performed by: HOSPITALIST

## 2019-02-03 PROCEDURE — 65660000000 HC RM CCU STEPDOWN

## 2019-02-03 PROCEDURE — 85018 HEMOGLOBIN: CPT

## 2019-02-03 RX ORDER — SODIUM CHLORIDE 9 MG/ML
250 INJECTION, SOLUTION INTRAVENOUS AS NEEDED
Status: DISCONTINUED | OUTPATIENT
Start: 2019-02-03 | End: 2019-02-10 | Stop reason: HOSPADM

## 2019-02-03 RX ORDER — VANCOMYCIN/0.9 % SOD CHLORIDE 1 G/100 ML
1000 PLASTIC BAG, INJECTION (ML) INTRAVENOUS
Status: DISCONTINUED | OUTPATIENT
Start: 2019-02-04 | End: 2019-02-05

## 2019-02-03 RX ADMIN — MEMANTINE 10 MG: 10 TABLET ORAL at 09:45

## 2019-02-03 RX ADMIN — DOCUSATE SODIUM 100 MG: 100 CAPSULE, LIQUID FILLED ORAL at 18:08

## 2019-02-03 RX ADMIN — ASPIRIN 81 MG: 81 TABLET, COATED ORAL at 09:46

## 2019-02-03 RX ADMIN — AMITRIPTYLINE HYDROCHLORIDE 25 MG: 50 TABLET, FILM COATED ORAL at 22:13

## 2019-02-03 RX ADMIN — ISOSORBIDE MONONITRATE 60 MG: 60 TABLET, EXTENDED RELEASE ORAL at 09:46

## 2019-02-03 RX ADMIN — Medication 10 ML: at 18:02

## 2019-02-03 RX ADMIN — Medication 10 ML: at 22:18

## 2019-02-03 RX ADMIN — PIPERACILLIN SODIUM,TAZOBACTAM SODIUM 2.25 G: 2; .25 INJECTION, POWDER, FOR SOLUTION INTRAVENOUS at 06:18

## 2019-02-03 RX ADMIN — PIPERACILLIN SODIUM,TAZOBACTAM SODIUM 2.25 G: 2; .25 INJECTION, POWDER, FOR SOLUTION INTRAVENOUS at 13:52

## 2019-02-03 RX ADMIN — SODIUM CHLORIDE 250 ML: 900 INJECTION, SOLUTION INTRAVENOUS at 21:13

## 2019-02-03 RX ADMIN — Medication 10 ML: at 06:18

## 2019-02-03 RX ADMIN — ACETAMINOPHEN 650 MG: 325 TABLET ORAL at 22:15

## 2019-02-03 RX ADMIN — CARVEDILOL 3.12 MG: 3.12 TABLET, FILM COATED ORAL at 22:14

## 2019-02-03 RX ADMIN — DOCUSATE SODIUM 100 MG: 100 CAPSULE, LIQUID FILLED ORAL at 09:45

## 2019-02-03 RX ADMIN — CARVEDILOL 3.12 MG: 3.12 TABLET, FILM COATED ORAL at 09:46

## 2019-02-03 RX ADMIN — PIPERACILLIN SODIUM,TAZOBACTAM SODIUM 2.25 G: 2; .25 INJECTION, POWDER, FOR SOLUTION INTRAVENOUS at 18:02

## 2019-02-03 RX ADMIN — FAMOTIDINE 20 MG: 20 TABLET ORAL at 09:45

## 2019-02-03 RX ADMIN — AMIODARONE HYDROCHLORIDE 200 MG: 200 TABLET ORAL at 09:46

## 2019-02-03 RX ADMIN — PIPERACILLIN SODIUM,TAZOBACTAM SODIUM 2.25 G: 2; .25 INJECTION, POWDER, FOR SOLUTION INTRAVENOUS at 00:18

## 2019-02-03 NOTE — PROGRESS NOTES
Abdiel Lockhart M.D. FACS PROGRESS NOTE Name: Kaylene Prado MRN: [id number] : 1931 Hospital: [facility] Date: 2/3/2019 Admission Date: 2019 12:44 PM  
 
Hospital Day: 5 Subjective: 
Patient without acute overnight events. Objective: 
Vitals:  
 19 0019 19 0415 19 0740 19 1101 BP: 104/62 90/51 99/48 (!) 83/47 Pulse: 73 70 72 79 Resp:  Temp: 99.3 °F (37.4 °C) 99 °F (37.2 °C) 99.6 °F (37.6 °C) 99.7 °F (37.6 °C) SpO2: 97% 95% 95% 99% Weight:  81.3 kg (179 lb 4.8 oz) Date 19 07 - 19 9465 19 07 - 19 8931 Shift 0483-0740 5184-7208 24 Hour Total 7224-5691 2212-6473 24 Hour Total  
INTAKE  
P.O. 480 120 600     
  P. O. 480 120 600     
I. V.(mL/kg/hr) 500(0.5) 100(0.1) 600(0.3) Volume (piperacillin-tazobactam (ZOSYN) 2.25 g in 0.9% sodium chloride (MBP/ADV) 50 mL ADV)  50 50 Volume (vancomycin (VANCOCIN) 2000 mg in  ml infusion) 500  500 Volume (piperacillin-tazobactam (ZOSYN) 2.25 g in 0.9% sodium chloride (MBP/ADV) 50 mL MBP)  50 50 Blood 310  310 Volume (TRANSFUSE PACKED RBC'S) 310  310 Other 100  100 Other 100  100 Shift Total(mL/kg) [id number]) 220(2.7) [id number]) OUTPUT Urine(mL/kg/hr) 450(0.5) 500(0.5) 950(0.5) 700  700 Urine Voided    700  700 Urine Occurrence(s) 1 x  1 x Urine Output (mL) (External Female Catheter 19) 450 500 950 Shift Total(mL/kg) 450(5.5) 500(6.1) 950(11.7) 700(8.6)  700(8.6)  -280 660 -700  -700 Weight (kg) 82 81.3 81.3 81.3 81.3 81.3 Physical Exam:  
 General: Awake and alert, no apparent distress Extremities: No change to the right medial posterior mid thigh mass no other right leg mass lower left leg mass. Labs: 
Recent Results (from the past 24 hour(s)) CULTURE, BLOOD Collection Time: 19 12:12 PM  
Result Value Ref Range Special Requests: NO SPECIAL REQUESTS Culture result: NO GROWTH AFTER 17 HOURS    
HGB & HCT Collection Time: 02/02/19  6:40 PM  
Result Value Ref Range HGB 7.1 (L) 12.0 - 16.0 g/dL HCT 22.4 (L) 35.0 - 45.0 % CULTURE, BLOOD Collection Time: 02/02/19  6:40 PM  
Result Value Ref Range Special Requests: NO SPECIAL REQUESTS Culture result: NO GROWTH AFTER 10 HOURS    
CULTURE, BLOOD Collection Time: 02/02/19  6:45 PM  
Result Value Ref Range Special Requests: NO SPECIAL REQUESTS Culture result: NO GROWTH AFTER 10 HOURS METABOLIC PANEL, BASIC Collection Time: 02/03/19  7:49 AM  
Result Value Ref Range Sodium 138 136 - 145 mmol/L Potassium 4.7 3.5 - 5.5 mmol/L Chloride 105 100 - 108 mmol/L  
 CO2 30 21 - 32 mmol/L Anion gap 3 3.0 - 18 mmol/L Glucose 89 74 - 99 mg/dL BUN 66 (H) 7.0 - 18 MG/DL Creatinine 2.12 (H) 0.6 - 1.3 MG/DL  
 BUN/Creatinine ratio 31 (H) 12 - 20 GFR est AA 27 (L) >60 ml/min/1.73m2 GFR est non-AA 22 (L) >60 ml/min/1.73m2 Calcium 8.1 (L) 8.5 - 10.1 MG/DL  
HGB & HCT Collection Time: 02/03/19  7:49 AM  
Result Value Ref Range HGB 6.7 (L) 12.0 - 16.0 g/dL HCT 20.6 (L) 35.0 - 45.0 % All Micro Results Procedure Component Value Units Date/Time CULTURE, BLOOD [[id number]] Collected:  02/02/19 1212 Order Status:  Completed Specimen:  Blood Updated:  02/03/19 0374 Special Requests: NO SPECIAL REQUESTS Culture result: NO GROWTH AFTER 17 HOURS     
 CULTURE, BLOOD [[id number]] Collected:  02/02/19 1840 Order Status:  Completed Specimen:  Whole Blood Updated:  02/03/19 8498 Special Requests: NO SPECIAL REQUESTS Culture result: NO GROWTH AFTER 10 HOURS     
 CULTURE, BLOOD [[id number]] Collected:  02/02/19 1845 Order Status:  Completed Specimen:  Whole Blood Updated:  02/03/19 5570 Special Requests: NO SPECIAL REQUESTS   Culture result: NO GROWTH AFTER 10 HOURS     
  
 
 
 Current Medications: 
Current Facility-Administered Medications Medication Dose Route Frequency Provider Last Rate Last Dose  piperacillin-tazobactam (ZOSYN) 2.25 g in 0.9% sodium chloride (MBP/ADV) 50 mL MBP  2.25 g IntraVENous Q6H Js Tyler  mL/hr at 02/03/19 0618 2.25 g at 02/03/19 9752  [START ON 2/4/2019] vancomycin (VANCOCIN) 1000 mg in  ml infusion  1,000 mg IntraVENous Q48H Js Tyler MD      
 0.9% sodium chloride infusion 250 mL  250 mL IntraVENous PRN Lior Arevalo MD      
 aspirin delayed-release tablet 81 mg  81 mg Oral DAILY Petrona Pfeiffer MD   81 mg at 02/03/19 0946  
 amitriptyline (ELAVIL) tablet 25 mg  25 mg Oral QHS Petrona Pfeiffer MD   25 mg at 02/02/19 2124  memantine (NAMENDA) tablet 10 mg  10 mg Oral DAILY Petrona Pfeiffer MD   10 mg at 02/03/19 0945  isosorbide mononitrate ER (IMDUR) tablet 60 mg  60 mg Oral DAILY Petrona Pfeiffer MD   60 mg at 02/03/19 9104  carvedilol (COREG) tablet 3.125 mg  3.125 mg Oral Q12H Petrona Pfeiffer MD   3.125 mg at 02/03/19 0946  
 docusate sodium (COLACE) capsule 100 mg  100 mg Oral BID Petrona Pfeiffer MD   100 mg at 02/03/19 0945  famotidine (PEPCID) tablet 20 mg  20 mg Oral DAILY Petrona Pfeiffer MD   20 mg at 02/03/19 0945  acetaminophen (TYLENOL) tablet 650 mg  650 mg Oral Q6H PRN Petrona Pfeiffer MD   650 mg at 02/02/19 0398  ondansetron (ZOFRAN) injection 4 mg  4 mg IntraVENous Q6H PRN Petrona Pfeiffer MD      
 sodium chloride (NS) flush 5-40 mL  5-40 mL IntraVENous Q8H Petrona Pfeiffer MD   10 mL at 02/03/19 3728  sodium chloride (NS) flush 5-40 mL  5-40 mL IntraVENous PRN Petrona Pfeiffer MD      
 amiodarone (CORDARONE) tablet 200 mg  200 mg Oral DAILY Petrona Pfeiffer MD   200 mg at 02/03/19 0036 IMPRESSION:  
· Patient with inflammatory nodules of the skin of the lower extremities x3 PLAN:/DISCUSION:  
· Continue empiric antibiotics-Zosyn · Blood cultures pending · No surgical intervention at this time.   
 
  
Radha Charles MD

## 2019-02-03 NOTE — PROGRESS NOTES
Received report on pt.from off going RN. Resting quietly in bed on rounds. Denies c/o pain or SOB at this time. Remains confused. Son at bedside. No acute distress noted. Call bell at side. Will cont to monitor for any changes in status. 0900 fed breakfast by son. No difficulty swallowing noted. 1200 multiple family members present  And fed pt her lunch. 1500 continues to have multiple family members at bedside. No change noted in pts status. 1930 Bedside and Verbal shift change report given to [facility]. (oncoming nurse) by Melva Dukes RN (offgoing nurse). Report given with Gayla GUZMAN and MAR.

## 2019-02-03 NOTE — PROGRESS NOTES
[facility] 
 
 
CARDIOLOGY PROGRESS NOTE 
RECS:1. Elevated troponin- the patient is chest pain free. Her SOB is improving  troponin indy trending- continue heparin drip for 24-48 hours- continue medical management for CAD.  asa,bb,statin . No further cardiac w/u needed at this time 2. Chronic combined systolic and diastolic heart failure- decompensated. use Lasix as needed for now as blood pressure may reduce in case she is bleeding. 3. Atrial fibrillation - currently paced on amiodarone. Not on anticoagulation due to high bleeding risk. Continue asa. 4. CKD- monitor kandy indices with diuresis consider renal consult 5. Hx of symptomatic bradycardia- S/P PPM  4297 Dr Sri Aceves- EP recommended  upgrade to biventricular pacemaker as an outpatient.  Patient's family refused   
10. Anemia: H&H dropping. Check stool guaiacs and may need to hold aspirin also if stool guaiacs are positive. 7. Lupus- follow by Dr. Laxmi Luo as out patient 8. Hypertension- with normal low bp. Continue low dose Coreg  will monitor 9. Dementia. 10. CODE STATUS: Consider palliative care consult. 
  
 
 
ASSESSMENT: 
Hospital Problems  Date Reviewed: 1/21/2019 Codes Class Noted POA  
 [facility]non[facility] ICD-10-CM: I21.4 ICD-9-CM: 410.70  1/30/2019 Unknown Dementia ICD-10-CM: F03.90 ICD-9-CM: 294.20  1/30/2019 Unknown SUBJECTIVE: 
No CP No SOB OBJECTIVE: 
 
VS:  
Visit Vitals BP 99/48 (BP 1 Location: Right arm, BP Patient Position: At rest) Pulse 72 Temp 99.6 °F (37.6 °C) Resp 18 Wt 81.3 kg (179 lb 4.8 oz) SpO2 95% Breastfeeding? No  
BMI 29.84 kg/m² Intake/Output Summary (Last 24 hours) at 2/3/2019 1004 Last data filed at 2/3/2019 2763 Gross per 24 hour Intake 1370 ml Output 950 ml Net 420 ml  
 
TELE: paced General: alert and in no apparent distress, lying flat HENT: Normocephalic, atraumatic. Normal external eye. Neck :  no bruit, increased JVP Cardiac:  regular rate and rhythm, systolic murmur: early systolic 3/6, crescendo at 2nd left intercostal space, at 2nd right intercostal space: Second systolic murmur which is along mid to late systolic murmur at apex grade 3/6 Chest/Lungs:chest clear, no wheezing, rales, normal symmetric air entry Abdomen: Soft, nontender, no masses Extremities:  No c/c/edema, peripheral pulses present Labs: Results:  
   
Chemistry Recent Labs 02/03/19 
4064 GLU 89   
K 4.7  CO2 30 BUN 66* CREA 2.12* CA 8.1* AGAP 3  
BUCR 31* CBC w/Diff Recent Labs 02/03/19 
0762 02/02/19 
1840 02/02/19 
0494  02/01/19 
1018 WBC  --   --   --   --  9.4  
RBC  --   --   --   --  2.31* HGB 6.7* 7.1* 6.6*   < > 6.8* HCT 20.6* 22.4* 20.0*   < > 21.5* PLT  --   --   --   --  154 GRANS  --   --   --   --  64  
LYMPH  --   --   --   --  20* EOS  --   --   --   --  2  
 < > = values in this interval not displayed. Cardiac Enzymes No results for input(s): CPK, CKND1, YAN in the last 72 hours. No lab exists for component: Venus Gonzaless Coagulation Recent Labs 02/02/19 
0101 02/01/19 
1720 PTP  --  14.9 INR  --  1.2 APTT 77.3* 35.8 Lipid Panel Lab Results Component Value Date/Time Cholesterol, total 142 01/31/2019 02:24 AM  
 HDL Cholesterol 68 (H) 01/31/2019 02:24 AM  
 LDL, calculated 66.4 01/31/2019 02:24 AM  
 VLDL, calculated 7.6 01/31/2019 02:24 AM  
 Triglyceride 38 01/31/2019 02:24 AM  
 CHOL/HDL Ratio 2.1 01/31/2019 02:24 AM  
  
BNP No results for input(s): BNPP in the last 72 hours. Liver Enzymes No results for input(s): TP, ALB, TBIL, AP, SGOT, GPT in the last 72 hours. No lab exists for component: DBIL Digoxin Thyroid Studies Lab Results Component Value Date/Time TSH 8.86 (H) 11/15/2015 01:45 PM  
    
 
 
 
Marta Ferrara MD   Pager # [phone]

## 2019-02-03 NOTE — PROGRESS NOTES
Problem: Falls - Risk of 
Goal: *Absence of Falls Document Laureen End Fall Risk and appropriate interventions in the flowsheet. Outcome: Progressing Towards Goal 
Fall Risk Interventions: 
Mobility Interventions: Assess mobility with egress test, Bed/chair exit alarm, Communicate number of staff needed for ambulation/transfer, OT consult for ADLs, Patient to call before getting OOB, PT Consult for mobility concerns Mentation Interventions: Adequate sleep, hydration, pain control, Bed/chair exit alarm, Door open when patient unattended, Evaluate medications/consider consulting pharmacy, Family/sitter at bedside, More frequent rounding, Reorient patient, Room close to nurse's station, Toileting rounds, Update white board Medication Interventions: Bed/chair exit alarm, Evaluate medications/consider consulting pharmacy, Patient to call before getting OOB, Teach patient to arise slowly Elimination Interventions: Bed/chair exit alarm, Call light in reach, Patient to call for help with toileting needs, Toilet paper/wipes in reach, Toileting schedule/hourly rounds Problem: Pressure Injury - Risk of 
Goal: *Prevention of pressure injury Document Rodríguez Scale and appropriate interventions in the flowsheet. Outcome: Progressing Towards Goal 
Pressure Injury Interventions: 
Sensory Interventions: Assess changes in LOC, Assess need for specialty bed, Discuss PT/OT consult with provider, Float heels, Keep linens dry and wrinkle-free, Maintain/enhance activity level, Minimize linen layers, Monitor skin under medical devices, Pressure redistribution bed/mattress (bed type), Use 30-degree side-lying position, Turn and reposition approx. every two hours (pillows and wedges if needed) Moisture Interventions: Absorbent underpads, Apply protective barrier, creams and emollients, Assess need for specialty bed, Check for incontinence Q2 hours and as needed, Limit adult briefs, Internal/External urinary devices, Maintain skin hydration (lotion/cream), Minimize layers, Moisture barrier Activity Interventions: Assess need for specialty bed, Increase time out of bed, Pressure redistribution bed/mattress(bed type), PT/OT evaluation Mobility Interventions: HOB 30 degrees or less, Pressure redistribution bed/mattress (bed type), PT/OT evaluation, Turn and reposition approx. every two hours(pillow and wedges), Float heels, Assess need for specialty bed Nutrition Interventions: Document food/fluid/supplement intake, Discuss nutritional consult with provider Friction and Shear Interventions: Apply protective barrier, creams and emollients, Foam dressings/transparent film/skin sealants, HOB 30 degrees or less, Lift team/patient mobility team, Sit at 90-degree angle, Transferring/repositioning devices

## 2019-02-03 NOTE — ROUTINE PROCESS
2001 Assumed care of patient from off going nurse. Patient resting in bed. Easily aroused to verbal stimuli. No distress noted. Call bell within reach, siderails up x 3, bed in lowest position, and patient instructed to use call bell for assistance. Will continue to monitor. Family at bedside. 0583 Bedside and Verbal shift change report given to [address] [place] [address] (oncoming nurse) by Wenceslao Ornelas RN (offgoing nurse). Report included the following information Kardex, Intake/Output, MAR, Recent Results and Cardiac Rhythm A/V paced tele box#[id number].

## 2019-02-04 ENCOUNTER — APPOINTMENT (OUTPATIENT)
Dept: VASCULAR SURGERY | Age: 84
DRG: 280 | End: 2019-02-04
Attending: EMERGENCY MEDICINE
Payer: MEDICARE

## 2019-02-04 LAB
ABO + RH BLD: NORMAL
ANION GAP SERPL CALC-SCNC: 5 MMOL/L (ref 3–18)
BLD PROD TYP BPU: NORMAL
BLOOD GROUP ANTIBODIES SERPL: NORMAL
BPU ID: NORMAL
BUN SERPL-MCNC: 68 MG/DL (ref 7–18)
BUN/CREAT SERPL: 28 (ref 12–20)
CALCIUM SERPL-MCNC: 8.2 MG/DL (ref 8.5–10.1)
CALLED TO:,BCALL1: NORMAL
CHLORIDE SERPL-SCNC: 105 MMOL/L (ref 100–108)
CO2 SERPL-SCNC: 28 MMOL/L (ref 21–32)
CREAT SERPL-MCNC: 2.41 MG/DL (ref 0.6–1.3)
CROSSMATCH RESULT,%XM: NORMAL
GLUCOSE SERPL-MCNC: 98 MG/DL (ref 74–99)
HCT VFR BLD AUTO: 22.9 % (ref 35–45)
HCT VFR BLD AUTO: 23.9 % (ref 35–45)
HGB BLD-MCNC: 7.6 G/DL (ref 12–16)
HGB BLD-MCNC: 7.7 G/DL (ref 12–16)
POTASSIUM SERPL-SCNC: 5.2 MMOL/L (ref 3.5–5.5)
SODIUM SERPL-SCNC: 138 MMOL/L (ref 136–145)
SPECIMEN EXP DATE BLD: NORMAL
STATUS OF UNIT,%ST: NORMAL
UNIT DIVISION, %UDIV: 0

## 2019-02-04 PROCEDURE — 85018 HEMOGLOBIN: CPT

## 2019-02-04 PROCEDURE — 74011250637 HC RX REV CODE- 250/637: Performed by: HOSPITALIST

## 2019-02-04 PROCEDURE — 97162 PT EVAL MOD COMPLEX 30 MIN: CPT

## 2019-02-04 PROCEDURE — 97530 THERAPEUTIC ACTIVITIES: CPT

## 2019-02-04 PROCEDURE — 74011250637 HC RX REV CODE- 250/637: Performed by: EMERGENCY MEDICINE

## 2019-02-04 PROCEDURE — 36415 COLL VENOUS BLD VENIPUNCTURE: CPT

## 2019-02-04 PROCEDURE — 80048 BASIC METABOLIC PNL TOTAL CA: CPT

## 2019-02-04 PROCEDURE — 74011250636 HC RX REV CODE- 250/636: Performed by: EMERGENCY MEDICINE

## 2019-02-04 PROCEDURE — 77030038269 HC DRN EXT URIN PURWCK BARD -A

## 2019-02-04 PROCEDURE — 74011000258 HC RX REV CODE- 258: Performed by: EMERGENCY MEDICINE

## 2019-02-04 PROCEDURE — 76450000000

## 2019-02-04 PROCEDURE — 65660000000 HC RM CCU STEPDOWN

## 2019-02-04 PROCEDURE — 93971 EXTREMITY STUDY: CPT

## 2019-02-04 RX ORDER — SODIUM POLYSTYRENE SULFONATE 15 G/60ML
30 SUSPENSION ORAL; RECTAL
Status: DISPENSED | OUTPATIENT
Start: 2019-02-04 | End: 2019-02-05

## 2019-02-04 RX ORDER — FACIAL-BODY WIPES
10 EACH TOPICAL
Status: COMPLETED | OUTPATIENT
Start: 2019-02-04 | End: 2019-02-04

## 2019-02-04 RX ADMIN — VANCOMYCIN HYDROCHLORIDE 1000 MG: 10 INJECTION, POWDER, LYOPHILIZED, FOR SOLUTION INTRAVENOUS at 18:03

## 2019-02-04 RX ADMIN — CARVEDILOL 3.12 MG: 3.12 TABLET, FILM COATED ORAL at 21:37

## 2019-02-04 RX ADMIN — AMITRIPTYLINE HYDROCHLORIDE 25 MG: 50 TABLET, FILM COATED ORAL at 21:37

## 2019-02-04 RX ADMIN — ISOSORBIDE MONONITRATE 60 MG: 60 TABLET, EXTENDED RELEASE ORAL at 08:48

## 2019-02-04 RX ADMIN — Medication 10 ML: at 18:06

## 2019-02-04 RX ADMIN — FAMOTIDINE 20 MG: 20 TABLET ORAL at 08:48

## 2019-02-04 RX ADMIN — Medication 10 ML: at 05:53

## 2019-02-04 RX ADMIN — PIPERACILLIN SODIUM,TAZOBACTAM SODIUM 2.25 G: 2; .25 INJECTION, POWDER, FOR SOLUTION INTRAVENOUS at 21:37

## 2019-02-04 RX ADMIN — CARVEDILOL 3.12 MG: 3.12 TABLET, FILM COATED ORAL at 08:48

## 2019-02-04 RX ADMIN — DOCUSATE SODIUM 100 MG: 100 CAPSULE, LIQUID FILLED ORAL at 18:03

## 2019-02-04 RX ADMIN — PIPERACILLIN SODIUM,TAZOBACTAM SODIUM 2.25 G: 2; .25 INJECTION, POWDER, FOR SOLUTION INTRAVENOUS at 05:51

## 2019-02-04 RX ADMIN — PIPERACILLIN SODIUM,TAZOBACTAM SODIUM 2.25 G: 2; .25 INJECTION, POWDER, FOR SOLUTION INTRAVENOUS at 00:15

## 2019-02-04 RX ADMIN — MEMANTINE 10 MG: 10 TABLET ORAL at 08:48

## 2019-02-04 RX ADMIN — AMIODARONE HYDROCHLORIDE 200 MG: 200 TABLET ORAL at 08:48

## 2019-02-04 RX ADMIN — PIPERACILLIN SODIUM,TAZOBACTAM SODIUM 2.25 G: 2; .25 INJECTION, POWDER, FOR SOLUTION INTRAVENOUS at 13:52

## 2019-02-04 RX ADMIN — Medication 10 MG: at 18:03

## 2019-02-04 RX ADMIN — Medication 10 ML: at 21:38

## 2019-02-04 RX ADMIN — ASPIRIN 81 MG: 81 TABLET, COATED ORAL at 08:48

## 2019-02-04 RX ADMIN — DOCUSATE SODIUM 100 MG: 100 CAPSULE, LIQUID FILLED ORAL at 08:48

## 2019-02-04 NOTE — PROGRESS NOTES
Palliative team - NP Caryl Sears, KENDRA Denton and I - met Ms Lattie Halsted who is awake and pleasantly confused and quickly falls asleep before she is able to name her family members present at bedside. Ms Corazon Dumont son [facility], two daughters and son in law, granddaughter Maria Ines Mcfarland and great grandson Husam are gathered around Ms Corazon Dumont bed. Palliative medicine support was introduced and offered as family shared a little about Ms Lattie Halsted and their desire that she go home with more personal care assistance. Per family, Ms Lattie Halsted lives with her spouse and son Chu Keating. Family is clear they are not ready for hospice care and agree that DNR is best for Ms Lattie Halsted. Family will call to set up time for Mr Lattie Halsted to come in and sign DDNR tomorrow. Per family, Ms Whitlock Palhakan God\" and knows that God will take care of her. Prayer blanket was offered to her and family shared their gratitude. No other needs or concerns were shared at this time.  
 
Palliative team will continue to follow up and help with paperwork when Mr Lattie Halsted comes to hospital.

## 2019-02-04 NOTE — PROGRESS NOTES
GENERAL SURGERY 
PROGRESS NOTE Name: Carole Urban MRN: [id number] : 1931 [facility] Admission Date: 2019 12:44 PM  
 
Hospital Day: 6 Subjective: 
Patient visited with multiple friends and family at bedside.  and  present as well. Patient is unable to meaningfully respond to my questions about her pain or discomfort. No acute events noted. Objective: 
Vitals:  
 19 0344 19 0539 19 0735 19 1100 BP: 103/60  102/56 108/70 Pulse: 72  72 70 Resp: 18   Temp: 98.4 °F (36.9 °C)  98.6 °F (37 °C) 97.6 °F (36.4 °C) SpO2: 96%  97% 97% Weight:  81.2 kg (179 lb 0.2 oz) Date 19 07 - 19 0749 19 07 - 19 6268 Shift 0719-5216 9925-0889 24 Hour Total 5397-4447 4175-1973 24 Hour Total  
INTAKE  
P.O. 540  540     
  P. O. 540  540 Blood  282.1 282.1 Volume (TRANSFUSE PACKED RBC'S)  282.1 282.1 Shift Total(mL/kg) 540(6.6) 282. 1(3.5) 822. 1(10.1) OUTPUT Urine(mL/kg/hr) 900(0.9) 400(0.4) 1300(0.7) Urine Voided 700  700 Urine Occurrence(s) 1 x  1 x Urine Output (mL) (External Female Catheter 19) 200 400 600 Stool Stool Occurrence(s) 1 x  1 x Shift Total(mL/kg) 900(11.1) 400(4.9) 1300(16) NET -360 -117.9 -477.9 Weight (kg) 81.3 81.2 81.2 81.2 81.2 81.2 Physical Exam:  
 General: Awake and alert. In no acute distress. Skin: Patient with 3, raised lesions to bilateral lower extremities. Erythematous, indurated, no drainage. Tender to palpation. Labs: 
Recent Results (from the past 24 hour(s)) HGB & HCT Collection Time: 19  2:59 AM  
Result Value Ref Range HGB 7.6 (L) 12.0 - 16.0 g/dL HCT 22.9 (L) 35.0 - 45.0 % METABOLIC PANEL, BASIC Collection Time: 19  2:59 AM  
Result Value Ref Range Sodium 138 136 - 145 mmol/L  Potassium 5.2 3.5 - 5.5 mmol/L  
 Chloride 105 100 - 108 mmol/L  
 CO2 28 21 - 32 mmol/L Anion gap 5 3.0 - 18 mmol/L Glucose 98 74 - 99 mg/dL BUN 68 (H) 7.0 - 18 MG/DL Creatinine 2.41 (H) 0.6 - 1.3 MG/DL  
 BUN/Creatinine ratio 28 (H) 12 - 20 GFR est AA 23 (L) >60 ml/min/1.73m2 GFR est non-AA 19 (L) >60 ml/min/1.73m2 Calcium 8.2 (L) 8.5 - 10.1 MG/DL All Micro Results Procedure Component Value Units Date/Time CULTURE, BLOOD [[id number]] Collected:  02/02/19 1212 Order Status:  Completed Specimen:  Blood Updated:  02/04/19 2472 Special Requests: NO SPECIAL REQUESTS Culture result: NO GROWTH 2 DAYS     
 CULTURE, BLOOD [[id number]] Collected:  02/02/19 1840 Order Status:  Completed Specimen:  Whole Blood Updated:  02/04/19 0326 Special Requests: NO SPECIAL REQUESTS Culture result: NO GROWTH 2 DAYS     
 CULTURE, BLOOD [[id number]] Collected:  02/02/19 1845 Order Status:  Completed Specimen:  Whole Blood Updated:  02/04/19 7701 Special Requests: NO SPECIAL REQUESTS Culture result: NO GROWTH 2 DAYS Current Medications: 
Current Facility-Administered Medications Medication Dose Route Frequency Provider Last Rate Last Dose  piperacillin-tazobactam (ZOSYN) 2.25 g in 0.9% sodium chloride (MBP/ADV) 50 mL MBP  2.25 g IntraVENous Q8H Js Tyler MD      
 vancomycin (VANCOCIN) 1000 mg in  ml infusion  1,000 mg IntraVENous Q48H Js Tyler MD      
 0.9% sodium chloride infusion 250 mL  250 mL IntraVENous PRN Lior Arevalo MD   Stopped at 02/03/19 3166  aspirin delayed-release tablet 81 mg  81 mg Oral DAILY Petrona Pfeiffer MD   81 mg at 02/04/19 0848  
 amitriptyline (ELAVIL) tablet 25 mg  25 mg Oral QHS Petrona Pfeiffer MD   25 mg at 02/03/19 3623  memantine (NAMENDA) tablet 10 mg  10 mg Oral DAILY Petrona Pfeiffer MD   10 mg at 02/04/19 6587  isosorbide mononitrate ER (IMDUR) tablet 60 mg  60 mg Oral DAILY Justice Dec, MD Geo   60 mg at 02/04/19 8100  carvedilol (COREG) tablet 3.125 mg  3.125 mg Oral Q12H Pernell Klinefelter, MD   3.125 mg at 02/04/19 7800  docusate sodium (COLACE) capsule 100 mg  100 mg Oral BID Pernell Klinefelter, MD   100 mg at 02/04/19 2078  famotidine (PEPCID) tablet 20 mg  20 mg Oral DAILY Pernell Klinefelter, MD   20 mg at 02/04/19 3837  acetaminophen (TYLENOL) tablet 650 mg  650 mg Oral Q6H PRN Pernell Klinefelter, MD   650 mg at 02/03/19 2215  ondansetron (ZOFRAN) injection 4 mg  4 mg IntraVENous Q6H PRN Pernell Klinefelter, MD      
 sodium chloride (NS) flush 5-40 mL  5-40 mL IntraVENous Q8H Pernell Klinefelter, MD   10 mL at 02/04/19 4378  sodium chloride (NS) flush 5-40 mL  5-40 mL IntraVENous PRN Pernell Klinefelter, MD      
 amiodarone (CORDARONE) tablet 200 mg  200 mg Oral DAILY Pernell Klinefelter, MD   200 mg at 02/04/19 0391 Chart and notes reviewed. Data reviewed. I have evaluated and examined the patient. IMPRESSION:  
· Patient with inflammatory, skin lesions (x3), tender to touch to bilateral lower extremities. PLAN:/DISCUSION:  
· Continue antibiotics; tailor to cultures Emmie Closs, NP-C

## 2019-02-04 NOTE — PROGRESS NOTES
OT order received and chart reviewed. Pt currently off floor for duplex study of UE (10:16 am). Second attempt made with pt and family in discussion with hospice care (13:08). Will f/u later as pt schedule allows. Thank you, Jennifer Angelo, OTRL

## 2019-02-04 NOTE — PROGRESS NOTES
Hospitalist Progress NotePatient: Maribell Cleaning MRN: [id number]  CSN: [id number] YOB: 1931  Age: 80 y.o. Sex: female DOA: 1/30/2019 LOS:  LOS: 5 days Patient lying in bed in NAD, awake, has dementia Assessment/Plan Active Problems: 
  NSTEMI (non-ST elevated myocardial infarction) ([address] (1/30/2019) Dementia (1/30/2019) Plan -  
 
1. NSTEMI - medical management , per cardio, off heparin drip 2. Anemia -monitor 3. Chronic combined CHF - lasix as needed 4. Baseline Dementia - continue namenda 5. H/o CKD 4 - monitor, nephro eval 
6. H/o AOCD - monitor hgb , check stool hemoccult, no over bleeding 7. H/o SLE - chronic - f/u with Dr Ernesto Davila as out pt 8-Legs - ? Abscesses, ? inflammatory nodules  - on abx, follow cx, surg following, ID consult Palliative care is following D/w RN Full code Case discussed with:  []Patient  [x]Family  []Nursing  []Case Management DVT Prophylaxis:  []Lovenox  []Hep SQ  [x]SCDs  []Coumadin   []On Heparin gtt Objective:  
  
Visit Vitals BP 91/46 (BP 1 Location: Right leg, BP Patient Position: At rest) Pulse 72 Temp 99.1 °F (37.3 °C) Resp 18 Wt 81.2 kg (179 lb 0.2 oz) SpO2 97% Breastfeeding? No  
BMI 29.79 kg/m² Physical Exam: 
General:  Awake, follows most commands Cardiovascular:  S1S2+, RRR Pulmonary:  CTA b/l GI:  Soft, BS+, NT, ND Extremities:  trace edema, ?adscess like lesions noted on both legs. Pulses 1+ Lue swelling Intake and Output: 
Current Shift:  No intake/output data recorded. Last three shifts:  02/02 1901 - 02/04 0700 In: 1042.1 [P.O.:660; I.V.:100] Out: 1800 [Urine:1800] Recent Results (from the past 24 hour(s)) HGB & HCT Collection Time: 02/04/19  2:59 AM  
Result Value Ref Range HGB 7.6 (L) 12.0 - 16.0 g/dL HCT 22.9 (L) 35.0 - 45.0 % METABOLIC PANEL, BASIC Collection Time: 02/04/19  2:59 AM  
Result Value Ref Range Sodium 138 136 - 145 mmol/L Potassium 5.2 3.5 - 5.5 mmol/L Chloride 105 100 - 108 mmol/L  
 CO2 28 21 - 32 mmol/L Anion gap 5 3.0 - 18 mmol/L Glucose 98 74 - 99 mg/dL BUN 68 (H) 7.0 - 18 MG/DL Creatinine 2.41 (H) 0.6 - 1.3 MG/DL  
 BUN/Creatinine ratio 28 (H) 12 - 20 GFR est AA 23 (L) >60 ml/min/1.73m2 GFR est non-AA 19 (L) >60 ml/min/1.73m2 Calcium 8.2 (L) 8.5 - 10.1 MG/DL  
HGB & HCT Collection Time: 02/04/19 12:25 PM  
Result Value Ref Range HGB 7.7 (L) 12.0 - 16.0 g/dL HCT 23.9 (L) 35.0 - 45.0 % Current Meds - Reviewed Lab Results Component Value Date/Time  Glucose 98 02/04/2019 02:59 AM  
 Glucose 89 02/03/2019 07:49 AM  
 Glucose 98 01/31/2019 02:24 AM  
 Glucose 99 01/30/2019 01:15 PM  
 Glucose 105 (H) 01/08/2019 03:05 AM  
 Glucose 88 03/17/2016 11:15 AM  
  
 
Nel Cristina MD 
2/4/2019,

## 2019-02-04 NOTE — PROGRESS NOTES
PT orders received, chart reviewed. Pt unable to participate with PT due to pt on continuous bedrest orders. Please remove to participate in therapy. Will f/u later as patient's schedule allows.  Thank you for this referral. 
Violet Singletary, PT, DPT

## 2019-02-04 NOTE — PROGRESS NOTES
Problem: Mobility Impaired (Adult and Pediatric) Goal: *Acute Goals and Plan of Care (Insert Text) Physical Therapy Goals Initiated 2/4/2019 and to be accomplished within 7 day(s) 1. Patient will move from supine to sit and sit to supine , scoot up and down and roll side to side in bed with minimal assistance/contact guard assist.    
2.  Patient will transfer from bed to chair and chair to bed with contact guard assist using the least restrictive device. 3.  Patient will perform sit to stand with contact guard assist. 
4.  Patient will ambulate with contact guard assist for 50 feet with the least restrictive device. 5.  Patient will ascend/descend 3 stairs with 1-2 handrail(s) with minimal assistance/contact guard assist. 
 
Outcome: Progressing Towards Goal 
physical Therapy EVALUATION Patient: Rosaura Tracey (80 y.o. female) Date: 2/4/2019 Primary Diagnosis: NSTEMI (non-ST elevated myocardial infarction) ([facility] NSTEMI (non-ST elevated myocardial infarction) ([facility] [address] Precautions:   Fall ASSESSMENT : 
PT orders received and patient cleared by nursing to participate with therapy. Patient is a 80 y.o. female admitted to the hospital due to AMS and difficulty to stand. Patient consents to PT evaluation and treatment. Pt was in a SNF after hospitization in January with pt recently home. Pt is confused and only oriented to herself today. Performed supien to sit mod A. Scooting to EOB mod A. Sit to stands pt would not performed with multiple attempts even having 2nd person in room to assist with pt then refusing more. Pt performed sit to supine max A. Scooting up in bed total Ax2. Pt ended therapy supine in bed with alarm donned and all needs met. Patient will benefit from skilled intervention to address the above impairments and increase functional independence. Patients rehabilitation potential is considered to be Fair Factors which may influence rehabilitation potential include: []         None noted 
[x]         Mental ability/status []         Medical condition 
[]         Home/family situation and support systems 
[x]         Safety awareness 
[]         Pain tolerance/management 
[]         Other: PLAN : 
Recommendations and Planned Interventions: 
[x]           Bed Mobility Training             [x]    Neuromuscular Re-Education 
[x]           Transfer Training                   []    Orthotic/Prosthetic Training 
[x]           Gait Training                          []    Modalities [x]           Therapeutic Exercises          []    Edema Management/Control 
[x]           Therapeutic Activities            [x]    Patient and Family Training/Education 
[]           Other (comment): Frequency/Duration: Patient will be followed by physical therapy 1-2 times per day to address goals. Discharge Recommendations: Andrey Ma Further Equipment Recommendations for Discharge: N/A  
 
SUBJECTIVE:  
Patient stated I'm sick.  OBJECTIVE DATA SUMMARY:  
 
Past Medical History:  
Diagnosis Date  Acute on chronic diastolic heart failure [address] [address]  Arthritis  Benign hypertensive heart disease without heart failure Better controlled, stable  Chronic diastolic heart failure ([address] Breathing and edema is improving  Congestive heart failure ([address]  HTN (hypertension)  Hypercholesteremia  Lupus (systemic lupus erythematosus) ([facility] [address] 6/18/2014  
 followed by dr Vero Hutton  Obesity, unspecified Patient has weight loss Discussed diet ad fluid restriction  Other and unspecified hyperlipidemia F/u per pmd  
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  
Procedure Laterality Date  HX HYSTERECTOMY  PACEMAKER PROCEDURE Barriers to Learning/Limitations: yes;  cognitive and altered mental status (i.e.Sedation, Confusion) Compensate with: Visual Cues, Verbal Cues and Tactile Cues Prior Level of Function/Home Situation: Assistance with bed mobility and independent/supervision with transfers and gait using RW. Home Situation Home Environment: Private residence Care Facility Name: [facility] # Steps to Enter: 3 Rails to Enter: Yes Hand Rails : Bilateral 
One/Two Story Residence: Two story Lift Chair Available: Yes Living Alone: No 
Support Systems: Spouse/Significant Other/Partner, Child(marty) Patient Expects to be Discharged to[de-identified] Private residence Current DME Used/Available at Home: Eric Sanchez Behavior: 
Neurologic State: Alert;Confused Psychosocial 
Patient Behaviors: Confused; Restless Family  Behaviors: Calm Purposeful Interaction: Yes Pt Identified Daily Priority: Clinical issues (comment); Communication issues (comment) Caritas Process: Establish trust;Teaching/learning Caring Interventions: Reassure Reassure: Informing Therapeutic Modalities: Intentional therapeutic touchStrength:   
Strength: Generally decreased, functional(B LE) Tone & Sensation:  
Tone: Normal(B LE) Range Of Motion: 
AROM: Generally decreased, functional(B LE) Functional Mobility: 
Bed Mobility: 
Supine to Sit: Moderate assistance Sit to Supine: Maximum assistance Scooting: Assist x2;Total assistance Transfers: 
 multiple attempts with pt refusing. Balance:  
Sitting: Impaired; With support Sitting - Static: Good (unsupported) Sitting - Dynamic: Fair (occasional)Therapeutic Exercises:  
Reviewed and performed ankle pumps. Pain: 
Pre: 0/10 Post: 0/10 Activity Tolerance:  
poor Please refer to the flowsheet for vital signs taken during this treatment. After treatment:  
[] Patient left in no apparent distress sitting up in chair 
[] Patient left sitting on EOB [x] Patient left in no apparent distress in bed 
[] Patient declined to be OOB at this time due to   
[x] Call bell left within reach [x] Nursing notified(Yohannes) [x] Caregiver present [x] Bed alarm activated 
[x] Personal items in reach COMMUNICATION/EDUCATION:  
[x]         Fall prevention education was provided and the patient/caregiver indicated understanding. [x]         Patient/family have participated as able in goal setting and plan of care. [x]         Patient/family agree to work toward stated goals and plan of care. []         Patient understands intent and goals of therapy, but is neutral about his/her participation. []         Patient is unable to participate in goal setting and plan of care. [x]         Role of physical therapy. []         Out of bed with nursing assistance 3-5 times a day. Thank you for this referral. 
Lee lAmodovar, PT, DPT Time Calculation: 23 mins Eval Complexity: History: MEDIUM  Complexity : 1-2 comorbidities / personal factors will impact the outcome/ POC Exam:HIGH Complexity : 4+ Standardized tests and measures addressing body structure, function, activity limitation and / or participation in recreation  Presentation: MEDIUM Complexity : Evolving with changing characteristics  Clinical Decision Making:Medium Complexity   Overall Complexity:MEDIUM

## 2019-02-04 NOTE — PROGRESS NOTES
PT orders received, chart reviewed. Pt unable to participate with PT due to: 
[]  Nausea/vomiting 
[]  Eating 
[]  Pain 
[]  Pt lethargic [x]  Off Unit, thank you for removing bedrest orders. Pt currently having duplex study for UE 
[]  Pt refused 
[]  Other Will f/u later as patient's schedule allows.  Thank you for this referral. 
Amy Pelayo, PT, DPT

## 2019-02-04 NOTE — PROGRESS NOTES
Problem: Falls - Risk of 
Goal: *Absence of Falls Document Reino Horn Fall Risk and appropriate interventions in the flowsheet. Outcome: Progressing Towards Goal 
Fall Risk Interventions: 
Mobility Interventions: Bed/chair exit alarm, Communicate number of staff needed for ambulation/transfer Mentation Interventions: Bed/chair exit alarm, Adequate sleep, hydration, pain control, Family/sitter at bedside, More frequent rounding, Reorient patient, Room close to nurse's station, Update white board Medication Interventions: Bed/chair exit alarm, Patient to call before getting OOB Elimination Interventions: Bed/chair exit alarm, Call light in reach, Patient to call for help with toileting needs, Toileting schedule/hourly rounds Problem: Pressure Injury - Risk of 
Goal: *Prevention of pressure injury Document Rodríguez Scale and appropriate interventions in the flowsheet. Outcome: Progressing Towards Goal 
Pressure Injury Interventions: 
Sensory Interventions: Assess changes in LOC, Float heels, Keep linens dry and wrinkle-free, Maintain/enhance activity level, Minimize linen layers, Pressure redistribution bed/mattress (bed type), Turn and reposition approx. every two hours (pillows and wedges if needed) Moisture Interventions: Apply protective barrier, creams and emollients, Absorbent underpads, Internal/External urinary devices, Check for incontinence Q2 hours and as needed, Limit adult briefs, Maintain skin hydration (lotion/cream), Minimize layers, Moisture barrier Activity Interventions: Increase time out of bed, Pressure redistribution bed/mattress(bed type), PT/OT evaluation Mobility Interventions: HOB 30 degrees or less, Pressure redistribution bed/mattress (bed type), Turn and reposition approx. every two hours(pillow and wedges) Nutrition Interventions: Document food/fluid/supplement intake, Offer support with meals,snacks and hydration Friction and Shear Interventions: HOB 30 degrees or less, Apply protective barrier, creams and emollients, Lift team/patient mobility team

## 2019-02-04 NOTE — PROGRESS NOTES
Palliative Med team consisting of this author, Morena Milner, GALINDO, and Vidhi Bynum met with Pt and family at bedside. Her daughters Oly Katz and Jose VALENTIN, son, Raymond Jacobo, brother in [place], Edin Osorio, and two grand kids were present. Pt's spouse, Jose M Rubio, was not at the hospital.  The couple lives alone, though another brother, Elmer Hernandez, has been staying with them lately to help out. Pt was ambulatory and independent with ADL's until \"about 6 mos ago. Then she started going downhill and needed help with dressing, bathing and all that. \"  She was eating on her own right before admission, but then she developed more swelling in her hands which made holding utensils difficult. The kids shared that Pt is an  who performed marriages for two of her grand kids. Pt's spouse, Jose M Rubio,  is ambulatory, though he doesn't drive any longer. The kids report that he is doing well mentally and has decision making capacity. Pt has advanced dementia and wasn't oriented to self or family when this team met with her, though son Raymond Jacobo reported that she does recognize her kids but that other awareness waxes and wanes. Raymond Jacobo also voiced concerns over Pt's agitation and asked about meds to help with this. The plan is for Pt to return home to her spouse with some additional caregiver help. They would like to meet with case mgt to discuss options for help. Although the family vehemently opposed any discussion of hospice, they were open to discussing code status. All 3 kids present would like Pt to be a DNR as the two sisters work in healthcare and know that it would be too difficult for Pt to go through CPR. They will discuss this with their dad and will call tomorrow when they're visiting Pt so that this team can meet with [facility] to review the DNR form. No further questions/concerns noted at this time. Will cont to follow to provide support. Pt remains a FULL CODE. Thank you for the consult and the opportunity to assist in Ms. Mateo Radha care. Tori Hinds, [facility] Palliative Medicine

## 2019-02-04 NOTE — CONSULTS
Palliative Medicine Consult DR. ROSARIO'S [facility]: [phone] ([phone]) DAYSI PORTILLO [facility]: [phone] [facility]: [phone] Patient Name: [place] YOB: 1931 Date of Initial Consult: 2/4/2019 Reason for Consult: goals of care Requesting Provider: Porsha Maria MD 
Primary Care Physician: Franky Tijerina MD 
  
 SUMMARY:  
[place] Barry Parker is a 80y.o. year old with a past history of CHF, HTN, hypercholesterolemia, Lupus, dementia who was admitted on 1/30/2019 from home with a diagnosis of NSTEMI. Patient with three day history of progressive altered mental status, generalized weakness and shortness of breath. Current medical issues leading to Palliative Medicine involvement include: dementia, CHF and goals of care. PALLIATIVE DIAGNOSES:  
1. Advanced care planning 2. Dementia 3. CHF with preserved EF 31-35%. 4. NSTEMI 
 
 
 PLAN:  
1. Advanced care planning: Palliative care team including Kay Mcarthur LCSW, Hi Martinez and myself met with patient and family in her room. , Alexandra Joaquin, was not present. Daughters: Erika Courser and Jose VALENTIN, son, Jarett Gutierrez were present. Patient is not oriented, did not recognize her own name and was unable to identify her children. Children were very concerned we not introduce topic of hospice to them or to [place]. Explained palliative medicine. Patient has been declining over the past six months but she is able to feed herself and was walking. Discussed progression of disease with CHF and dementia. Explained overlapping diseases which effect different organs of the body and impact on survival. Family members present were supportive of DNR/DNI in event of arrest. They will bring the , Alexandra Joaquin, to the hospital tomorrow to discuss further. Goals of care remain FULL CODE. 2. Dementia: CT head: no acute changes. Currently on namenda which son states is not helping much.  Family admits to patient being agitated at times. She is currently on amitriptyline at hs. Consider adding haldol PRN. Will need additional assistance at home with health aides. 3. CHF combined systolic and diastolic with preserved EF 31-35%. NT-pro-BNP on admission: 4856. Lasix currently on hold secondary to hypotension. Cardiology following. 4. NSTEMI: Elevated troponin on admission 3.03, now trending down. On asa, bb. Consider adding statin. Medically managed per cardiology. 5. Initial consult note routed to primary continuity provider 6. Communicated plan of care with: Palliative IDT 
 
GOALS OF CARE: 
Patient/Health Care Proxy Stated Goals: Prolong life TREATMENT PREFERENCES:  
Code Status: Full Code Advance Care Planning: 
Advance Care Planning 1/31/2019 Patient's Healthcare Decision Maker is: Legal Next of Kin Primary Decision Maker Name -  
Primary Decision Maker Phone Number -  
Primary Decision Maker Relationship to Patient - Secondary Decision Maker Name - Secondary Decision Maker Phone Number - Secondary Decision Maker Relationship to Patient -  
Confirm Advance Directive Yes, on file Patient Would Like to Complete Advance Directive - Does the patient have other document types Power of [address] [place] [address] Medical Interventions: Full interventions Other: As far as possible, the palliative care team has discussed with patient / health care proxy about goals of care / treatment preferences for patient. HISTORY:  
 
History obtained from: patient chart CHIEF COMPLAINT: altered mental status HPI/SUBJECTIVE: The patient is:  
[] Verbal and participatory [x] Non-participatory due to: 
Patient with dementia and does not recognize family members, nor answer to her name. Unable to assess. Clinical Pain Assessment (nonverbal scale for nonverbal patients): Clinical Pain Assessment Severity: 0 Duration: for how long has pt been experiencing pain (e.g., 2 days, 1 month, years) Frequency: how often pain is an issue (e.g., several times per day, once every few days, constant) FUNCTIONAL ASSESSMENT:  
 
Palliative Performance Scale (PPS): PPS: 40 ECOG 
ECOG Status : Limited self-care PSYCHOSOCIAL/SPIRITUAL SCREENING:  
  
Any spiritual / Yarsani concerns: 
[] Yes /  [x] No 
 
Caregiver Burnout: 
[] Yes /  [x] No /  [] No Caregiver Present Anticipatory grief assessment:  
[x] Normal  / [] Maladaptive REVIEW OF SYSTEMS:  
 
Positive and pertinent negative findings in ROS are noted above in HPI. The following systems were [] reviewed / [x] unable to be reviewed as noted in HPI Other findings are noted below. Systems: constitutional, ears/nose/mouth/throat, respiratory, gastrointestinal, genitourinary, musculoskeletal, integumentary, neurologic, psychiatric, endocrine. Positive findings noted below. Modified ESAS Completed by: provider Fatigue: 5 Pain: 0 Stool Occurrence(s): 1 PHYSICAL EXAM:  
 
Wt Readings from Last 3 Encounters:  
02/04/19 81.2 kg (179 lb 0.2 oz) 01/21/19 84.8 kg (187 lb) 01/08/19 69.6 kg (153 lb 7 oz) Blood pressure 108/70, pulse 70, temperature 97.6 °F (36.4 °C), resp. rate 17, weight 81.2 kg (179 lb 0.2 oz), SpO2 97 %, not currently breastfeeding. Pain: 
Pain Scale 1: Numeric (0 - 10) Pain Intensity 1: 0 Constitutional: Elderly, AA female in mild respiratory distress. Eyes: pupils equal, anicteric ENMT: no nasal discharge, moist mucous membranes Cardiovascular: Anasarca 2+ Respiratory: breathing shallow and slightly labored, using abdominal muscles; symmetric Musculoskeletal: no deformity, no tenderness to palpation Skin: warm, dry ; multiple raised cystic like masses on lower extremities with erythema. Neurologic: moving all extremities; disoriented X 4. HISTORY:  
 
Active Problems: 
  NSTEMI (non-ST elevated myocardial infarction) ([facility] [address] (1/30/2019) Dementia (1/30/2019) Past Medical History:  
Diagnosis Date  Acute on chronic diastolic heart failure ([facility] [address]  Arthritis  Benign hypertensive heart disease without heart failure Better controlled, stable  Chronic diastolic heart failure ([facility] [address] Breathing and edema is improving  Congestive heart failure ([facility] [address]  HTN (hypertension)  Hypercholesteremia  Lupus (systemic lupus erythematosus) ([facility] [address] 6/18/2014  
 followed by dr Vero Hutton  Obesity, unspecified Patient has weight loss Discussed diet ad fluid restriction  Other and unspecified hyperlipidemia F/u per pmd  
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  
Procedure Laterality Date  HX HYSTERECTOMY  PACEMAKER PROCEDURE Family History Problem Relation Age of Onset  Arrhythmia Neg Hx  Asthma Neg Hx  Clotting Disorder Neg Hx  Fainting Neg Hx   
 Heart Attack Neg Hx  High Cholesterol Neg Hx  Pacemaker Neg Hx  Stroke Neg Hx History reviewed, no pertinent family history. Social History Tobacco Use  Smoking status: Never Smoker  Smokeless tobacco: Never Used Substance Use Topics  Alcohol use: No  
 
No Known Allergies Current Facility-Administered Medications Medication Dose Route Frequency  piperacillin-tazobactam (ZOSYN) 2.25 g in 0.9% sodium chloride (MBP/ADV) 50 mL MBP  2.25 g IntraVENous Q8H  
 vancomycin (VANCOCIN) 1000 mg in  ml infusion  1,000 mg IntraVENous Q48H  
 0.9% sodium chloride infusion 250 mL  250 mL IntraVENous PRN  
 aspirin delayed-release tablet 81 mg  81 mg Oral DAILY  amitriptyline (ELAVIL) tablet 25 mg  25 mg Oral QHS  memantine (NAMENDA) tablet 10 mg  10 mg Oral DAILY  isosorbide mononitrate ER (IMDUR) tablet 60 mg  60 mg Oral DAILY  carvedilol (COREG) tablet 3.125 mg  3.125 mg Oral Q12H  docusate sodium (COLACE) capsule 100 mg  100 mg Oral BID  famotidine (PEPCID) tablet 20 mg  20 mg Oral DAILY  acetaminophen (TYLENOL) tablet 650 mg  650 mg Oral Q6H PRN  
 ondansetron (ZOFRAN) injection 4 mg  4 mg IntraVENous Q6H PRN  
 sodium chloride (NS) flush 5-40 mL  5-40 mL IntraVENous Q8H  
 sodium chloride (NS) flush 5-40 mL  5-40 mL IntraVENous PRN  
 amiodarone (CORDARONE) tablet 200 mg  200 mg Oral DAILY  
 
 
 LAB AND IMAGING FINDINGS:  
 
Lab Results Component Value Date/Time WBC 9.4 02/01/2019 04:35 AM  
 HGB 7.7 (L) 02/04/2019 12:25 PM  
 PLATELET 420 44/85/6505 04:35 AM  
 
Lab Results Component Value Date/Time Sodium 138 02/04/2019 02:59 AM  
 Potassium 5.2 02/04/2019 02:59 AM  
 Chloride 105 02/04/2019 02:59 AM  
 CO2 28 02/04/2019 02:59 AM  
 BUN 68 (H) 02/04/2019 02:59 AM  
 Creatinine 2.41 (H) 02/04/2019 02:59 AM  
 Calcium 8.2 (L) 02/04/2019 02:59 AM  
 Magnesium 2.4 12/30/2018 05:55 AM  
 Phosphorus 3.4 12/28/2018 09:25 AM  
  
Lab Results Component Value Date/Time AST (SGOT) 26 01/31/2019 02:24 AM  
 Alk. phosphatase 66 01/31/2019 02:24 AM  
 Protein, total 5.5 (L) 01/31/2019 02:24 AM  
 Albumin 2.6 (L) 01/31/2019 02:24 AM  
 Globulin 2.9 01/31/2019 02:24 AM  
 
Lab Results Component Value Date/Time INR 1.2 02/01/2019 05:20 PM  
 Prothrombin time 14.9 02/01/2019 05:20 PM  
 aPTT 77.3 (H) 02/02/2019 01:01 AM  
  
No results found for: IRON, FE, TIBC, IBCT, PSAT, FERR No results found for: PH, PCO2, PO2 No components found for: [place] Lab Results Component Value Date/Time  01/30/2019 06:12 PM  
 CK - MB 7.5 (H) 01/30/2019 06:12 PM  
  
 
   
 
Total time: 70 minutes Counseling / coordination time, spent as noted above: 50 minutes > 50% counseling / coordination: family Prolonged service was provided for  []30 min   []75 min in face to face time in the presence of the patient, spent as noted above. Time Start:  
Time End: Note: this can only be billed with 72597 (initial) or 37142 (follow up). If multiple start / stop times, list each separately.

## 2019-02-04 NOTE — PROGRESS NOTES
Hospitalist Progress NotePatient: Rick Sams MRN: [id number]  CSN: [id number] YOB: 1931  Age: 80 y.o. Sex: female DOA: 1/30/2019 LOS:  LOS: 4 days Patient lying in bed in NAD, has dementia, son and daughter at bedside Assessment/Plan Active Problems: 
  NSTEMI (non-ST elevated myocardial infarction) ([address] (1/30/2019) Dementia (1/30/2019) Plan -  
 
1. NSTEMI - medical management , per cardio, off heparin drip 2. Anemia -monitor , no overt bleeding, transfuse PRN 3. Chronic combined CHF - off lasix per cardio 4. Baseline Dementia - continue namenda 5. H/o CKD 4 - monitor 6. H/o AOCD - monitor hgb , check stool hemoccult, no over bleeding 7. H/o SLE - chronic - f/u with Dr Leonardo Liu as out pt 8-Legs - ? Abscesses noted - on abx, follow cx, surg following 
 
follow dopplers of left arm for swelling. D/w family at bedside and another daughter over phone, full code Case discussed with:  []Patient  [x]Family  []Nursing  []Case Management DVT Prophylaxis:  []Lovenox  []Hep SQ  [x]SCDs  []Coumadin   []On Heparin gtt Objective:  
  
Visit Vitals /70 (BP 1 Location: Left arm, BP Patient Position: At rest) Pulse 70 Temp 99.1 °F (37.3 °C) Resp 18 Wt 81.3 kg (179 lb 4.8 oz) SpO2 99% Breastfeeding? No  
BMI 29.84 kg/m² Physical Exam: 
General:  Awake, has dementia Cardiovascular:  S1S2+, RRR Pulmonary:  CTA b/l GI:  Soft, BS+, NT, ND Extremities:  trace edema, ?adscess like lesions noted on both legs. Pulses 1+ Intake and Output: 
Current Shift:  No intake/output data recorded. Last three shifts:  02/02 0701 - 02/03 1900 In: 6153 [P.O.:600; I.V.:600] Out: 0521 [[id number]] Recent Results (from the past 24 hour(s)) METABOLIC PANEL, BASIC Collection Time: 02/03/19  7:49 AM  
Result Value Ref Range Sodium 138 136 - 145 mmol/L Potassium 4.7 3.5 - 5.5 mmol/L  Chloride 105 100 - 108 mmol/L  
 CO2 30 21 - 32 mmol/L Anion gap 3 3.0 - 18 mmol/L Glucose 89 74 - 99 mg/dL BUN 66 (H) 7.0 - 18 MG/DL Creatinine 2.12 (H) 0.6 - 1.3 MG/DL  
 BUN/Creatinine ratio 31 (H) 12 - 20 GFR est AA 27 (L) >60 ml/min/1.73m2 GFR est non-AA 22 (L) >60 ml/min/1.73m2 Calcium 8.1 (L) 8.5 - 10.1 MG/DL  
HGB & HCT Collection Time: 02/03/19  7:49 AM  
Result Value Ref Range HGB 6.7 (L) 12.0 - 16.0 g/dL HCT 20.6 (L) 35.0 - 45.0 % Current Meds - Reviewed Lab Results Component Value Date/Time  Glucose 89 02/03/2019 07:49 AM  
 Glucose 98 01/31/2019 02:24 AM  
 Glucose 99 01/30/2019 01:15 PM  
 Glucose 105 (H) 01/08/2019 03:05 AM  
 Glucose 146 (H) 01/07/2019 02:47 AM  
 Glucose 88 03/17/2016 11:15 AM  
  
 
Porsha Maria MD 
2/3/2019,

## 2019-02-04 NOTE — PROGRESS NOTES
[facility] 
 
 
CARDIOLOGY PROGRESS NOTE 
RECS:1. Elevated troponin- the patient is chest pain free. Her SOB is improving  troponin indy trending-  continue medical management for CAD.  asa,bb,statin . No further cardiac w/u needed at this time 2. Chronic combined systolic and diastolic heart failure- decompensated. use Lasix as needed for now as blood pressure may reduce in case she is bleeding. 3. Atrial fibrillation - currently paced on amiodarone. Not on anticoagulation due to high bleeding risk. Continue asa. 4. CKD- monitor kandy indices with diuresis consider renal consult 5. Hx of symptomatic bradycardia- S/P PPM  6943 Dr José Martinez- EP recommended  upgrade to biventricular pacemaker as an outpatient.  Patient's family refused   
10. Anemia: H&H dropping. Check stool guaiacs and may need to hold aspirin also if stool guaiacs are positive. 7. Lupus- follow by Dr. Shweta Valverde as out patient 8. Hypertension- with normal low bp. Continue low dose Coreg  will monitor 9. Dementia. 10. CODE STATUS: Consider palliative care consult. 
  
 
Discussed with patient's family members at length today-- no aggressive w/u per family. Continue supportive care Will f/u peripherally Call us as needed ASSESSMENT: 
Hospital Problems  Date Reviewed: 1/21/2019 Codes Class Noted POA  
 NSTEMI (non-ST elevated myocardial infarction) ([facility] [address] ICD-10-CM: I21.4 ICD-9-CM: 410.70  1/30/2019 Unknown Dementia ICD-10-CM: F03.90 ICD-9-CM: 294.20  1/30/2019 Unknown SUBJECTIVE: 
No CP No SOB OBJECTIVE: 
 
VS:  
Visit Vitals BP 91/46 (BP 1 Location: Right leg, BP Patient Position: At rest) Pulse 72 Temp 99.1 °F (37.3 °C) Resp 18 Wt 81.2 kg (179 lb 0.2 oz) SpO2 97% Breastfeeding? No  
BMI 29.79 kg/m² Intake/Output Summary (Last 24 hours) at 2/4/2019 1626 Last data filed at 2/4/2019 2650 Gross per 24 hour Intake 462.1 ml Output 600 ml Net -137.9 ml  
 
TELE: paced General: alert and in no apparent distress, lying flat HENT: Normocephalic, atraumatic. Normal external eye. Neck :  no bruit, increased JVP Cardiac:  regular rate and rhythm, systolic murmur: early systolic 3/6, crescendo at 2nd left intercostal space, at 2nd right intercostal space: Second systolic murmur which is along mid to late systolic murmur at apex grade 3/6 Chest/Lungs:chest clear, no wheezing, rales, normal symmetric air entry Abdomen: Soft, nontender, no masses Extremities:  No c/c/edema, peripheral pulses present Labs: Results:  
   
Chemistry Recent Labs 02/04/19 
0259 02/03/19 
2845 GLU 98 89  138  
K 5.2 4.7  105 CO2 28 30 BUN 68* 66* CREA 2.41* 2.12* CA 8.2* 8.1* AGAP 5 3 BUCR 28* 31* CBC w/Diff Recent Labs 02/04/19 
1225 02/04/19 
0259 02/03/19 
5520 HGB 7.7* 7.6* 6.7* HCT 23.9* 22.9* 20.6* Cardiac Enzymes No results for input(s): CPK, CKND1, YAN in the last 72 hours. No lab exists for component: Luis E Brighter Coagulation Recent Labs 02/02/19 
0101 02/01/19 
1720 PTP  --  14.9 INR  --  1.2 APTT 77.3* 35.8 Lipid Panel Lab Results Component Value Date/Time Cholesterol, total 142 01/31/2019 02:24 AM  
 HDL Cholesterol 68 (H) 01/31/2019 02:24 AM  
 LDL, calculated 66.4 01/31/2019 02:24 AM  
 VLDL, calculated 7.6 01/31/2019 02:24 AM  
 Triglyceride 38 01/31/2019 02:24 AM  
 CHOL/HDL Ratio 2.1 01/31/2019 02:24 AM  
  
BNP No results for input(s): BNPP in the last 72 hours. Liver Enzymes No results for input(s): TP, ALB, TBIL, AP, SGOT, GPT in the last 72 hours. No lab exists for component: DBIL Digoxin Thyroid Studies Lab Results Component Value Date/Time  TSH 8.86 (H) 11/15/2015 01:45 PM  
    
 
 
 
Julio Lane MD

## 2019-02-04 NOTE — ROUTINE PROCESS
Bedside and Verbal shift change report given to Jessica Gutierrez RN 
 (oncoming nurse) by Leslie Ferrera RN (offgoing nurse). Report included the following information SBAR, Kardex, Intake/Output, MAR and Recent Results.

## 2019-02-04 NOTE — ACP (ADVANCE CARE PLANNING)
PALLIATIVE MEDICINE NOTE Pt has dementia and is unable to complete an AMD.  Her legal decision maker is her spouse, Fide Browning, who was not present at bedside. Met with 3 of her adult children who relayed that they are NOT interested in hospice care. However, they would like for Pt to be a DNR. They will ask their dad to come in to meet with this team (hopefully tomorrow, Feb 5), to go over the DNR form. Primary Decision Maker (Health Care Agent): Defaults to Delilah Orozco Relationship to patient:spouse Phone number: 
[] Named in a scanned document  
[x] Legal Next of Kin 
[] Guardian Secondary Decision Maker (First Alternate Health Care Agent):  
Relationship to patient: 
Phone number: 
[] Named in a scanned document  
[] Legal Next of Kin 
[] Guardian ACP documents you current have include: None currently 
[] Advance Directive or Living Will 
[] Durable Do Not Resuscitate 
[] Physician Orders for Scope of Treatment (POST) [] Medical Power of  
[] Other Thank you for the consult and the opportunity to assist in Ms. Hilary Jimenez care. Nathan Washington, [facility] Palliative Medicine

## 2019-02-05 LAB
ANION GAP SERPL CALC-SCNC: 6 MMOL/L (ref 3–18)
BASOPHILS # BLD: 0 K/UL (ref 0–0.1)
BASOPHILS NFR BLD: 0 % (ref 0–2)
BUN SERPL-MCNC: 70 MG/DL (ref 7–18)
BUN/CREAT SERPL: 27 (ref 12–20)
CALCIUM SERPL-MCNC: 8.2 MG/DL (ref 8.5–10.1)
CHLORIDE SERPL-SCNC: 107 MMOL/L (ref 100–108)
CO2 SERPL-SCNC: 28 MMOL/L (ref 21–32)
CREAT SERPL-MCNC: 2.64 MG/DL (ref 0.6–1.3)
DIFFERENTIAL METHOD BLD: ABNORMAL
EOSINOPHIL # BLD: 0.2 K/UL (ref 0–0.4)
EOSINOPHIL NFR BLD: 2 % (ref 0–5)
ERYTHROCYTE [DISTWIDTH] IN BLOOD BY AUTOMATED COUNT: 17.5 % (ref 11.6–14.5)
GLUCOSE SERPL-MCNC: 100 MG/DL (ref 74–99)
HCT VFR BLD AUTO: 23 % (ref 35–45)
HCT VFR BLD AUTO: 23.6 % (ref 35–45)
HEMOCCULT STL QL: NEGATIVE
HGB BLD-MCNC: 7.4 G/DL (ref 12–16)
HGB BLD-MCNC: 7.5 G/DL (ref 12–16)
LYMPHOCYTES # BLD: 2.1 K/UL (ref 0.9–3.6)
LYMPHOCYTES NFR BLD: 23 % (ref 21–52)
MCH RBC QN AUTO: 29.1 PG (ref 24–34)
MCHC RBC AUTO-ENTMCNC: 31.8 G/DL (ref 31–37)
MCV RBC AUTO: 91.5 FL (ref 74–97)
MONOCYTES # BLD: 0.9 K/UL (ref 0.05–1.2)
MONOCYTES NFR BLD: 9 % (ref 3–10)
NEUTS SEG # BLD: 6 K/UL (ref 1.8–8)
NEUTS SEG NFR BLD: 66 % (ref 40–73)
PLATELET # BLD AUTO: 137 K/UL (ref 135–420)
PMV BLD AUTO: 9 FL (ref 9.2–11.8)
POTASSIUM SERPL-SCNC: 5.2 MMOL/L (ref 3.5–5.5)
RBC # BLD AUTO: 2.58 M/UL (ref 4.2–5.3)
SODIUM SERPL-SCNC: 141 MMOL/L (ref 136–145)
WBC # BLD AUTO: 9.2 K/UL (ref 4.6–13.2)

## 2019-02-05 PROCEDURE — 77030038269 HC DRN EXT URIN PURWCK BARD -A

## 2019-02-05 PROCEDURE — 85018 HEMOGLOBIN: CPT

## 2019-02-05 PROCEDURE — 74011000258 HC RX REV CODE- 258: Performed by: EMERGENCY MEDICINE

## 2019-02-05 PROCEDURE — 74011250636 HC RX REV CODE- 250/636: Performed by: EMERGENCY MEDICINE

## 2019-02-05 PROCEDURE — 80048 BASIC METABOLIC PNL TOTAL CA: CPT

## 2019-02-05 PROCEDURE — 65660000000 HC RM CCU STEPDOWN

## 2019-02-05 PROCEDURE — 74011250636 HC RX REV CODE- 250/636: Performed by: SPECIALIST

## 2019-02-05 PROCEDURE — 36415 COLL VENOUS BLD VENIPUNCTURE: CPT

## 2019-02-05 PROCEDURE — 82272 OCCULT BLD FECES 1-3 TESTS: CPT

## 2019-02-05 PROCEDURE — 74011250637 HC RX REV CODE- 250/637: Performed by: HOSPITALIST

## 2019-02-05 PROCEDURE — 85025 COMPLETE CBC W/AUTO DIFF WBC: CPT

## 2019-02-05 RX ORDER — FUROSEMIDE 10 MG/ML
20 INJECTION INTRAMUSCULAR; INTRAVENOUS 2 TIMES DAILY
Status: DISCONTINUED | OUTPATIENT
Start: 2019-02-05 | End: 2019-02-05

## 2019-02-05 RX ADMIN — PIPERACILLIN SODIUM,TAZOBACTAM SODIUM 2.25 G: 2; .25 INJECTION, POWDER, FOR SOLUTION INTRAVENOUS at 06:10

## 2019-02-05 RX ADMIN — Medication 10 ML: at 14:00

## 2019-02-05 RX ADMIN — CARVEDILOL 3.12 MG: 3.12 TABLET, FILM COATED ORAL at 09:42

## 2019-02-05 RX ADMIN — DOCUSATE SODIUM 100 MG: 100 CAPSULE, LIQUID FILLED ORAL at 09:42

## 2019-02-05 RX ADMIN — DOCUSATE SODIUM 100 MG: 100 CAPSULE, LIQUID FILLED ORAL at 19:31

## 2019-02-05 RX ADMIN — FAMOTIDINE 20 MG: 20 TABLET ORAL at 09:41

## 2019-02-05 RX ADMIN — Medication 10 ML: at 23:22

## 2019-02-05 RX ADMIN — ISOSORBIDE MONONITRATE 60 MG: 60 TABLET, EXTENDED RELEASE ORAL at 09:41

## 2019-02-05 RX ADMIN — MEMANTINE 10 MG: 10 TABLET ORAL at 09:41

## 2019-02-05 RX ADMIN — ASPIRIN 81 MG: 81 TABLET, COATED ORAL at 09:41

## 2019-02-05 RX ADMIN — Medication 10 ML: at 06:12

## 2019-02-05 RX ADMIN — METHYLPREDNISOLONE SODIUM SUCCINATE 40 MG: 40 INJECTION, POWDER, FOR SOLUTION INTRAMUSCULAR; INTRAVENOUS at 21:52

## 2019-02-05 RX ADMIN — AMIODARONE HYDROCHLORIDE 200 MG: 200 TABLET ORAL at 09:42

## 2019-02-05 NOTE — PROGRESS NOTES
Bedside and Verbal shift change report received from  Arron Joshi RN (off going nurse). Report included the following information SBAR, Kardex, MAR and Recent Results. Assumed care patient awake laying comfortably on bed. Fall prevention program maintained,call bell and bedside table within reach,Bed alarm on. No problems identified at this time,no complaints made by patient ,will continue care. [address] Trevon Potts NP of surgery ordered for ABD & kerlix wrap dressing on the left lateral side of the leg that looks like being irritated by SCD machine to prevent more irritation & keep skin intack.

## 2019-02-05 NOTE — CONSULTS
Infectious Disease Consultation Note Requested by: dr. Mira Thompson 
 
Reason: abscess on both legs Current abx Prior abx Pip/tazo, vancomycin since 2/2/19 Lines:  
 
 
Assessment : 
 
 
 80y.o. year old with a past history of CHF, HTN, hypercholesterolemia, SLE, dementia who was admitted on 1/30/2019  with three day history of progressive altered mental status, generalized weakness and shortness of breath. Now with bilateral leg soft tissue mass/swelling. Highly complex clinical picture. Clinical presentation is not c/w cellulitis of legs. Also, lack of erythema/induration/fluctuance of soft tissue mass argues against abscess. Etiology of LE lesions not entirely clear: ?drug induced ?lupus associated skin/soft tissue manifestation ?indolent mycobacterial/fungal infection (rapidity of progression argues against this). No definitive evidence of bacterial infection. Progression despite abx argues against this too. SOB on admission likely from non STEMI: cardiology f/u appreciated. Recommendations: 
 
1. D/c pip/tazo, vancomycin 2. Obtain USG of the masses to determine if any fluid present to aspirate 3. Review list of medications to determine if any newly started medication can cause this side effect 4. Consider punch biopsy of the lesion if progression of these lesions and no definitive etiology identified. Discussed with dr. Franky Stubbs. He would like to avoid invasive work up unless it will change the management. He will discuss with dr. Lorri Ball. 5. Monitor off abx 6. mx of non STEMI per cardiology Advance Care planning: full code: discussed  with patient/surrogate decision maker: Jaylyn Trey: [phone] Thank you for consultation request. Above plan was discussed in details with dr. Franky Stubbs and dr Amy Banuelos. Please call me if any further questions or concerns. Will continue to participate in the care of this patient.  
 
HPI: 
 
 80y.o. year old with a past history of CHF, HTN, hypercholesterolemia, SLE, dementia who was admitted on 1/30/2019  with three day history of progressive altered mental status, generalized weakness and shortness of breath. Pt has baseline dementia so unable to provide any info. Looking back at her history, pt was recently admitted tot he hosp in jan 2019 - was discharged to rehab - per son at bedside she was just discharged from rehab a few days ago. On 1/30/19 a.m, she started to complain of weakness , couldn't stand up - they also felt a change in mental status so pt brought to the ER. ER eval - pt noted to have an elevated trop - started on heparin gtt , cardiology consulted. Diagnosed to have non STEMI. In the interim, she was noted to hve red nodules on her legs. There was concern for abscess. Surgery consulted. They didn't find any evidence of abscess. I have been consulted for further recommendations. Patient is confused. Detailed ros not feasible. Past Medical History:  
Diagnosis Date  Acute on chronic diastolic heart failure ([address]  Arthritis  Benign hypertensive heart disease without heart failure Better controlled, stable  Chronic diastolic heart failure ([address] Breathing and edema is improving  Congestive heart failure ([address]  HTN (hypertension)  Hypercholesteremia  Lupus (systemic lupus erythematosus) ([address] 6/18/2014  
 followed by dr Thea Rodríguez  Obesity, unspecified Patient has weight loss Discussed diet ad fluid restriction  Other and unspecified hyperlipidemia F/u per pmd  
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  
Procedure Laterality Date  HX HYSTERECTOMY  PACEMAKER PROCEDURE Medication List  
  
ASK your doctor about these medications   
acetaminophen 650 mg Tber Commonly known as:  TYLENOL ARTHRITIS PAIN 
 Take 1 Tab by mouth every eight (8) hours. amiodarone 200 mg tablet Commonly known as:  CORDARONE 
TAKE ONE TABLET EVERY DAY (MAKE AN APPT) 
  
amitriptyline 25 mg tablet Commonly known as:  ELAVIL 
  
aspirin 81 mg tablet CALMOSEPTINE 0.44-20.6 % Oint Generic drug:  menthol-zinc oxide 
  
carvedilol 3.125 mg tablet Commonly known as:  Eliud Elders Take 1 Tab by mouth every twelve (12) hours. diclofenac 1 % Gel Commonly known as:  VOLTAREN 
  
docusate sodium 100 mg capsule Commonly known as:  Kennieth Argue Take 1 Cap by mouth two (2) times a day for 90 days. famotidine 20 mg tablet Commonly known as:  PEPCID Take 1 Tab by mouth daily. furosemide 20 mg tablet Commonly known as:  LASIX 
1 -2 tabs po daily prn for leg swelling or SOB 
  
isosorbide mononitrate ER 60 mg CR tablet Commonly known as:  IMDUR 
TAKE ONE TABLET EVERY MORNING 
  
memantine 10 mg tablet Commonly known as:  NAMENDA 
  
potassium chloride 20 mEq tablet Commonly known as:  K-DUR, KLOR-CON Take 1 Tab by mouth two (2) times a day. predniSONE 10 mg tablet Commonly known as:  DELTASONE 
1 tab po tid for 1 week , and 1 tab po bid until discontinued 
  
raNITIdine 75 mg Tab Commonly known as:  ZANTAC Current Facility-Administered Medications Medication Dose Route Frequency  piperacillin-tazobactam (ZOSYN) 2.25 g in 0.9% sodium chloride (MBP/ADV) 50 mL MBP  2.25 g IntraVENous Q8H  
 vancomycin (VANCOCIN) 1000 mg in  ml infusion  1,000 mg IntraVENous Q48H  
 0.9% sodium chloride infusion 250 mL  250 mL IntraVENous PRN  
 aspirin delayed-release tablet 81 mg  81 mg Oral DAILY  amitriptyline (ELAVIL) tablet 25 mg  25 mg Oral QHS  memantine (NAMENDA) tablet 10 mg  10 mg Oral DAILY  isosorbide mononitrate ER (IMDUR) tablet 60 mg  60 mg Oral DAILY  carvedilol (COREG) tablet 3.125 mg  3.125 mg Oral Q12H  
 docusate sodium (COLACE) capsule 100 mg  100 mg Oral BID  
  famotidine (PEPCID) tablet 20 mg  20 mg Oral DAILY  acetaminophen (TYLENOL) tablet 650 mg  650 mg Oral Q6H PRN  
 ondansetron (ZOFRAN) injection 4 mg  4 mg IntraVENous Q6H PRN  
 sodium chloride (NS) flush 5-40 mL  5-40 mL IntraVENous Q8H  
 sodium chloride (NS) flush 5-40 mL  5-40 mL IntraVENous PRN  
 amiodarone (CORDARONE) tablet 200 mg  200 mg Oral DAILY Allergies: Patient has no known allergies. Family History Problem Relation Age of Onset  Arrhythmia Neg Hx  Asthma Neg Hx  Clotting Disorder Neg Hx  Fainting Neg Hx   
 Heart Attack Neg Hx  High Cholesterol Neg Hx  Pacemaker Neg Hx  Stroke Neg Hx Social History Socioeconomic History  Marital status:  Spouse name: Not on file  Number of children: Not on file  Years of education: Not on file  Highest education level: Not on file Social Needs  Financial resource strain: Not on file  Food insecurity - worry: Not on file  Food insecurity - inability: Not on file  Transportation needs - medical: Not on file  Transportation needs - non-medical: Not on file Occupational History  Not on file Tobacco Use  Smoking status: Never Smoker  Smokeless tobacco: Never Used Substance and Sexual Activity  Alcohol use: No  
 Drug use: No  
 Sexual activity: Not on file Other Topics Concern  Not on file Social History Narrative  Not on file Social History Tobacco Use Smoking Status Never Smoker Smokeless Tobacco Never Used Temp (24hrs), Av.6 °F (37 °C), Min:97.2 °F (36.2 °C), Max:99.8 °F (37.7 °C) Visit Vitals /56 (BP 1 Location: Right arm, BP Patient Position: At rest) Pulse 72 Temp 97.2 °F (36.2 °C) Resp 18 Wt 83.6 kg (184 lb 4.9 oz) SpO2 97% Breastfeeding? No  
BMI 30.67 kg/m² ROS:  
Patient is confused. Detailed ros not feasible. Physical Exam: Constitutional: She is oriented to person, place, and time. She appears well-developed and well-nourished. No distress. Drowsy. HENT:  
Head: Normocephalic and atraumatic. Eyes: Conjunctivae and EOM are normal. Right eye exhibits no discharge. Left eye exhibits no discharge. No scleral icterus. Neck: Normal range of motion. Neck supple. No tracheal deviation present. Cardiovascular: Normal rate, regular rhythm and normal heart sounds. No murmur heard. Pulmonary/Chest: Effort normal and breath sounds normal. No respiratory distress. She has no wheezes. She has no rales. Abdominal: Soft. She exhibits no distension. There is no tenderness. There is no rebound and no guarding. Large ventral wall hernia that is easily reducible. Musculoskeletal: Normal range of motion. She exhibits no edema or deformity. Neurological: She is oriented to person, place, and time. No cranial nerve deficit. no focal neurologic deficit. Skin: Skin is warm and dry. She is not diaphoretic. Soft tissue swelling seen on both LE - one on right leg, two on left leg - no overlying erythema/warmth. Unable to assess tenderness since patient moans intermittently even when not touched. Psychiatric: mumbles words. Unable to assess.  
  
 
 
 
Labs: Results:  
Chemistry Recent Labs 02/05/19 
0038 02/04/19 
0102 02/03/19 
5292 * 98 89  138 138  
K 5.2 5.2 4.7  105 105 CO2 28 28 30 BUN 70* 68* 66* CREA 2.64* 2.41* 2.12* CA 8.2* 8.2* 8.1* AGAP 6 5 3 BUCR 27* 28* 31* CBC w/Diff Recent Labs 02/05/19 
0038 02/04/19 
1225 02/04/19 
7745 WBC 9.2  --   --   
RBC 2.58*  --   --   
HGB 7.5* 7.7* 7.6* HCT 23.6* 23.9* 22.9*  
  --   --   
GRANS 66  --   --   
LYMPH 23  --   --   
EOS 2  --   --   
  
Microbiology Recent Labs 02/02/19 
1845 02/02/19 
1840 02/02/19 
1212 CULT NO GROWTH 3 DAYS NO GROWTH 3 DAYS NO GROWTH 3 DAYS  
  
 
 
RADIOLOGY: 
 
 All available imaging studies/reports in [facility] for this admission were reviewed Dr. Meenakshi James, Infectious Disease Specialist 
[phone] February 5, 2019 
10:53 AM

## 2019-02-05 NOTE — PROGRESS NOTES
Received in bed awake and alert. In no acute distress. Vital signs stable. No moaning or facial grimacing noted

## 2019-02-05 NOTE — PROGRESS NOTES
Problem: Falls - Risk of 
Goal: *Absence of Falls Document [address] Fall Risk and appropriate interventions in the flowsheet. Fall Risk Interventions: 
Mobility Interventions: Bed/chair exit alarm, Patient to call before getting OOB, OT consult for ADLs, PT Consult for mobility concerns, PT Consult for assist device competence Mentation Interventions: Bed/chair exit alarm, Adequate sleep, hydration, pain control, Door open when patient unattended, Room close to nurse's station Medication Interventions: Evaluate medications/consider consulting pharmacy, Teach patient to arise slowly, Bed/chair exit alarm Elimination Interventions: Bed/chair exit alarm, Call light in reach, Toileting schedule/hourly rounds

## 2019-02-05 NOTE — PROGRESS NOTES
Problem: Mobility Impaired (Adult and Pediatric) Goal: *Acute Goals and Plan of Care (Insert Text) Physical Therapy Goals Initiated 2/4/2019 and to be accomplished within 7 day(s) 1. Patient will move from supine to sit and sit to supine , scoot up and down and roll side to side in bed with minimal assistance/contact guard assist.    
2.  Patient will transfer from bed to chair and chair to bed with contact guard assist using the least restrictive device. 3.  Patient will perform sit to stand with contact guard assist. 
4.  Patient will ambulate with contact guard assist for 50 feet with the least restrictive device. 5.  Patient will ascend/descend 3 stairs with 1-2 handrail(s) with minimal assistance/contact guard assist. 
  
 
1441 - Pt currently asleep and does not wake to touch or to name being called. Will f/u later in day. 1547 - Pt still asleep at this time. Will f/u at later date.

## 2019-02-05 NOTE — ROUTINE PROCESS
Bedside and Verbal shift change report given to TAINA Gonzales (oncoming nurse) by Kenya Schneider RN (offgoing nurse). Report included the following information SBAR, Kardex and MAR.

## 2019-02-05 NOTE — PROGRESS NOTES
OT order received and chart reviewed. 1505: Upon entering room, pt supine with HOB elevated with daughter present. Pt not seen for skilled OT as pt is currently sleeping and not waking up to touch or name being called at this time. 1602: Pt still sleeping. Will continue to let pt rest. 
 
Will f/u at a later time/date and when schedule allows.  
 
Thank you for this referral. 
Sommer Song MS, OTR/L

## 2019-02-05 NOTE — PROGRESS NOTES
Hospitalist Progress NotePatient: Chelsi Camarena MRN: [id number]  CSN: [id number] YOB: 1931  Age: 80 y.o. Sex: female DOA: 1/30/2019 LOS:  LOS: 6 days Patient lying in bed in NAD, has dementia, family at bedside Assessment/Plan Active Problems: 
  NSTEMI (non-ST elevated myocardial infarction) ([facility] (1/30/2019) Dementia (1/30/2019) Plan -  
 
1. NSTEMI - medical management , per cardio, off heparin drip 2. Anemia -monitor 3. Chronic combined CHF - lasix as needed 4. Baseline Dementia - continue namenda 5. H/o CKD 4 - nephro following 6. H/o AOCD - monitor hgb , stool hemoccult negative no over bleeding 7. H/o SLE - chronic - consult Dr dallas- left message for office staff 8-Legs - ? Abscesses, ? inflammatory nodules  - on abx, follow cx, surg following, ID following. Will consult Dr Iona Romano D/w family at bedside Palliative care is following D/w RN Full code Case discussed with:  []Patient  [x]Family  []Nursing  []Case Management DVT Prophylaxis:  []Lovenox  []Hep SQ  [x]SCDs  []Coumadin   []On Heparin gtt Objective:  
  
Visit Vitals /56 (BP 1 Location: Right arm, BP Patient Position: At rest) Pulse 72 Temp 97.2 °F (36.2 °C) Resp 18 Wt 83.6 kg (184 lb 4.9 oz) SpO2 97% Breastfeeding? No  
BMI 30.67 kg/m² Physical Exam: 
General:  Awake, alert Cardiovascular:  S1S2+, RRR Pulmonary:  CTA b/l GI:  Soft, BS+, NT, ND Extremities:  trace edema, ? inflammatory lesions noted on both legs. Pulses 1+ Lue swelling Intake and Output: 
Current Shift:  No intake/output data recorded. Last three shifts:  02/03 1901 - 02/05 0700 In: 402.1 [P.O.:120] Out: 950 [Urine:950] Recent Results (from the past 24 hour(s)) CBC WITH AUTOMATED DIFF Collection Time: 02/05/19 12:38 AM  
Result Value Ref Range WBC 9.2 4.6 - 13.2 K/uL  
 RBC 2.58 (L) 4.20 - 5.30 M/uL HGB 7.5 (L) 12.0 - 16.0 g/dL HCT 23.6 (L) 35.0 - 45.0 % MCV 91.5 74.0 - 97.0 FL  
 MCH 29.1 24.0 - 34.0 PG  
 MCHC 31.8 31.0 - 37.0 g/dL  
 RDW 17.5 (H) 11.6 - 14.5 % PLATELET 966 134 - 209 K/uL MPV 9.0 (L) 9.2 - 11.8 FL  
 NEUTROPHILS 66 40 - 73 % LYMPHOCYTES 23 21 - 52 % MONOCYTES 9 3 - 10 % EOSINOPHILS 2 0 - 5 % BASOPHILS 0 0 - 2 %  
 ABS. NEUTROPHILS 6.0 1.8 - 8.0 K/UL  
 ABS. LYMPHOCYTES 2.1 0.9 - 3.6 K/UL  
 ABS. MONOCYTES 0.9 0.05 - 1.2 K/UL  
 ABS. EOSINOPHILS 0.2 0.0 - 0.4 K/UL  
 ABS. BASOPHILS 0.0 0.0 - 0.1 K/UL  
 DF AUTOMATED METABOLIC PANEL, BASIC Collection Time: 02/05/19 12:38 AM  
Result Value Ref Range Sodium 141 136 - 145 mmol/L Potassium 5.2 3.5 - 5.5 mmol/L Chloride 107 100 - 108 mmol/L  
 CO2 28 21 - 32 mmol/L Anion gap 6 3.0 - 18 mmol/L Glucose 100 (H) 74 - 99 mg/dL BUN 70 (H) 7.0 - 18 MG/DL Creatinine 2.64 (H) 0.6 - 1.3 MG/DL  
 BUN/Creatinine ratio 27 (H) 12 - 20 GFR est AA 21 (L) >60 ml/min/1.73m2 GFR est non-AA 17 (L) >60 ml/min/1.73m2 Calcium 8.2 (L) 8.5 - 10.1 MG/DL  
OCCULT BLOOD, STOOL Collection Time: 02/05/19  3:00 AM  
Result Value Ref Range Occult blood, stool NEGATIVE  NEG    
HGB & HCT Collection Time: 02/05/19  1:00 PM  
Result Value Ref Range HGB 7.4 (L) 12.0 - 16.0 g/dL HCT 23.0 (L) 35.0 - 45.0 % Current Meds - Reviewed Lab Results Component Value Date/Time  Glucose 100 (H) 02/05/2019 12:38 AM  
 Glucose 98 02/04/2019 02:59 AM  
 Glucose 89 02/03/2019 07:49 AM  
 Glucose 98 01/31/2019 02:24 AM  
 Glucose 99 01/30/2019 01:15 PM  
 Glucose 88 03/17/2016 11:15 AM  
  
 
Link Mahoney MD 
2/5/2019,

## 2019-02-05 NOTE — CONSULTS
Consult Note Consult requested by: Agueda Garcia MD 
 
ADMIT DATE: 1/30/2019  CONSULT DATE: February 5, 2019 Admission diagnosis: SOB, lethargy Reason for Nephrology Consultation: ARIEL , volume status Assessment:  
1 acute kidney injury on chronic kidney disease stage IV: Baseline creatinine seems to be around 2 range, creatinine on admission was 1.8 which has gone up to around 2.4. Subjectively patient has continued to be short of breath. Patient has no peripheral edema but does seem to have JVD. She has low EF, needs to be on a diuretic. But has poor intake at the moment. We will give a small dose of Lasix and see the response. Will monitor closely and avoid further diuresis later today. Have discussed with cardiology. 2 elevated troponins, has been evaluated by cardiology, continue medical management 3 atrial fibrillation, on amiodarone continue per cardiology 4 combined systolic and diastolic heart failure, compensated 5 hyperkalemia , low k diet , if continues to trend up may us pateromer daily to keep down . Please call with questions America Husain MD [facility] Cell [phone] Pager: [phone] HPI: 
Patient is a 45-year-old female with past medical history of cardiomyopathy with systolic CHF, hypertension, dementia, history of lupus with recent multiple admissions in the hospital presented with shortness of breath, generalized weakness, changes in mental status. Patient is not able to provide much history and was obtained mostly from her son. Per patient's son when she was at rehab she was not getting any water pills, was getting swollen and progressive short of breath. After admission patient was noted to have elevated troponins, but was chest pain-free and therefore considering her age and morbidities medical management was recommended by cardiology.   Initially patient was diuresed but diuretics have been held. Patient does have CKD and her creatinine went up from 1.8-2.4 on admission and therefore nephrology was consulted. Past Medical History:  
Diagnosis Date  Acute on chronic diastolic heart failure ([facility])  Arthritis  Benign hypertensive heart disease without heart failure Better controlled, stable  Chronic diastolic heart failure ([facility]) Breathing and edema is improving  Congestive heart failure ([facility])  HTN (hypertension)  Hypercholesteremia  Lupus (systemic lupus erythematosus) ([facility]) 6/18/2014  
 followed by dr Laxmi Luo  Obesity, unspecified Patient has weight loss Discussed diet ad fluid restriction  Other and unspecified hyperlipidemia F/u per pmd  
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  
Procedure Laterality Date  HX HYSTERECTOMY  PACEMAKER PROCEDURE Social History Socioeconomic History  Marital status:  Spouse name: Not on file  Number of children: Not on file  Years of education: Not on file  Highest education level: Not on file Social Needs  Financial resource strain: Not on file  Food insecurity - worry: Not on file  Food insecurity - inability: Not on file  Transportation needs - medical: Not on file  Transportation needs - non-medical: Not on file Occupational History  Not on file Tobacco Use  Smoking status: Never Smoker  Smokeless tobacco: Never Used Substance and Sexual Activity  Alcohol use: No  
 Drug use: No  
 Sexual activity: Not on file Other Topics Concern  Not on file Social History Narrative  Not on file Family History Problem Relation Age of Onset  Arrhythmia Neg Hx  Asthma Neg Hx  Clotting Disorder Neg Hx  Fainting Neg Hx   
 Heart Attack Neg Hx  High Cholesterol Neg Hx  Pacemaker Neg Hx  Stroke Neg Hx No Known Allergies Home Medications:  
 
Medications Prior to Admission Medication Sig  
 menthol-zinc oxide (CALMOSEPTINE) 0.44-20.6 % oint Apply  to affected area.  raNITIdine (ZANTAC) 75 mg tab Take  by mouth two (2) times a day.  diclofenac (VOLTAREN) 1 % gel Apply  to affected area four (4) times daily.  furosemide (LASIX) 20 mg tablet 1 -2 tabs po daily prn for leg swelling or SOB  docusate sodium (COLACE) 100 mg capsule Take 1 Cap by mouth two (2) times a day for 90 days.  potassium chloride (K-DUR, KLOR-CON) 20 mEq tablet Take 1 Tab by mouth two (2) times a day.  carvedilol (COREG) 3.125 mg tablet Take 1 Tab by mouth every twelve (12) hours.  acetaminophen (TYLENOL ARTHRITIS PAIN) 650 mg TbER Take 1 Tab by mouth every eight (8) hours.  amiodarone (CORDARONE) 200 mg tablet TAKE ONE TABLET EVERY DAY (MAKE AN APPT)  isosorbide mononitrate ER (IMDUR) 60 mg CR tablet TAKE ONE TABLET EVERY MORNING  
 memantine (NAMENDA) 10 mg tablet  amitriptyline (ELAVIL) 25 mg tablet Take  by mouth nightly.  aspirin 81 mg tablet Take 81 mg by mouth daily.  predniSONE (DELTASONE) 10 mg tablet 1 tab po tid for 1 week , and 1 tab po bid until discontinued  famotidine (PEPCID) 20 mg tablet Take 1 Tab by mouth daily. Current Inpatient Medications:  
 
Current Facility-Administered Medications Medication Dose Route Frequency  0.9% sodium chloride infusion 250 mL  250 mL IntraVENous PRN  
 aspirin delayed-release tablet 81 mg  81 mg Oral DAILY  amitriptyline (ELAVIL) tablet 25 mg  25 mg Oral QHS  memantine (NAMENDA) tablet 10 mg  10 mg Oral DAILY  isosorbide mononitrate ER (IMDUR) tablet 60 mg  60 mg Oral DAILY  carvedilol (COREG) tablet 3.125 mg  3.125 mg Oral Q12H  
 docusate sodium (COLACE) capsule 100 mg  100 mg Oral BID  famotidine (PEPCID) tablet 20 mg  20 mg Oral DAILY  acetaminophen (TYLENOL) tablet 650 mg  650 mg Oral Q6H PRN  
  ondansetron (ZOFRAN) injection 4 mg  4 mg IntraVENous Q6H PRN  
 sodium chloride (NS) flush 5-40 mL  5-40 mL IntraVENous Q8H  
 sodium chloride (NS) flush 5-40 mL  5-40 mL IntraVENous PRN  
 amiodarone (CORDARONE) tablet 200 mg  200 mg Oral DAILY Review of Systems: No fever or chills. No sore throat. No cough or hemoptysis. No shortness of breath or chest pain. No orthopnea or paroxysmal nocturnal dyspnea. Good appetite. No nausea, vomiting, abdominal pain, melena or hematochezia. No constipation or diarrhea. No dysuria, no gross hematuria of voiding difficulties. No ankle swelling, no joint paints. No muscle aches. No skin changes. No dizziness or lightheadedness. No headaches. Physical Assessment:  
 
Vitals:  
 02/05/19 0400 02/05/19 0743 02/05/19 1052 02/05/19 1414 BP: 110/65 141/84 118/56 116/72 Pulse: 66 74 72 70 Resp: 20 16 18 16 Temp: 99.8 °F (37.7 °C) 98 °F (36.7 °C) 97.2 °F (36.2 °C) 97.1 °F (36.2 °C) SpO2: 98% 95% 97% 95% Weight: 83.6 kg (184 lb 4.9 oz) Last 3 Recorded Weights in this Encounter 02/03/19 0415 02/04/19 0539 02/05/19 0400 Weight: 81.3 kg (179 lb 4.8 oz) 81.2 kg (179 lb 0.2 oz) 83.6 kg (184 lb 4.9 oz) Admission weight: Weight: 81.6 kg (180 lb) (01/30/19 1829) Intake/Output Summary (Last 24 hours) at 2/5/2019 1615 Last data filed at 2/5/2019 3533 Gross per 24 hour Intake 120 ml Output 550 ml Net -430 ml  
 
Mmm, NAD Lungs: good air entry, clear to auscultation Cardiovascular system: S1, S2 wnl , JVD+ Abdomen: soft, non tender, non distended Extremities: no edema Data Review: 
 
Labs: Results:  
   
Chemistry Recent Labs 02/05/19 
0038 02/04/19 
3817 02/03/19 
8203 * 98 89  138 138  
K 5.2 5.2 4.7  105 105 CO2 28 28 30 BUN 70* 68* 66* CREA 2.64* 2.41* 2.12* CA 8.2* 8.2* 8.1* AGAP 6 5 3 BUCR 27* 28* 31* CBC w/Diff Recent Labs 02/05/19 
1300 02/05/19 
0038 02/04/19 
1225 WBC  --  9.2  --   
RBC  --  2.58*  --   
HGB 7.4* 7.5* 7.7* HCT 23.0* 23.6* 23.9*  
PLT  --  137  --   
GRANS  --  66  --   
LYMPH  --  23  --   
EOS  --  2  --   
  
  
Iron/Ferritin No results for input(s): IRON in the last 72 hours. No lab exists for component: TIBCCALC  
PTH/VIT D No results for input(s): PTH in the last 72 hours. No lab exists for component: VITD Almaz Azul MD 
2/5/2019 4:15 PM 
 
 
February 5, 2019

## 2019-02-05 NOTE — PROGRESS NOTES
Palliative Medicine Consult [facility]: [phone] ([phone]) DAYSI PORTILLO [facility]: [phone] [facility]: [phone] Patient Name: [place] YOB: 1931 Date of Initial Consult: 2/4/2019 Reason for Consult: goals of care Requesting Provider: John Churchill MD 
Primary Care Physician: Elinor Dye MD 
  
 SUMMARY:  
[place] Barry Parker is a 80y.o. year old with a past history of CHF, HTN, hypercholesterolemia, Lupus, dementia who was admitted on 1/30/2019 from home with a diagnosis of NSTEMI. Patient with three day history of progressive altered mental status, generalized weakness and shortness of breath. Current medical issues leading to Palliative Medicine involvement include: dementia, CHF and goals of care. 2/5/2019: Palliative care team including Xiomara Benjamin LCSW and myself met with patient's family in room. We have been waiting today for Price, the spouse of patient to come to discuss DNR/DNI. Adult children including Ronn Aguilar and Claudette are on board with the plan for no resuscitation in the event of arrest. They will talk with their father and bring him in either later today or tomorrow in hopes of him agreeing to the plan. They want to reassure him that DNR does not mean we withdraw treatments. PALLIATIVE DIAGNOSES:  
1. Advanced care planning 2. Dementia 3. CHF with preserved EF 31-35%. 4. NSTEMI 
 
 
 PLAN:  
 
2/5/2019:  Patient's spouse is the healthcare decision maker as patient is incapable of making decisions. She does not answer questions appropriately and is sleeping this afternoon. We have visited the room three times today to catch  here. Family states they will bring him either later today or tomorrow. Goals of care remains: FULL CODE. Previous notes shown below:  
2/4/2019 1.  Advanced care planning: Palliative care team including Xiomara Benjamin, LCSW, Esperanza Macdonald and myself met with patient and family in her room. , Wilder Jolley, was not present. Daughters: Yaritza Calvo and Jose VALENTIN, son, Clarence Dougherty were present. Patient is not oriented, did not recognize her own name and was unable to identify her children. Children were very concerned we not introduce topic of hospice to them or to Price. Explained palliative medicine. Patient has been declining over the past six months but she is able to feed herself and was walking. Discussed progression of disease with CHF and dementia. Explained overlapping diseases which effect different organs of the body and impact on survival. Family members present were supportive of DNR/DNI in event of arrest. They will bring the , Wilder Jolley, to the hospital tomorrow to discuss further. Goals of care remain FULL CODE. 2.  Dementia: CT head: no acute changes. Currently on namenda which son states is not helping much. Family admits to patient being agitated at times. She is currently on amitriptyline at hs. Consider adding haldol PRN. Will need additional assistance at home with health aides. 3.  CHF combined systolic and diastolic with preserved EF 31-35%. NT-pro-BNP on admission: 4856. Lasix currently on hold secondary to hypotension. Cardiology following. 4.  NSTEMI: Elevated troponin on admission 3.03, now trending down. On asa, bb. Consider adding statin. Medically managed per cardiology. 5.  Initial consult note routed to primary continuity provider 6. Communicated plan of care with: Palliative IDT 
 
GOALS OF CARE: 
Patient/Health Care Proxy Stated Goals: Prolong life TREATMENT PREFERENCES:  
Code Status: Full Code Advance Care Planning: 
Advance Care Planning 1/31/2019 Patient's Healthcare Decision Maker is: Legal Next of Kin Primary Decision Maker Name -  
Primary Decision Maker Phone Number -  
Primary Decision Maker Relationship to Patient - Secondary Decision Maker Name -  
 Secondary Decision Maker Phone Number - Secondary Decision Maker Relationship to Patient -  
Confirm Advance Directive Yes, on file Patient Would Like to Complete Advance Directive - Does the patient have other document types Power Extended [employer] Medical Interventions: Full interventions Other: As far as possible, the palliative care team has discussed with patient / health care proxy about goals of care / treatment preferences for patient. HISTORY:  
 
History obtained from: patient chart CHIEF COMPLAINT: altered mental status HPI/SUBJECTIVE: The patient is:  
[] Verbal and participatory [x] Non-participatory due to: 
Patient with dementia and does not recognize family members, nor answer to her name. Unable to assess. Clinical Pain Assessment (nonverbal scale for nonverbal patients): Clinical Pain Assessment Severity: 0 Duration: for how long has pt been experiencing pain (e.g., 2 days, 1 month, years) Frequency: how often pain is an issue (e.g., several times per day, once every few days, constant) FUNCTIONAL ASSESSMENT:  
 
Palliative Performance Scale (PPS): PPS: 40 ECOG 
ECOG Status : Limited self-care PSYCHOSOCIAL/SPIRITUAL SCREENING:  
  
Any spiritual / Jain concerns: 
[] Yes /  [x] No 
 
Caregiver Burnout: 
[] Yes /  [x] No /  [] No Caregiver Present Anticipatory grief assessment:  
[x] Normal  / [] Maladaptive REVIEW OF SYSTEMS:  
 
Positive and pertinent negative findings in ROS are noted above in HPI. The following systems were [] reviewed / [x] unable to be reviewed as noted in HPI Other findings are noted below. Systems: constitutional, ears/nose/mouth/throat, respiratory, gastrointestinal, genitourinary, musculoskeletal, integumentary, neurologic, psychiatric, endocrine. Positive findings noted below. Modified ESAS Completed by: provider Fatigue: 5 Pain: 0 Stool Occurrence(s): 1 PHYSICAL EXAM:  
 
Wt Readings from Last 3 Encounters:  
02/05/19 83.6 kg (184 lb 4.9 oz) 01/21/19 84.8 kg (187 lb) 01/08/19 69.6 kg (153 lb 7 oz) Blood pressure 118/56, pulse 72, temperature 97.2 °F (36.2 °C), resp. rate 18, weight 83.6 kg (184 lb 4.9 oz), SpO2 97 %, not currently breastfeeding. Pain: 
Pain Scale 1: Visual 
Pain Intensity 1: 0 Constitutional: Elderly, AA female in no apparent distress. Eyes: pupils equal, anicteric ENMT: no nasal discharge, moist mucous membranes Cardiovascular: Anasarca 2+ Respiratory: breathing unlabored;  symmetric Musculoskeletal: no deformity, no tenderness to palpation Skin: warm, dry ; multiple raised cystic like masses on lower extremities with erythema. Neurologic: moving all extremities; disoriented X 4. HISTORY:  
 
Active Problems: 
  NSTEMI (non-ST elevated myocardial infarction) ([address] (1/30/2019) Dementia (1/30/2019) Past Medical History:  
Diagnosis Date  Acute on chronic diastolic heart failure ([address]  Arthritis  Benign hypertensive heart disease without heart failure Better controlled, stable  Chronic diastolic heart failure ([address] Breathing and edema is improving  Congestive heart failure ([address]  HTN (hypertension)  Hypercholesteremia  Lupus (systemic lupus erythematosus) ([address] 6/18/2014  
 followed by dr Sotelo Gain  Obesity, unspecified Patient has weight loss Discussed diet ad fluid restriction  Other and unspecified hyperlipidemia F/u per pmd  
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  
Procedure Laterality Date  HX HYSTERECTOMY  PACEMAKER PROCEDURE Family History Problem Relation Age of Onset  Arrhythmia Neg Hx  Asthma Neg Hx  Clotting Disorder Neg Hx  Fainting Neg Hx   
 Heart Attack Neg Hx  High Cholesterol Neg Hx  Pacemaker Neg Hx  Stroke Neg Hx History reviewed, no pertinent family history. Social History Tobacco Use  Smoking status: Never Smoker  Smokeless tobacco: Never Used Substance Use Topics  Alcohol use: No  
 
No Known Allergies Current Facility-Administered Medications Medication Dose Route Frequency  0.9% sodium chloride infusion 250 mL  250 mL IntraVENous PRN  
 aspirin delayed-release tablet 81 mg  81 mg Oral DAILY  amitriptyline (ELAVIL) tablet 25 mg  25 mg Oral QHS  memantine (NAMENDA) tablet 10 mg  10 mg Oral DAILY  isosorbide mononitrate ER (IMDUR) tablet 60 mg  60 mg Oral DAILY  carvedilol (COREG) tablet 3.125 mg  3.125 mg Oral Q12H  
 docusate sodium (COLACE) capsule 100 mg  100 mg Oral BID  famotidine (PEPCID) tablet 20 mg  20 mg Oral DAILY  acetaminophen (TYLENOL) tablet 650 mg  650 mg Oral Q6H PRN  
 ondansetron (ZOFRAN) injection 4 mg  4 mg IntraVENous Q6H PRN  
 sodium chloride (NS) flush 5-40 mL  5-40 mL IntraVENous Q8H  
 sodium chloride (NS) flush 5-40 mL  5-40 mL IntraVENous PRN  
 amiodarone (CORDARONE) tablet 200 mg  200 mg Oral DAILY  
 
 
 LAB AND IMAGING FINDINGS:  
 
Lab Results Component Value Date/Time WBC 9.2 02/05/2019 12:38 AM  
 HGB 7.4 (L) 02/05/2019 01:00 PM  
 PLATELET 488 53/10/0171 12:38 AM  
 
Lab Results Component Value Date/Time Sodium 141 02/05/2019 12:38 AM  
 Potassium 5.2 02/05/2019 12:38 AM  
 Chloride 107 02/05/2019 12:38 AM  
 CO2 28 02/05/2019 12:38 AM  
 BUN 70 (H) 02/05/2019 12:38 AM  
 Creatinine 2.64 (H) 02/05/2019 12:38 AM  
 Calcium 8.2 (L) 02/05/2019 12:38 AM  
 Magnesium 2.4 12/30/2018 05:55 AM  
 Phosphorus 3.4 12/28/2018 09:25 AM  
  
Lab Results Component Value Date/Time AST (SGOT) 26 01/31/2019 02:24 AM  
 Alk. phosphatase 66 01/31/2019 02:24 AM  
 Protein, total 5.5 (L) 01/31/2019 02:24 AM  
 Albumin 2.6 (L) 01/31/2019 02:24 AM  
 Globulin 2.9 01/31/2019 02:24 AM  
 
Lab Results Component Value Date/Time INR 1.2 02/01/2019 05:20 PM  
 Prothrombin time 14.9 02/01/2019 05:20 PM  
 aPTT 77.3 (H) 02/02/2019 01:01 AM  
  
No results found for: IRON, FE, TIBC, IBCT, PSAT, FERR No results found for: PH, PCO2, PO2 No components found for: [place] Lab Results Component Value Date/Time  01/30/2019 06:12 PM  
 CK - MB 7.5 (H) 01/30/2019 06:12 PM  
  
 
   
 
Total time: 15 minutes Counseling / coordination time, spent as noted above: 10 minutes > 50% counseling / coordination: family Prolonged service was provided for  []30 min   []75 min in face to face time in the presence of the patient, spent as noted above. Time Start:  
Time End:  
Note: this can only be billed with 74164 (initial) or 93736 (follow up). If multiple start / stop times, list each separately.

## 2019-02-05 NOTE — PROGRESS NOTES
[facility] 
 
 
CARDIOLOGY PROGRESS NOTE 
RECS:1. Elevated troponin- the patient is chest pain free. Her SOB is improving  troponin indy trending-  continue medical management for CAD.  asa,bb,statin . No further cardiac w/u needed at this time 2. Chronic combined systolic and diastolic heart failure- appears compensated today clinically. Hold Lasix as discussed with renal and watch clinically 3. Atrial fibrillation - currently paced on amiodarone. Not on anticoagulation due to high bleeding risk. Continue asa. 4. CKD- monitor kandy indices with diuresis consider renal consult 5. Hx of symptomatic bradycardia- S/P PPM  1796 Dr Roseline Cho- EP recommended  upgrade to biventricular pacemaker as an outpatient.  Patient's family refused   
10. Anemia: H&H dropping. Check stool guaiacs and may need to hold aspirin also if stool guaiacs are positive. 7. Lupus- follow by Dr. Radha Morataya as out patient 8. Hypertension- with normal low bp. Continue low dose Coreg  will monitor 9. Dementia. 10. CODE STATUS: Consider palliative care consult. 
  
 
Discussed with patient's family members at length by Dr. Anne Fernandez yesterday- no aggressive w/u per family. Continue supportive care Will f/u peripherally Call us as needed ASSESSMENT: 
Hospital Problems  Date Reviewed: 1/21/2019 Codes Class Noted POA  
 NSTEMI (non-ST elevated myocardial infarction) [address] [address] ICD-10-CM: I21.4 ICD-9-CM: 410.70  1/30/2019 Unknown Dementia ICD-10-CM: F03.90 ICD-9-CM: 294.20  1/30/2019 Unknown SUBJECTIVE: 
No no significant verbal communication. OBJECTIVE: 
 
VS:  
Visit Vitals /56 (BP 1 Location: Right arm, BP Patient Position: At rest) Pulse 72 Temp 97.2 °F (36.2 °C) Resp 18 Wt 83.6 kg (184 lb 4.9 oz) SpO2 97% Breastfeeding? No  
BMI 30.67 kg/m² Intake/Output Summary (Last 24 hours) at 2/5/2019 5687 Last data filed at 2/5/2019 4423 Gross per 24 hour Intake 120 ml Output 550 ml Net -430 ml  
 
TELE: paced General: Moaning when touched, lying flat HENT: Normocephalic, atraumatic. Normal external eye. Neck :  no bruit, increased JVP Cardiac:  regular rate and rhythm, systolic murmur: early systolic 3/6, crescendo at 2nd left intercostal space, at 2nd right intercostal space: Second systolic murmur which is along mid to late systolic murmur at apex grade 3/6 Chest/Lungs:chest clear, no wheezing, rales, normal symmetric air entry Abdomen: Soft, nontender, no masses Extremities:  No c/c/edema, peripheral pulses present Labs: Results:  
   
Chemistry Recent Labs 02/05/19 
0038 02/04/19 
3331 02/03/19 
1728 * 98 89  138 138  
K 5.2 5.2 4.7  105 105 CO2 28 28 30 BUN 70* 68* 66* CREA 2.64* 2.41* 2.12* CA 8.2* 8.2* 8.1* AGAP 6 5 3 BUCR 27* 28* 31* CBC w/Diff Recent Labs 02/05/19 
0038 02/04/19 
1225 02/04/19 
2263 WBC 9.2  --   --   
RBC 2.58*  --   --   
HGB 7.5* 7.7* 7.6* HCT 23.6* 23.9* 22.9*  
  --   --   
GRANS 66  --   --   
LYMPH 23  --   --   
EOS 2  --   --   
  
Cardiac Enzymes No results for input(s): CPK, CKND1, YAN in the last 72 hours. No lab exists for component: [place] [facility] Coagulation No results for input(s): PTP, INR, APTT in the last 72 hours. No lab exists for component: INREXT, INREXT Lipid Panel Lab Results Component Value Date/Time Cholesterol, total 142 01/31/2019 02:24 AM  
 HDL Cholesterol 68 (H) 01/31/2019 02:24 AM  
 LDL, calculated 66.4 01/31/2019 02:24 AM  
 VLDL, calculated 7.6 01/31/2019 02:24 AM  
 Triglyceride 38 01/31/2019 02:24 AM  
 CHOL/HDL Ratio 2.1 01/31/2019 02:24 AM  
  
BNP No results for input(s): BNPP in the last 72 hours. Liver Enzymes No results for input(s): TP, ALB, TBIL, AP, SGOT, GPT in the last 72 hours. No lab exists for component: DBIL Digoxin Thyroid Studies Lab Results Component Value Date/Time  TSH 8.86 (H) 11/15/2015 01:45 PM  
    
 
 
 
Yuniel Hurley MD

## 2019-02-05 NOTE — CONSULTS
Consult noted for hyperkalemia , CKD4 , cardiorenal syndrome , Patient seen and examined , ordered placed, A/P as below Please call with questions, 
 
Wanda Yanez MD FASN Cell [phone] Pager: [phone]

## 2019-02-05 NOTE — PROGRESS NOTES
GENERAL SURGERY 
PROGRESS NOTE Name: Eduarda Potts MRN: [id number] : 1931 Hospital: [facility] Date: 2019 Admission Date: 2019 12:44 PM  
 
Hospital Day: 7 Subjective: 
Patient visited with son at bedside. Patient unable to meaningfully answer questions about discomfort or pain. Son consistently talks about Lasix and when she will receive it again. I explained that surgery would like to find a resolution to these lesions nonsurgically. I also explained that cultures have been sent and when we receive results, we will be able to better understand the source of these lesions and tailor treatment to the identified source. The source can be inflammatory in nature as well. Objective: 
Vitals:  
 19 2000 19 0000 19 0400 19 0973 BP: 107/52 111/57 110/65 141/84 Pulse: 68 74 66 74 Resp: 18 20 20 16 Temp: 99 °F (37.2 °C) 99.4 °F (37.4 °C) 99.8 °F (37.7 °C) 98 °F (36.7 °C) SpO2: 95% 98% 98% 95% Weight:   83.6 kg (184 lb 4.9 oz) Date 19 - 19 6389 19 - 19 0511 Shift 9360-0773 4616-8160 24 Hour Total 4537-6478 9971-0117 24 Hour Total  
INTAKE  
P.O.  120 120     
  P. O.  120 120 Shift Total(mL/kg)  120(1.4) 120(1.4) OUTPUT Urine(mL/kg/hr)  550(0.5) 550(0.3) Urine Voided  550 550 Shift Total(mL/kg)  550(6.6) 550(6.6) NET  -430 -430 Weight (kg) 81.2 83.6 83.6 83.6 83.6 83.6 Physical Exam:  
 General: awake and alert, in no acute distress Skin: right, medial, lower leg with less indurated lesion. Also less erythematous today. Left, medial and lateral lesions with less erythema and less inflammation today. Medial lesion has an area of scabbing. No drainage to any lesion. Labs: 
Recent Results (from the past 24 hour(s)) HGB & HCT Collection Time: 19 12:25 PM  
Result Value Ref Range HGB 7.7 (L) 12.0 - 16.0 g/dL HCT 23.9 (L) 35.0 - 45.0 % CBC WITH AUTOMATED DIFF Collection Time: 02/05/19 12:38 AM  
Result Value Ref Range WBC 9.2 4.6 - 13.2 K/uL  
 RBC 2.58 (L) 4.20 - 5.30 M/uL HGB 7.5 (L) 12.0 - 16.0 g/dL HCT 23.6 (L) 35.0 - 45.0 % MCV 91.5 74.0 - 97.0 FL  
 MCH 29.1 24.0 - 34.0 PG  
 MCHC 31.8 31.0 - 37.0 g/dL  
 RDW 17.5 (H) 11.6 - 14.5 % PLATELET 333 923 - 654 K/uL MPV 9.0 (L) 9.2 - 11.8 FL  
 NEUTROPHILS 66 40 - 73 % LYMPHOCYTES 23 21 - 52 % MONOCYTES 9 3 - 10 % EOSINOPHILS 2 0 - 5 % BASOPHILS 0 0 - 2 %  
 ABS. NEUTROPHILS 6.0 1.8 - 8.0 K/UL  
 ABS. LYMPHOCYTES 2.1 0.9 - 3.6 K/UL  
 ABS. MONOCYTES 0.9 0.05 - 1.2 K/UL  
 ABS. EOSINOPHILS 0.2 0.0 - 0.4 K/UL  
 ABS. BASOPHILS 0.0 0.0 - 0.1 K/UL  
 DF AUTOMATED METABOLIC PANEL, BASIC Collection Time: 02/05/19 12:38 AM  
Result Value Ref Range Sodium 141 136 - 145 mmol/L Potassium 5.2 3.5 - 5.5 mmol/L Chloride 107 100 - 108 mmol/L  
 CO2 28 21 - 32 mmol/L Anion gap 6 3.0 - 18 mmol/L Glucose 100 (H) 74 - 99 mg/dL BUN 70 (H) 7.0 - 18 MG/DL Creatinine 2.64 (H) 0.6 - 1.3 MG/DL  
 BUN/Creatinine ratio 27 (H) 12 - 20 GFR est AA 21 (L) >60 ml/min/1.73m2 GFR est non-AA 17 (L) >60 ml/min/1.73m2 Calcium 8.2 (L) 8.5 - 10.1 MG/DL  
OCCULT BLOOD, STOOL Collection Time: 02/05/19  3:00 AM  
Result Value Ref Range Occult blood, stool NEGATIVE  NEG All Micro Results Procedure Component Value Units Date/Time CULTURE, BLOOD [[id number]] Collected:  02/02/19 8409 Order Status:  Completed Specimen:  Whole Blood Updated:  02/05/19 6852 Special Requests: NO SPECIAL REQUESTS Culture result: NO GROWTH 3 DAYS     
 CULTURE, BLOOD [[id number]] Collected:  02/02/19 1212 Order Status:  Completed Specimen:  Blood Updated:  02/05/19 3866 Special Requests: NO SPECIAL REQUESTS Culture result: NO GROWTH 3 DAYS     
 CULTURE, BLOOD [[id number]] Collected:  02/02/19 1842 Order Status:  Completed Specimen:  Whole Blood Updated:  02/05/19 1332 Special Requests: NO SPECIAL REQUESTS Culture result: NO GROWTH 3 DAYS Current Medications: 
Current Facility-Administered Medications Medication Dose Route Frequency Provider Last Rate Last Dose  piperacillin-tazobactam (ZOSYN) 2.25 g in 0.9% sodium chloride (MBP/ADV) 50 mL MBP  2.25 g IntraVENous Q8H Js Tyler  mL/hr at 02/05/19 0610 2.25 g at 02/05/19 0610  
 vancomycin (VANCOCIN) 1000 mg in  ml infusion  1,000 mg IntraVENous Q48H Js Tyler .7 mL/hr at 02/04/19 1803 1,000 mg at 02/04/19 1803  
 0.9% sodium chloride infusion 250 mL  250 mL IntraVENous PRN Luis Carlos Braden MD   Stopped at 02/03/19 2345  aspirin delayed-release tablet 81 mg  81 mg Oral DAILY Hua Moraes MD   81 mg at 02/05/19 0941  
 amitriptyline (ELAVIL) tablet 25 mg  25 mg Oral QHS Hua Moraes MD   25 mg at 02/04/19 2137  memantine (NAMENDA) tablet 10 mg  10 mg Oral DAILY Hua Moraes MD   10 mg at 02/05/19 0941  
 isosorbide mononitrate ER (IMDUR) tablet 60 mg  60 mg Oral DAILY Hua Moraes MD   60 mg at 02/05/19 9894  carvedilol (COREG) tablet 3.125 mg  3.125 mg Oral Q12H Hua Moraes MD   3.125 mg at 02/05/19 7112  docusate sodium (COLACE) capsule 100 mg  100 mg Oral BID Hua Moraes MD   100 mg at 02/05/19 1135  famotidine (PEPCID) tablet 20 mg  20 mg Oral DAILY Hua Moraes MD   20 mg at 02/05/19 7307  acetaminophen (TYLENOL) tablet 650 mg  650 mg Oral Q6H PRN Hua Moraes MD   650 mg at 02/03/19 2215  ondansetron (ZOFRAN) injection 4 mg  4 mg IntraVENous Q6H PRN Hua Moraes MD      
 sodium chloride (NS) flush 5-40 mL  5-40 mL IntraVENous Q8H Hua Moraes MD   10 mL at 02/05/19 1866  sodium chloride (NS) flush 5-40 mL  5-40 mL IntraVENous PRN Hua Moraes MD      
  amiodarone (CORDARONE) tablet 200 mg  200 mg Oral DAILY Delaney Camargo MD   200 mg at 02/05/19 4374 Chart and notes reviewed. Data reviewed. I have evaluated and examined the patient. IMPRESSION:  
· Patient with lesions (x3) of BLE of unknown etiology, cultures showing no growth after 3 days. Lesions seem to be improving with current management. PLAN:/DISCUSION:  
· Please keep Abd's in place with kerlix to secure when using sequentials to prevent friction to indurated lesions · ID consulted · No surgical plans at this time EVELYN Trujillo

## 2019-02-06 ENCOUNTER — APPOINTMENT (OUTPATIENT)
Dept: ULTRASOUND IMAGING | Age: 84
DRG: 280 | End: 2019-02-06
Attending: INTERNAL MEDICINE
Payer: MEDICARE

## 2019-02-06 PROBLEM — L98.9 SKIN LESIONS: Status: ACTIVE | Noted: 2019-02-06

## 2019-02-06 LAB
ALBUMIN SERPL-MCNC: 2.3 G/DL (ref 3.4–5)
ALBUMIN/GLOB SERPL: 0.8 {RATIO} (ref 0.8–1.7)
ALP SERPL-CCNC: 64 U/L (ref 45–117)
ALT SERPL-CCNC: 15 U/L (ref 13–56)
ANION GAP SERPL CALC-SCNC: 7 MMOL/L (ref 3–18)
ARTERIAL PATENCY WRIST A: YES
AST SERPL-CCNC: 16 U/L (ref 15–37)
BASE EXCESS BLDV CALC-SCNC: 1 MMOL/L
BASOPHILS # BLD: 0 K/UL (ref 0–0.1)
BASOPHILS NFR BLD: 0 % (ref 0–2)
BDY SITE: NORMAL
BILIRUB SERPL-MCNC: 1 MG/DL (ref 0.2–1)
BUN SERPL-MCNC: 72 MG/DL (ref 7–18)
BUN/CREAT SERPL: 31 (ref 12–20)
CALCIUM SERPL-MCNC: 8.3 MG/DL (ref 8.5–10.1)
CHLORIDE SERPL-SCNC: 107 MMOL/L (ref 100–108)
CO2 SERPL-SCNC: 27 MMOL/L (ref 21–32)
CREAT SERPL-MCNC: 2.32 MG/DL (ref 0.6–1.3)
DIFFERENTIAL METHOD BLD: ABNORMAL
EOSINOPHIL # BLD: 0 K/UL (ref 0–0.4)
EOSINOPHIL NFR BLD: 0 % (ref 0–5)
ERYTHROCYTE [DISTWIDTH] IN BLOOD BY AUTOMATED COUNT: 17 % (ref 11.6–14.5)
ERYTHROCYTE [SEDIMENTATION RATE] IN BLOOD: 67 MM/HR (ref 0–30)
GAS FLOW.O2 O2 DELIVERY SYS: NORMAL L/MIN
GLOBULIN SER CALC-MCNC: 2.9 G/DL (ref 2–4)
GLUCOSE BLD STRIP.AUTO-MCNC: 138 MG/DL (ref 70–110)
GLUCOSE SERPL-MCNC: 115 MG/DL (ref 74–99)
HCO3 BLDV-SCNC: 26.5 MMOL/L (ref 23–28)
HCT VFR BLD AUTO: 22.9 % (ref 35–45)
HCT VFR BLD AUTO: 23.4 % (ref 35–45)
HCV AB SER IA-ACNC: 0.02 INDEX
HCV AB SERPL QL IA: NEGATIVE
HCV COMMENT,HCGAC: NORMAL
HGB BLD-MCNC: 7.4 G/DL (ref 12–16)
HGB BLD-MCNC: 7.5 G/DL (ref 12–16)
LYMPHOCYTES # BLD: 0.7 K/UL (ref 0.9–3.6)
LYMPHOCYTES NFR BLD: 11 % (ref 21–52)
MCH RBC QN AUTO: 29.8 PG (ref 24–34)
MCHC RBC AUTO-ENTMCNC: 32.8 G/DL (ref 31–37)
MCV RBC AUTO: 90.9 FL (ref 74–97)
MONOCYTES # BLD: 0.2 K/UL (ref 0.05–1.2)
MONOCYTES NFR BLD: 3 % (ref 3–10)
NEUTS SEG # BLD: 5.4 K/UL (ref 1.8–8)
NEUTS SEG NFR BLD: 86 % (ref 40–73)
O2/TOTAL GAS SETTING VFR VENT: 21 %
PCO2 BLDV: 46.3 MMHG (ref 41–51)
PH BLDV: 7.37 [PH] (ref 7.32–7.42)
PLATELET # BLD AUTO: 160 K/UL (ref 135–420)
PMV BLD AUTO: 8.8 FL (ref 9.2–11.8)
PO2 BLDV: 38 MMHG (ref 25–40)
POTASSIUM SERPL-SCNC: 4.8 MMOL/L (ref 3.5–5.5)
PROT SERPL-MCNC: 5.2 G/DL (ref 6.4–8.2)
RBC # BLD AUTO: 2.52 M/UL (ref 4.2–5.3)
SAO2 % BLDV: 70 % (ref 65–88)
SERVICE CMNT-IMP: NORMAL
SODIUM SERPL-SCNC: 141 MMOL/L (ref 136–145)
SPECIMEN TYPE: NORMAL
TOTAL RESP. RATE, ITRR: 18
URATE SERPL-MCNC: 8.7 MG/DL (ref 2.6–7.2)
WBC # BLD AUTO: 6.3 K/UL (ref 4.6–13.2)

## 2019-02-06 PROCEDURE — 36415 COLL VENOUS BLD VENIPUNCTURE: CPT

## 2019-02-06 PROCEDURE — 80053 COMPREHEN METABOLIC PANEL: CPT

## 2019-02-06 PROCEDURE — 65660000000 HC RM CCU STEPDOWN

## 2019-02-06 PROCEDURE — 74011000258 HC RX REV CODE- 258: Performed by: INTERNAL MEDICINE

## 2019-02-06 PROCEDURE — 74011250636 HC RX REV CODE- 250/636: Performed by: SPECIALIST

## 2019-02-06 PROCEDURE — 83520 IMMUNOASSAY QUANT NOS NONAB: CPT

## 2019-02-06 PROCEDURE — 76882 US LMTD JT/FCL EVL NVASC XTR: CPT

## 2019-02-06 PROCEDURE — 85613 RUSSELL VIPER VENOM DILUTED: CPT

## 2019-02-06 PROCEDURE — 86803 HEPATITIS C AB TEST: CPT

## 2019-02-06 PROCEDURE — 85018 HEMOGLOBIN: CPT

## 2019-02-06 PROCEDURE — 84550 ASSAY OF BLOOD/URIC ACID: CPT

## 2019-02-06 PROCEDURE — 85652 RBC SED RATE AUTOMATED: CPT

## 2019-02-06 PROCEDURE — 85025 COMPLETE CBC W/AUTO DIFF WBC: CPT

## 2019-02-06 PROCEDURE — 86235 NUCLEAR ANTIGEN ANTIBODY: CPT

## 2019-02-06 PROCEDURE — 74011250637 HC RX REV CODE- 250/637: Performed by: HOSPITALIST

## 2019-02-06 PROCEDURE — 86038 ANTINUCLEAR ANTIBODIES: CPT

## 2019-02-06 PROCEDURE — 82962 GLUCOSE BLOOD TEST: CPT

## 2019-02-06 PROCEDURE — 86225 DNA ANTIBODY NATIVE: CPT

## 2019-02-06 PROCEDURE — 82803 BLOOD GASES ANY COMBINATION: CPT

## 2019-02-06 RX ORDER — DEXTROSE MONOHYDRATE AND SODIUM CHLORIDE 5; .45 G/100ML; G/100ML
50 INJECTION, SOLUTION INTRAVENOUS CONTINUOUS
Status: DISCONTINUED | OUTPATIENT
Start: 2019-02-06 | End: 2019-02-08

## 2019-02-06 RX ADMIN — MEMANTINE 10 MG: 10 TABLET ORAL at 09:51

## 2019-02-06 RX ADMIN — METHYLPREDNISOLONE SODIUM SUCCINATE 40 MG: 40 INJECTION, POWDER, FOR SOLUTION INTRAMUSCULAR; INTRAVENOUS at 21:22

## 2019-02-06 RX ADMIN — CARVEDILOL 3.12 MG: 3.12 TABLET, FILM COATED ORAL at 21:22

## 2019-02-06 RX ADMIN — ISOSORBIDE MONONITRATE 60 MG: 60 TABLET, EXTENDED RELEASE ORAL at 09:51

## 2019-02-06 RX ADMIN — FAMOTIDINE 20 MG: 20 TABLET ORAL at 10:15

## 2019-02-06 RX ADMIN — METHYLPREDNISOLONE SODIUM SUCCINATE 40 MG: 40 INJECTION, POWDER, FOR SOLUTION INTRAMUSCULAR; INTRAVENOUS at 09:46

## 2019-02-06 RX ADMIN — Medication 10 ML: at 07:10

## 2019-02-06 RX ADMIN — AMITRIPTYLINE HYDROCHLORIDE 25 MG: 50 TABLET, FILM COATED ORAL at 21:22

## 2019-02-06 RX ADMIN — DOCUSATE SODIUM 100 MG: 100 CAPSULE, LIQUID FILLED ORAL at 10:13

## 2019-02-06 RX ADMIN — CARVEDILOL 3.12 MG: 3.12 TABLET, FILM COATED ORAL at 09:51

## 2019-02-06 RX ADMIN — Medication 10 ML: at 16:16

## 2019-02-06 RX ADMIN — Medication 10 ML: at 21:48

## 2019-02-06 RX ADMIN — ASPIRIN 81 MG: 81 TABLET, COATED ORAL at 09:51

## 2019-02-06 RX ADMIN — DEXTROSE MONOHYDRATE AND SODIUM CHLORIDE 50 ML/HR: 5; .45 INJECTION, SOLUTION INTRAVENOUS at 14:02

## 2019-02-06 RX ADMIN — Medication 10 ML: at 09:46

## 2019-02-06 RX ADMIN — AMIODARONE HYDROCHLORIDE 200 MG: 200 TABLET ORAL at 10:12

## 2019-02-06 RX ADMIN — DOCUSATE SODIUM 100 MG: 100 CAPSULE, LIQUID FILLED ORAL at 18:02

## 2019-02-06 NOTE — CONSULTS
[address] [facility] Name:  Glenn Shipley 
MR#:   [id number] :  [id number] ACCOUNT #:  [de-identified] DATE OF SERVICE:  2019 ATTENDING PHYSICIAN:  Didier Crisostomo MD 
 
I was asked to see her in consultation. HISTORY OF PRESENT ILLNESS:  The patient is a 66-year-old -American female with very complex medical history 
including longstanding systemic lupus erythematosus, 
cardiomyopathy, systolic CHF, dementia, hypertension and 
recurrent UTIs recently. Recently, she has had a repeat 
hospitalization and she is in the near total care of her 
family and can do very little for herself. I have seen her 
for a number of years for systemic lupus erythematosus and 
she did very well on regimen of Plaquenil 200 mg twice 
daily with intermittent doses of prednisone and an NSAID on 
occasion. Most recently, she has been declining in health, requiring 
more frequent office visits; however, the serology for her 
systemic lupus erythematosus was not much changed recently 
and she was having other health issue that was emerging at 
that time. In any event, at this time, she is having sores 
on her lower extremities and the question has been raised 
whether or not they are related to her systemic lupus 
erythematosus. During the course of this admission, she has 
been seen by Nephrology, Cardiology as well as the 
hospitalist team and they have addressed her health issues 
as warranted per their subspecialty. ALLERGIES:  NO KNOWN DRUG ALLERGIES. PAST MEDICAL HISTORY:  She has history of hypertension, 
hyperlipidemia, tricuspid valve disorder, non-rheumatic and 
anasarca. Has a history of chronic kidney disease, history 
of anemia and most recently non-ST elevated MI, which was 
managed by Cardiology.  
 
MEDICATIONS:  Current medication at home list includes the 
following: Prednisone 10 mg daily, hydroxychloroquine 200 mg 
 every 12 hours, Isosorbide 60 mg CR one daily, memantine 10 
mg daily, Elavil 25 mg p.o. at bedtime, Zantac 75 mg two 
times a day, Voltaren gel 1% apply to the area up to four 
times daily, potassium chloride 20 mEq daily. Since her 
admission, Cordarone 200 mg daily has been added to her 
regimen. SOCIAL HISTORY:  [facility] She resides 
at home with her  and son. Social history and habits 
negative for ETOH, illicit drugs and tobacco. 
 
REVIEW OF SYSTEMS:  The patient is a bit somnolent at this 
time and does not provide review of systems but from the 
medical record, it appears that she has no complaints at 
this time of shortness of breath, chest pain or headaches. PHYSICAL EXAMINATION: 
GENERAL:  On examination, she is resting in bed. When I 
touched her to arouse her, she seems to be having some 
discomfort. VITAL SIGNS:  Temp 97.1, pulse 70 to 74, respiratory rate 18, blood pressure 141/84. O2 saturation 95% to 97%. HEENT:  Her eyes are PERRLA, EOMI. Ears, nose and throat 
appear clear. NECK:  Supple. LUNGS:  Few scattered rhonchi. HEART:  Regular rate and rhythm at this time. ABDOMEN:  Bowel sounds are active, slightly tender to 
palpation but no rebound tenderness. GENITOURINARY/RECTAL:  Examination was deferred. MUSCULOSKELETAL:  She has diffuse palpable tenderness 
involving peripheral joints. L4-S1 slightly tender to 
palpation as well. She has peripheral edema of lower 
extremities and gait is not evaluable. NEUROLOGIC:  Cranial nerves II through XII appears to be 
intact. SKIN:  Lower extremities have palpable nodules that are 
tender to palpation and they seem to be  
erythematous in nature and she tends to withdraw whenever we 
try to palpate these areas. LABORATORY DATA:  From 02/05/2019 reveals WBC count 9.2, 
hemoglobin 7.5, hematocrit 23.6, platelet 450,137. Sodium 141, potassium 5.2, chloride 107, CO2 28, BUN 17, creatinine 2.64, glucose 100, calcium 8.2 and her GFR since this 
admission has been listed. as 21. IMPRESSION: 
1. Longstanding history of systemic lupus erythematosus. She could very well be undergoing a systemic flare. At this 
particular time, the skin lesions that she is having are 
somewhat problematic and could be representative of a 
vasculitis process as one would see in leukocytoclastic vasculitis. So, for this reason it warrants aggressive evaluation. 2.  Renal insufficiency. 3.  Hypertension, which is controlled at this time. 4.  Anemia. 5.  Non-ST elevation myocardial infarction. Under medical 
management with Cardiology and as stated above, rapid 
decline in health. She has a history of chronic diastolic 
heart failure with a history of status post hysterectomy and 
pacemaker procedure. RECOMMENDATION:  My recommendation at this time is to 
evaluate the patient for possible vasculitic process and 
make her comfortable and treat her empirically at this 
point. I feel that we should perhaps increase her Solu-Medrol to 40 mg every 12 hours. If her discomfort, 
however, increases, I feel that Toradol 30 mg twice daily 
would add to her comfort level and that the benefit would 
probably outweigh the risk of increase in her creatinine, 
and I think she is quite covered for GI protection with 
Pepcid and/or Zantac and we can always cut back on the 
Toradol dosage to 15 once or twice daily as long as it would 
make her feel a little bit more comfortable because it is 
clear that she has an inflammatory process especially with 
the skin lesions. Thank you very much, for allowing me the opportunity to 
participate in the care of this lady and I am available to 
answer additional questions and can be reached at 
[phone] or cell phone [phone]. Again, thank you very much. Aj Mejía MD [email or web address] 
D:  02/06/2019 11:19 
T:  02/06/2019 17:00 
JOB #:  [id number]

## 2019-02-06 NOTE — PROGRESS NOTES
Problem: Falls - Risk of 
Goal: *Absence of Falls Document Ashley Fine Fall Risk and appropriate interventions in the flowsheet. Outcome: Progressing Towards Goal 
Fall Risk Interventions: 
Mobility Interventions: Assess mobility with egress test, Bed/chair exit alarm, OT consult for ADLs, Patient to call before getting OOB, PT Consult for mobility concerns, PT Consult for assist device competence, Strengthening exercises (ROM-active/passive) Mentation Interventions: Adequate sleep, hydration, pain control, Bed/chair exit alarm, Door open when patient unattended, Evaluate medications/consider consulting pharmacy, Familiar objects from home, Increase mobility, More frequent rounding, Reorient patient, Room close to nurse's station, Toileting rounds, Update white board Medication Interventions: Assess postural VS orthostatic hypotension, Bed/chair exit alarm, Evaluate medications/consider consulting pharmacy Elimination Interventions: Bed/chair exit alarm, Call light in reach, Patient to call for help with toileting needs, Toilet paper/wipes in reach, Toileting schedule/hourly rounds Problem: Pressure Injury - Risk of 
Goal: *Prevention of pressure injury Document Rodríguez Scale and appropriate interventions in the flowsheet. Outcome: Progressing Towards Goal 
Pressure Injury Interventions: 
Sensory Interventions: Assess changes in LOC, Assess need for specialty bed, Avoid rigorous massage over bony prominences, Check visual cues for pain, Discuss PT/OT consult with provider, Float heels, Keep linens dry and wrinkle-free, Maintain/enhance activity level, Minimize linen layers, Monitor skin under medical devices, Pad between skin to skin, Pressure redistribution bed/mattress (bed type), Sit a 90-degree angle/use footstool if needed, Turn and reposition approx. every two hours (pillows and wedges if needed), Use 30-degree side-lying position Moisture Interventions: Absorbent underpads, Apply protective barrier, creams and emollients, Assess need for specialty bed, Check for incontinence Q2 hours and as needed, Contain wound drainage, Internal/External urinary devices, Limit adult briefs, Maintain skin hydration (lotion/cream), Minimize layers, Moisture barrier, Offer toileting Q_hr Activity Interventions: Assess need for specialty bed, Pressure redistribution bed/mattress(bed type), PT/OT evaluation Mobility Interventions: Assess need for specialty bed, Float heels, HOB 30 degrees or less, Pressure redistribution bed/mattress (bed type), PT/OT evaluation, Suspension boots, Turn and reposition approx. every two hours(pillow and wedges) Nutrition Interventions: Document food/fluid/supplement intake Friction and Shear Interventions: Apply protective barrier, creams and emollients, Feet elevated on foot rest, Foam dressings/transparent film/skin sealants, HOB 30 degrees or less, Lift sheet, Lift team/patient mobility team, Minimize layers, Sit at 90-degree angle, Transfer aides:transfer board/Brayan lift/ceiling lift

## 2019-02-06 NOTE — ROUTINE PROCESS
Received report from [facility]. Pt awake, oriented to self only, NAD, breathing non labored, on room air, HOB up. IV site clean, dry and intact. Bed at the lowest level on lock position, call bell w/i reach. Bedside and Verbal shift change report given to KAREEM Johnson (oncoming nurse) by me (offgoing nurse).  Report included the following information SBAR, Kardex, Intake/Output, MAR, Recent Results and Cardiac Rhythm SR.

## 2019-02-06 NOTE — PROGRESS NOTES
Problem: Falls - Risk of 
Goal: *Absence of Falls Document Julieta Zuniga Fall Risk and appropriate interventions in the flowsheet. Outcome: Progressing Towards Goal 
Fall Risk Interventions: 
Mobility Interventions: Assess mobility with egress test, Bed/chair exit alarm, OT consult for ADLs, Patient to call before getting OOB, PT Consult for mobility concerns, PT Consult for assist device competence, Strengthening exercises (ROM-active/passive) Mentation Interventions: Adequate sleep, hydration, pain control, Bed/chair exit alarm, Door open when patient unattended, Evaluate medications/consider consulting pharmacy, Familiar objects from home, Increase mobility, More frequent rounding, Reorient patient, Room close to nurse's station, Toileting rounds, Update white board Medication Interventions: Assess postural VS orthostatic hypotension, Bed/chair exit alarm, Evaluate medications/consider consulting pharmacy Elimination Interventions: Bed/chair exit alarm, Call light in reach, Patient to call for help with toileting needs, Toilet paper/wipes in reach, Toileting schedule/hourly rounds Problem: Pressure Injury - Risk of 
Goal: *Prevention of pressure injury Document Rodríguez Scale and appropriate interventions in the flowsheet. Outcome: Progressing Towards Goal 
Pressure Injury Interventions: 
Sensory Interventions: Assess changes in LOC, Assess need for specialty bed, Avoid rigorous massage over bony prominences, Check visual cues for pain, Discuss PT/OT consult with provider, Float heels, Keep linens dry and wrinkle-free, Maintain/enhance activity level, Minimize linen layers, Monitor skin under medical devices, Pad between skin to skin, Pressure redistribution bed/mattress (bed type), Sit a 90-degree angle/use footstool if needed, Turn and reposition approx. every two hours (pillows and wedges if needed), Use 30-degree side-lying position Moisture Interventions: Absorbent underpads, Apply protective barrier, creams and emollients, Assess need for specialty bed, Check for incontinence Q2 hours and as needed, Contain wound drainage, Internal/External urinary devices, Limit adult briefs, Maintain skin hydration (lotion/cream), Minimize layers, Moisture barrier, Offer toileting Q_hr Activity Interventions: Assess need for specialty bed, Pressure redistribution bed/mattress(bed type), PT/OT evaluation Mobility Interventions: Assess need for specialty bed, Float heels, HOB 30 degrees or less, Pressure redistribution bed/mattress (bed type), PT/OT evaluation, Suspension boots, Turn and reposition approx. every two hours(pillow and wedges) Nutrition Interventions: Document food/fluid/supplement intake Friction and Shear Interventions: Apply protective barrier, creams and emollients, Feet elevated on foot rest, Foam dressings/transparent film/skin sealants, HOB 30 degrees or less, Lift sheet, Lift team/patient mobility team, Minimize layers, Sit at 90-degree angle, Transfer aides:transfer board/Brayan lift/ceiling lift

## 2019-02-06 NOTE — PROGRESS NOTES
In Patient Progress note Admit Date: 1/30/2019 Impression:  
 
1 acute kidney injury on chronic kidney disease stage IV: Baseline creatinine seems to be around 2 range, creatinine on admission was 1.8 ( may have been dilutional) has worsened to 2.6 today Poor intake , volume status this AM on dry side, will start gentle IVF with d51/2 NS @ 50 cc/hrs for 12 hrs , discussed with cardiology 2 NSTMI , has been evaluated by cardiology, continue medical management 3 atrial fibrillation, on amiodarone continue per cardiology 4 combined systolic and diastolic heart failure, compensated 5 hyperkalemia , low k diet , if continues to trend up may us pateromer daily to keep down .  
6 h/o lupus , rheumatology on board, serologies send , started on steroids , elevated Sed rate 7 lower ext lesions ,  ID following , noted request for US and possible biopsy Please call with questions Debbie Holland MD FASN Cell [phone] Pager: [phone] Subjective:  
 
Rapid response called this AM d/t lethargy Was hemodynamically stable Poor intake yesterday Current Facility-Administered Medications:  
  dextrose 5 % - 0.45% NaCl infusion, 50 mL/hr, IntraVENous, CONTINUOUS, Eloy Kirby MD 
  methylPREDNISolone (PF) (SOLU-MEDROL) injection 40 mg, 40 mg, IntraVENous, Q12H, Jackie Knight MD, 40 mg at 02/06/19 0946 
  0.9% sodium chloride infusion 250 mL, 250 mL, IntraVENous, PRN, Qian Ortiz MD, Stopped at 02/03/19 2345   aspirin delayed-release tablet 81 mg, 81 mg, Oral, DAILY, Jess Potts MD, Stopped at 02/06/19 0951 
  amitriptyline (ELAVIL) tablet 25 mg, 25 mg, Oral, QHS, Jess Potts MD, 25 mg at 02/04/19 2137   memantine (NAMENDA) tablet 10 mg, 10 mg, Oral, DAILY, Jess Potts MD, Stopped at 02/06/19 3656   isosorbide mononitrate ER (IMDUR) tablet 60 mg, 60 mg, Oral, DAILY, Jess Potts MD, Stopped at 02/06/19 0608   carvedilol (COREG) tablet 3.125 mg, 3.125 mg, Oral, Q12H, Real Pascual MD, Stopped at 02/06/19 7257   docusate sodium (COLACE) capsule 100 mg, 100 mg, Oral, BID, Real Pascual MD, Stopped at 02/06/19 1013   famotidine (PEPCID) tablet 20 mg, 20 mg, Oral, DAILY, Real Pascual MD, Stopped at 02/06/19 1015   acetaminophen (TYLENOL) tablet 650 mg, 650 mg, Oral, Q6H PRN, Real Pascual MD, 650 mg at 02/03/19 2215   ondansetron (ZOFRAN) injection 4 mg, 4 mg, IntraVENous, Q6H PRN, Real Pascual MD 
  sodium chloride (NS) flush 5-40 mL, 5-40 mL, IntraVENous, Q8H, Real Pascual MD, 10 mL at 02/06/19 0710 
  sodium chloride (NS) flush 5-40 mL, 5-40 mL, IntraVENous, PRN, Real Pascual MD, 10 mL at 02/06/19 0946 
  amiodarone (CORDARONE) tablet 200 mg, 200 mg, Oral, DAILY, Real Pascual MD, Stopped at 02/06/19 1012 Objective:  
 
Visit Vitals /72 Pulse 71 Temp 98.4 °F (36.9 °C) Resp 18 Wt 84 kg (185 lb 3.2 oz) SpO2 98% Breastfeeding? No  
BMI 30.82 kg/m² Intake/Output Summary (Last 24 hours) at 2/6/2019 1122 Last data filed at 2/6/2019 1460 Gross per 24 hour Intake  Output 550 ml Net -550 ml Physical Exam:  
 
Mmm, NAD Lungs: good air entry, clear to auscultation Cardiovascular system: S1, S2 wnl , JVD+ Abdomen: soft, non tender, non distended Extremities: no edema Data Review: 
 
Recent Labs 02/06/19 
0111  02/05/19 
0038 WBC  --   --  9.2 RBC  --   --  2.58* HCT 23.4*   < > 23.6* MCV  --   --  91.5 MCH  --   --  29.1 MCHC  --   --  31.8 RDW  --   --  17.5*  
 < > = values in this interval not displayed. Recent Labs 02/05/19 
0038 02/04/19 
2280 BUN 70* 68* CREA 2.64* 2.41* CA 8.2* 8.2*  
K 5.2 5.2  138  105 CO2 28 28 * 98  
 
 
Radha Peck MD

## 2019-02-06 NOTE — PROGRESS NOTES
GENERAL SURGERY 
PROGRESS NOTE Name: Rick Sams MRN: [id number] : 1931 Hospital: [facility] Date: 2019 Admission Date: 2019 12:44 PM  
 
Hospital Day: 8 Subjective: 
Patient is resting with her eyes closed when I arrive with son and daughter at bedside. She will respond to them when she gets aggravated. She denies pain or distress. Objective: 
Vitals:  
 19 2023 19 2336 19 9735 19 0745 BP: 102/55 114/63 110/55 125/68 Pulse: 70 71 70 77 Resp:  Temp: 98.5 °F (36.9 °C) 99.3 °F (37.4 °C) 99.2 °F (37.3 °C) 97.5 °F (36.4 °C) SpO2: 96% 95% 94% 95% Weight:   84 kg (185 lb 3.2 oz) Date 19 07 - 19 7969 19 07 - 19 6196 Shift 0220-4294 0349-2883 24 Hour Total 8736-7868 3564-9446 24 Hour Total  
INTAKE Shift Total(mL/kg) OUTPUT Urine(mL/kg/hr) 350(0.3) 200(0.2) 550(0.3) Urine Occurrence(s) 2 x 1 x 3 x Urine Output (mL) (External Female Catheter 19) 350 200 550 Stool Stool Occurrence(s) 1 x 1 x 2 x Shift Total(mL/kg) 350(4.2) 200(2.4) 550(6.5) NET -350 -200 -550 Weight (kg) 83.6 84 84 84 84 84 Physical Exam:  
 General: awake and alert to self Skin: right, medial, lower leg with small, indurated lesion. Also less erythematous today. Left, medial and lateral lesions with less erythema and less inflammation today. Medial lesion has an area of scabbing. No drainage to any lesion. Labs: 
Recent Results (from the past 24 hour(s)) HGB & HCT Collection Time: 19  1:00 PM  
Result Value Ref Range HGB 7.4 (L) 12.0 - 16.0 g/dL HCT 23.0 (L) 35.0 - 45.0 % SED RATE (ESR) Collection Time: 19  1:11 AM  
Result Value Ref Range Sed rate, automated 67 (H) 0 - 30 mm/hr URIC ACID Collection Time: 19  1:11 AM  
Result Value Ref Range  Uric acid 8.7 (H) 2.6 - 7.2 MG/DL  
HGB & HCT  
 Collection Time: 02/06/19  1:11 AM  
Result Value Ref Range HGB 7.4 (L) 12.0 - 16.0 g/dL HCT 23.4 (L) 35.0 - 45.0 % All Micro Results Procedure Component Value Units Date/Time CULTURE, BLOOD [[id number]] Collected:  02/02/19 1840 Order Status:  Completed Specimen:  Whole Blood Updated:  02/06/19 0759 Special Requests: NO SPECIAL REQUESTS Culture result: NO GROWTH 4 DAYS     
 CULTURE, BLOOD [[id number]] Collected:  02/02/19 1845 Order Status:  Completed Specimen:  Whole Blood Updated:  02/06/19 0759 Special Requests: NO SPECIAL REQUESTS Culture result: NO GROWTH 4 DAYS     
 CULTURE, BLOOD [[id number]] Collected:  02/02/19 1212 Order Status:  Completed Specimen:  Blood Updated:  02/06/19 0759 Special Requests: NO SPECIAL REQUESTS Culture result: NO GROWTH 4 DAYS Current Medications: 
Current Facility-Administered Medications Medication Dose Route Frequency Provider Last Rate Last Dose  methylPREDNISolone (PF) (SOLU-MEDROL) injection 40 mg  40 mg IntraVENous Q12H Evans Hdz MD   40 mg at 02/05/19 2152  
 0.9% sodium chloride infusion 250 mL  250 mL IntraVENous PRN Hoa Wylie MD   Stopped at 02/03/19 2345  aspirin delayed-release tablet 81 mg  81 mg Oral DAILY Dereck Agarwal MD   81 mg at 02/05/19 0941  
 amitriptyline (ELAVIL) tablet 25 mg  25 mg Oral QHS Dereck Agarwal MD   25 mg at 02/04/19 2137  memantine (NAMENDA) tablet 10 mg  10 mg Oral DAILY Dereck Agarwal MD   10 mg at 02/05/19 0941  
 isosorbide mononitrate ER (IMDUR) tablet 60 mg  60 mg Oral DAILY Dereck Agarwal MD   60 mg at 02/05/19 6647  carvedilol (COREG) tablet 3.125 mg  3.125 mg Oral Q12H Dereck Agarwal MD   3.125 mg at 02/05/19 9871  docusate sodium (COLACE) capsule 100 mg  100 mg Oral BID Dereck Agarwal MD   100 mg at 02/05/19 1931  famotidine (PEPCID) tablet 20 mg  20 mg Oral DAILY Dereck Agarwal MD 20 mg at 02/05/19 0941  acetaminophen (TYLENOL) tablet 650 mg  650 mg Oral Q6H PRN Chaim Cr MD   650 mg at 02/03/19 2215  ondansetron (ZOFRAN) injection 4 mg  4 mg IntraVENous Q6H PRN Chaim Cr MD      
 sodium chloride (NS) flush 5-40 mL  5-40 mL IntraVENous Q8H Chaim Cr MD   10 mL at 02/06/19 0710  
 sodium chloride (NS) flush 5-40 mL  5-40 mL IntraVENous PRN Chaim Cr MD      
 amiodarone (CORDARONE) tablet 200 mg  200 mg Oral DAILY Chaim Cr MD   200 mg at 02/05/19 9282 Chart and notes reviewed. Data reviewed. I have evaluated and examined the patient. IMPRESSION:  
· Patient with 3 lesions to BLE, likely inflammatory in nature, improving with current care. PLAN:/DISCUSION:  
· Please keep Abd's in place at all times to both legs to prevent compromise of skin integrity with continuous use of sequential compression device. · Surgery will sign off at this time, but remains available should current status change EVELYN Westbrook

## 2019-02-06 NOTE — PROGRESS NOTES
[facility] 
 
 
CARDIOLOGY PROGRESS NOTE 
RECS:1. Elevated troponin- the patient is chest pain free. Her SOB is improving  troponin downtrending-  continue medical management for CAD.  asa,bb,statin . No further cardiac w/u needed at this time 2. Chronic combined systolic and diastolic heart failure- appears compensated today clinically. Hold Lasix as discussed with renal and watch clinically 3. Atrial fibrillation - currently paced on amiodarone. Not on anticoagulation due to high bleeding risk. Continue asa. 4. CKD- monitor kandy indices with diuresis consider renal consult 5. Hx of symptomatic bradycardia- S/P PPM  0645 Dr Maybelle Pallas- EP recommended  upgrade to biventricular pacemaker as an outpatient.  Patient's family refused   
10. Anemia: H&H dropping. Check stool guaiacs and may need to hold aspirin also if stool guaiacs are positive. 7. Lupus- follow by Dr. Jess Hammans as out patient 8. Hypertension- with normal low bp. Continue low dose Coreg  will monitor 9. Dementia. 10. CODE STATUS: Consider palliative care consult. 
  
 
Discussed with patient's family members at length- no aggressive w/u per family. Continue supportive care Will f/u peripherally Call us as needed ASSESSMENT: 
Hospital Problems  Date Reviewed: 1/21/2019 Codes Class Noted POA Skin lesions ICD-10-CM: L98.9 ICD-9-CM: 709.9  2/6/2019 Unknown Overview Signed 2/6/2019  9:51 AM by Rahat Lane NP  
  3, inflammatory lesions to BLE 
  
  
   
 NSTEMI (non-ST elevated myocardial infarction) [facility]) ICD-10-CM: I21.4 ICD-9-CM: 410.70  1/30/2019 Unknown Dementia ICD-10-CM: F03.90 ICD-9-CM: 294.20  1/30/2019 Unknown SUBJECTIVE: 
Denies chest pain/shortness of breath OBJECTIVE: 
 
VS:  
Visit Vitals /72 Pulse 71 Temp 98.4 °F (36.9 °C) Resp 18 Wt 84 kg (185 lb 3.2 oz) SpO2 98% Breastfeeding? No  
BMI 30.82 kg/m² Intake/Output Summary (Last 24 hours) at 2/6/2019 1234 Last data filed at 2/6/2019 5678 Gross per 24 hour Intake  Output 550 ml Net -550 ml  
 
TELE: paced General: Alert, pleasant, being fed by family HENT: Normocephalic, atraumatic. Normal external eye. Neck :  no bruit, increased JVP Cardiac:  regular rate and rhythm, systolic murmur: early systolic 3/6, crescendo at 2nd left intercostal space, at 2nd right intercostal space: Second systolic murmur which is along mid to late systolic murmur at apex grade 3/6 Chest/Lungs:chest clear, no wheezing, rales, normal symmetric air entry Abdomen: Soft, nontender, no masses Extremities:  No c/c/edema, peripheral pulses present Labs: Results:  
   
Chemistry Recent Labs 02/06/19 
1115 02/05/19 
0038 02/04/19 
4147 * 100* 98  141 138  
K 4.8 5.2 5.2  107 105 CO2 27 28 28 BUN 72* 70* 68* CREA 2.32* 2.64* 2.41* CA 8.3* 8.2* 8.2* AGAP 7 6 5 BUCR 31* 27* 28* AP 64  --   --   
TP 5.2*  --   --   
ALB 2.3*  --   --   
GLOB 2.9  --   --   
AGRAT 0.8  --   --   
  
CBC w/Diff Recent Labs 02/06/19 
1115 02/06/19 
0111 02/05/19 
1300 02/05/19 
0038 WBC 6.3  --   --  9.2  
RBC 2.52*  --   --  2.58* HGB 7.5* 7.4* 7.4* 7.5* HCT 22.9* 23.4* 23.0* 23.6*  
  --   --  137 GRANS 86*  --   --  66  
LYMPH 11*  --   --  23 EOS 0  --   --  2 Cardiac Enzymes No results for input(s): CPK, CKND1, YAN in the last 72 hours. No lab exists for component: Estelle Kendall Coagulation No results for input(s): PTP, INR, APTT in the last 72 hours. No lab exists for component: INREXT, INREXT Lipid Panel Lab Results Component Value Date/Time  Cholesterol, total 142 01/31/2019 02:24 AM  
 HDL Cholesterol 68 (H) 01/31/2019 02:24 AM  
 LDL, calculated 66.4 01/31/2019 02:24 AM  
 VLDL, calculated 7.6 01/31/2019 02:24 AM  
 Triglyceride 38 01/31/2019 02:24 AM  
 CHOL/HDL Ratio 2.1 01/31/2019 02:24 AM  
  
 BNP No results for input(s): BNPP in the last 72 hours. Liver Enzymes Recent Labs 02/06/19 
1115  
TP 5.2* ALB 2.3* AP 64 SGOT 16  
  
Digoxin Thyroid Studies Lab Results Component Value Date/Time  TSH 8.86 (H) 11/15/2015 01:45 PM  
    
 
 
 
Yuly Alarcon MD

## 2019-02-06 NOTE — PROGRESS NOTES
Infectious Disease progress Note Requested by: dr. Samara Blizzard 
 
Reason: abscess on both legs Current abx Prior abx Pip/tazo, vancomycin  2/2/19-2/5/19 Lines:  
 
 
Assessment : 
 
 
 80y.o. year old with a past history of CHF, HTN, hypercholesterolemia, SLE, dementia who was admitted on 1/30/2019  with three day history of progressive altered mental status, generalized weakness and shortness of breath. Now with bilateral leg soft tissue mass/swelling. Highly complex clinical picture. Clinical presentation is not c/w cellulitis of legs. Also, lack of erythema/induration/fluctuance of soft tissue mass argues against abscess. Etiology of LE lesions not entirely clear: ?drug induced ?lupus associated skin/soft tissue manifestation Rheumatology f/u appreciated. Lupus work up in progress No definitive evidence of bacterial infection. Progression despite abx argues against this too. SOB on admission likely from non STEMI: cardiology f/u appreciated. S/p prednisone. Clinically better. Recommendations: 
 
1. Hold off abx 2. Obtain USG of the masses to determine if any fluid present to aspirate 3. F/u rheumatology recommendations 4. 5. Monitor off abx 5. mx of non STEMI per cardiology Advance Care planning: full code: discussed  with patient/surrogate decision maker: Jesúsjb Dias: [phone] Above plan was discussed in details with family at bedside and dr Chris Samuel. Please call me if any further questions or concerns. Will continue to participate in the care of this patient. HPI: 
 
Patient is confused. Detailed ros not feasible. Medication List  
  
ASK your doctor about these medications   
acetaminophen 650 mg Tber Commonly known as:  TYLENOL ARTHRITIS PAIN Take 1 Tab by mouth every eight (8) hours. amiodarone 200 mg tablet Commonly known as:  CORDARONE 
TAKE ONE TABLET EVERY DAY (MAKE AN APPT) 
  
amitriptyline 25 mg tablet Commonly known as:  ELAVIL 
  
aspirin 81 mg tablet CALMOSEPTINE 0.44-20.6 % Oint Generic drug:  menthol-zinc oxide 
  
carvedilol 3.125 mg tablet Commonly known as:  Aletha Jimenez Take 1 Tab by mouth every twelve (12) hours. diclofenac 1 % Gel Commonly known as:  VOLTAREN 
  
docusate sodium 100 mg capsule Commonly known as:  Sangita Henrry Take 1 Cap by mouth two (2) times a day for 90 days. famotidine 20 mg tablet Commonly known as:  PEPCID Take 1 Tab by mouth daily. furosemide 20 mg tablet Commonly known as:  LASIX 
1 -2 tabs po daily prn for leg swelling or SOB 
  
isosorbide mononitrate ER 60 mg CR tablet Commonly known as:  IMDUR 
TAKE ONE TABLET EVERY MORNING 
  
memantine 10 mg tablet Commonly known as:  NAMENDA 
  
potassium chloride 20 mEq tablet Commonly known as:  K-DUR, KLOR-CON Take 1 Tab by mouth two (2) times a day. predniSONE 10 mg tablet Commonly known as:  DELTASONE 
1 tab po tid for 1 week , and 1 tab po bid until discontinued 
  
raNITIdine 75 mg Tab Commonly known as:  ZANTAC Current Facility-Administered Medications Medication Dose Route Frequency  methylPREDNISolone (PF) (SOLU-MEDROL) injection 40 mg  40 mg IntraVENous Q12H  
 0.9% sodium chloride infusion 250 mL  250 mL IntraVENous PRN  
 aspirin delayed-release tablet 81 mg  81 mg Oral DAILY  amitriptyline (ELAVIL) tablet 25 mg  25 mg Oral QHS  memantine (NAMENDA) tablet 10 mg  10 mg Oral DAILY  isosorbide mononitrate ER (IMDUR) tablet 60 mg  60 mg Oral DAILY  carvedilol (COREG) tablet 3.125 mg  3.125 mg Oral Q12H  
 docusate sodium (COLACE) capsule 100 mg  100 mg Oral BID  famotidine (PEPCID) tablet 20 mg  20 mg Oral DAILY  acetaminophen (TYLENOL) tablet 650 mg  650 mg Oral Q6H PRN  
 ondansetron (ZOFRAN) injection 4 mg  4 mg IntraVENous Q6H PRN  
 sodium chloride (NS) flush 5-40 mL  5-40 mL IntraVENous Q8H  
 sodium chloride (NS) flush 5-40 mL  5-40 mL IntraVENous PRN  
  amiodarone (CORDARONE) tablet 200 mg  200 mg Oral DAILY Allergies: Patient has no known allergies. Temp (24hrs), Av.1 °F (36.7 °C), Min:97.1 °F (36.2 °C), Max:99.3 °F (37.4 °C) Visit Vitals /68 (BP 1 Location: Right arm, BP Patient Position: At rest) Pulse 77 Temp 97.5 °F (36.4 °C) Resp 19 Wt 84 kg (185 lb 3.2 oz) SpO2 95% Breastfeeding? No  
BMI 30.82 kg/m² ROS:  
Patient is confused. Detailed ros not feasible. Physical Exam: 
 
Constitutional: She is oriented to person, place, and time. She appears well-developed and well-nourished. No distress. Drowsy. HENT:  
Head: Normocephalic and atraumatic. Eyes: Conjunctivae and EOM are normal. Right eye exhibits no discharge. Left eye exhibits no discharge. No scleral icterus. Neck: Normal range of motion. Neck supple. No tracheal deviation present. Cardiovascular: Normal rate, regular rhythm and normal heart sounds. No murmur heard. Pulmonary/Chest: Effort normal and breath sounds normal. No respiratory distress. She has no wheezes. She has no rales. Abdominal: Soft. She exhibits no distension. There is no tenderness. There is no rebound and no guarding. Large ventral wall hernia that is easily reducible. Musculoskeletal: Normal range of motion. She exhibits no edema or deformity. Neurological: She is oriented to person, place, and time. No cranial nerve deficit. no focal neurologic deficit. Skin: Skin is warm and dry. She is not diaphoretic. Soft tissue swelling seen on both LE - one on right leg, two on left leg - no overlying erythema/warmth. Unable to assess tenderness since patient moans intermittently even when not touched. Psychiatric: mumbles words. Unable to assess.  
  
 
 
 
Labs: Results:  
Chemistry Recent Labs 19 
0038 19 
4465 * 98  138  
K 5.2 5.2  105 CO2 28 28 BUN 70* 68* CREA 2.64* 2.41* CA 8.2* 8.2* AGAP 6 5 BUCR 27* 28*  
  
 CBC w/Diff Recent Labs 02/06/19 
0111 02/05/19 
1300 02/05/19 
0038 WBC  --   --  9.2 RBC  --   --  2.58* HGB 7.4* 7.4* 7.5* HCT 23.4* 23.0* 23.6* PLT  --   --  137 GRANS  --   --  66  
LYMPH  --   --  23 EOS  --   --  2 Microbiology No results for input(s): CULT in the last 72 hours. RADIOLOGY: 
 
All available imaging studies/reports in [facility] for this admission were reviewed Dr. Tahmina Casillas, Infectious Disease Specialist 
[phone] February 6, 2019 
10:53 AM

## 2019-02-06 NOTE — PROGRESS NOTES
Attempted to see patient for skilled OT evaluation but a rapid response was called secondary to AMS. Will hold at this time and see patient when appropriate/as available.   Thank you for the referral.  Shanel Hensley, MS OTR/L

## 2019-02-06 NOTE — PROGRESS NOTES
Bedside and Verbal shift change report received from  Lola Valle RN (off going nurse). Report included the following information SBAR, Kardex, MAR and Recent Results. Assumed care patient in bed sleeping comfortably. Fall prevention program maintained,call bell and bedside table within reach. No problems identified at this time ,will continue care. 1- Son is complaining that patient is always sleeping and staying awake. Reassess patient neuro. pupil L-pinpoint R 2mm,sluggish. Used to be 3mm bilateral,sluggish. Baseline confuse,oriented to self only & lethargic at times 
  
1020- Informed  regarding assessment. MD advised to call RRT. 1025-RRT was called. EKG,bld sugar & other orders acknowledge. Patient is now awake. Bilateral pupils reassessed nowback to 3mm ,sluggish. Patient remains at floor/room. . Will continue to monitor. IVF order started.

## 2019-02-06 NOTE — CONSULTS
Patient seen, examined, chart reviewed and consult dictated. Patient has  a longstanding history of SLE which responded well to Plaquenil  For a long Time. Recently she has been declining over-all in health. She may be experiencing an acute SLE flare. REC- WILL GET SOME BASELINE STUDIES,MAKE HER COMFORTABLE 
           SEE ORDERS AND CONSULT NOTE FOR FURTHER DETAILS. Thanks for allowing me to see this. nice lady. Rehana Knight M.D.,F.A.C.R. 02- 
[phone]

## 2019-02-06 NOTE — PROGRESS NOTES
Hospitalist Progress NotePatient: Derick Shepadr MRN: [id number]  CSN: [id number] YOB: 1931  Age: 80 y.o. Sex: female DOA: 1/30/2019 LOS:  LOS: 7 days Patient sitting in bed in NAD, awake, son at bedside is feeding patient Assessment/Plan Active Problems: 
  NSTEMI (non-ST elevated myocardial infarction) ([facility] [address] (1/30/2019) Dementia (1/30/2019) Skin lesions (2/6/2019) Overview: 3, inflammatory lesions to BLE Plan -  
 
1. NSTEMI - medical management , per cardio, off heparin drip 2. Anemia -monitor 3. Chronic combined CHF - lasix as needed 4. Baseline Dementia - continue namenda 5. H/o CKD 4 - nephro following, monitor 6. H/o AOCD - monitor hgb , stool hemoccult negative 7. H/o SLE - chronic - consult Dr dallas- left message for office staff 8-Legs - ? Abscesses, ? inflammatory nodules  - on abx, follow cx, surg following, ID following. Off abx. Rheum consulted I saw patient after RRT. Patient is now awake, eating lunch. D/w son and daughter at bedside Palliative care is following  consulted Case discussed with:  []Patient  [x]Family  []Nursing  []Case Management DVT Prophylaxis:  []Lovenox  []Hep SQ  [x]SCDs  []Coumadin   []On Heparin gtt Objective:  
  
Visit Vitals /72 Pulse 71 Temp 98.4 °F (36.9 °C) Resp 18 Wt 84 kg (185 lb 3.2 oz) SpO2 98% Breastfeeding? No  
BMI 30.82 kg/m² Physical Exam: 
General:  Awake, follows commands Cardiovascular:  S1S2+, RRR Pulmonary:  CTA b/l GI:  Soft, BS+, NT, ND Extremities:  trace edema, ? inflammatory lesions noted on both legs. Pulses 1+ Lue swelling Intake and Output: 
Current Shift:  No intake/output data recorded. Last three shifts:  02/04 1901 - 02/06 0700 In: 120 [P.O.:120] Out: 1100 [Urine:1100] Recent Results (from the past 24 hour(s)) HGB & HCT Collection Time: 02/05/19  1:00 PM  
Result Value Ref Range HGB 7.4 (L) 12.0 - 16.0 g/dL HCT 23.0 (L) 35.0 - 45.0 % SED RATE (ESR) Collection Time: 02/06/19  1:11 AM  
Result Value Ref Range Sed rate, automated 67 (H) 0 - 30 mm/hr HEPATITIS C AB Collection Time: 02/06/19  1:11 AM  
Result Value Ref Range Hepatitis C virus Ab 0.02 <0.80 Index Hep C  virus Ab Interp. NEGATIVE  NEG Hep C  virus Ab comment URIC ACID Collection Time: 02/06/19  1:11 AM  
Result Value Ref Range Uric acid 8.7 (H) 2.6 - 7.2 MG/DL  
HGB & HCT Collection Time: 02/06/19  1:11 AM  
Result Value Ref Range HGB 7.4 (L) 12.0 - 16.0 g/dL HCT 23.4 (L) 35.0 - 45.0 % GLUCOSE, POC Collection Time: 02/06/19 10:49 AM  
Result Value Ref Range Glucose (POC) 138 (H) 70 - 110 mg/dL POC VENOUS BLOOD GAS Collection Time: 02/06/19 11:12 AM  
Result Value Ref Range Device: ROOM AIR    
 FIO2 (POC) 21 %  
 pH, venous (POC) 7.366 7.32 - 7.42    
 pCO2, venous (POC) 46.3 41 - 51 MMHG  
 pO2, venous (POC) 38 25 - 40 mmHg HCO3, venous (POC) 26.5 23.0 - 28.0 MMOL/L  
 sO2, venous (POC) 70 65 - 88 % Base excess, venous (POC) 1 mmol/L Allens test (POC) YES Total resp. rate 18 Site LEFT RADIAL Specimen type (POC) VENOUS BLOOD Performed by Alem Freedman Current Meds - Reviewed Lab Results Component Value Date/Time  Glucose 100 (H) 02/05/2019 12:38 AM  
 Glucose 98 02/04/2019 02:59 AM  
 Glucose 89 02/03/2019 07:49 AM  
 Glucose 98 01/31/2019 02:24 AM  
 Glucose 99 01/30/2019 01:15 PM  
 Glucose 88 03/17/2016 11:15 AM  
  
 
Juan Khan MD 
2/6/2019,

## 2019-02-06 NOTE — PROGRESS NOTES
Problem: Mobility Impaired (Adult and Pediatric) Goal: *Acute Goals and Plan of Care (Insert Text) Physical Therapy Goals Initiated 2/4/2019 and to be accomplished within 7 day(s) 1. Patient will move from supine to sit and sit to supine , scoot up and down and roll side to side in bed with minimal assistance/contact guard assist.    
2.  Patient will transfer from bed to chair and chair to bed with contact guard assist using the least restrictive device. 3.  Patient will perform sit to stand with contact guard assist. 
4.  Patient will ambulate with contact guard assist for 50 feet with the least restrictive device. 5.  Patient will ascend/descend 3 stairs with 1-2 handrail(s) with minimal assistance/contact guard assist. 
  
 
1116 - HOLD PT tx as pt has recently had Rapid Response team dispatched to her room. Will f/u as appropriate.

## 2019-02-06 NOTE — PROGRESS NOTES
Discharge planning: This writer has completed the [employer] for patient to potentially get a personal care aide, upone discharge from [place]. Patient's family has formally applied for medicaid. This writer will continue to closely monitor for discharge planning to ensure a safe discharge home from [place]. Lamonte Cannon. MSW Care Manager Pager#: [phone]

## 2019-02-06 NOTE — PROGRESS NOTES
Palliative Medicine Consult DR. [facility]: [phone] [facility]: [phone] [facility]: [phone] Patient Name: [place] YOB: 1931 Date of Initial Consult: 2/4/2019 Reason for Consult: goals of care Requesting Provider: Satya Valladares MD 
Primary Care Physician: Orlando Hernandez MD 
  
 SUMMARY:  
[place] Barry Parker is a 80y.o. year old with a past history of CHF, HTN, hypercholesterolemia, Lupus, dementia who was admitted on 1/30/2019 from home with a diagnosis of NSTEMI. Patient with three day history of progressive altered mental status, generalized weakness and shortness of breath. Current medical issues leading to Palliative Medicine involvement include: dementia, CHF and goals of care. 2/5/2019: Palliative care team including Steven Mandujano, KENDRA and myself met with patient's family in room. We have been waiting today for Price, the spouse of patient to come to discuss DNR/DNI. Adult children including Carlene Molina and Claudette are on board with the plan for no resuscitation in the event of arrest. They will talk with their father and bring him in either later today or tomorrow in hopes of him agreeing to the plan. They want to reassure him that DNR does not mean we withdraw treatments. 2/6/19: Pt seen at bedside along with Steven PRADO. She did not awake to voice/light touch. Her family at bedside tells us that they could not wake her up to eat. Discussed with attending. PALLIATIVE DIAGNOSES:  
1. Advanced care planning 2. Dementia 3. CHF with preserved EF 31-35%. 4. NSTEMI 
 
 
 PLAN:  
2/6/19: Pt seen at bedside along with Steven PRADO. Her son Gillian Bejarano and daughter Andrea Troncoso are at bedside. They share that they have discussed the patient's condition with their father and have tried to get him to come to the hospital to discuss goals of care.   Unfortunately he has refused and told them that he will not sign anything. The children tell us that he wants the patient to remain FULL CODE. Claudette says that she attempted to speak with him about benefits/burdens, but that he continues to say that he wants the patient to be resuscitated. No changes to goals of care, palliative care will continue to follow. Please see below for details of previous conversations. 2/5/2019:  Patient's spouse is the healthcare decision maker as patient is incapable of making decisions. She does not answer questions appropriately and is sleeping this afternoon. We have visited the room three times today to catch  here. Family states they will bring him either later today or tomorrow. Goals of care remains: FULL CODE. Previous notes shown below:  
2/4/2019 1. Advanced care planning: Palliative care team including Nicol Andrews LCSW, Jordan Macias and myself met with patient and family in her room. , Sharyn Neumann, was not present. Daughters: Roel Emerson and Jose VALENTIN, son, Kayla Abdul were present. Patient is not oriented, did not recognize her own name and was unable to identify her children. Children were very concerned we not introduce topic of hospice to them or to Price. Explained palliative medicine. Patient has been declining over the past six months but she is able to feed herself and was walking. Discussed progression of disease with CHF and dementia. Explained overlapping diseases which effect different organs of the body and impact on survival. Family members present were supportive of DNR/DNI in event of arrest. They will bring the , Sharyn Neumann, to the hospital tomorrow to discuss further. Goals of care remain FULL CODE. 2.  Dementia: CT head: no acute changes. Currently on namenda which son states is not helping much. Family admits to patient being agitated at times. She is currently on amitriptyline at hs.   Consider adding haldol PRN. Will need additional assistance at home with health aides. 3.  CHF combined systolic and diastolic with preserved EF 31-35%. NT-pro-BNP on admission: 4856. Lasix currently on hold secondary to hypotension. Cardiology following. 4.  NSTEMI: Elevated troponin on admission 3.03, now trending down. On asa, bb. Consider adding statin. Medically managed per cardiology. 5.  Initial consult note routed to primary continuity provider 6. Communicated plan of care with: Palliative IDT 
 
GOALS OF CARE: 
Patient/Health Care Proxy Stated Goals: Prolong life TREATMENT PREFERENCES:  
Code Status: Full Code Medical Interventions: Full interventions Other: As far as possible, the palliative care team has discussed with patient / health care proxy about goals of care / treatment preferences for patient. HISTORY:  
 
History obtained from: patient chart CHIEF COMPLAINT: altered mental status HPI/SUBJECTIVE: The patient is:  
[] Verbal and participatory [x] Non-participatory due to: Not responding to voice/touch Clinical Pain Assessment (nonverbal scale for nonverbal patients): Clinical Pain Assessment Severity: 0 Activity (Movement): Lying quietly, normal position Duration: for how long has pt been experiencing pain (e.g., 2 days, 1 month, years) Frequency: how often pain is an issue (e.g., several times per day, once every few days, constant) FUNCTIONAL ASSESSMENT:  
 
Palliative Performance Scale (PPS): PPS: 20 
 
ECOG 
ECOG Status : Limited self-care PSYCHOSOCIAL/SPIRITUAL SCREENING:  
  
Any spiritual / Sabianism concerns: 
[] Yes /  [x] No 
 
Caregiver Burnout: 
[] Yes /  [x] No /  [] No Caregiver Present Anticipatory grief assessment:  
[x] Normal  / [] Maladaptive REVIEW OF SYSTEMS:  
 
Positive and pertinent negative findings in ROS are noted above in HPI.  
The following systems were [] reviewed / [x] unable to be reviewed as noted in HPI 
 Other findings are noted below. Systems: constitutional, ears/nose/mouth/throat, respiratory, gastrointestinal, genitourinary, musculoskeletal, integumentary, neurologic, psychiatric, endocrine. Positive findings noted below. Modified ESAS Completed by: provider Drowsiness: 10 Pain: 0 Dyspnea: 0 Stool Occurrence(s): 1 PHYSICAL EXAM:  
 
Wt Readings from Last 3 Encounters:  
02/06/19 84 kg (185 lb 3.2 oz) 01/21/19 84.8 kg (187 lb) 01/08/19 69.6 kg (153 lb 7 oz) Blood pressure 111/72, pulse 70, temperature 98.4 °F (36.9 °C), resp. rate 18, weight 84 kg (185 lb 3.2 oz), SpO2 96 %, not currently breastfeeding. Pain: 
Pain Scale 1: Visual 
Pain Intensity 1: 0 Constitutional: Elderly, AA female in no apparent distress. Eyes: pupils equal, anicteric ENMT: no nasal discharge, moist mucous membranes Respiratory: breathing unlabored;  symmetric Musculoskeletal: no deformity, no tenderness to palpation Skin: warm, dry ; multiple raised cystic like masses on lower extremities with erythema. Neurologic: not responding to voice, light touch HISTORY:  
 
Active Problems: 
  NSTEMI (non-ST elevated myocardial infarction) ([address] (1/30/2019) Dementia (1/30/2019) Skin lesions (2/6/2019) Overview: 3, inflammatory lesions to BLE Past Medical History:  
Diagnosis Date  Acute on chronic diastolic heart failure ([address]  Arthritis  Benign hypertensive heart disease without heart failure Better controlled, stable  Chronic diastolic heart failure ([address] Breathing and edema is improving  Congestive heart failure ([address]  HTN (hypertension)  Hypercholesteremia  Lupus (systemic lupus erythematosus) ([address] 6/18/2014  
 followed by dr Li Lance  Obesity, unspecified Patient has weight loss Discussed diet ad fluid restriction  Other and unspecified hyperlipidemia  F/u per pmd  
 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  
Procedure Laterality Date  HX HYSTERECTOMY  PACEMAKER PROCEDURE Family History Problem Relation Age of Onset  Arrhythmia Neg Hx  Asthma Neg Hx  Clotting Disorder Neg Hx  Fainting Neg Hx   
 Heart Attack Neg Hx  High Cholesterol Neg Hx  Pacemaker Neg Hx  Stroke Neg Hx History reviewed, no pertinent family history. Social History Tobacco Use  Smoking status: Never Smoker  Smokeless tobacco: Never Used Substance Use Topics  Alcohol use: No  
 
No Known Allergies Current Facility-Administered Medications Medication Dose Route Frequency  dextrose 5 % - 0.45% NaCl infusion  50 mL/hr IntraVENous CONTINUOUS  
 methylPREDNISolone (PF) (SOLU-MEDROL) injection 40 mg  40 mg IntraVENous Q12H  
 0.9% sodium chloride infusion 250 mL  250 mL IntraVENous PRN  
 aspirin delayed-release tablet 81 mg  81 mg Oral DAILY  amitriptyline (ELAVIL) tablet 25 mg  25 mg Oral QHS  memantine (NAMENDA) tablet 10 mg  10 mg Oral DAILY  isosorbide mononitrate ER (IMDUR) tablet 60 mg  60 mg Oral DAILY  carvedilol (COREG) tablet 3.125 mg  3.125 mg Oral Q12H  
 docusate sodium (COLACE) capsule 100 mg  100 mg Oral BID  famotidine (PEPCID) tablet 20 mg  20 mg Oral DAILY  acetaminophen (TYLENOL) tablet 650 mg  650 mg Oral Q6H PRN  
 ondansetron (ZOFRAN) injection 4 mg  4 mg IntraVENous Q6H PRN  
 sodium chloride (NS) flush 5-40 mL  5-40 mL IntraVENous Q8H  
 sodium chloride (NS) flush 5-40 mL  5-40 mL IntraVENous PRN  
 amiodarone (CORDARONE) tablet 200 mg  200 mg Oral DAILY  
 
 
 LAB AND IMAGING FINDINGS:  
 
Lab Results Component Value Date/Time WBC 9.2 02/05/2019 12:38 AM  
 HGB 7.4 (L) 02/06/2019 01:11 AM  
 PLATELET 867 27/29/0815 12:38 AM  
 
Lab Results Component Value Date/Time  Sodium 141 02/05/2019 12:38 AM  
 Potassium 5.2 02/05/2019 12:38 AM  
 Chloride 107 02/05/2019 12:38 AM  
 CO2 28 02/05/2019 12:38 AM  
 BUN 70 (H) 02/05/2019 12:38 AM  
 Creatinine 2.64 (H) 02/05/2019 12:38 AM  
 Calcium 8.2 (L) 02/05/2019 12:38 AM  
 Magnesium 2.4 12/30/2018 05:55 AM  
 Phosphorus 3.4 12/28/2018 09:25 AM  
  
Lab Results Component Value Date/Time AST (SGOT) 26 01/31/2019 02:24 AM  
 Alk. phosphatase 66 01/31/2019 02:24 AM  
 Protein, total 5.5 (L) 01/31/2019 02:24 AM  
 Albumin 2.6 (L) 01/31/2019 02:24 AM  
 Globulin 2.9 01/31/2019 02:24 AM  
 
Lab Results Component Value Date/Time INR 1.2 02/01/2019 05:20 PM  
 Prothrombin time 14.9 02/01/2019 05:20 PM  
 aPTT 77.3 (H) 02/02/2019 01:01 AM  
  
No results found for: IRON, FE, TIBC, IBCT, PSAT, FERR No results found for: PH, PCO2, PO2 No components found for: [place] Lab Results Component Value Date/Time  01/30/2019 06:12 PM  
 CK - MB 7.5 (H) 01/30/2019 06:12 PM  
  
 
   
 
Total time: 15 minutes Counseling / coordination time, spent as noted above: 10 minutes > 50% counseling / coordination: family Prolonged service was provided for  []30 min   []75 min in face to face time in the presence of the patient, spent as noted above. Time Start:  
Time End:  
Note: this can only be billed with 60254 (initial) or 49477 (follow up). If multiple start / stop times, list each separately.

## 2019-02-06 NOTE — PROGRESS NOTES
Rapid Response Note 120 [facility] Patient: Thom Mcneal 80 y.o. female [id number] 
6/26/1931 Admit Date: 1/30/2019 Admission Diagnosis: NSTEMI (non-ST elevated myocardial infarction) ([facility] NSTEMI (non-ST elevated myocardial infarction) ([facility] RAPID RESPONSE Rapid response called for AMS, lethargic and pinpoint pupils. Patient was admitted on 1/30 for NSTEMI. Noted that numerous meds were held this morning due to sleepiness. On arrival, her temp was 98.4, HR 70, RR 18, /72,  96% on RA. Patient has a PMHx of CAD, CHF (EF 31%), dementia, CKD stage 4. Upon arrival, patient was more alert. She was able to say her name. Rechecked her pupils which were PERRL. Patient not oriented to place or time and does not follow commands at baseline. No facial droop noted. Blood glucose 138 Medications Reviewed Amiodarone- held this am 
ASA- held this morning Carvedilol- held this morning Pepcid- held this morning Imdur- held this morning 
memantine- held this morning SoluMedrol- 40mg IV at 0900 this morning OBJECTIVE Patient Vitals for the past 12 hrs: 
 Temp Pulse Resp BP SpO2  
02/06/19 1105  71 18 123/72 98 % 02/06/19 1048 98.4 °F (36.9 °C)      
02/06/19 1046  70 18 111/72 96 % 02/06/19 0745 97.5 °F (36.4 °C) 77 19 125/68 95 % 02/06/19 0426 99.2 °F (37.3 °C) 70 19 110/55 94 % PHYSICAL: 
General:  Alert and Responsive and in no acute distress. CV:  RRR, systolic murmurs, rubs, or gallops. PMI not displaced. No visible pulsations or thrills. RESP:  Unlabored breathing. CTAB. ABD:  Soft, nontender, nondistended. Normoactive bowel sounds. Neuro:  Alert, eyes open, can say her name ASSESSMENT, PLAN & DISPOSITION Thom Mcneal is a 80y.o. year old female admitted for NSTEMI (non-ST elevated myocardial infarction) ([address] NSTEMI (non-ST elevated myocardial infarction) ([facility]  Rapid response called for Altered Mental Status, unresponsiveness. Patient condition currently: stable. Medications Administered: None Labs ordered/Pending: ABG, CMP, CBC--> STAT Disposition: Stable Attending, Dr. Galen Frye, notified of rapid response. In agreement with plan. Primary team resuming care. Senior resident, Dr. Ceci Schaefer, present during RRT and evaluation Giovanny Willett MD  
[address] Medicine PGY-1 
10:49 AM 02/06/19

## 2019-02-07 ENCOUNTER — APPOINTMENT (OUTPATIENT)
Dept: ULTRASOUND IMAGING | Age: 84
DRG: 280 | End: 2019-02-07
Attending: PHYSICIAN ASSISTANT
Payer: MEDICARE

## 2019-02-07 LAB
ANA SER QL: NEGATIVE
C-ANCA TITR SER IF: NORMAL TITER
DSDNA AB SER-ACNC: <1 IU/ML (ref 0–9)
ENA RNP AB SER-ACNC: <0.2 AI (ref 0–0.9)
ENA SM AB SER-ACNC: <0.2 AI (ref 0–0.9)
HCT VFR BLD AUTO: 22.4 % (ref 35–45)
HCT VFR BLD AUTO: 24.2 % (ref 35–45)
HGB BLD-MCNC: 7.3 G/DL (ref 12–16)
HGB BLD-MCNC: 7.7 G/DL (ref 12–16)
INTERPRETATION, 117893: NORMAL
MYELOPEROXIDASE AB SER IA-ACNC: <9 U/ML (ref 0–9)
P-ANCA ATYPICAL TITR SER IF: NORMAL TITER
P-ANCA TITR SER IF: NORMAL TITER
PROTEINASE3 AB SER IA-ACNC: <3.5 U/ML (ref 0–3.5)
SCREEN APTT: 36.6 SEC (ref 0–51.9)
SCREEN DRVVT: 30.7 SEC (ref 0–47)

## 2019-02-07 PROCEDURE — 77030038269 HC DRN EXT URIN PURWCK BARD -A

## 2019-02-07 PROCEDURE — 97166 OT EVAL MOD COMPLEX 45 MIN: CPT

## 2019-02-07 PROCEDURE — C1729 CATH, DRAINAGE: HCPCS

## 2019-02-07 PROCEDURE — 87102 FUNGUS ISOLATION CULTURE: CPT

## 2019-02-07 PROCEDURE — 74011250636 HC RX REV CODE- 250/636: Performed by: SPECIALIST

## 2019-02-07 PROCEDURE — 0J9P3ZX DRAINAGE OF LEFT LOWER LEG SUBCUTANEOUS TISSUE AND FASCIA, PERCUTANEOUS APPROACH, DIAGNOSTIC: ICD-10-PCS | Performed by: STUDENT IN AN ORGANIZED HEALTH CARE EDUCATION/TRAINING PROGRAM

## 2019-02-07 PROCEDURE — 97535 SELF CARE MNGMENT TRAINING: CPT

## 2019-02-07 PROCEDURE — 65660000000 HC RM CCU STEPDOWN

## 2019-02-07 PROCEDURE — 36415 COLL VENOUS BLD VENIPUNCTURE: CPT

## 2019-02-07 PROCEDURE — 74011250637 HC RX REV CODE- 250/637: Performed by: HOSPITALIST

## 2019-02-07 PROCEDURE — 87116 MYCOBACTERIA CULTURE: CPT

## 2019-02-07 PROCEDURE — 87070 CULTURE OTHR SPECIMN AEROBIC: CPT

## 2019-02-07 PROCEDURE — 74011250636 HC RX REV CODE- 250/636: Performed by: STUDENT IN AN ORGANIZED HEALTH CARE EDUCATION/TRAINING PROGRAM

## 2019-02-07 PROCEDURE — 85018 HEMOGLOBIN: CPT

## 2019-02-07 RX ORDER — KETOROLAC TROMETHAMINE 15 MG/ML
15 INJECTION, SOLUTION INTRAMUSCULAR; INTRAVENOUS EVERY 6 HOURS
Status: DISCONTINUED | OUTPATIENT
Start: 2019-02-07 | End: 2019-02-07

## 2019-02-07 RX ORDER — KETOROLAC TROMETHAMINE 15 MG/ML
15 INJECTION, SOLUTION INTRAMUSCULAR; INTRAVENOUS 2 TIMES DAILY
Status: DISCONTINUED | OUTPATIENT
Start: 2019-02-07 | End: 2019-02-08

## 2019-02-07 RX ORDER — LIDOCAINE HYDROCHLORIDE 10 MG/ML
30 INJECTION, SOLUTION EPIDURAL; INFILTRATION; INTRACAUDAL; PERINEURAL
Status: COMPLETED | OUTPATIENT
Start: 2019-02-07 | End: 2019-02-07

## 2019-02-07 RX ADMIN — METHYLPREDNISOLONE SODIUM SUCCINATE 40 MG: 40 INJECTION, POWDER, FOR SOLUTION INTRAMUSCULAR; INTRAVENOUS at 09:54

## 2019-02-07 RX ADMIN — ASPIRIN 81 MG: 81 TABLET, COATED ORAL at 09:53

## 2019-02-07 RX ADMIN — AMIODARONE HYDROCHLORIDE 200 MG: 200 TABLET ORAL at 09:53

## 2019-02-07 RX ADMIN — DOCUSATE SODIUM 100 MG: 100 CAPSULE, LIQUID FILLED ORAL at 09:53

## 2019-02-07 RX ADMIN — CARVEDILOL 3.12 MG: 3.12 TABLET, FILM COATED ORAL at 09:53

## 2019-02-07 RX ADMIN — LIDOCAINE HYDROCHLORIDE 10 ML: 10 INJECTION, SOLUTION EPIDURAL; INFILTRATION; INTRACAUDAL; PERINEURAL at 13:51

## 2019-02-07 RX ADMIN — Medication 10 ML: at 05:46

## 2019-02-07 RX ADMIN — FAMOTIDINE 20 MG: 20 TABLET ORAL at 09:53

## 2019-02-07 RX ADMIN — KETOROLAC TROMETHAMINE 15 MG: 15 INJECTION, SOLUTION INTRAMUSCULAR; INTRAVENOUS at 18:28

## 2019-02-07 RX ADMIN — Medication 10 ML: at 21:09

## 2019-02-07 RX ADMIN — METHYLPREDNISOLONE SODIUM SUCCINATE 40 MG: 40 INJECTION, POWDER, FOR SOLUTION INTRAMUSCULAR; INTRAVENOUS at 21:09

## 2019-02-07 RX ADMIN — AMITRIPTYLINE HYDROCHLORIDE 25 MG: 50 TABLET, FILM COATED ORAL at 21:09

## 2019-02-07 RX ADMIN — CARVEDILOL 3.12 MG: 3.12 TABLET, FILM COATED ORAL at 21:09

## 2019-02-07 RX ADMIN — KETOROLAC TROMETHAMINE 15 MG: 15 INJECTION, SOLUTION INTRAMUSCULAR; INTRAVENOUS at 02:50

## 2019-02-07 RX ADMIN — ISOSORBIDE MONONITRATE 60 MG: 60 TABLET, EXTENDED RELEASE ORAL at 09:53

## 2019-02-07 RX ADMIN — KETOROLAC TROMETHAMINE 15 MG: 15 INJECTION, SOLUTION INTRAMUSCULAR; INTRAVENOUS at 09:54

## 2019-02-07 RX ADMIN — Medication 10 ML: at 18:29

## 2019-02-07 RX ADMIN — MEMANTINE 10 MG: 10 TABLET ORAL at 09:53

## 2019-02-07 NOTE — OP NOTES
Interventional Radiology Brief Procedure Note Interventional Radiologist: Michael Helm MD 
Pre-operative Diagnosis: Subcutaneous fluid collections Post-operative Diagnosis: Same as pre-operative diagnosis Procedure(s) Performed:  US Guided aspiration Anesthesia:  Local and Moderate Sedation Findings: 0.5 mL or dark red blood removed. The fluid is very thick and I was unable to obtain any more. If more is required, I recommend bedside I&D. Complications: None Estimated Blood Loss:  minimal 
 
Tubes and Drains: None Specimens: Yes 
 
Condition: Good Disposition: Back to nursing unit Michael Helm MD 
2/7/2019

## 2019-02-07 NOTE — PROGRESS NOTES
NUTRITION Nutrition Screen RECOMMENDATIONS / PLAN:  
 
- Add supplements: Ensure Enlive, BID. - Add chopped meats/vegetable to diet order. 
- Monitor and encourage po/supplement intake. Assist with meals as needed. - Continue RD inpatient monitoring and evaluation. NUTRITION INTERVENTIONS & DIAGNOSIS:  
 
[x] Meals/snacks: modify composition 
[x] Medical food supplement therapy: initiate Nutrition Diagnosis: Predicted inadequate energy intake related to lethargy, appetite, and chewing difficulty as evidenced by fair meal intake and report of pt sleeping through some meal times. ASSESSMENT:  
 
Pt with AMS/dementia, sleeping during visit. Per pt's son when pt is awake she has a good appetite and 100% meal intake, although some days pt is skipping meals. Per chart pt with fair meal intake. NFPE completed on upper body, no evidenced of muscle/fat loss at this time. Weight gain PTA per chart however pt with edema. Average po intake adequate to meet patients estimated nutritional needs:   [] Yes     [] No   [x] Unable to determine at this time Diet: DIET CARDIAC Regular Food Allergies: NKFA Current Appetite:   [x] Good; per family     [] Fair     [] Poor     [x] Other: lethargic Appetite/meal intake prior to admission:   [x] Good per family    [] Fair     [] Poor     [] Other: 
Feeding Limitations:  [] Swallowing difficulty    [] Chewing difficulty    [] Other: 
Current Meal Intake:  
Patient Vitals for the past 100 hrs: 
 % Diet Eaten 02/03/19 1802 50 % 02/03/19 1218 40 % 02/03/19 0830 50 % BM: 2/5 Skin Integrity: WDL Edema:   [x] No     [] Yes Pertinent Medications: Reviewed: D5 1/2 NS at 50 mL/hr (stopped), colace, pepcid, steroid, zofran Recent Labs 02/06/19 
1115 02/05/19 
0038  141  
K 4.8 5.2  107 CO2 27 28 * 100* BUN 72* 70* CREA 2.32* 2.64* CA 8.3* 8.2* ALB 2.3*  --   
SGOT 16  --   
ALT 15  --   
 
 
 Intake/Output Summary (Last 24 hours) at 2/7/2019 1229 Last data filed at 2/7/2019 1942 Gross per 24 hour Intake 640 ml Output 200 ml Net 440 ml Anthropometrics: 
Ht Readings from Last 1 Encounters:  
01/21/19 5' 5\" (1.651 m) Last 3 Recorded Weights in this Encounter 02/05/19 0400 02/06/19 0426 02/07/19 2397 Weight: 83.6 kg (184 lb 4.9 oz) 84 kg (185 lb 3.2 oz) 82.8 kg (182 lb 9.6 oz) Body mass index is 30.39 kg/m². Weight History: Weight gain x 2 months PTA per chart review however noted pt edematous. Family feels as if pt's weight has been stable. Weight Metrics 2/7/2019 1/21/2019 1/8/2019 12/31/2018 12/4/2018 1/24/2018 11/30/2016 Weight 182 lb 9.6 oz 187 lb 153 lb 7 oz 158 lb 8 oz 167 lb 132 lb 147 lb BMI 30.39 kg/m2 31.12 kg/m2 25.53 kg/m2 26.38 kg/m2 26.16 kg/m2 21.97 kg/m2 24.46 kg/m2 Admitting Diagnosis: NSTEMI (non-ST elevated myocardial infarction) ([facility] NSTEMI (non-ST elevated myocardial infarction) ([facility] Pertinent PMHx: CHF, HTN, hypercholesteremia, lupus Education Needs:        [x] None identified  [] Identified - Not appropriate at this time  []  Identified and addressed - refer to education log Learning Limitations:   [] None identified  [x] Identified: dementia/AMS Cultural, Adventism & ethnic food preferences:  [x] None identified    [] Identified and addressed ESTIMATED NUTRITION NEEDS:  
 
Calories: 3240-8224 kcal (MSJx1.2-1.3) based on  [x] Actual BW: 83 kg      [] IBW Protein: 66-83 gm (0.8-1 gm/kg) based on  [x] Actual BW      [] IBW Fluid: 1 mL/kcal 
  
MONITORING & EVALUATION:  
 
Nutrition Goal(s): 1. Po intake of meals will meet >75% of patient estimated nutritional needs within the next 7 days.   Outcome:  [] Met/Ongoing    []  Not Met    [x] New/Initial Goal  
 
Monitoring:   [x] Food and beverage intake   [x] Diet order   [x] Nutrition-focused physical findings   [x] Treatment/therapy   [] Weight [] Enteral nutrition intake Previous Recommendations (for follow-up assessments only):     []   Implemented       []   Not Implemented (RD to address)      [] No Longer Appropriate     [] No Recommendation Made Discharge Planning: cardiac diet; consistency as tolerated 
[x] Participated in care planning, discharge planning, & interdisciplinary rounds as appropriate Linda Taylor RD Pager: [phone]

## 2019-02-07 NOTE — PROGRESS NOTES
Hospitalist Progress NotePatient: Clay Geronimo MRN: [id number]  CSN: [id number] YOB: 1931  Age: 80 y.o. Sex: female DOA: 1/30/2019 LOS:  LOS: 8 days Patient sitting in bed , awake, in good spirits. Family at bedside Assessment/Plan Active Problems: 
  NSTEMI (non-ST elevated myocardial infarction) ([facility] (1/30/2019) Dementia (1/30/2019) Skin lesions (2/6/2019) Overview: 3, inflammatory lesions to BLE Plan -  
 
1. NSTEMI - medical management , per cardio, off heparin drip 2. Anemia -monitor 3. Chronic combined CHF - lasix as needed 4. Baseline Dementia - continue namenda 5. H/o CKD 4 - nephro following, monitor 6. H/o AOCD - monitor hgb , stool hemoccult negative 7. H/o SLE - rheum following, trial of steroids per rehum 8-Legs - ? Abscesses, ? inflammatory nodules  - off abx, trial of steroids, IR consult D/w patient and family. They wish to take patient home with [facility] 
D/w - discharge planning Case discussed with:  []Patient  [x]Family  []Nursing  []Case Management DVT Prophylaxis:  []Lovenox  []Hep SQ  [x]SCDs  []Coumadin   []On Heparin gtt Objective:  
  
Visit Vitals /67 (BP 1 Location: Left arm, BP Patient Position: At rest) Pulse 74 Temp 98 °F (36.7 °C) Resp 18 Wt 82.8 kg (182 lb 9.6 oz) SpO2 99% Breastfeeding? No  
BMI 30.39 kg/m² Physical Exam: 
General:  Awake, alert Cardiovascular:  S1S2+, RRR Pulmonary:  CTA b/l GI:  Soft, BS+, NT, ND Extremities:  trace edema, ? inflammatory lesions noted on both legs. Pulses 1+ Lue swelling Intake and Output: 
Current Shift:  No intake/output data recorded. Last three shifts:  02/05 1901 - 02/07 0700 In: 640 [I.V.:640] Out: 400 [Urine:400] Recent Results (from the past 24 hour(s)) HGB & HCT Collection Time: 02/07/19  2:47 AM  
Result Value Ref Range HGB 7.3 (L) 12.0 - 16.0 g/dL HCT 22.4 (L) 35.0 - 45.0 % HGB & HCT Collection Time: 02/07/19 12:20 PM  
Result Value Ref Range HGB 7.7 (L) 12.0 - 16.0 g/dL HCT 24.2 (L) 35.0 - 45.0 % CULTURE, BODY FLUID W GRAM STAIN Collection Time: 02/07/19  1:50 PM  
Result Value Ref Range Special Requests: LEFT LEG FNA   
 GRAM STAIN PENDING Culture result: PENDING   
CULTURE, FUNGUS Collection Time: 02/07/19  1:50 PM  
Result Value Ref Range Special Requests: LEFT LEG FNA Culture result: PENDING Current Meds - Reviewed Lab Results Component Value Date/Time  Glucose 115 (H) 02/06/2019 11:15 AM  
 Glucose 100 (H) 02/05/2019 12:38 AM  
 Glucose 98 02/04/2019 02:59 AM  
 Glucose 89 02/03/2019 07:49 AM  
 Glucose 98 01/31/2019 02:24 AM  
 Glucose 88 03/17/2016 11:15 AM  
  
 
Leann Franco MD 
2/7/2019,

## 2019-02-07 NOTE — PROGRESS NOTES
Discharge planning: 
 
Referral to [facility] was sent. Care manager will need to forward the actual home health order to [facility] home health, once the order is put in. The referral was sent through [facility]. Lamonte Flores. [facility] Care Manager Pager#: [phone]

## 2019-02-07 NOTE — PROGRESS NOTES
Discharge planning: 
 
Patient will likely discharge home tomorrow (2/8/2019). She is active with Home [facility]). She will need home health orders to resume home health services. Patient will also need stretcher transport set up for transport home. Care manager will set that up tomorrow, once discharge orders are officially in place. Patient's family is also requesting a hospital bed. Doctor will need to complete the hospital bed medical necessity to get qualified for a hospital bed. Lamonte Garcia. [facility] Care Manager Pager#: [phone]

## 2019-02-07 NOTE — CONSULTS
Consult Note Patient: Mary Beth Panda               Sex: female          DOA: 1/30/2019 YOB: 1931      Age:  80 y.o.        LOS:  LOS: 8 days HPI:  
 
Mary Beth Panda is a 80 y.o. female with a history of CHF, HTN, hypercholesterolemia, SLE and dementia who has been seen in evaluation of left lower extremity fluid collections at the request of Dr. Arvin Denver. Patient admitted for management of NSTEMI after initial presentation of weakness, shortness of breath and altered mental status. During the course of admission, she developed bilateral lower extremity soft tissue swelling/masses and ID was consulted. She underwent ultrasound to determine if the areas on the lower extremities contain fluid for aspiration. US of the left lower leg showed \"two separate irregular avascular complex fluid collections in the superficial subcutaneous soft tissue of the anteromedial lower leg and lateral lower leg. \" US of the right lower leg showed \"two separate irregular avascular complex fluid collections in the superficial subcutaneous soft tissue of the anteromedial lower leg and the anterior lower leg. \" All findings were concerning for abscesses. Past Medical History:  
Diagnosis Date  Acute on chronic diastolic heart failure ([address]  Arthritis  Benign hypertensive heart disease without heart failure Better controlled, stable  Chronic diastolic heart failure ([address] Breathing and edema is improving  Congestive heart failure ([address]  HTN (hypertension)  Hypercholesteremia  Lupus (systemic lupus erythematosus) ([address] 6/18/2014  
 followed by dr Ivett Morocho  Obesity, unspecified Patient has weight loss Discussed diet ad fluid restriction  Other and unspecified hyperlipidemia F/u per pmd  
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  
Procedure Laterality Date  HX HYSTERECTOMY  PACEMAKER PROCEDURE Family History Problem Relation Age of Onset  Arrhythmia Neg Hx  Asthma Neg Hx  Clotting Disorder Neg Hx  Fainting Neg Hx   
 Heart Attack Neg Hx  High Cholesterol Neg Hx  Pacemaker Neg Hx  Stroke Neg Hx Social History Socioeconomic History  Marital status:  Spouse name: Not on file  Number of children: Not on file  Years of education: Not on file  Highest education level: Not on file Tobacco Use  Smoking status: Never Smoker  Smokeless tobacco: Never Used Substance and Sexual Activity  Alcohol use: No  
 Drug use: No  
 
 
Prior to Admission medications Medication Sig Start Date End Date Taking? Authorizing Provider  
menthol-zinc oxide (CALMOSEPTINE) 0.44-20.6 % oint Apply  to affected area. Yes Provider, Historical  
raNITIdine (ZANTAC) 75 mg tab Take  by mouth two (2) times a day. Yes Provider, Historical  
diclofenac (VOLTAREN) 1 % gel Apply  to affected area four (4) times daily. Yes Provider, Historical  
furosemide (LASIX) 20 mg tablet 1 -2 tabs po daily prn for leg swelling or SOB 1/9/19  Yes Vijaya Merrill MD  
docusate sodium (COLACE) 100 mg capsule Take 1 Cap by mouth two (2) times a day for 90 days. 1/9/19 4/9/19 Yes Vijaya Merrill MD  
potassium chloride (K-DUR, KLOR-CON) 20 mEq tablet Take 1 Tab by mouth two (2) times a day. 1/9/19  Yes Vijaya Merrill MD  
carvedilol (COREG) 3.125 mg tablet Take 1 Tab by mouth every twelve (12) hours. 12/31/18  Yes Vijaya Merrill MD  
acetaminophen (TYLENOL ARTHRITIS PAIN) 650 mg TbER Take 1 Tab by mouth every eight (8) hours.  12/4/18  Yes Hayden Phillips PAJessicaC  
amiodarone (CORDARONE) 200 mg tablet TAKE ONE TABLET EVERY DAY (MAKE AN APPT) 11/2/18  Yes Becki Roca NP  
isosorbide mononitrate ER (IMDUR) 60 mg CR tablet TAKE ONE TABLET EVERY MORNING 10/4/18  Yes Sanma Lyons MD  
 memantine (NAMENDA) 10 mg tablet  7/10/18  Yes Provider, Historical  
amitriptyline (ELAVIL) 25 mg tablet Take  by mouth nightly. Yes Provider, Historical  
aspirin 81 mg tablet Take 81 mg by mouth daily. Yes Provider, Historical  
predniSONE (DELTASONE) 10 mg tablet 1 tab po tid for 1 week , and 1 tab po bid until discontinued 1/9/19   Aylin Mccabe MD  
famotidine (PEPCID) 20 mg tablet Take 1 Tab by mouth daily. 1/10/19   Aylin Mccabe MD  
 
 
No Known Allergies Review of Systems Pertinent items are noted in the History of Present Illness. Physical Exam:  
  
Visit Vitals /62 (BP 1 Location: Left arm, BP Patient Position: At rest) Pulse 71 Temp 97.8 °F (36.6 °C) Resp 18 Wt 82.8 kg (182 lb 9.6 oz) SpO2 95% Breastfeeding? No  
BMI 30.39 kg/m² Physical Exam: 
Constitutional: Alert, cooperative, appears stated age Respiratory: Normal respiratory rate and effort Cardiovascular: Regular rate Gastrointestinal: Soft, NT, ND  
MSK: Bilateral lower extremity soft tissue swelling. No drainage or erythema. Labs Reviewed: 
CMP: No results found for: NA, K, CL, CO2, AGAP, GLU, BUN, CREA, GFRAA, GFRNA, CA, MG, PHOS, ALB, TBIL, TP, ALB, GLOB, AGRAT, SGOT, ALT, GPT 
CBC:  
Lab Results Component Value Date/Time HGB 7.7 (L) 02/07/2019 12:20 PM  
 HCT 24.2 (L) 02/07/2019 12:20 PM  
 
COAGS: No results found for: APTT, PTP, INR Imaging: 
Ultrasound extremity limited left 
  
INDICATION: Swelling anterior lower leg, evaluate abscess 
  
COMPARISON: None 
  
Area contrast: Left lower leg 
  
FINDINGS: 
  
Sonographic imaging of the lower leg area of interest was performed. Color Doppler was utilized. 
  
There is anterior medial lower leg superficial subcutaneous significantly 
irregular hypoechoic very complex collection with internal heterogeneous 
echogenicity measuring 4.7 x 4.5 x 1.2 cm. There is no internal vascularity. There is peripheral surrounding edematous soft tissue with hyperemia. 
  
Additional left lateral lower leg subcutaneous irregular hypoechoic complex 
collection with internal heterogeneous echogenicity measuring 5.5 x 5 x 1.4 cm. There is no internal vascularity. There is peripheral surrounding edematous soft 
tissue with hyponatremia. 
  
IMPRESSION: 
  
Two separate irregular avascular complex fluid collections in the superficial 
subcutaneous soft tissue of the anteromedial lower leg and the lateral left 
lower leg as discussed most concerning for abscesses. Ultrasound extremity limited right 
  
INDICATION: Swelling anterior lower leg, evaluate abscess 
  
COMPARISON: None 
  
Area contrast: Right anterior lower leg 
  
FINDINGS: 
  
Sonographic imaging of the area of interest in the lower leg was performed. Color Doppler was utilized. 
  
There is an anteromedial lower leg subcutaneous irregular hypoechoic complex 
collection with internal heterogeneous echogenicity measuring 3.9 x 2.2 x 1 cm. There is no internal vascularity. There is peripheral surrounding edematous soft 
tissue with hyperemia. 
  
Additional anterior lower leg subcutaneous irregular hypoechoic complex 
collection with internal heterogeneous echogenicity measuring 4.4 x 2.8 x 0.7 
cm. There is no internal vascularity. There is peripheral surrounding edematous 
soft tissue with hyponatremia. 
  
IMPRESSION: 
  
Two separate irregular avascular complex fluid collections in the superficial 
subcutaneous soft tissue of the anteromedial lower leg and the anterior lower 
leg as discussed most concerning for abscesses. Assessment Active Problems: 
  NSTEMI (non-ST elevated myocardial infarction) ([address] (1/30/2019) Dementia (1/30/2019) Skin lesions (2/6/2019) Overview: 3, inflammatory lesions to BLE Robert Pod is a 80 y.o. female with a history of CHF, HTN, hypercholesterolemia, SLE and dementia presenting with new onset bilateral lower extremity lesions concerning for abscesses. Plan Case and images reviewed by Dr. Alyssa Dia. Discussions held between Dr. Missael Mckee and Dr. Krishna Claire regarding management options and they include percutaneous aspiration/drainage, surgical I&D or no intervention. Given that the etiology of these fluid collections is required for adequate treatment,  will plan for image-guided aspiration of amenable fluid collections as determined by Dr. Alyssa Dia at time of procedure. Hold afternoon blood thinning medications for procedure. Consent to be obtained from patient's son prior to procedure.

## 2019-02-07 NOTE — PROGRESS NOTES
Problem: Self Care Deficits Care Plan (Adult) Goal: *Acute Goals and Plan of Care (Insert Text) Outcome: Resolved/Met Date Met: 02/07/19 Occupational Therapy EVALUATION/discharge Patient: Woodrow Krueger (80 y.o. female) Date: 2/7/2019 Primary Diagnosis: NSTEMI (non-ST elevated myocardial infarction) ([facility] NSTEMI (non-ST elevated myocardial infarction) ([facility] Precautions:  Fall ; Seizure; Aspiration ASSESSMENT AND RECOMMENDATIONS: 
Pt cleared to participate in OT evaluation by RN. Upon entering room, pt supine with HOB elevated, sleeping, with children present. Pt awaken by her son to participate in therapy session with verbal and tactile stimulation. Based on the objective data described below, the patient presents she is at her baseline for basic self-care/ADLs. She was able to perform grooming tasks at bed level during assessment with setup. Per pt's children, they report she requires total assist for dressing and (sponge) bathing PTA. However, pt's children also report she is able to ambulate with use a rolling walker with supervision. Will defer to PT for functional balance and mobility tasks. Educated pt on the role of OT and evaluation process with pt demonstrating little to no understanding and with children presenting good understanding. As pt appears to be at her baseline for basic self-care/ADLs, skilled occupational therapy is not indicated at this time. Discharge Recommendations: Home Health with 24/7 supervision vs LTC Further Equipment Recommendations for Discharge: N/A Barriers to Learning/Limitations: yes;  Cognitive and altered mental status Compensate with: visual, verbal, tactile, kinesthetic cues/model COMPLEXITY Eval Complexity: History: MEDIUM Complexity : Expanded review of history including physical, cognitive and psychosocial  history ;  Examination: MEDIUM Complexity : 3-5 performance deficits relating to physical, cognitive , or psychosocial skils that result in activity limitations and / or participation restrictions; Decision Making:MEDIUM Complexity : Patient may present with comorbidities that affect occupational performnce. Miniml to moderate modification of tasks or assistance (eg, physical or verbal ) with assesment(s) is necessary to enable patient to complete evaluation  Assessment: Moderate Complexity SUBJECTIVE:  
Patient stated [facility], [facility] pretty girl!  OBJECTIVE DATA SUMMARY:  
 
Past Medical History:  
Diagnosis Date  Acute on chronic diastolic heart failure ([address]  Arthritis  Benign hypertensive heart disease without heart failure Better controlled, stable  Chronic diastolic heart failure ([address] Breathing and edema is improving  Congestive heart failure ([address]  HTN (hypertension)  Hypercholesteremia  Lupus (systemic lupus erythematosus) ([facility] 6/18/2014  
 followed by dr Sj Santiago  Obesity, unspecified Patient has weight loss Discussed diet ad fluid restriction  Other and unspecified hyperlipidemia F/u per pmd  
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  
Procedure Laterality Date  HX HYSTERECTOMY  PACEMAKER PROCEDURE Prior Level of Function/Home Situation: Pt appears to be a poor historian as she has a medical history of dementia,  is unable to follow most commands and presents with confusion. Per pt's son and daughter who were present during the time of assessment, the pt is able to feed and groom herself. However requires total assistance for dressing and sponge bathing, receiving the assistance from her daughter. Pt does not use the toilet at home. Per pt's son, she is able to ambulate around her home using a RW with supervision. Home Situation Home Environment: Private residence Care Facility Name: [address] # Steps to Enter: 3 Rails to Enter:  Yes 
 Hand Rails : Bilateral 
One/Two Story Residence: Two story Lift Chair Available: Yes Living Alone: No 
Support Systems: Spouse/Significant Other/Partner, Child(marty) Patient Expects to be Discharged to[de-identified] Private residence Current DME Used/Available at Home: Walker, rolling 
[x]     Right hand dominant   []     Left hand dominantCognitive/Behavioral Status: 
Neurologic State: Alert;Confused Orientation Level: Oriented to person Cognition: (Can follow some commands but unable to follow most) Safety/Judgement: Decreased insight into deficits Skin: Visible skin appeared intact Edema: None noted Coordination: 
Generally decreased, but functional (BUEs) Balance: Not functional 
 
Strength: 
Strength: Generally decreased, but functional (BUEs) Pt is unable to properly  OT's hands d/t decreased command following and confusion. However, she is able to hold onto a wash cloth when given for grooming task. Tone & Sensation: 
Tone: Normal(BUEs) Range of Motion: 
AROM: Generally decreased, functional(BUEs) Functional Mobility and Transfers for ADLs: 
Not assessed ADL Assessment: 
Feeding: Setup; Supervision Oral Facial Hygiene/Grooming: Setup; Supervision (at bed level) Bathing: Maximum assistance; Total assistance Upper Body Dressing: Maximum assistance Lower Body Dressing: Total assistance Toileting: Total assistance ADL Intervention: 
Grooming Grooming Assistance: Supervision/set up Washing Face: Supervision/set-up(at bed level) Cognitive Retraining Safety/Judgement: Decreased insight into deficits Pain: 
Pt reports no pain or discomfort prior to or post- treatment.   
Activity Tolerance:  
Fair Please refer to the flowsheet for vital signs taken during this treatment. After treatment:  
[]  Patient left in no apparent distress sitting up in chair 
[x]  Patient left in no apparent distress in bed 
[x]  Call bell left within reach 
[]  Nursing notified [x]  Son and daughter present 
[]  Bed alarm activated COMMUNICATION/EDUCATION:  
Communication/Collaboration: 
[]      Home safety education was provided and the patient/caregiver indicated understanding. [x]      Patient/family have participated as able and agree with findings and recommendations. []      Patient is unable to participate in plan of care at this time. James Ayers OT Time Calculation: 23 mins

## 2019-02-07 NOTE — PROGRESS NOTES
Chart reviewed and  Vitals are stable. Serum creat. Is >2  And she is anemic. Rheum. Serology  For vasculitis eval. Is pending. Will add low  toradol 15 mg iv q 12 hrs. To increase her comfort level; in this case I consider the benefit to out weight the risk. Thanks, Rehana Knight M.D.,F.A.C.R.

## 2019-02-07 NOTE — ROUTINE PROCESS
Bedside and Verbal shift change report given to Gisela Kaiser RN 
 (oncoming nurse) by Gurdeep To RN (offgoing nurse). Report included the following information SBAR, Kardex, Intake/Output, MAR and Recent Results.

## 2019-02-07 NOTE — PROGRESS NOTES
Problem: Mobility Impaired (Adult and Pediatric) Goal: *Acute Goals and Plan of Care (Insert Text) Physical Therapy Goals Initiated 2/4/2019 and to be accomplished within 7 day(s) 1. Patient will move from supine to sit and sit to supine , scoot up and down and roll side to side in bed with minimal assistance/contact guard assist.    
2.  Patient will transfer from bed to chair and chair to bed with contact guard assist using the least restrictive device. 3.  Patient will perform sit to stand with contact guard assist. 
4.  Patient will ambulate with contact guard assist for 50 feet with the least restrictive device. 5.  Patient will ascend/descend 3 stairs with 1-2 handrail(s) with minimal assistance/contact guard assist. 
  
 
1333 - Pt off floor at this time. Will f/u later in day. 1354 - Pt still off floor. Will f/u per pt's schedule.

## 2019-02-07 NOTE — PROGRESS NOTES
In Patient Progress note Admit Date: 1/30/2019 Impression:  
 
1 acute kidney injury on chronic kidney disease stage IV: Baseline creatinine seems to be around 2 range, creatinine stable ~ 2.3, BP has improved , now on steroids per Rheumatology , also on Toradol for inflammation per rheumatology , patient and family aware of effects of NSAID's , but benefits outweigh the risks 2 NSTMI , has been evaluated by cardiology, continue medical management 3 atrial fibrillation, on amiodarone continue per cardiology 4 combined systolic and diastolic heart failure, compensated 5 hyperkalemia , low k diet , if continues to trend up may us pateromer daily to keep down .  
6 h/o lupus , rheumatology on board, serologies send , started on steroids , elevated Sed rate 7 lower ext lesions ,  ID following , noted request for US and possible biopsy Please call with questions Nephrology will sign off , please call with questions if any over the weekend . Joya Lu MD FASN Cell [phone] Pager: [phone] Subjective:  
 
Rapid response called this AM d/t lethargy Was hemodynamically stable Poor intake yesterday 2 Current Facility-Administered Medications:  
  ketorolac (TORADOL) injection 15 mg, 15 mg, IntraVENous, BID, Tiffanie Knight MD, 15 mg at 02/07/19 8752   dextrose 5 % - 0.45% NaCl infusion, 50 mL/hr, IntraVENous, CONTINUOUS, Bichu, Severo Aris, MD, Stopped at 02/07/19 9231   methylPREDNISolone (PF) (SOLU-MEDROL) injection 40 mg, 40 mg, IntraVENous, Q12H, Tiffanie Knight MD, 40 mg at 02/07/19 0954 
  0.9% sodium chloride infusion 250 mL, 250 mL, IntraVENous, PRN, Sofia Morris MD, Stopped at 02/03/19 2345   aspirin delayed-release tablet 81 mg, 81 mg, Oral, DAILY, Stefany Perez MD, 81 mg at 02/07/19 0953 
  amitriptyline (ELAVIL) tablet 25 mg, 25 mg, Oral, QHS, Stefany Perez MD, 25 mg at 02/06/19 6172   memantine (NAMENDA) tablet 10 mg, 10 mg, Oral, DAILY, Adali Linda MD, 10 mg at 02/07/19 1182   isosorbide mononitrate ER (IMDUR) tablet 60 mg, 60 mg, Oral, DAILY, Adali Linda MD, 60 mg at 02/07/19 8886   carvedilol (COREG) tablet 3.125 mg, 3.125 mg, Oral, Q12H, Adali Linda MD, 3.125 mg at 02/07/19 5490   docusate sodium (COLACE) capsule 100 mg, 100 mg, Oral, BID, Adali Linda MD, 100 mg at 02/07/19 8796   famotidine (PEPCID) tablet 20 mg, 20 mg, Oral, DAILY, Adali Linda MD, 20 mg at 02/07/19 8996   acetaminophen (TYLENOL) tablet 650 mg, 650 mg, Oral, Q6H PRN, Adali Linda MD, 650 mg at 02/03/19 2215   ondansetron (ZOFRAN) injection 4 mg, 4 mg, IntraVENous, Q6H PRN, Adali Linda MD 
  sodium chloride (NS) flush 5-40 mL, 5-40 mL, IntraVENous, Q8H, Adali Linda MD, 10 mL at 02/07/19 2661   sodium chloride (NS) flush 5-40 mL, 5-40 mL, IntraVENous, PRN, Adali Linda MD, 10 mL at 02/06/19 0946 
  amiodarone (CORDARONE) tablet 200 mg, 200 mg, Oral, DAILY, Adali Linda MD, 200 mg at 02/07/19 0229 Objective:  
 
Visit Vitals /62 (BP 1 Location: Left arm, BP Patient Position: At rest) Pulse 71 Temp 97.8 °F (36.6 °C) Resp 18 Wt 82.8 kg (182 lb 9.6 oz) SpO2 95% Breastfeeding? No  
BMI 30.39 kg/m² Intake/Output Summary (Last 24 hours) at 2/7/2019 1310 Last data filed at 2/7/2019 6762 Gross per 24 hour Intake 640 ml Output 200 ml Net 440 ml Physical Exam:  
 
Mmm, NAD Lungs: good air entry, clear to auscultation Cardiovascular system: S1, S2 wnl , JVD+ Abdomen: soft, non tender, non distended Extremities: no edema Data Review: 
 
Recent Labs 02/07/19 
1220  02/06/19 
1115 WBC  --   --  6.3  
RBC  --   --  2.52* HCT 24.2*   < > 22.9* MCV  --   --  90.9 MCH  --   --  29.8 MCHC  --   --  32.8 RDW  --   --  17.0*  
 < > = values in this interval not displayed. Recent Labs 02/06/19 
1115 02/05/19 
0038 BUN 72* 70* CREA 2.32* 2.64* CA 8.3* 8.2* ALB 2.3*  --   
K 4.8 5.2  141  107 CO2 27 28 * 100* Richard Jasmine MD

## 2019-02-07 NOTE — PROGRESS NOTES
Problem: Falls - Risk of 
Goal: *Absence of Falls Document Breanna Quick Fall Risk and appropriate interventions in the flowsheet. Outcome: Progressing Towards Goal 
Fall Risk Interventions: 
Mobility Interventions: Bed/chair exit alarm, Patient to call before getting OOB, Communicate number of staff needed for ambulation/transfer Mentation Interventions: Adequate sleep, hydration, pain control, Bed/chair exit alarm, Door open when patient unattended, More frequent rounding, Reorient patient, Room close to nurse's station, Update white board, Toileting rounds Medication Interventions: Bed/chair exit alarm, Evaluate medications/consider consulting pharmacy Elimination Interventions: Bed/chair exit alarm, Call light in reach, Patient to call for help with toileting needs, Toileting schedule/hourly rounds Problem: Pressure Injury - Risk of 
Goal: *Prevention of pressure injury Document Rodríguez Scale and appropriate interventions in the flowsheet. Outcome: Progressing Towards Goal 
Pressure Injury Interventions: 
Sensory Interventions: Assess changes in LOC, Float heels, Keep linens dry and wrinkle-free, Maintain/enhance activity level, Minimize linen layers, Pressure redistribution bed/mattress (bed type), Turn and reposition approx. every two hours (pillows and wedges if needed) Moisture Interventions: Absorbent underpads, Apply protective barrier, creams and emollients, Check for incontinence Q2 hours and as needed, Internal/External urinary devices, Limit adult briefs, Maintain skin hydration (lotion/cream), Minimize layers, Moisture barrier Activity Interventions: Increase time out of bed, Pressure redistribution bed/mattress(bed type), PT/OT evaluation Mobility Interventions: Float heels, HOB 30 degrees or less, Pressure redistribution bed/mattress (bed type), PT/OT evaluation, Turn and reposition approx. every two hours(pillow and wedges) Nutrition Interventions: Document food/fluid/supplement intake, Offer support with meals,snacks and hydration Friction and Shear Interventions: Apply protective barrier, creams and emollients, Lift team/patient mobility team, HOB 30 degrees or less

## 2019-02-07 NOTE — PROGRESS NOTES
Infectious Disease progress Note Requested by: dr. Hui Everett 
 
Reason: abscess on both legs Current abx Prior abx Pip/tazo, vancomycin  2/2/19-2/5/19 Lines:  
 
 
Assessment : 
 
 
 80y.o. year old with a past history of CHF, HTN, hypercholesterolemia, SLE, dementia who was admitted on 1/30/2019  with three day history of progressive altered mental status, generalized weakness and shortness of breath. Now with bilateral leg soft tissue mass/swelling. Highly complex clinical picture. Clinical presentation is not c/w cellulitis of legs. Also, lack of erythema/induration/fluctuance of soft tissue mass argues against abscess. Etiology of LE lesions not entirely clear: ?drug induced ?lupus associated skin/soft tissue manifestation Rheumatology f/u appreciated. Lupus work up in progress No definitive evidence of bacterial infection. Progression despite abx argues against this too. No new lesions seen off abx x 48 hours SOB on admission likely from non STEMI: cardiology f/u appreciated. S/p prednisone. Clinically better. However, USG LE reveals complex avascular fluid collection concerning for abscess. Current prednisone may mask the full presentation Recommendations: 
 
1. Hold off abx 
2  IR guided aspiration of LE fluid collections to determine if it is truly abscess or not. 3. F/u rheumatology recommendations 4. mx of non STEMI per cardiology Advance Care planning: full code: discussed  with patient/surrogate decision maker: Niranjankatia Cobb: [phone] Above plan was discussed in details with family at bedside and dr Kayleen Norwood. Please call me if any further questions or concerns. Will continue to participate in the care of this patient. HPI: 
 
Patient is confused. Detailed ros not feasible. Medication List  
  
ASK your doctor about these medications   
acetaminophen 650 mg Tber Commonly known as:  TYLENOL ARTHRITIS PAIN 
 Take 1 Tab by mouth every eight (8) hours. amiodarone 200 mg tablet Commonly known as:  CORDARONE 
TAKE ONE TABLET EVERY DAY (MAKE AN APPT) 
  
amitriptyline 25 mg tablet Commonly known as:  ELAVIL 
  
aspirin 81 mg tablet CALMOSEPTINE 0.44-20.6 % Oint Generic drug:  menthol-zinc oxide 
  
carvedilol 3.125 mg tablet Commonly known as:  Lili Ruth Ann Take 1 Tab by mouth every twelve (12) hours. diclofenac 1 % Gel Commonly known as:  VOLTAREN 
  
docusate sodium 100 mg capsule Commonly known as:  Genice Flax Take 1 Cap by mouth two (2) times a day for 90 days. famotidine 20 mg tablet Commonly known as:  PEPCID Take 1 Tab by mouth daily. furosemide 20 mg tablet Commonly known as:  LASIX 
1 -2 tabs po daily prn for leg swelling or SOB 
  
isosorbide mononitrate ER 60 mg CR tablet Commonly known as:  IMDUR 
TAKE ONE TABLET EVERY MORNING 
  
memantine 10 mg tablet Commonly known as:  NAMENDA 
  
potassium chloride 20 mEq tablet Commonly known as:  K-DUR, KLOR-CON Take 1 Tab by mouth two (2) times a day. predniSONE 10 mg tablet Commonly known as:  DELTASONE 
1 tab po tid for 1 week , and 1 tab po bid until discontinued 
  
raNITIdine 75 mg Tab Commonly known as:  ZANTAC Current Facility-Administered Medications Medication Dose Route Frequency  ketorolac (TORADOL) injection 15 mg  15 mg IntraVENous BID  dextrose 5 % - 0.45% NaCl infusion  50 mL/hr IntraVENous CONTINUOUS  
 methylPREDNISolone (PF) (SOLU-MEDROL) injection 40 mg  40 mg IntraVENous Q12H  
 0.9% sodium chloride infusion 250 mL  250 mL IntraVENous PRN  
 aspirin delayed-release tablet 81 mg  81 mg Oral DAILY  amitriptyline (ELAVIL) tablet 25 mg  25 mg Oral QHS  memantine (NAMENDA) tablet 10 mg  10 mg Oral DAILY  isosorbide mononitrate ER (IMDUR) tablet 60 mg  60 mg Oral DAILY  carvedilol (COREG) tablet 3.125 mg  3.125 mg Oral Q12H  
 docusate sodium (COLACE) capsule 100 mg  100 mg Oral BID  
  famotidine (PEPCID) tablet 20 mg  20 mg Oral DAILY  acetaminophen (TYLENOL) tablet 650 mg  650 mg Oral Q6H PRN  
 ondansetron (ZOFRAN) injection 4 mg  4 mg IntraVENous Q6H PRN  
 sodium chloride (NS) flush 5-40 mL  5-40 mL IntraVENous Q8H  
 sodium chloride (NS) flush 5-40 mL  5-40 mL IntraVENous PRN  
 amiodarone (CORDARONE) tablet 200 mg  200 mg Oral DAILY Allergies: Patient has no known allergies. Temp (24hrs), Av °F (36.7 °C), Min:97.4 °F (36.3 °C), Max:98.4 °F (36.9 °C) Visit Vitals /65 (BP 1 Location: Right arm, BP Patient Position: At rest) Pulse 79 Temp 97.4 °F (36.3 °C) Resp 18 Wt 82.8 kg (182 lb 9.6 oz) SpO2 96% Breastfeeding? No  
BMI 30.39 kg/m² ROS:  
Patient is confused. Detailed ros not feasible. Physical Exam: 
 
Constitutional: She is oriented to person, place, and time. She appears well-developed and well-nourished. No distress. Drowsy. HENT:  
Head: Normocephalic and atraumatic. Eyes: Conjunctivae and EOM are normal. Right eye exhibits no discharge. Left eye exhibits no discharge. No scleral icterus. Neck: Normal range of motion. Neck supple. No tracheal deviation present. Cardiovascular: Normal rate, regular rhythm and normal heart sounds. No murmur heard. Pulmonary/Chest: Effort normal and breath sounds normal. No respiratory distress. She has no wheezes. She has no rales. Abdominal: Soft. She exhibits no distension. There is no tenderness. There is no rebound and no guarding. Large ventral wall hernia that is easily reducible. Musculoskeletal: Normal range of motion. She exhibits no edema or deformity. Neurological: She is oriented to person, place, and time. No cranial nerve deficit. no focal neurologic deficit. Skin: Skin is warm and dry. She is not diaphoretic. Soft tissue swelling seen on both LE - one on right leg, two on left leg - no overlying erythema/warmth.  Unable to assess tenderness since patient moans intermittently even when not touched. Psychiatric: mumbles words. Unable to assess.  
  
 
 
 
Labs: Results:  
Chemistry Recent Labs 02/06/19 
1115 02/05/19 
0038 * 100*  141  
K 4.8 5.2  107 CO2 27 28 BUN 72* 70* CREA 2.32* 2.64* CA 8.3* 8.2* AGAP 7 6 BUCR 31* 27* AP 64  --   
TP 5.2*  --   
ALB 2.3*  --   
GLOB 2.9  --   
AGRAT 0.8  --   
  
CBC w/Diff Recent Labs 02/07/19 
0247 02/06/19 
1115 02/06/19 
0111  02/05/19 0038 WBC  --  6.3  --   --  9.2 RBC  --  2.52*  --   --  2.58* HGB 7.3* 7.5* 7.4*   < > 7.5* HCT 22.4* 22.9* 23.4*   < > 23.6* PLT  --  160  --   --  137 GRANS  --  86*  --   --  66  
LYMPH  --  11*  --   --  23 EOS  --  0  --   --  2  
 < > = values in this interval not displayed. Microbiology No results for input(s): CULT in the last 72 hours. RADIOLOGY: 
 
All available imaging studies/reports in [facility] for this admission were reviewed Dr. Lance Dejesus, Infectious Disease Specialist 
[phone] February 7, 2019 
10:53 AM

## 2019-02-08 LAB
ANION GAP SERPL CALC-SCNC: 9 MMOL/L (ref 3–18)
BACTERIA SPEC CULT: NORMAL
BASOPHILS # BLD: 0 K/UL (ref 0–0.1)
BASOPHILS NFR BLD: 0 % (ref 0–2)
BUN SERPL-MCNC: 86 MG/DL (ref 7–18)
BUN/CREAT SERPL: 33 (ref 12–20)
CALCIUM SERPL-MCNC: 8 MG/DL (ref 8.5–10.1)
CHLORIDE SERPL-SCNC: 106 MMOL/L (ref 100–108)
CO2 SERPL-SCNC: 25 MMOL/L (ref 21–32)
CREAT SERPL-MCNC: 2.59 MG/DL (ref 0.6–1.3)
DIFFERENTIAL METHOD BLD: ABNORMAL
EOSINOPHIL # BLD: 0 K/UL (ref 0–0.4)
EOSINOPHIL NFR BLD: 0 % (ref 0–5)
ERYTHROCYTE [DISTWIDTH] IN BLOOD BY AUTOMATED COUNT: 16.9 % (ref 11.6–14.5)
GLUCOSE SERPL-MCNC: 162 MG/DL (ref 74–99)
HCT VFR BLD AUTO: 22.9 % (ref 35–45)
HGB BLD-MCNC: 7.5 G/DL (ref 12–16)
LYMPHOCYTES # BLD: 0.4 K/UL (ref 0.9–3.6)
LYMPHOCYTES NFR BLD: 5 % (ref 21–52)
MCH RBC QN AUTO: 29.8 PG (ref 24–34)
MCHC RBC AUTO-ENTMCNC: 32.8 G/DL (ref 31–37)
MCV RBC AUTO: 90.9 FL (ref 74–97)
MONOCYTES # BLD: 0.7 K/UL (ref 0.05–1.2)
MONOCYTES NFR BLD: 8 % (ref 3–10)
NEUTS SEG # BLD: 8.3 K/UL (ref 1.8–8)
NEUTS SEG NFR BLD: 87 % (ref 40–73)
PLATELET # BLD AUTO: 192 K/UL (ref 135–420)
PMV BLD AUTO: 8.8 FL (ref 9.2–11.8)
POTASSIUM SERPL-SCNC: 5.1 MMOL/L (ref 3.5–5.5)
RBC # BLD AUTO: 2.52 M/UL (ref 4.2–5.3)
SERVICE CMNT-IMP: NORMAL
SODIUM SERPL-SCNC: 140 MMOL/L (ref 136–145)
WBC # BLD AUTO: 9.5 K/UL (ref 4.6–13.2)

## 2019-02-08 PROCEDURE — 77030038269 HC DRN EXT URIN PURWCK BARD -A

## 2019-02-08 PROCEDURE — 74011250636 HC RX REV CODE- 250/636: Performed by: SPECIALIST

## 2019-02-08 PROCEDURE — 74011250637 HC RX REV CODE- 250/637: Performed by: HOSPITALIST

## 2019-02-08 PROCEDURE — 80048 BASIC METABOLIC PNL TOTAL CA: CPT

## 2019-02-08 PROCEDURE — 65660000000 HC RM CCU STEPDOWN

## 2019-02-08 PROCEDURE — 85025 COMPLETE CBC W/AUTO DIFF WBC: CPT

## 2019-02-08 PROCEDURE — 74011000258 HC RX REV CODE- 258: Performed by: INTERNAL MEDICINE

## 2019-02-08 PROCEDURE — 36415 COLL VENOUS BLD VENIPUNCTURE: CPT

## 2019-02-08 PROCEDURE — 74011000258 HC RX REV CODE- 258: Performed by: EMERGENCY MEDICINE

## 2019-02-08 RX ORDER — DEXTROSE MONOHYDRATE AND SODIUM CHLORIDE 5; .45 G/100ML; G/100ML
40 INJECTION, SOLUTION INTRAVENOUS CONTINUOUS
Status: DISPENSED | OUTPATIENT
Start: 2019-02-08 | End: 2019-02-09

## 2019-02-08 RX ADMIN — METHYLPREDNISOLONE SODIUM SUCCINATE 40 MG: 40 INJECTION, POWDER, FOR SOLUTION INTRAMUSCULAR; INTRAVENOUS at 21:13

## 2019-02-08 RX ADMIN — AMIODARONE HYDROCHLORIDE 200 MG: 200 TABLET ORAL at 10:49

## 2019-02-08 RX ADMIN — DEXTROSE MONOHYDRATE AND SODIUM CHLORIDE 50 ML/HR: 5; .45 INJECTION, SOLUTION INTRAVENOUS at 14:10

## 2019-02-08 RX ADMIN — ISOSORBIDE MONONITRATE 60 MG: 60 TABLET, EXTENDED RELEASE ORAL at 10:48

## 2019-02-08 RX ADMIN — DOCUSATE SODIUM 100 MG: 100 CAPSULE, LIQUID FILLED ORAL at 10:48

## 2019-02-08 RX ADMIN — Medication 10 ML: at 06:08

## 2019-02-08 RX ADMIN — CARVEDILOL 3.12 MG: 3.12 TABLET, FILM COATED ORAL at 10:48

## 2019-02-08 RX ADMIN — Medication 10 ML: at 13:40

## 2019-02-08 RX ADMIN — FAMOTIDINE 20 MG: 20 TABLET ORAL at 10:49

## 2019-02-08 RX ADMIN — METHYLPREDNISOLONE SODIUM SUCCINATE 40 MG: 40 INJECTION, POWDER, FOR SOLUTION INTRAMUSCULAR; INTRAVENOUS at 10:49

## 2019-02-08 RX ADMIN — CARVEDILOL 3.12 MG: 3.12 TABLET, FILM COATED ORAL at 21:13

## 2019-02-08 RX ADMIN — ASPIRIN 81 MG: 81 TABLET, COATED ORAL at 10:49

## 2019-02-08 RX ADMIN — DEXTROSE MONOHYDRATE AND SODIUM CHLORIDE 40 ML/HR: 5; .45 INJECTION, SOLUTION INTRAVENOUS at 16:00

## 2019-02-08 RX ADMIN — AMITRIPTYLINE HYDROCHLORIDE 25 MG: 50 TABLET, FILM COATED ORAL at 21:13

## 2019-02-08 RX ADMIN — MEMANTINE 10 MG: 10 TABLET ORAL at 10:48

## 2019-02-08 RX ADMIN — KETOROLAC TROMETHAMINE 15 MG: 15 INJECTION, SOLUTION INTRAMUSCULAR; INTRAVENOUS at 10:49

## 2019-02-08 RX ADMIN — Medication 10 ML: at 21:14

## 2019-02-08 NOTE — PALLIATIVE CARE
Goals of care are set; full code, benefits and burdens of resuscitation  have been discussed at length with family.  wishes full code status. We will sign off at this time.

## 2019-02-08 NOTE — PROGRESS NOTES
Bedside and Verbal shift change report given to this nurse (oncoming nurse) by Coleobi Song (offgoing nurse). Report included the following information SBAR and Kardex Pt being monitored for output. Difficult to measure due to incontinence. PT did void multiple times and purewick only able to capture 100ml. Monica Aaron

## 2019-02-08 NOTE — PROGRESS NOTES
Restarted D5 1/2 saline per Dr. Karen Moya orders as previous order was only for a total of 12 hours.

## 2019-02-08 NOTE — ROUTINE PROCESS
Bedside and Verbal shift change report given to Francy Braden RN (oncoming nurse) by Cherelle Ryan RN (offgoing nurse). Report included the following information SBAR, Kardex, Intake/Output, MAR and Recent Results.

## 2019-02-08 NOTE — PROGRESS NOTES
Hospitalist Progress NotePatient: Elvia Owens MRN: [id number]  CSN: [id number] YOB: 1931  Age: 80 y.o. Sex: female DOA: 1/30/2019 LOS:  LOS: 9 days Patient lying in bed in NAD, son and daughter at bedside Assessment/Plan Active Problems: 
  NSTEMI (non-ST elevated myocardial infarction) ([facility] [address] (1/30/2019) Dementia (1/30/2019) Skin lesions (2/6/2019) Overview: 3, inflammatory lesions to BLE Plan -  
 
1. NSTEMI - medical management per cardio 2. Anemia -monitor 3. Chronic combined CHF - lasix as needed 4. Baseline Dementia - continue namenda 5. H/o CKD 4 - nephro following, worsening, d/w Nephro- will d/c toradol, gentle hydration 6. H/o AOCD - monitor hgb , stool hemoccult negative 7. H/o SLE - rheum following, trial of steroids per rehum 8-Legs - ? Abscesses, ? inflammatory nodules  - off abx, trial of steroids, s/p IR guided needle - follow cx D/w son and daughter at bedside, d/w  Case discussed with:  []Patient  [x]Family  []Nursing  []Case Management DVT Prophylaxis:  []Lovenox  []Hep SQ  [x]SCDs  []Coumadin   []On Heparin gtt Objective:  
  
Visit Vitals /67 (BP 1 Location: Right arm, BP Patient Position: At rest) Pulse 70 Temp 97.7 °F (36.5 °C) Resp 18 Wt 83.6 kg (184 lb 3.2 oz) SpO2 98% Breastfeeding? No  
BMI 30.65 kg/m² Physical Exam: 
General:  Awake,  
Cardiovascular:  S1S2+, RRR Pulmonary:  CTA b/l GI:  Soft, BS+, NT, ND Extremities:  trace edema, ? inflammatory lesions noted on both legs. Pulses 1+ Lue swelling Intake and Output: 
Current Shift:  No intake/output data recorded. Last three shifts:  02/06 1901 - 02/08 0700 In: 1370 [P.O.:730; I.V.:640] Out: 1800 [Urine:1800] Recent Results (from the past 24 hour(s)) CBC WITH AUTOMATED DIFF Collection Time: 02/08/19  4:39 AM  
Result Value Ref Range WBC 9.5 4.6 - 13.2 K/uL RBC 2.52 (L) 4.20 - 5.30 M/uL HGB 7.5 (L) 12.0 - 16.0 g/dL HCT 22.9 (L) 35.0 - 45.0 % MCV 90.9 74.0 - 97.0 FL  
 MCH 29.8 24.0 - 34.0 PG  
 MCHC 32.8 31.0 - 37.0 g/dL  
 RDW 16.9 (H) 11.6 - 14.5 % PLATELET 864 690 - 474 K/uL MPV 8.8 (L) 9.2 - 11.8 FL  
 NEUTROPHILS 87 (H) 40 - 73 % LYMPHOCYTES 5 (L) 21 - 52 % MONOCYTES 8 3 - 10 % EOSINOPHILS 0 0 - 5 % BASOPHILS 0 0 - 2 %  
 ABS. NEUTROPHILS 8.3 (H) 1.8 - 8.0 K/UL  
 ABS. LYMPHOCYTES 0.4 (L) 0.9 - 3.6 K/UL  
 ABS. MONOCYTES 0.7 0.05 - 1.2 K/UL  
 ABS. EOSINOPHILS 0.0 0.0 - 0.4 K/UL  
 ABS. BASOPHILS 0.0 0.0 - 0.1 K/UL  
 DF AUTOMATED METABOLIC PANEL, BASIC Collection Time: 02/08/19  4:39 AM  
Result Value Ref Range Sodium 140 136 - 145 mmol/L Potassium 5.1 3.5 - 5.5 mmol/L Chloride 106 100 - 108 mmol/L  
 CO2 25 21 - 32 mmol/L Anion gap 9 3.0 - 18 mmol/L Glucose 162 (H) 74 - 99 mg/dL BUN 86 (H) 7.0 - 18 MG/DL Creatinine 2.59 (H) 0.6 - 1.3 MG/DL  
 BUN/Creatinine ratio 33 (H) 12 - 20 GFR est AA 21 (L) >60 ml/min/1.73m2 GFR est non-AA 17 (L) >60 ml/min/1.73m2 Calcium 8.0 (L) 8.5 - 10.1 MG/DL Current Meds - Reviewed Lab Results Component Value Date/Time  Glucose 162 (H) 02/08/2019 04:39 AM  
 Glucose 115 (H) 02/06/2019 11:15 AM  
 Glucose 100 (H) 02/05/2019 12:38 AM  
 Glucose 98 02/04/2019 02:59 AM  
 Glucose 89 02/03/2019 07:49 AM  
 Glucose 88 03/17/2016 11:15 AM  
  
 
Ghazal Gamino MD 
2/8/2019,

## 2019-02-08 NOTE — PROGRESS NOTES
Problem: Mobility Impaired (Adult and Pediatric) Goal: *Acute Goals and Plan of Care (Insert Text) Physical Therapy Goals Initiated 2/4/2019 and to be accomplished within 7 day(s) 1. Patient will move from supine to sit and sit to supine , scoot up and down and roll side to side in bed with minimal assistance/contact guard assist.    
2.  Patient will transfer from bed to chair and chair to bed with contact guard assist using the least restrictive device. 3.  Patient will perform sit to stand with contact guard assist. 
4.  Patient will ambulate with contact guard assist for 50 feet with the least restrictive device. 5.  Patient will ascend/descend 3 stairs with 1-2 handrail(s) with minimal assistance/contact guard assist. 
  
 
1459 - per pt's daughter, pt has been unable to wake since this AM. Pt has not eaten lunch and did not wake while repositioned in bed by staff. Will hold PT tx and f/u at later date.

## 2019-02-08 NOTE — PROGRESS NOTES
This writer faxed [facility] order to [facility]. This writer called [facility] and confirmed with their staff. Rufina Londono RN, BSN  [phone]

## 2019-02-08 NOTE — PROGRESS NOTES
Bedside and Verbal shift change [facility] ([facility]coming nurse) by this RN (offgoing nurse). Report included the following information SBAR and Kardex.

## 2019-02-08 NOTE — PROGRESS NOTES
This writer called [address] [place] [address] to inform that d/c was canceled for today. Dayron [place] RN, BSN  [phone]

## 2019-02-08 NOTE — PROGRESS NOTES
In Patient Progress note Admit Date: 1/30/2019 Impression:  
 
1 acute kidney injury on chronic kidney disease stage IV: Baseline creatinine seems to be around 2 range, 
 creatinine slightly worse ~ 2.5, BP has improved , now on steroids per Rheumatology ,  
Creat at baseline , BUN higher d/t being on steroids. Her volume status looks ok No need for diuretics , intake is improving, ok to continue gentle IVF overnight I would d/c Toradol as nephrotoxic  
2 NSTMI , has been evaluated by cardiology, continue medical management 3 atrial fibrillation, on amiodarone continue per cardiology 4 combined systolic and diastolic heart failure, compensated 5 hyperkalemia , low k diet , if continues to trend up may us pateromer daily to keep down .  
6 h/o lupus , rheumatology on board, serologies send , started on steroids , elevated Sed rate 7 lower ext lesions ,  ID/rheumatology  following Please call with questions, 
 
Bharati Agosto MD FASN Cell [phone] Pager: [phone] Subjective:  
 
Awake, sitting in bed eating her breakfast 
 
Current Facility-Administered Medications:  
  ketorolac (TORADOL) injection 15 mg, 15 mg, IntraVENous, BID, Osmar Knight MD, 15 mg at 02/08/19 1049 
  dextrose 5 % - 0.45% NaCl infusion, 50 mL/hr, IntraVENous, CONTINUOUS, Bichu, Enzo Vidal MD, Stopped at 02/07/19 7140   methylPREDNISolone (PF) (SOLU-MEDROL) injection 40 mg, 40 mg, IntraVENous, Q12H, Osmar Knight MD, 40 mg at 02/08/19 1049 
  0.9% sodium chloride infusion 250 mL, 250 mL, IntraVENous, PRN, Shaheen Evans MD, Stopped at 02/03/19 3235   aspirin delayed-release tablet 81 mg, 81 mg, Oral, DAILY, Chaim Cr MD, 81 mg at 02/08/19 1049 
  amitriptyline (ELAVIL) tablet 25 mg, 25 mg, Oral, QHS, Chaim Cr MD, 25 mg at 02/07/19 2109   memantine (NAMENDA) tablet 10 mg, 10 mg, Oral, DAILY, Chaim Cr MD, 10 mg at 02/08/19 1048   isosorbide mononitrate ER (IMDUR) tablet 60 mg, 60 mg, Oral, DAILY, Jess Potts MD, 60 mg at 02/08/19 1048   carvedilol (COREG) tablet 3.125 mg, 3.125 mg, Oral, Q12H, Jess Potts MD, 3.125 mg at 02/08/19 1048   docusate sodium (COLACE) capsule 100 mg, 100 mg, Oral, BID, Jess Potts MD, 100 mg at 02/08/19 1048   famotidine (PEPCID) tablet 20 mg, 20 mg, Oral, DAILY, Jess Potts MD, 20 mg at 02/08/19 1049   acetaminophen (TYLENOL) tablet 650 mg, 650 mg, Oral, Q6H PRN, Jess Potts MD, 650 mg at 02/03/19 2215   ondansetron (ZOFRAN) injection 4 mg, 4 mg, IntraVENous, Q6H PRN, Jess Potts MD 
  sodium chloride (NS) flush 5-40 mL, 5-40 mL, IntraVENous, Q8H, Jess Potts MD, 10 mL at 02/08/19 9889   sodium chloride (NS) flush 5-40 mL, 5-40 mL, IntraVENous, PRN, Jess Potts MD, 10 mL at 02/06/19 0946 
  amiodarone (CORDARONE) tablet 200 mg, 200 mg, Oral, DAILY, Jess Potts MD, 200 mg at 02/08/19 1049 Objective:  
 
Visit Vitals /68 (BP 1 Location: Right arm, BP Patient Position: At rest) Pulse 69 Temp 96.5 °F (35.8 °C) Resp 18 Wt 83.6 kg (184 lb 3.2 oz) SpO2 99% Breastfeeding? No  
BMI 30.65 kg/m² Intake/Output Summary (Last 24 hours) at 2/8/2019 1332 Last data filed at 2/8/2019 1688 Gross per 24 hour Intake 530 ml Output 1600 ml Net -1070 ml Physical Exam:  
 
Mmm, NAD Lungs: good air entry, clear to auscultation Cardiovascular system: S1, S2 wnl , JVD+ Abdomen: soft, non tender, non distended Extremities: no edema Data Review: 
 
Recent Labs 02/08/19 
0461 WBC 9.5  
RBC 2.52* HCT 22.9*  
MCV 90.9 MCH 29.8 MCHC 32.8  
RDW 16.9* Recent Labs 02/08/19 
0439 02/06/19 
1115 BUN 86* 72* CREA 2.59* 2.32* CA 8.0* 8.3* ALB  --  2.3*  
K 5.1 4.8  141  107 CO2 25 27 * 115* Bee Wilson MD

## 2019-02-09 LAB
ANION GAP SERPL CALC-SCNC: 8 MMOL/L (ref 3–18)
BUN SERPL-MCNC: 86 MG/DL (ref 7–18)
BUN/CREAT SERPL: 39 (ref 12–20)
CALCIUM SERPL-MCNC: 8.2 MG/DL (ref 8.5–10.1)
CHLORIDE SERPL-SCNC: 106 MMOL/L (ref 100–108)
CO2 SERPL-SCNC: 26 MMOL/L (ref 21–32)
CREAT SERPL-MCNC: 2.22 MG/DL (ref 0.6–1.3)
GLUCOSE SERPL-MCNC: 105 MG/DL (ref 74–99)
POTASSIUM SERPL-SCNC: 4.8 MMOL/L (ref 3.5–5.5)
SODIUM SERPL-SCNC: 140 MMOL/L (ref 136–145)

## 2019-02-09 PROCEDURE — 74011250636 HC RX REV CODE- 250/636: Performed by: EMERGENCY MEDICINE

## 2019-02-09 PROCEDURE — 80048 BASIC METABOLIC PNL TOTAL CA: CPT

## 2019-02-09 PROCEDURE — 36415 COLL VENOUS BLD VENIPUNCTURE: CPT

## 2019-02-09 PROCEDURE — 65660000000 HC RM CCU STEPDOWN

## 2019-02-09 PROCEDURE — 77030038269 HC DRN EXT URIN PURWCK BARD -A

## 2019-02-09 PROCEDURE — 74011250637 HC RX REV CODE- 250/637: Performed by: HOSPITALIST

## 2019-02-09 RX ORDER — PREDNISONE 20 MG/1
20 TABLET ORAL
Status: DISCONTINUED | OUTPATIENT
Start: 2019-02-10 | End: 2019-02-10 | Stop reason: HOSPADM

## 2019-02-09 RX ADMIN — Medication 10 ML: at 05:20

## 2019-02-09 RX ADMIN — Medication 10 ML: at 22:23

## 2019-02-09 RX ADMIN — Medication 10 ML: at 18:01

## 2019-02-09 RX ADMIN — METHYLPREDNISOLONE SODIUM SUCCINATE 40 MG: 40 INJECTION, POWDER, FOR SOLUTION INTRAMUSCULAR; INTRAVENOUS at 22:22

## 2019-02-09 NOTE — ROUTINE PROCESS
Bedside and Verbal shift change report given to Cristine Belcher RN 
 (oncoming nurse) by Javed Gonzalez RN (offgoing nurse). Report included the following information SBAR, Kardex, Intake/Output, MAR and Recent Results.

## 2019-02-09 NOTE — PROGRESS NOTES
Hospitalist Progress NotePatient: Madeline Hernandez MRN: [id number]  CSN: [id number] YOB: 1931  Age: 80 y.o. Sex: female DOA: 1/30/2019 LOS:  LOS: 10 days Patient lying in bed in NAD, daughter at bedside Assessment/Plan Active Problems: 
  NSTEMI (non-ST elevated myocardial infarction) ([address] (1/30/2019) Dementia (1/30/2019) Skin lesions (2/6/2019) Overview: 3, inflammatory lesions to BLE Plan -  
 
1. NSTEMI - medical management per cardio 2. Anemia -monitor 3. Chronic combined CHF - lasix as needed 4. Baseline Dementia - continue namenda 5. H/o CKD 4 - nephro following, off toradol 6. H/o AOCD - monitor hgb , stool hemoccult negative 7. H/o SLE - rheum following, trial of steroids per rehum 8-Likely inflammatory nodules- on iv steroids as per dr Jaclyn French. D/w Dr Jaclyn French and she recommends to transition to Prednisone 20 mg daily from tomorrow D/w daughter at bedside Discharge planning. Family declines SNF Case discussed with:  []Patient  [x]Family  []Nursing  []Case Management DVT Prophylaxis:  []Lovenox  []Hep SQ  [x]SCDs  []Coumadin   []On Heparin gtt Objective:  
  
Visit Vitals /69 (BP 1 Location: Right arm, BP Patient Position: At rest) Pulse 70 Temp 97.7 °F (36.5 °C) Resp 22 Wt 83.6 kg (184 lb 3.3 oz) SpO2 99% Breastfeeding? No  
BMI 30.65 kg/m² Physical Exam: 
General:  Sleepy Cardiovascular:  S1S2+, RRR Pulmonary:  CTA b/l GI:  Soft, BS+, NT, ND Extremities:  trace edema, ? inflammatory lesions noted on both legs. Pulses 1+ Lue swelling Intake and Output: 
Current Shift:  No intake/output data recorded. Last three shifts:  02/07 1901 - 02/09 0700 In: 1082 [P.O.:530; I.V.:552] Out: 600 [Urine:600] Recent Results (from the past 24 hour(s)) METABOLIC PANEL, BASIC Collection Time: 02/09/19  1:53 PM  
Result Value Ref Range  Sodium 140 136 - 145 mmol/L  
 Potassium 4.8 3.5 - 5.5 mmol/L Chloride 106 100 - 108 mmol/L  
 CO2 26 21 - 32 mmol/L Anion gap 8 3.0 - 18 mmol/L Glucose 105 (H) 74 - 99 mg/dL BUN 86 (H) 7.0 - 18 MG/DL Creatinine 2.22 (H) 0.6 - 1.3 MG/DL  
 BUN/Creatinine ratio 39 (H) 12 - 20 GFR est AA 25 (L) >60 ml/min/1.73m2 GFR est non-AA 21 (L) >60 ml/min/1.73m2 Calcium 8.2 (L) 8.5 - 10.1 MG/DL Current Meds - Reviewed Lab Results Component Value Date/Time  Glucose 105 (H) 02/09/2019 01:53 PM  
 Glucose 162 (H) 02/08/2019 04:39 AM  
 Glucose 115 (H) 02/06/2019 11:15 AM  
 Glucose 100 (H) 02/05/2019 12:38 AM  
 Glucose 98 02/04/2019 02:59 AM  
 Glucose 88 03/17/2016 11:15 AM  
  
 
Audi Santiago MD 
2/9/2019,

## 2019-02-09 NOTE — PROGRESS NOTES
Labs not back , noted patient has had incontinence of urine and unable to measure as per nursing note. WIll f/u on the labs , request nursing to call me when BMP is back. Available for questions. Please call with questions Joe Vila MD [facility] Cell [phone] Pager: [phone]

## 2019-02-10 VITALS
RESPIRATION RATE: 18 BRPM | WEIGHT: 183.8 LBS | OXYGEN SATURATION: 98 % | BODY MASS INDEX: 30.59 KG/M2 | DIASTOLIC BLOOD PRESSURE: 65 MMHG | TEMPERATURE: 97.3 F | HEART RATE: 71 BPM | SYSTOLIC BLOOD PRESSURE: 123 MMHG

## 2019-02-10 LAB
CALCIUM SERPL-MCNC: 8.2 MG/DL (ref 8.5–10.1)
IRON SATN MFR SERPL: 25 %
IRON SERPL-MCNC: 51 UG/DL (ref 50–175)
PTH-INTACT SERPL-MCNC: 191.5 PG/ML (ref 18.4–88)
TIBC SERPL-MCNC: 202 UG/DL (ref 250–450)

## 2019-02-10 PROCEDURE — 83540 ASSAY OF IRON: CPT

## 2019-02-10 PROCEDURE — 74011636637 HC RX REV CODE- 636/637: Performed by: EMERGENCY MEDICINE

## 2019-02-10 PROCEDURE — 36415 COLL VENOUS BLD VENIPUNCTURE: CPT

## 2019-02-10 PROCEDURE — 74011250637 HC RX REV CODE- 250/637: Performed by: HOSPITALIST

## 2019-02-10 PROCEDURE — 83970 ASSAY OF PARATHORMONE: CPT

## 2019-02-10 PROCEDURE — 77030038269 HC DRN EXT URIN PURWCK BARD -A

## 2019-02-10 PROCEDURE — 77030012890

## 2019-02-10 PROCEDURE — 77030027138 HC INCENT SPIROMETER -A

## 2019-02-10 RX ORDER — MELOXICAM 7.5 MG/1
7.5 TABLET ORAL EVERY OTHER DAY
Qty: 7 TAB | Refills: 0 | Status: SHIPPED | OUTPATIENT
Start: 2019-02-10 | End: 2019-03-20

## 2019-02-10 RX ORDER — PREDNISONE 20 MG/1
20 TABLET ORAL
Qty: 30 TAB | Refills: 0 | Status: SHIPPED | OUTPATIENT
Start: 2019-02-11 | End: 2019-03-20

## 2019-02-10 RX ADMIN — FAMOTIDINE 20 MG: 20 TABLET ORAL at 08:42

## 2019-02-10 RX ADMIN — DOCUSATE SODIUM 100 MG: 100 CAPSULE, LIQUID FILLED ORAL at 08:42

## 2019-02-10 RX ADMIN — ISOSORBIDE MONONITRATE 60 MG: 60 TABLET, EXTENDED RELEASE ORAL at 08:42

## 2019-02-10 RX ADMIN — MEMANTINE 10 MG: 10 TABLET ORAL at 08:42

## 2019-02-10 RX ADMIN — AMIODARONE HYDROCHLORIDE 200 MG: 200 TABLET ORAL at 08:41

## 2019-02-10 RX ADMIN — ASPIRIN 81 MG: 81 TABLET, COATED ORAL at 08:42

## 2019-02-10 RX ADMIN — CARVEDILOL 3.12 MG: 3.12 TABLET, FILM COATED ORAL at 08:41

## 2019-02-10 RX ADMIN — PREDNISONE 20 MG: 20 TABLET ORAL at 08:42

## 2019-02-10 NOTE — PROGRESS NOTES
Reviewed labs , noted rheumatology note , noted possible plans for d/c tomorrow and agree. Off toradol and continue prednisone per rheumatology. Renal parameters stable ,  
May need Prn Lasix for chronic systolic CHF but presently compensated . Continues to have anemia from advanced CKD , Hb in 7 range , may be helpful if we can improve this in setting of her chr CHF . Check iron levels , and give a dose of epogen to bring it up , needs referral to hematology outpatient clinic for continuation after discharge. Phos is ok, check pth with next blood draw. Please call with questions, 
 
Amaury Rubio MD FASN Cell [phone] Pager: [phone]

## 2019-02-10 NOTE — DISCHARGE INSTRUCTIONS
DISCHARGE SUMMARY from Nurse    PATIENT INSTRUCTIONS:    After general anesthesia or intravenous sedation, for 24 hours or while taking prescription Narcotics:  · Limit your activities  · Do not drive and operate hazardous machinery  · Do not make important personal or business decisions  · Do  not drink alcoholic beverages  · If you have not urinated within 8 hours after discharge, please contact your surgeon on call. Report the following to your surgeon:  · Excessive pain, swelling, redness or odor of or around the surgical area  · Temperature over 100.5  · Nausea and vomiting lasting longer than 4 hours or if unable to take medications  · Any signs of decreased circulation or nerve impairment to extremity: change in color, persistent  numbness, tingling, coldness or increase pain  · Any questions    What to do at Home:  Recommended activity: Activity as tolerated,     If you experience any of the following symptoms chest pain,increased shortness of breath, please follow up with emergency department. *  Please give a list of your current medications to your Primary Care Provider. *  Please update this list whenever your medications are discontinued, doses are      changed, or new medications (including over-the-counter products) are added. *  Please carry medication information at all times in case of emergency situations. These are general instructions for a healthy lifestyle:    No smoking/ No tobacco products/ Avoid exposure to second hand smoke  Surgeon General's Warning:  Quitting smoking now greatly reduces serious risk to your health.     Obesity, smoking, and sedentary lifestyle greatly increases your risk for illness    A healthy diet, regular physical exercise & weight monitoring are important for maintaining a healthy lifestyle    You may be retaining fluid if you have a history of heart failure or if you experience any of the following symptoms:  Weight gain of 3 pounds or more overnight or 5 pounds in a week, increased swelling in our hands or feet or shortness of breath while lying flat in bed. Please call your doctor as soon as you notice any of these symptoms; do not wait until your next office visit. Recognize signs and symptoms of STROKE:    F-face looks uneven    A-arms unable to move or move unevenly    S-speech slurred or non-existent    T-time-call 911 as soon as signs and symptoms begin-DO NOT go       Back to bed or wait to see if you get better-TIME IS BRAIN. Warning Signs of HEART ATTACK     Call 911 if you have these symptoms:   Chest discomfort. Most heart attacks involve discomfort in the center of the chest that lasts more than a few minutes, or that goes away and comes back. It can feel like uncomfortable pressure, squeezing, fullness, or pain.  Discomfort in other areas of the upper body. Symptoms can include pain or discomfort in one or both arms, the back, neck, jaw, or stomach.  Shortness of breath with or without chest discomfort.  Other signs may include breaking out in a cold sweat, nausea, or lightheadedness. Don't wait more than five minutes to call 911 - MINUTES MATTER! Fast action can save your life. Calling 911 is almost always the fastest way to get lifesaving treatment. Emergency Medical Services staff can begin treatment when they arrive -- up to an hour sooner than if someone gets to the hospital by car. The discharge information has been reviewed with the caregiver. The caregiver verbalized understanding. Discharge medications reviewed with the caregiver and appropriate educational materials and side effects teaching were provided. ___________________________________________________________________________________________________________________________________MyChart Activation    Thank you for requesting access to [employer]. Please follow the instructions below to securely access and download your online medical record.  [employer] allows you to send messages to your doctor, view your test results, renew your prescriptions, schedule appointments, and more. How Do I Sign Up? 1. In your internet browser, go to [email or web address]  2. Click on the First Time User? Click Here link in the Sign In box. You will be redirect to the New Member Sign Up page. 3. Enter your [employer] Access Code exactly as it appears below. You will not need to use this code after youve completed the sign-up process. If you do not sign up before the expiration date, you must request a new code. [employer] Access Code: Activation code not generated  Current [employer] Status: Patient Declined (This is the date your [employer] access code will )    4. Enter the last four digits of your Social Security Number (xxxx) and Date of Birth (mm/dd/yyyy) as indicated and click Submit. You will be taken to the next sign-up page. 5. Create a [employer] ID. This will be your [employer] login ID and cannot be changed, so think of one that is secure and easy to remember. 6. Create a [employer] password. You can change your password at any time. 7. Enter your Password Reset Question and Answer. This can be used at a later time if you forget your password. 8. Enter your e-mail address. You will receive e-mail notification when new information is available in [address] 9. Click Sign Up. You can now view and download portions of your medical record. 10. Click the Download Summary menu link to download a portable copy of your medical information. Additional Information    If you have questions, please visit the Frequently Asked Questions section of the [employer] website at [email or web address] Remember, [employer] is NOT to be used for urgent needs. For medical emergencies, dial 911.

## 2019-02-10 NOTE — PROGRESS NOTES
Chart reviewed, and her level of discomfort has improved on toradol and iv solumedrol, however there Is a bump in her creatinine level. Her attending is considering  D/C tomorrow. Labs. Yaquelin pending, anti-ds DNA  is wnl, as well as anti- ssa, anti- ssb, anti-sm, anti.rnp. I f d/c tomorrow I rec. Prednisone 20 mg po daily, K+ supplement, mobic 7.5 mg po every other day to keep patient comfortable. Follow up in my office in 1-2 weeks. Thanks, Rehana Knight M.D.,F.A.C.R. 02-

## 2019-02-10 NOTE — ROUTINE PROCESS
Received bedside report from [address] [place] [address], patient was resting in bed with visitor at bedside, HOB elevated, bed in lowest position and call button within reach of patient. Gave bedside report to Swati Olivares RN, using SBAR, MAR, and Kardex.

## 2019-02-11 ENCOUNTER — PATIENT OUTREACH (OUTPATIENT)
Dept: CARDIOLOGY CLINIC | Age: 84
End: 2019-02-11

## 2019-02-11 NOTE — PROGRESS NOTES
Heart Failure Follow Up Call NN contacted the patient by telephone to perform CHF Follow Up. Verified  and address as identifiers. Shahida Dorsey caregiver daughter PHI confirmed reports patient is Brenden martinez is sleeping\". Daily Weight (document daily weights in flowsheets):   patient is confined to bed at this time, not able to weigh Amount:  183 lbs last hospital weight Provider Notified:   No  
 
Zone:(Pt Reported)  green Goals  Knowledge and adherence medication (ie. action, side effects, missed dose, etc.) Caregiver to provide teach back on medications on/by 3/10/19.  Patient verbalizes understanding of self-management goals of living with Congestive Heart Failure Caregiver will verbalize heart failure zones and report red flags to physician/NN on/by 4/10/19.  Prevent complications post hospitalization. Caregiver to take or make arrangements for patient to follow up with appointments post hospitalization on/by 19. Needs addressed from pathway:   
24-48 Hours- or initial contact Review/Verification: 
? Identify care team 
? Disposition (Home, SNF, IRF LTC, longterm, Hospice) ? DC Instructions, Education, and HF Zones ? James Gonzalez in place. [facility], Dialysis center) ? Evaluate DME needs; arrange home equipment ? Advanced care planning (e.g.: Palliative Care; Hospice ? Type of monitoring ? Labs/Diagnostics to follow ? Follow-up apts are arranged ? Identify transportation Med Rec*  
? Beta Blocker, Baseline Labs* 
? BMP 
? BUN/Creat. 
? PT/INR 
? Hgb/Hct ? WBC 
 
MANNY Documentation:? Goals ? Challenges/Plan ? Weight   
? Edema ? Symptoms Education Disease Management: 
? Cardiac Low-sodium Diet (No added sodium; 1500mg as indicated). If Diabetic:  
?  include carb-controlled ? Fluid restriction (if indicated) ? Comorbidity Management ? Advance medical directive status PCP/Specialist follow up: Future Appointments Date Time Provider Win Mukherjee 2019 12:15 PM Marjan Piña MD [facility] [address] Cardiologist consult while IP: yes Palliative consult while IP:    yes EF: 31-35% on 18. Type of HF:   HFrEF Cardiac Device present: pacemaker Heart Failure Medications: Betablocker Disposition of patient:  Home, Home Health HH Services/Tele Monitor[facility] [address] Social support: family Do you have a Scale:    patient unable to weigh at this time bedbound How often do you weigh:  not at this time Does patient have an Advance Directive:  not on file Advance Directive scanned into patients chart:  No  
 
Patient reminded that there are physicians on call 24 hours a day / 7 days a week (M-F 5pm to 8am and from Friday 5pm until Monday 8a for the weekend) should the patient have questions or concerns. Patient reminded to call 911 if situation is emergent or patient feels the situation is emergent. Pt verbalizes understanding.

## 2019-02-12 LAB
BACTERIA SPEC CULT: NORMAL
BACTERIA SPEC CULT: NORMAL
GRAM STN SPEC: NORMAL
SERVICE CMNT-IMP: NORMAL
SERVICE CMNT-IMP: NORMAL

## 2019-02-13 ENCOUNTER — HOSPITAL ENCOUNTER (EMERGENCY)
Age: 84
Discharge: HOME | End: 2019-02-13
Attending: EMERGENCY MEDICINE
Payer: MEDICARE

## 2019-02-13 ENCOUNTER — APPOINTMENT (OUTPATIENT)
Dept: CT IMAGING | Age: 84
End: 2019-02-13
Attending: EMERGENCY MEDICINE
Payer: MEDICARE

## 2019-02-13 VITALS
SYSTOLIC BLOOD PRESSURE: 128 MMHG | HEIGHT: 64 IN | BODY MASS INDEX: 27.31 KG/M2 | TEMPERATURE: 98 F | DIASTOLIC BLOOD PRESSURE: 68 MMHG | WEIGHT: 160 LBS | RESPIRATION RATE: 10 BRPM | OXYGEN SATURATION: 99 % | HEART RATE: 70 BPM

## 2019-02-13 DIAGNOSIS — K59.00 CONSTIPATION, UNSPECIFIED CONSTIPATION TYPE: Primary | ICD-10-CM

## 2019-02-13 DIAGNOSIS — R14.0 ABDOMINAL DISTENTION: ICD-10-CM

## 2019-02-13 DIAGNOSIS — S32.010A CLOSED COMPRESSION FRACTURE OF FIRST LUMBAR VERTEBRA, INITIAL ENCOUNTER: ICD-10-CM

## 2019-02-13 LAB
ALBUMIN SERPL-MCNC: 2.8 G/DL (ref 3.4–5)
ALBUMIN/GLOB SERPL: 0.8 {RATIO} (ref 0.8–1.7)
ALP SERPL-CCNC: 69 U/L (ref 45–117)
ALT SERPL-CCNC: 17 U/L (ref 13–56)
ANION GAP SERPL CALC-SCNC: 4 MMOL/L (ref 3–18)
APPEARANCE UR: CLEAR
APTT PPP: 26.8 SEC (ref 23–36.4)
AST SERPL-CCNC: 20 U/L (ref 15–37)
BASOPHILS # BLD: 0 K/UL (ref 0–0.1)
BASOPHILS NFR BLD: 0 % (ref 0–2)
BILIRUB SERPL-MCNC: 0.7 MG/DL (ref 0.2–1)
BILIRUB UR QL: NEGATIVE
BNP SERPL-MCNC: 7102 PG/ML (ref 0–1800)
BUN SERPL-MCNC: 65 MG/DL (ref 7–18)
BUN/CREAT SERPL: 40 (ref 12–20)
CALCIUM SERPL-MCNC: 8.5 MG/DL (ref 8.5–10.1)
CHLORIDE SERPL-SCNC: 109 MMOL/L (ref 100–108)
CO2 SERPL-SCNC: 31 MMOL/L (ref 21–32)
COLOR UR: YELLOW
CREAT SERPL-MCNC: 1.64 MG/DL (ref 0.6–1.3)
DIFFERENTIAL METHOD BLD: ABNORMAL
EOSINOPHIL # BLD: 0 K/UL (ref 0–0.4)
EOSINOPHIL NFR BLD: 0 % (ref 0–5)
ERYTHROCYTE [DISTWIDTH] IN BLOOD BY AUTOMATED COUNT: 17.7 % (ref 11.6–14.5)
GLOBULIN SER CALC-MCNC: 3.3 G/DL (ref 2–4)
GLUCOSE SERPL-MCNC: 104 MG/DL (ref 74–99)
GLUCOSE UR STRIP.AUTO-MCNC: NEGATIVE MG/DL
HCT VFR BLD AUTO: 29.7 % (ref 35–45)
HGB BLD-MCNC: 9.3 G/DL (ref 12–16)
HGB UR QL STRIP: NEGATIVE
INR PPP: 1.1 (ref 0.8–1.2)
KETONES UR QL STRIP.AUTO: NEGATIVE MG/DL
LEUKOCYTE ESTERASE UR QL STRIP.AUTO: NEGATIVE
LYMPHOCYTES # BLD: 1.4 K/UL (ref 0.9–3.6)
LYMPHOCYTES NFR BLD: 14 % (ref 21–52)
MCH RBC QN AUTO: 29 PG (ref 24–34)
MCHC RBC AUTO-ENTMCNC: 31.3 G/DL (ref 31–37)
MCV RBC AUTO: 92.5 FL (ref 74–97)
MONOCYTES # BLD: 0.8 K/UL (ref 0.05–1.2)
MONOCYTES NFR BLD: 8 % (ref 3–10)
NEUTS SEG # BLD: 7.8 K/UL (ref 1.8–8)
NEUTS SEG NFR BLD: 78 % (ref 40–73)
NITRITE UR QL STRIP.AUTO: NEGATIVE
PH UR STRIP: 5 [PH] (ref 5–8)
PLATELET # BLD AUTO: 284 K/UL (ref 135–420)
PMV BLD AUTO: 8.7 FL (ref 9.2–11.8)
POTASSIUM SERPL-SCNC: 5 MMOL/L (ref 3.5–5.5)
PROT SERPL-MCNC: 6.1 G/DL (ref 6.4–8.2)
PROT UR STRIP-MCNC: NEGATIVE MG/DL
PROTHROMBIN TIME: 13.8 SEC (ref 11.5–15.2)
RBC # BLD AUTO: 3.21 M/UL (ref 4.2–5.3)
SODIUM SERPL-SCNC: 144 MMOL/L (ref 136–145)
SP GR UR REFRACTOMETRY: 1.02 (ref 1–1.03)
UROBILINOGEN UR QL STRIP.AUTO: 1 EU/DL (ref 0.2–1)
WBC # BLD AUTO: 9.9 K/UL (ref 4.6–13.2)

## 2019-02-13 PROCEDURE — 74176 CT ABD & PELVIS W/O CONTRAST: CPT

## 2019-02-13 PROCEDURE — 80053 COMPREHEN METABOLIC PANEL: CPT

## 2019-02-13 PROCEDURE — 83880 ASSAY OF NATRIURETIC PEPTIDE: CPT

## 2019-02-13 PROCEDURE — 85025 COMPLETE CBC W/AUTO DIFF WBC: CPT

## 2019-02-13 PROCEDURE — 85730 THROMBOPLASTIN TIME PARTIAL: CPT

## 2019-02-13 PROCEDURE — 99285 EMERGENCY DEPT VISIT HI MDM: CPT

## 2019-02-13 PROCEDURE — 85610 PROTHROMBIN TIME: CPT

## 2019-02-13 PROCEDURE — 81003 URINALYSIS AUTO W/O SCOPE: CPT

## 2019-02-13 RX ORDER — POLYETHYLENE GLYCOL 3350 17 G/17G
17 POWDER, FOR SOLUTION ORAL 2 TIMES DAILY
Qty: 170 G | Refills: 0 | Status: SHIPPED | OUTPATIENT
Start: 2019-02-13 | End: 2019-02-18

## 2019-02-13 NOTE — ED PROVIDER NOTES
EMERGENCY DEPARTMENT HISTORY AND PHYSICAL EXAM 
 
9:42 AM 
 
 
Date: 2/13/2019 Patient Name: [facility] History of Presenting Illness Chief Complaint Patient presents with  Abdominal Pain History Provided By: Family member Additional History (Context): 9:42 AM [facility] 80 y.[facility]. female with h/o CHF, HTN, Hypercholesteremia, and lupus who presents to ED for evaluation of abd distension onset last night. Patient's family member reports that she was recently discharged from [facility] 3 days ago for CHF. He states that her legs were swollen before but have gone done. Denies hx of cirrhosis. Hx limited as obtained by family member. PCP: Xochitl Valles MD 
 
Chief Complaint: Abd distension Duration:  Last night Timing:  Acute Location: Abd, diffuse Quality: N/A Severity: Not reported Modifying Factors: No modifying factors noted Associated Symptoms: No associated sx reported Current Outpatient Medications Medication Sig Dispense Refill  polyethylene glycol (MIRALAX) 17 gram/dose powder Take 17 g by mouth two (2) times a day for 5 days. 1 tablespoon with 8 oz of water daily 170 g 0  
 predniSONE (DELTASONE) 20 mg tablet Take 20 mg by mouth daily (with breakfast). 30 Tab 0  
 OTHER Check CBC, CMP, Mg on 2/13/19- results to PCP and nephrologist Dr Missael Jimenez immediately, Diagnosis- CKD4 1 Each 0  
 meloxicam (MOBIC) 7.5 mg tablet Take 1 Tab by mouth every other day. 7 Tab 0  
 OTHER Incentive Spirometry- Use as directed 1 Each 0  
 OTHER Graded Compression Stockings b/l LE- use as directed 1 Each 0  
 food supplemt, lactose-reduced (ENSURE ENLIVE) 0.08 gram-1.5 kcal/mL liqd Take 1 Bottle by mouth two (2) times a day. 60 Bottle 0  
 menthol-zinc oxide (CALMOSEPTINE) 0.44-20.6 % oint Apply  to affected area.  raNITIdine (ZANTAC) 75 mg tab Take  by mouth two (2) times a day.     
 docusate sodium (COLACE) 100 mg capsule Take 1 Cap by mouth two (2) times a day for 90 days. 60 Cap 2  carvedilol (COREG) 3.125 mg tablet Take 1 Tab by mouth every twelve (12) hours. 60 Tab 0  
 acetaminophen (TYLENOL ARTHRITIS PAIN) 650 mg TbER Take 1 Tab by mouth every eight (8) hours. 15 Tab 0  
 amiodarone (CORDARONE) 200 mg tablet TAKE ONE TABLET EVERY DAY (MAKE AN APPT) 30 Tab 5  
 isosorbide mononitrate ER (IMDUR) 60 mg CR tablet TAKE ONE TABLET EVERY MORNING 30 Tab 6  
 memantine (NAMENDA) 10 mg tablet  amitriptyline (ELAVIL) 25 mg tablet Take  by mouth nightly.  aspirin 81 mg tablet Take 81 mg by mouth daily. Past History Past Medical History: 
Past Medical History:  
Diagnosis Date  Acute on chronic diastolic heart failure ([facility]  Arthritis  Benign hypertensive heart disease without heart failure Better controlled, stable  Chronic diastolic heart failure ([facility] Breathing and edema is improving  Congestive heart failure ([facility]  HTN (hypertension)  Hypercholesteremia  Lupus (systemic lupus erythematosus) ([facility] 6/18/2014  
 followed by dr Romayne Ingles  Obesity, unspecified Patient has weight loss Discussed diet ad fluid restriction  Other and unspecified hyperlipidemia F/u per pmd  
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 
Past Surgical History:  
Procedure Laterality Date  HX HYSTERECTOMY  PACEMAKER PROCEDURE Family History: 
Family History Problem Relation Age of Onset  Arrhythmia Neg Hx  Asthma Neg Hx  Clotting Disorder Neg Hx  Fainting Neg Hx   
 Heart Attack Neg Hx  High Cholesterol Neg Hx  Pacemaker Neg Hx  Stroke Neg Hx Social History: 
Social History Tobacco Use  Smoking status: Never Smoker  Smokeless tobacco: Never Used Substance Use Topics  Alcohol use: No  
 Drug use: No  
 
 
Allergies: 
No Known Allergies Review of Systems Review of Systems Unable to perform ROS: Other (Hx and ROS obtained by family member) Physical Exam  
 
Visit Vitals /68 Pulse 70 Temp 98 °F (36.7 °C) Resp 10 Ht 5' 4\" (1.626 m) Wt 72.6 kg (160 lb) SpO2 99% BMI 27.46 kg/m² Physical Exam 
Physical Exam: 
. Patient Vitals for the past 12 hrs: 
 Temp Pulse Resp BP SpO2  
02/13/19 1600 98 °F (36.7 °C) 70 10 128/68 99 % 02/13/19 1545  71 11 142/64 98 % 02/13/19 1530  74 9 131/66 98 % 02/13/19 1515  70 10 127/60 99 % 02/13/19 1500  70 10 131/63 98 % 02/13/19 1445  70 10 141/67 97 % 02/13/19 1430  71 11 131/65 98 % 02/13/19 1415  70 10 124/70 98 % 02/13/19 1400  70 9 134/59 98 % 02/13/19 1345  72 9 133/65 98 % 02/13/19 1330  71 10 132/65 98 % 02/13/19 1315  78 14 141/69 99 % 02/13/19 1300  70 10 124/90 97 % 02/13/19 1245  70 9 127/68 98 % 02/13/19 1230  70 10 126/62 98 % 02/13/19 1215  70 9 132/62 98 % 02/13/19 1200  70 12 133/63 97 % 02/13/19 1145  73 13 131/66 99 % 02/13/19 1015  72 13 123/59 100 % 02/13/19 1000  70 17 122/72 96 % 02/13/19 0945  70 12 120/51 97 % 02/13/19 0931 98 °F (36.7 °C) 71 12 134/60 98 % 02/13/19 0930  71 12 126/60 99 % 02/13/19 0927    134/60 97 % Gen: Well developed, well nourished, obese  80 y.o. female HEENT: Normocephalic, atraumatic, no scleral icterus Respiratory: No accessory muscle use No wheeze, No rales, No rhonchi. Normal chest wall excursion. No subcutaneous air, no rib crepitus Cardiovascular: Regular rhythm and rate, Normal pulses, Normal perfusion. No edema Gastrointestinal: Soft abd, MIldly distended, Diffusely tender, Has a ventral hernia No masses. No ascites. No organomegaly. No evidence of trauma. Patient is wearing an adult diaper. Musculoskeletal: Full range of motion at all other tested joints. No joint effusions. No spinal tenderness. Neuro: Normal strength, Normal sensation. Normal speech. No ataxia. Cranial Nerves II-XII normal as tested. Skin: No rash, No lesions, petechia or purpura. Warm and dry Psyche: No suicidal ideation, No homicidal ideation. No hallucinations. Organized thoughts. Heme: Normal 
: Deferred Diagnostic Study Results Labs - Recent Results (from the past 12 hour(s)) CBC WITH AUTOMATED DIFF Collection Time: 02/13/19  9:32 AM  
Result Value Ref Range WBC 9.9 4.6 - 13.2 K/uL  
 RBC 3.21 (L) 4.20 - 5.30 M/uL HGB 9.3 (L) 12.0 - 16.0 g/dL HCT 29.7 (L) 35.0 - 45.0 % MCV 92.5 74.0 - 97.0 FL  
 MCH 29.0 24.0 - 34.0 PG  
 MCHC 31.3 31.0 - 37.0 g/dL  
 RDW 17.7 (H) 11.6 - 14.5 % PLATELET 321 468 - 130 K/uL MPV 8.7 (L) 9.2 - 11.8 FL  
 NEUTROPHILS 78 (H) 40 - 73 % LYMPHOCYTES 14 (L) 21 - 52 % MONOCYTES 8 3 - 10 % EOSINOPHILS 0 0 - 5 % BASOPHILS 0 0 - 2 %  
 ABS. NEUTROPHILS 7.8 1.8 - 8.0 K/UL  
 ABS. LYMPHOCYTES 1.4 0.9 - 3.6 K/UL  
 ABS. MONOCYTES 0.8 0.05 - 1.2 K/UL  
 ABS. EOSINOPHILS 0.0 0.0 - 0.4 K/UL  
 ABS. BASOPHILS 0.0 0.0 - 0.1 K/UL  
 DF AUTOMATED METABOLIC PANEL, COMPREHENSIVE Collection Time: 02/13/19  9:32 AM  
Result Value Ref Range Sodium 144 136 - 145 mmol/L Potassium 5.0 3.5 - 5.5 mmol/L Chloride 109 (H) 100 - 108 mmol/L  
 CO2 31 21 - 32 mmol/L Anion gap 4 3.0 - 18 mmol/L Glucose 104 (H) 74 - 99 mg/dL BUN 65 (H) 7.0 - 18 MG/DL Creatinine 1.64 (H) 0.6 - 1.3 MG/DL  
 BUN/Creatinine ratio 40 (H) 12 - 20 GFR est AA 36 (L) >60 ml/min/1.73m2 GFR est non-AA 30 (L) >60 ml/min/1.73m2 Calcium 8.5 8.5 - 10.1 MG/DL Bilirubin, total 0.7 0.2 - 1.0 MG/DL  
 ALT (SGPT) 17 13 - 56 U/L  
 AST (SGOT) 20 15 - 37 U/L Alk. phosphatase 69 45 - 117 U/L Protein, total 6.1 (L) 6.4 - 8.2 g/dL Albumin 2.8 (L) 3.4 - 5.0 g/dL Globulin 3.3 2.0 - 4.0 g/dL A-G Ratio 0.8 0.8 - 1.7 NT-PRO BNP Collection Time: 02/13/19  9:32 AM  
Result Value Ref Range  NT pro-BNP 7,102 (H) 0 - 1,800 PG/ML  
 PROTHROMBIN TIME + INR Collection Time: 02/13/19  9:32 AM  
Result Value Ref Range Prothrombin time 13.8 11.5 - 15.2 sec INR 1.1 0.8 - 1.2 PTT Collection Time: 02/13/19  9:32 AM  
Result Value Ref Range aPTT 26.8 23.0 - 36.4 SEC URINALYSIS W/ RFLX MICROSCOPIC Collection Time: 02/13/19  1:15 PM  
Result Value Ref Range Color YELLOW Appearance CLEAR Specific gravity 1.017 1.005 - 1.030    
 pH (UA) 5.0 5.0 - 8.0 Protein NEGATIVE  NEG mg/dL Glucose NEGATIVE  NEG mg/dL Ketone NEGATIVE  NEG mg/dL Bilirubin NEGATIVE  NEG Blood NEGATIVE  NEG Urobilinogen 1.0 0.2 - 1.0 EU/dL Nitrites NEGATIVE  NEG Leukocyte Esterase NEGATIVE  NEG Radiologic Studies -  
CT ABD PELV WO CONT Final Result IMPRESSION:  
  
     
 degenerative changes bilateral h multifocal severe degenerative disc disease. L1 compression fracture is new since 2015, possibly acute. IMPRESSION:  
1. Increased moderate to large colonic stool burden likely  explains   
increased abdominal distention . 2.  Likely hepatic congestion with possible Cirrhosis. Multifocal cardiac  
enlargement and findings of right heart failure. 3.  Generalized abdominal edema and trace ascites. Increased small bilateral  
pleural effusions. Moderate left and mild lower lobe atelectasis. Improved  
moderate anasarca. 4.  Similar cholelithiasis. 5.  Osteopenia with L1 compression fracture, possibly acute. Medical Decision Making It should be noted that I, No att. providers found will be the provider of record for this patient. I reviewed the vital signs, available nursing notes, past medical history, past surgical history, family history and social history. Vital Signs-Reviewed the patient's vital signs. Pulse Oximetry Analysis -  98% on room air , WNL Cardiac Monitor: 
Rate: 71 bpm 
Rhythm: Records Reviewed: Nursing Notes and Old Medical Records (Time of Review: 9:42 AM) 
 
ED Course: Progress Notes, Reevaluation, and Consults: 
12:52PM 
I discussed the case with Ana Castro, the NP on-call for Dr. Tigre Diana. We went over in detail the patient's laboratory data, recent hospitalization, and imaging studies. She recommends a trial of MiraLAX at home for relief of the patient's constipation. She did not see any indication for admission to the hospital based upon these CT findings. I discussed with the family regarding using the MiraLAX, return precautions, and close outpatient follow-up with a primary care doctor and gastroenterology. The 
 
Diagnosis Clinical Impression: 1. Constipation, unspecified constipation type 2. Closed compression fracture of first lumbar vertebra, [facility] 3. Abdominal distention Disposition: Discharge home Follow-up Information Follow up With Specialties Details Why Contact Info Mary Jo Tolliver MD Internal Medicine Call in 1 day  [address] [place] [address] 
[phone] Medication List  
  
START taking these medications   
polyethylene glycol 17 gram/dose powder Commonly known as:  Adama Barrs Take 17 g by mouth two (2) times a day for 5 days. 1 tablespoon with 8 oz of water daily ASK your doctor about these medications   
acetaminophen 650 mg Tber Commonly known as:  TYLENOL ARTHRITIS PAIN Take 1 Tab by mouth every eight (8) hours. amiodarone 200 mg tablet Commonly known as:  CORDARONE 
TAKE ONE TABLET EVERY DAY (MAKE AN APPT) 
  
amitriptyline 25 mg tablet Commonly known as:  ELAVIL 
  
aspirin 81 mg tablet CALMOSEPTINE 0.44-20.6 % Oint Generic drug:  menthol-zinc oxide 
  
carvedilol 3.125 mg tablet Commonly known as:  Nahum Hamper Take 1 Tab by mouth every twelve (12) hours. docusate sodium 100 mg capsule Commonly known as:  Janusz Miranda 
 Take 1 Cap by mouth two (2) times a day for 90 days. food supplemt, lactose-reduced 0.08 gram-1.5 kcal/mL Liqd Commonly known as:  ENSURE ENLIVE Take 1 Bottle by mouth two (2) times a day. isosorbide mononitrate ER 60 mg CR tablet Commonly known as:  IMDUR 
TAKE ONE TABLET EVERY MORNING 
  
meloxicam 7.5 mg tablet Commonly known as:  MOBIC Take 1 Tab by mouth every other day. memantine 10 mg tablet Commonly known as:  NAMENDA 
  
OTHER Check CBC, CMP, Mg on 2/13/19- results to PCP and nephrologist Dr Caren Reddy immediately, Diagnosis- CKD4 OTHER Incentive Spirometry- Use as directed OTHER 
Graded Compression Stockings b/l LE- use as directed 
  
predniSONE 20 mg tablet Commonly known as:  Deacon Smaller Take 20 mg by mouth daily (with breakfast). raNITIdine 75 mg Tab Commonly known as:  ZANTAC Where to Get Your Medications These medications were sent to [facility], [address]  [address] [place] [address], [address] [place] [id number] Phone:  [phone] · polyethylene glycol 17 gram/dose powder 
  
 
_______________________________ Scribe Attestation Martir Alvarenga acting as a scribe for and in the presence of Jonny Reddy MD     
February 13, 2019 at 9:42 AM 
    
Provider Attestation:     
I personally performed the services described in the documentation, reviewed the documentation, as recorded by the scribe in my presence, and it accurately and completely records my words and actions. February 13, 2019 at 9:42 AM - Jonny Reddy MD   
_______________________________

## 2019-02-13 NOTE — DISCHARGE INSTRUCTIONS
Patient Education        Constipation: Care Instructions  Your Care Instructions    Constipation means that you have a hard time passing stools (bowel movements). People pass stools from 3 times a day to once every 3 days. What is normal for you may be different. Constipation may occur with pain in the rectum and cramping. The pain may get worse when you try to pass stools. Sometimes there are small amounts of bright red blood on toilet paper or the surface of stools. This is because of enlarged veins near the rectum (hemorrhoids). A few changes in your diet and lifestyle may help you avoid ongoing constipation. Your doctor may also prescribe medicine to help loosen your stool. Some medicines can cause constipation. These include pain medicines and antidepressants. Tell your doctor about all the medicines you take. Your doctor may want to make a medicine change to ease your symptoms. Follow-up care is a key part of your treatment and safety. Be sure to make and go to all appointments, and call your doctor if you are having problems. It's also a good idea to know your test results and keep a list of the medicines you take. How can you care for yourself at home? · Drink plenty of fluids, enough so that your urine is light yellow or clear like water. If you have kidney, heart, or liver disease and have to limit fluids, talk with your doctor before you increase the amount of fluids you drink. · Include high-fiber foods in your diet each day. These include fruits, vegetables, beans, and whole grains. · Get at least 30 minutes of exercise on most days of the week. Walking is a good choice. You also may want to do other activities, such as running, swimming, cycling, or playing tennis or team sports. · Take a fiber supplement, such as Citrucel or Metamucil, every day. Read and follow all instructions on the label. · Schedule time each day for a bowel movement. A daily routine may help.  Take your time having your bowel movement. · Support your feet with a small step stool when you sit on the toilet. This helps flex your hips and places your pelvis in a squatting position. · Your doctor may recommend an over-the-counter laxative to relieve your constipation. Examples are Milk of Magnesia and MiraLax. Read and follow all instructions on the label. Do not use laxatives on a long-term basis. When should you call for help? Call your doctor now or seek immediate medical care if:    · You have new or worse belly pain.     · You have new or worse nausea or vomiting.     · You have blood in your stools.    Watch closely for changes in your health, and be sure to contact your doctor if:    · Your constipation is getting worse.     · You do not get better as expected. Where can you learn more? Go to http://renata-antonieta.info/. Enter 21 [phone] in the search box to learn more about \"Constipation: Care Instructions. \"  Current as of: September 23, 2018  Content Version: 11.9  © 8817-0953 [employer]. Care instructions adapted under license by [employer] (which disclaims liability or warranty for this information). If you have questions about a medical condition or this instruction, always ask your healthcare professional. [place] [address] any warranty or liability for your use of this information. Patient Education        Compression Fracture of the Spine: Care Instructions  Your Care Instructions    A compression fracture happens when the front part of a spinal bone breaks and collapses. A fall or other accident can cause it. A minor injury or moving the wrong way can cause a break if you have thin or brittle bones (osteoporosis). These types of breaks will heal in 8 to 10 weeks. You will need rest and pain medicines. Your doctor may recommend physical therapy. Some doctors recommend that certain people with compression fractures wear braces.   Your doctor also may treat thin or brittle bones. You may need surgery if you have lasting pain or if the bone presses on the spinal cord or nerves. You heal best when you take good care of yourself. Eat a variety of healthy foods, and don't smoke. Follow-up care is a key part of your treatment and safety. Be sure to make and go to all appointments, and call your doctor if you are having problems. It's also a good idea to know your test results and keep a list of the medicines you take. How can you care for yourself at home? · Be safe with medicines. Read and follow all instructions on the label. ? If the doctor gave you a prescription medicine for pain, take it as prescribed. ? If you are not taking a prescription pain medicine, ask your doctor if you can take an over-the-counter medicine. · Talk to your doctor about how to make your bones stronger. You may need medicines or a change in what you eat. · Be active only as directed by your doctor. When should you call for help? Call 911 anytime you think you may need emergency care. For example, call if:    · You are unable to move an arm or a leg at all.   [facility] your doctor now or seek immediate medical care if:    · You have new or worse symptoms in your arms, legs, belly, or buttocks. Symptoms may include:  ? Numbness or tingling. ? Weakness. ? Pain.     · You lose bladder or bowel control.     · You have belly pain, bloating, vomiting, or nausea.    Watch closely for changes in your health, and be sure to contact your doctor if:    · You do not get better as expected. Where can you learn more? Go to http://renata-antonieta.info/. Enter P445 in the search box to learn more about \"Compression Fracture of the Spine: Care Instructions. \"  Current as of: September 20, 2018  Content Version: 11.9  © 6037-3356 [employer]. Care instructions adapted under license by [employer] (which disclaims liability or warranty for this information). If you have questions about a medical condition or this instruction, always ask your healthcare professional. [place] [address] any warranty or liability for your use of this information.

## 2019-02-13 NOTE — ED TRIAGE NOTES
0920: EMS reports pt has dementia; A/O x 1; self; family has concerns about abd.; pressed on abd pt does not grimace or respond to pain; abd hernia; recently discharge; received blood transfusion on admission; no fever or abd distention; VSS; pacemaker

## 2019-02-18 ENCOUNTER — PATIENT OUTREACH (OUTPATIENT)
Dept: CARDIOLOGY CLINIC | Age: 84
End: 2019-02-18

## 2019-02-18 NOTE — PROGRESS NOTES
NN contacted the patient by telephone to perform CHF follow Up. Noted Priorities/Plan: Spoke with daughter Michael Mata PHI confirmed. States that patient did not attend PCP appointment today because pt unable to ambulate, could not get patient into car. Ms Kevin Chavez states she has telephone# for Medicare Transport and will call them today to arrange transportation. Next appointment with Dr. Ayanna Briceno is scheduled for 2/25/19. Ms. Fredrick Bravo is being followed by [facility] and they have been out to see her last week and are scheduled for today. Ms. Kevin Chavez reports her mom is doing ok, no swelling or SOB noticed. Ms. Fredrick Bravo was in the ED on 2/13/19 for abdominal distension, discharged on same day for constipation and advised to start Miralax. Ms Kevin Chavez reports her mother has had bowel movements since ED visit. No other concerns at this time, NN will follow up. Daily Weight: unable to weigh at this time Zone: green Signs/Symptoms: Edema none at this time; SOB none at this time. Goals  Knowledge and adherence medication (ie. action, side effects, missed dose, etc.) Caregiver to provide teach back on medications on/by 3/10/19.  Patient verbalizes understanding of self-management goals of living with Congestive Heart Failure Caregiver will verbalize heart failure zones and report red flags to physician/NN on/by 4/10/19.  Prevent complications post hospitalization. Caregiver to take or make arrangements for patient to follow up with appointments post hospitalization on/by 2/28/19. 
2/18/19 Patient went to ED on 2/13/19 for abdominal distension, discharged on same day with constipation, pt instructed to begin using Miralax for constipation. Needs addressed from pathway:  
Week 1-4 Provide Daily Disease Management 
(NN initiated) ?  Daily weight (Before Breakfast- 
Daily Zone Identification (symptom management; weight, edema, SOB, activity/sleep changes)-notify provider immediately as indicated ? Cardiac Low-sodium Diet (No added sodium; 1500mg as indicated). If 
Diabetic: include carbohydrate controlled ? Fluid restriction (if indicated) ? Comorbidity Management ? Confirm follow-up appointments/transportation. Reschedule if needed. Additional assessment ? Fall precautions ? Activity tolerance assessment  
(eg: Vital signs; level of consciousness; dyspnea on exertion; pillow usage; recliner vs bed) ? Energy conservation management (balance activity with rest) ? Labs/diagnostics *as indicated ? Medication Therapy *as directed ? Diet/appetite assessment 
? ED/Hospital utilization ? Home assessment/modifications * as indicated ? Transportation  assistance ? Home health services Psychosocial: Reassurance/emotional support Monitoring: ? Home health ? My Chart Education: 
? Advanced care planning status ? Support system identification ( eg: Caregiver, meal planning, community resources, family, friends, Sabianist, support group) ? Health literacy for heart failure ? Caregiver education and preparation Have you been to an ER/Hospital since discharge from [facility]? yes  ED 2/13 Have you followed up with PCP/Cardiologist/Specialist? Cardiology appt on 2/25/19. Cancelled PCP for today 2/18/19. Transportation: Medicare Transport Diet: cardiac/no salt added Activity/ADLs: Needs assistance with ADL. Medications Reconciled at this time:  no 
Home health:  Company/Completion: [facility] Social Support:Family Advanced Care Plan: (not addressed) Patient reminded that there is a physician on call 24 hours a day / 7 days a week (M-F 5pm to 8am and from Friday 5pm until Monday 8a for the weekend) should the patient have questions or concerns. Patient reminded to call 911 if situation is emergent or patient feels the situation is emergent. Pt verbalizes understanding.

## 2019-02-19 NOTE — DISCHARGE SUMMARY
[facility] DISCHARGE SUMMARY Name:  Van Birch 
MR#:   [id number] :  [id number] ACCOUNT #:  [de-identified] ADMIT DATE:  2019 DISCHARGE DATE:  02/10/2019 DISCHARGE DIAGNOSES:  Include 1. Non-ST elevation. 2.  Anemia of chronic disease. 3.  Chronic kidney disease, stage IV. 4.  Dementia. 5.  Chronic combined congestive heart failure. 6.  History of systemic lupus erythematosus. 7. Inflammatory nodules noted on lower extremities. HOSPITAL COURSE:  This is an 80-year-old female, who presented to the ED with 
complaints of generalized weakness. The patient was evaluated. The patient was 
noted to have elevated troponin. Cardiology was consulted. The patient was started 
on a heparin infusion. Cardiology saw the patient and recommended medical 
management, given the patient's age and comorbidities, discussions were held with the 
family. The patient also received some IV Lasix for some congestive heart failure. The patient had chronic combined systolic and diastolic congestive heart failure with 
an acute exacerbation. The patient also had atrial fibrillation and was continued on 
amiodarone. The patient was not on chronic anticoagulation, secondary to high 
bleeding risks. Subsequently, the patient was noted to have a drop in her hemoglobin 
and hematocrit. Heparin was discontinued. Hemoglobin and hematocrit were monitored. No overt bleeding was noted. Hemoglobin remained stable. The patient was noted to 
have some inflammatory nodules on her lower extremities. There was also concern that 
this may be infection related. The patient was empirically started on antibiotics. General Surgery was consulted. Infectious Disease was also consulted. Rheumatology 
was also consulted, and they felt that those inflammatory nodules could be related to 
the patient's history of lupus.   The patient was started on some IV steroids and 
 anti-inflammatory medications. The patient's symptoms showed improvement. There was 
some worsening of the renal function noted, and Nephrology was also consulted, but 
remained relatively stable. Palliative Care was also consulted. Family wished to 
continue the patient on full code. PT/OT were also consulted. Infectious Disease 
also recommended that IR-guided FNAC of the inflammatory nodule to be done, which was 
done. Cultures remained negative. Steroids were tapered to prednisone and after 
discussions with Rheumatology, the patient was cleared for discharge. Discharge 
plans were discussed with the family. Family declined [facility] 
placement. Case management was involved. DISPOSITION:  Home. CONSULTATIONS:  With Infectious Disease, General Surgery, Cardiology, Nephrology. PROCEDURES:  Include FNAC of the inflammatory nodules by Interventional Radiology. CONDITION ON DISCHARGE:  The patient was hemodynamically stable at the time of 
discharge. DISCHARGE INSTRUCTIONS:  The patient was advised to follow up with PCP in one week 
and with Cardiology in two weeks and with Nephrology in 10 days and with Rheumatology, Dr. Lorri Ball in 10 days. DISCHARGE MEDICATIONS:  Include 1. Prednisone 20 mg p.o. daily. 2.  Mobic 7.5 mg p.o. every other day. 3.  Incentive spirometry, use as directed. 4.  Graded compression stockings, bilateral lower extremities. 5.  Check her CBC, CMP, magnesium on 02/13/2019. 
6.  Ensure Enlive one bottle p.o. twice daily. 7.  Calmoseptine, use as directed. 8.  Zantac 75 mg p.o. twice daily. 9.  Colace 100 mg p.o. twice daily. 10. Coreg 3.125 mg p.o. every 12 hours. 11. Tylenol Arthritis as needed. 12. Amiodarone 200 mg p.o. daily. 13. Imdur 60 mg p.o. daily. 14. Namenda 10 mg p.o. daily. 15. Elavil 25 mg p.o. daily at night. 16. Aspirin 81 mg p.o. daily. Discharge plans were discussed with the family. Case management was involved. Total time for the discharge was more than 35 minutes. Дмитрий Falk MD 
 
 
[id number] 
D:  02/18/2019 19:46 
T:  02/19/2019 10:29 
JOB #:  [id number] CC:  Brody MD Rodrigue antonio MD

## 2019-02-20 ENCOUNTER — PATIENT OUTREACH (OUTPATIENT)
Dept: CARDIOLOGY CLINIC | Age: 84
End: 2019-02-20

## 2019-02-26 ENCOUNTER — PATIENT OUTREACH (OUTPATIENT)
Dept: CARDIOLOGY CLINIC | Age: 84
End: 2019-02-26

## 2019-02-28 ENCOUNTER — HOSPITAL ENCOUNTER (EMERGENCY)
Age: 84
Discharge: HOME | End: 2019-02-28
Attending: EMERGENCY MEDICINE
Payer: MEDICARE

## 2019-02-28 ENCOUNTER — APPOINTMENT (OUTPATIENT)
Dept: GENERAL RADIOLOGY | Age: 84
End: 2019-02-28
Attending: EMERGENCY MEDICINE
Payer: MEDICARE

## 2019-02-28 VITALS
TEMPERATURE: 98.1 F | SYSTOLIC BLOOD PRESSURE: 141 MMHG | HEART RATE: 70 BPM | RESPIRATION RATE: 14 BRPM | OXYGEN SATURATION: 98 % | DIASTOLIC BLOOD PRESSURE: 72 MMHG

## 2019-02-28 DIAGNOSIS — K59.00 CONSTIPATION, UNSPECIFIED CONSTIPATION TYPE: Primary | ICD-10-CM

## 2019-02-28 DIAGNOSIS — F03.90 DEMENTIA WITHOUT BEHAVIORAL DISTURBANCE, UNSPECIFIED DEMENTIA TYPE: ICD-10-CM

## 2019-02-28 LAB
ALBUMIN SERPL-MCNC: 3.2 G/DL (ref 3.4–5)
ALBUMIN/GLOB SERPL: 1 {RATIO} (ref 0.8–1.7)
ALP SERPL-CCNC: 88 U/L (ref 45–117)
ALT SERPL-CCNC: 36 U/L (ref 13–56)
ANION GAP SERPL CALC-SCNC: 1 MMOL/L (ref 3–18)
AST SERPL-CCNC: 29 U/L (ref 15–37)
ATRIAL RATE: 57 BPM
BASOPHILS # BLD: 0 K/UL (ref 0–0.1)
BASOPHILS NFR BLD: 0 % (ref 0–2)
BILIRUB SERPL-MCNC: 0.7 MG/DL (ref 0.2–1)
BNP SERPL-MCNC: 8089 PG/ML (ref 0–1800)
BUN SERPL-MCNC: 29 MG/DL (ref 7–18)
BUN/CREAT SERPL: 19 (ref 12–20)
CALCIUM SERPL-MCNC: 8.7 MG/DL (ref 8.5–10.1)
CALCULATED R AXIS, ECG10: -45 DEGREES
CALCULATED T AXIS, ECG11: 132 DEGREES
CHLORIDE SERPL-SCNC: 105 MMOL/L (ref 100–108)
CO2 SERPL-SCNC: 31 MMOL/L (ref 21–32)
CREAT SERPL-MCNC: 1.53 MG/DL (ref 0.6–1.3)
DIAGNOSIS, 93000: NORMAL
DIFFERENTIAL METHOD BLD: ABNORMAL
EOSINOPHIL # BLD: 0 K/UL (ref 0–0.4)
EOSINOPHIL NFR BLD: 1 % (ref 0–5)
ERYTHROCYTE [DISTWIDTH] IN BLOOD BY AUTOMATED COUNT: 18.9 % (ref 11.6–14.5)
GLOBULIN SER CALC-MCNC: 3.1 G/DL (ref 2–4)
GLUCOSE SERPL-MCNC: 89 MG/DL (ref 74–99)
HCT VFR BLD AUTO: 32.8 % (ref 35–45)
HGB BLD-MCNC: 10.2 G/DL (ref 12–16)
LYMPHOCYTES # BLD: 1.4 K/UL (ref 0.9–3.6)
LYMPHOCYTES NFR BLD: 17 % (ref 21–52)
MCH RBC QN AUTO: 29.9 PG (ref 24–34)
MCHC RBC AUTO-ENTMCNC: 31.1 G/DL (ref 31–37)
MCV RBC AUTO: 96.2 FL (ref 74–97)
MONOCYTES # BLD: 1 K/UL (ref 0.05–1.2)
MONOCYTES NFR BLD: 12 % (ref 3–10)
NEUTS SEG # BLD: 6 K/UL (ref 1.8–8)
NEUTS SEG NFR BLD: 70 % (ref 40–73)
PLATELET # BLD AUTO: 148 K/UL (ref 135–420)
PMV BLD AUTO: 9 FL (ref 9.2–11.8)
POTASSIUM SERPL-SCNC: 5 MMOL/L (ref 3.5–5.5)
PROT SERPL-MCNC: 6.3 G/DL (ref 6.4–8.2)
Q-T INTERVAL, ECG07: 460 MS
QRS DURATION, ECG06: 188 MS
QTC CALCULATION (BEZET), ECG08: 527 MS
RBC # BLD AUTO: 3.41 M/UL (ref 4.2–5.3)
SODIUM SERPL-SCNC: 137 MMOL/L (ref 136–145)
VENTRICULAR RATE, ECG03: 79 BPM
WBC # BLD AUTO: 8.5 K/UL (ref 4.6–13.2)

## 2019-02-28 PROCEDURE — 80053 COMPREHEN METABOLIC PANEL: CPT

## 2019-02-28 PROCEDURE — 93005 ELECTROCARDIOGRAM TRACING: CPT

## 2019-02-28 PROCEDURE — 85025 COMPLETE CBC W/AUTO DIFF WBC: CPT

## 2019-02-28 PROCEDURE — 74022 RADEX COMPL AQT ABD SERIES: CPT

## 2019-02-28 PROCEDURE — 83880 ASSAY OF NATRIURETIC PEPTIDE: CPT

## 2019-02-28 PROCEDURE — 99285 EMERGENCY DEPT VISIT HI MDM: CPT

## 2019-02-28 PROCEDURE — 96374 THER/PROPH/DIAG INJ IV PUSH: CPT

## 2019-02-28 PROCEDURE — 74011250636 HC RX REV CODE- 250/636: Performed by: EMERGENCY MEDICINE

## 2019-02-28 RX ORDER — FUROSEMIDE 10 MG/ML
40 INJECTION INTRAMUSCULAR; INTRAVENOUS
Status: COMPLETED | OUTPATIENT
Start: 2019-02-28 | End: 2019-02-28

## 2019-02-28 RX ADMIN — FUROSEMIDE 40 MG: 10 INJECTION, SOLUTION INTRAMUSCULAR; INTRAVENOUS at 10:14

## 2019-02-28 NOTE — DISCHARGE INSTRUCTIONS
Patient Education        Constipation: Care Instructions  Your Care Instructions    Constipation means that you have a hard time passing stools (bowel movements). People pass stools from 3 times a day to once every 3 days. What is normal for you may be different. Constipation may occur with pain in the rectum and cramping. The pain may get worse when you try to pass stools. Sometimes there are small amounts of bright red blood on toilet paper or the surface of stools. This is because of enlarged veins near the rectum (hemorrhoids). A few changes in your diet and lifestyle may help you avoid ongoing constipation. Your doctor may also prescribe medicine to help loosen your stool. Some medicines can cause constipation. These include pain medicines and antidepressants. Tell your doctor about all the medicines you take. Your doctor may want to make a medicine change to ease your symptoms. Follow-up care is a key part of your treatment and safety. Be sure to make and go to all appointments, and call your doctor if you are having problems. It's also a good idea to know your test results and keep a list of the medicines you take. How can you care for yourself at home? · Drink plenty of fluids, enough so that your urine is light yellow or clear like water. If you have kidney, heart, or liver disease and have to limit fluids, talk with your doctor before you increase the amount of fluids you drink. · Include high-fiber foods in your diet each day. These include fruits, vegetables, beans, and whole grains. · Get at least 30 minutes of exercise on most days of the week. Walking is a good choice. You also may want to do other activities, such as running, swimming, cycling, or playing tennis or team sports. · Take a fiber supplement, such as Citrucel or Metamucil, every day. Read and follow all instructions on the label. · Schedule time each day for a bowel movement. A daily routine may help.  Take your time having your bowel movement. · Support your feet with a small step stool when you sit on the toilet. This helps flex your hips and places your pelvis in a squatting position. · Your doctor may recommend an over-the-counter laxative to relieve your constipation. Examples are Milk of Magnesia and MiraLax. Read and follow all instructions on the label. Do not use laxatives on a long-term basis. When should you call for help? Call your doctor now or seek immediate medical care if:    · You have new or worse belly pain.     · You have new or worse nausea or vomiting.     · You have blood in your stools.    Watch closely for changes in your health, and be sure to contact your doctor if:    · Your constipation is getting worse.     · You do not get better as expected. Where can you learn more? Go to http://renata-antonieta.info/. Enter 21 [phone] in the search box to learn more about \"Constipation: Care Instructions. \"  Current as of: September 23, 2018  Content Version: 11.9  © 2192-6727 [employer]. Care instructions adapted under license by [employer] (which disclaims liability or warranty for this information). If you have questions about a medical condition or this instruction, always ask your healthcare professional. [place] [address] any warranty or liability for your use of this information. Patient Education        Dementia: Care Instructions  Your Care Instructions    Dementia is a loss of mental skills that affects your daily life. It is different than the occasional trouble with memory that is part of aging. You may find it hard to remember things that you feel you should be able to remember. Or you may feel that your mind is just not working as well as usual.  Finding out that you have dementia is a shock. You may be afraid and worried about how the condition will change your life.  Although there is no cure at this time, medicine may slow memory loss and improve thinking for a while. Other medicines may be able to help you sleep or cope with depression and behavior changes. Dementia often gets worse slowly. But it can get worse quickly. As dementia gets worse, it may become harder to do common things that take planning, like making a list and going shopping. Over time, the disease may make it hard for you to take care of yourself. Some people with dementia need others to help care for them. Dementia is different for everyone. You may be able to function well for a long time. In the early stage of the condition, you can do things at home to make life easier and safer. You also can keep doing your hobbies and other activities. Many people find comfort in planning now for their future needs. Follow-up care is a key part of your treatment and safety. Be sure to make and go to all appointments, and call your doctor if you are having problems. It's also a good idea to know your test results and keep a list of the medicines you take. How can you care for yourself at home? · Take your medicines exactly as prescribed. Call your doctor if you think you are having a problem with your medicine. · Eat healthy foods. Eat lots of whole grains, fruits, and vegetables every day. If you are not hungry, try snacks or nutritional drinks such as Boost, Ensure, or Sustacal.  · If you have problems sleeping:  ? Try not to nap too close to your bedtime. ? Exercise regularly. Walking is a good choice. ? Try a glass of warm milk or caffeine-free herbal tea before bed. · Do tasks and activities during the time of day when you feel your best. It may help to develop a daily routine. · Post labels, lists, and sticky notes to help you remember things. Write your activities on a calendar you can easily find. Put your clock where you can easily see it. · Stay active. Take walks in familiar places, or with friends or loved ones. Try to stay active mentally too.  Read and work crossword puzzles if you enjoy these activities. · Do not drive unless you can pass an on-road driving test. If you are not sure if you are safe to drive, your state 's license bureau can test you. · Keep a cordless phone and a flashlight with new batteries by your bed. If possible, put a phone in each of the main rooms of your house, or carry a cell phone in case you fall and cannot reach a phone. Or, you can wear a device around your neck or wrist. You push a button that sends a signal for help. Acknowledge your emotions and plan for the future  · Talk openly and honestly with your doctor. · Let yourself grieve. It is common to feel angry, scared, frustrated, anxious, or depressed. · Get emotional support from family, friends, a support group, or a counselor experienced in working with people who have dementia. · Ask for help if you need it. · Plan for the future. ? Talk to your family and doctor about preparing a living will and other important papers while you can make decisions. These papers tell your doctors how to care for you at the end of your life. ? Consider naming a person to make decisions about your care if you are not able to. When should you call for help? Call 911 anytime you think you may need emergency care. For example, call if:    · You are lost and do not know whom to call.     · You are injured and do not know whom to call.    Call your doctor now or seek immediate medical care if:    · You are more confused or upset than usual.     · You feel like you could hurt yourself because your mind is not working well.   [place] Pear closely for changes in your health, and be sure to contact your doctor if you have any problems. Where can you learn more? Go to http://renata-antonieta.info/. Enter V770 in the search box to learn more about \"Dementia: Care Instructions. \"  Current as of: September 11, 2018  Content Version: 11.9  © 9119-7531 [employer].  Care instructions adapted under license by [employer] (which disclaims liability or warranty for this information). If you have questions about a medical condition or this instruction, always ask your healthcare professional. [address] any warranty or liability for your use of this information.

## 2019-02-28 NOTE — ED PROVIDER NOTES
EMERGENCY DEPARTMENT HISTORY AND PHYSICAL EXAM 
 
8:54 AM 
 
 
Date: 2/28/2019 Patient Name: [facility] History of Presenting Illness Chief Complaint Patient presents with  Leg Swelling TOSIN LE History Provided By: Patient's Son Additional History (Context): [place] [facility] is a 80 y.o. female with PMHx of CHF, obesity, HTN and hypercholesteremia who presents to the ED with c/o BLE edema for the past 2 days. Associated symptoms include SOB. Patient's son reports patient is unable to take fluid pills, \"it bring her HR down. \" He also reports patient has not had a BM for the past week, notes she is on stool softeners, \"but they are not working. \". She is followed by Dr. Tuan Duncan, cardiologist. Son reports patient was hospitalized for CHF exacerbation 3 weeks ago. Patient is on daily ASA. HPI limited due to patient's history of dementia. PCP: Brandon Allan MD 
 
Chief Complaint: BLE edema Duration:  2 days Timing:  Progressive Location: BLE Quality: not reported Severity: Not described Modifying Factors: No modifying or aggravating factors were reported. Associated Symptoms: denies any other associated signs or symptoms Past History Past Medical History: 
Past Medical History:  
Diagnosis Date  Acute on chronic diastolic heart failure ([address]  Arthritis  Benign hypertensive heart disease without heart failure Better controlled, stable  Chronic diastolic heart failure ([address] Breathing and edema is improving  Congestive heart failure ([address]  HTN (hypertension)  Hypercholesteremia  Lupus (systemic lupus erythematosus) ([address] 6/18/2014  
 followed by dr Emilia Patterson  Obesity, unspecified Patient has weight loss Discussed diet ad fluid restriction  Other and unspecified hyperlipidemia F/u per pmd  
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  
 tr with moderate pulmonary htn Past Surgical History: Past Surgical History:  
Procedure Laterality Date  HX HYSTERECTOMY  PACEMAKER PROCEDURE Family History: 
Family History Problem Relation Age of Onset  Arrhythmia Neg Hx  Asthma Neg Hx  Clotting Disorder Neg Hx  Fainting Neg Hx   
 Heart Attack Neg Hx  High Cholesterol Neg Hx  Pacemaker Neg Hx  Stroke Neg Hx Social History: 
Social History Tobacco Use  Smoking status: Never Smoker  Smokeless tobacco: Never Used Substance Use Topics  Alcohol use: No  
 Drug use: No  
 
 
Allergies: 
No Known Allergies Review of Systems Review of Systems Unable to perform ROS: Dementia Physical Exam  
 
Visit Vitals /77 Pulse 73 Temp 98.1 °F (36.7 °C) Resp 24 SpO2 98% Physical Exam  
Constitutional: She appears well-developed and well-nourished. No distress. Pleasantly confused with history of dementia/alzheimer's HENT:  
Head: Normocephalic and atraumatic. Mouth/Throat: Oropharynx is clear and moist.  
Eyes: Conjunctivae are normal. Pupils are equal, round, and reactive to light. No scleral icterus. Neck: Normal range of motion. Neck supple. Cardiovascular: Normal rate and intact distal pulses. Capillary refill < 3 seconds Pulmonary/Chest: Breath sounds normal. She has no wheezes. Appears to be in some respiratory distress, using accessory muscles to breathe. Abdominal: Soft. Bowel sounds are normal. She exhibits no distension. There is no tenderness. Musculoskeletal: Normal range of motion. She exhibits edema (2+ BLE). Lymphadenopathy:  
  She has no cervical adenopathy. Neurological: She is alert. No cranial nerve deficit. Skin: Skin is warm and dry. She is not diaphoretic. Nursing note and vitals reviewed. Diagnostic Study Results Labs - Recent Results (from the past 12 hour(s)) CBC WITH AUTOMATED DIFF Collection Time: 02/28/19  8:30 AM  
Result Value Ref Range WBC 8.5 4.6 - 13.2 K/uL  
 RBC 3.41 (L) 4.20 - 5.30 M/uL  
 HGB 10.2 (L) 12.0 - 16.0 g/dL HCT 32.8 (L) 35.0 - 45.0 % MCV 96.2 74.0 - 97.0 FL  
 MCH 29.9 24.0 - 34.0 PG  
 MCHC 31.1 31.0 - 37.0 g/dL  
 RDW 18.9 (H) 11.6 - 14.5 % PLATELET 183 740 - 406 K/uL MPV 9.0 (L) 9.2 - 11.8 FL  
 NEUTROPHILS 70 40 - 73 % LYMPHOCYTES 17 (L) 21 - 52 % MONOCYTES 12 (H) 3 - 10 % EOSINOPHILS 1 0 - 5 % BASOPHILS 0 0 - 2 %  
 ABS. NEUTROPHILS 6.0 1.8 - 8.0 K/UL  
 ABS. LYMPHOCYTES 1.4 0.9 - 3.6 K/UL  
 ABS. MONOCYTES 1.0 0.05 - 1.2 K/UL  
 ABS. EOSINOPHILS 0.0 0.0 - 0.4 K/UL  
 ABS. BASOPHILS 0.0 0.0 - 0.1 K/UL  
 DF AUTOMATED METABOLIC PANEL, COMPREHENSIVE Collection Time: 02/28/19  8:30 AM  
Result Value Ref Range Sodium 137 136 - 145 mmol/L Potassium 5.0 3.5 - 5.5 mmol/L Chloride 105 100 - 108 mmol/L  
 CO2 31 21 - 32 mmol/L Anion gap 1 (L) 3.0 - 18 mmol/L Glucose 89 74 - 99 mg/dL BUN 29 (H) 7.0 - 18 MG/DL Creatinine 1.53 (H) 0.6 - 1.3 MG/DL  
 BUN/Creatinine ratio 19 12 - 20 GFR est AA 39 (L) >60 ml/min/1.73m2 GFR est non-AA 32 (L) >60 ml/min/1.73m2 Calcium 8.7 8.5 - 10.1 MG/DL Bilirubin, total 0.7 0.2 - 1.0 MG/DL  
 ALT (SGPT) 36 13 - 56 U/L  
 AST (SGOT) 29 15 - 37 U/L Alk. phosphatase 88 45 - 117 U/L Protein, total 6.3 (L) 6.4 - 8.2 g/dL Albumin 3.2 (L) 3.4 - 5.0 g/dL Globulin 3.1 2.0 - 4.0 g/dL A-G Ratio 1.0 0.8 - 1.7 NT-PRO BNP Collection Time: 02/28/19  8:30 AM  
Result Value Ref Range NT pro-BNP 8,089 (H) 0 - 1,800 PG/ML  
EKG, 12 LEAD, INITIAL Collection Time: 02/28/19 10:12 AM  
Result Value Ref Range Ventricular Rate 79 BPM  
 Atrial Rate 57 BPM  
 QRS Duration 188 ms Q-T Interval 460 ms QTC Calculation (Bezet) 527 ms Calculated R Axis -45 degrees Calculated T Axis 132 degrees Diagnosis AV sequential or dual chamber electronic pacemaker When compared with ECG of 30-JAN-2019 13:48, 
 No significant change was found Confirmed by Darline Reyes MD, Rosa Maria Jones ([id number]) on 2/28/2019 3:44:41 PM 
  
 
 
Radiologic Studies -  
XR ABD ACUTE W 1 V CHEST Final Result IMPRESSION:  
  
Stable cardiomegaly. Increased stool throughout the colon. Medical Decision Making I am the first provider for this patient. I reviewed the vital signs, available nursing notes, past medical history, past surgical history, family history and social history. Vital Signs-Reviewed the patient's vital signs. Pulse Oximetry Analysis -  100% on room air, stable Cardiac Monitor: 
Rate: 70 Rhythm:  Normal Sinus Rhythm EKG: Interpreted by the EP. Time Interpreted: 10:16 Rate:  79 Rhythm: Paced Records Reviewed: Nursing Notes and Old Medical Records (Time of Review: 8:54 AM) Provider Notes (Medical Decision Making): MDM Number of Diagnoses or Management Options Diagnosis management comments: DDx: CHF, metabolic, constipation, dementia. Will check labs, acute abdominal series. Will administer Lasix. Patient may need enema. constipation on xray Patient had a BM after enema. Is diuresing Feeling better, now talkative, pleasantly confused (dementia) Will dc home, fu given. Medications  
furosemide (LASIX) injection 40 mg (40 mg IntraVENous Given 2/28/19 1014) Diagnosis Clinical Impression: 1. Constipation, unspecified constipation type 2. Dementia without behavioral disturbance, unspecified dementia type Disposition: Discharge. Follow-up Information Follow up With Specialties Details Why Contact Info Claribel Martinez MD Internal Medicine Schedule an appointment as soon as possible for a visit in 2 days  [address] [place] [address] 
[phone] [facility] EMERGENCY DEPT Emergency Medicine  As needed, If symptoms worsen [address] [place] [id number] 
[phone] Medication List  
  
 CONTINUE taking these medications   
amiodarone 200 mg tablet Commonly known as:  CORDARONE 
TAKE ONE TABLET EVERY DAY (MAKE AN APPT) docusate sodium 100 mg capsule Commonly known as:  Millicent Alie Take 1 Cap by mouth two (2) times a day for 90 days. isosorbide mononitrate ER 60 mg CR tablet Commonly known as:  IMDUR 
TAKE ONE TABLET EVERY MORNING 
  
  
ASK your doctor about these medications   
acetaminophen 650 mg Tber Commonly known as:  TYLENOL ARTHRITIS PAIN Take 1 Tab by mouth every eight (8) hours. amitriptyline 25 mg tablet Commonly known as:  ELAVIL 
  
aspirin 81 mg tablet CALMOSEPTINE 0.44-20.6 % Oint Generic drug:  menthol-zinc oxide 
  
carvedilol 3.125 mg tablet Commonly known as:  Luis E Hirschfeld Take 1 Tab by mouth every twelve (12) hours. food supplemt, lactose-reduced 0.08 gram-1.5 kcal/mL Liqd Commonly known as:  ENSURE ENLIVE Take 1 Bottle by mouth two (2) times a day. meloxicam 7.5 mg tablet Commonly known as:  MOBIC Take 1 Tab by mouth every other day. memantine 10 mg tablet Commonly known as:  NAMENDA 
  
OTHER Check CBC, CMP, Mg on 2/13/19- results to PCP and nephrologist Dr Molly Graham immediately, Diagnosis- CKD4 OTHER Incentive Spirometry- Use as directed OTHER 
Graded Compression Stockings b/l LE- use as directed 
  
predniSONE 20 mg tablet Commonly known as:  Paty Gong Take 20 mg by mouth daily (with breakfast). raNITIdine 75 mg Tab Commonly known as:  ZANTAC 
  
  
 
_______________________________ Attestations: 
Scribe Attestation Marisol Espitia acting as a scribe for and in the presence of Neyda Nava DO February 28, 2019 at 8:54 AM 
    
Provider Attestation:     
I personally performed the services described in the documentation, reviewed the documentation, as recorded by the scribe in my presence, and it accurately and completely records my words and actions.  February 28, 2019 at 8:54 AM - Jason Her DO   
 _______________________________

## 2019-03-01 ENCOUNTER — PATIENT OUTREACH (OUTPATIENT)
Dept: CARDIOLOGY CLINIC | Age: 84
End: 2019-03-01

## 2019-03-01 NOTE — PROGRESS NOTES
NN contacted the patient's family by telephone to perform CHF follow Up. Noted Priorities/Plan:  ACP, Returning to baseline function, transportation plan for upcoming appt on 3/4/19 Spoke to [employer] (daughter) and to Tosha Villalobos (son). Daily Weight: Unable to obtain, Pt unable to tolerate standing at this time Zone: green Signs/Symptoms: Edema none; SOB none; orthopnea none Goals  Knowledge and adherence medication (ie. action, side effects, missed dose, etc.) Caregiver to provide teach back on medications on/by 3/10/19.  Patient verbalizes understanding of self-management goals of living with Congestive Heart Failure Caregiver will verbalize heart failure zones and report red flags to physician/NN on/by 4/10/19.  Prevent complications post hospitalization. Caregiver to take or make arrangements for patient to follow up with appointments post hospitalization on/by 2/28/19. 
2/18/19 Patient went to ED on 2/13/19 for abdominal distension, discharged on same day with constipation, pt instructed to begin using Miralax for constipation. Needs addressed from pathway:  
Week 1-4 Provide Daily Disease Management 
(NN initiated) Pt unable to do Daily weights ? Discussed Daily Zone Identification ? Discussed Cardiac Low-sodium Diet (No added sodium; 1500mg as indicated). If 
? Discussed Fluid restriction of 1500 ml/day ? Comorbidity Management ? Confirmed follow-up appointments/transportation. Son states has medicaid cab set up for upcoming Card appt. Additional assessment ? Reinforced Fall precautions ? Discussed Activity tolerance ? Educated on Energy conservation management (balance activity with rest) ? Labs/diagnostics *as indicated by MD office ? Medication Therapy *as directed ? Pt w/ no Diet/appetite issues ? Discussed appropriate ED/Hospital utilization for CHF ? Immunizations not up to date, educated family on importance. They will f/u at next PCP visit ? No Home assessment/modifications indicated at this time ? Family reports some Transportation need. Discussed function of medicaid cap. Family stated understanding. ? Family denies Medication assistance need Psychosocial: Reassurance/emotional support Education: 
? Educated on Advanced care planning status, daughter states they will view the form on next appt. ? Support system identification ? Health literacy for heart failure ? Caregiver education and preparation Have you been to an ER/Hospital since discharge from [facility]? yes Pt w/ ED visit on 2/28/19 for constipation. Have you followed up with PCP/Cardiologist/Specialist?  
Appt w/ ANTONINO Austin on 3/4/19 at 1030 AM 
 
Transportation: Medicare Transport Diet: cardiac/no salt added Activity/ADLs: Needs assistance with ADL. Medications Reconciled at this time:  no, family denied at this time Home health:  Company/Completion: [employer] Social Support:Family 
  
Advanced Care Plan: Not on file, addressed w/ family. Patient reminded that there is a physician on call 24 hours a day / 7 days a week (M-F 5pm to 8am and from Friday 5pm until Monday 8a for the weekend) should the patient have questions or concerns. Patient reminded to call 911 if situation is emergent or patient feels the situation is emergent. Pt verbalizes understanding.

## 2019-03-01 NOTE — ED NOTES
Report received from [facility]. Pt resting on stretcher awaiting medical transport home. Discharge instructions reviewed with family member by off going nurse.

## 2019-03-04 ENCOUNTER — OFFICE VISIT (OUTPATIENT)
Dept: CARDIOLOGY CLINIC | Age: 84
End: 2019-03-04

## 2019-03-04 VITALS
SYSTOLIC BLOOD PRESSURE: 124 MMHG | HEIGHT: 64 IN | HEART RATE: 73 BPM | DIASTOLIC BLOOD PRESSURE: 56 MMHG | BODY MASS INDEX: 27.46 KG/M2

## 2019-03-04 DIAGNOSIS — Z95.0 S/P PLACEMENT OF CARDIAC PACEMAKER: ICD-10-CM

## 2019-03-04 DIAGNOSIS — I50.22 CHRONIC SYSTOLIC CONGESTIVE HEART FAILURE (HCC): Primary | ICD-10-CM

## 2019-03-04 DIAGNOSIS — N18.4 CKD (CHRONIC KIDNEY DISEASE) STAGE 4, GFR 15-29 ML/MIN (HCC): ICD-10-CM

## 2019-03-04 DIAGNOSIS — I48.92 PAROXYSMAL ATRIAL FLUTTER (HCC): ICD-10-CM

## 2019-03-04 DIAGNOSIS — I10 ESSENTIAL HYPERTENSION: ICD-10-CM

## 2019-03-04 RX ORDER — METOLAZONE 2.5 MG/1
2.5 TABLET ORAL DAILY
Qty: 30 TAB | Refills: 5 | Status: SHIPPED | OUTPATIENT
Start: 2019-03-04 | End: 2019-05-09

## 2019-03-04 RX ORDER — TORSEMIDE 10 MG/1
20 TABLET ORAL DAILY
Qty: 30 TAB | Refills: 5 | Status: SHIPPED | OUTPATIENT
Start: 2019-03-04 | End: 2019-03-20

## 2019-03-04 NOTE — PROGRESS NOTES
HISTORY OF PRESENT ILLNESS Kayla Jimenez is a 80 y.o. female. Patient with  chf,had persistent junctional ryhtm ,atrial flutter , s/p pacemaker,feels better Sob better Admitted to hospital 1/2019 with acute CHF. Low ejection fraction with systolic heart failure. Feels less short of breath since discharge. 3/4/2019 - admitted to hospital 2/10/19 for NSTEMI , Anemia, CKD stage IV and chronic systolic CHF. Has not had diuretics since d/c and c/o increased edema to upper and lower extremties. Pacemaker Check The history is provided by the patient. This is a new problem. Pertinent negatives include no chest pain, no abdominal pain, no headaches and no shortness of breath. CHF The history is provided by the patient. This is a chronic problem. The problem occurs constantly. The problem has been gradually improving. Pertinent negatives include no chest pain, no abdominal pain, no headaches and no shortness of breath. The symptoms are aggravated by exertion. Palpitations The history is provided by the patient. This is a chronic problem. The problem occurs constantly. Associated symptoms include lower extremity edema. Pertinent negatives include no fever, no chest pain, no claudication, no orthopnea, no PND, no abdominal pain, no nausea, no vomiting, no headaches, no dizziness, no weakness, no cough, no hemoptysis, no shortness of breath and no sputum production. Her past medical history is significant for hypertension. Hypertension The history is provided by the patient. This is a chronic problem. The problem occurs constantly. The problem has not changed since onset. Pertinent negatives include no chest pain, no abdominal pain, no headaches and no shortness of breath. Shortness of Breath The history is provided by the patient. This is a recurrent problem. The problem occurs intermittently. The problem has been gradually improving. Associated symptoms include leg swelling. Pertinent negatives include no fever, no headaches, no cough, no sputum production, no hemoptysis, no wheezing, no PND, no orthopnea, no chest pain, no vomiting, no abdominal pain, no rash and no claudication. Precipitated by: exertion. Associated medical issues include heart failure. Leg Swelling The history is provided by the patient. This is a chronic problem. The problem occurs daily. The problem has been gradually improving. Pertinent negatives include no chest pain, no abdominal pain, no headaches and no shortness of breath. Hospital Follow Up The history is provided by the patient. Pertinent negatives include no chest pain, no abdominal pain, no headaches and no shortness of breath. Review of Systems Constitutional: Negative for chills and fever. HENT: Negative for nosebleeds. Eyes: Negative for blurred vision and double vision. Respiratory: Negative for cough, hemoptysis, sputum production, shortness of breath and wheezing. Cardiovascular: Positive for leg swelling. Negative for chest pain, palpitations, orthopnea, claudication and PND. Gastrointestinal: Negative for abdominal pain, heartburn, nausea and vomiting. Musculoskeletal: Negative for myalgias. Skin: Negative for rash. Neurological: Negative for dizziness, weakness and headaches. Endo/Heme/Allergies: Does not bruise/bleed easily. Family History Problem Relation Age of Onset  Arrhythmia Neg Hx  Asthma Neg Hx  Clotting Disorder Neg Hx  Fainting Neg Hx   
 Heart Attack Neg Hx  High Cholesterol Neg Hx  Pacemaker Neg Hx  Stroke Neg Hx Past Medical History:  
Diagnosis Date  Acute on chronic diastolic heart failure ([address]  Arthritis  Benign hypertensive heart disease without heart failure Better controlled, stable  Chronic diastolic heart failure ([address] Breathing and edema is improving  [facility]  HTN (hypertension)  Hypercholesteremia  Lupus (systemic lupus erythematosus) ([facility] 6/18/2014  
 followed by dr Gildardo Ness  Obesity, unspecified Patient has weight loss Discussed diet ad fluid restriction  Other and unspecified hyperlipidemia F/u per pmd  
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  
Procedure Laterality Date  HX HYSTERECTOMY  PACEMAKER PROCEDURE No Known Allergies Current Outpatient Medications Medication Sig  torsemide (DEMADEX) 10 mg tablet Take 2 Tabs by mouth daily.  metOLazone (ZAROXOLYN) 2.5 mg tablet Take 1 Tab by mouth daily.  predniSONE (DELTASONE) 20 mg tablet Take 20 mg by mouth daily (with breakfast).  meloxicam (MOBIC) 7.5 mg tablet Take 1 Tab by mouth every other day.  OTHER Incentive Spirometry- Use as directed  food supplemt, lactose-reduced (ENSURE ENLIVE) 0.08 gram-1.5 kcal/mL liqd Take 1 Bottle by mouth two (2) times a day.  menthol-zinc oxide (CALMOSEPTINE) 0.44-20.6 % oint Apply  to affected area.  raNITIdine (ZANTAC) 75 mg tab Take  by mouth two (2) times a day.  docusate sodium (COLACE) 100 mg capsule Take 1 Cap by mouth two (2) times a day for 90 days.  carvedilol (COREG) 3.125 mg tablet Take 1 Tab by mouth every twelve (12) hours.  acetaminophen (TYLENOL ARTHRITIS PAIN) 650 mg TbER Take 1 Tab by mouth every eight (8) hours.  amiodarone (CORDARONE) 200 mg tablet TAKE ONE TABLET EVERY DAY (MAKE AN APPT)  isosorbide mononitrate ER (IMDUR) 60 mg CR tablet TAKE ONE TABLET EVERY MORNING  
 memantine (NAMENDA) 10 mg tablet  amitriptyline (ELAVIL) 25 mg tablet Take  by mouth nightly.  aspirin 81 mg tablet Take 81 mg by mouth daily.  OTHER Check CBC, CMP, Mg on 2/13/19- results to PCP and nephrologist Dr Molly Graham immediately, Diagnosis- CKD4  OTHER Graded Compression Stockings b/l LE- use as directed No current facility-administered medications for this visit. Visit Vitals /56 Pulse 73 Ht 5' 4\" (1.626 m) BMI 27.46 kg/m² Physical Exam  
Constitutional: She is oriented to person, place, and time. She appears well-developed and well-nourished. HENT:  
Head: Normocephalic and atraumatic. Eyes: Conjunctivae are normal.  
Neck: Neck supple. No JVD present. No tracheal deviation present. No thyromegaly present. Cardiovascular: Normal rate and regular rhythm. PMI is not displaced. Exam reveals no gallop and no decreased pulses. Murmur heard. Holosystolic murmur is present at the lower left sternal border. Pulmonary/Chest: No respiratory distress. She has no wheezes. She has no rales. She exhibits no tenderness. Abdominal: Soft. There is no tenderness. Musculoskeletal: She exhibits edema (3+ BLE edema - extending to thighs, BL upper ext edema). Neurological: She is alert and oriented to person, place, and time. Skin: Skin is warm. Psychiatric: She has a normal mood and affect. No flowsheet data found. Ms. Rehan Jackson has a reminder for a \"due or due soon\" health maintenance. I have asked that she contact her primary care provider for follow-up on this health maintenance. SUMMARY:4/2014 Left ventricle: Ejection fraction was estimated to be 60 %. No obvious 
wall motion abnormalities identified in the views obtained. There was mild 
concentric hypertrophy. Features were consistent with a pseudonormal left 
ventricular filling pattern, with concomitant abnormal relaxation and 
increased filling pressure (grade 2 diastolic dysfunction). Tricuspid valve: There was moderate regurgitation. Pulmonary artery 
systolic pressure: 50 mmHg. 11/2015:stress test 
1. Normal perfusion scan. 2. Normal wall motion and ejection fraction. 3. Gated images reveal normal wall motion and ejection fraction is 
calculated at 59%. 12/07/2015 Pacer check Noted with A Flutter. D/w daughter on phone 7/2019 Pacemaker check normal function. No A. fib. Atrial heart rate up to 159. Interpretation Summary 12/2018 · Technically difficult study due to patient compliance. Unable to obtain on-axis apical images. Good parasternals, poor subcostal images. · Left ventricular moderate-to-severely decreased global systolic function. Estimated left ventricular ejection fraction is 31 - 35%. Visually measured ejection fraction. Left ventricular severe sigmoid septum hypertrophy. Abnormal left ventricular wall motion as described on the wall scoring diagram below. Abnormal left ventricular septal motion. Interventricular septal \"bounce\". Severe (grade 3) left ventricular diastolic dysfunction. · Moderate tricuspid valve regurgitation is present. Mild pulmonary hypertension is present. PASP 38mmHg · Mild aortic valve regurgitation is present. · Mild to moderate mitral valve regurgitation. Assessment ICD-10-CM ICD-9-CM 1. Chronic systolic congestive heart failure (HCC) P50.81 230.01 METABOLIC PANEL, BASIC  
  428.0   
 + BLE edema extending to thighs - begin Demadex 20 mg/ day - and metolazone 2.5 mg/ day 2. Paroxysmal atrial flutter (HCC) I48.92 427.32   
 continue amiodarone, not on AC due to high risk of bleeding 3. S/P placement of cardiac pacemaker Z95.0 V45.01   
 normal function by last interrogation 4. CKD (chronic kidney disease) stage 4, GFR 15-29 ml/min ([facility]) N18.4 585.4 Monitor with diuresis - bmp in 1 week 5. Essential hypertension I10 401.9 Controlled  
continue amiodarone to maintain sr,risk of anticoagulation high and would not anticoagulate-discussed with family I have discussed risk benefit and option of use of amiodarone for arrythmia. Risk of toxicity with medication are informed. Patient will require careful monitoring. Lasix and metolazone used as needed- 
1/2019 Recent admission with CHF. Continue to monitor. Due to dementia would not be a candidate for ICD upgrade. Patient has increased edema since discharge. I have increase Lasix from 20 mg to 40 mg 1-2 tablets a day. Patient currently nursing home 3/4/2019 - Recent admission for acute systolic CHF and CKD with hypotension. She has had increased edema since discharge and has not had diuretics in 3 weeks. Begin Demadex 20 mg and Metolazone 2.5 mg/ day. Have BMP drawn in 1 weeks. F/U in our office in 2 weeks - to ER for new or worsening symptoms. All discussed with daughter and son in room. There are no discontinued medications. Orders Placed This Encounter  METABOLIC PANEL, BASIC Standing Status:   Future Standing Expiration Date:   3/4/2020  torsemide (DEMADEX) 10 mg tablet Sig: Take 2 Tabs by mouth daily. Dispense:  30 Tab Refill:  5  
 metOLazone (ZAROXOLYN) 2.5 mg tablet Sig: Take 1 Tab by mouth daily. Dispense:  30 Tab Refill:  5 Follow-up Disposition: 
Return in about 2 weeks (around 3/18/2019), or if symptoms worsen or fail to improve. I have independently evaluated taken history and examined the patient. All relevant labs and testing data's are reviewed. Care plan discussed and updated after review.  
Joyce Huddleston MD

## 2019-03-04 NOTE — PATIENT INSTRUCTIONS
Begin Demadex 20 mg/ day and Metolazone 2.5 mg/ day Have blood work drawn in 1 week Follow up in our office in 2 weeks To ER for new or worsening symptoms A Healthy Heart: Care Instructions Your Care Instructions Heart disease occurs when a substance called plaque builds up in the vessels that supply oxygen-rich blood to your heart. This can narrow the blood vessels and reduce blood flow. A heart attack happens when blood flow is completely blocked. A high-fat diet, smoking, and other factors increase the risk of heart disease. Your doctor has found that you have a chance of having heart disease. You can do lots of things to keep your heart healthy. It may not be easy, but you can change your diet, exercise more, and quit smoking. These steps really work to lower your chance of heart disease. Follow-up care is a key part of your treatment and safety. Be sure to make and go to all appointments, and call your doctor if you are having problems. It's also a good idea to know your test results and keep a list of the medicines you take. How can you care for yourself at home? Diet 
  · Use less salt when you cook and eat. This helps lower your blood pressure. Taste food before salting. Add only a little salt when you think you need it. With time, your taste buds will adjust to less salt.  
  · Eat fewer snack items, fast foods, canned soups, and other high-salt, high-fat, processed foods.  
  · Read food labels and try to avoid saturated and trans fats. They increase your risk of heart disease by raising cholesterol levels.  
  · Limit the amount of solid fat-butter, margarine, and shortening-you eat. Use olive, peanut, or canola oil when you cook. Bake, broil, and steam foods instead of frying them.  
  · Eating fish can lower your risk for heart disease. Eat at least 2 servings of fish a week.  [place], mackerel, herring, sardines, and chunk light tuna are very good choices. These fish contain omega-3 fatty acids.  
  · Eat a variety of fruit and vegetables every day. Dark green, deep orange, red, or yellow fruits and vegetables are especially good for you. Examples include spinach, carrots, peaches, and berries.  
  · Foods high in fiber can reduce your cholesterol and provide important vitamins and minerals. High-fiber foods include whole-grain cereals and breads, oatmeal, beans, brown rice, citrus fruits, and apples.  
  · Limit drinks and foods with added sugar. These include candy, desserts, and soda pop.  
 Lifestyle changes 
  · If your doctor recommends it, get more exercise. Walking is a good choice. Bit by bit, increase the amount you walk every day. Try for at least 30 minutes on most days of the week. You also may want to swim, bike, or do other activities.  
  · Do not smoke. If you need help quitting, talk to your doctor about stop-smoking programs and medicines. These can increase your chances of quitting for good. Quitting smoking may be the most important step you can take to protect your heart. It is never too late to quit. You will get health benefits right away.  
  · Limit alcohol to 2 drinks a day for men and 1 drink a day for women. Too much alcohol can cause health problems. Medicines 
  · Take your medicines exactly as prescribed. Call your doctor if you think you are having a problem with your medicine.  
  · If your doctor recommends aspirin, take the amount directed each day. Make sure you take aspirin and not another kind of pain reliever, such as acetaminophen (Tylenol). If you take ibuprofen (such as Advil or Motrin) for other problems, take aspirin at least 2 hours before taking ibuprofen. When should you call for help? Call 911 if you have symptoms of a heart attack. These may include: 
  · Chest pain or pressure, or a strange feeling in the chest.  
  · Sweating.  
  · Shortness of breath.   · Pain, pressure, or a strange feeling in the back, neck, jaw, or upper belly or in one or both shoulders or arms.  
  · Lightheadedness or sudden weakness.  
  · A fast or irregular heartbeat.  
 After you call 911, the  may tell you to chew 1 adult-strength or 2 to 4 low-dose aspirin. Wait for an ambulance. Do not try to drive yourself. 
 Watch closely for changes in your health, and be sure to contact your doctor if you have any problems. Where can you learn more? Go to http://renata-antonieta.info/. Enter H295 in the search box to learn more about \"A Healthy Heart: Care Instructions. \" Current as of: July 22, 2018 Content Version: 11.9 © 9704-3007 [employer]. Care instructions adapted under license by [employer] (which disclaims liability or warranty for this information). If you have questions about a medical condition or this instruction, always ask your healthcare professional. [address] any warranty or liability for your use of this information.

## 2019-03-04 NOTE — LETTER
Michelle Zuñiga 
6/26/1931 
 
3/4/2019 Dear Jameson Stone MD 
 
I had the pleasure of evaluating  Ms. Dereck Ortiz in office today. Below are the relevant portions of my assessment and plan of care. ICD-10-CM ICD-9-CM 1. Chronic systolic congestive heart failure (HCC) I50.22 428.22   
  428.0 2. Paroxysmal atrial flutter (HCC) I48.92 427.32   
3. S/P placement of cardiac pacemaker Z95.0 V45.01   
4. CKD (chronic kidney disease) stage 4, GFR 15-29 ml/min (HCC) N18.4 585.4 5. Essential hypertension I10 401.9 6. Hypercholesteremia E78.00 272.0 Current Outpatient Medications Medication Sig Dispense Refill  torsemide (DEMADEX) 10 mg tablet Take 2 Tabs by mouth daily. 30 Tab 5  
 metOLazone (ZAROXOLYN) 2.5 mg tablet Take 1 Tab by mouth daily. 30 Tab 5  predniSONE (DELTASONE) 20 mg tablet Take 20 mg by mouth daily (with breakfast). 30 Tab 0  
 meloxicam (MOBIC) 7.5 mg tablet Take 1 Tab by mouth every other day. 7 Tab 0  
 OTHER Incentive Spirometry- Use as directed 1 Each 0  
 food supplemt, lactose-reduced (ENSURE ENLIVE) 0.08 gram-1.5 kcal/mL liqd Take 1 Bottle by mouth two (2) times a day. 60 Bottle 0  
 menthol-zinc oxide (CALMOSEPTINE) 0.44-20.6 % oint Apply  to affected area.  raNITIdine (ZANTAC) 75 mg tab Take  by mouth two (2) times a day.  docusate sodium (COLACE) 100 mg capsule Take 1 Cap by mouth two (2) times a day for 90 days. 60 Cap 2  carvedilol (COREG) 3.125 mg tablet Take 1 Tab by mouth every twelve (12) hours. 60 Tab 0  
 acetaminophen (TYLENOL ARTHRITIS PAIN) 650 mg TbER Take 1 Tab by mouth every eight (8) hours. 15 Tab 0  
 amiodarone (CORDARONE) 200 mg tablet TAKE ONE TABLET EVERY DAY (MAKE AN APPT) 30 Tab 5  
 isosorbide mononitrate ER (IMDUR) 60 mg CR tablet TAKE ONE TABLET EVERY MORNING 30 Tab 6  
 memantine (NAMENDA) 10 mg tablet  amitriptyline (ELAVIL) 25 mg tablet Take  by mouth nightly.  aspirin 81 mg tablet Take 81 mg by mouth daily.  OTHER Check CBC, CMP, Mg on 2/13/19- results to PCP and nephrologist Dr Darcy Sampson immediately, Diagnosis- CKD4 1 Each 0  
 OTHER Graded Compression Stockings b/l LE- use as directed 1 Each 0 Orders Placed This Encounter  torsemide (DEMADEX) 10 mg tablet Sig: Take 2 Tabs by mouth daily. Dispense:  30 Tab Refill:  5  
 metOLazone (ZAROXOLYN) 2.5 mg tablet Sig: Take 1 Tab by mouth daily. Dispense:  30 Tab Refill:  5 If you have questions, please do not hesitate to call me. I look forward to following Ms. Rehan Jackson along with you. Sincerely, Krista Mclaughlin MD

## 2019-03-11 ENCOUNTER — HOSPITAL ENCOUNTER (OUTPATIENT)
Dept: LAB | Age: 84
Discharge: HOME | End: 2019-03-11
Payer: MEDICARE

## 2019-03-11 DIAGNOSIS — I50.32 HYPERTENSIVE HEART DISEASE WITH CHRONIC DIASTOLIC CONGESTIVE HEART FAILURE (HCC): Primary | ICD-10-CM

## 2019-03-11 DIAGNOSIS — I11.0 HYPERTENSIVE HEART DISEASE WITH CHRONIC DIASTOLIC CONGESTIVE HEART FAILURE (HCC): Primary | ICD-10-CM

## 2019-03-11 DIAGNOSIS — I50.22 CHRONIC SYSTOLIC CONGESTIVE HEART FAILURE (HCC): ICD-10-CM

## 2019-03-11 LAB
ANION GAP SERPL CALC-SCNC: 4 MMOL/L (ref 3–18)
BACTERIA SPEC CULT: NORMAL
BACTERIA SPEC CULT: NORMAL
BUN SERPL-MCNC: 39 MG/DL (ref 7–18)
BUN/CREAT SERPL: 24 (ref 12–20)
CALCIUM SERPL-MCNC: 8.4 MG/DL (ref 8.5–10.1)
CHLORIDE SERPL-SCNC: 96 MMOL/L (ref 100–108)
CO2 SERPL-SCNC: 40 MMOL/L (ref 21–32)
CREAT SERPL-MCNC: 1.64 MG/DL (ref 0.6–1.3)
FUNGUS SMEAR,FNGSMR: NORMAL
GLUCOSE SERPL-MCNC: 90 MG/DL (ref 74–99)
POTASSIUM SERPL-SCNC: 3.4 MMOL/L (ref 3.5–5.5)
SERVICE CMNT-IMP: NORMAL
SERVICE CMNT-IMP: NORMAL
SODIUM SERPL-SCNC: 140 MMOL/L (ref 136–145)

## 2019-03-11 PROCEDURE — 80048 BASIC METABOLIC PNL TOTAL CA: CPT

## 2019-03-11 PROCEDURE — 36415 COLL VENOUS BLD VENIPUNCTURE: CPT

## 2019-03-11 NOTE — TELEPHONE ENCOUNTER
Requested Prescriptions     Pending Prescriptions Disp Refills    potassium chloride (KLOR-CON) 10 mEq tablet 30 Tab 1     Sig: Take 1 Tab by mouth two (2) times a day.

## 2019-03-11 NOTE — TELEPHONE ENCOUNTER
Spoke to patient son regarding patient lab/test per Dr. Naveed Bocanegra, add potassium 10 MEQ BID. BMP in 1 week. He  voices understanding and acceptance of this advice and will call back if any further questions or concerns.

## 2019-03-11 NOTE — TELEPHONE ENCOUNTER
----- Message from Sheila Vergara MD sent at 3/11/2019  2:13 PM EDT -----  At potassium 10 M EQ twice a day.   BMP 1 week

## 2019-03-12 RX ORDER — POTASSIUM CHLORIDE 750 MG/1
10 TABLET, EXTENDED RELEASE ORAL 2 TIMES DAILY
Qty: 30 TAB | Refills: 1 | Status: SHIPPED | OUTPATIENT
Start: 2019-03-12 | End: 2019-03-20

## 2019-03-13 ENCOUNTER — PATIENT OUTREACH (OUTPATIENT)
Dept: CARDIOLOGY CLINIC | Age: 84
End: 2019-03-13

## 2019-03-13 NOTE — PROGRESS NOTES
NN contacted by the patient's son, Lindsey Mott by telephone in response to message left to perform CHF follow Up. Noted Priorities/Plan:  ACP, Returning to baseline function, transportation plan for upcoming appt on 3/18/19      Daily Weight:Son again reports unable to obtain, Pt unable to tolerate standing at this time  Zone: green   Signs/Symptoms: Edema none; SOB none; orthopnea none        Goals      Knowledge and adherence medication (ie. action, side effects, missed dose, etc.)      Caregiver to provide teach back on medications on/by 5/10/19.  Patient verbalizes understanding of self-management goals of living with Congestive Heart Failure      Caregiver will verbalize heart failure zones and report red flags to physician/NN on/by 4/10/19.  Prevent complications post hospitalization. Caregiver to take or make arrangements for patient to follow up with appointments post hospitalization on/by 2/28/19.  2/18/19 Patient went to ED on 2/13/19 for abdominal distension, discharged on same day with constipation, pt instructed to begin using Miralax for constipation. Needs addressed from pathway:   Week 5-8   Provide Daily Disease Management (patient/caregiver initiated)  ? Educated again on importance ofDaily weight  ? Reinforced Daily Zone Identification   ? Reinforced Cardiac Low-sodium Diet (No added sodium; 1500mg as indicated). Reinforced liquid restriction of 1500 ml/day  ? Comorbidity Management  ? Confirm follow-up appointments/transportation. Additional Assessments  ? Reinforced Fall precautions    ? Activity tolerance assessment: pt reports pt still w/ difficulties w/ prolonged standing  ? Discussed Energy conservation management (balance activity w/ rest)  ? Labs/diagnostics per md office  ? Medication Therapy: no issues identified  ? Family reports normal Diet/appetite  ? Discussed appropriate ED/Hospital utilization  ? No Home modifications   ?  Cardiac rehab consideration    Psychosocial: Reassurance and emotional support; depression screening. Education:  ? Advanced Care Planning status again reinforced  ? Patient/Caregiver verifies support systems   ? Health literacy for heart failure         Have you been to an ER/Hospital since discharge from [facility]?   yes    Pt w/ ED visit on 2/28/19 for constipation.     Have you followed up with PCP/Cardiologist/Specialist?   Appt w/ ANTONINO Kinney on 3/4/19 at 1030 AM     Transportation: Medicare Transport  Diet: cardiac/no salt added  Activity/ADLs: Needs assistance with ADL. Medications Reconciled at this time:  yes  Home health: no  Company/Completion: [employer]  Social Support:Family     Advanced Care Plan: Not on file, addressed w/ family. Patient reminded that there is a physician on call 24 hours a day / 7 days a week (M-F 5pm to 8am and from Friday 5pm until Monday 8a for the weekend) should the patient have questions or concerns. Patient reminded to call 911 if situation is emergent or patient feels the situation is emergent. Pt verbalizes understanding.

## 2019-03-13 NOTE — PROGRESS NOTES
Attempted to contact patient's family for Transition of Care follow up. No answer, left message with contact information provided. Will attempt to contact at a later time.

## 2019-03-14 ENCOUNTER — HOSPITAL ENCOUNTER (EMERGENCY)
Age: 84
Discharge: HOME | DRG: 871 | End: 2019-03-14
Attending: EMERGENCY MEDICINE
Payer: MEDICARE

## 2019-03-14 VITALS
HEART RATE: 79 BPM | SYSTOLIC BLOOD PRESSURE: 122 MMHG | RESPIRATION RATE: 18 BRPM | DIASTOLIC BLOOD PRESSURE: 50 MMHG | OXYGEN SATURATION: 96 %

## 2019-03-14 DIAGNOSIS — F03.90 DEMENTIA WITHOUT BEHAVIORAL DISTURBANCE, UNSPECIFIED DEMENTIA TYPE: ICD-10-CM

## 2019-03-14 DIAGNOSIS — N18.9 CHRONIC KIDNEY DISEASE, UNSPECIFIED CKD STAGE: ICD-10-CM

## 2019-03-14 DIAGNOSIS — N30.00 ACUTE CYSTITIS WITHOUT HEMATURIA: Primary | ICD-10-CM

## 2019-03-14 DIAGNOSIS — L89.302 PRESSURE INJURY OF BUTTOCK, STAGE 2, UNSPECIFIED LATERALITY (HCC): ICD-10-CM

## 2019-03-14 LAB
ANION GAP SERPL CALC-SCNC: 4 MMOL/L (ref 3–18)
APPEARANCE UR: CLEAR
ARTERIAL PATENCY WRIST A: NO
BACTERIA URNS QL MICRO: ABNORMAL /HPF
BASE EXCESS BLDV CALC-SCNC: 19 MMOL/L
BASOPHILS # BLD: 0 K/UL (ref 0–0.1)
BASOPHILS NFR BLD: 0 % (ref 0–2)
BDY SITE: ABNORMAL
BILIRUB UR QL: NEGATIVE
BODY TEMPERATURE: 97.8
BUN SERPL-MCNC: 55 MG/DL (ref 7–18)
BUN/CREAT SERPL: 29 (ref 12–20)
CALCIUM SERPL-MCNC: 8.9 MG/DL (ref 8.5–10.1)
CHLORIDE SERPL-SCNC: 95 MMOL/L (ref 100–108)
CO2 SERPL-SCNC: 42 MMOL/L (ref 21–32)
COLOR UR: YELLOW
CREAT SERPL-MCNC: 1.88 MG/DL (ref 0.6–1.3)
DIFFERENTIAL METHOD BLD: ABNORMAL
EOSINOPHIL # BLD: 0.2 K/UL (ref 0–0.4)
EOSINOPHIL NFR BLD: 2 % (ref 0–5)
EPITH CASTS URNS QL MICRO: ABNORMAL /LPF (ref 0–5)
ERYTHROCYTE [DISTWIDTH] IN BLOOD BY AUTOMATED COUNT: 17.9 % (ref 11.6–14.5)
GAS FLOW.O2 O2 DELIVERY SYS: ABNORMAL L/MIN
GLUCOSE SERPL-MCNC: 117 MG/DL (ref 74–99)
GLUCOSE UR STRIP.AUTO-MCNC: NEGATIVE MG/DL
HCO3 BLDV-SCNC: 45.9 MMOL/L (ref 23–28)
HCT VFR BLD AUTO: 36.9 % (ref 35–45)
HGB BLD-MCNC: 11.4 G/DL (ref 12–16)
HGB UR QL STRIP: NEGATIVE
KETONES UR QL STRIP.AUTO: NEGATIVE MG/DL
LEUKOCYTE ESTERASE UR QL STRIP.AUTO: ABNORMAL
LYMPHOCYTES # BLD: 2.6 K/UL (ref 0.9–3.6)
LYMPHOCYTES NFR BLD: 27 % (ref 21–52)
MCH RBC QN AUTO: 30.3 PG (ref 24–34)
MCHC RBC AUTO-ENTMCNC: 30.9 G/DL (ref 31–37)
MCV RBC AUTO: 98.1 FL (ref 74–97)
MONOCYTES # BLD: 1 K/UL (ref 0.05–1.2)
MONOCYTES NFR BLD: 11 % (ref 3–10)
NEUTS SEG # BLD: 5.8 K/UL (ref 1.8–8)
NEUTS SEG NFR BLD: 60 % (ref 40–73)
NITRITE UR QL STRIP.AUTO: NEGATIVE
PCO2 BLDV: 63.1 MMHG (ref 41–51)
PH BLDV: 7.47 [PH] (ref 7.32–7.42)
PH UR STRIP: 7.5 [PH] (ref 5–8)
PLATELET # BLD AUTO: 151 K/UL (ref 135–420)
PMV BLD AUTO: 9.3 FL (ref 9.2–11.8)
PO2 BLDV: 25 MMHG (ref 25–40)
POTASSIUM SERPL-SCNC: 3.6 MMOL/L (ref 3.5–5.5)
PROT UR STRIP-MCNC: NEGATIVE MG/DL
RBC # BLD AUTO: 3.76 M/UL (ref 4.2–5.3)
SAO2 % BLDV: 49 % (ref 65–88)
SERVICE CMNT-IMP: ABNORMAL
SODIUM SERPL-SCNC: 141 MMOL/L (ref 136–145)
SP GR UR REFRACTOMETRY: 1.01 (ref 1–1.03)
SPECIMEN TYPE: ABNORMAL
UROBILINOGEN UR QL STRIP.AUTO: 1 EU/DL (ref 0.2–1)
WBC # BLD AUTO: 9.6 K/UL (ref 4.6–13.2)
WBC URNS QL MICRO: ABNORMAL /HPF (ref 0–5)

## 2019-03-14 PROCEDURE — 82803 BLOOD GASES ANY COMBINATION: CPT

## 2019-03-14 PROCEDURE — 51798 US URINE CAPACITY MEASURE: CPT

## 2019-03-14 PROCEDURE — 81001 URINALYSIS AUTO W/SCOPE: CPT

## 2019-03-14 PROCEDURE — 87186 SC STD MICRODIL/AGAR DIL: CPT

## 2019-03-14 PROCEDURE — 74011000250 HC RX REV CODE- 250: Performed by: PHYSICIAN ASSISTANT

## 2019-03-14 PROCEDURE — 85025 COMPLETE CBC W/AUTO DIFF WBC: CPT

## 2019-03-14 PROCEDURE — 74011250636 HC RX REV CODE- 250/636: Performed by: PHYSICIAN ASSISTANT

## 2019-03-14 PROCEDURE — 87086 URINE CULTURE/COLONY COUNT: CPT

## 2019-03-14 PROCEDURE — 96361 HYDRATE IV INFUSION ADD-ON: CPT

## 2019-03-14 PROCEDURE — 99284 EMERGENCY DEPT VISIT MOD MDM: CPT

## 2019-03-14 PROCEDURE — 87077 CULTURE AEROBIC IDENTIFY: CPT

## 2019-03-14 PROCEDURE — 96374 THER/PROPH/DIAG INJ IV PUSH: CPT

## 2019-03-14 PROCEDURE — 80048 BASIC METABOLIC PNL TOTAL CA: CPT

## 2019-03-14 RX ORDER — CEPHALEXIN 500 MG/1
500 CAPSULE ORAL 2 TIMES DAILY
Qty: 14 CAP | Refills: 0 | Status: SHIPPED | OUTPATIENT
Start: 2019-03-14 | End: 2019-03-20

## 2019-03-14 RX ADMIN — SODIUM CHLORIDE 250 ML: 900 INJECTION, SOLUTION INTRAVENOUS at 10:37

## 2019-03-14 RX ADMIN — WATER 1 G: 1 INJECTION INTRAMUSCULAR; INTRAVENOUS; SUBCUTANEOUS at 11:48

## 2019-03-14 NOTE — ED NOTES
I have reviewed discharge instructions with the patient. The patient verbalized understanding. Patient given discharge instructions, verbally acknowledged understanding of all discharge instructions. Patient ambulated out of ED with no signs of distress noted.

## 2019-03-14 NOTE — ED PROVIDER NOTES
EMERGENCY DEPARTMENT HISTORY AND PHYSICAL EXAM    Date: 3/14/2019  Patient Name: Luda Dang    History of Presenting Illness     Chief Complaint   Patient presents with    Blood in Urine         History Provided By: Patient's Son and EMS    Chief Complaint: Blood in diaper , unsure if from stool or urine   Duration: This morning   Timing:  Acute  Location: unknown   Quality: n/a  Severity: n/a  Modifying Factors: none   Associated Symptoms: none       Additional History (Context): Luda Dang is a 80 y.o. female with a history of arthritis, lupus, HTN, CHF who presents with a history of blood in her diaper, patient has dementia is unable to answer questions, son is at bedside who provided the majority of the history. He reports when his mother used the bathroom this morning they noticed blood, they feel the blood is coming from the urine but is unsure if its from urine or stool. Denies recent illness, or evidence of discomfort. Denies recent illness or fevers. PCP: Jer Garvey MD    Current Outpatient Medications   Medication Sig Dispense Refill    cephALEXin (KEFLEX) 500 mg capsule Take 1 Cap by mouth two (2) times a day for 7 days. 14 Cap 0    potassium chloride (KLOR-CON) 10 mEq tablet Take 1 Tab by mouth two (2) times a day. 30 Tab 1    torsemide (DEMADEX) 10 mg tablet Take 2 Tabs by mouth daily. 30 Tab 5    metOLazone (ZAROXOLYN) 2.5 mg tablet Take 1 Tab by mouth daily. 30 Tab 5    predniSONE (DELTASONE) 20 mg tablet Take 20 mg by mouth daily (with breakfast). 30 Tab 0    OTHER Check CBC, CMP, Mg on 2/13/19- results to PCP and nephrologist Dr Tyra Councilman immediately, Diagnosis- CKD4 1 Each 0    meloxicam (MOBIC) 7.5 mg tablet Take 1 Tab by mouth every other day.  7 Tab 0    OTHER Incentive Spirometry- Use as directed 1 Each 0    OTHER Graded Compression Stockings b/l LE- use as directed 1 Each 0    food supplemt, lactose-reduced (ENSURE ENLIVE) 0.08 gram-1.5 kcal/mL liqd Take 1 Bottle by mouth two (2) times a day. 60 Bottle 0    menthol-zinc oxide (CALMOSEPTINE) 0.44-20.6 % oint Apply  to affected area.  raNITIdine (ZANTAC) 75 mg tab Take  by mouth two (2) times a day.  docusate sodium (COLACE) 100 mg capsule Take 1 Cap by mouth two (2) times a day for 90 days. 60 Cap 2    carvedilol (COREG) 3.125 mg tablet Take 1 Tab by mouth every twelve (12) hours. 60 Tab 0    acetaminophen (TYLENOL ARTHRITIS PAIN) 650 mg TbER Take 1 Tab by mouth every eight (8) hours. 15 Tab 0    amiodarone (CORDARONE) 200 mg tablet TAKE ONE TABLET EVERY DAY (MAKE AN APPT) 30 Tab 5    isosorbide mononitrate ER (IMDUR) 60 mg CR tablet TAKE ONE TABLET EVERY MORNING 30 Tab 6    memantine (NAMENDA) 10 mg tablet       amitriptyline (ELAVIL) 25 mg tablet Take  by mouth nightly.  aspirin 81 mg tablet Take 81 mg by mouth daily.          Past History     Past Medical History:  Past Medical History:   Diagnosis Date    Acute on chronic diastolic heart failure (HCC)     Arthritis     Benign hypertensive heart disease without heart failure     Better controlled, stable    Chronic diastolic heart failure (HCC)     Breathing and edema is improving    Congestive heart failure (HCC)     HTN (hypertension)     Hypercholesteremia     [facility]    followed by dr Senthil Olivarez     Obesity, unspecified     Patient has weight loss Discussed diet ad fluid restriction    Other and unspecified hyperlipidemia     F/u per pmd    Tricuspid valve disorders, 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    tr with moderate pulmonary htn        Past Surgical History:  Past Surgical History:   Procedure Laterality Date    HX HYSTERECTOMY      PACEMAKER PROCEDURE         Family History:  Family History   Problem Relation Age of Onset    Arrhythmia Neg Hx     Asthma Neg Hx     Clotting Disorder Neg Hx     Fainting Neg Hx     Heart Attack Neg Hx     High Cholesterol Neg Hx     Pacemaker Neg Hx     Stroke Neg Hx        Social History:  Social History     Tobacco Use    Smoking status: Never Smoker    Smokeless tobacco: Never Used   Substance Use Topics    Alcohol use: No    Drug use: No       Allergies:  No Known Allergies      Review of Systems   Review of Systems   Unable to perform ROS: Dementia     All Other Systems Negative  Physical Exam     Vitals:    03/14/19 0919 03/14/19 1035   BP: 122/50    Pulse:  79   Resp:  18   SpO2: 96%      Physical Exam   Constitutional: She is oriented to person, place, and time. She appears well-developed and well-nourished. No distress. HENT:   Head: Normocephalic and atraumatic. Eyes: Conjunctivae are normal.   Neck: Normal range of motion. Neck supple. Cardiovascular: Normal rate, regular rhythm and normal heart sounds. Pulmonary/Chest: Effort normal and breath sounds normal. No respiratory distress. She exhibits no tenderness. Abdominal: Soft. Bowel sounds are normal. She exhibits no distension. There is no tenderness. There is no rebound and no guarding. Genitourinary: Rectal exam shows guaiac negative stool. Musculoskeletal: She exhibits no edema or deformity. Neurological: She is alert and oriented to person, place, and time. She has normal reflexes. Skin: Skin is warm and dry. She is not diaphoretic. Psychiatric: She has a normal mood and affect. Nursing note and vitals reviewed.         Diagnostic Study Results     Labs -     Recent Results (from the past 12 hour(s))   CBC WITH AUTOMATED DIFF    Collection Time: 03/14/19  9:50 AM   Result Value Ref Range    WBC 9.6 4.6 - 13.2 K/uL    RBC 3.76 (L) 4.20 - 5.30 M/uL    HGB 11.4 (L) 12.0 - 16.0 g/dL    HCT 36.9 35.0 - 45.0 %    MCV 98.1 (H) 74.0 - 97.0 FL    MCH 30.3 24.0 - 34.0 PG    MCHC 30.9 (L) 31.0 - 37.0 g/dL    RDW 17.9 (H) 11.6 - 14.5 %    PLATELET 753 137 - 921 K/uL    MPV 9.3 9.2 - 11.8 FL    NEUTROPHILS 60 40 - 73 %    LYMPHOCYTES 27 21 - 52 %    MONOCYTES 11 (H) 3 - 10 %    EOSINOPHILS 2 0 - 5 %    BASOPHILS 0 0 - 2 %    ABS. NEUTROPHILS 5.8 1.8 - 8.0 K/UL    ABS. LYMPHOCYTES 2.6 0.9 - 3.6 K/UL    ABS. MONOCYTES 1.0 0.05 - 1.2 K/UL    ABS. EOSINOPHILS 0.2 0.0 - 0.4 K/UL    ABS.  BASOPHILS 0.0 0.0 - 0.1 K/UL    DF AUTOMATED     METABOLIC PANEL, BASIC    Collection Time: 03/14/19  9:50 AM   Result Value Ref Range    Sodium 141 136 - 145 mmol/L    Potassium 3.6 3.5 - 5.5 mmol/L    Chloride 95 (L) 100 - 108 mmol/L    CO2 42 (HH) 21 - 32 mmol/L    Anion gap 4 3.0 - 18 mmol/L    Glucose 117 (H) 74 - 99 mg/dL    BUN 55 (H) 7.0 - 18 MG/DL    Creatinine 1.88 (H) 0.6 - 1.3 MG/DL    BUN/Creatinine ratio 29 (H) 12 - 20      GFR est AA 31 (L) >60 ml/min/1.73m2    GFR est non-AA 25 (L) >60 ml/min/1.73m2    Calcium 8.9 8.5 - 10.1 MG/DL   URINALYSIS W/ RFLX MICROSCOPIC    Collection Time: 03/14/19 10:35 AM   Result Value Ref Range    Color YELLOW      Appearance CLEAR      Specific gravity 1.009 1.005 - 1.030      pH (UA) 7.5 5.0 - 8.0      Protein NEGATIVE  NEG mg/dL    Glucose NEGATIVE  NEG mg/dL    Ketone NEGATIVE  NEG mg/dL    Bilirubin NEGATIVE  NEG      Blood NEGATIVE  NEG      Urobilinogen 1.0 0.2 - 1.0 EU/dL    Nitrites NEGATIVE  NEG      Leukocyte Esterase LARGE (A) NEG     URINE MICROSCOPIC ONLY    Collection Time: 03/14/19 10:35 AM   Result Value Ref Range    WBC 21 to 35 0 - 5 /hpf    Epithelial cells FEW 0 - 5 /lpf    Bacteria 3+ (A) NEG /hpf   POC VENOUS BLOOD GAS    Collection Time: 03/14/19  1:51 PM   Result Value Ref Range    Device: ROOM AIR      pH, venous (POC) 7.468 (H) 7.32 - 7.42      pCO2, venous (POC) 63.1 (H) 41 - 51 MMHG    pO2, venous (POC) 25 25 - 40 mmHg    HCO3, venous (POC) 45.9 (H) 23.0 - 28.0 MMOL/L    sO2, venous (POC) 49 (L) 65 - 88 %    Base excess, venous (POC) 19 mmol/L    Allens test (POC) NO      Site LEFT BRACHIAL      Patient temp. 97.8      Specimen type (POC) VENOUS BLOOD      Performed by Juan Mora        Radiologic Studies -   No orders to display     CT Results  (Last 48 hours)    None        CXR Results  (Last 48 hours)    None            Medical Decision Making   I am the first provider for this patient. I reviewed the vital signs, available nursing notes, past medical history, past surgical history, family history and social history. Vital Signs-Reviewed the patient's vital signs. Pulse Oximetry Analysis -  100 % RA    Records Reviewed: Nursing Notes and Old Medical Records     Procedures: None   Procedures    Provider Notes (Medical Decision Making):     differential diagnosis: UTI, ARIEL, hemorrhoids, Upper/lower GI bleed, Kidney stone     Plan: Will order labs, urine, and do rectal exam.        11:28 AM  Hemoccult negative for blood, no melena noted, Nonthrombosed hemorrhoids noted external, no actively bleeding, stage 2 pressure ulcer found on patient saccrum, likely cause for blood in diaper. Discussed with family the importance of turning and need for wound care. Will place  for assistance with wound care home needs. Will dress and clean wound prior to discharge. Patient has UTI, will give dose of abx here and send urine for culture. Pending VBG at this time. 2:20 PM  Marian blood gas consistent with chronic CO2 retention, will discharge home with PCP follow up. Disucssed plan and home care again with son, Patients son agrees with the plan and management and states all questions have been thoroughly answered and there are no more remaining questions. MED RECONCILIATION:  No current facility-administered medications for this encounter. Current Outpatient Medications   Medication Sig    cephALEXin (KEFLEX) 500 mg capsule Take 1 Cap by mouth two (2) times a day for 7 days.  potassium chloride (KLOR-CON) 10 mEq tablet Take 1 Tab by mouth two (2) times a day.  torsemide (DEMADEX) 10 mg tablet Take 2 Tabs by mouth daily.  metOLazone (ZAROXOLYN) 2.5 mg tablet Take 1 Tab by mouth daily.     predniSONE (DELTASONE) 20 mg tablet Take 20 mg by mouth daily (with breakfast).  OTHER Check CBC, CMP, Mg on 2/13/19- results to PCP and nephrologist Dr Ayah More immediately, Diagnosis- CKD4    meloxicam (MOBIC) 7.5 mg tablet Take 1 Tab by mouth every other day.  OTHER Incentive Spirometry- Use as directed    OTHER Graded Compression Stockings b/l LE- use as directed    food supplemt, lactose-reduced (ENSURE ENLIVE) 0.08 gram-1.5 kcal/mL liqd Take 1 Bottle by mouth two (2) times a day.  menthol-zinc oxide (CALMOSEPTINE) 0.44-20.6 % oint Apply  to affected area.  raNITIdine (ZANTAC) 75 mg tab Take  by mouth two (2) times a day.  docusate sodium (COLACE) 100 mg capsule Take 1 Cap by mouth two (2) times a day for 90 days.  carvedilol (COREG) 3.125 mg tablet Take 1 Tab by mouth every twelve (12) hours.  acetaminophen (TYLENOL ARTHRITIS PAIN) 650 mg TbER Take 1 Tab by mouth every eight (8) hours.  amiodarone (CORDARONE) 200 mg tablet TAKE ONE TABLET EVERY DAY (MAKE AN APPT)    isosorbide mononitrate ER (IMDUR) 60 mg CR tablet TAKE ONE TABLET EVERY MORNING    memantine (NAMENDA) 10 mg tablet     amitriptyline (ELAVIL) 25 mg tablet Take  by mouth nightly.  aspirin 81 mg tablet Take 81 mg by mouth daily. Disposition:  Home     DISCHARGE NOTE:   Pt has been reexamined. Patient has no new complaints, changes, or physical findings. Care plan outlined and precautions discussed. Results of workup were reviewed with the patient. All medications were reviewed with the patient. All of pt's questions and concerns were addressed. Patient was instructed and agrees to follow up with PCP, as well as to return to the ED upon further deterioration. Patient is ready to go home.     Follow-up Information     Follow up With Specialties Details Why [address] [facility] EMERGENCY DEPT Emergency Medicine  As needed [address] [facility]    Kenna Gosselin, MD Internal Medicine In 2 days  [address] [facility] [place]  [phone]            Current Discharge Medication List      START taking these medications    Details   cephALEXin (KEFLEX) 500 mg capsule Take 1 Cap by mouth two (2) times a day for 7 days. Qty: 14 Cap, Refills: 0                 Diagnosis     Clinical Impression:   1. Acute cystitis without hematuria    2. Pressure injury of buttock, stage 2, unspecified laterality    3. Dementia without behavioral disturbance, unspecified dementia type    4.  Chronic kidney disease, unspecified CKD stage

## 2019-03-14 NOTE — ED TRIAGE NOTES
Per EMS, Patient family saw some blood on sheets this morning when they changed her. Patient is baseline A&O x1.

## 2019-03-14 NOTE — ED NOTES
Patient laying in bed alert and oriented x2. Patient remains on monitors. No signs of distress noted at this time. Will continue to monitor. Son is at bedside.

## 2019-03-16 LAB
BACTERIA SPEC CULT: ABNORMAL
SERVICE CMNT-IMP: ABNORMAL

## 2019-03-17 ENCOUNTER — HOSPITAL ENCOUNTER (INPATIENT)
Age: 84
LOS: 3 days | Discharge: HOME HEALTH | DRG: 871 | End: 2019-03-20
Attending: EMERGENCY MEDICINE | Admitting: INTERNAL MEDICINE
Payer: MEDICARE

## 2019-03-17 ENCOUNTER — APPOINTMENT (OUTPATIENT)
Dept: GENERAL RADIOLOGY | Age: 84
DRG: 871 | End: 2019-03-17
Attending: EMERGENCY MEDICINE
Payer: MEDICARE

## 2019-03-17 DIAGNOSIS — R31.9 URINARY TRACT INFECTION WITH HEMATURIA, SITE UNSPECIFIED: Primary | ICD-10-CM

## 2019-03-17 DIAGNOSIS — R19.7 DIARRHEA, UNSPECIFIED TYPE: ICD-10-CM

## 2019-03-17 DIAGNOSIS — A41.9 SEPSIS, DUE TO UNSPECIFIED ORGANISM: ICD-10-CM

## 2019-03-17 DIAGNOSIS — R50.9 FEVER, UNSPECIFIED FEVER CAUSE: ICD-10-CM

## 2019-03-17 DIAGNOSIS — N39.0 URINARY TRACT INFECTION WITH HEMATURIA, SITE UNSPECIFIED: Primary | ICD-10-CM

## 2019-03-17 LAB
ALBUMIN SERPL-MCNC: 3.2 G/DL (ref 3.4–5)
ALBUMIN/GLOB SERPL: 1.1 {RATIO} (ref 0.8–1.7)
ALP SERPL-CCNC: 82 U/L (ref 45–117)
ALT SERPL-CCNC: 23 U/L (ref 13–56)
ANION GAP SERPL CALC-SCNC: 5 MMOL/L (ref 3–18)
APPEARANCE UR: CLEAR
AST SERPL-CCNC: 31 U/L (ref 15–37)
BACTERIA URNS QL MICRO: ABNORMAL /HPF
BASOPHILS # BLD: 0 K/UL (ref 0–0.1)
BASOPHILS # BLD: 0 K/UL (ref 0–0.1)
BASOPHILS NFR BLD: 0 % (ref 0–2)
BASOPHILS NFR BLD: 0 % (ref 0–2)
BILIRUB SERPL-MCNC: 1 MG/DL (ref 0.2–1)
BILIRUB UR QL: NEGATIVE
BUN SERPL-MCNC: 53 MG/DL (ref 7–18)
BUN/CREAT SERPL: 28 (ref 12–20)
CALCIUM SERPL-MCNC: 8.7 MG/DL (ref 8.5–10.1)
CHLORIDE SERPL-SCNC: 101 MMOL/L (ref 100–108)
CO2 SERPL-SCNC: 37 MMOL/L (ref 21–32)
COLOR UR: YELLOW
CREAT SERPL-MCNC: 1.91 MG/DL (ref 0.6–1.3)
DIFFERENTIAL METHOD BLD: ABNORMAL
DIFFERENTIAL METHOD BLD: ABNORMAL
EOSINOPHIL # BLD: 0.1 K/UL (ref 0–0.4)
EOSINOPHIL # BLD: 0.1 K/UL (ref 0–0.4)
EOSINOPHIL NFR BLD: 1 % (ref 0–5)
EOSINOPHIL NFR BLD: 1 % (ref 0–5)
EPITH CASTS URNS QL MICRO: ABNORMAL /LPF (ref 0–5)
ERYTHROCYTE [DISTWIDTH] IN BLOOD BY AUTOMATED COUNT: 17.8 % (ref 11.6–14.5)
ERYTHROCYTE [DISTWIDTH] IN BLOOD BY AUTOMATED COUNT: 17.8 % (ref 11.6–14.5)
FLUAV RNA SPEC QL NAA+PROBE: NEGATIVE
FLUBV RNA SPEC QL NAA+PROBE: NEGATIVE
GLOBULIN SER CALC-MCNC: 2.9 G/DL (ref 2–4)
GLUCOSE SERPL-MCNC: 104 MG/DL (ref 74–99)
GLUCOSE UR STRIP.AUTO-MCNC: NEGATIVE MG/DL
HCT VFR BLD AUTO: 27.3 % (ref 35–45)
HCT VFR BLD AUTO: 31.7 % (ref 35–45)
HGB BLD-MCNC: 8.5 G/DL (ref 12–16)
HGB BLD-MCNC: 9.6 G/DL (ref 12–16)
HGB UR QL STRIP: NEGATIVE
KETONES UR QL STRIP.AUTO: NEGATIVE MG/DL
LACTATE BLD-SCNC: 1.07 MMOL/L (ref 0.4–2)
LEUKOCYTE ESTERASE UR QL STRIP.AUTO: ABNORMAL
LYMPHOCYTES # BLD: 1.6 K/UL (ref 0.9–3.6)
LYMPHOCYTES # BLD: 1.8 K/UL (ref 0.9–3.6)
LYMPHOCYTES NFR BLD: 11 % (ref 21–52)
LYMPHOCYTES NFR BLD: 13 % (ref 21–52)
MCH RBC QN AUTO: 30.1 PG (ref 24–34)
MCH RBC QN AUTO: 30.7 PG (ref 24–34)
MCHC RBC AUTO-ENTMCNC: 30.3 G/DL (ref 31–37)
MCHC RBC AUTO-ENTMCNC: 31.1 G/DL (ref 31–37)
MCV RBC AUTO: 98.6 FL (ref 74–97)
MCV RBC AUTO: 99.4 FL (ref 74–97)
MONOCYTES # BLD: 1.4 K/UL (ref 0.05–1.2)
MONOCYTES # BLD: 1.6 K/UL (ref 0.05–1.2)
MONOCYTES NFR BLD: 10 % (ref 3–10)
MONOCYTES NFR BLD: 12 % (ref 3–10)
NEUTS SEG # BLD: 10 K/UL (ref 1.8–8)
NEUTS SEG # BLD: 11.3 K/UL (ref 1.8–8)
NEUTS SEG NFR BLD: 74 % (ref 40–73)
NEUTS SEG NFR BLD: 78 % (ref 40–73)
NITRITE UR QL STRIP.AUTO: NEGATIVE
PH UR STRIP: 7.5 [PH] (ref 5–8)
PLATELET # BLD AUTO: 132 K/UL (ref 135–420)
PLATELET # BLD AUTO: 135 K/UL (ref 135–420)
PLATELET COMMENTS,PCOM: ABNORMAL
PMV BLD AUTO: 9.2 FL (ref 9.2–11.8)
PMV BLD AUTO: 9.5 FL (ref 9.2–11.8)
POTASSIUM SERPL-SCNC: 3.5 MMOL/L (ref 3.5–5.5)
PROT SERPL-MCNC: 6.1 G/DL (ref 6.4–8.2)
PROT UR STRIP-MCNC: NEGATIVE MG/DL
RBC # BLD AUTO: 2.77 M/UL (ref 4.2–5.3)
RBC # BLD AUTO: 3.19 M/UL (ref 4.2–5.3)
RBC #/AREA URNS HPF: NEGATIVE /HPF (ref 0–5)
RBC MORPH BLD: ABNORMAL
RBC MORPH BLD: ABNORMAL
SODIUM SERPL-SCNC: 143 MMOL/L (ref 136–145)
SP GR UR REFRACTOMETRY: 1.01 (ref 1–1.03)
UROBILINOGEN UR QL STRIP.AUTO: 1 EU/DL (ref 0.2–1)
WBC # BLD AUTO: 13.5 K/UL (ref 4.6–13.2)
WBC # BLD AUTO: 14.4 K/UL (ref 4.6–13.2)
WBC URNS QL MICRO: ABNORMAL /HPF (ref 0–4)

## 2019-03-17 PROCEDURE — 87086 URINE CULTURE/COLONY COUNT: CPT

## 2019-03-17 PROCEDURE — 81001 URINALYSIS AUTO W/SCOPE: CPT

## 2019-03-17 PROCEDURE — 65270000029 HC RM PRIVATE

## 2019-03-17 PROCEDURE — 99284 EMERGENCY DEPT VISIT MOD MDM: CPT

## 2019-03-17 PROCEDURE — 96361 HYDRATE IV INFUSION ADD-ON: CPT

## 2019-03-17 PROCEDURE — 93005 ELECTROCARDIOGRAM TRACING: CPT

## 2019-03-17 PROCEDURE — 74011250636 HC RX REV CODE- 250/636: Performed by: INTERNAL MEDICINE

## 2019-03-17 PROCEDURE — 87502 INFLUENZA DNA AMP PROBE: CPT

## 2019-03-17 PROCEDURE — 87040 BLOOD CULTURE FOR BACTERIA: CPT

## 2019-03-17 PROCEDURE — 96360 HYDRATION IV INFUSION INIT: CPT

## 2019-03-17 PROCEDURE — 83605 ASSAY OF LACTIC ACID: CPT

## 2019-03-17 PROCEDURE — 74011000258 HC RX REV CODE- 258: Performed by: EMERGENCY MEDICINE

## 2019-03-17 PROCEDURE — 74011250636 HC RX REV CODE- 250/636: Performed by: EMERGENCY MEDICINE

## 2019-03-17 PROCEDURE — 85025 COMPLETE CBC W/AUTO DIFF WBC: CPT

## 2019-03-17 PROCEDURE — 71045 X-RAY EXAM CHEST 1 VIEW: CPT

## 2019-03-17 PROCEDURE — 80053 COMPREHEN METABOLIC PANEL: CPT

## 2019-03-17 RX ORDER — LEVOFLOXACIN 5 MG/ML
750 INJECTION, SOLUTION INTRAVENOUS
Status: DISCONTINUED | OUTPATIENT
Start: 2019-03-17 | End: 2019-03-18

## 2019-03-17 RX ORDER — LEVOFLOXACIN 5 MG/ML
750 INJECTION, SOLUTION INTRAVENOUS
Status: DISCONTINUED | OUTPATIENT
Start: 2019-03-19 | End: 2019-03-17

## 2019-03-17 RX ORDER — VANCOMYCIN/0.9 % SOD CHLORIDE 1 G/100 ML
1000 PLASTIC BAG, INJECTION (ML) INTRAVENOUS ONCE
Status: DISCONTINUED | OUTPATIENT
Start: 2019-03-17 | End: 2019-03-17

## 2019-03-17 RX ORDER — SODIUM CHLORIDE 9 MG/ML
75 INJECTION, SOLUTION INTRAVENOUS CONTINUOUS
Status: DISCONTINUED | OUTPATIENT
Start: 2019-03-17 | End: 2019-03-17

## 2019-03-17 RX ORDER — LEVOFLOXACIN 5 MG/ML
750 INJECTION, SOLUTION INTRAVENOUS ONCE
Status: DISCONTINUED | OUTPATIENT
Start: 2019-03-17 | End: 2019-03-17 | Stop reason: DRUGHIGH

## 2019-03-17 RX ORDER — LEVOFLOXACIN 5 MG/ML
750 INJECTION, SOLUTION INTRAVENOUS EVERY 24 HOURS
Status: DISCONTINUED | OUTPATIENT
Start: 2019-03-17 | End: 2019-03-17

## 2019-03-17 RX ORDER — SODIUM CHLORIDE 0.9 % (FLUSH) 0.9 %
5-10 SYRINGE (ML) INJECTION AS NEEDED
Status: DISCONTINUED | OUTPATIENT
Start: 2019-03-17 | End: 2019-03-20 | Stop reason: HOSPADM

## 2019-03-17 RX ORDER — VANCOMYCIN/0.9 % SOD CHLORIDE 1.5G/250ML
1500 PLASTIC BAG, INJECTION (ML) INTRAVENOUS ONCE
Status: COMPLETED | OUTPATIENT
Start: 2019-03-17 | End: 2019-03-17

## 2019-03-17 RX ADMIN — SODIUM CHLORIDE 800 ML: 900 INJECTION, SOLUTION INTRAVENOUS at 17:27

## 2019-03-17 RX ADMIN — VANCOMYCIN HYDROCHLORIDE 1500 MG: 10 INJECTION, POWDER, LYOPHILIZED, FOR SOLUTION INTRAVENOUS at 17:25

## 2019-03-17 RX ADMIN — SODIUM CHLORIDE 75 ML/HR: 900 INJECTION, SOLUTION INTRAVENOUS at 21:49

## 2019-03-17 RX ADMIN — LEVOFLOXACIN 750 MG: 750 INJECTION, SOLUTION INTRAVENOUS at 15:45

## 2019-03-17 RX ADMIN — SODIUM CHLORIDE 1000 ML: 900 INJECTION, SOLUTION INTRAVENOUS at 15:08

## 2019-03-17 RX ADMIN — PIPERACILLIN SODIUM,TAZOBACTAM SODIUM 2.25 G: 2; .25 INJECTION, POWDER, FOR SOLUTION INTRAVENOUS at 21:48

## 2019-03-17 RX ADMIN — PIPERACILLIN SODIUM,TAZOBACTAM SODIUM 4.5 G: 4; .5 INJECTION, POWDER, FOR SOLUTION INTRAVENOUS at 15:03

## 2019-03-17 NOTE — ED PROVIDER NOTES
EMERGENCY DEPARTMENT HISTORY AND PHYSICAL EXAM    1:20 PM  Date: 3/17/2019  Patient Name: Luly Jackson    History of Presenting Illness     Chief Complaint:    History Provided By:     HPI: Luly Jackson is a 80 y.o. female with a past medical history of acute on chronic diastolic heart failure, arthritis, hypertension, lupus who presents with confusion and fever. History is somewhat limited but the patient is noted to have arrived in the emergency department with diarrhea and warm to touch. Baseline dementia patient responds to nursing but just somewhat looked at me. No other aggravating or alleviating factors no other associated symptoms history is limited at this time. PCP: Christian Barrios MD    This was created with voice recognition software and transcription errors may be present.        Past History     Past Medical History:  Past Medical History:   Diagnosis Date    Acute on chronic diastolic heart failure (HCC)     Arthritis     Benign hypertensive heart disease without heart failure     Better controlled, stable    Chronic diastolic heart failure (HCC)     Breathing and edema is improving    Congestive heart failure (HCC)     HTN (hypertension)     Hypercholesteremia     Lupus (systemic lupus erythematosus) ([facility] [address] 6/18/2014    followed by dr Mitzi Hyman     Obesity, unspecified     Patient has weight loss Discussed diet ad fluid restriction    Other and unspecified hyperlipidemia     F/u per pmd    Tricuspid valve disorders, 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    tr with moderate pulmonary htn        Past Surgical History:  Past Surgical History:   Procedure Laterality Date    HX HYSTERECTOMY      PACEMAKER PROCEDURE         Family History:  Family History   Problem Relation Age of Onset    Arrhythmia Neg Hx     Asthma Neg Hx     Clotting Disorder Neg Hx     Fainting Neg Hx     Heart Attack Neg Hx     High Cholesterol Neg Hx     Pacemaker Neg Hx     Stroke Neg Hx        Social History:  Social History     Tobacco Use    Smoking status: Never Smoker    Smokeless tobacco: Never Used   Substance Use Topics    Alcohol use: No    Drug use: No       Allergies:  No Known Allergies    Review of Systems     Review of Systems   Unable to perform ROS: Acuity of condition       Physical Exam       Physical Exam   Constitutional:   Frail and elderly   HENT:   Head: Normocephalic and atraumatic. Eyes: Conjunctivae are normal. Pupils are equal, round, and reactive to light. Neck: Normal range of motion. Cardiovascular: Normal rate and regular rhythm. Pulmonary/Chest: Effort normal and breath sounds normal.   Abdominal: Soft. Easily reducible large midline hernia that is epigastric   Musculoskeletal:   No gross deformity   Neurological:   Awake looking around the room but not responsive   Skin: Skin is warm and dry. Diagnostic Study Results     Vital Signs   Visit Vitals  /49   Pulse 76   Temp (!) 101.1 °F (38.4 °C)   Resp 19   Wt 73.4 kg (161 lb 14.4 oz)   SpO2 99%   BMI 27.79 kg/m²         EKG: EKG shows V paced at 71- for ischemia     Labs: reviewed. Imaging: cxr napd   Medical Decision Making     ED Course: Progress Notes, Reevaluation, and Consults:      Provider Notes (Medical Decision Making): This is an 51-year-old female who presents with increased confusion and baseline dementia and fever. UA is positive although she also has diarrhea and may be a contaminant. Will admit for IV antibiotics. Discussed with the hospitalist.  Dr. Sim Hawley         Diagnosis     Clinical Impression: No diagnosis found. Disposition:       Medication List      ASK your doctor about these medications    acetaminophen 650 mg Tber  Commonly known as:  TYLENOL ARTHRITIS PAIN  Take 1 Tab by mouth every eight (8) hours.      amiodarone 200 mg tablet  Commonly known as:  CORDARONE  TAKE ONE TABLET EVERY DAY (MAKE AN APPT)     amitriptyline 25 mg tablet  Commonly known as:  ELAVIL aspirin 81 mg tablet     CALMOSEPTINE 0.44-20.6 % Oint  Generic drug:  menthol-zinc oxide     carvedilol 3.125 mg tablet  Commonly known as:  COREG  Take 1 Tab by mouth every twelve (12) hours. cephALEXin 500 mg capsule  Commonly known as:  KEFLEX  Take 1 Cap by mouth two (2) times a day for 7 days. docusate sodium 100 mg capsule  Commonly known as:  COLACE  Take 1 Cap by mouth two (2) times a day for 90 days. food supplemt, lactose-reduced 0.08 gram-1.5 kcal/mL Liqd  Commonly known as:  ENSURE ENLIVE  Take 1 Bottle by mouth two (2) times a day. isosorbide mononitrate ER 60 mg CR tablet  Commonly known as:  IMDUR  TAKE ONE TABLET EVERY MORNING     meloxicam 7.5 mg tablet  Commonly known as:  MOBIC  Take 1 Tab by mouth every other day. memantine 10 mg tablet  Commonly known as:  NAMENDA     metOLazone 2.5 mg tablet  Commonly known as:  ZAROXOLYN  Take 1 Tab by mouth daily. OTHER  Check CBC, CMP, Mg on 2/13/19- results to PCP and nephrologist Dr Federico Mcdonald immediately, Diagnosis- CKD4     OTHER  Incentive Spirometry- Use as directed     OTHER  Graded Compression Stockings b/l LE- use as directed     potassium chloride 10 mEq tablet  Commonly known as:  KLOR-CON  Take 1 Tab by mouth two (2) times a day. predniSONE 20 mg tablet  Commonly known as:  DELTASONE  Take 20 mg by mouth daily (with breakfast). raNITIdine 75 mg Tab  Commonly known as:  ZANTAC     torsemide 10 mg tablet  Commonly known as:  DEMADEX  Take 2 Tabs by mouth daily. _______________________________    Attestations:  Scribe Attestation      Cindy Uribe MD acting as a scribe for and in the presence of Heriberto Chowdary MD      March 17, 2019 at 1:20 PM       Provider Attestation:      I personally performed the services described in the documentation, reviewed the documentation, as recorded by the scribe in my presence, and it accurately and completely records my words and actions.  March 17, 2019 at 1:20 PM - Vishnu Troy, MD    _______________________________

## 2019-03-17 NOTE — ED NOTES
Received report from [facility]. Pt resting on stretcher with eyes closed. Family at bedside.

## 2019-03-17 NOTE — ED TRIAGE NOTES
Pt arrived from home c/o SOB, dehydration, and diarrhea since this morning. Upon arrival, pt is hot to the touch with increased respirations. Pt responds to her name.

## 2019-03-18 LAB
ANION GAP SERPL CALC-SCNC: 7 MMOL/L (ref 3–18)
ATRIAL RATE: 78 BPM
BASOPHILS # BLD: 0 K/UL (ref 0–0.1)
BASOPHILS NFR BLD: 0 % (ref 0–2)
BUN SERPL-MCNC: 49 MG/DL (ref 7–18)
BUN/CREAT SERPL: 33 (ref 12–20)
CALCIUM SERPL-MCNC: 8.4 MG/DL (ref 8.5–10.1)
CALCULATED R AXIS, ECG10: 53 DEGREES
CALCULATED T AXIS, ECG11: 157 DEGREES
CHLORIDE SERPL-SCNC: 105 MMOL/L (ref 100–108)
CO2 SERPL-SCNC: 35 MMOL/L (ref 21–32)
CREAT SERPL-MCNC: 1.47 MG/DL (ref 0.6–1.3)
CREAT SERPL-MCNC: 1.49 MG/DL (ref 0.6–1.3)
DIAGNOSIS, 93000: NORMAL
DIFFERENTIAL METHOD BLD: ABNORMAL
EOSINOPHIL # BLD: 0.1 K/UL (ref 0–0.4)
EOSINOPHIL NFR BLD: 1 % (ref 0–5)
ERYTHROCYTE [DISTWIDTH] IN BLOOD BY AUTOMATED COUNT: 17.5 % (ref 11.6–14.5)
GLUCOSE SERPL-MCNC: 72 MG/DL (ref 74–99)
HCT VFR BLD AUTO: 29.5 % (ref 35–45)
HGB BLD-MCNC: 8.8 G/DL (ref 12–16)
LYMPHOCYTES # BLD: 2.1 K/UL (ref 0.9–3.6)
LYMPHOCYTES NFR BLD: 23 % (ref 21–52)
MAGNESIUM SERPL-MCNC: 2.5 MG/DL (ref 1.6–2.6)
MCH RBC QN AUTO: 29.8 PG (ref 24–34)
MCHC RBC AUTO-ENTMCNC: 29.8 G/DL (ref 31–37)
MCV RBC AUTO: 100 FL (ref 74–97)
MONOCYTES # BLD: 0.2 K/UL (ref 0.05–1.2)
MONOCYTES NFR BLD: 2 % (ref 3–10)
NEUTS SEG # BLD: 6.7 K/UL (ref 1.8–8)
NEUTS SEG NFR BLD: 74 % (ref 40–73)
PLATELET # BLD AUTO: 118 K/UL (ref 135–420)
PMV BLD AUTO: 9.2 FL (ref 9.2–11.8)
POTASSIUM SERPL-SCNC: 3.1 MMOL/L (ref 3.5–5.5)
Q-T INTERVAL, ECG07: 502 MS
QRS DURATION, ECG06: 190 MS
QTC CALCULATION (BEZET), ECG08: 545 MS
RBC # BLD AUTO: 2.95 M/UL (ref 4.2–5.3)
SODIUM SERPL-SCNC: 147 MMOL/L (ref 136–145)
VENTRICULAR RATE, ECG03: 71 BPM
WBC # BLD AUTO: 9.1 K/UL (ref 4.6–13.2)

## 2019-03-18 PROCEDURE — 74011000258 HC RX REV CODE- 258: Performed by: NURSE PRACTITIONER

## 2019-03-18 PROCEDURE — 77030038269 HC DRN EXT URIN PURWCK BARD -A

## 2019-03-18 PROCEDURE — 80048 BASIC METABOLIC PNL TOTAL CA: CPT

## 2019-03-18 PROCEDURE — 74011000258 HC RX REV CODE- 258: Performed by: FAMILY MEDICINE

## 2019-03-18 PROCEDURE — 74011250636 HC RX REV CODE- 250/636: Performed by: NURSE PRACTITIONER

## 2019-03-18 PROCEDURE — 74011000258 HC RX REV CODE- 258: Performed by: EMERGENCY MEDICINE

## 2019-03-18 PROCEDURE — 74011250636 HC RX REV CODE- 250/636: Performed by: INTERNAL MEDICINE

## 2019-03-18 PROCEDURE — 36415 COLL VENOUS BLD VENIPUNCTURE: CPT

## 2019-03-18 PROCEDURE — 74011250636 HC RX REV CODE- 250/636: Performed by: FAMILY MEDICINE

## 2019-03-18 PROCEDURE — 74011250636 HC RX REV CODE- 250/636: Performed by: EMERGENCY MEDICINE

## 2019-03-18 PROCEDURE — 83735 ASSAY OF MAGNESIUM: CPT

## 2019-03-18 PROCEDURE — 85025 COMPLETE CBC W/AUTO DIFF WBC: CPT

## 2019-03-18 PROCEDURE — 65270000029 HC RM PRIVATE

## 2019-03-18 RX ORDER — LEVOFLOXACIN 5 MG/ML
750 INJECTION, SOLUTION INTRAVENOUS
Status: DISCONTINUED | OUTPATIENT
Start: 2019-03-19 | End: 2019-03-20 | Stop reason: HOSPADM

## 2019-03-18 RX ORDER — SODIUM CHLORIDE 450 MG/100ML
50 INJECTION, SOLUTION INTRAVENOUS CONTINUOUS
Status: DISCONTINUED | OUTPATIENT
Start: 2019-03-18 | End: 2019-03-19

## 2019-03-18 RX ORDER — VANCOMYCIN HYDROCHLORIDE
1250
Status: DISCONTINUED | OUTPATIENT
Start: 2019-03-18 | End: 2019-03-19

## 2019-03-18 RX ADMIN — PIPERACILLIN SODIUM,TAZOBACTAM SODIUM 2.25 G: 2; .25 INJECTION, POWDER, FOR SOLUTION INTRAVENOUS at 03:00

## 2019-03-18 RX ADMIN — PIPERACILLIN SODIUM,TAZOBACTAM SODIUM 2.25 G: 2; .25 INJECTION, POWDER, FOR SOLUTION INTRAVENOUS at 21:46

## 2019-03-18 RX ADMIN — POTASSIUM CHLORIDE: 2 INJECTION, SOLUTION, CONCENTRATE INTRAVENOUS at 18:13

## 2019-03-18 RX ADMIN — SODIUM CHLORIDE 50 ML/HR: 450 INJECTION, SOLUTION INTRAVENOUS at 18:18

## 2019-03-18 RX ADMIN — PIPERACILLIN SODIUM,TAZOBACTAM SODIUM 2.25 G: 2; .25 INJECTION, POWDER, FOR SOLUTION INTRAVENOUS at 15:17

## 2019-03-18 RX ADMIN — LIDOCAINE HYDROCHLORIDE: 10 INJECTION, SOLUTION EPIDURAL; INFILTRATION; INTRACAUDAL; PERINEURAL at 22:48

## 2019-03-18 RX ADMIN — PIPERACILLIN SODIUM,TAZOBACTAM SODIUM 2.25 G: 2; .25 INJECTION, POWDER, FOR SOLUTION INTRAVENOUS at 10:01

## 2019-03-18 RX ADMIN — LIDOCAINE HYDROCHLORIDE: 10 INJECTION, SOLUTION EPIDURAL; INFILTRATION; INTRACAUDAL; PERINEURAL at 21:44

## 2019-03-18 RX ADMIN — LIDOCAINE HYDROCHLORIDE: 10 INJECTION, SOLUTION EPIDURAL; INFILTRATION; INTRACAUDAL; PERINEURAL at 20:26

## 2019-03-18 RX ADMIN — VANCOMYCIN HYDROCHLORIDE 1250 MG: 10 INJECTION, POWDER, LYOPHILIZED, FOR SOLUTION INTRAVENOUS at 19:24

## 2019-03-18 NOTE — H&P
History & Physical    Patient: Tapan Haile MRN: [id number]  CSN: [id number]    YOB: 1931  Age: 80 y.o. Sex: female      DOA: 3/17/2019    Chief Complaint:   Chief Complaint   Patient presents with    Blood infection          HPI:     Tapan Haile is a 80 y.o.   female who has hx of dementia/ CHF ef 27 presents with worsening confusion and Fever as well as decrease apetite  In ER found to have Fever of 102 with lactate elevation   Asked to admit for further treatment   No Family at bedside and patient is confused     Past Medical History:   Diagnosis Date    Acute on chronic diastolic heart failure (HCC)     Arthritis     Benign hypertensive heart disease without heart failure     Better controlled, stable    Chronic diastolic heart failure (HCC)     Breathing and edema is improving    Congestive heart failure (HCC)     HTN (hypertension)     Hypercholesteremia     [facility] 6[facility]18[facility]    followed by dr Patricia Stack     Obesity, unspecified     Patient has weight loss Discussed diet ad fluid restriction    Other and unspecified hyperlipidemia     F/u per pmd    Tricuspid valve disorders, 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    tr with moderate pulmonary htn        Past Surgical History:   Procedure Laterality Date    HX HYSTERECTOMY      PACEMAKER PROCEDURE         Family History   Problem Relation Age of Onset    Arrhythmia Neg Hx     Asthma Neg Hx     Clotting Disorder Neg Hx     Fainting Neg Hx     Heart Attack Neg Hx     High Cholesterol Neg Hx     Pacemaker Neg Hx     Stroke Neg Hx        Social History     Socioeconomic History    Marital status:      Spouse name: Not on file    Number of children: Not on file    Years of education: Not on file    Highest education level: Not on file   Tobacco Use    Smoking status: Never Smoker    Smokeless tobacco: Never Used   Substance and Sexual Activity    Alcohol use: No    Drug use: No    Sexual activity: No       Prior to Admission medications    Medication Sig Start Date End Date Taking? Authorizing Provider   cephALEXin (KEFLEX) 500 mg capsule Take 1 Cap by mouth two (2) times a day for 7 days. 3/14/19 3/21/19  CLARIBEL Melvin   potassium chloride (KLOR-CON) 10 mEq tablet Take 1 Tab by mouth two (2) times a day. 3/12/19   Tasneem Payne, NP   torsemide (DEMADEX) 10 mg tablet Take 2 Tabs by mouth daily. 3/4/19   Tasneem Payne, NP   metOLazone (ZAROXOLYN) 2.5 mg tablet Take 1 Tab by mouth daily. 3/4/19   Tasneem Payne, NP   predniSONE (DELTASONE) 20 mg tablet Take 20 mg by mouth daily (with breakfast). 2/11/19   Lance Rand MD   OTHER Check CBC, CMP, Mg on 2/13/19- results to PCP and nephrologist Dr Ирина Kuo immediately, Diagnosis- CKD4 2/10/19   Lance Rand MD   meloxicam (MOBIC) 7.5 mg tablet Take 1 Tab by mouth every other day. 2/10/19   Lance Rand MD   OTHER Incentive Spirometry- Use as directed 2/10/19   Lance Rand MD   OTHER Graded Compression Stockings b/l LE- use as directed 2/10/19   Lance Rand MD   food supplemt, lactose-reduced (ENSURE ENLIVE) 0.08 gram-1.5 kcal/mL liqd Take 1 Bottle by mouth two (2) times a day. 2/10/19   Lance Rand MD   menthol-zinc oxide (CALMOSEPTINE) 0.44-20.6 % oint Apply  to affected area. Provider, Historical   raNITIdine (ZANTAC) 75 mg tab Take  by mouth two (2) times a day. Provider, Historical   docusate sodium (COLACE) 100 mg capsule Take 1 Cap by mouth two (2) times a day for 90 days. 1/9/19 4/9/19  Jenna Garduno MD   carvedilol (COREG) 3.125 mg tablet Take 1 Tab by mouth every twelve (12) hours. 12/31/18   Jenna Garduno MD   acetaminophen (TYLENOL ARTHRITIS PAIN) 650 mg TbER Take 1 Tab by mouth every eight (8) hours.  12/4/18   Mele METCALF, PA-C   amiodarone (CORDARONE) 200 mg tablet TAKE ONE TABLET EVERY DAY (MAKE AN APPT) 11/2/18   Becki Roca, NP   isosorbide mononitrate ER (IMDUR) 60 mg CR tablet TAKE ONE TABLET EVERY MORNING 10/4/18   Jacqueline Robison MD   [facility]) 10 mg tablet  7/10/18   Provider, Historical   amitriptyline (ELAVIL) 25 mg tablet Take  by mouth nightly. Provider, Historical   aspirin 81 mg tablet Take 81 mg by mouth daily. Provider, Historical       No Known Allergies      Review of Systems  Limited due to lethargy and dementia    Physical Exam:     Physical Exam:  Visit Vitals  /85   Pulse 70   Temp 98.2 °F (36.8 °C)   Resp 11   Wt 73.4 kg (161 lb 14.4 oz)   SpO2 100%   BMI 27.79 kg/m²           Temp (24hrs), Av.7 °F (37.6 °C), Min:98.2 °F (36.8 °C), Max:101.1 °F (38.4 °C)    1901 -  0700  In: 2600 [I.V.:2600]  Out: -     0701 -  1900  In: 150 [I.V.:150]  Out: -     General:  Lethargic but arousable non cooperative, no distress, appears stated age. Head: Normocephalic, without obvious abnormality, atraumatic. Eyes:  Conjunctivae/corneas clear. PERRL, EOMs intact. Nose: Nares normal. No drainage or sinus tenderness. Neck: Supple, symmetrical, trachea midline, no adenopathy, thyroid: no enlargement, no carotid bruit and no JVD. Lungs:   Clear to auscultation bilaterally. occasion rhonchi   Heart:  Regular rate and rhythm, S1, S2 normal. Pacer      Abdomen: Soft, non-tender. Bowel sounds normal. Large abdominal hernia    Extremities: Extremities normal, atraumatic, no cyanosis +2edema. Pulses: 2+ and symmetric all extremities. Skin:  No rashes or lesions   Neurologic: ,limited movement of lower legs good upper strength   Left leg shorter /and eversion noted t. Labs Reviewed: All lab results for the last 24 hours reviewed.    and EKG    Procedures/imaging: see electronic medical records for all procedures/Xrays and details which were not copied into this note but were reviewed prior to creation of Plan    Recent Results (from the past 24 hour(s))   URINALYSIS W/ RFLX MICROSCOPIC    Collection Time: 03/17/19  1:14 PM   Result Value Ref Range    Color YELLOW      Appearance CLEAR      Specific gravity 1.010 1.005 - 1.030      pH (UA) 7.5 5.0 - 8.0      Protein NEGATIVE  NEG mg/dL    Glucose NEGATIVE  NEG mg/dL    Ketone NEGATIVE  NEG mg/dL    Bilirubin NEGATIVE  NEG      Blood NEGATIVE  NEG      Urobilinogen 1.0 0.2 - 1.0 EU/dL    Nitrites NEGATIVE  NEG      Leukocyte Esterase LARGE (A) NEG     URINE MICROSCOPIC ONLY    Collection Time: 03/17/19  1:14 PM   Result Value Ref Range    WBC 50 to 60 0 - 4 /hpf    RBC NEGATIVE  0 - 5 /hpf    Epithelial cells 1+ 0 - 5 /lpf    Bacteria 2+ (A) NEG /hpf   EKG, 12 LEAD, INITIAL    Collection Time: 03/17/19  1:20 PM   Result Value Ref Range    Ventricular Rate 71 BPM    Atrial Rate 78 BPM    QRS Duration 190 ms    Q-T Interval 502 ms    QTC Calculation (Bezet) 545 ms    Calculated R Axis 53 degrees    Calculated T Axis 157 degrees    Diagnosis       Electronic ventricular pacemaker  When compared with ECG of 28-FEB-2019 10:12,  Vent. rate has decreased BY   8 BPM     METABOLIC PANEL, COMPREHENSIVE    Collection Time: 03/17/19  2:25 PM   Result Value Ref Range    Sodium 143 136 - 145 mmol/L    Potassium 3.5 3.5 - 5.5 mmol/L    Chloride 101 100 - 108 mmol/L    CO2 37 (H) 21 - 32 mmol/L    Anion gap 5 3.0 - 18 mmol/L    Glucose 104 (H) 74 - 99 mg/dL    BUN 53 (H) 7.0 - 18 MG/DL    Creatinine 1.91 (H) 0.6 - 1.3 MG/DL    BUN/Creatinine ratio 28 (H) 12 - 20      GFR est AA 30 (L) >60 ml/min/1.73m2    GFR est non-AA 25 (L) >60 ml/min/1.73m2    Calcium 8.7 8.5 - 10.1 MG/DL    Bilirubin, total 1.0 0.2 - 1.0 MG/DL    ALT (SGPT) 23 13 - 56 U/L    AST (SGOT) 31 15 - 37 U/L    Alk.  phosphatase 82 45 - 117 U/L    Protein, total 6.1 (L) 6.4 - 8.2 g/dL    Albumin 3.2 (L) 3.4 - 5.0 g/dL    Globulin 2.9 2.0 - 4.0 g/dL    A-G Ratio 1.1 0.8 - 1.7     CBC WITH AUTOMATED DIFF    Collection Time: 03/17/19  2:25 PM   Result Value Ref Range    WBC 14.4 (H) 4.6 - 13.2 K/uL    RBC 3.19 (L) 4.20 - 5.30 M/uL    HGB 9.6 (L) 12.0 - 16.0 g/dL    HCT 31.7 (L) 35.0 - 45.0 %    MCV 99.4 (H) 74.0 - 97.0 FL    MCH 30.1 24.0 - 34.0 PG    MCHC 30.3 (L) 31.0 - 37.0 g/dL    RDW 17.8 (H) 11.6 - 14.5 %    PLATELET 584 990 - 775 K/uL    MPV 9.2 9.2 - 11.8 FL    NEUTROPHILS 78 (H) 40 - 73 %    LYMPHOCYTES 11 (L) 21 - 52 %    MONOCYTES 10 3 - 10 %    EOSINOPHILS 1 0 - 5 %    BASOPHILS 0 0 - 2 %    ABS. NEUTROPHILS 11.3 (H) 1.8 - 8.0 K/UL    ABS. LYMPHOCYTES 1.6 0.9 - 3.6 K/UL    ABS. MONOCYTES 1.4 (H) 0.05 - 1.2 K/UL    ABS. EOSINOPHILS 0.1 0.0 - 0.4 K/UL    ABS. BASOPHILS 0.0 0.0 - 0.1 K/UL    DF AUTOMATED      PLATELET COMMENTS DECREASED PLATELETS      RBC COMMENTS SCHISTOCYTES  1+        RBC COMMENTS LUÍS CELLS  FEW       POC LACTIC ACID    Collection Time: 03/17/19  2:26 PM   Result Value Ref Range    Lactic Acid (POC) 1.07 0.40 - 2.00 mmol/L   CBC WITH AUTOMATED DIFF    Collection Time: 03/17/19  5:42 PM   Result Value Ref Range    WBC 13.5 (H) 4.6 - 13.2 K/uL    RBC 2.77 (L) 4.20 - 5.30 M/uL    HGB 8.5 (L) 12.0 - 16.0 g/dL    HCT 27.3 (L) 35.0 - 45.0 %    MCV 98.6 (H) 74.0 - 97.0 FL    MCH 30.7 24.0 - 34.0 PG    MCHC 31.1 31.0 - 37.0 g/dL    RDW 17.8 (H) 11.6 - 14.5 %    PLATELET 022 (L) 897 - 420 K/uL    MPV 9.5 9.2 - 11.8 FL    NEUTROPHILS 74 (H) 40 - 73 %    LYMPHOCYTES 13 (L) 21 - 52 %    MONOCYTES 12 (H) 3 - 10 %    EOSINOPHILS 1 0 - 5 %    BASOPHILS 0 0 - 2 %    ABS. NEUTROPHILS 10.0 (H) 1.8 - 8.0 K/UL    ABS. LYMPHOCYTES 1.8 0.9 - 3.6 K/UL    ABS. MONOCYTES 1.6 (H) 0.05 - 1.2 K/UL    ABS. EOSINOPHILS 0.1 0.0 - 0.4 K/UL    ABS.  BASOPHILS 0.0 0.0 - 0.1 K/UL    DF AUTOMATED     INFLUENZA A/B BY PCR    Collection Time: 03/17/19  7:36 PM   Result Value Ref Range    Influenza A by PCR NEGATIVE  NEG      Influenza B by PCR NEGATIVE  NEG       Assessment/Plan     Active Problems:    Diarrhea (3/17/2019)      UTI (urinary tract infection) (3/17/2019)      Fever (3/17/2019)     Sepsis    Hx of Cardiomyopathy ef 30    Renal insufficency     Dementia    Lupus    Plan;  Start Broad Abx  Did not follow 30cc/kg sepsis bundle due to CHF  Will hold diuretics for tonight  IV zosyn/vanc/ started in ER   Check flu   Resume home po meds      DVT/GI Prophylaxis: Hep SQ    Discussed with patient at bedside about hospital admission and my plan care, who understood and agree with my plan care.     Adamaris Falk MD  3/17/2019 9:28 PM

## 2019-03-18 NOTE — ED NOTES
TRANSFER - OUT REPORT:    Verbal report given to JACKELIN SERNA(name) on [place]  being transferred to (unit) for routine progression of care       Report consisted of patients Situation, Background, Assessment and   Recommendations(SBAR). Information from the following report(s) SBAR was reviewed with the receiving nurse. Lines:   Peripheral IV 03/17/19 Left Antecubital (Active)   Site Assessment Clean, dry, & intact 3/17/2019  2:31 PM   Phlebitis Assessment 0 3/17/2019  2:31 PM   Infiltration Assessment 0 3/17/2019  2:31 PM   Dressing Status Clean, dry, & intact 3/17/2019  2:31 PM   Dressing Type Transparent 3/17/2019  2:31 PM   Hub Color/Line Status Pink;Flushed;Patent 3/17/2019  2:31 PM   Action Taken Blood drawn 3/17/2019  2:31 PM        Opportunity for questions and clarification was provided.       Patient transported with:   [employer]

## 2019-03-18 NOTE — PROGRESS NOTES
[facility]  Progress Note    Patient: Tabby Ross Age: 80 y.o. : 1931 MR#: [id number] SSN: [id number]  Date: 3/18/2019     Subjective:     Denies SOB or CP when asked. Ongoing multijoint pain    Assessment/Plan:   1. Acute metabolic encephalopathy - in setting of UTI; follow for baseline; SLP consulted - NPO until seep by SLP, aspiration precautions  2. UTI - per U/A; urine culture added to culture on admission; broad spectrum abx; narrow abx based on culture  3. CKD stage 3-4 - stable; renally dose meds  4. Chronic combined CHF - no acute decompensation; gentle hydration while NPO; echo w/ EF of 00-66%, grade 3 diastolic dysfunction   5. Hypokalemia - replaced; follow  6. Fevers - present on admission; resolved at present.    7. Systemic Lupus - multi joint pain  8. Thrombocytopenia - mild dec, follow    Additional Notes:      Case discussed with:  [x]Patient  [x]Family  [x]Nursing  [x]Case Management  DVT Prophylaxis:  []Lovenox  []Hep SQ  [x]SCDs  []Coumadin   []On Heparin gtt    Objective:   VS:   Visit Vitals  /72 (BP 1 Location: Right arm, BP Patient Position: At rest)   Pulse (!) 108   Temp 98.8 °F (37.1 °C)   Resp 12   Wt 73.4 kg (161 lb 14.4 oz)   SpO2 100%   Breastfeeding?  No   BMI 27.79 kg/m²      Tmax/24hrs: Temp (24hrs), Av.3 °F (36.8 °C), Min:97.5 °F (36.4 °C), Max:98.8 °F (37.1 °C)      Intake/Output Summary (Last 24 hours) at 3/18/2019 1411  Last data filed at 3/18/2019 0636  Gross per 24 hour   Intake 3850 ml   Output    Net 3850 ml     General:  Alert, NAD  Cardiovascular:  RRR  Pulmonary:  LSC throughout  GI:  +BS in all four quadrants, soft, non-tender  Extremities:  No edema; 2+ dorsalis pedis pulses bilaterally  Neuro: responds to name, recognizes son in room    Family contact: David Neumann   [phone]         Labs:    Recent Results (from the past 24 hour(s))   CULTURE, BLOOD    Collection Time: 19  2:20 PM Result Value Ref Range    Special Requests: NO SPECIAL REQUESTS      Culture result: NO GROWTH AFTER 16 HOURS     CULTURE, BLOOD    Collection Time: 03/17/19  2:25 PM   Result Value Ref Range    Special Requests: NO SPECIAL REQUESTS      Culture result: NO GROWTH AFTER 16 HOURS     METABOLIC PANEL, COMPREHENSIVE    Collection Time: 03/17/19  2:25 PM   Result Value Ref Range    Sodium 143 136 - 145 mmol/L    Potassium 3.5 3.5 - 5.5 mmol/L    Chloride 101 100 - 108 mmol/L    CO2 37 (H) 21 - 32 mmol/L    Anion gap 5 3.0 - 18 mmol/L    Glucose 104 (H) 74 - 99 mg/dL    BUN 53 (H) 7.0 - 18 MG/DL    Creatinine 1.91 (H) 0.6 - 1.3 MG/DL    BUN/Creatinine ratio 28 (H) 12 - 20      GFR est AA 30 (L) >60 ml/min/1.73m2    GFR est non-AA 25 (L) >60 ml/min/1.73m2    Calcium 8.7 8.5 - 10.1 MG/DL    Bilirubin, total 1.0 0.2 - 1.0 MG/DL    ALT (SGPT) 23 13 - 56 U/L    AST (SGOT) 31 15 - 37 U/L    Alk. phosphatase 82 45 - 117 U/L    Protein, total 6.1 (L) 6.4 - 8.2 g/dL    Albumin 3.2 (L) 3.4 - 5.0 g/dL    Globulin 2.9 2.0 - 4.0 g/dL    A-G Ratio 1.1 0.8 - 1.7     CBC WITH AUTOMATED DIFF    Collection Time: 03/17/19  2:25 PM   Result Value Ref Range    WBC 14.4 (H) 4.6 - 13.2 K/uL    RBC 3.19 (L) 4.20 - 5.30 M/uL    HGB 9.6 (L) 12.0 - 16.0 g/dL    HCT 31.7 (L) 35.0 - 45.0 %    MCV 99.4 (H) 74.0 - 97.0 FL    MCH 30.1 24.0 - 34.0 PG    MCHC 30.3 (L) 31.0 - 37.0 g/dL    RDW 17.8 (H) 11.6 - 14.5 %    PLATELET 963 745 - 469 K/uL    MPV 9.2 9.2 - 11.8 FL    NEUTROPHILS 78 (H) 40 - 73 %    LYMPHOCYTES 11 (L) 21 - 52 %    MONOCYTES 10 3 - 10 %    EOSINOPHILS 1 0 - 5 %    BASOPHILS 0 0 - 2 %    ABS. NEUTROPHILS 11.3 (H) 1.8 - 8.0 K/UL    ABS. LYMPHOCYTES 1.6 0.9 - 3.6 K/UL    ABS. MONOCYTES 1.4 (H) 0.05 - 1.2 K/UL    ABS. EOSINOPHILS 0.1 0.0 - 0.4 K/UL    ABS.  BASOPHILS 0.0 0.0 - 0.1 K/UL    DF AUTOMATED      PLATELET COMMENTS DECREASED PLATELETS      RBC COMMENTS SCHISTOCYTES  1+        RBC COMMENTS LUÍS CELLS  FEW       POC LACTIC ACID Collection Time: 03/17/19  2:26 PM   Result Value Ref Range    Lactic Acid (POC) 1.07 0.40 - 2.00 mmol/L   CBC WITH AUTOMATED DIFF    Collection Time: 03/17/19  5:42 PM   Result Value Ref Range    WBC 13.5 (H) 4.6 - 13.2 K/uL    RBC 2.77 (L) 4.20 - 5.30 M/uL    HGB 8.5 (L) 12.0 - 16.0 g/dL    HCT 27.3 (L) 35.0 - 45.0 %    MCV 98.6 (H) 74.0 - 97.0 FL    MCH 30.7 24.0 - 34.0 PG    MCHC 31.1 31.0 - 37.0 g/dL    RDW 17.8 (H) 11.6 - 14.5 %    PLATELET 752 (L) 657 - 420 K/uL    MPV 9.5 9.2 - 11.8 FL    NEUTROPHILS 74 (H) 40 - 73 %    LYMPHOCYTES 13 (L) 21 - 52 %    MONOCYTES 12 (H) 3 - 10 %    EOSINOPHILS 1 0 - 5 %    BASOPHILS 0 0 - 2 %    ABS. NEUTROPHILS 10.0 (H) 1.8 - 8.0 K/UL    ABS. LYMPHOCYTES 1.8 0.9 - 3.6 K/UL    ABS. MONOCYTES 1.6 (H) 0.05 - 1.2 K/UL    ABS. EOSINOPHILS 0.1 0.0 - 0.4 K/UL    ABS.  BASOPHILS 0.0 0.0 - 0.1 K/UL    DF AUTOMATED     INFLUENZA A/B BY PCR    Collection Time: 03/17/19  7:36 PM   Result Value Ref Range    Influenza A by PCR NEGATIVE  NEG      Influenza B by PCR NEGATIVE  NEG     CREATININE    Collection Time: 03/18/19  6:56 AM   Result Value Ref Range    Creatinine 1.49 (H) 0.6 - 1.3 MG/DL    GFR est AA 40 (L) >60 ml/min/1.73m2    GFR est non-AA 33 (L) >60 ml/min/1.73m2     Signed By: Johnnie Hightower NP     March 18, 2019

## 2019-03-18 NOTE — PROGRESS NOTES
DISCHARGE PLANNING:  Reason for Admission:   DIARRHEA               RRAT Score:     37             Resources/supports as identified by patient/family:   PT LIVES WITH HER ADULT DAUGHTERS AND A LARGE SUPPORTIVE FAMILY                Top Challenges facing patient (as identified by patient/family and CM): Finances/Medication cost?    NO CONCERNS               Transportation? FAMILY              Support system or lack thereof? NONE, LARGE SUPPORTIVE FAMILY                     Living arrangements? LIVES IN HER OWN HOME WITH HER ADULT CHILDREN WHO SERVE AS CARETAKERS            Self-care/ADLs/Cognition? FAMILY MEMBERS PROVIDE          Current Advanced Directive/Advance Care Plan:  NOT FILED                          Plan for utilizing home health:   FOC WILL BE PROVIDED                       Likelihood of readmission: HIGH                 Transition of Care Plan:   [address]        Pt was recommended for SNF, daughter and family prefer [address] with personal care aides support. Pt states they are willing to apply for Medicaid. During previous admission, daughters informed previous worker that they had filed for [facility] application. Pt's family states the only need is personal care aides to provide assistance. Spoke with [employer], who states family refused to provide bank statements to complete Medicaid application. Previous CM has completed a [employer] to assist with personal care aides.     Care Management Interventions  PCP Verified by CM: Yes(Maddison Ventura)  Palliative Care Criteria Met (RRAT>21 & CHF Dx)?: No  Mode of Transport at Discharge: BLS  Transition of Care Consult (CM Consult): Discharge Planning  Physical Therapy Consult: Yes  Occupational Therapy Consult: Yes  Current Support Network: Own Home, Lives with Caregiver, Family Lives Nearby(PT LIVES WITH HER ADULT DAUGHTERS)  Confirm Follow Up Transport: Family  Plan discussed with Pt/Family/Caregiver: Yes(PT'S FAMILY WANTS THIS PT TO RETURN HOME WITH FAMILY SUPPORT, FAMILY REQUESTED [address], FAMILY DOES NOT WANT THIS PT IN A  SNF WHICH AT THIS TIME IS RECOMMENDED )  Freedom of Choice Offered: Yes  Discharge Location  Discharge Placement: Home with home health      [address] [place] [address] Drive  [phone]

## 2019-03-19 LAB
ANION GAP SERPL CALC-SCNC: 9 MMOL/L (ref 3–18)
BASOPHILS # BLD: 0 K/UL (ref 0–0.1)
BASOPHILS NFR BLD: 0 % (ref 0–2)
BUN SERPL-MCNC: 47 MG/DL (ref 7–18)
BUN/CREAT SERPL: 34 (ref 12–20)
CALCIUM SERPL-MCNC: 8.5 MG/DL (ref 8.5–10.1)
CHLORIDE SERPL-SCNC: 109 MMOL/L (ref 100–108)
CO2 SERPL-SCNC: 28 MMOL/L (ref 21–32)
CREAT SERPL-MCNC: 1.37 MG/DL (ref 0.6–1.3)
DIFFERENTIAL METHOD BLD: ABNORMAL
EOSINOPHIL # BLD: 0.1 K/UL (ref 0–0.4)
EOSINOPHIL NFR BLD: 1 % (ref 0–5)
ERYTHROCYTE [DISTWIDTH] IN BLOOD BY AUTOMATED COUNT: 17.3 % (ref 11.6–14.5)
GLUCOSE SERPL-MCNC: 58 MG/DL (ref 74–99)
HCT VFR BLD AUTO: 29.6 % (ref 35–45)
HGB BLD-MCNC: 9 G/DL (ref 12–16)
LYMPHOCYTES # BLD: 1.5 K/UL (ref 0.9–3.6)
LYMPHOCYTES NFR BLD: 15 % (ref 21–52)
MAGNESIUM SERPL-MCNC: 2.4 MG/DL (ref 1.6–2.6)
MCH RBC QN AUTO: 30.4 PG (ref 24–34)
MCHC RBC AUTO-ENTMCNC: 30.4 G/DL (ref 31–37)
MCV RBC AUTO: 100 FL (ref 74–97)
MONOCYTES # BLD: 1.2 K/UL (ref 0.05–1.2)
MONOCYTES NFR BLD: 12 % (ref 3–10)
NEUTS SEG # BLD: 7.1 K/UL (ref 1.8–8)
NEUTS SEG NFR BLD: 72 % (ref 40–73)
PLATELET # BLD AUTO: 133 K/UL (ref 135–420)
PMV BLD AUTO: 9.5 FL (ref 9.2–11.8)
POTASSIUM SERPL-SCNC: 4 MMOL/L (ref 3.5–5.5)
RBC # BLD AUTO: 2.96 M/UL (ref 4.2–5.3)
SODIUM SERPL-SCNC: 146 MMOL/L (ref 136–145)
WBC # BLD AUTO: 9.9 K/UL (ref 4.6–13.2)

## 2019-03-19 PROCEDURE — 83735 ASSAY OF MAGNESIUM: CPT

## 2019-03-19 PROCEDURE — 74011000258 HC RX REV CODE- 258: Performed by: EMERGENCY MEDICINE

## 2019-03-19 PROCEDURE — 74011250636 HC RX REV CODE- 250/636: Performed by: EMERGENCY MEDICINE

## 2019-03-19 PROCEDURE — 87177 OVA AND PARASITES SMEARS: CPT

## 2019-03-19 PROCEDURE — 80048 BASIC METABOLIC PNL TOTAL CA: CPT

## 2019-03-19 PROCEDURE — 92610 EVALUATE SWALLOWING FUNCTION: CPT

## 2019-03-19 PROCEDURE — 85025 COMPLETE CBC W/AUTO DIFF WBC: CPT

## 2019-03-19 PROCEDURE — 77030038269 HC DRN EXT URIN PURWCK BARD -A

## 2019-03-19 PROCEDURE — 65270000029 HC RM PRIVATE

## 2019-03-19 PROCEDURE — 36415 COLL VENOUS BLD VENIPUNCTURE: CPT

## 2019-03-19 RX ADMIN — LEVOFLOXACIN 750 MG: 5 INJECTION, SOLUTION INTRAVENOUS at 17:24

## 2019-03-19 RX ADMIN — PIPERACILLIN SODIUM,TAZOBACTAM SODIUM 2.25 G: 2; .25 INJECTION, POWDER, FOR SOLUTION INTRAVENOUS at 03:40

## 2019-03-19 RX ADMIN — PIPERACILLIN SODIUM,TAZOBACTAM SODIUM 2.25 G: 2; .25 INJECTION, POWDER, FOR SOLUTION INTRAVENOUS at 08:29

## 2019-03-19 NOTE — CDMP QUERY
Pt admitted with Sepsis and UTI,  Pt noted to have Metabolic Encephalopathy. If possible, please clarify in progress notes and d/c summary the relationship, if any, between Sepsis and Metabolic Encephalopathy. Are the conditions: 
Due to or associated with each other Unrelated to each other Other, please specify Unable to determine The medical record reflects the following: 
  Risk Factors: 79 yo with UTI and hx dementia Clinical Indicators:  
H&P Dx sepsis, UTI, worsening confusion, GCS 11 
0/07 Dx Acute metabolic encephalopathy - in setting of UTI Treatment: IV abx Thank you, Mahesh Arboleda RN BSN CCDS [phone]

## 2019-03-19 NOTE — PROGRESS NOTES
Problem: Dysphagia (Adult) Goal: *Acute Goals and Plan of Care (Insert Text) Patient will: 1. Tolerate PO trials with 0 s/s overt distress in 4/5 trials 2. Utilize compensatory swallow strategies/maneuvers (decrease bite/sip, size/rate, alt. liq/sol) with min cues in 4/5 trials Recommend:  
Regular diet with thin liquids Meds as tolerated Assist with meals as needed Aspiration precautions HOB >45 degrees during all intake and for at least 30 min after po Small bites/sips, Slow rate of intake Speech LAnguage Pathology bedside swallow evaluation Patient: Luly Jackson (80 y.o. female) Date: 3/19/2019 Primary Diagnosis: Fever [R50.9] Diarrhea [R19.7] UTI (urinary tract infection) [N39.0] Sepsis ([address] [A41.9] UTI (urinary tract infection) [N39.0] Precautions: Aspiration ASSESSMENT : 
Based on the objective data described below, the patient presents with mild oral dysphagia c/b prolonged but thorough mastication. Pt seen with son present at b/s, oriented to person and following one step commands. Oral mech exam revealed structures functional for speech/deglutition. Pt's son denies dysphagia. Pt observed with thin liquids + straw via successive swallows with timely swallow initiation and adequate laryngeal elevation to palpation. Pt demo prolonged mastication of regular solids; however, able to clear given increased time and facilitated liquid wash. No overt s/sx aspiration noted across trials. Recommend initiate regular diet with use of the above mentioned compensatory strategies/aspiration precautions. Results/recommendations discussed with pt and family. If silent aspiration a concern, consider MBS to rule out. Will follow x1-2 visits to ensure diet tolerance. D/w nursing. Patient will benefit from skilled intervention to address the above impairments. Patients rehabilitation potential is considered to be Good Factors which may influence rehabilitation potential include:  
[]            None noted [x]            Mental ability/status [x]            Medical condition []            Home/family situation and support systems 
[]            Safety awareness 
[]            Pain tolerance/management []            Other: PLAN : 
Recommendations and Planned Interventions: As above Frequency/Duration: Patient will be followed by speech-language pathology x1-2 visits to address goals. Discharge Recommendations: To Be Determined SUBJECTIVE:  
Patient stated You're so pretty. OBJECTIVE:  
 
Past Medical History:  
Diagnosis Date  Acute on chronic diastolic heart failure ([facility]  Arthritis  Benign hypertensive heart disease without heart failure Better controlled, stable  Chronic diastolic heart failure ([address] Breathing and edema is improving  Congestive heart failure ([facility]  HTN (hypertension)  Hypercholesteremia  Lupus (systemic lupus erythematosus) ([facility] 6/18/2014  
 followed by dr Nan Osgood  Obesity, unspecified Patient has weight loss Discussed diet ad fluid restriction  Other and unspecified hyperlipidemia F/u per pmd  
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  
Procedure Laterality Date  HX HYSTERECTOMY  PACEMAKER PROCEDURE Prior Level of Function/Home Situation: 
Home Situation Home Environment: Private residence # Steps to Enter: 3 One/Two Story Residence: Two story # of Interior Steps: 12 Interior Rails: Right Lift Chair Available: Yes Living Alone: No 
Support Systems: Child(marty), Spouse/Significant Other/Partner Patient Expects to be Discharged to[de-identified] Private residence Current DME Used/Available at Home: Lift chair, Walker, rolling, Wheelchair Diet prior to admission: Regular/thin Current Diet:  NPO; recommend advance to regular/thin Cognitive and Communication Status: 
Neurologic State: Alert Orientation Level: Oriented to place Cognition: Follows commands Oral Assessment: 
Oral Assessment Labial: No impairment Dentition: Intact Oral Hygiene: Good Lingual: No impairment Velum: No impairment Mandible: No impairment P.O. Trials: 
Patient Position: 45 at [facility] Vocal quality prior to P.O.: No impairment Consistency Presented: Solid; Thin liquid How Presented: Self-fed/presented;Cup/sip;Straw;Successive swallows Bolus Acceptance: No impairment Bolus Formation/Control: Impaired Type of Impairment: Mastication Propulsion: Delayed (# of seconds) Oral Residue: None Initiation of Swallow: No impairment Laryngeal Elevation: Functional 
Aspiration Signs/Symptoms: None Pharyngeal Phase Characteristics: Poor endurance Effective Modifications: Small sips and bites; Alternate liquids/solids Cues for Modifications: Minimal-moderate Oral Phase Severity: Mild Pharyngeal Phase Severity : Mild The severity rating is based on the following outcomes:   
Clinical Judgment Pain: 
Pt c/o 0/10 pain prior to evaluation. Pt c/o 0/10 pain post evaluation. After treatment:  
[]            Patient left in no apparent distress sitting up in chair 
[x]            Patient left in no apparent distress in bed 
[x]            Call bell left within reach [x]            Nursing notified 
[x]            Caregiver present 
[]            Bed alarm activated COMMUNICATION/EDUCATION:  
[x]            SLP educated pt/pt's son with regard to compensatory swallow strategies and 
     aspiration/reflux precautions including: small bites/sips, 
     alternate liquids/solids, decrease feeding rate, HOB > 45 with all po, and upright in bed at 30 degrees after po for at least 45 minutes. []            Patient/family have participated as able in goal setting and plan of care. [x]            Patient/family agree to work toward stated goals and plan of care. []            Patient understands intent and goals of therapy; neutral about participation. []            Patient is unable to participate in goal setting and plan of care. Thank you for this referral. 
Jett Hadley M.S., CCC-SLP Speech-Language Pathologist

## 2019-03-19 NOTE — PROGRESS NOTES
[facility] Hospitalist Group Progress Note Patient: Luly April Age: 80 y.o. : 1931 MR#: [id number] SSN: [id number] Date: 3/19/2019 Subjective:  
 
Pt reports doing okay. Has mild  Pain in shoulder. No chest pain, SOB, or abd pain. Assessment/Plan: 1. Acute metabolic encephalopathy - in setting of UTI; follow for baseline; SLP recs regular diet. 2. Sepsis POA, fever, elev WBC and UTI: cont abx 3. UTI - dc vanc/zosyn, cont Levaquin only based on sensitivity. 4. CKD stage 3-4 - stable; renally dose meds 5. Chronic combined CHF - no acute decompensation; echo w/ EF of 91-57%, grade 3 diastolic dysfunction 3034 6. Hypokalemia - replaced; follow. 7. Fevers - present on admission; resolved at present.   
8. Systemic Lupus - multi joint pain 9. Thrombocytopenia - follow Goals of care: full code Disposition:  [x]PT/OT ordered   [x] Case management referral 
 
Case discussed with:  [x]Patient  [x]Family  []Nursing  []Case Management DVT Prophylaxis:  []Lovenox  []Hep SQ  [x]SCDs  []Coumadin   []On Heparin gtt Objective:  
VS:  
Visit Vitals /64 (BP 1 Location: Right arm, BP Patient Position: At rest) Pulse 70 Temp 98 °F (36.7 °C) Resp 16 Wt 75 kg (165 lb 4.8 oz) SpO2 92% Breastfeeding? No  
BMI 28.37 kg/m² Tmax/24hrs: Temp (24hrs), Av.9 °F (36.6 °C), Min:96.8 °F (36 °C), Max:98.9 °F (37.2 °C) Intake/Output Summary (Last 24 hours) at 3/19/2019 1153 Last data filed at 3/19/2019 1056 Gross per 24 hour Intake 480 ml Output  Net 480 ml General:  Awake, alert, NAD Cardiovascular:  RRR Pulmonary: CTA  
GI:  NT, normal BS Extremities:  No edema or cyanosis Labs:   
Recent Results (from the past 24 hour(s)) METABOLIC PANEL, BASIC Collection Time: 19  2:30 PM  
Result Value Ref Range Sodium 147 (H) 136 - 145 mmol/L Potassium 3.1 (L) 3.5 - 5.5 mmol/L  Chloride 105 100 - 108 mmol/L  
 CO2 35 (H) 21 - 32 mmol/L Anion gap 7 3.0 - 18 mmol/L Glucose 72 (L) 74 - 99 mg/dL BUN 49 (H) 7.0 - 18 MG/DL Creatinine 1.47 (H) 0.6 - 1.3 MG/DL  
 BUN/Creatinine ratio 33 (H) 12 - 20 GFR est AA 41 (L) >60 ml/min/1.73m2 GFR est non-AA 34 (L) >60 ml/min/1.73m2 Calcium 8.4 (L) 8.5 - 10.1 MG/DL  
CBC WITH AUTOMATED DIFF Collection Time: 03/18/19  2:30 PM  
Result Value Ref Range WBC 9.1 4.6 - 13.2 K/uL  
 RBC 2.95 (L) 4.20 - 5.30 M/uL HGB 8.8 (L) 12.0 - 16.0 g/dL HCT 29.5 (L) 35.0 - 45.0 % .0 (H) 74.0 - 97.0 FL  
 MCH 29.8 24.0 - 34.0 PG  
 MCHC 29.8 (L) 31.0 - 37.0 g/dL  
 RDW 17.5 (H) 11.6 - 14.5 % PLATELET 860 (L) 359 - 420 K/uL MPV 9.2 9.2 - 11.8 FL  
 NEUTROPHILS 74 (H) 40 - 73 % LYMPHOCYTES 23 21 - 52 % MONOCYTES 2 (L) 3 - 10 % EOSINOPHILS 1 0 - 5 % BASOPHILS 0 0 - 2 %  
 ABS. NEUTROPHILS 6.7 1.8 - 8.0 K/UL  
 ABS. LYMPHOCYTES 2.1 0.9 - 3.6 K/UL  
 ABS. MONOCYTES 0.2 0.05 - 1.2 K/UL  
 ABS. EOSINOPHILS 0.1 0.0 - 0.4 K/UL  
 ABS. BASOPHILS 0.0 0.0 - 0.1 K/UL  
 DF AUTOMATED MAGNESIUM Collection Time: 03/18/19  5:07 PM  
Result Value Ref Range Magnesium 2.5 1.6 - 2.6 mg/dL METABOLIC PANEL, BASIC Collection Time: 03/19/19  3:18 AM  
Result Value Ref Range Sodium 146 (H) 136 - 145 mmol/L Potassium 4.0 3.5 - 5.5 mmol/L Chloride 109 (H) 100 - 108 mmol/L  
 CO2 28 21 - 32 mmol/L Anion gap 9 3.0 - 18 mmol/L Glucose 58 (L) 74 - 99 mg/dL BUN 47 (H) 7.0 - 18 MG/DL Creatinine 1.37 (H) 0.6 - 1.3 MG/DL  
 BUN/Creatinine ratio 34 (H) 12 - 20 GFR est AA 44 (L) >60 ml/min/1.73m2 GFR est non-AA 36 (L) >60 ml/min/1.73m2 Calcium 8.5 8.5 - 10.1 MG/DL  
CBC WITH AUTOMATED DIFF Collection Time: 03/19/19  3:18 AM  
Result Value Ref Range WBC 9.9 4.6 - 13.2 K/uL  
 RBC 2.96 (L) 4.20 - 5.30 M/uL HGB 9.0 (L) 12.0 - 16.0 g/dL HCT 29.6 (L) 35.0 - 45.0 %  .0 (H) 74.0 - 97.0 FL  
 MCH 30.4 24.0 - 34.0 PG  
 MCHC 30.4 (L) 31.0 - 37.0 g/dL  
 RDW 17.3 (H) 11.6 - 14.5 % PLATELET 315 (L) 578 - 420 K/uL MPV 9.5 9.2 - 11.8 FL  
 NEUTROPHILS 72 40 - 73 % LYMPHOCYTES 15 (L) 21 - 52 % MONOCYTES 12 (H) 3 - 10 % EOSINOPHILS 1 0 - 5 % BASOPHILS 0 0 - 2 %  
 ABS. NEUTROPHILS 7.1 1.8 - 8.0 K/UL  
 ABS. LYMPHOCYTES 1.5 0.9 - 3.6 K/UL  
 ABS. MONOCYTES 1.2 0.05 - 1.2 K/UL  
 ABS. EOSINOPHILS 0.1 0.0 - 0.4 K/UL  
 ABS. BASOPHILS 0.0 0.0 - 0.1 K/UL  
 DF AUTOMATED MAGNESIUM Collection Time: 03/19/19  3:18 AM  
Result Value Ref Range Magnesium 2.4 1.6 - 2.6 mg/dL Signed By: Jos Dick PA-C  March 19, 2019 11:53 AM

## 2019-03-19 NOTE — PROGRESS NOTES
conducted an initial consultation and Spiritual Assessment for [place], who is a 80 y.o.,female. Patients Primary Language is: [place]. According to the patients EMR [employer] Affiliation is: Jew.  
 
The reason the Patient came to the hospital is:  
Patient Active Problem List  
 Diagnosis Date Noted  Amelie 06/08/2015 Priority: 1 - One  
 Diarrhea 03/17/2019  UTI (urinary tract infection) 03/17/2019  Fever 03/17/2019  Sepsis ([facility] 03/17/2019  
 Skin lesions 02/06/2019  
 NSTEMI (non-ST elevated myocardial infarction) ([facility] 01/30/2019  Dementia 01/30/2019  CKD (chronic kidney disease) stage 4, GFR 15-29 ml/min ([facility]) 01/21/2019  Uremia 01/05/2019  Left hip pain 01/05/2019  Acute exacerbation of CHF (congestive heart failure) ([facility] 12/27/2018  ARIEL (acute kidney injury) ([facility] 12/27/2018  Diastolic CHF, chronic ([facility] 11/30/2016  High risk medication use 11/30/2016  Acute on chronic diastolic (congestive) heart failure ([facility] 05/09/2016  Paroxysmal atrial flutter ([facility] 01/11/2016  S/P placement of cardiac pacemaker 11/22/2015  Hypertensive heart disease with CHF (congestive heart failure) ([facility] 11/17/2015  Tricuspid valve disorders, non-rheumatic 06/18/2014  Lupus (systemic lupus erythematosus) ([facility] 06/18/2014  
 HTN (hypertension)  Hypercholesteremia The  provided the following Interventions: 
Initiated a relationship of care and support. Explored issues of valeria, belief, spirituality and Islam/ritual needs while hospitalized. Listened empathically. Provided chaplaincy education. Provided information about Spiritual Care Services. Offered prayer and assurance of continued prayers on patient's behalf. Chart reviewed. The following outcomes where achieved: 
Patient shared limited information about both their medical narrative and spiritual journey/beliefs. Patient processed feeling about current hospitalization. Patient expressed gratitude for 's visit. Assessment: 
Patient does not have any Scientologist/cultural needs that will affect patients preferences in health care. There are no spiritual or Scientologist issues which require intervention at this time. Plan: 
Chaplains will continue to follow and will provide pastoral care on an as needed/requested basis.  recommends bedside caregivers page  on duty if patient shows signs of acute spiritual or emotional distress. [facility] Spiritual Care  
[phone]

## 2019-03-19 NOTE — CDMP QUERY
Pt admitted 3/17 with UTI. Pt noted to have Sepsis. If possible, please document in the progress notes and d/c summary if you are evaluating and / or treating any of the following: 
 
Sepsis, present on admission, suspected or probable causative organism (please specify) Sepsis, now resolved, suspected or probable causative organism (please specify) Sepsis, not present on admission, suspected or probable causative organism (please specify) No Sepsis, suspected or probable localized infection (please specify) Sepsis was ruled out (include corresponding diagnosis for patients clinical picture and treatment) Other, please specify Clinically unable to determine The medical record reflects the following: 
   Risk Factors: 81 yo, hx dementia, lupus, CHF, Clinical Indicators:  
ER T 101.1, P76, RR 19, /49, sat 99% WBC 14.4, cr 1.91, lactic acid 1.07, bld cx NTD 
3/14 ucx:  >100,000 COLONIES/mL KLEBSIELLA PNEUMONIAE 
H&P:  sepsis, uti, dementia, \"worsening confusion\" 3/18 PN:  Acute metabolic encephalopathy - in setting of UTI Treatment: sepsis bundle, IV abx Thank you,  Mag Tran RN BSN CCDS [phone]

## 2019-03-19 NOTE — ROUTINE PROCESS
Bedside and Verbal shift change report given to [employer], RN (oncoming nurse) by Miley Diaz RN (offgoing nurse). Report included the following information SBAR, Kardex, Intake/Output and MAR.

## 2019-03-19 NOTE — PROGRESS NOTES
Problem: Falls - Risk of  Goal: *Absence of Falls  Document Neeraj Fall Risk and appropriate interventions in the flowsheet. Outcome: Progressing Towards Goal  Fall Risk Interventions:  Mobility Interventions: Bed/chair exit alarm, Patient to call before getting OOB    Mentation Interventions: Adequate sleep, hydration, pain control         Elimination Interventions: Bed/chair exit alarm, Call light in reach, Patient to call for help with toileting needs             Problem: Pressure Injury - Risk of  Goal: *Prevention of pressure injury  Document Rodríguez Scale and appropriate interventions in the flowsheet. Outcome: Progressing Towards Goal  Pressure Injury Interventions:  Sensory Interventions: Assess need for specialty bed    Moisture Interventions: Absorbent underpads    Activity Interventions: Increase time out of bed, Pressure redistribution bed/mattress(bed type)    Mobility Interventions: Assess need for specialty bed, Pressure redistribution bed/mattress (bed type)    Nutrition Interventions: Document food/fluid/supplement intake    Friction and Shear Interventions: HOB 30 degrees or less               Problem: Impaired Skin Integrity/Pressure Injury Treatment  Goal: *Prevention of pressure injury  Document Rodríguez Scale and appropriate interventions in the flowsheet.   Outcome: Progressing Towards Goal  Pressure Injury Interventions:  Sensory Interventions: Assess need for specialty bed    Moisture Interventions: Absorbent underpads    Activity Interventions: Increase time out of bed, Pressure redistribution bed/mattress(bed type)    Mobility Interventions: Assess need for specialty bed, Pressure redistribution bed/mattress (bed type)    Nutrition Interventions: Document food/fluid/supplement intake    Friction and Shear Interventions: HOB 30 degrees or less

## 2019-03-20 ENCOUNTER — HOME HEALTH ADMISSION (OUTPATIENT)
Dept: HOME HEALTH SERVICES | Facility: HOME HEALTH | Age: 84
End: 2019-03-20
Payer: MEDICARE

## 2019-03-20 VITALS
RESPIRATION RATE: 18 BRPM | DIASTOLIC BLOOD PRESSURE: 64 MMHG | TEMPERATURE: 97.4 F | SYSTOLIC BLOOD PRESSURE: 119 MMHG | WEIGHT: 170.3 LBS | BODY MASS INDEX: 29.23 KG/M2 | OXYGEN SATURATION: 92 % | HEART RATE: 73 BPM

## 2019-03-20 LAB
BACTERIA SPEC CULT: ABNORMAL
GLUCOSE BLD STRIP.AUTO-MCNC: 152 MG/DL (ref 70–110)
SERVICE CMNT-IMP: ABNORMAL

## 2019-03-20 PROCEDURE — 92526 ORAL FUNCTION THERAPY: CPT

## 2019-03-20 PROCEDURE — 82962 GLUCOSE BLOOD TEST: CPT

## 2019-03-20 RX ORDER — CEFPODOXIME PROXETIL 200 MG/1
200 TABLET, FILM COATED ORAL 2 TIMES DAILY
Qty: 6 TAB | Refills: 0 | Status: SHIPPED | OUTPATIENT
Start: 2019-03-20 | End: 2019-03-23

## 2019-03-20 RX ORDER — ISOSORBIDE MONONITRATE 30 MG/1
30 TABLET, EXTENDED RELEASE ORAL
Qty: 30 TAB | Refills: 0 | Status: SHIPPED | OUTPATIENT
Start: 2019-03-20 | End: 2019-04-19

## 2019-03-20 NOTE — PROGRESS NOTES
PT is progressing towards goals. Problem: Pressure Injury - Risk of 
Goal: *Prevention of pressure injury Description Document Rodríguez Scale and appropriate interventions in the flowsheet. Outcome: Progressing Towards Goal 
  
Problem: Pressure Injury - Risk of 
Goal: *Prevention of pressure injury Description Document Rodríguez Scale and appropriate interventions in the flowsheet. Outcome: Progressing Towards Goal 
  
Problem: Impaired Skin Integrity/Pressure Injury Treatment Goal: *Improvement of Existing Pressure Injury Outcome: Progressing Towards Goal 
  
Problem: Patient Education: Go to Patient Education Activity Goal: Patient/Family Education Outcome: Progressing Towards Goal

## 2019-03-20 NOTE — DISCHARGE INSTRUCTIONS
DISCHARGE SUMMARY from Nurse    PATIENT INSTRUCTIONS:    After general anesthesia or intravenous sedation, for 24 hours or while taking prescription Narcotics:  · Limit your activities  · Do not drive and operate hazardous machinery  · Do not make important personal or business decisions  · Do  not drink alcoholic beverages  · If you have not urinated within 8 hours after discharge, please contact your surgeon on call. Report the following to your surgeon:  · Excessive pain, swelling, redness or odor of or around the surgical area  · Temperature over 100.5  · Nausea and vomiting lasting longer than 4 hours or if unable to take medications  · Any signs of decreased circulation or nerve impairment to extremity: change in color, persistent  numbness, tingling, coldness or increase pain  · Any questions    What to do at Home:  Recommended activity: Activity as tolerated and No lifting, Driving, or Strenuous exercise until your doctor says its okay. If you experience any of the following symptoms: Any signs of a urinary tract infection such as fever, burning when urinating, increased urgency and frequency to urinate, confusion, altered mental status, diarrhea, blood in your urine, or any other unusual symptoms, please follow up with your primary care doctor. *  Please give a list of your current medications to your Primary Care Provider. *  Please update this list whenever your medications are discontinued, doses are      changed, or new medications (including over-the-counter products) are added. *  Please carry medication information at all times in case of emergency situations. These are general instructions for a healthy lifestyle:    No smoking/ No tobacco products/ Avoid exposure to second hand smoke  Surgeon General's Warning:  Quitting smoking now greatly reduces serious risk to your health.     Obesity, smoking, and sedentary lifestyle greatly increases your risk for illness    A healthy diet, regular physical exercise & weight monitoring are important for maintaining a healthy lifestyle    You may be retaining fluid if you have a history of heart failure or if you experience any of the following symptoms:  Weight gain of 3 pounds or more overnight or 5 pounds in a week, increased swelling in our hands or feet or shortness of breath while lying flat in bed. Please call your doctor as soon as you notice any of these symptoms; do not wait until your next office visit. Recognize signs and symptoms of STROKE:    F-face looks uneven    A-arms unable to move or move unevenly    S-speech slurred or non-existent    T-time-call 911 as soon as signs and symptoms begin-DO NOT go       Back to bed or wait to see if you get better-TIME IS BRAIN. Warning Signs of HEART ATTACK     Call 911 if you have these symptoms:   Chest discomfort. Most heart attacks involve discomfort in the center of the chest that lasts more than a few minutes, or that goes away and comes back. It can feel like uncomfortable pressure, squeezing, fullness, or pain.  Discomfort in other areas of the upper body. Symptoms can include pain or discomfort in one or both arms, the back, neck, jaw, or stomach.  Shortness of breath with or without chest discomfort.  Other signs may include breaking out in a cold sweat, nausea, or lightheadedness. Don't wait more than five minutes to call 911 - MINUTES MATTER! Fast action can save your life. Calling 911 is almost always the fastest way to get lifesaving treatment. Emergency Medical Services staff can begin treatment when they arrive -- up to an hour sooner than if someone gets to the hospital by car. The discharge information has been reviewed with the patient and caregiver. The patient and caregiver verbalized understanding.   Discharge medications reviewed with the patient and caregiver and appropriate educational materials and side effects teaching were provided. ___________________________________________________________________________________________________________________________________    MyChart Activation    Thank you for requesting access to [employer]-Art. Please follow the instructions below to securely access and download your online medical record. [employer]-Art allows you to send messages to your doctor, view your test results, renew your prescriptions, schedule appointments, and more. How Do I Sign Up? 1. In your internet browser, go to [email or web address]  2. Click on the First Time User? Click Here link in the Sign In box. You will be redirect to the New Member Sign Up page. 3. Enter your t-Art Access Code exactly as it appears below. You will not need to use this code after youve completed the sign-up process. If you do not sign up before the expiration date, you must request a new code. t-Art Access Code: [email or web address]  Expires: 2019  9:56 AM (This is the date your t-Art access code will )    4. Enter the last four digits of your Social Security Number (xxxx) and Date of Birth (mm/dd/yyyy) as indicated and click Submit. You will be taken to the next sign-up page. 5. Create a t-Art ID. This will be your t-Art login ID and cannot be changed, so think of one that is secure and easy to remember. 6. Create a t-Art password. You can change your password at any time. 7. Enter your Password Reset Question and Answer. This can be used at a later time if you forget your password. 8. Enter your e-mail address. You will receive e-mail notification when new information is available in [address] 9. Click Sign Up. You can now view and download portions of your medical record. 10. Click the Download Summary menu link to download a portable copy of your medical information.     Additional Information    If you have questions, please visit the Frequently Asked Questions section of the t-Art website at [email or web address] Remember, [employer] is NOT to be used for urgent needs. For medical emergencies, dial 911.       Patient armband removed and shredded

## 2019-03-20 NOTE — DISCHARGE SUMMARY
[facility] Patient: Roseanna Jackson Age: 80 y.o. : 1931 MR#: [id number] SSN: [id number] PCP on record: Evon Leonard MD 
Admit date: 3/17/2019 Discharge date: 3/20/2019 Disposition:  
 []Home   [x]Home with Home Health   []SNF/NH   []Rehab   []Home with family []Alternate Facility:____________________ Admission Diagnoses: 
Fever [R50.9] Diarrhea [R19.7] UTI (urinary tract infection) [N39.0] Sepsis ([address] [A41.9] UTI (urinary tract infection) [N39.0] Discharge Diagnoses:                            
1. Acute metabolic encephalopathy 2. Sepsis POA, fever, elev WBC and UTI 3. 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  
5. Chronic combined CHF 6. Hypokalemia 7. Fevers - present on admission; resolved 8. Systemic Lupus 9. Thrombocytopenia Discharge Medications:  
 
Current Discharge Medication List  
  
START taking these medications Details  
cefpodoxime (VANTIN) 200 mg tablet Take 1 Tab by mouth two (2) times a day for 3 days. Qty: 6 Tab, Refills: 0 CONTINUE these medications which have CHANGED Details  
isosorbide mononitrate ER (IMDUR) 30 mg tablet Take 1 Tab by mouth every morning for 30 days. Qty: 30 Tab, Refills: 0 CONTINUE these medications which have NOT CHANGED Details  
metOLazone (ZAROXOLYN) 2.5 mg tablet Take 1 Tab by mouth daily. Qty: 30 Tab, Refills: 5  
  
!! OTHER Check CBC, CMP, Mg on 19- results to PCP and nephrologist Dr Dontrell Mckenzie immediately, Diagnosis- CKD4 Qty: 1 Each, Refills: 0  
  
!! OTHER Incentive Spirometry- Use as directed Qty: 1 Each, Refills: 0  
  
!! OTHER Graded Compression Stockings b/l LE- use as directed Qty: 1 Each, Refills: 0  
  
food supplemt, lactose-reduced (ENSURE ENLIVE) 0.08 gram-1.5 kcal/mL liqd Take 1 Bottle by mouth two (2) times a day. Qty: 60 Bottle, Refills: 0  
  
menthol-zinc oxide (CALMOSEPTINE) 0.44-20.6 % oint Apply  to affected area. raNITIdine (ZANTAC) 75 mg tab Take  by mouth two (2) times a day. docusate sodium (COLACE) 100 mg capsule Take 1 Cap by mouth two (2) times a day for 90 days. Qty: 60 Cap, Refills: 2  
  
carvedilol (COREG) 3.125 mg tablet Take 1 Tab by mouth every twelve (12) hours. Qty: 60 Tab, Refills: 0  
  
acetaminophen (TYLENOL ARTHRITIS PAIN) 650 mg TbER Take 1 Tab by mouth every eight (8) hours. Qty: 15 Tab, Refills: 0  
  
amiodarone (CORDARONE) 200 mg tablet TAKE ONE TABLET EVERY DAY (MAKE AN APPT) Qty: 30 Tab, Refills: 5  
  
memantine (NAMENDA) 10 mg tablet   
  
amitriptyline (ELAVIL) 25 mg tablet Take  by mouth nightly. aspirin 81 mg tablet Take 81 mg by mouth daily. !! - Potential duplicate medications found. Please discuss with provider. STOP taking these medications  
  
 cephALEXin (KEFLEX) 500 mg capsule Comments:  
Reason for Stopping:   
   
 potassium chloride (KLOR-CON) 10 mEq tablet Comments:  
Reason for Stopping:   
   
 torsemide (DEMADEX) 10 mg tablet Comments:  
Reason for Stopping:   
   
 predniSONE (DELTASONE) 20 mg tablet Comments:  
Reason for Stopping:   
   
 meloxicam (MOBIC) 7.5 mg tablet Comments:  
Reason for Stopping:   
   
  
 
 
Consults:  none -  
Procedures: none - Significant Diagnostic Studies: CXR: 
IMPRESSION: 
  
Enlarged cardiac silhouette with suspected interstitial infiltrates/edema, 
radiographically similar compared to 1/30/2019. Nonspecific retrocardiac opacity 
may be increased compared to prior. Possible small to moderate left pleural 
effusion, similar appearing to prior. Hospital Course by Problem HPI per admitting provider: Ekle Combs is a 80 y.o.  female who has hx of dementia/ CHF ef 30 presents with worsening confusion and Fever as well as decrease apetite In ER found to have Fever of 102 with lactate elevation Asked to admit for further treatment No Family at bedside and patient is confused \" 1. Acute metabolic encephalopathy - in setting of UTI; Admitted due to above. Pt had postive UA, treated with abx. Urine cx who grew <10,000 of pseudomonas. Mental status improved to baseline per son, prior to discharge. SLP consulted and recs regular diet. 2. Sepsis POA, fever, elev WBC and UTI: initially placed on broad spectrum abx, then narrowed, fevers, and eleva WBC resolved. 3. UTI - treated w/ abx. 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 Chronic combined CHF - no acute decompensation; echo w/ EF of 08-34%, grade 3 diastolic dysfunction  6. Hypokalemia - replaced and resolved. 7. Fevers - present on admission; resolved. 8. Systemic Lupus - multi joint pain 9. Thrombocytopenia - no bleeding s/s, stable at 133 prior to discharge. No heparin used. Will need to follow as OP with PCP. Today's examination of the patient revealed:  
 
Subjective:  
Pt reports doing okay. No complaints. D/w pt and son at bedside. Objective:  
VS:  
Visit Vitals /66 (BP 1 Location: Right arm, BP Patient Position: At rest) Pulse 75 Temp 98 °F (36.7 °C) Resp 18 Wt 77.2 kg (170 lb 4.8 oz) SpO2 96% Breastfeeding? No  
BMI 29.23 kg/m² Tmax/24hrs: Temp (24hrs), Av.1 °F (36.7 °C), Min:97 °F (36.1 °C), Max:99.8 °F (37.7 °C) Input/Output:  
 
Intake/Output Summary (Last 24 hours) at 3/20/2019 1019 Last data filed at 3/19/2019 1954 Gross per 24 hour Intake 480 ml Output 200 ml Net 280 ml General:  Alert, NAD Cardiovascular:  RRR Pulmonary:  LSC throughout; respiratory effort WNL 
GI:  +BS in all four quadrants, soft, non-tender Extremities:  No edema; Additional:   
 
Labs:   
No results found for this or any previous visit (from the past 24 hour(s)). Additional Data Reviewed: 
  
Condition: Stable Follow-up Appointments:  
1. Your PCP: Corrine Singleton MD, within 4-06QDRG >30 minutes spent coordinating this discharge (review instructions/follow-up, prescriptions, preparing report for sign off) Signed: 
Willa Rosen PA-C 
3/20/2019 
10:19 AM

## 2019-03-20 NOTE — PROGRESS NOTES
NUTRITION Nursing Referral: Pressure Injury RECOMMENDATIONS / PLAN:  
 
- Add supplement: Ensure Enlive BID 
- Continue RD inpatient monitoring and evaluation. NUTRITION INTERVENTIONS & DIAGNOSIS:  
 
[x] Meals/snacks:  General/healthful diet [x] Medical food supplement therapy:  initiate Nutrition Diagnosis:  Predicted inadequate energy intake related to lethargy and dementia as evidenced by fair meal intake at times since admission. Increased nutrient needs (protein) related to promotion of wound healing as evidenced by pt with buttocks pressure injury ASSESSMENT:  
 
Pt with dementia, lethargic at time of visit. Per son report, pt had good appetite and meal intake PTA. Viewed lunch tray today; pt ate about 50% of meal. Has good meal intake per chart. Tolerating diet; SLP following. Per chart review, noted pt with buttock pressure injury. Son agreeable to nutrition supplement for pt; usually drinks  Ensure at home Average po intake adequate to meet patients estimated nutritional needs:   [] Yes     [x] No   [] Unable to determine at this time Diet: DIET REGULAR Food Allergies:  None known Current Appetite:   [] Good     [] Fair     [] Poor     [x] Other: unknown Appetite/meal intake prior to admission:   [x] Good     [] Fair     [] Poor     [] Other: 
Feeding Limitations:  [] Swallowing difficulty    [] Chewing difficulty    [] Other: 
Current Meal Intake:  
Patient Vitals for the past 100 hrs: 
 % Diet Eaten  
03/20/19 0814 75 % 03/19/19 1859 75 % 03/19/19 1330 75 % 03/19/19 0959 100 % BM: 3/19 Skin Integrity:  Per chart review, noted medial buttocks stage II pressure injury Edema:   [x] No     [] Yes Pertinent Medications: Reviewed Recent Labs  
  03/19/19 
0318 03/18/19 
1707 03/18/19 
1430 03/18/19 
0656 03/17/19 
1425 *  --  147*  --  143  
K 4.0  --  3.1*  --  3.5 *  --  105  --  101 CO2 28  --  35*  --  37* GLU 58*  --  72*  --  104* BUN 47*  --  49*  --  53* CREA 1.37*  --  1.47* 1.49* 1.91* CA 8.5  --  8.4*  --  8.7 MG 2.4 2.5  --   --   --   
ALB  --   --   --   --  3.2* SGOT  --   --   --   --  31 ALT  --   --   --   --  23 Intake/Output Summary (Last 24 hours) at 3/20/2019 1405 Last data filed at 3/20/2019 6588 Gross per 24 hour Intake 480 ml Output 200 ml Net 280 ml Anthropometrics: 
Ht Readings from Last 1 Encounters:  
03/04/19 5' 4\" (1.626 m) Last 3 Recorded Weights in this Encounter 03/17/19 1317 03/18/19 1955 03/19/19 2003 Weight: 73.4 kg (161 lb 14.4 oz) 75 kg (165 lb 4.8 oz) 77.2 kg (170 lb 4.8 oz) Body mass index is 29.23 kg/m². Overweight Weight History:   Noted weight fluctuations PTA. Per son report, thinks pt had about 5 lb wt loss PTA, but he was unsure of exact weight trends and UBW. Question accuracy of weight taken on 1/21/19 and 2/10/19 due to patient's weight before and after those dates. Weight Metrics 3/19/2019 3/4/2019 2/13/2019 2/10/2019 1/21/2019 1/8/2019 12/31/2018 Weight 170 lb 4.8 oz - 160 lb 183 lb 12.8 oz 187 lb 153 lb 7 oz 158 lb 8 oz BMI 29.23 kg/m2 27.46 kg/m2 27.46 kg/m2 30.59 kg/m2 31.12 kg/m2 25.53 kg/m2 26.38 kg/m2 Admitting Diagnosis: Fever [R50.9] Diarrhea [R19.7] UTI (urinary tract infection) [N39.0] Sepsis ([address] [A41.9] UTI (urinary tract infection) [N39.0] Pertinent PMHx:  CHF, HTN, hypercholesterolemia, lupus, dementia Education Needs:        [x] None identified  [] Identified - Not appropriate at this time  []  Identified and addressed - refer to education log Learning Limitations:   [] None identified  [x] Identified: dementia Cultural, Anabaptist & ethnic food preferences:  [x] None identified    [] Identified and addressed ESTIMATED NUTRITION NEEDS:  
 
Calories: 5672-7978 kcal (MSJx1.2-1.4) based on  [x] Actual BW: 77      [] IBW Protein:  gm (1.25-1.4 gm/kg) based on  [] Actual BW      [] IBW Fluid: 1 mL/kcal 
  
MONITORING & EVALUATION:  
 
Nutrition Goal(s): 1. Po intake of meals will meet >75% of patient estimated nutritional needs within the next 7 days. Outcome:  [] Met/Ongoing    []  Not Met    [x] New/Initial Goal  
 
Monitoring:   [x] Food and beverage intake   [x] Diet order   [x] Nutrition-focused physical findings   [x] Treatment/therapy   [] Weight   [] Enteral nutrition intake Previous Recommendations (for follow-up assessments only):     []   Implemented       []   Not Implemented (RD to address)      [] No Longer Appropriate     [] No Recommendation Made Discharge Planning:  Cardiac diet; consistency per SLP [x] Participated in care planning, discharge planning, & interdisciplinary rounds as appropriate Cori Cuevas RD Pager: [phone]

## 2019-03-20 NOTE — HOME CARE
Received HH referral, Discharge noted for today, [address] will follow for SN,PT,OT;pt's son Shelia Romeo) states his mom already has hosp bed,RW,BSC and W/C; [address] will follow. SUZETTE ARRIETA.

## 2019-03-20 NOTE — ROUTINE PROCESS
Bedside and Verbal shift change report given to Rochelle Mayen RN (oncoming nurse) by Nickolas Li RN (offgoing nurse). Report included the following information SBAR, Kardex, Intake/Output and MAR.

## 2019-03-20 NOTE — PROGRESS NOTES
Patient will: 1. Tolerate PO trials with 0 s/s overt distress in 4/5 trials - met 2. Utilize compensatory swallow strategies/maneuvers (decrease bite/sip, size/rate, alt. liq/sol) with min cues in 4/5 trials - met Recommend:  
Regular diet with thin liquids Meds as tolerated Assist with meals as needed Aspiration precautions HOB >45 degrees during all intake and for at least 30 min after po Small bites/sips, Slow rate of intake SPEECH LANGUAGE PATHOLOGY DYSPHAGIA TREATMENT/DISCHARGE Patient: Roseanna Jackson (80 y.o. female) Date: 3/20/2019 Diagnosis: Fever [R50.9] Diarrhea [R19.7] UTI (urinary tract infection) [N39.0] Sepsis ([address] [A41.9] UTI (urinary tract infection) [N39.0] <principal problem not specified> Precautions: aspiration ASSESSMENT: 
Pt was seen at bedside for follow up dysphagia management. Pt tolerated reg solids and thin liquids without any overt s/sx of aspiration and positive oral clearance. Mastication was minimally increased with reg solids with positive oral clearance. Laryngeal elevation was functional/timely to palpation. Continue to rec above stated diet recommendations with aspiration precautions, oral care TID, and meds as tolerated. No further skilled SLP services are indicated at this time. Please re-consult if needed. PLAN: 
Recommendations and Planned Interventions: See above Discharge Recommendations:  Andrey Ma and To Be Determined SUBJECTIVE:  
Patient stated Thank you honey. OBJECTIVE:  
Cognitive and Communication Status: 
Neurologic State: Alert Orientation Level: Oriented to person Cognition: Decreased command following Dysphagia Treatment: 
Oral Assessment: 
Oral Assessment Labial: No impairment Dentition: Intact Oral Hygiene: Good Lingual: No impairment Velum: No impairment Mandible: No impairment P.O. Trials: 
 Patient Position: 45 at [facility] Vocal quality prior to P.O.: No impairment Consistency Presented: Solid, Thin liquid How Presented: Self-fed/presented, Cup/sip, Straw, Successive swallows Bolus Acceptance: No impairment Bolus Formation/Control: Impaired Type of Impairment: Mastication Propulsion: Delayed (# of seconds) Oral Residue: None Initiation of Swallow: No impairment Laryngeal Elevation: Functional 
 Aspiration Signs/Symptoms: None Pharyngeal Phase Characteristics: Poor endurance Effective Modifications: Small sips and bites, Alternate liquids/solids Cues for Modifications: Minimal-moderate Oral Phase Severity: Mild Pharyngeal Phase Severity : None PAIN: 
Pt reports 0/10 pain or discomfort prior to tx. Pt reports 0/10 pain or discomfort post tx. After treatment:  
?              Patient left in no apparent distress sitting up in chair ? Patient left in no apparent distress in bed 
? Call bell left within reach ? Nursing notified ? Caregiver present ? Bed alarm activated COMMUNICATION/EDUCATION:  
?              SLP educated pt with regard to compensatory swallow strategies and 
      aspiration/reflux precautions including: small bites/sips, 
      alternate liquids/solids, decrease feeding rate, HOB > 45 with all po, and 
                 upright in bed at 30 degrees after po for at least 45 minutes. Thank you for this referral. 
 
Tania Faye M.S. CCC-SLP/L Speech-Language Pathologist

## 2019-03-20 NOTE — PROGRESS NOTES
Discharge order noted for today. Pt has been accepted to [facility] agency. Met with patient and son, [facility] via phone and are agreeable to the transition plan today. Son will transport home. Patient's discharge summary and home health  orders have been forwarded to [address] [place] [address] via [facility]. Updated bedside RN, Fauzia Katz,  to the transition plan. Discharge information has been documented on the AVS. Yvette Roberts, RN 
[phone]

## 2019-03-21 ENCOUNTER — PATIENT OUTREACH (OUTPATIENT)
Dept: CARDIOLOGY CLINIC | Age: 84
End: 2019-03-21

## 2019-03-21 ENCOUNTER — HOME CARE VISIT (OUTPATIENT)
Dept: SCHEDULING | Facility: HOME HEALTH | Age: 84
End: 2019-03-21
Payer: MEDICARE

## 2019-03-21 PROCEDURE — 400013 HH SOC

## 2019-03-21 PROCEDURE — 3331090001 HH PPS REVENUE CREDIT

## 2019-03-21 PROCEDURE — 3331090002 HH PPS REVENUE DEBIT

## 2019-03-21 PROCEDURE — G0299 HHS/HOSPICE OF RN EA 15 MIN: HCPCS

## 2019-03-22 ENCOUNTER — PATIENT OUTREACH (OUTPATIENT)
Dept: CARDIOLOGY CLINIC | Age: 84
End: 2019-03-22

## 2019-03-22 ENCOUNTER — HOME CARE VISIT (OUTPATIENT)
Dept: HOME HEALTH SERVICES | Facility: HOME HEALTH | Age: 84
End: 2019-03-22

## 2019-03-22 PROCEDURE — 3331090001 HH PPS REVENUE CREDIT

## 2019-03-22 PROCEDURE — 3331090002 HH PPS REVENUE DEBIT

## 2019-03-22 NOTE — PROGRESS NOTES
Heart Failure Follow Up Call NN contacted the patient's son, Belle Jesus, by telephone to perform CHF Follow Up. Verified  and address as identifiers. Pt's son reports that the patient is doing well. Daily Weight: Son reports pt unable to stand unaided for a daily weight. Zone:(Pt Reported)  green Goals  Knowledge and adherence medication (ie. action, side effects, missed dose, etc.) Caregiver to provide teach back on medications on/by 5/10/19.  Patient verbalizes understanding of self-management goals of living with Congestive Heart Failure Caregiver will verbalize heart failure zones and report red flags to physician/NN on/by 4/10/19.  Prevent complications post hospitalization. Caregiver to take or make arrangements for patient to follow up with appointments post hospitalization on/by 19. 
19 Patient went to ED on 19 for abdominal distension, discharged on same day with constipation, pt instructed to begin using Miralax for constipation. Needs addressed from pathway:   
24-48 Hours- or initial contact Review/Verification: 
? Identified care team 
? Disposition (Home) ? Reviewed DC Instructions, Education, and HF Zones ? Verified [address] in place. ? No DME needs at this time ? Advanced care planning not on file. Pt w/ dx of dementia Labs/Diagnostics to follow per MD office ? Follow-up apts are arranged. Assisted w/ arranging f/u Card appt. ? Transportation identified to be provided by the Son, Belle Jesus Med Rec ? Diuretic,  
? Beta Blocker, Baseline Labs available in EMR ? BMP 
? BUN/Creat. ? Hgb/Hct ? WBC 
 
MANNY Documentation:? Goals ? Challenges/Plan ? Weight   
? Edema ? Symptoms Education Disease Management: 
? Cardiac Low-sodium Diet (No added sodium; 1500mg as indicated). ? Educated on Fluid restriction of 1500 ml/day ? Comorbidity Management PCP/Specialist follow up:  
Future Appointments Date Time Provider Win Mukherjee 3/27/2019 12:45 PM Lizzy Tim MD [facility] SCHED  
3/28/2019 To Be Determined KAREEM Rosa  
4/4/2019 To Be Determined KAREEM Rosa Appt w/ PCP on 3/27/19 per pt's son. Son unable to complete Medication Reconciliation at this time, he is driving and not in the home. Will complete on next outreach. Cardiologist consult while IP: yes Palliative consult while IP:    No  
 
EF: 31-35%) on 12/28/19 Type of HF:   HFrEF Cardiac Device present: pacemaker Heart Failure Medications: Betablocker, Diuretic Disposition of patient:  Home, Home Health [facility] Services/Tele Monitoring:  Home Health/[facility] Social support: Family Does patient have an Advance Directive:  not on file, pt w/ documented dementia. Advance Directive scanned into patients chart:  No  
 
Patient reminded that there are physicians on call 24 hours a day / 7 days a week (M-F 5pm to 8am and from Friday 5pm until Monday 8a for the weekend) should the patient have questions or concerns. Patient reminded to call 911 if situation is emergent or patient feels the situation is emergent. Pt verbalizes understanding.

## 2019-03-23 LAB
BACTERIA SPEC CULT: NORMAL
BACTERIA SPEC CULT: NORMAL
SERVICE CMNT-IMP: NORMAL
SERVICE CMNT-IMP: NORMAL

## 2019-03-23 PROCEDURE — 3331090002 HH PPS REVENUE DEBIT

## 2019-03-23 PROCEDURE — A6212 FOAM DRG <=16 SQ IN W/BORDER: HCPCS

## 2019-03-23 PROCEDURE — 3331090001 HH PPS REVENUE CREDIT

## 2019-03-24 PROCEDURE — 3331090002 HH PPS REVENUE DEBIT

## 2019-03-24 PROCEDURE — 3331090001 HH PPS REVENUE CREDIT

## 2019-03-25 ENCOUNTER — HOME CARE VISIT (OUTPATIENT)
Dept: SCHEDULING | Facility: HOME HEALTH | Age: 84
End: 2019-03-25
Payer: MEDICARE

## 2019-03-25 LAB
ACID FAST STN SPEC: NEGATIVE
ACID FAST STN SPEC: NEGATIVE
MYCOBACTERIUM SPEC QL CULT: NEGATIVE
MYCOBACTERIUM SPEC QL CULT: NEGATIVE
O+P SPEC MICRO: NORMAL
O+P STL CONC: NORMAL
SPECIMEN PREPARATION: NORMAL
SPECIMEN PREPARATION: NORMAL
SPECIMEN SOURCE: NORMAL

## 2019-03-25 PROCEDURE — 3331090002 HH PPS REVENUE DEBIT

## 2019-03-25 PROCEDURE — G0152 HHCP-SERV OF OT,EA 15 MIN: HCPCS

## 2019-03-25 PROCEDURE — 3331090001 HH PPS REVENUE CREDIT

## 2019-03-25 PROCEDURE — G0151 HHCP-SERV OF PT,EA 15 MIN: HCPCS

## 2019-03-26 VITALS
HEART RATE: 71 BPM | SYSTOLIC BLOOD PRESSURE: 122 MMHG | TEMPERATURE: 97.5 F | OXYGEN SATURATION: 95 % | DIASTOLIC BLOOD PRESSURE: 60 MMHG

## 2019-03-26 VITALS
TEMPERATURE: 97.5 F | DIASTOLIC BLOOD PRESSURE: 60 MMHG | OXYGEN SATURATION: 95 % | HEART RATE: 71 BPM | SYSTOLIC BLOOD PRESSURE: 122 MMHG

## 2019-03-26 PROCEDURE — 3331090002 HH PPS REVENUE DEBIT

## 2019-03-26 PROCEDURE — 3331090001 HH PPS REVENUE CREDIT

## 2019-03-27 ENCOUNTER — HOME CARE VISIT (OUTPATIENT)
Dept: SCHEDULING | Facility: HOME HEALTH | Age: 84
End: 2019-03-27
Payer: MEDICARE

## 2019-03-27 ENCOUNTER — OFFICE VISIT (OUTPATIENT)
Dept: CARDIOLOGY CLINIC | Age: 84
End: 2019-03-27

## 2019-03-27 VITALS
BODY MASS INDEX: 29.02 KG/M2 | HEART RATE: 72 BPM | DIASTOLIC BLOOD PRESSURE: 54 MMHG | SYSTOLIC BLOOD PRESSURE: 115 MMHG | WEIGHT: 170 LBS | HEIGHT: 64 IN

## 2019-03-27 VITALS
OXYGEN SATURATION: 95 % | DIASTOLIC BLOOD PRESSURE: 78 MMHG | TEMPERATURE: 98.7 F | SYSTOLIC BLOOD PRESSURE: 124 MMHG | HEART RATE: 73 BPM

## 2019-03-27 VITALS
HEART RATE: 71 BPM | SYSTOLIC BLOOD PRESSURE: 130 MMHG | RESPIRATION RATE: 16 BRPM | OXYGEN SATURATION: 97 % | DIASTOLIC BLOOD PRESSURE: 72 MMHG

## 2019-03-27 DIAGNOSIS — Z95.0 S/P PLACEMENT OF CARDIAC PACEMAKER: ICD-10-CM

## 2019-03-27 DIAGNOSIS — I11.0 HYPERTENSIVE HEART DISEASE WITH CHRONIC DIASTOLIC CONGESTIVE HEART FAILURE (HCC): ICD-10-CM

## 2019-03-27 DIAGNOSIS — I50.22 CHRONIC SYSTOLIC CONGESTIVE HEART FAILURE (HCC): Primary | ICD-10-CM

## 2019-03-27 DIAGNOSIS — I48.92 PAROXYSMAL ATRIAL FLUTTER (HCC): ICD-10-CM

## 2019-03-27 DIAGNOSIS — I50.32 HYPERTENSIVE HEART DISEASE WITH CHRONIC DIASTOLIC CONGESTIVE HEART FAILURE (HCC): ICD-10-CM

## 2019-03-27 PROCEDURE — 3331090002 HH PPS REVENUE DEBIT

## 2019-03-27 PROCEDURE — 3331090001 HH PPS REVENUE CREDIT

## 2019-03-27 PROCEDURE — G0299 HHS/HOSPICE OF RN EA 15 MIN: HCPCS

## 2019-03-27 PROCEDURE — G0157 HHC PT ASSISTANT EA 15: HCPCS

## 2019-03-27 RX ORDER — TORSEMIDE 10 MG/1
10 TABLET ORAL DAILY
Qty: 30 TAB | Refills: 6 | Status: SHIPPED | OUTPATIENT
Start: 2019-03-27 | End: 2019-05-09

## 2019-03-27 RX ORDER — GLYCERIN ADULT
1 SUPPOSITORY, RECTAL RECTAL
COMMUNITY
End: 2019-03-29 | Stop reason: ALTCHOICE

## 2019-03-27 RX ORDER — POLYETHYLENE GLYCOL 3350 17 G/17G
17 POWDER, FOR SOLUTION ORAL DAILY
COMMUNITY

## 2019-03-27 NOTE — PROGRESS NOTES
HISTORY OF PRESENT ILLNESS Jos Robbins is a 80 y.o. female. Patient with  chf,had persistent junctional ryhtm ,atrial flutter , s/p pacemaker,feels better Sob better Admitted to hospital 1/2019 with acute CHF. Low ejection fraction with systolic heart failure. Feels less short of breath since discharge. 3/4/2019 - admitted to hospital 2/10/19 for NSTEMI , Anemia, CKD stage IV and chronic systolic CHF. Has not had diuretics since d/c and c/o increased edema to upper and lower extremties. 3/19/2019admitted to hospital for acute metabolic encephalopathy, sepsis, UTI. Hospital Follow Up The history is provided by the patient. Pertinent negatives include no chest pain, no abdominal pain, no headaches and no shortness of breath. Pacemaker Check The history is provided by the patient. This is a new problem. Pertinent negatives include no chest pain, no abdominal pain, no headaches and no shortness of breath. CHF The history is provided by the patient. This is a chronic problem. The problem occurs constantly. The problem has been gradually improving. Pertinent negatives include no chest pain, no abdominal pain, no headaches and no shortness of breath. The symptoms are aggravated by exertion. Palpitations The history is provided by the patient. This is a chronic problem. The problem occurs constantly. Associated symptoms include lower extremity edema. Pertinent negatives include no fever, no chest pain, no claudication, no orthopnea, no PND, no abdominal pain, no nausea, no vomiting, no headaches, no dizziness, no weakness, no cough, no hemoptysis, no shortness of breath and no sputum production. Her past medical history is significant for hypertension. Hypertension The history is provided by the patient. This is a chronic problem. The problem occurs constantly. The problem has not changed since onset. Pertinent negatives include no chest pain, no abdominal pain, no headaches and no shortness of breath. Shortness of Breath The history is provided by the patient. This is a recurrent problem. The problem occurs intermittently. The problem has been gradually improving. Associated symptoms include leg swelling. Pertinent negatives include no fever, no headaches, no cough, no sputum production, no hemoptysis, no wheezing, no PND, no orthopnea, no chest pain, no vomiting, no abdominal pain, no rash and no claudication. Precipitated by: exertion. Associated medical issues include heart failure. Leg Swelling The history is provided by the patient. This is a chronic problem. The problem occurs daily. The problem has been gradually improving. Pertinent negatives include no chest pain, no abdominal pain, no headaches and no shortness of breath. Review of Systems Constitutional: Negative for chills and fever. HENT: Negative for nosebleeds. Eyes: Negative for blurred vision and double vision. Respiratory: Negative for cough, hemoptysis, sputum production, shortness of breath and wheezing. Cardiovascular: Positive for leg swelling. Negative for chest pain, palpitations, orthopnea, claudication and PND. Gastrointestinal: Negative for abdominal pain, heartburn, nausea and vomiting. Musculoskeletal: Negative for myalgias. Skin: Negative for rash. Neurological: Negative for dizziness, weakness and headaches. Endo/Heme/Allergies: Does not bruise/bleed easily. Family History Problem Relation Age of Onset  Arrhythmia Neg Hx  Asthma Neg Hx  Clotting Disorder Neg Hx  Fainting Neg Hx   
 Heart Attack Neg Hx  High Cholesterol Neg Hx  Pacemaker Neg Hx  Stroke Neg Hx Past Medical History:  
Diagnosis Date  Acute on chronic diastolic heart failure ([address]  Arthritis  Benign hypertensive heart disease without heart failure Better controlled, stable  Chronic diastolic heart failure ([address] Breathing and edema is improving  Congestive heart failure [address] [address]  HTN (hypertension)  Hypercholesteremia  Lupus (systemic lupus erythematosus) ([facility] [address] 6/18/2014  
 followed by dr Thea Rodríguez  Obesity, unspecified Patient has weight loss Discussed diet ad fluid restriction  Other and unspecified hyperlipidemia F/u per pmd  
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  
Procedure Laterality Date  HX HYSTERECTOMY  PACEMAKER PROCEDURE No Known Allergies Current Outpatient Medications Medication Sig  
 glycerin, adult, suppository Insert 1 Suppository into rectum now.  polyethylene glycol (MIRALAX) 17 gram/dose powder Take 17 g by mouth daily.  torsemide (DEMADEX) 10 mg tablet Take 1 Tab by mouth daily.  amoxicillin (AMOXIL) 500 mg capsule Take 500 mg by mouth three (3) times daily.  aspirin delayed-release 81 mg tablet Take 81 mg by mouth daily.  hydroxychloroquine (PLAQUENIL) 200 mg tablet Take 200 mg by mouth two (2) times a day.  isosorbide mononitrate ER (IMDUR) 30 mg tablet Take 1 Tab by mouth every morning for 30 days.  metOLazone (ZAROXOLYN) 2.5 mg tablet Take 1 Tab by mouth daily.  OTHER Incentive Spirometry- Use as directed  OTHER Graded Compression Stockings b/l LE- use as directed  food supplemt, lactose-reduced (ENSURE ENLIVE) 0.08 gram-1.5 kcal/mL liqd Take 1 Bottle by mouth two (2) times a day.  menthol-zinc oxide (CALMOSEPTINE) 0.44-20.6 % oint Apply  to affected area.  carvedilol (COREG) 3.125 mg tablet Take 1 Tab by mouth every twelve (12) hours.  acetaminophen (TYLENOL ARTHRITIS PAIN) 650 mg TbER Take 1 Tab by mouth every eight (8) hours.  amiodarone (CORDARONE) 200 mg tablet TAKE ONE TABLET EVERY DAY (MAKE AN APPT) (Patient taking differently: TAKE ONE TABLET EVERY DAY )  memantine (NAMENDA) 10 mg tablet Take 10 mg by mouth two (2) times a day.  amitriptyline (ELAVIL) 25 mg tablet Take 25 mg by mouth nightly. No current facility-administered medications for this visit. Visit Vitals /54 Pulse 72 Ht 5' 4\" (1.626 m) Wt 77.1 kg (170 lb) Comment: Verbal weight BMI 29.18 kg/m² Physical Exam  
Constitutional: She is oriented to person, place, and time. She appears well-developed and well-nourished. HENT:  
Head: Normocephalic and atraumatic. Eyes: Conjunctivae are normal.  
Neck: Neck supple. No JVD present. No tracheal deviation present. No thyromegaly present. Cardiovascular: Normal rate and regular rhythm. PMI is not displaced. Exam reveals no gallop and no decreased pulses. Murmur heard. Holosystolic murmur is present at the lower left sternal border. Pulmonary/Chest: No respiratory distress. She has no wheezes. She has no rales. She exhibits no tenderness. Abdominal: Soft. There is no tenderness. Musculoskeletal: She exhibits edema (3+ BLE edema - extending to thighs, BL upper ext edema). Neurological: She is alert and oriented to person, place, and time. Skin: Skin is warm. Psychiatric: She has a normal mood and affect. No flowsheet data found. Ms. Nabila Rodriguez has a reminder for a \"due or due soon\" health maintenance. I have asked that she contact her primary care provider for follow-up on this health maintenance. SUMMARY:4/2014 Left ventricle: Ejection fraction was estimated to be 60 %. No obvious 
wall motion abnormalities identified in the views obtained. There was mild 
concentric hypertrophy. Features were consistent with a pseudonormal left 
ventricular filling pattern, with concomitant abnormal relaxation and 
increased filling pressure (grade 2 diastolic dysfunction). Tricuspid valve: There was moderate regurgitation. Pulmonary artery 
systolic pressure: 50 mmHg. 11/2015:stress test 
1. Normal perfusion scan. 2. Normal wall motion and ejection fraction. 3. Gated images reveal normal wall motion and ejection fraction is 
calculated at 59%. 12/07/2015 Pacer check Noted with A Flutter. D/w daughter on phone 7/2019 Pacemaker check normal function. No A. fib. Atrial heart rate up to 159. Interpretation Summary 12/2018 · Technically difficult study due to patient compliance. Unable to obtain on-axis apical images. Good parasternals, poor subcostal images. · Left ventricular moderate-to-severely decreased global systolic function. Estimated left ventricular ejection fraction is 31 - 35%. Visually measured ejection fraction. Left ventricular severe sigmoid septum hypertrophy. Abnormal left ventricular wall motion as described on the wall scoring diagram below. Abnormal left ventricular septal motion. Interventricular septal \"bounce\". Severe (grade 3) left ventricular diastolic dysfunction. · Moderate tricuspid valve regurgitation is present. Mild pulmonary hypertension is present. PASP 38mmHg · Mild aortic valve regurgitation is present. · Mild to moderate mitral valve regurgitation. Assessment ICD-10-CM ICD-9-CM 1. Chronic systolic congestive heart failure (HCC) I50.22 428.22   
  428.0 Increased weight since discharge. I have restarted Demadex 10 mg a day continue with metolazone. Limited activity 2. Hypertensive heart disease with chronic diastolic congestive heart failure (HCC) I11.0 402.91 I50.32 428.32 Blood pressure is controlled continue treatment not significant medication needed at this point 3. Paroxysmal atrial flutter (HCC) I48.92 427.32 Stable continue amiodarone. On aspirin 4. S/P placement of cardiac pacemaker Z95.0 V45.01 Stable  
continue amiodarone to maintain sr,risk of anticoagulation high and would not anticoagulate-discussed with family I have discussed risk benefit and option of use of amiodarone for arrythmia. Risk of toxicity with medication are informed. Patient will require careful monitoring. Lasix and metolazone used as needed- 
1/2019 Recent admission with CHF. Continue to monitor. Due to dementia would not be a candidate for ICD upgrade. Patient has increased edema since discharge. I have increase Lasix from 20 mg to 40 mg 1-2 tablets a day. Patient currently nursing home 3/4/2019 - Recent admission for acute systolic CHF and CKD with hypotension. She has had increased edema since discharge and has not had diuretics in 3 weeks. Begin Demadex 20 mg and Metolazone 2.5 mg/ day. Have BMP drawn in 1 weeks. F/U in our office in 2 weeks - to ER for new or worsening symptoms. All discussed with daughter and son in room. Medications Discontinued During This Encounter Medication Reason  aspirin delayed-release 81 mg tablet Duplicate Order  OTHER Not A Current Medication  docusate sodium (COLACE) 100 mg capsule Not A Current Medication  raNITIdine (ZANTAC) 75 mg tab Not A Current Medication Orders Placed This Encounter  torsemide (DEMADEX) 10 mg tablet Sig: Take 1 Tab by mouth daily. Dispense:  30 Tab Refill:  6 Follow-up and Dispositions · Return in about 3 months (around 6/27/2019).  
  
 
 
Eugenia Reid MD

## 2019-03-28 ENCOUNTER — HOME CARE VISIT (OUTPATIENT)
Dept: SCHEDULING | Facility: HOME HEALTH | Age: 84
End: 2019-03-28
Payer: MEDICARE

## 2019-03-28 PROCEDURE — G0158 HHC OT ASSISTANT EA 15: HCPCS

## 2019-03-28 PROCEDURE — 3331090001 HH PPS REVENUE CREDIT

## 2019-03-28 PROCEDURE — 3331090002 HH PPS REVENUE DEBIT

## 2019-03-29 ENCOUNTER — PATIENT OUTREACH (OUTPATIENT)
Dept: CARDIOLOGY CLINIC | Age: 84
End: 2019-03-29

## 2019-03-29 VITALS
SYSTOLIC BLOOD PRESSURE: 115 MMHG | DIASTOLIC BLOOD PRESSURE: 57 MMHG | TEMPERATURE: 98.1 F | OXYGEN SATURATION: 92 % | HEART RATE: 73 BPM

## 2019-03-29 PROCEDURE — 3331090002 HH PPS REVENUE DEBIT

## 2019-03-29 PROCEDURE — 3331090001 HH PPS REVENUE CREDIT

## 2019-03-29 NOTE — PROGRESS NOTES
NN contacted the patient's son  by telephone to perform CHF follow Up. Noted Priorities/Plan:  Return to baseline, set up earlier cardiology appt Daily Weight: unable to take per son Zone: green Signs/Symptoms: Edema none; SOB none; orthopnea none Goals  Knowledge and adherence medication (ie. action, side effects, missed dose, etc.) Caregiver to provide teach back on medications on/by 5/10/19.  Patient verbalizes understanding of self-management goals of living with Congestive Heart Failure Caregiver will verbalize heart failure zones and report red flags to physician/NN on/by 4/10/19.  Prevent complications post hospitalization. Caregiver to take or make arrangements for patient to follow up with appointments post hospitalization on/by 2/28/19. 
2/18/19 Patient went to ED on 2/13/19 for abdominal distension, discharged on same day with constipation, pt instructed to begin using Miralax for constipation. Needs addressed from pathway:  
Week 1-4 Provide Daily Disease Management 
(NN initiated) Reviewed Daily Zone Identification (symptom management; weight, edema, SOB, activity/sleep changes)-notify provider immediately as indicated ? Reviewed Cardiac Low-sodium Diet (No added sodium; 1500mg as indicated). ? Reviewed Fluid restriction ? Confirm follow-up appointments/transportation. Additional assessment ? Reviewed Fall precautions ? Activity tolerance assessment: per son, returning to normal 
(eg: Vital signs; level of consciousness; dyspnea on exertion; pillow usage; recliner vs bed) ? Discussed Energy conservation management (balance activity with rest) ? Labs/diagnostics per md office ? Medication Therapy: no issues per son, reviewed changes from md office visit ? Son reports normal Diet/appetite assessment ? Discussed appropriate ED/Hospital utilization ? Immunizations not up to date ? No Home modification needs ? No Transportation  Assistance needs ? No Medication assistance needs ? Home health services in place Psychosocial: Reassurance/emotional support Monitoring: ? Home health Education: 
? Support system identification ( eg: Caregiver, meal planning, community resources, family, friends, [place], support group) ? Health literacy for heart failure ? Caregiver education and preparation Have you been to an ER/Hospital since discharge from [facility]? No   
 
Have you followed up with PCP/Cardiologist/Specialist?  
F/u w/ cardiologist DANIELLE Yoo On 3/27/19. Demadex 20 mg started. Take w/ metolazone 2.5 mg per day. Have labs drawn next week. Next appt was set at 6/24/19. Called clinic to see if it could be done earlier. Earliest available is 5/8/19 at 0815. Son notified. Transportation:  Son 
Diet: Cardiac/no salt added Activity/ADLs: w/ assistance from family. Medications Reconciled at this time:  yes Home health:  Company/Completion: [facility] Social Support: family (son/daughter) Advanced Care Plan: pt w/ documented dementia. Patient reminded that there is a physician on call 24 hours a day / 7 days a week (M-F 5pm to 8am and from Friday 5pm until Monday 8a for the weekend) should the patient have questions or concerns. Patient reminded to call 911 if situation is emergent or patient feels the situation is emergent. Pt verbalizes understanding.

## 2019-03-30 PROCEDURE — 3331090002 HH PPS REVENUE DEBIT

## 2019-03-30 PROCEDURE — 3331090001 HH PPS REVENUE CREDIT

## 2019-03-31 PROCEDURE — 3331090001 HH PPS REVENUE CREDIT

## 2019-03-31 PROCEDURE — 3331090002 HH PPS REVENUE DEBIT

## 2019-04-01 ENCOUNTER — PATIENT OUTREACH (OUTPATIENT)
Dept: CARDIOLOGY CLINIC | Age: 84
End: 2019-04-01

## 2019-04-01 ENCOUNTER — HOME CARE VISIT (OUTPATIENT)
Dept: SCHEDULING | Facility: HOME HEALTH | Age: 84
End: 2019-04-01
Payer: MEDICARE

## 2019-04-01 PROCEDURE — 3331090002 HH PPS REVENUE DEBIT

## 2019-04-01 PROCEDURE — G0157 HHC PT ASSISTANT EA 15: HCPCS

## 2019-04-01 PROCEDURE — 3331090001 HH PPS REVENUE CREDIT

## 2019-04-01 NOTE — PROGRESS NOTES
NN contacted the patient's son  by telephone to perform CHF follow Up. Noted Priorities/Plan:  Return to baseline, set up earlier cardiology appt Daily Weight: per son, patient is unable to stand at this time due to patient being unstable and he doesn't feel safe. Called [facility] who is currently visiting the patient multiple times a week to see if they could include weighing the patient on their visits.  stated they would pass it on to the patient's care team.  Will continue to follow. Zone: green Signs/Symptoms: Edema none; SOB none; orthopnea none Goals  Knowledge and adherence medication (ie. action, side effects, missed dose, etc.) Caregiver to provide teach back on medications on/by 5/10/19.  Patient verbalizes understanding of self-management goals of living with Congestive Heart Failure Caregiver will verbalize heart failure zones and report red flags to physician/NN on/by 4/10/19.  Prevent complications post hospitalization. Caregiver to take or make arrangements for patient to follow up with appointments post hospitalization on/by 2/28/19. 
2/18/19 Patient went to ED on 2/13/19 for abdominal distension, discharged on same day with constipation, pt instructed to begin using Miralax for constipation. Needs addressed from pathway:  
Week 2 Provide Daily Disease Management 
(NN initiated) Reviewed Daily Zone Identification (symptom management; weight, edema, SOB, activity/sleep changes)-notify provider immediately as indicated ? Reviewed Cardiac Low-sodium Diet (No added sodium; 1500mg as indicated). ? Reviewed Fluid restriction ? Confirmed follow-up appointments/transportation. Additional assessment ? Reviewed Fall precautions ? Activity tolerance assessment: per son, returning to normal 
(eg: Vital signs; level of consciousness; dyspnea on exertion; pillow usage; recliner vs bed) ? Discussed Energy conservation management (balance activity with rest) ? Labs/diagnostics per md office ? Medication Therapy: no issues per son, reviewed changes from md office visit again ? Son reports normal Diet/appetite assessment ? Discussed appropriate ED/Hospital utilization ? Immunizations not up to date ? No Home modification needs ? No Transportation  Assistance needs ? No Medication assistance needs ? Home health services in place Psychosocial: Reassurance/emotional support Monitoring: ? Home health Education: 
? Support system identification ( eg: Caregiver, meal planning, community resources, family, friends, Protestant, support group) ? Health literacy for heart failure ? Caregiver education and preparation Have you been to an ER/Hospital since discharge from [facility]? No   
 
Have you followed up with PCP/Cardiologist/Specialist?  
 
F/u w/ cardiologist DANIELLE Parrish On 3/27/19. Demadex 20 mg started. Take w/ metolazone 2.5 mg per day. Have labs drawn next week. Next appt 5/8/19 at . [facility] [address] Transportation:  Son 
Diet: Cardiac/no salt added Activity/ADLs: w/ assistance from family. Medications Reconciled at this time:  refused by son at this time Home health:  Company/Completion: [facility] Social Support: family (son/daughter) Advanced Care Plan: pt w/ documented dementia. Patient reminded that there is a physician on call 24 hours a day / 7 days a week (M-F 5pm to 8am and from Friday 5pm until Monday 8a for the weekend) should the patient have questions or concerns. Patient reminded to call 911 if situation is emergent or patient feels the situation is emergent. Pt verbalizes understanding.

## 2019-04-02 ENCOUNTER — HOME CARE VISIT (OUTPATIENT)
Dept: SCHEDULING | Facility: HOME HEALTH | Age: 84
End: 2019-04-02
Payer: MEDICARE

## 2019-04-02 VITALS
OXYGEN SATURATION: 94 % | DIASTOLIC BLOOD PRESSURE: 60 MMHG | SYSTOLIC BLOOD PRESSURE: 110 MMHG | TEMPERATURE: 98.6 F | HEART RATE: 70 BPM

## 2019-04-02 PROCEDURE — 3331090002 HH PPS REVENUE DEBIT

## 2019-04-02 PROCEDURE — 3331090001 HH PPS REVENUE CREDIT

## 2019-04-03 ENCOUNTER — HOME CARE VISIT (OUTPATIENT)
Dept: SCHEDULING | Facility: HOME HEALTH | Age: 84
End: 2019-04-03
Payer: MEDICARE

## 2019-04-03 VITALS
HEART RATE: 82 BPM | DIASTOLIC BLOOD PRESSURE: 62 MMHG | SYSTOLIC BLOOD PRESSURE: 120 MMHG | OXYGEN SATURATION: 96 % | RESPIRATION RATE: 16 BRPM

## 2019-04-03 PROCEDURE — G0299 HHS/HOSPICE OF RN EA 15 MIN: HCPCS

## 2019-04-03 PROCEDURE — 3331090002 HH PPS REVENUE DEBIT

## 2019-04-03 PROCEDURE — G0157 HHC PT ASSISTANT EA 15: HCPCS

## 2019-04-03 PROCEDURE — 3331090001 HH PPS REVENUE CREDIT

## 2019-04-04 ENCOUNTER — HOME CARE VISIT (OUTPATIENT)
Dept: SCHEDULING | Facility: HOME HEALTH | Age: 84
End: 2019-04-04
Payer: MEDICARE

## 2019-04-04 ENCOUNTER — OFFICE VISIT (OUTPATIENT)
Dept: CARDIOLOGY CLINIC | Age: 84
End: 2019-04-04

## 2019-04-04 VITALS
SYSTOLIC BLOOD PRESSURE: 140 MMHG | HEIGHT: 64 IN | HEART RATE: 74 BPM | DIASTOLIC BLOOD PRESSURE: 65 MMHG | BODY MASS INDEX: 29.18 KG/M2

## 2019-04-04 VITALS
SYSTOLIC BLOOD PRESSURE: 90 MMHG | OXYGEN SATURATION: 96 % | DIASTOLIC BLOOD PRESSURE: 60 MMHG | TEMPERATURE: 98 F | HEART RATE: 70 BPM

## 2019-04-04 DIAGNOSIS — I11.0 HYPERTENSIVE HEART DISEASE WITH CHRONIC DIASTOLIC CONGESTIVE HEART FAILURE (HCC): ICD-10-CM

## 2019-04-04 DIAGNOSIS — I50.32 HYPERTENSIVE HEART DISEASE WITH CHRONIC DIASTOLIC CONGESTIVE HEART FAILURE (HCC): ICD-10-CM

## 2019-04-04 DIAGNOSIS — I50.22 CHRONIC SYSTOLIC CONGESTIVE HEART FAILURE (HCC): Primary | ICD-10-CM

## 2019-04-04 DIAGNOSIS — Z95.0 S/P PLACEMENT OF CARDIAC PACEMAKER: ICD-10-CM

## 2019-04-04 DIAGNOSIS — I48.92 PAROXYSMAL ATRIAL FLUTTER (HCC): ICD-10-CM

## 2019-04-04 PROCEDURE — 3331090001 HH PPS REVENUE CREDIT

## 2019-04-04 PROCEDURE — G0158 HHC OT ASSISTANT EA 15: HCPCS

## 2019-04-04 PROCEDURE — 3331090002 HH PPS REVENUE DEBIT

## 2019-04-04 NOTE — PROGRESS NOTES
HISTORY OF PRESENT ILLNESS Justina Holden is a 80 y.o. female. Patient with  chf,had persistent junctional ryhtm ,atrial flutter , s/p pacemaker,feels better Sob better Admitted to hospital 1/2019 with acute CHF. Low ejection fraction with systolic heart failure. Feels less short of breath since discharge. 3/4/2019 - admitted to hospital 2/10/19 for NSTEMI , Anemia, CKD stage IV and chronic systolic CHF. Has not had diuretics since d/c and c/o increased edema to upper and lower extremties. 4/2/2019was in emergency room with UTI and metabolic encephalopathy. Feels much better today. More alert. Denies any shortness of breath. Edema is better except for right upper extremity. 3/19/2019-admitted to hospital for acute metabolic encephalopathy, sepsis, UTI. Hospital Follow Up The history is provided by the patient. Pertinent negatives include no chest pain, no abdominal pain, no headaches and no shortness of breath. Pacemaker Check The history is provided by the patient. This is a new problem. Pertinent negatives include no chest pain, no abdominal pain, no headaches and no shortness of breath. CHF The history is provided by the patient. This is a chronic problem. The problem occurs constantly. The problem has been gradually improving. Pertinent negatives include no chest pain, no abdominal pain, no headaches and no shortness of breath. The symptoms are aggravated by exertion. Palpitations The history is provided by the patient. This is a chronic problem. The problem occurs constantly. Associated symptoms include lower extremity edema. Pertinent negatives include no fever, no chest pain, no claudication, no orthopnea, no PND, no abdominal pain, no nausea, no vomiting, no headaches, no dizziness, no weakness, no cough, no hemoptysis, no shortness of breath and no sputum production. Her past medical history is significant for hypertension. Hypertension The history is provided by the patient. This is a chronic problem. The problem occurs constantly. The problem has not changed since onset. Pertinent negatives include no chest pain, no abdominal pain, no headaches and no shortness of breath. Shortness of Breath The history is provided by the patient. This is a recurrent problem. The problem occurs intermittently. The problem has been gradually improving. Associated symptoms include leg swelling. Pertinent negatives include no fever, no headaches, no cough, no sputum production, no hemoptysis, no wheezing, no PND, no orthopnea, no chest pain, no vomiting, no abdominal pain, no rash and no claudication. Precipitated by: exertion. Associated medical issues include heart failure. Leg Swelling The history is provided by the patient. This is a chronic problem. The problem occurs daily. The problem has been gradually improving. Pertinent negatives include no chest pain, no abdominal pain, no headaches and no shortness of breath. Review of Systems Constitutional: Negative for chills and fever. HENT: Negative for nosebleeds. Eyes: Negative for blurred vision and double vision. Respiratory: Negative for cough, hemoptysis, sputum production, shortness of breath and wheezing. Cardiovascular: Positive for leg swelling. Negative for chest pain, palpitations, orthopnea, claudication and PND. Gastrointestinal: Negative for abdominal pain, heartburn, nausea and vomiting. Musculoskeletal: Negative for myalgias. Skin: Negative for rash. Neurological: Negative for dizziness, weakness and headaches. Endo/Heme/Allergies: Does not bruise/bleed easily. Family History Problem Relation Age of Onset  Arrhythmia Neg Hx  Asthma Neg Hx  Clotting Disorder Neg Hx  Fainting Neg Hx   
 Heart Attack Neg Hx  High Cholesterol Neg Hx  Pacemaker Neg Hx  Stroke Neg Hx Past Medical History:  
Diagnosis Date  Acute on chronic diastolic heart failure ([facility] [address]  Arthritis  Benign hypertensive heart disease without heart failure Better controlled, stable  Chronic diastolic heart failure ([facility] [address] Breathing and edema is improving  Congestive heart failure ([facility] [address]  HTN (hypertension)  Hypercholesteremia  Lupus (systemic lupus erythematosus) ([facility] [address] 6/18/2014  
 followed by dr Precious Bowman  Obesity, unspecified Patient has weight loss Discussed diet ad fluid restriction  Other and unspecified hyperlipidemia F/u per pmd  
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  
Procedure Laterality Date  HX HYSTERECTOMY  PACEMAKER PROCEDURE No Known Allergies Current Outpatient Medications Medication Sig  polyethylene glycol (MIRALAX) 17 gram/dose powder Take 17 g by mouth daily.  torsemide (DEMADEX) 10 mg tablet Take 1 Tab by mouth daily. (Patient taking differently: Take 20 mg by mouth daily.)  aspirin delayed-release 81 mg tablet Take 81 mg by mouth daily.  isosorbide mononitrate ER (IMDUR) 30 mg tablet Take 1 Tab by mouth every morning for 30 days.  metOLazone (ZAROXOLYN) 2.5 mg tablet Take 1 Tab by mouth daily.  OTHER Incentive Spirometry- Use as directed  OTHER Graded Compression Stockings b/l LE- use as directed  food supplemt, lactose-reduced (ENSURE ENLIVE) 0.08 gram-1.5 kcal/mL liqd Take 1 Bottle by mouth two (2) times a day.  menthol-zinc oxide (CALMOSEPTINE) 0.44-20.6 % oint Apply  to affected area.  carvedilol (COREG) 3.125 mg tablet Take 1 Tab by mouth every twelve (12) hours.  acetaminophen (TYLENOL ARTHRITIS PAIN) 650 mg TbER Take 1 Tab by mouth every eight (8) hours.  amiodarone (CORDARONE) 200 mg tablet TAKE ONE TABLET EVERY DAY (MAKE AN APPT) (Patient taking differently: TAKE ONE TABLET EVERY DAY )  memantine (NAMENDA) 10 mg tablet Take 10 mg by mouth two (2) times a day.  amitriptyline (ELAVIL) 25 mg tablet Take 25 mg by mouth nightly.  amoxicillin (AMOXIL) 500 mg capsule Take 500 mg by mouth three (3) times daily.  hydroxychloroquine (PLAQUENIL) 200 mg tablet Take 200 mg by mouth two (2) times a day. No current facility-administered medications for this visit. Visit Vitals /65 Pulse 74 Ht 5' 4\" (1.626 m) BMI 29.18 kg/m² Physical Exam  
Constitutional: She is oriented to person, place, and time. She appears well-developed and well-nourished. HENT:  
Head: Normocephalic and atraumatic. Eyes: Conjunctivae are normal.  
Neck: Neck supple. No JVD present. No tracheal deviation present. No thyromegaly present. Cardiovascular: Normal rate and regular rhythm. PMI is not displaced. Exam reveals no gallop and no decreased pulses. Murmur heard. Holosystolic murmur is present at the lower left sternal border. Pulmonary/Chest: No respiratory distress. She has no wheezes. She has no rales. She exhibits no tenderness. Abdominal: Soft. There is no tenderness. Musculoskeletal: She exhibits edema (3+ BLE edema - extending to thighs, BL upper ext edema). Neurological: She is alert and oriented to person, place, and time. Skin: Skin is warm. Psychiatric: She has a normal mood and affect. No flowsheet data found. Ms. Mary Cheek has a reminder for a \"due or due soon\" health maintenance. I have asked that she contact her primary care provider for follow-up on this health maintenance. SUMMARY:4/2014 Left ventricle: Ejection fraction was estimated to be 60 %. No obvious 
wall motion abnormalities identified in the views obtained. There was mild 
concentric hypertrophy. Features were consistent with a pseudonormal left 
ventricular filling pattern, with concomitant abnormal relaxation and 
increased filling pressure (grade 2 diastolic dysfunction). Tricuspid valve: There was moderate regurgitation. Pulmonary artery 
systolic pressure: 50 mmHg. 11/2015:stress test 
1. Normal perfusion scan. 2. Normal wall motion and ejection fraction. 3. Gated images reveal normal wall motion and ejection fraction is 
calculated at 59%. 12/07/2015 Pacer check Noted with A Flutter. D/w daughter on phone 7/2019 Pacemaker check normal function. No A. fib. Atrial heart rate up to 159. Interpretation Summary 12/2018 · Technically difficult study due to patient compliance. Unable to obtain on-axis apical images. Good parasternals, poor subcostal images. · Left ventricular moderate-to-severely decreased global systolic function. Estimated left ventricular ejection fraction is 31 - 35%. Visually measured ejection fraction. Left ventricular severe sigmoid septum hypertrophy. Abnormal left ventricular wall motion as described on the wall scoring diagram below. Abnormal left ventricular septal motion. Interventricular septal \"bounce\". Severe (grade 3) left ventricular diastolic dysfunction. · Moderate tricuspid valve regurgitation is present. Mild pulmonary hypertension is present. PASP 38mmHg · Mild aortic valve regurgitation is present. · Mild to moderate mitral valve regurgitation. Assessment ICD-10-CM ICD-9-CM 1. Chronic systolic congestive heart failure (HCC) I50.22 428.22   
  428.0 Stable. Edema better. Shortness of breath is better patient more alert. Right upper arm swelling with negative DVT study 2/2019 2. Hypertensive heart disease with chronic diastolic congestive heart failure (HCC) I11.0 402.91 I50.32 428.32 Stable continue treatment 3. Paroxysmal atrial flutter (HCC) I48.92 427.32 Stable. 4. S/P placement of cardiac pacemaker Z95.0 V45.01 Function 1/2019  
continue amiodarone to maintain sr,risk of anticoagulation high and would not anticoagulate-discussed with family I have discussed risk benefit and option of use of amiodarone for arrythmia. Risk of toxicity with medication are informed. Patient will require careful monitoring. Lasix and metolazone used as needed- 
1/2019 Recent admission with CHF. Continue to monitor. Due to dementia would not be a candidate for ICD upgrade. Patient has increased edema since discharge. I have increase Lasix from 20 mg to 40 mg 1-2 tablets a day. Patient currently nursing home 3/4/2019 - Recent admission for acute systolic CHF and CKD with hypotension. She has had increased edema since discharge and has not had diuretics in 3 weeks. Begin Demadex 20 mg and Metolazone 2.5 mg/ day. Have BMP drawn in 1 weeks. F/U in our office in 2 weeks - to ER for new or worsening symptoms. All discussed with daughter and son in room. There are no discontinued medications. No orders of the defined types were placed in this encounter.  
 
 
 
 
Natalio Hernandez MD

## 2019-04-05 VITALS — DIASTOLIC BLOOD PRESSURE: 52 MMHG | OXYGEN SATURATION: 96 % | SYSTOLIC BLOOD PRESSURE: 136 MMHG | HEART RATE: 72 BPM

## 2019-04-05 PROCEDURE — 3331090001 HH PPS REVENUE CREDIT

## 2019-04-05 PROCEDURE — 3331090002 HH PPS REVENUE DEBIT

## 2019-04-06 PROCEDURE — 3331090001 HH PPS REVENUE CREDIT

## 2019-04-06 PROCEDURE — 3331090002 HH PPS REVENUE DEBIT

## 2019-04-07 PROCEDURE — 3331090001 HH PPS REVENUE CREDIT

## 2019-04-07 PROCEDURE — 3331090002 HH PPS REVENUE DEBIT

## 2019-04-08 ENCOUNTER — HOME CARE VISIT (OUTPATIENT)
Dept: SCHEDULING | Facility: HOME HEALTH | Age: 84
End: 2019-04-08
Payer: MEDICARE

## 2019-04-08 PROCEDURE — 3331090001 HH PPS REVENUE CREDIT

## 2019-04-08 PROCEDURE — 3331090002 HH PPS REVENUE DEBIT

## 2019-04-08 PROCEDURE — G0157 HHC PT ASSISTANT EA 15: HCPCS

## 2019-04-09 VITALS
TEMPERATURE: 97.3 F | OXYGEN SATURATION: 94 % | HEART RATE: 71 BPM | DIASTOLIC BLOOD PRESSURE: 60 MMHG | SYSTOLIC BLOOD PRESSURE: 102 MMHG

## 2019-04-09 PROCEDURE — 3331090001 HH PPS REVENUE CREDIT

## 2019-04-09 PROCEDURE — 3331090002 HH PPS REVENUE DEBIT

## 2019-04-10 ENCOUNTER — HOME CARE VISIT (OUTPATIENT)
Dept: SCHEDULING | Facility: HOME HEALTH | Age: 84
End: 2019-04-10
Payer: MEDICARE

## 2019-04-10 ENCOUNTER — HOME CARE VISIT (OUTPATIENT)
Dept: HOME HEALTH SERVICES | Facility: HOME HEALTH | Age: 84
End: 2019-04-10
Payer: MEDICARE

## 2019-04-10 PROCEDURE — 3331090002 HH PPS REVENUE DEBIT

## 2019-04-10 PROCEDURE — 3331090001 HH PPS REVENUE CREDIT

## 2019-04-11 VITALS
TEMPERATURE: 97.3 F | OXYGEN SATURATION: 95 % | DIASTOLIC BLOOD PRESSURE: 62 MMHG | SYSTOLIC BLOOD PRESSURE: 115 MMHG | HEART RATE: 75 BPM

## 2019-04-11 PROCEDURE — 3331090002 HH PPS REVENUE DEBIT

## 2019-04-11 PROCEDURE — 3331090001 HH PPS REVENUE CREDIT

## 2019-04-12 ENCOUNTER — HOME CARE VISIT (OUTPATIENT)
Dept: SCHEDULING | Facility: HOME HEALTH | Age: 84
End: 2019-04-12
Payer: MEDICARE

## 2019-04-12 ENCOUNTER — PATIENT OUTREACH (OUTPATIENT)
Dept: CARDIOLOGY CLINIC | Age: 84
End: 2019-04-12

## 2019-04-12 VITALS
TEMPERATURE: 96.3 F | HEART RATE: 70 BPM | OXYGEN SATURATION: 95 % | DIASTOLIC BLOOD PRESSURE: 66 MMHG | SYSTOLIC BLOOD PRESSURE: 110 MMHG

## 2019-04-12 PROCEDURE — G0152 HHCP-SERV OF OT,EA 15 MIN: HCPCS

## 2019-04-12 PROCEDURE — G0157 HHC PT ASSISTANT EA 15: HCPCS

## 2019-04-12 PROCEDURE — 3331090002 HH PPS REVENUE DEBIT

## 2019-04-12 PROCEDURE — 3331090001 HH PPS REVENUE CREDIT

## 2019-04-13 PROCEDURE — 3331090001 HH PPS REVENUE CREDIT

## 2019-04-13 PROCEDURE — 3331090002 HH PPS REVENUE DEBIT

## 2019-04-14 VITALS
OXYGEN SATURATION: 94 % | SYSTOLIC BLOOD PRESSURE: 100 MMHG | TEMPERATURE: 97.8 F | HEART RATE: 70 BPM | DIASTOLIC BLOOD PRESSURE: 58 MMHG

## 2019-04-14 PROCEDURE — 3331090001 HH PPS REVENUE CREDIT

## 2019-04-14 PROCEDURE — 3331090002 HH PPS REVENUE DEBIT

## 2019-04-15 ENCOUNTER — HOME CARE VISIT (OUTPATIENT)
Dept: SCHEDULING | Facility: HOME HEALTH | Age: 84
End: 2019-04-15
Payer: MEDICARE

## 2019-04-15 ENCOUNTER — HOME CARE VISIT (OUTPATIENT)
Dept: HOME HEALTH SERVICES | Facility: HOME HEALTH | Age: 84
End: 2019-04-15
Payer: MEDICARE

## 2019-04-15 VITALS
HEART RATE: 74 BPM | OXYGEN SATURATION: 95 % | SYSTOLIC BLOOD PRESSURE: 110 MMHG | DIASTOLIC BLOOD PRESSURE: 58 MMHG | TEMPERATURE: 98.4 F

## 2019-04-15 PROCEDURE — 3331090001 HH PPS REVENUE CREDIT

## 2019-04-15 PROCEDURE — G0151 HHCP-SERV OF PT,EA 15 MIN: HCPCS

## 2019-04-15 PROCEDURE — 3331090002 HH PPS REVENUE DEBIT

## 2019-04-16 PROCEDURE — 3331090001 HH PPS REVENUE CREDIT

## 2019-04-16 PROCEDURE — 3331090002 HH PPS REVENUE DEBIT

## 2019-04-17 ENCOUNTER — HOME CARE VISIT (OUTPATIENT)
Dept: HOME HEALTH SERVICES | Facility: HOME HEALTH | Age: 84
End: 2019-04-17
Payer: MEDICARE

## 2019-04-17 ENCOUNTER — HOME CARE VISIT (OUTPATIENT)
Dept: SCHEDULING | Facility: HOME HEALTH | Age: 84
End: 2019-04-17
Payer: MEDICARE

## 2019-04-17 VITALS
SYSTOLIC BLOOD PRESSURE: 131 MMHG | DIASTOLIC BLOOD PRESSURE: 65 MMHG | OXYGEN SATURATION: 95 % | HEART RATE: 75 BPM | TEMPERATURE: 97.2 F

## 2019-04-17 PROCEDURE — G0158 HHC OT ASSISTANT EA 15: HCPCS

## 2019-04-17 PROCEDURE — 3331090002 HH PPS REVENUE DEBIT

## 2019-04-17 PROCEDURE — 3331090001 HH PPS REVENUE CREDIT

## 2019-04-18 PROCEDURE — 3331090002 HH PPS REVENUE DEBIT

## 2019-04-18 PROCEDURE — 3331090001 HH PPS REVENUE CREDIT

## 2019-04-19 ENCOUNTER — PATIENT OUTREACH (OUTPATIENT)
Dept: CARDIOLOGY CLINIC | Age: 84
End: 2019-04-19

## 2019-04-19 PROCEDURE — 3331090001 HH PPS REVENUE CREDIT

## 2019-04-19 PROCEDURE — 3331090002 HH PPS REVENUE DEBIT

## 2019-04-19 NOTE — PROGRESS NOTES
NN contacted the patient's son  by telephone to perform CHF follow Up. Noted Priorities/Plan:  Return to baseline, set up earlier cardiology appt Daily Weight: per son, patient is unable to stand at this time due to patient being unstable and he doesn't feel safe. Zone: green Signs/Symptoms: Edema none; SOB none; orthopnea none Goals  Knowledge and adherence medication (ie. action, side effects, missed dose, etc.) Caregiver to provide teach back on medications on/by 5/10/19.  Patient verbalizes understanding of self-management goals of living with Congestive Heart Failure Caregiver will verbalize heart failure zones and report red flags to physician/NN on/by 4/10/19.  Prevent complications post hospitalization. Caregiver to take or make arrangements for patient to follow up with appointments post hospitalization on/by 2/28/19. 
2/18/19 Patient went to ED on 2/13/19 for abdominal distension, discharged on same day with constipation, pt instructed to begin using Miralax for constipation. Needs addressed from pathway:  
Week 4 Provide Daily Disease Management 
(NN initiated) Reviewed Daily Zone Identification (symptom management; weight, edema, SOB, activity/sleep changes)-notify provider immediately as indicated ? Reviewed Cardiac Low-sodium Diet (No added sodium; 1500mg as indicated). ? Reviewed Fluid restriction ? Confirmed follow-up appointments/transportation. Additional assessment ? Reviewed Fall precautions ? Activity tolerance assessment: per son, returning to normal 
(eg: Vital signs; level of consciousness; dyspnea on exertion; pillow usage; recliner vs bed) ? Discussed Energy conservation management (balance activity with rest) ? Labs/diagnostics per md office ? Medication Therapy: no issues per son, reviewed changes from md office visit again ? Son reports normal Diet/appetite assessment ? Discussed appropriate ED/Hospital utilization ? Immunizations not up to date ? No Home modification needs ? No Transportation  Assistance needs ? No Medication assistance needs ? Home health services in place Psychosocial: Reassurance/emotional support Monitoring: ? Home health Education: 
? Support system identification ( eg: Caregiver, meal planning, community resources, family, friends, Gnosticism, support group) ? Health literacy for heart failure ? Caregiver education and preparation Have you been to an ER/Hospital since discharge from [facility]? No   
 
Have you followed up with PCP/Cardiologist/Specialist?  
 
F/u w/ cardiologist DANIELLE Echeverria On 3/27/19. Demadex 20 mg started. Take w/ metolazone 2.5 mg per day. Have labs drawn next week. Next appt 5/8/19 at . [address] Transportation:  Son 
Diet: Cardiac/no salt added Activity/ADLs: w/ assistance from family. Medications Reconciled at this time:  refused by son at this time Home health:  Company/Completion: [facility] Social Support: family (son/daughter) Advanced Care Plan: pt w/ documented dementia. Patient reminded that there is a physician on call 24 hours a day / 7 days a week (M-F 5pm to 8am and from Friday 5pm until Monday 8a for the weekend) should the patient have questions or concerns. Patient reminded to call 911 if situation is emergent or patient feels the situation is emergent. Pt verbalizes understanding.

## 2019-04-20 PROCEDURE — 3331090001 HH PPS REVENUE CREDIT

## 2019-04-20 PROCEDURE — 3331090002 HH PPS REVENUE DEBIT

## 2019-04-21 PROCEDURE — 3331090002 HH PPS REVENUE DEBIT

## 2019-04-21 PROCEDURE — 3331090001 HH PPS REVENUE CREDIT

## 2019-04-22 PROCEDURE — 3331090002 HH PPS REVENUE DEBIT

## 2019-04-22 PROCEDURE — 3331090001 HH PPS REVENUE CREDIT

## 2019-04-23 ENCOUNTER — HOME CARE VISIT (OUTPATIENT)
Dept: SCHEDULING | Facility: HOME HEALTH | Age: 84
End: 2019-04-23
Payer: MEDICARE

## 2019-04-23 PROCEDURE — 3331090001 HH PPS REVENUE CREDIT

## 2019-04-23 PROCEDURE — 3331090002 HH PPS REVENUE DEBIT

## 2019-04-23 PROCEDURE — G0152 HHCP-SERV OF OT,EA 15 MIN: HCPCS

## 2019-04-24 PROCEDURE — 3331090001 HH PPS REVENUE CREDIT

## 2019-04-24 PROCEDURE — 3331090002 HH PPS REVENUE DEBIT

## 2019-04-25 ENCOUNTER — PATIENT OUTREACH (OUTPATIENT)
Dept: CARDIOLOGY CLINIC | Age: 84
End: 2019-04-25

## 2019-04-25 ENCOUNTER — HOME CARE VISIT (OUTPATIENT)
Dept: SCHEDULING | Facility: HOME HEALTH | Age: 84
End: 2019-04-25
Payer: MEDICARE

## 2019-04-25 PROCEDURE — 3331090002 HH PPS REVENUE DEBIT

## 2019-04-25 PROCEDURE — 3331090001 HH PPS REVENUE CREDIT

## 2019-04-25 PROCEDURE — G0299 HHS/HOSPICE OF RN EA 15 MIN: HCPCS

## 2019-04-25 NOTE — PROGRESS NOTES
Attempted to contact patient's son for Transition of Care follow up. No answer, left message with contact information provided. Will attempt to contact at a later time.

## 2019-04-26 PROCEDURE — 3331090002 HH PPS REVENUE DEBIT

## 2019-04-26 PROCEDURE — 3331090001 HH PPS REVENUE CREDIT

## 2019-04-27 PROCEDURE — 3331090002 HH PPS REVENUE DEBIT

## 2019-04-27 PROCEDURE — 3331090001 HH PPS REVENUE CREDIT

## 2019-04-28 PROCEDURE — 3331090002 HH PPS REVENUE DEBIT

## 2019-04-28 PROCEDURE — 3331090001 HH PPS REVENUE CREDIT

## 2019-04-29 PROCEDURE — 3331090002 HH PPS REVENUE DEBIT

## 2019-04-29 PROCEDURE — 3331090001 HH PPS REVENUE CREDIT

## 2019-04-30 ENCOUNTER — HOME CARE VISIT (OUTPATIENT)
Dept: SCHEDULING | Facility: HOME HEALTH | Age: 84
End: 2019-04-30
Payer: MEDICARE

## 2019-04-30 ENCOUNTER — HOSPITAL ENCOUNTER (INPATIENT)
Age: 84
LOS: 9 days | Discharge: HOME HEALTH | DRG: 853 | End: 2019-05-09
Attending: EMERGENCY MEDICINE | Admitting: HOSPITALIST
Payer: MEDICARE

## 2019-04-30 ENCOUNTER — APPOINTMENT (OUTPATIENT)
Dept: CT IMAGING | Age: 84
DRG: 853 | End: 2019-04-30
Attending: EMERGENCY MEDICINE
Payer: MEDICARE

## 2019-04-30 ENCOUNTER — APPOINTMENT (OUTPATIENT)
Dept: GENERAL RADIOLOGY | Age: 84
DRG: 853 | End: 2019-04-30
Attending: EMERGENCY MEDICINE
Payer: MEDICARE

## 2019-04-30 VITALS
DIASTOLIC BLOOD PRESSURE: 70 MMHG | SYSTOLIC BLOOD PRESSURE: 130 MMHG | HEART RATE: 88 BPM | OXYGEN SATURATION: 98 % | RESPIRATION RATE: 16 BRPM | TEMPERATURE: 98 F

## 2019-04-30 DIAGNOSIS — R65.20 SEVERE SEPSIS (HCC): Primary | ICD-10-CM

## 2019-04-30 DIAGNOSIS — R77.8 ELEVATED TROPONIN: ICD-10-CM

## 2019-04-30 DIAGNOSIS — L98.493 SKIN ULCER WITH NECROSIS OF MUSCLE (HCC): ICD-10-CM

## 2019-04-30 DIAGNOSIS — L03.115 CELLULITIS OF RIGHT LOWER EXTREMITY: ICD-10-CM

## 2019-04-30 DIAGNOSIS — G93.41 ACUTE METABOLIC ENCEPHALOPATHY: ICD-10-CM

## 2019-04-30 DIAGNOSIS — A41.9 SEVERE SEPSIS (HCC): Primary | ICD-10-CM

## 2019-04-30 DIAGNOSIS — D72.825 BANDEMIA: ICD-10-CM

## 2019-04-30 DIAGNOSIS — I24.8 DEMAND ISCHEMIA (HCC): ICD-10-CM

## 2019-04-30 PROBLEM — L02.91 ABSCESS: Status: ACTIVE | Noted: 2019-04-30

## 2019-04-30 LAB
ALBUMIN SERPL-MCNC: 2.7 G/DL (ref 3.4–5)
ALBUMIN/GLOB SERPL: 0.8 {RATIO} (ref 0.8–1.7)
ALP SERPL-CCNC: 68 U/L (ref 45–117)
ALT SERPL-CCNC: 14 U/L (ref 13–56)
ANION GAP SERPL CALC-SCNC: 6 MMOL/L (ref 3–18)
APTT PPP: 42.5 SEC (ref 23–36.4)
AST SERPL-CCNC: 17 U/L (ref 15–37)
BASOPHILS # BLD: 0 K/UL (ref 0–0.06)
BASOPHILS NFR BLD: 0 % (ref 0–3)
BILIRUB SERPL-MCNC: 0.5 MG/DL (ref 0.2–1)
BNP SERPL-MCNC: ABNORMAL PG/ML (ref 0–1800)
BUN SERPL-MCNC: 61 MG/DL (ref 7–18)
BUN/CREAT SERPL: 29 (ref 12–20)
CALCIUM SERPL-MCNC: 8.7 MG/DL (ref 8.5–10.1)
CHLORIDE SERPL-SCNC: 99 MMOL/L (ref 100–108)
CO2 SERPL-SCNC: 34 MMOL/L (ref 21–32)
CREAT SERPL-MCNC: 2.14 MG/DL (ref 0.6–1.3)
DIFFERENTIAL METHOD BLD: ABNORMAL
EOSINOPHIL # BLD: 0 K/UL (ref 0–0.4)
EOSINOPHIL NFR BLD: 0 % (ref 0–5)
ERYTHROCYTE [DISTWIDTH] IN BLOOD BY AUTOMATED COUNT: 14.1 % (ref 11.6–14.5)
GLOBULIN SER CALC-MCNC: 3.5 G/DL (ref 2–4)
GLUCOSE SERPL-MCNC: 129 MG/DL (ref 74–99)
HCT VFR BLD AUTO: 26 % (ref 35–45)
HGB BLD-MCNC: 8.4 G/DL (ref 12–16)
INR PPP: 1.4 (ref 0.8–1.2)
LACTATE BLD-SCNC: 0.87 MMOL/L (ref 0.4–2)
LYMPHOCYTES # BLD: 0.8 K/UL (ref 0.8–3.5)
LYMPHOCYTES NFR BLD: 4 % (ref 20–51)
MAGNESIUM SERPL-MCNC: 2 MG/DL (ref 1.6–2.6)
MCH RBC QN AUTO: 29.8 PG (ref 24–34)
MCHC RBC AUTO-ENTMCNC: 32.3 G/DL (ref 31–37)
MCV RBC AUTO: 92.2 FL (ref 74–97)
MONOCYTES # BLD: 0.6 K/UL (ref 0–1)
MONOCYTES NFR BLD: 3 % (ref 2–9)
NEUTS BAND NFR BLD MANUAL: 2 % (ref 0–5)
NEUTS SEG # BLD: 19.4 K/UL (ref 1.8–8)
NEUTS SEG NFR BLD: 91 % (ref 42–75)
PLATELET # BLD AUTO: 171 K/UL (ref 135–420)
PLATELET COMMENTS,PCOM: ABNORMAL
PMV BLD AUTO: 9.3 FL (ref 9.2–11.8)
POTASSIUM SERPL-SCNC: 3.2 MMOL/L (ref 3.5–5.5)
PROT SERPL-MCNC: 6.2 G/DL (ref 6.4–8.2)
PROTHROMBIN TIME: 16.8 SEC (ref 11.5–15.2)
RBC # BLD AUTO: 2.82 M/UL (ref 4.2–5.3)
RBC MORPH BLD: ABNORMAL
SODIUM SERPL-SCNC: 139 MMOL/L (ref 136–145)
TROPONIN I SERPL-MCNC: 1.25 NG/ML (ref 0–0.04)
WBC # BLD AUTO: 20.8 K/UL (ref 4.6–13.2)

## 2019-04-30 PROCEDURE — 94761 N-INVAS EAR/PLS OXIMETRY MLT: CPT

## 2019-04-30 PROCEDURE — 3331090001 HH PPS REVENUE CREDIT

## 2019-04-30 PROCEDURE — 96375 TX/PRO/DX INJ NEW DRUG ADDON: CPT

## 2019-04-30 PROCEDURE — 96365 THER/PROPH/DIAG IV INF INIT: CPT

## 2019-04-30 PROCEDURE — 87186 SC STD MICRODIL/AGAR DIL: CPT

## 2019-04-30 PROCEDURE — 83735 ASSAY OF MAGNESIUM: CPT

## 2019-04-30 PROCEDURE — 73700 CT LOWER EXTREMITY W/O DYE: CPT

## 2019-04-30 PROCEDURE — 99285 EMERGENCY DEPT VISIT HI MDM: CPT

## 2019-04-30 PROCEDURE — 87040 BLOOD CULTURE FOR BACTERIA: CPT

## 2019-04-30 PROCEDURE — 74011000258 HC RX REV CODE- 258: Performed by: EMERGENCY MEDICINE

## 2019-04-30 PROCEDURE — 83880 ASSAY OF NATRIURETIC PEPTIDE: CPT

## 2019-04-30 PROCEDURE — 80053 COMPREHEN METABOLIC PANEL: CPT

## 2019-04-30 PROCEDURE — 65660000004 HC RM CVT STEPDOWN

## 2019-04-30 PROCEDURE — 74011250636 HC RX REV CODE- 250/636: Performed by: EMERGENCY MEDICINE

## 2019-04-30 PROCEDURE — G0299 HHS/HOSPICE OF RN EA 15 MIN: HCPCS

## 2019-04-30 PROCEDURE — 93005 ELECTROCARDIOGRAM TRACING: CPT

## 2019-04-30 PROCEDURE — 75810000289 HC I&D ABSCESS SIMP/COMP/MULT

## 2019-04-30 PROCEDURE — 3331090002 HH PPS REVENUE DEBIT

## 2019-04-30 PROCEDURE — 85025 COMPLETE CBC W/AUTO DIFF WBC: CPT

## 2019-04-30 PROCEDURE — 85610 PROTHROMBIN TIME: CPT

## 2019-04-30 PROCEDURE — 74011000250 HC RX REV CODE- 250: Performed by: EMERGENCY MEDICINE

## 2019-04-30 PROCEDURE — 83605 ASSAY OF LACTIC ACID: CPT

## 2019-04-30 PROCEDURE — 85730 THROMBOPLASTIN TIME PARTIAL: CPT

## 2019-04-30 PROCEDURE — 71045 X-RAY EXAM CHEST 1 VIEW: CPT

## 2019-04-30 PROCEDURE — 87077 CULTURE AEROBIC IDENTIFY: CPT

## 2019-04-30 PROCEDURE — 74011250637 HC RX REV CODE- 250/637: Performed by: EMERGENCY MEDICINE

## 2019-04-30 PROCEDURE — 84484 ASSAY OF TROPONIN QUANT: CPT

## 2019-04-30 RX ORDER — SODIUM CHLORIDE 0.9 % (FLUSH) 0.9 %
5-10 SYRINGE (ML) INJECTION AS NEEDED
Status: DISCONTINUED | OUTPATIENT
Start: 2019-04-30 | End: 2019-05-09 | Stop reason: HOSPADM

## 2019-04-30 RX ORDER — ASPIRIN 325 MG
325 TABLET ORAL DAILY
Status: DISCONTINUED | OUTPATIENT
Start: 2019-05-01 | End: 2019-05-01

## 2019-04-30 RX ORDER — LIDOCAINE HYDROCHLORIDE AND EPINEPHRINE 10; 10 MG/ML; UG/ML
INJECTION, SOLUTION INFILTRATION; PERINEURAL
Status: DISPENSED
Start: 2019-04-30 | End: 2019-05-01

## 2019-04-30 RX ORDER — VANCOMYCIN/0.9 % SOD CHLORIDE 1 G/100 ML
1000 PLASTIC BAG, INJECTION (ML) INTRAVENOUS ONCE
Status: COMPLETED | OUTPATIENT
Start: 2019-04-30 | End: 2019-04-30

## 2019-04-30 RX ADMIN — SODIUM CHLORIDE 500 MG: 900 INJECTION, SOLUTION INTRAVENOUS at 23:10

## 2019-04-30 RX ADMIN — VANCOMYCIN HYDROCHLORIDE 1000 MG: 10 INJECTION, POWDER, LYOPHILIZED, FOR SOLUTION INTRAVENOUS at 19:43

## 2019-04-30 RX ADMIN — CEFEPIME HYDROCHLORIDE 2 G: 2 INJECTION, POWDER, FOR SOLUTION INTRAVENOUS at 19:40

## 2019-04-30 RX ADMIN — ACETAMINOPHEN 975 MG: 650 SUPPOSITORY RECTAL at 18:22

## 2019-04-30 RX ADMIN — SODIUM CHLORIDE 1000 ML: 900 INJECTION, SOLUTION INTRAVENOUS at 23:11

## 2019-04-30 RX ADMIN — SODIUM CHLORIDE 1000 ML: 900 INJECTION, SOLUTION INTRAVENOUS at 19:40

## 2019-04-30 NOTE — ED TRIAGE NOTES
Pt arrived via EMS c/o AMS. Pt has hx of dementia, family states pt is more altered than usual.  Pt is hot to touch, rectal temp 103.4. Pt's right inner thigh is red and hot with a noted lump.

## 2019-04-30 NOTE — ED PROVIDER NOTES
EMERGENCY DEPARTMENT HISTORY AND PHYSICAL EXAM 
 
7:37 PM 
 
 
Date: 4/30/2019 Patient Name: [facility] History of Presenting Illness Chief Complaint Patient presents with  Altered mental status History Provided By: Patient's Son Location/Duration/Severity/Modifying factors Patient is an 51-year-old female with a history of congestive heart failure, lupus, thrombus cytopenia, chronic kidney disease, and urinary tract infections presents after being found altered today. Patient lives with her son and has some home health checking in for a wound on her right leg. Patient usually is talkative but today was not talking normally and was confused. They found her to have a high fever and so they decided to call EMS to have her evaluated in the emergency department. Patient has been having recurrent infections and does not have advanced directives. Patient has been otherwise doing well for the last month according to the family however is been developing a wound on her right lower extremity has been handled by home health. In the emergency department the patient cannot give a history and the history is only obtained by talking with the son. There are no other aggravating or alleviating factors. PCP: Warren Dobbs MD 
 
Current Facility-Administered Medications Medication Dose Route Frequency Provider Last Rate Last Dose  sodium chloride (NS) flush 5-10 mL  5-10 mL IntraVENous PRN Emy Finn MD      
 vancomycin (VANCOCIN) 1000 mg in  ml infusion  1,000 mg IntraVENous Phoenix Bains  mL/hr at 04/30/19 1943 1,000 mg at 04/30/19 1943  sodium chloride 0.9 % bolus infusion 1,000 mL  1,000 mL IntraVENous ONCE Barrie Evans MD      
 Followed by  
Florence Wilson sodium chloride 0.9 % bolus infusion 100 mL  100 mL IntraVENous ONCE Emy Finn MD      
 [START ON 5/1/2019] cefepime (MAXIPIME) 1 g in sterile water (preservative free) 10 mL IV syringe  1 g IntraVENous Q24H Noemi Kirkpatrick MD      
 PHARMACY INFORMATION NOTE  1 Each Other Rx Dosing/Monitoring Noemi Kirkpatrick MD      
 vancomycin (VANCOCIN) 500 mg in 0.9% sodium chloride (MBP/ADV) 100 mL ADV  500 mg IntraVENous ONCE Noemi Kirkpatrick MD      
 lidocaine-EPINEPHrine (XYLOCAINE) 1 %-1:100,000 injection Current Outpatient Medications Medication Sig Dispense Refill  polyethylene glycol (MIRALAX) 17 gram/dose powder Take 17 g by mouth daily.  torsemide (DEMADEX) 10 mg tablet Take 1 Tab by mouth daily. 30 Tab 6  
 amoxicillin (AMOXIL) 500 mg capsule Take 500 mg by mouth three (3) times daily.  aspirin delayed-release 81 mg tablet Take 81 mg by mouth daily.  hydroxychloroquine (PLAQUENIL) 200 mg tablet Take 200 mg by mouth two (2) times a day.  metOLazone (ZAROXOLYN) 2.5 mg tablet Take 1 Tab by mouth daily. 30 Tab 5  
 OTHER Incentive Spirometry- Use as directed 1 Each 0  
 OTHER Graded Compression Stockings b/l LE- use as directed 1 Each 0  
 food supplemt, lactose-reduced (ENSURE ENLIVE) 0.08 gram-1.5 kcal/mL liqd Take 1 Bottle by mouth two (2) times a day. 60 Bottle 0  
 menthol-zinc oxide (CALMOSEPTINE) 0.44-20.6 % oint Apply  to affected area.  carvedilol (COREG) 3.125 mg tablet Take 1 Tab by mouth every twelve (12) hours. 60 Tab 0  
 acetaminophen (TYLENOL ARTHRITIS PAIN) 650 mg TbER Take 1 Tab by mouth every eight (8) hours. (Patient taking differently: Take 1 Tab by mouth daily. ) 15 Tab 0  
 amiodarone (CORDARONE) 200 mg tablet TAKE ONE TABLET EVERY DAY (MAKE AN APPT) (Patient taking differently: TAKE ONE TABLET EVERY DAY ) 30 Tab 5  
 memantine (NAMENDA) 10 mg tablet Take 10 mg by mouth two (2) times a day.  amitriptyline (ELAVIL) 25 mg tablet Take 25 mg by mouth nightly. Past History Past Medical History: 
Past Medical History:  
Diagnosis Date  Acute on chronic diastolic heart failure ([facility] [address]  Arthritis  Benign hypertensive heart disease without heart failure Better controlled, stable  Chronic diastolic heart failure ([address] Breathing and edema is improving  Congestive heart failure ([address]  HTN (hypertension)  Hypercholesteremia  Lupus (systemic lupus erythematosus) ([facility] [address] 6/18/2014  
 followed by dr Severa Art  Obesity, unspecified Patient has weight loss Discussed diet ad fluid restriction  Other and unspecified hyperlipidemia F/u per pmd  
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 
Past Surgical History:  
Procedure Laterality Date  HX HYSTERECTOMY  PACEMAKER PROCEDURE Family History: 
Family History Problem Relation Age of Onset  Arrhythmia Neg Hx  Asthma Neg Hx  Clotting Disorder Neg Hx  Fainting Neg Hx   
 Heart Attack Neg Hx  High Cholesterol Neg Hx  Pacemaker Neg Hx  Stroke Neg Hx Social History: 
Social History Tobacco Use  Smoking status: Never Smoker  Smokeless tobacco: Never Used Substance Use Topics  Alcohol use: No  
 Drug use: No  
 
 
Allergies: 
No Known Allergies Review of Systems Review of Systems Unable to perform ROS: Mental status change Constitutional: Positive for fever. Skin: Positive for wound. Physical Exam  
 
Visit Vitals /78 Pulse 75 Temp (!) 102 °F (38.9 °C) Resp 18 Wt 74.8 kg (165 lb) SpO2 97% BMI 28.32 kg/m² Physical Exam  
Constitutional: She appears well-developed and well-nourished. No distress. Not speaking lying in bed HENT:  
Head: Normocephalic and atraumatic. Right Ear: External ear normal.  
Left Ear: External ear normal.  
Nose: Nose normal.  
Dry oral mucosa Eyes: Pupils are equal, round, and reactive to light.  Conjunctivae and EOM are normal. No scleral icterus. Neck: Normal range of motion. Neck supple. No JVD present. No tracheal deviation present. No thyromegaly present. Head rotated to the right Cardiovascular: Normal rate, regular rhythm, normal heart sounds and intact distal pulses. Exam reveals no gallop and no friction rub. No murmur heard. Pulmonary/Chest: Effort normal and breath sounds normal. She exhibits no tenderness. Mild rhonchi Abdominal: Soft. Bowel sounds are normal. She exhibits no distension. There is no tenderness. There is no rebound and no guarding. Musculoskeletal: She exhibits no edema or tenderness. Right lower extremity with diffuse erythema medially begins at the mid thigh and extends down to the anterior tibial region, suspected fluctuance at the medial thigh, anterior tibial 10 x 5 cm open bloody wound with necrotic center, right lower extremities warm Left lower extremity normal pulse without edema or erythema Lymphadenopathy:  
  She has no cervical adenopathy. Neurological: She is alert. No cranial nerve deficit. Coordination normal.  
Eyes open but does not follow commands, globally weak, right lower extremity is contracted Skin: Skin is dry. Rash noted. There is erythema. Right lower extremity warm to touch Psychiatric: Supportive family at bedside Nursing note and vitals reviewed. Diagnostic Study Results Labs - Recent Results (from the past 12 hour(s)) CBC WITH AUTOMATED DIFF Collection Time: 04/30/19  7:00 PM  
Result Value Ref Range WBC 20.8 (H) 4.6 - 13.2 K/uL  
 RBC 2.82 (L) 4.20 - 5.30 M/uL HGB 8.4 (L) 12.0 - 16.0 g/dL HCT 26.0 (L) 35.0 - 45.0 % MCV 92.2 74.0 - 97.0 FL  
 MCH 29.8 24.0 - 34.0 PG  
 MCHC 32.3 31.0 - 37.0 g/dL  
 RDW 14.1 11.6 - 14.5 % PLATELET 060 269 - 482 K/uL MPV 9.3 9.2 - 11.8 FL  
 NEUTROPHILS 91 (H) 42 - 75 % BAND NEUTROPHILS 2 0 - 5 % LYMPHOCYTES 4 (L) 20 - 51 % MONOCYTES 3 2 - 9 % EOSINOPHILS 0 0 - 5 % BASOPHILS 0 0 - 3 %  
 ABS. NEUTROPHILS 19.4 (H) 1.8 - 8.0 K/UL  
 ABS. LYMPHOCYTES 0.8 0.8 - 3.5 K/UL  
 ABS. MONOCYTES 0.6 0 - 1.0 K/UL  
 ABS. EOSINOPHILS 0.0 0.0 - 0.4 K/UL  
 ABS. BASOPHILS 0.0 0.0 - 0.06 K/UL  
 DF MANUAL PLATELET COMMENTS ADEQUATE PLATELETS    
 RBC COMMENTS NORMOCYTIC, NORMOCHROMIC    
 RBC COMMENTS ANISOCYTOSIS 1+ 
    
 RBC COMMENTS POIKILOCYTOSIS 
1+ METABOLIC PANEL, COMPREHENSIVE Collection Time: 04/30/19  7:00 PM  
Result Value Ref Range Sodium 139 136 - 145 mmol/L Potassium 3.2 (L) 3.5 - 5.5 mmol/L Chloride 99 (L) 100 - 108 mmol/L  
 CO2 34 (H) 21 - 32 mmol/L Anion gap 6 3.0 - 18 mmol/L Glucose 129 (H) 74 - 99 mg/dL BUN 61 (H) 7.0 - 18 MG/DL Creatinine 2.14 (H) 0.6 - 1.3 MG/DL  
 BUN/Creatinine ratio 29 (H) 12 - 20 GFR est AA 26 (L) >60 ml/min/1.73m2 GFR est non-AA 22 (L) >60 ml/min/1.73m2 Calcium 8.7 8.5 - 10.1 MG/DL Bilirubin, total 0.5 0.2 - 1.0 MG/DL  
 ALT (SGPT) 14 13 - 56 U/L  
 AST (SGOT) 17 15 - 37 U/L Alk. phosphatase 68 45 - 117 U/L Protein, total 6.2 (L) 6.4 - 8.2 g/dL Albumin 2.7 (L) 3.4 - 5.0 g/dL Globulin 3.5 2.0 - 4.0 g/dL A-G Ratio 0.8 0.8 - 1.7 PROTHROMBIN TIME + INR Collection Time: 04/30/19  7:00 PM  
Result Value Ref Range Prothrombin time 16.8 (H) 11.5 - 15.2 sec INR 1.4 (H) 0.8 - 1.2 PTT Collection Time: 04/30/19  7:00 PM  
Result Value Ref Range aPTT 42.5 (H) 23.0 - 36.4 SEC MAGNESIUM Collection Time: 04/30/19  7:00 PM  
Result Value Ref Range Magnesium 2.0 1.6 - 2.6 mg/dL TROPONIN I Collection Time: 04/30/19  7:00 PM  
Result Value Ref Range Troponin-I, QT 1.25 (H) 0.0 - 0.045 NG/ML  
NT-PRO BNP Collection Time: 04/30/19  7:00 PM  
Result Value Ref Range NT pro-BNP 11,831 (H) 0 - 1,800 PG/ML  
POC LACTIC ACID Collection Time: 04/30/19  7:02 PM  
Result Value Ref Range  Lactic Acid (POC) 0.87 0.40 - 2.00 mmol/L  
 EKG, 12 LEAD, INITIAL Collection Time: 04/30/19  7:03 PM  
Result Value Ref Range Ventricular Rate 75 BPM  
 Atrial Rate 76 BPM  
 P-R Interval 210 ms QRS Duration 200 ms Q-T Interval 472 ms QTC Calculation (Bezet) 527 ms Calculated R Axis 2 degrees Calculated T Axis 147 degrees Diagnosis Atrial-paced rhythm with prolonged AV conduction Nonspecific intraventricular block Possible Inferior infarct , age undetermined Abnormal ECG When compared with ECG of 17-MAR-2019 13:20, 
Electronic atrial pacemaker has replaced Electronic ventricular pacemaker Radiologic Studies -  
CT LOW EXT RT WO CONT Final Result IMPRESSION: An encapsulated appearing fluid collection in the medial aspect of the right  
distal thigh, worrisome for abscess in the appropriate clinical setting. Anterior to the distal tibia there is evidence of a wound containing  
indeterminate dense material, possibly packing, complex superficial collection,  
or blister. Clinical correlation advised. Lateral tibial plateau depression with widening of the lateral compartment. Correlate clinically for age-indeterminate tibial plateau fracture. See  
additional details above. XR CHEST SNGL V Final Result IMPRESSION:  
  
Moderately enlarged cardiac silhouette with interstitial infiltrates/edema. Compared to 3/17/2019, left pleural effusion has either decreased or resolved. Medical Decision Making I am the first provider for this patient. I reviewed the vital signs, available nursing notes, past medical history, past surgical history, family history and social history. Vital Signs-Reviewed the patient's vital signs. EKG: V paced Records Reviewed: Nursing Notes and Old Medical Records (Time of Review: 7:37 PM) ED Course: Progress Notes, Reevaluation, and Consults: 
 
Patient has a white count elevation and her temp is improving.   Her map is now 80 and she has had no significant hypotension. The CT of her leg shows that she has a large abscess to her proximal thigh. We will drain the abscess and plan to admit the patient. Discussed the case with Dr. Sammy Solo and will admit the patient to stepdown. Patient has a significant trop elevation that she has had in the past.  I suspect is demand ischemia we will discuss this with her cardiologist. 
 
Under direct supervision her intern will drain the abscess after obtaining informed consent from patient's daughter. After drainage of the abscess I believe we will have significant source control for admission. Diego Mcnamara DO 9:15 PM 
 
 
 
PROVIDER SEPSIS PHYSICAL EXAM EVAL Vital signs reviewed (see nursing documentation for further details): Vitals:  
 04/30/19 1825 04/30/19 1915 04/30/19 2039 04/30/19 2104 BP:  115/81 111/78 113/78 Pulse:  71 75 75 Resp:  18 18 18 Temp: (!) 103.4 °F (39.7 °C)   (!) 102 °F (38.9 °C) SpO2:  98% 92% 97% Cardiac exam:Regular Rate Pulmonary exam:Rales mild Peripheral pulses:Normal 
 
Capillary refill:Normal 
 
Skin exam:pink Exam performed byMorteza Evans MD 
 
Pt MAP is 100 and I discussed with family regarding the risks and benefits of further fluids for sepsis and will continue to hydrate as tolerated but agree that the patient will not receive full weight-based fluids if she does not need further hydration or she has shortness of breath. Discussed the case with Dr. Mckenzie Celeste and he recommends trending the troponin and not treating with any anticoagulation at this time. Dr. Hiram Allred will be able to evaluate the patient in the morning. Diego Mcnamara DO 9:36 PM 
 
 
 
Provider Notes (Medical Decision Making): MDM Number of Diagnoses or Management Options Diagnosis management comments: Patient is a 80-year-old female with a history of congestive heart failure, valvular heart disease, SLE, recurrent infections, debility with an inability to ambulate that presents with complaint of fever and altered mental status. Patient has an acute infection of her right lower extremity has a large wound to the distal part of her anterior tibial region. Appears necrotic with clot centrally and more proximally there is erythema as well as a possible area of fluctuance in the medial thigh. Will start broad-spectrum antibiotics, her lactate is reassuring, hydrate as tolerated, and proceed with a CT of her right lower extremity to evaluate for deep space infection. We will follow the patient closely. Davis Page DO 7:50 PM 
 
 
 
I&D Abcess Complex Date/Time: 4/30/2019 9:29 PM 
Performed by: Lyudmila Merino MD 
Authorized by: Lyudmila Merino MD  
 
Consent:  
  Consent obtained:  Written Consent given by:  Healthcare agent Risks discussed:  Bleeding, incomplete drainage, pain and infection Alternatives discussed:  Delayed treatment Universal protocol:  
  Procedure explained and questions answered to patient or proxy's satisfaction: yes Relevant documents present and verified: yes Test results available and properly labeled: yes Imaging studies available: yes Site/side marked: yes Immediately prior to procedure a time out was called: yes Location:  
  Type:  Abscess Location:  Lower extremity Lower extremity location:  Leg 
Pre-procedure details:  
  Skin preparation:  Betadine Procedure type:  
  Complexity:  Complex Procedure details:  
  Needle aspiration: no Incision types:  Single straight Incision depth:  Subcutaneous Scalpel blade:  11 Wound management:  Probed and deloculated, irrigated with saline and extensive cleaning Drainage:  Bloody Drainage amount:  Copious Wound treatment:  Wound left open Packing materials:  1/4 in gauze Post-procedure details:  
  Patient tolerance of procedure:   Tolerated well, no immediate complications Comments:  
   Procedure done by [facility] Critical Care Time: Critical Care Time: The services I provided to this patient were to treat and/or prevent clinically significant deterioration that could result in the failure of one or more body systems and/or organ systems due to severe sepsis with AMS with elevated trop. Services included the following: 
-reviewing nursing notes and old charts 
-vital sign assessments 
-direct patient care 
-medication orders and management 
-interpreting and reviewing diagnostic studies/labs 
-re-evaluations 
-documentation time Aggregate critical care time was 90 minutes, which includes only time during which I was engaged in work directly related to the patient's care as described above, whether I was at bedside or elsewhere in the Emergency Department. It did not include time spent performing other reported procedures or the services of residents, students, nurses, or advance practice providers. Harry Watkins,  9:16 PM 
 
 
Diagnosis Clinical Impression: 1. Severe sepsis ([address] 2. Cellulitis of right lower extremity 3. Skin ulcer with necrosis of muscle ([address] 4. Bandemia 5. Acute metabolic encephalopathy 6. Demand ischemia ([facility] [address] 7. Elevated troponin Disposition: Admit Follow-up Information None Patient's Medications Start Taking No medications on file Continue Taking ACETAMINOPHEN (TYLENOL ARTHRITIS PAIN) 650 MG TBER    Take 1 Tab by mouth every eight (8) hours. AMIODARONE (CORDARONE) 200 MG TABLET    TAKE ONE TABLET EVERY DAY (MAKE AN APPT) AMITRIPTYLINE (ELAVIL) 25 MG TABLET    Take 25 mg by mouth nightly. AMOXICILLIN (AMOXIL) 500 MG CAPSULE    Take 500 mg by mouth three (3) times daily. ASPIRIN DELAYED-RELEASE 81 MG TABLET    Take 81 mg by mouth daily. CARVEDILOL (COREG) 3.125 MG TABLET    Take 1 Tab by mouth every twelve (12) hours. FOOD SUPPLEMT, LACTOSE-REDUCED (ENSURE ENLIVE) 0.08 GRAM-1.5 KCAL/ML LIQD    Take 1 Bottle by mouth two (2) times a day. HYDROXYCHLOROQUINE (PLAQUENIL) 200 MG TABLET    Take 200 mg by mouth two (2) times a day. MEMANTINE (NAMENDA) 10 MG TABLET    Take 10 mg by mouth two (2) times a day. MENTHOL-ZINC OXIDE (CALMOSEPTINE) 0.44-20.6 % OINT    Apply  to affected area. METOLAZONE (ZAROXOLYN) 2.5 MG TABLET    Take 1 Tab by mouth daily. OTHER    Incentive Spirometry- Use as directed OTHER    Graded Compression Stockings b/l LE- use as directed POLYETHYLENE GLYCOL (MIRALAX) 17 GRAM/DOSE POWDER    Take 17 g by mouth daily. TORSEMIDE (DEMADEX) 10 MG TABLET    Take 1 Tab by mouth daily. These Medications have changed No medications on file Stop Taking No medications on file Disclaimer: Sections of this note are dictated using utilizing voice recognition software. Minor typographical errors may be present. If questions arise, please do not hesitate to contact me or call our department.

## 2019-05-01 LAB
ALBUMIN SERPL-MCNC: 2.8 G/DL (ref 3.4–5)
ALBUMIN/GLOB SERPL: 0.8 {RATIO} (ref 0.8–1.7)
ALP SERPL-CCNC: 67 U/L (ref 45–117)
ALT SERPL-CCNC: 14 U/L (ref 13–56)
AMORPH CRY URNS QL MICRO: ABNORMAL
ANION GAP SERPL CALC-SCNC: 7 MMOL/L (ref 3–18)
APPEARANCE UR: ABNORMAL
AST SERPL-CCNC: 21 U/L (ref 15–37)
ATRIAL RATE: 76 BPM
BACTERIA URNS QL MICRO: ABNORMAL /HPF
BASOPHILS # BLD: 0 K/UL (ref 0–0.06)
BASOPHILS NFR BLD: 0 % (ref 0–3)
BILIRUB SERPL-MCNC: 0.6 MG/DL (ref 0.2–1)
BILIRUB UR QL: NEGATIVE
BUN SERPL-MCNC: 62 MG/DL (ref 7–18)
BUN/CREAT SERPL: 30 (ref 12–20)
CALCIUM SERPL-MCNC: 8.3 MG/DL (ref 8.5–10.1)
CALCULATED R AXIS, ECG10: 2 DEGREES
CALCULATED T AXIS, ECG11: 147 DEGREES
CHLORIDE SERPL-SCNC: 99 MMOL/L (ref 100–108)
CK MB CFR SERPL CALC: ABNORMAL % (ref 0–4)
CK MB CFR SERPL CALC: NORMAL % (ref 0–4)
CK MB SERPL-MCNC: <1 NG/ML (ref 5–25)
CK MB SERPL-MCNC: <1 NG/ML (ref 5–25)
CK SERPL-CCNC: 78 U/L (ref 26–192)
CK SERPL-CCNC: 79 U/L (ref 26–192)
CO2 SERPL-SCNC: 32 MMOL/L (ref 21–32)
COLOR UR: YELLOW
CREAT SERPL-MCNC: 2.04 MG/DL (ref 0.6–1.3)
DIAGNOSIS, 93000: NORMAL
DIFFERENTIAL METHOD BLD: ABNORMAL
EOSINOPHIL # BLD: 0 K/UL (ref 0–0.4)
EOSINOPHIL NFR BLD: 0 % (ref 0–5)
EPITH CASTS URNS QL MICRO: ABNORMAL /LPF (ref 0–5)
ERYTHROCYTE [DISTWIDTH] IN BLOOD BY AUTOMATED COUNT: 14.2 % (ref 11.6–14.5)
GLOBULIN SER CALC-MCNC: 3.5 G/DL (ref 2–4)
GLUCOSE BLD STRIP.AUTO-MCNC: 126 MG/DL (ref 70–110)
GLUCOSE SERPL-MCNC: 115 MG/DL (ref 74–99)
GLUCOSE UR STRIP.AUTO-MCNC: NEGATIVE MG/DL
HCT VFR BLD AUTO: 26.7 % (ref 35–45)
HGB BLD-MCNC: 8.5 G/DL (ref 12–16)
HGB UR QL STRIP: NEGATIVE
KETONES UR QL STRIP.AUTO: NEGATIVE MG/DL
LEUKOCYTE ESTERASE UR QL STRIP.AUTO: NEGATIVE
LYMPHOCYTES # BLD: 0.7 K/UL (ref 0.8–3.5)
LYMPHOCYTES NFR BLD: 3 % (ref 20–51)
MCH RBC QN AUTO: 29.5 PG (ref 24–34)
MCHC RBC AUTO-ENTMCNC: 31.8 G/DL (ref 31–37)
MCV RBC AUTO: 92.7 FL (ref 74–97)
MONOCYTES # BLD: 0.9 K/UL (ref 0–1)
MONOCYTES NFR BLD: 4 % (ref 2–9)
NEUTS BAND NFR BLD MANUAL: 14 % (ref 0–5)
NEUTS SEG # BLD: 20.7 K/UL (ref 1.8–8)
NEUTS SEG NFR BLD: 79 % (ref 42–75)
NITRITE UR QL STRIP.AUTO: NEGATIVE
P-R INTERVAL, ECG05: 210 MS
PH UR STRIP: 5 [PH] (ref 5–8)
PLATELET # BLD AUTO: 184 K/UL (ref 135–420)
PLATELET COMMENTS,PCOM: ABNORMAL
PMV BLD AUTO: 9.7 FL (ref 9.2–11.8)
POTASSIUM SERPL-SCNC: 2.9 MMOL/L (ref 3.5–5.5)
PROT SERPL-MCNC: 6.3 G/DL (ref 6.4–8.2)
PROT UR STRIP-MCNC: 30 MG/DL
Q-T INTERVAL, ECG07: 472 MS
QRS DURATION, ECG06: 200 MS
QTC CALCULATION (BEZET), ECG08: 527 MS
RBC # BLD AUTO: 2.88 M/UL (ref 4.2–5.3)
RBC #/AREA URNS HPF: ABNORMAL /HPF (ref 0–5)
RBC MORPH BLD: ABNORMAL
SODIUM SERPL-SCNC: 138 MMOL/L (ref 136–145)
SP GR UR REFRACTOMETRY: 1.01 (ref 1–1.03)
TROPONIN I SERPL-MCNC: 0.84 NG/ML (ref 0–0.04)
TROPONIN I SERPL-MCNC: 1.03 NG/ML (ref 0–0.04)
UROBILINOGEN UR QL STRIP.AUTO: 1 EU/DL (ref 0.2–1)
VANCOMYCIN SERPL-MCNC: 15.1 UG/ML (ref 5–40)
VENTRICULAR RATE, ECG03: 75 BPM
WBC # BLD AUTO: 22.3 K/UL (ref 4.6–13.2)
WBC URNS QL MICRO: ABNORMAL /HPF (ref 0–4)

## 2019-05-01 PROCEDURE — 94762 N-INVAS EAR/PLS OXIMTRY CONT: CPT

## 2019-05-01 PROCEDURE — 81001 URINALYSIS AUTO W/SCOPE: CPT

## 2019-05-01 PROCEDURE — 87086 URINE CULTURE/COLONY COUNT: CPT

## 2019-05-01 PROCEDURE — 74011250636 HC RX REV CODE- 250/636: Performed by: EMERGENCY MEDICINE

## 2019-05-01 PROCEDURE — 87077 CULTURE AEROBIC IDENTIFY: CPT

## 2019-05-01 PROCEDURE — 84484 ASSAY OF TROPONIN QUANT: CPT

## 2019-05-01 PROCEDURE — A4927 NON-STERILE GLOVES: HCPCS

## 2019-05-01 PROCEDURE — 94640 AIRWAY INHALATION TREATMENT: CPT

## 2019-05-01 PROCEDURE — 36415 COLL VENOUS BLD VENIPUNCTURE: CPT

## 2019-05-01 PROCEDURE — A6212 FOAM DRG <=16 SQ IN W/BORDER: HCPCS

## 2019-05-01 PROCEDURE — 74011000258 HC RX REV CODE- 258: Performed by: HOSPITALIST

## 2019-05-01 PROCEDURE — 87186 SC STD MICRODIL/AGAR DIL: CPT

## 2019-05-01 PROCEDURE — A6446 CONFORM BAND S W>=3" <5"/YD: HCPCS

## 2019-05-01 PROCEDURE — 82962 GLUCOSE BLOOD TEST: CPT

## 2019-05-01 PROCEDURE — 87075 CULTR BACTERIA EXCEPT BLOOD: CPT

## 2019-05-01 PROCEDURE — 82553 CREATINE MB FRACTION: CPT

## 2019-05-01 PROCEDURE — 74011250636 HC RX REV CODE- 250/636

## 2019-05-01 PROCEDURE — 65660000004 HC RM CVT STEPDOWN

## 2019-05-01 PROCEDURE — A6260 WOUND CLEANSER ANY TYPE/SIZE: HCPCS

## 2019-05-01 PROCEDURE — A6216 NON-STERILE GAUZE<=16 SQ IN: HCPCS

## 2019-05-01 PROCEDURE — 80202 ASSAY OF VANCOMYCIN: CPT

## 2019-05-01 PROCEDURE — 74011250636 HC RX REV CODE- 250/636: Performed by: INTERNAL MEDICINE

## 2019-05-01 PROCEDURE — 3331090001 HH PPS REVENUE CREDIT

## 2019-05-01 PROCEDURE — 74011250636 HC RX REV CODE- 250/636: Performed by: HOSPITALIST

## 2019-05-01 PROCEDURE — 82550 ASSAY OF CK (CPK): CPT

## 2019-05-01 PROCEDURE — 80053 COMPREHEN METABOLIC PANEL: CPT

## 2019-05-01 PROCEDURE — A4452 WATERPROOF TAPE: HCPCS

## 2019-05-01 PROCEDURE — 3331090002 HH PPS REVENUE DEBIT

## 2019-05-01 PROCEDURE — 85025 COMPLETE CBC W/AUTO DIFF WBC: CPT

## 2019-05-01 PROCEDURE — 74011000250 HC RX REV CODE- 250: Performed by: HOSPITALIST

## 2019-05-01 PROCEDURE — 87070 CULTURE OTHR SPECIMN AEROBIC: CPT

## 2019-05-01 RX ORDER — AMIODARONE HYDROCHLORIDE 200 MG/1
200 TABLET ORAL DAILY
Status: DISCONTINUED | OUTPATIENT
Start: 2019-05-01 | End: 2019-05-09 | Stop reason: HOSPADM

## 2019-05-01 RX ORDER — TORSEMIDE 20 MG/1
10 TABLET ORAL DAILY
Status: DISCONTINUED | OUTPATIENT
Start: 2019-05-01 | End: 2019-05-01

## 2019-05-01 RX ORDER — POTASSIUM CHLORIDE 7.45 MG/ML
INJECTION INTRAVENOUS
Status: COMPLETED
Start: 2019-05-01 | End: 2019-05-01

## 2019-05-01 RX ORDER — IPRATROPIUM BROMIDE AND ALBUTEROL SULFATE 2.5; .5 MG/3ML; MG/3ML
3 SOLUTION RESPIRATORY (INHALATION)
Status: DISCONTINUED | OUTPATIENT
Start: 2019-05-01 | End: 2019-05-02

## 2019-05-01 RX ORDER — SODIUM CHLORIDE 9 MG/ML
50 INJECTION, SOLUTION INTRAVENOUS CONTINUOUS
Status: DISCONTINUED | OUTPATIENT
Start: 2019-05-01 | End: 2019-05-01

## 2019-05-01 RX ORDER — PREDNISONE 5 MG/1
5 TABLET ORAL
Status: DISCONTINUED | OUTPATIENT
Start: 2019-05-01 | End: 2019-05-01

## 2019-05-01 RX ORDER — METOLAZONE 2.5 MG/1
2.5 TABLET ORAL DAILY
Status: DISCONTINUED | OUTPATIENT
Start: 2019-05-01 | End: 2019-05-01

## 2019-05-01 RX ORDER — HYDRALAZINE HYDROCHLORIDE 20 MG/ML
10 INJECTION INTRAMUSCULAR; INTRAVENOUS
Status: DISCONTINUED | OUTPATIENT
Start: 2019-05-01 | End: 2019-05-09 | Stop reason: HOSPADM

## 2019-05-01 RX ORDER — HYDROXYCHLOROQUINE SULFATE 200 MG/1
200 TABLET, FILM COATED ORAL 2 TIMES DAILY
Status: DISCONTINUED | OUTPATIENT
Start: 2019-05-01 | End: 2019-05-09 | Stop reason: HOSPADM

## 2019-05-01 RX ORDER — ASPIRIN 81 MG/1
81 TABLET ORAL DAILY
Status: DISCONTINUED | OUTPATIENT
Start: 2019-05-01 | End: 2019-05-03

## 2019-05-01 RX ORDER — HYDROCORTISONE SODIUM SUCCINATE 100 MG/2ML
50 INJECTION, POWDER, FOR SOLUTION INTRAMUSCULAR; INTRAVENOUS EVERY 8 HOURS
Status: DISCONTINUED | OUTPATIENT
Start: 2019-05-01 | End: 2019-05-08

## 2019-05-01 RX ORDER — VANCOMYCIN/0.9 % SOD CHLORIDE 1 G/100 ML
1000 PLASTIC BAG, INJECTION (ML) INTRAVENOUS ONCE
Status: DISCONTINUED | OUTPATIENT
Start: 2019-05-01 | End: 2019-05-01 | Stop reason: DRUGHIGH

## 2019-05-01 RX ORDER — ACETAMINOPHEN 325 MG/1
650 TABLET ORAL
Status: DISCONTINUED | OUTPATIENT
Start: 2019-05-01 | End: 2019-05-09 | Stop reason: HOSPADM

## 2019-05-01 RX ORDER — CARVEDILOL 3.12 MG/1
3.12 TABLET ORAL EVERY 12 HOURS
Status: DISCONTINUED | OUTPATIENT
Start: 2019-05-01 | End: 2019-05-09 | Stop reason: HOSPADM

## 2019-05-01 RX ORDER — SODIUM CHLORIDE 9 MG/ML
30 INJECTION, SOLUTION INTRAVENOUS CONTINUOUS
Status: DISCONTINUED | OUTPATIENT
Start: 2019-05-01 | End: 2019-05-05

## 2019-05-01 RX ORDER — POLYETHYLENE GLYCOL 3350 17 G/17G
17 POWDER, FOR SOLUTION ORAL DAILY
Status: DISCONTINUED | OUTPATIENT
Start: 2019-05-01 | End: 2019-05-09 | Stop reason: HOSPADM

## 2019-05-01 RX ORDER — METRONIDAZOLE 500 MG/100ML
500 INJECTION, SOLUTION INTRAVENOUS EVERY 12 HOURS
Status: DISCONTINUED | OUTPATIENT
Start: 2019-05-01 | End: 2019-05-07

## 2019-05-01 RX ORDER — MEMANTINE HYDROCHLORIDE 10 MG/1
10 TABLET ORAL 2 TIMES DAILY
Status: DISCONTINUED | OUTPATIENT
Start: 2019-05-01 | End: 2019-05-09 | Stop reason: HOSPADM

## 2019-05-01 RX ORDER — MENTHOL AND ZINC OXIDE .44; 20.625 G/100G; G/100G
OINTMENT TOPICAL 2 TIMES DAILY
Status: DISCONTINUED | OUTPATIENT
Start: 2019-05-01 | End: 2019-05-09 | Stop reason: HOSPADM

## 2019-05-01 RX ORDER — HEPARIN SODIUM 5000 [USP'U]/ML
5000 INJECTION, SOLUTION INTRAVENOUS; SUBCUTANEOUS EVERY 8 HOURS
Status: DISCONTINUED | OUTPATIENT
Start: 2019-05-01 | End: 2019-05-09 | Stop reason: HOSPADM

## 2019-05-01 RX ORDER — AMITRIPTYLINE HYDROCHLORIDE 50 MG/1
25 TABLET, FILM COATED ORAL
Status: DISCONTINUED | OUTPATIENT
Start: 2019-05-01 | End: 2019-05-09 | Stop reason: HOSPADM

## 2019-05-01 RX ADMIN — METRONIDAZOLE 500 MG: 500 INJECTION, SOLUTION INTRAVENOUS at 21:05

## 2019-05-01 RX ADMIN — HYDROCORTISONE SODIUM SUCCINATE 50 MG: 100 INJECTION, POWDER, FOR SOLUTION INTRAMUSCULAR; INTRAVENOUS at 21:04

## 2019-05-01 RX ADMIN — HEPARIN SODIUM 5000 UNITS: 5000 INJECTION INTRAVENOUS; SUBCUTANEOUS at 03:52

## 2019-05-01 RX ADMIN — IPRATROPIUM BROMIDE AND ALBUTEROL SULFATE 3 ML: .5; 3 SOLUTION RESPIRATORY (INHALATION) at 09:29

## 2019-05-01 RX ADMIN — POTASSIUM CHLORIDE: 2 INJECTION, SOLUTION, CONCENTRATE INTRAVENOUS at 09:00

## 2019-05-01 RX ADMIN — HEPARIN SODIUM 5000 UNITS: 5000 INJECTION INTRAVENOUS; SUBCUTANEOUS at 10:14

## 2019-05-01 RX ADMIN — POTASSIUM CHLORIDE: 2 INJECTION, SOLUTION, CONCENTRATE INTRAVENOUS at 08:00

## 2019-05-01 RX ADMIN — SODIUM CHLORIDE 50 ML/HR: 900 INJECTION, SOLUTION INTRAVENOUS at 11:50

## 2019-05-01 RX ADMIN — HEPARIN SODIUM 5000 UNITS: 5000 INJECTION INTRAVENOUS; SUBCUTANEOUS at 18:51

## 2019-05-01 RX ADMIN — POTASSIUM CHLORIDE: 2 INJECTION, SOLUTION, CONCENTRATE INTRAVENOUS at 10:00

## 2019-05-01 RX ADMIN — IPRATROPIUM BROMIDE AND ALBUTEROL SULFATE 3 ML: .5; 3 SOLUTION RESPIRATORY (INHALATION) at 03:52

## 2019-05-01 RX ADMIN — IPRATROPIUM BROMIDE AND ALBUTEROL SULFATE 3 ML: .5; 3 SOLUTION RESPIRATORY (INHALATION) at 23:48

## 2019-05-01 RX ADMIN — HYDROCORTISONE SODIUM SUCCINATE 50 MG: 100 INJECTION, POWDER, FOR SOLUTION INTRAMUSCULAR; INTRAVENOUS at 18:47

## 2019-05-01 RX ADMIN — POTASSIUM CHLORIDE 10 MEQ: 7.46 INJECTION, SOLUTION INTRAVENOUS at 10:14

## 2019-05-01 RX ADMIN — CEFEPIME HYDROCHLORIDE 1 G: 1 INJECTION, POWDER, FOR SOLUTION INTRAMUSCULAR; INTRAVENOUS at 18:50

## 2019-05-01 RX ADMIN — IPRATROPIUM BROMIDE AND ALBUTEROL SULFATE 3 ML: .5; 3 SOLUTION RESPIRATORY (INHALATION) at 14:26

## 2019-05-01 RX ADMIN — POTASSIUM CHLORIDE: 2 INJECTION, SOLUTION, CONCENTRATE INTRAVENOUS at 07:00

## 2019-05-01 RX ADMIN — METRONIDAZOLE 500 MG: 500 INJECTION, SOLUTION INTRAVENOUS at 08:48

## 2019-05-01 NOTE — PROGRESS NOTES
0700: Assumed care of patient, NAD noted, resting in bed quietly. 3458: Dr Almaz Sawant at bedside, aware patient is not alert enough to take PO medications. 1235: Wound care nurse at bedside to assess patient's wounds. 1545: Dr Cruz Frazier on unit. 1550: Bedside and Verbal shift change report given to Darris Krabbe, RN (oncoming nurse) by Gera Espinoza RN (offgoing nurse). Report included the following information SBAR, Kardex, Intake/Output, MAR, Recent Results and Cardiac Rhythm AV paced.

## 2019-05-01 NOTE — WOUND CARE
Physical Exam  
Room 2304: wound assess Wound Sacrum Right closed pressure injury on the Sacral area POA (Active) Number of days: 0 Wound Leg lower Lower;Right;Medial Eschar with blood clots POA (Active) Dressing Status Intact; Removed 5/1/2019 12:34 PM  
Dressing Type Gauze;ABD pad 5/1/2019 12:34 PM  
Non-staged Wound Description Full thickness 5/1/2019 12:34 PM  
Shape irregular 5/1/2019 12:34 PM  
Wound Length (cm) 9.5 cm 5/1/2019 12:34 PM  
Wound Width (cm) 4 cm 5/1/2019 12:34 PM  
Wound Depth (cm) 1 cm 5/1/2019 12:34 PM  
Wound Volume (cm^3) 38 cm^3 5/1/2019 12:34 PM  
Condition of Base Eschar;blood clots;Pink 5/1/2019 12:34 PM  
Condition of Edges Closed 5/1/2019 12:34 PM  
Epithelialization (%) 0 5/1/2019 12:34 PM  
Assessment Black;Pink;blood clots 5/1/2019 12:34 PM  
Tissue Type Percent Black 90 % 5/1/2019 12:34 PM  
Tissue Type Percent Pink 105 5/1/2019 12:34 PM  
Tissue Type Percent Other (comment) 5 5/1/2019 12:34 PM  
Drainage Amount Moderate 5/1/2019 12:34 PM  
Drainage Color Serosanguinous 5/1/2019 12:34 PM  
Wound Odor None 5/1/2019 12:34 PM  
Keisha-wound Assessment Intact 5/1/2019 12:34 PM  
Margins Defined edges; Unattached edges 5/1/2019 12:34 PM  
Dressing Changed Changed/New 5/1/2019 12:34 PM  
Dressing Type Applied ABD pad 5/1/2019 12:34 PM  
Procedure Tolerated Fair 5/1/2019 12:34 PM  
Number of days: 0 Wound Thigh Right;Upper; s/p I&D; dry cellulitis POA with no open wound visible (Active) Number of days: 0 Wound Groin Right;Left moisture associated skin damage POA (Active) Number of days: 0 Discussed with family members present: they reveal no trauma known to cause this eschar wound. Envision mattress on [employer] frame ordered for delivery today. Discussed care with primary nurse [facility] RN. Discussed eschar on right lower leg wound with Dr. Penelope Vanegas via phone. Will turn over care to nursing staff at this time.  
Clayton GOMEZ, RN, [employer], [address]

## 2019-05-01 NOTE — H&P
PCP 
Zhanna Julian MD 
 
DOA: 4/30/2019 DOS: 4/30/2019 Chief complaint(s): AMS, Fever, Right LE pain,swelling and wound HPI: 
51-year-old AA female with a history of congestive heart failure with EF of 31-35 % on 12/28/18, lupus, thrombus cytopenia, chronic kidney disease, and urinary tract infections was brought by to the ED of [facility] after being found altered from her base line today. Patient lives with her son and has some home health checking in for a wound on her right leg. Patient usually is talkative but today was not talking normally and was confused. They found her to have a high fever and so they decided to call EMS to have her evaluated in the emergency department. Patient has been having recurrent infections and does not have advanced directives. Patient has dementia and does not talk. All information was gathered from her daughter and ED record. She is not in acute distress at this point and her vital signs are stable. Has fever.  
 
  
  
 
 
 
 
 
No Known Allergies Past Medical History:  
Diagnosis Date  Acute on chronic diastolic heart failure ([address]  Arthritis  Benign hypertensive heart disease without heart failure Better controlled, stable  Chronic diastolic heart failure ([address] Breathing and edema is improving  Congestive heart failure ([address]  HTN (hypertension)  Hypercholesteremia  Lupus (systemic lupus erythematosus) ([address] 6/18/2014  
 followed by  [facility] [place]  Obesity, unspecified Patient has weight loss Discussed diet ad fluid restriction  Other and unspecified hyperlipidemia F/u per pmd  
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  
Procedure Laterality Date  HX HYSTERECTOMY  PACEMAKER PROCEDURE Family History Problem Relation Age of Onset  Arrhythmia Neg Hx  Asthma Neg Hx  Clotting Disorder Neg Hx  Fainting Neg Hx   
 Heart Attack Neg Hx  High Cholesterol Neg Hx  Pacemaker Neg Hx  Stroke Neg Hx Social History Socioeconomic History  Marital status:  Spouse name: Not on file  Number of children: Not on file  Years of education: Not on file  Highest education level: Not on file Occupational History  Not on file Social Needs  Financial resource strain: Not on file  Food insecurity:  
  Worry: Not on file Inability: Not on file  Transportation needs:  
  Medical: Not on file Non-medical: Not on file Tobacco Use  Smoking status: Never Smoker  Smokeless tobacco: Never Used Substance and Sexual Activity  Alcohol use: No  
 Drug use: No  
 Sexual activity: Never Lifestyle  Physical activity:  
  Days per week: Not on file Minutes per session: Not on file  Stress: Not on file Relationships  Social connections:  
  Talks on phone: Not on file Gets together: Not on file Attends Baptism service: Not on file Active member of club or organization: Not on file Attends meetings of clubs or organizations: Not on file Relationship status: Not on file  Intimate partner violence:  
  Fear of current or ex partner: Not on file Emotionally abused: Not on file Physically abused: Not on file Forced sexual activity: Not on file Other Topics Concern  Not on file Social History Narrative  Not on file Review of Systems:   
Unable to obtain ROS because of her confusion and dementia. Physical Exam:  
 
Visit Vitals /78 Pulse 75 Temp (!) 102 °F (38.9 °C) Resp 18 Wt 74.8 kg (165 lb) SpO2 97% BMI 28.32 kg/m² General Appearance:  Awake , Alert and  Oriented X 0. Head:  Normocephalic, without obvious abnormality, atraumatic Eyes:  PERRL, conjunctiva/corneas clear, EOM's intact, anicteric sclerae Ears:  Normal  external ear canals, both ears Throat: Lips, mucosa, and tongue normal; teeth and gums normal  
Neck:  No carotid bruit or JVD, no thyromegaly Lungs:   Diminished BS  to auscultation bilaterally,symmetrical expansion Heart:  Paced rhythm, S1 and S2 normal, no murmur, rub, or gallop, POMI nondisplaced Abdomen:   Soft, non-tender non-distended, bowel sounds active all four quadrants,  no masses, no organomegaly Extremities: Right inter thigh are is painful and tender by palpation. She has a large wound covered with loose black necrotic tissue in RLE. Pulses: 2+ and symmetric Skin: Skin color, texture, turgor normal, no rashes or lesions Neurologic: She is awake. A and O X 0. She had dementia. Labs Reviewed CBC WITH AUTOMATED DIFF - Abnormal; Notable for the following components:  
    Result Value WBC 20.8 (*)   
 RBC 2.82 (*) HGB 8.4 (*) HCT 26.0 (*) NEUTROPHILS 91 (*) LYMPHOCYTES 4 (*)   
 ABS. NEUTROPHILS 19.4 (*) All other components within normal limits METABOLIC PANEL, COMPREHENSIVE - Abnormal; Notable for the following components:  
 Potassium 3.2 (*) Chloride 99 (*)   
 CO2 34 (*) Glucose 129 (*) BUN 61 (*) Creatinine 2.14 (*)   
 BUN/Creatinine ratio 29 (*)   
 GFR est AA 26 (*)   
 GFR est non-AA 22 (*) Protein, total 6.2 (*) Albumin 2.7 (*) All other components within normal limits PROTHROMBIN TIME + INR - Abnormal; Notable for the following components:  
 Prothrombin time 16.8 (*) INR 1.4 (*) All other components within normal limits PTT - Abnormal; Notable for the following components:  
 aPTT 42.5 (*) All other components within normal limits TROPONIN I - Abnormal; Notable for the following components:  
 Troponin-I, QT 1.25 (*) All other components within normal limits NT-PRO BNP - Abnormal; Notable for the following components:  
 NT pro-BNP 11,831 (*) All other components within normal limits CULTURE, BLOOD CULTURE, BLOOD CULTURE, URINE  
MAGNESIUM  
URINALYSIS W/ RFLX MICROSCOPIC  
POC LACTIC ACID I have reviewed labs and imaging studies  and discussed pertinent findings  with patient's daughter. Assessment/Plan : 
 
 
---Sepsis most likely secondary to RLE abscess and wound : Admit to step down. Start her on Broad spectrum IV antibiotic. ED physician will do I and D . Cultures are done. Follow result. Consult ID. Consult Wound Care.  
 
---Elevated tropon with hx of CHF : Likely demand ischemia. Cardiology been informed and Dr. Jared Linton is aware. Will continue Coreg, Statin and Aspirin . Monitor troponin level. Continue her po diuretics as she is taking at home. ---Dementia : Stable. No agitation. Monitor her mental status and continue her home medications. --- Atrial Fibrillation: Continue amiodarone and Coreg. Continue Tele. ---CKD-IV: Monitor BMP, Avoid nephrotoxic agents. Consult Nephrology. Dr. Nelli Russ.  
 
--- SLE:  As per her daughter she is on 5 mg of prednisone at home. Will continue prednisone and  Plaquenil. The rest of the treatment will be per am Hospitalist team, Consultants recommendation, test results and Patient's Clinical Course. Plan of the treatment was discussed with the patient's daughter. She is  in  agreement. PATIENT IS FULL CODE. Victorino Taylor MD 
 
04/30/19

## 2019-05-01 NOTE — CONSULTS
Infectious Disease Consultation Note Requested by:dr. issa 
 
Reason:sepsis, right leg cellulitis, right thigh abscess Current abx Prior abx Cefepime, vancomycin since 4/30 Pip/tazo x 1 - 5/1 Lines:  
 
 
Assessment : 
 
75-year-old AA female with a history of congestive heart failure with EF of 31-35 % on 12/28/18, lupus, thrombus cytopenia, chronic kidney disease admitted to [facility] on 4/30/19 for altered mental status. H/o lupus nodules on LE in 1/2019 Now with high fevers, RLE redness, ulcer right leg Clinical picture consistent with sepsis (POA) due to acute right thigh/leg cellulitis, right thigh abscess, necrotic right leg ulcer with possible superinfection. Right thigh abscess s/p bedside I&D in ed on 4/30/19. No cultures sent. Exact microbial etiology of infeciton unclear. Will send cultures to determine this. Also, patient continues to have significant erythema, tenderness right thigh/leg, duskiness of left leg - will benefit from vascular surgery evaluation to determine need for further I&D, vascular studies 
  
Recommendations: 
  
1. Continue cefepime, vancomycin. Add metronidazole 2. Obtain wound culture right leg 3. Vascular surgery consult to evaluate right leg ulcer - determine need for I&D, evaluate left leg for vascular insufficiency 4. F/u blood culture 5. Monitor right LE clinically 
  
Advance Care planning: full code: discussed  with patient/surrogate decision maker: zacarias ledbetter: [phone] 
  
  
 Above plan was discussed in details with  dr Rose Mary Juarez. Please call me if any further questions or concerns. Will continue to participate in the care of this patient. 
  
 
 
 
Thank you for consultation request.  
 
 
HPI: 
 
75-year-old AA female with a history of congestive heart failure with EF of 31-35 % on 12/28/18, lupus, thrombus cytopenia, chronic kidney disease admitted to [facility] on 4/30/19 for altered mental status. Patient is known to me from prior inpatient consultation at [facility] in 1/2019. At that time, I was consulted for bilateral LE nodules, concern for infection. After work up and clinical exam, it was felt that the skin changes were related to SLE. Rheumatology was consulted. Patient started on steroids with improvement. Subsequently patient developed an ulcer right leg. Salvatore Santos lives with her son and has some home health checking in for a wound on her right leg.  she was noted to be confused on 4/30/19. Sherolyn Spurling found her to have a high fever and so they decided to call EMS to have her evaluated in the emergency department.   Patient has dementia and does not talk. All information was gathered by talking to RN and review of [facility] care records. In ed, she was noted to have temp of 103.4, tachycardia. Had fluctuance right medial thigh with erythema right thigh, leg. She had ct scan which appeared concerning for right thigh abscess. She underwent bedside I&D. Purulent material noted. No cultures sent. I have been consulted for further recommendations. Review of records reveal that she had 2 hospitalizations after I saw her in 1/2019. in 3/4/2019 - admitted to hospital 2/10/19 for NSTEMI , Anemia, CKD stage IV and chronic systolic CHF. Has not had diuretics since d/c and c/o increased edema to upper and lower extremties. 3/19/2019admitted to hospital for acute metabolic encephalopathy, sepsis, UTI. 
  
Detailed ros not feasible since patient unable to communicate effectively. Past Medical History:  
Diagnosis Date  Acute on chronic diastolic heart failure ([address]  Arthritis  Benign hypertensive heart disease without heart failure Better controlled, stable  Chronic diastolic heart failure ([address] Breathing and edema is improving  Congestive heart failure ([address]  HTN (hypertension)  Hypercholesteremia  Lupus (systemic lupus erythematosus) ([address] 6/18/2014 followed by dr Valente An  Obesity, unspecified Patient has weight loss Discussed diet ad fluid restriction  Other and unspecified hyperlipidemia F/u per pmd  
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  
Procedure Laterality Date  HX HYSTERECTOMY  PACEMAKER PROCEDURE    
 
 
home Medication List  
  
  
   
 Details  
polyethylene glycol (MIRALAX) 17 gram/dose powder Take 17 g by mouth daily. torsemide (DEMADEX) 10 mg tablet Take 1 Tab by mouth daily. Qty: 30 Tab, Refills: 6  
  
amoxicillin (AMOXIL) 500 mg capsule Take 500 mg by mouth three (3) times daily. aspirin delayed-release 81 mg tablet Take 81 mg by mouth daily. hydroxychloroquine (PLAQUENIL) 200 mg tablet Take 200 mg by mouth two (2) times a day. metOLazone (ZAROXOLYN) 2.5 mg tablet Take 1 Tab by mouth daily. Qty: 30 Tab, Refills: 5  
  
!! OTHER Incentive Spirometry- Use as directed Qty: 1 Each, Refills: 0  
  
!! OTHER Graded Compression Stockings b/l LE- use as directed Qty: 1 Each, Refills: 0  
  
food supplemt, lactose-reduced (ENSURE ENLIVE) 0.08 gram-1.5 kcal/mL liqd Take 1 Bottle by mouth two (2) times a day. Qty: 60 Bottle, Refills: 0  
  
menthol-zinc oxide (CALMOSEPTINE) 0.44-20.6 % oint Apply  to affected area. carvedilol (COREG) 3.125 mg tablet Take 1 Tab by mouth every twelve (12) hours. Qty: 60 Tab, Refills: 0  
  
acetaminophen (TYLENOL ARTHRITIS PAIN) 650 mg TbER Take 1 Tab by mouth every eight (8) hours. Qty: 15 Tab, Refills: 0  
  
amiodarone (CORDARONE) 200 mg tablet TAKE ONE TABLET EVERY DAY (MAKE AN APPT) Qty: 30 Tab, Refills: 5  
  
memantine (NAMENDA) 10 mg tablet Take 10 mg by mouth two (2) times a day. amitriptyline (ELAVIL) 25 mg tablet Take 25 mg by mouth nightly. !! - Potential duplicate medications found. Please discuss with provider. Current Facility-Administered Medications Medication Dose Route Frequency  acetaminophen (TYLENOL) tablet 650 mg  650 mg Oral Q6H PRN  
 amiodarone (CORDARONE) tablet 200 mg  200 mg Oral DAILY  amitriptyline (ELAVIL) tablet 25 mg  25 mg Oral QHS  aspirin delayed-release tablet 81 mg  81 mg Oral DAILY  carvedilol (COREG) tablet 3.125 mg  3.125 mg Oral Q12H  
 food supplemt, lactose-reduced (ENSURE ENLIVE) 0.08 gram-1.5 kcal/mL liqd 1 Bottle  1 Bottle Oral BID  hydroxychloroquine (PLAQUENIL) tablet 200 mg  200 mg Oral BID  memantine (NAMENDA) tablet 10 mg  10 mg Oral BID  menthol-zinc oxide (CALMOSEPTINE) 0.44-20.6 % ointment   Topical BID  metOLazone (ZAROXOLYN) tablet 2.5 mg  2.5 mg Oral DAILY  polyethylene glycol (MIRALAX) packet 17 g  17 g Oral DAILY  torsemide (DEMADEX) tablet 10 mg  10 mg Oral DAILY  hydrALAZINE (APRESOLINE) 20 mg/mL injection 10 mg  10 mg IntraVENous Q6H PRN  
 albuterol-ipratropium (DUO-NEB) 2.5 MG-0.5 MG/3 ML  3 mL Nebulization Q4H RT  
 heparin (porcine) injection 5,000 Units  5,000 Units SubCUTAneous Q8H  predniSONE (DELTASONE) tablet 5 mg  5 mg Oral DAILY WITH BREAKFAST  potassium chloride 10 mEq, lidocaine (PF) (XYLOCAINE) 10 mg/mL (1 %) 1 mL in 0.9% sodium chloride 100 mL IVPB   IntraVENous Q1H  
 sodium chloride (NS) flush 5-10 mL  5-10 mL IntraVENous PRN  
 sodium chloride 0.9 % bolus infusion 100 mL  100 mL IntraVENous ONCE  
 cefepime (MAXIPIME) 1 g in sterile water (preservative free) 10 mL IV syringe  1 g IntraVENous Q24H  VANCOMYCIN INFORMATION NOTE  1 Each Other Rx Dosing/Monitoring  lidocaine-EPINEPHrine (XYLOCAINE) 1 %-1:100,000 injection  aspirin tablet 325 mg  325 mg Oral DAILY Allergies: Patient has no known allergies. Family History Problem Relation Age of Onset  Arrhythmia Neg Hx  Asthma Neg Hx  Clotting Disorder Neg Hx  Fainting Neg Hx   
 Heart Attack Neg Hx  High Cholesterol Neg Hx  Pacemaker Neg Hx  Stroke Neg Hx Social History Socioeconomic History  Marital status:  Spouse name: Not on file  Number of children: Not on file  Years of education: Not on file  Highest education level: Not on file Occupational History  Not on file Social Needs  Financial resource strain: Not on file  Food insecurity:  
  Worry: Not on file Inability: Not on file  Transportation needs:  
  Medical: Not on file Non-medical: Not on file Tobacco Use  Smoking status: Never Smoker  Smokeless tobacco: Never Used Substance and Sexual Activity  Alcohol use: No  
 Drug use: No  
 Sexual activity: Never Lifestyle  Physical activity:  
  Days per week: Not on file Minutes per session: Not on file  Stress: Not on file Relationships  Social connections:  
  Talks on phone: Not on file Gets together: Not on file Attends Zoroastrian service: Not on file Active member of club or organization: Not on file Attends meetings of clubs or organizations: Not on file Relationship status: Not on file  Intimate partner violence:  
  Fear of current or ex partner: Not on file Emotionally abused: Not on file Physically abused: Not on file Forced sexual activity: Not on file Other Topics Concern  Not on file Social History Narrative  Not on file Social History Tobacco Use Smoking Status Never Smoker Smokeless Tobacco Never Used Temp (24hrs), Av °F (37.8 °C), Min:97.2 °F (36.2 °C), Max:103.4 °F (39.7 °C) Visit Vitals /50 (BP 1 Location: Right arm, BP Patient Position: At rest) Pulse 70 Temp 98.2 °F (36.8 °C) Resp 20 Ht 5' 4\" (1.626 m) Wt 68.4 kg (150 lb 14.4 oz) SpO2 93% BMI 25.90 kg/m² ROS: 12 point ROS obtained in details. Pertinent positives as mentioned in HPI,  
otherwise negative Physical Exam: Constitutional: She appears well-developed and well-nourished. No distress. non verbal 
 
HENT:  
Head: Normocephalic and atraumatic. Right Ear: External ear normal.  
Left Ear: External ear normal.  
Nose: Nose normal.  
Eyes: Pupils are equal, round, and reactive to light. Conjunctivae and EOM are normal. No scleral icterus. Neck: Normal range of motion. Neck supple. No JVD present. No tracheal deviation present. No thyromegaly present. Cardiovascular: Normal rate, regular rhythm, normal heart sounds and intact distal pulses. Exam reveals no gallop and no friction rub. No murmur heard. Pulmonary/Chest: Effort normal and breath sounds normal. She exhibits no tenderness. no rales/rhonchi Abdominal: Soft. Bowel sounds are normal. She exhibits no distension. There is no tenderness. There is no rebound and no guarding. Musculoskeletal: She exhibits no edema or tenderness. Right lower extremity with diffuse erythema extending to right lateral thigh, right leg with overlying warmth/tenderness, anterior tibial 10 x 5 cm open bloody wound with necrotic center, Left lower extremity dusky color without edema Lymphadenopathy:  
  She has no cervical adenopathy. Neurological: She is alert. No cranial nerve deficit. Coordination normal.  
Eyes open but does not follow commands, globally weak, right lower extremity is contracted Skin: Skin is dry. Right lower extremity erythema and warm to touch Psychiatric:  
Unable to assess Nursing note and vitals reviewed. 
  
  
 
 
 
 
Labs: Results:  
Chemistry Recent Labs 05/01/19 0407 04/30/19 
1900 * 129*  139  
K 2.9* 3.2*  
CL 99* 99* CO2 32 34* BUN 62* 61* CREA 2.04* 2.14* CA 8.3* 8.7 AGAP 7 6 BUCR 30* 29* AP 67 68  
TP 6.3* 6.2* ALB 2.8* 2.7*  
GLOB 3.5 3.5 AGRAT 0.8 0.8 CBC w/Diff Recent Labs 05/01/19 0407 04/30/19 
1900 WBC 22.3* 20.8*  
RBC 2.88* 2.82* HGB 8.5* 8.4* HCT 26.7* 26.0*  
 171 GRANS 79* 91* LYMPH 3* 4* EOS 0 0 Microbiology No results for input(s): CULT in the last 72 hours. RADIOLOGY: 
 
All available imaging studies/reports in [facility] for this admission were reviewed Dr. Felicity Robertson, Infectious Disease Specialist 
[phone] May 1, 2019 
7:41 AM

## 2019-05-01 NOTE — ROUTINE PROCESS
1549 assumed care of pt after bedside verbal report was given by off going nurse, pt resting in bed quietly, normal saline infusing at 50 ml/hr, no acute distress noted 1934 Bedside shift change report given to KAREEM Davis (oncoming nurse) by Ludwin SERNA (offgoing nurse). Report included the following information SBAR, Kardex and MAR.

## 2019-05-01 NOTE — CONSULTS
Consult Note Consult requested by: Angel Hu MD 
 
ADMIT DATE: 4/30/2019 CONSULT DATE: May 1, 2019 Admission diagnosis: AMS Reason for Nephrology Consultation: ARIEL Assessment:  
 
1 Acute kidney injury o prerenal azotemia versus ATN in the setting ofn CKD4  secondary to sepsis/right lower extremity abscess/SIRS Patient looks very dry, hold her diuretics, start fluids. Agree with 50 cc an hour of normal saline as a continuous maintenance. Strict ins and outs, agree with Mendiola in place, trend renal parameters closely. Blood pressures are soft, so placed parameters for holding blood pressure medications. 2 chronic kidney disease stage IV secondary to hypertension nephrosclerosis, cardiorenal syndrome 3 sepsis most likely secondary to right lower extremity abscess/wounds. On antibiotics per ID, vascular also consulted per ID recs 4 history of dementia  
5 history of lupus, immunocompromised on the right long-term steroids. Please call with questions , 
 
Silvestre Bonilla MD [facility] Cell [phone] Pager: [phone] HPI:  
Patient is a 69-year-old female with past medical history of congestive heart failure, hypertension, cardiomyopathy, history of dementia, atrial fibrillation who was brought into the emergency room with altered mental status and fever. It was noted that patient had high-grade fever yesterday and so was brought into the emergency room. Patient has been more lethargic compared to her baseline, mostly information was obtained from medical records. Noted that patient had a recent right thigh/leg cellulitis/abscess which she had a I&D on 30 April. Worsening lower extremity tenderness as well as redness. On presentation to the emergency room patient was noted to be with significantly elevated white count also had acute renal failure with a bump in creatinine to 2.04 range. Her baseline is 1.2-1.3 Past Medical History:  
Diagnosis Date  Acute on chronic diastolic heart failure ([facility] [address]  Arthritis  Benign hypertensive heart disease without heart failure Better controlled, stable  Chronic diastolic heart failure ([facility] [address] Breathing and edema is improving  Congestive heart failure ([facility] [address]  HTN (hypertension)  Hypercholesteremia  Lupus (systemic lupus erythematosus) ([facility] [address] 6/18/2014  
 followed by dr Carolyn Anderson  Obesity, unspecified Patient has weight loss Discussed diet ad fluid restriction  Other and unspecified hyperlipidemia F/u per pmd  
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  
Procedure Laterality Date  HX HYSTERECTOMY  PACEMAKER PROCEDURE Social History Socioeconomic History  Marital status:  Spouse name: Not on file  Number of children: Not on file  Years of education: Not on file  Highest education level: Not on file Occupational History  Not on file Social Needs  Financial resource strain: Not on file  Food insecurity:  
  Worry: Not on file Inability: Not on file  Transportation needs:  
  Medical: Not on file Non-medical: Not on file Tobacco Use  Smoking status: Never Smoker  Smokeless tobacco: Never Used Substance and Sexual Activity  Alcohol use: No  
 Drug use: No  
 Sexual activity: Never Lifestyle  Physical activity:  
  Days per week: Not on file Minutes per session: Not on file  Stress: Not on file Relationships  Social connections:  
  Talks on phone: Not on file Gets together: Not on file Attends [employer] service: Not on file Active member of club or organization: Not on file Attends meetings of clubs or organizations: Not on file Relationship status: Not on file  Intimate partner violence:  
  Fear of current or ex partner: Not on file Emotionally abused: Not on file Physically abused: Not on file Forced sexual activity: Not on file Other Topics Concern  Not on file Social History Narrative  Not on file Family History Problem Relation Age of Onset  Arrhythmia Neg Hx  Asthma Neg Hx  Clotting Disorder Neg Hx  Fainting Neg Hx   
 Heart Attack Neg Hx  High Cholesterol Neg Hx  Pacemaker Neg Hx  Stroke Neg Hx No Known Allergies Home Medications:  
 
Medications Prior to Admission Medication Sig  polyethylene glycol (MIRALAX) 17 gram/dose powder Take 17 g by mouth daily.  torsemide (DEMADEX) 10 mg tablet Take 1 Tab by mouth daily.  amoxicillin (AMOXIL) 500 mg capsule Take 500 mg by mouth three (3) times daily.  aspirin delayed-release 81 mg tablet Take 81 mg by mouth daily.  hydroxychloroquine (PLAQUENIL) 200 mg tablet Take 200 mg by mouth two (2) times a day.  metOLazone (ZAROXOLYN) 2.5 mg tablet Take 1 Tab by mouth daily.  OTHER Incentive Spirometry- Use as directed  OTHER Graded Compression Stockings b/l LE- use as directed  food supplemt, lactose-reduced (ENSURE ENLIVE) 0.08 gram-1.5 kcal/mL liqd Take 1 Bottle by mouth two (2) times a day.  menthol-zinc oxide (CALMOSEPTINE) 0.44-20.6 % oint Apply  to affected area.  carvedilol (COREG) 3.125 mg tablet Take 1 Tab by mouth every twelve (12) hours.  acetaminophen (TYLENOL ARTHRITIS PAIN) 650 mg TbER Take 1 Tab by mouth every eight (8) hours. (Patient taking differently: Take 1 Tab by mouth daily.)  amiodarone (CORDARONE) 200 mg tablet TAKE ONE TABLET EVERY DAY (MAKE AN APPT) (Patient taking differently: TAKE ONE TABLET EVERY DAY )  
 memantine (NAMENDA) 10 mg tablet Take 10 mg by mouth two (2) times a day.  amitriptyline (ELAVIL) 25 mg tablet Take 25 mg by mouth nightly. Current Inpatient Medications:  
 
Current Facility-Administered Medications Medication Dose Route Frequency  acetaminophen (TYLENOL) tablet 650 mg  650 mg Oral Q6H PRN  
 amiodarone (CORDARONE) tablet 200 mg  200 mg Oral DAILY  amitriptyline (ELAVIL) tablet 25 mg  25 mg Oral QHS  aspirin delayed-release tablet 81 mg  81 mg Oral DAILY  carvedilol (COREG) tablet 3.125 mg  3.125 mg Oral Q12H  
 food supplemt, lactose-reduced (ENSURE ENLIVE) 0.08 gram-1.5 kcal/mL liqd 1 Bottle  1 Bottle Oral BID  hydroxychloroquine (PLAQUENIL) tablet 200 mg  200 mg Oral BID  memantine (NAMENDA) tablet 10 mg  10 mg Oral BID  menthol-zinc oxide (CALMOSEPTINE) 0.44-20.6 % ointment   Topical BID  polyethylene glycol (MIRALAX) packet 17 g  17 g Oral DAILY  hydrALAZINE (APRESOLINE) 20 mg/mL injection 10 mg  10 mg IntraVENous Q6H PRN  
 albuterol-ipratropium (DUO-NEB) 2.5 MG-0.5 MG/3 ML  3 mL Nebulization Q4H RT  
 heparin (porcine) injection 5,000 Units  5,000 Units SubCUTAneous Q8H  predniSONE (DELTASONE) tablet 5 mg  5 mg Oral DAILY WITH BREAKFAST  metroNIDAZOLE (FLAGYL) IVPB premix 500 mg  500 mg IntraVENous Q12H  
 0.9% sodium chloride infusion  50 mL/hr IntraVENous CONTINUOUS  
 sodium chloride (NS) flush 5-10 mL  5-10 mL IntraVENous PRN  
 cefepime (MAXIPIME) 1 g in sterile water (preservative free) 10 mL IV syringe  1 g IntraVENous Q24H  VANCOMYCIN INFORMATION NOTE  1 Each Other Rx Dosing/Monitoring Review of Systems:  
Patient unable to provide much ROS Physical Assessment:  
 
Vitals:  
 05/01/19 0358 05/01/19 0359 05/01/19 0736 05/01/19 1116 BP:   102/50 94/51 Pulse:   70 70 Resp:   20 21 Temp: 99.2 °F (37.3 °C) 99 °F (37.2 °C) 98.2 °F (36.8 °C) 98.5 °F (36.9 °C) SpO2:   93% 98% Weight:      
Height:      
 
Last 3 Recorded Weights in this Encounter 04/30/19 1947 05/01/19 9550 Weight: 74.8 kg (165 lb) 68.4 kg (150 lb 14.4 oz) Admission weight: Weight: 74.8 kg (165 lb) (04/30/19 1947) Intake/Output Summary (Last 24 hours) at 5/1/2019 3058 Last data filed at 5/1/2019 1200 Gross per 24 hour Intake 2368. 33 ml Output 230 ml Net 2138.33 ml Patient is in no apparent distress. HEENT: Head is normocephalic and atraumatic. Neck: no cervical lymphadenopathy or thyromegaly. Lungs: good air entry, clear to auscultation bilaterally. Cardiovascular system: S1, S2, regular rate and rhythm Abdomen: soft, non tender, non distended. BS+ Extremities: no edema Integumentary: skin is grossly intact. Neurologic: Alert, oriented time three. Data Review: 
 
Labs: Results:  
   
Chemistry Recent Labs 05/01/19 
0407 04/30/19 
1900 * 129*  139  
K 2.9* 3.2*  
CL 99* 99* CO2 32 34* BUN 62* 61* CREA 2.04* 2.14* CA 8.3* 8.7 AGAP 7 6 BUCR 30* 29* AP 67 68  
TP 6.3* 6.2* ALB 2.8* 2.7*  
GLOB 3.5 3.5 AGRAT 0.8 0.8 CBC w/Diff Recent Labs 05/01/19 
0407 04/30/19 
1900 WBC 22.3* 20.8*  
RBC 2.88* 2.82* HGB 8.5* 8.4* HCT 26.7* 26.0*  
 171 GRANS 79* 91* LYMPH 3* 4* EOS 0 0 Iron/Ferritin No results for input(s): IRON in the last 72 hours. No lab exists for component: TIBCCALC  
PTH/VIT D No results for input(s): PTH in the last 72 hours. No lab exists for component: VITD Bharat Harris MD 
5/1/2019 
2:44 PM 
 
 
May 1, 2019

## 2019-05-01 NOTE — PROGRESS NOTES
Bedside shift change report given to Eric Arias (oncoming nurse) by Horace Garcia (offgoing nurse). Report included the following information SBAR, Kardex, Intake/Output, MAR and Recent Results.

## 2019-05-01 NOTE — PROGRESS NOTES
[facility] Hospitalist Group Progress Note Patient: Dunia Tillman Age: 80 y.o. : 1931 MR#: [id number] SSN: [id number] Date/Time: 2019 Subjective:  
 
Patient lying in bed in NAD Assessment/Plan:  
 
- RLE cellulitis, abscess 
-ARIEL on ckd - chronic mixed systolic and diastolic chf- compensated 
-A fib, rate controlled H/o lupus Dementia - elevated trop, likely 2/2 demand ischemia PLAN On abx per ID, follow cx Vacular consulted Monitor renal function, nephro following Iv fluids On amiodarone, cardio following D/w Dr Kyler Castaneda Case discussed with:  []Patient  []Family  []Nursing  []Case Management DVT Prophylaxis:  []Lovenox  [x]Hep SQ  []SCDs  []Coumadin   []On Heparin gtt Objective:  
VS:  
Visit Vitals BP 90/47 (BP 1 Location: Right arm, BP Patient Position: At rest) Pulse 67 Temp 99.4 °F (37.4 °C) Resp 20 Ht 5' 4\" (1.626 m) Wt 68.4 kg (150 lb 14.4 oz) SpO2 94% BMI 25.90 kg/m² Tmax/24hrs: Temp (24hrs), Av.8 °F (37.7 °C), Min:97.2 °F (36.2 °C), Max:103.4 °F (39.7 °C) Input/Output:  
 
Intake/Output Summary (Last 24 hours) at 2019 1738 Last data filed at 2019 1200 Gross per 24 hour Intake 2368. 33 ml Output 230 ml Net 2138.33 ml General:  Awake, confused, has dementia Cardiovascular:  S1S2+, Irr 
Pulmonary:  CTA b/l GI:  Soft, BS+, NT, ND Extremities:  Right thigh abscess/cellulitis, RLE ulcer Labs:   
Recent Results (from the past 24 hour(s)) CBC WITH AUTOMATED DIFF Collection Time: 19  7:00 PM  
Result Value Ref Range WBC 20.8 (H) 4.6 - 13.2 K/uL  
 RBC 2.82 (L) 4.20 - 5.30 M/uL HGB 8.4 (L) 12.0 - 16.0 g/dL HCT 26.0 (L) 35.0 - 45.0 % MCV 92.2 74.0 - 97.0 FL  
 MCH 29.8 24.0 - 34.0 PG  
 MCHC 32.3 31.0 - 37.0 g/dL  
 RDW 14.1 11.6 - 14.5 % PLATELET 136 178 - 182 K/uL MPV 9.3 9.2 - 11.8 FL  
 NEUTROPHILS 91 (H) 42 - 75 % BAND NEUTROPHILS 2 0 - 5 % LYMPHOCYTES 4 (L) 20 - 51 % MONOCYTES 3 2 - 9 % EOSINOPHILS 0 0 - 5 % BASOPHILS 0 0 - 3 %  
 ABS. NEUTROPHILS 19.4 (H) 1.8 - 8.0 K/UL  
 ABS. LYMPHOCYTES 0.8 0.8 - 3.5 K/UL  
 ABS. MONOCYTES 0.6 0 - 1.0 K/UL  
 ABS. EOSINOPHILS 0.0 0.0 - 0.4 K/UL  
 ABS. BASOPHILS 0.0 0.0 - 0.06 K/UL  
 DF MANUAL PLATELET COMMENTS ADEQUATE PLATELETS    
 RBC COMMENTS NORMOCYTIC, NORMOCHROMIC    
 RBC COMMENTS ANISOCYTOSIS 1+ 
    
 RBC COMMENTS POIKILOCYTOSIS 
1+ METABOLIC PANEL, COMPREHENSIVE Collection Time: 04/30/19  7:00 PM  
Result Value Ref Range Sodium 139 136 - 145 mmol/L Potassium 3.2 (L) 3.5 - 5.5 mmol/L Chloride 99 (L) 100 - 108 mmol/L  
 CO2 34 (H) 21 - 32 mmol/L Anion gap 6 3.0 - 18 mmol/L Glucose 129 (H) 74 - 99 mg/dL BUN 61 (H) 7.0 - 18 MG/DL Creatinine 2.14 (H) 0.6 - 1.3 MG/DL  
 BUN/Creatinine ratio 29 (H) 12 - 20 GFR est AA 26 (L) >60 ml/min/1.73m2 GFR est non-AA 22 (L) >60 ml/min/1.73m2 Calcium 8.7 8.5 - 10.1 MG/DL Bilirubin, total 0.5 0.2 - 1.0 MG/DL  
 ALT (SGPT) 14 13 - 56 U/L  
 AST (SGOT) 17 15 - 37 U/L Alk. phosphatase 68 45 - 117 U/L Protein, total 6.2 (L) 6.4 - 8.2 g/dL Albumin 2.7 (L) 3.4 - 5.0 g/dL Globulin 3.5 2.0 - 4.0 g/dL A-G Ratio 0.8 0.8 - 1.7 PROTHROMBIN TIME + INR Collection Time: 04/30/19  7:00 PM  
Result Value Ref Range Prothrombin time 16.8 (H) 11.5 - 15.2 sec INR 1.4 (H) 0.8 - 1.2 PTT Collection Time: 04/30/19  7:00 PM  
Result Value Ref Range aPTT 42.5 (H) 23.0 - 36.4 SEC MAGNESIUM Collection Time: 04/30/19  7:00 PM  
Result Value Ref Range Magnesium 2.0 1.6 - 2.6 mg/dL TROPONIN I Collection Time: 04/30/19  7:00 PM  
Result Value Ref Range Troponin-I, QT 1.25 (H) 0.0 - 0.045 NG/ML  
NT-PRO BNP Collection Time: 04/30/19  7:00 PM  
Result Value Ref Range NT pro-BNP 11,831 (H) 0 - 1,800 PG/ML  
POC LACTIC ACID Collection Time: 04/30/19  7:02 PM  
Result Value Ref Range Lactic Acid (POC) 0.87 0.40 - 2.00 mmol/L  
EKG, 12 LEAD, INITIAL Collection Time: 04/30/19  7:03 PM  
Result Value Ref Range Ventricular Rate 75 BPM  
 Atrial Rate 76 BPM  
 P-R Interval 210 ms QRS Duration 200 ms Q-T Interval 472 ms QTC Calculation (Bezet) 527 ms Calculated R Axis 2 degrees Calculated T Axis 147 degrees Diagnosis AV dual-paced rhythm When compared with ECG of 17-MAR-2019 13:20, No significant change was found Confirmed by Jennifer Osuna MD, Eliana Mccord ([id number]) on 5/1/2019 1:56:20 PM 
  
CULTURE, BLOOD Collection Time: 04/30/19  7:15 PM  
Result Value Ref Range Special Requests: NO SPECIAL REQUESTS Culture result: NO GROWTH AFTER 11 HOURS    
CULTURE, BLOOD Collection Time: 04/30/19  7:53 PM  
Result Value Ref Range Special Requests: NO SPECIAL REQUESTS Culture result: NO GROWTH AFTER 11 HOURS    
URINALYSIS W/ RFLX MICROSCOPIC Collection Time: 05/01/19 12:18 AM  
Result Value Ref Range Color YELLOW Appearance CLOUDY Specific gravity 1.014 1.005 - 1.030    
 pH (UA) 5.0 5.0 - 8.0 Protein 30 (A) NEG mg/dL Glucose NEGATIVE  NEG mg/dL Ketone NEGATIVE  NEG mg/dL Bilirubin NEGATIVE  NEG Blood NEGATIVE  NEG Urobilinogen 1.0 0.2 - 1.0 EU/dL Nitrites NEGATIVE  NEG Leukocyte Esterase NEGATIVE  NEG    
URINE MICROSCOPIC ONLY Collection Time: 05/01/19 12:18 AM  
Result Value Ref Range WBC 1 to 4 0 - 4 /hpf  
 RBC 0 to 2 0 - 5 /hpf Epithelial cells FEW 0 - 5 /lpf Bacteria FEW (A) NEG /hpf Amorphous Crystals 3+ (A) NEG  
CBC WITH AUTOMATED DIFF Collection Time: 05/01/19  4:07 AM  
Result Value Ref Range WBC 22.3 (H) 4.6 - 13.2 K/uL  
 RBC 2.88 (L) 4.20 - 5.30 M/uL HGB 8.5 (L) 12.0 - 16.0 g/dL HCT 26.7 (L) 35.0 - 45.0 % MCV 92.7 74.0 - 97.0 FL  
 MCH 29.5 24.0 - 34.0 PG  
 MCHC 31.8 31.0 - 37.0 g/dL  
 RDW 14.2 11.6 - 14.5 % PLATELET 637 118 - 560 K/uL  MPV 9.7 9.2 - 11.8 FL  
 NEUTROPHILS 79 (H) 42 - 75 % BAND NEUTROPHILS 14 (H) 0 - 5 % LYMPHOCYTES 3 (L) 20 - 51 % MONOCYTES 4 2 - 9 % EOSINOPHILS 0 0 - 5 % BASOPHILS 0 0 - 3 %  
 ABS. NEUTROPHILS 20.7 (H) 1.8 - 8.0 K/UL  
 ABS. LYMPHOCYTES 0.7 (L) 0.8 - 3.5 K/UL  
 ABS. MONOCYTES 0.9 0 - 1.0 K/UL  
 ABS. EOSINOPHILS 0.0 0.0 - 0.4 K/UL  
 ABS. BASOPHILS 0.0 0.0 - 0.06 K/UL  
 DF AUTOMATED PLATELET COMMENTS ADEQUATE PLATELETS    
 RBC COMMENTS ANISOCYTOSIS 
1+ METABOLIC PANEL, COMPREHENSIVE Collection Time: 05/01/19  4:07 AM  
Result Value Ref Range Sodium 138 136 - 145 mmol/L Potassium 2.9 (LL) 3.5 - 5.5 mmol/L Chloride 99 (L) 100 - 108 mmol/L  
 CO2 32 21 - 32 mmol/L Anion gap 7 3.0 - 18 mmol/L Glucose 115 (H) 74 - 99 mg/dL BUN 62 (H) 7.0 - 18 MG/DL Creatinine 2.04 (H) 0.6 - 1.3 MG/DL  
 BUN/Creatinine ratio 30 (H) 12 - 20 GFR est AA 28 (L) >60 ml/min/1.73m2 GFR est non-AA 23 (L) >60 ml/min/1.73m2 Calcium 8.3 (L) 8.5 - 10.1 MG/DL Bilirubin, total 0.6 0.2 - 1.0 MG/DL  
 ALT (SGPT) 14 13 - 56 U/L  
 AST (SGOT) 21 15 - 37 U/L Alk. phosphatase 67 45 - 117 U/L Protein, total 6.3 (L) 6.4 - 8.2 g/dL Albumin 2.8 (L) 3.4 - 5.0 g/dL Globulin 3.5 2.0 - 4.0 g/dL A-G Ratio 0.8 0.8 - 1.7 CK Collection Time: 05/01/19  4:07 AM  
Result Value Ref Range CK 79 26 - 192 U/L  
CK-MB,QT Collection Time: 05/01/19  4:07 AM  
Result Value Ref Range CK - MB <1.0 <3.6 ng/ml CK-MB Index  0.0 - 4.0 % CALCULATION NOT PERFORMED WHEN RESULT IS BELOW LINEAR LIMIT  
TROPONIN I Collection Time: 05/01/19  4:07 AM  
Result Value Ref Range Troponin-I, QT 1.03 (H) 0.0 - 0.045 NG/ML  
VANCOMYCIN, RANDOM Collection Time: 05/01/19  8:17 AM  
Result Value Ref Range Vancomycin, random 15.1 5.0 - 40.0 UG/ML  
CARDIAC PANEL,(CK, CKMB & TROPONIN) Collection Time: 05/01/19 10:15 AM  
Result Value Ref Range CK 78 26 - 192 U/L  
 CK - MB <1.0 <3.6 ng/ml CK-MB Index  0.0 - 4.0 % CALCULATION NOT PERFORMED WHEN RESULT IS BELOW LINEAR LIMIT Troponin-I, QT 0.84 (H) 0.0 - 0.045 NG/ML  
CULTURE, WOUND W GRAM STAIN Collection Time: 05/01/19 10:50 AM  
Result Value Ref Range Special Requests: RIGHT LED MEDIAL   
 GRAM STAIN RARE WBC'S    
 GRAM STAIN RARE GRAM POSITIVE COCCI IN PAIRS Culture result: PENDING   
GLUCOSE, POC Collection Time: 05/01/19  3:42 PM  
Result Value Ref Range Glucose (POC) 126 (H) 70 - 110 mg/dL Additional Data Reviewed:   
 
Signed By: Glennie Goldberg, MD   
 May 1, 2019

## 2019-05-01 NOTE — CONSULTS
[facility] - Consult Note Date of  Admission: 4/30/2019  6:09 PM 
  
Primary Care Physician: Kenna Gosselin, MD 
 
 Plan: 1. Elevated troponin-possible demand ischemia in the setting ARIEL, sepsis and chronic CHF- will continue to monitoring for any ischemia symptoms. Continue   asa,bb,statin . No further cardiac w/u needed at this time 2. Chronic combined systolic and diastolic heart failure-mildly decompensated. Hold metolazone and torsemide. Will monitor for s/s fluid overload. Now on ivf 3. Atrial fibrillation - currently paced on amiodarone. Not on anticoagulation due to high bleeding risk. Continue asa. 4. CKD on CKD- worsening renal function likely due to diuretic use and sepsis. Added NS 50 cc hour 5. Severe cardiomyopathy, progressive, unclear etiology considering age. No further workup for that. 6. Hx of symptomatic bradycardia- S/P PPM  [id number] Dr Candie Xiao- EP recommended  upgrade to biventricular pacemaker as an outpatient.  Patient's family refused   
9. Lupus- follow by Dr. Pankaj Valverde as out patient 8. Hypertension- with normal low bp. Continue low dose Coreg. 9. Dementia- confused 10. Hypokalemia. Continue supplement. 11. Sepsis- possible  Related to RLE/Right thigh abscess and wound- on antibiotics- managed by ID Elevated troponins are likely due to demand ischemia indicating type II MI. Patient appears to be clinically volume depleted, so will hold diuretics and instead give gentle IV fluids. Treatment of sepsis/cellulitis per medical service. Will get pacemaker check while she is here. Will follow briefly and closely with you 
 
 
12/27/18 ECHO ADULT COMPLETE 12/28/2018 12/28/2018 Narrative · Technically difficult study due to patient compliance. Unable to obtain  
on-axis apical images. Good parasternals, poor subcostal images. · Left ventricular moderate-to-severely decreased global systolic function. Estimated left ventricular ejection fraction is 31 - 35%. Visually measured ejection fraction. Left ventricular severe sigmoid  
septum hypertrophy. Abnormal left ventricular wall motion as described on  
the wall scoring diagram below. Abnormal left ventricular septal motion. Interventricular septal \"bounce\". Severe (grade 3) left ventricular  
diastolic dysfunction. · Moderate tricuspid valve regurgitation is present. Mild pulmonary  
hypertension is present. PASP 38mmHg · Mild aortic valve regurgitation is present. · Mild to moderate mitral valve regurgitation. · Pacing wire present Signed by: Florencia Lilly MD  
 
 
  
  
 
XR Results (most recent): 
Results from [facility] Encounter encounter on 04/30/19 XR CHEST SNGL V  
 Narrative EXAMINATION: Chest single view INDICATION: Altered mental status, weakness COMPARISON: 3/17/2019 FINDINGS: Single frontal view the chest obtained. Multilead left subclavian 
pacer, similar to prior. Moderately enlarged cardiac silhouette. Aortic arch 
calcifications. Prominent perihilar and beyond pulmonary interstitium. No 
confluent consolidation. No evidence of pneumothorax. No obvious acute osseous 
findings. Impression IMPRESSION: 
 
Moderately enlarged cardiac silhouette with interstitial infiltrates/edema. Compared to 3/17/2019, left pleural effusion has either decreased or resolved. Assessment:  
 
Hospital Problems  Date Reviewed: 4/4/2019 Codes Class Noted POA Abscess ICD-10-CM: L02.91 
ICD-9-CM: 682.9  4/30/2019 Unknown Severe sepsis (HCC) ICD-10-CM: A41.9, R65.20 ICD-9-CM: 038.9, 995.92  4/30/2019 Unknown Sepsis ([facility] ICD-10-CM: A41.9 ICD-9-CM: 038.9, 995.91  3/17/2019 Unknown History of Present Illness: This is a 80 y.o. female admitted for Severe sepsis ([facility] [A41.9, R65.20]Abscess [L02.91]Sepsis ([facility] [A41.9].   Patient known to our service with PMHx of CHF, hypertension , CMO s/p PPM, dementia and afib. The patient was  was brought by to the ED of [facility] after being found altered from her base line yesterday   Patient lives with her son and has some home health checking in for a wound on her right leg. They found her to have a high fever and so they decided to call EMS to have her evaluated in the emergency department. Patient has dementia and does not talk. All information was gathered from his son and ED records. She is resting in bed comfortable no distress noted. Unable to provide detail information due to severe dementia. Past Medical History:  
 
Past Medical History:  
Diagnosis Date  Acute on chronic diastolic heart failure ([address]  Arthritis  Benign hypertensive heart disease without heart failure Better controlled, stable  Chronic diastolic heart failure ([address] Breathing and edema is improving  Congestive heart failure ([address]  HTN (hypertension)  Hypercholesteremia  Lupus (systemic lupus erythematosus) ([facility] 6/18/2014  
 followed by dr Yue Pierce  Obesity, unspecified Patient has weight loss Discussed diet ad fluid restriction  Other and unspecified hyperlipidemia F/u per pmd  
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  
 tr with moderate pulmonary htn Social History:  
 
Social History Socioeconomic History  Marital status:  Spouse name: Not on file  Number of children: Not on file  Years of education: Not on file  Highest education level: Not on file Tobacco Use  Smoking status: Never Smoker  Smokeless tobacco: Never Used Substance and Sexual Activity  Alcohol use: No  
 Drug use: No  
 Sexual activity: Never Family History:  
 
Family History Problem Relation Age of Onset  Arrhythmia Neg Hx  Asthma Neg Hx  Clotting Disorder Neg Hx  Fainting Neg Hx   
 Heart Attack Neg Hx  High Cholesterol Neg Hx  Pacemaker Neg Hx  Stroke Neg Hx Medications:  
No Known Allergies Current Facility-Administered Medications Medication Dose Route Frequency  acetaminophen (TYLENOL) tablet 650 mg  650 mg Oral Q6H PRN  
 amiodarone (CORDARONE) tablet 200 mg  200 mg Oral DAILY  amitriptyline (ELAVIL) tablet 25 mg  25 mg Oral QHS  aspirin delayed-release tablet 81 mg  81 mg Oral DAILY  carvedilol (COREG) tablet 3.125 mg  3.125 mg Oral Q12H  
 food supplemt, lactose-reduced (ENSURE ENLIVE) 0.08 gram-1.5 kcal/mL liqd 1 Bottle  1 Bottle Oral BID  hydroxychloroquine (PLAQUENIL) tablet 200 mg  200 mg Oral BID  memantine (NAMENDA) tablet 10 mg  10 mg Oral BID  menthol-zinc oxide (CALMOSEPTINE) 0.44-20.6 % ointment   Topical BID  metOLazone (ZAROXOLYN) tablet 2.5 mg  2.5 mg Oral DAILY  polyethylene glycol (MIRALAX) packet 17 g  17 g Oral DAILY  torsemide (DEMADEX) tablet 10 mg  10 mg Oral DAILY  hydrALAZINE (APRESOLINE) 20 mg/mL injection 10 mg  10 mg IntraVENous Q6H PRN  
 albuterol-ipratropium (DUO-NEB) 2.5 MG-0.5 MG/3 ML  3 mL Nebulization Q4H RT  
 heparin (porcine) injection 5,000 Units  5,000 Units SubCUTAneous Q8H  predniSONE (DELTASONE) tablet 5 mg  5 mg Oral DAILY WITH BREAKFAST  potassium chloride 10 mEq, lidocaine (PF) (XYLOCAINE) 10 mg/mL (1 %) 1 mL in 0.9% sodium chloride 100 mL IVPB   IntraVENous Q1H  
 metroNIDAZOLE (FLAGYL) IVPB premix 500 mg  500 mg IntraVENous Q12H  
 sodium chloride (NS) flush 5-10 mL  5-10 mL IntraVENous PRN  
 cefepime (MAXIPIME) 1 g in sterile water (preservative free) 10 mL IV syringe  1 g IntraVENous Q24H  VANCOMYCIN INFORMATION NOTE  1 Each Other Rx Dosing/Monitoring  lidocaine-EPINEPHrine (XYLOCAINE) 1 %-1:100,000 injection  aspirin tablet 325 mg  325 mg Oral DAILY Review Of Systems:  
 
 
Unable to obtain due to severe dementia Physical Exam:  
 
Visit Vitals /50 (BP 1 Location: Right arm, BP Patient Position: At rest) Pulse 70 Temp 98.2 °F (36.8 °C) Resp 20 Ht 5' 4\" (1.626 m) Wt 68.4 kg (150 lb 14.4 oz) SpO2 93% BMI 25.90 kg/m² BP Readings from Last 3 Encounters:  
05/01/19 102/50  
04/25/19 130/70  
04/17/19 131/65 Pulse Readings from Last 3 Encounters:  
05/01/19 70  
04/25/19 88  
04/17/19 75 Wt Readings from Last 3 Encounters:  
05/01/19 68.4 kg (150 lb 14.4 oz) 03/27/19 77.1 kg (170 lb)  
03/19/19 77.2 kg (170 lb 4.8 oz) General:  alert, appears stated age, confused Skin: Warm and dry, acyanotic, normal color. Head: Normocephalic, atraumatic. Eyes: Sclerae anicteric, conjunctivae without injection. Neck:  nontender, no nuchal rigidity, no masses, no stridor, no carotid bruit, + JVD Lungs:  Scattered rales lung bases. diminished breath sounds b/l. Heart:  regular rhythm, S1, S2 normal, no click, no rub. 3/6 soft holosystolic murmur at apex. Abdomen:  abdomen is soft without significant tenderness, masses, organomegaly or guarding Extremities:  Extremities. Right upper thigh with erythema. RLE wound cover with dressing  no cyanosis or edema. Peripheral pulses present Neurological: grossly intact. No focal abnormalities, moves all extremities well. Psychiatric Affect: unable to obtain patient is non verbal  
 Data Review:  
 
Recent Results (from the past 48 hour(s)) CBC WITH AUTOMATED DIFF Collection Time: 04/30/19  7:00 PM  
Result Value Ref Range WBC 20.8 (H) 4.6 - 13.2 K/uL  
 RBC 2.82 (L) 4.20 - 5.30 M/uL HGB 8.4 (L) 12.0 - 16.0 g/dL HCT 26.0 (L) 35.0 - 45.0 % MCV 92.2 74.0 - 97.0 FL  
 MCH 29.8 24.0 - 34.0 PG  
 MCHC 32.3 31.0 - 37.0 g/dL  
 RDW 14.1 11.6 - 14.5 % PLATELET 306 281 - 939 K/uL MPV 9.3 9.2 - 11.8 FL  
 NEUTROPHILS 91 (H) 42 - 75 % BAND NEUTROPHILS 2 0 - 5 % LYMPHOCYTES 4 (L) 20 - 51 % MONOCYTES 3 2 - 9 % EOSINOPHILS 0 0 - 5 % BASOPHILS 0 0 - 3 % ABS. NEUTROPHILS 19.4 (H) 1.8 - 8.0 K/UL  
 ABS. LYMPHOCYTES 0.8 0.8 - 3.5 K/UL  
 ABS. MONOCYTES 0.6 0 - 1.0 K/UL  
 ABS. EOSINOPHILS 0.0 0.0 - 0.4 K/UL  
 ABS. BASOPHILS 0.0 0.0 - 0.06 K/UL  
 DF MANUAL PLATELET COMMENTS ADEQUATE PLATELETS    
 RBC COMMENTS NORMOCYTIC, NORMOCHROMIC    
 RBC COMMENTS ANISOCYTOSIS 1+ 
    
 RBC COMMENTS POIKILOCYTOSIS 
1+ METABOLIC PANEL, COMPREHENSIVE Collection Time: 04/30/19  7:00 PM  
Result Value Ref Range Sodium 139 136 - 145 mmol/L Potassium 3.2 (L) 3.5 - 5.5 mmol/L Chloride 99 (L) 100 - 108 mmol/L  
 CO2 34 (H) 21 - 32 mmol/L Anion gap 6 3.0 - 18 mmol/L Glucose 129 (H) 74 - 99 mg/dL BUN 61 (H) 7.0 - 18 MG/DL Creatinine 2.14 (H) 0.6 - 1.3 MG/DL  
 BUN/Creatinine ratio 29 (H) 12 - 20 GFR est AA 26 (L) >60 ml/min/1.73m2 GFR est non-AA 22 (L) >60 ml/min/1.73m2 Calcium 8.7 8.5 - 10.1 MG/DL Bilirubin, total 0.5 0.2 - 1.0 MG/DL  
 ALT (SGPT) 14 13 - 56 U/L  
 AST (SGOT) 17 15 - 37 U/L Alk. phosphatase 68 45 - 117 U/L Protein, total 6.2 (L) 6.4 - 8.2 g/dL Albumin 2.7 (L) 3.4 - 5.0 g/dL Globulin 3.5 2.0 - 4.0 g/dL A-G Ratio 0.8 0.8 - 1.7 PROTHROMBIN TIME + INR Collection Time: 04/30/19  7:00 PM  
Result Value Ref Range Prothrombin time 16.8 (H) 11.5 - 15.2 sec INR 1.4 (H) 0.8 - 1.2 PTT Collection Time: 04/30/19  7:00 PM  
Result Value Ref Range aPTT 42.5 (H) 23.0 - 36.4 SEC MAGNESIUM Collection Time: 04/30/19  7:00 PM  
Result Value Ref Range Magnesium 2.0 1.6 - 2.6 mg/dL TROPONIN I Collection Time: 04/30/19  7:00 PM  
Result Value Ref Range Troponin-I, QT 1.25 (H) 0.0 - 0.045 NG/ML  
NT-PRO BNP Collection Time: 04/30/19  7:00 PM  
Result Value Ref Range NT pro-BNP 11,831 (H) 0 - 1,800 PG/ML  
POC LACTIC ACID Collection Time: 04/30/19  7:02 PM  
Result Value Ref Range Lactic Acid (POC) 0.87 0.40 - 2.00 mmol/L  
EKG, 12 LEAD, INITIAL Collection Time: 04/30/19  7:03 PM  
Result Value Ref Range Ventricular Rate 75 BPM  
 Atrial Rate 76 BPM  
 P-R Interval 210 ms QRS Duration 200 ms Q-T Interval 472 ms QTC Calculation (Bezet) 527 ms Calculated R Axis 2 degrees Calculated T Axis 147 degrees Diagnosis Atrial-paced rhythm with prolonged AV conduction Nonspecific intraventricular block Possible Inferior infarct , age undetermined Abnormal ECG When compared with ECG of 17-MAR-2019 13:20, 
Electronic atrial pacemaker has replaced Electronic ventricular pacemaker CULTURE, BLOOD Collection Time: 04/30/19  7:15 PM  
Result Value Ref Range Special Requests: NO SPECIAL REQUESTS Culture result: NO GROWTH AFTER 11 HOURS    
CULTURE, BLOOD Collection Time: 04/30/19  7:53 PM  
Result Value Ref Range Special Requests: NO SPECIAL REQUESTS Culture result: NO GROWTH AFTER 11 HOURS    
URINALYSIS W/ RFLX MICROSCOPIC Collection Time: 05/01/19 12:18 AM  
Result Value Ref Range Color YELLOW Appearance CLOUDY Specific gravity 1.014 1.005 - 1.030    
 pH (UA) 5.0 5.0 - 8.0 Protein 30 (A) NEG mg/dL Glucose NEGATIVE  NEG mg/dL Ketone NEGATIVE  NEG mg/dL Bilirubin NEGATIVE  NEG Blood NEGATIVE  NEG Urobilinogen 1.0 0.2 - 1.0 EU/dL Nitrites NEGATIVE  NEG Leukocyte Esterase NEGATIVE  NEG    
URINE MICROSCOPIC ONLY Collection Time: 05/01/19 12:18 AM  
Result Value Ref Range WBC 1 to 4 0 - 4 /hpf  
 RBC 0 to 2 0 - 5 /hpf Epithelial cells FEW 0 - 5 /lpf Bacteria FEW (A) NEG /hpf Amorphous Crystals 3+ (A) NEG  
CBC WITH AUTOMATED DIFF Collection Time: 05/01/19  4:07 AM  
Result Value Ref Range WBC 22.3 (H) 4.6 - 13.2 K/uL  
 RBC 2.88 (L) 4.20 - 5.30 M/uL HGB 8.5 (L) 12.0 - 16.0 g/dL HCT 26.7 (L) 35.0 - 45.0 % MCV 92.7 74.0 - 97.0 FL  
 MCH 29.5 24.0 - 34.0 PG  
 MCHC 31.8 31.0 - 37.0 g/dL  
 RDW 14.2 11.6 - 14.5 % PLATELET 842 086 - 743 K/uL MPV 9.7 9.2 - 11.8 FL  
 NEUTROPHILS 79 (H) 42 - 75 % BAND NEUTROPHILS 14 (H) 0 - 5 % LYMPHOCYTES 3 (L) 20 - 51 % MONOCYTES 4 2 - 9 % EOSINOPHILS 0 0 - 5 % BASOPHILS 0 0 - 3 %  
 ABS. NEUTROPHILS 20.7 (H) 1.8 - 8.0 K/UL  
 ABS. LYMPHOCYTES 0.7 (L) 0.8 - 3.5 K/UL  
 ABS. MONOCYTES 0.9 0 - 1.0 K/UL  
 ABS. EOSINOPHILS 0.0 0.0 - 0.4 K/UL  
 ABS. BASOPHILS 0.0 0.0 - 0.06 K/UL  
 DF AUTOMATED PLATELET COMMENTS ADEQUATE PLATELETS    
 RBC COMMENTS ANISOCYTOSIS 
1+ METABOLIC PANEL, COMPREHENSIVE Collection Time: 05/01/19  4:07 AM  
Result Value Ref Range Sodium 138 136 - 145 mmol/L Potassium 2.9 (LL) 3.5 - 5.5 mmol/L Chloride 99 (L) 100 - 108 mmol/L  
 CO2 32 21 - 32 mmol/L Anion gap 7 3.0 - 18 mmol/L Glucose 115 (H) 74 - 99 mg/dL BUN 62 (H) 7.0 - 18 MG/DL Creatinine 2.04 (H) 0.6 - 1.3 MG/DL  
 BUN/Creatinine ratio 30 (H) 12 - 20 GFR est AA 28 (L) >60 ml/min/1.73m2 GFR est non-AA 23 (L) >60 ml/min/1.73m2 Calcium 8.3 (L) 8.5 - 10.1 MG/DL Bilirubin, total 0.6 0.2 - 1.0 MG/DL  
 ALT (SGPT) 14 13 - 56 U/L  
 AST (SGOT) 21 15 - 37 U/L Alk. phosphatase 67 45 - 117 U/L Protein, total 6.3 (L) 6.4 - 8.2 g/dL Albumin 2.8 (L) 3.4 - 5.0 g/dL Globulin 3.5 2.0 - 4.0 g/dL A-G Ratio 0.8 0.8 - 1.7 CK Collection Time: 05/01/19  4:07 AM  
Result Value Ref Range CK 79 26 - 192 U/L  
CK-MB,QT Collection Time: 05/01/19  4:07 AM  
Result Value Ref Range CK - MB <1.0 <3.6 ng/ml CK-MB Index  0.0 - 4.0 % CALCULATION NOT PERFORMED WHEN RESULT IS BELOW LINEAR LIMIT  
TROPONIN I Collection Time: 05/01/19  4:07 AM  
Result Value Ref Range Troponin-I, QT 1.03 (H) 0.0 - 0.045 NG/ML Intake/Output Summary (Last 24 hours) at 5/1/2019 9848 Last data filed at 5/1/2019 5493 Gross per 24 hour Intake 1500 ml Output 230 ml Net 1270 ml Cardiographics:  
 
 
EKG Results Procedure 720 Value Units Date/Time EKG, 12 LEAD, INITIAL [[id number]] Collected:  04/30/19 1903 Order Status:  Completed Updated:  04/30/19 3585 Ventricular Rate 75 BPM   
  Atrial Rate 76 BPM   
  P-R Interval 210 ms QRS Duration 200 ms Q-T Interval 472 ms QTC Calculation (Bezet) 527 ms Calculated R Axis 2 degrees Calculated T Axis 147 degrees Diagnosis -- Atrial-paced rhythm with prolonged AV conduction Nonspecific intraventricular block Possible Inferior infarct , age undetermined Abnormal ECG When compared with ECG of 17-MAR-2019 13:20, 
Electronic atrial pacemaker has replaced Electronic ventricular pacemaker 12/27/18 ECHO ADULT COMPLETE 12/28/2018 12/28/2018 Narrative · Technically difficult study due to patient compliance. Unable to obtain  
on-axis apical images. Good parasternals, poor subcostal images. · Left ventricular moderate-to-severely decreased global systolic  
function. Estimated left ventricular ejection fraction is 31 - 35%. Visually measured ejection fraction. Left ventricular severe sigmoid  
septum hypertrophy. Abnormal left ventricular wall motion as described on  
the wall scoring diagram below. Abnormal left ventricular septal motion. Interventricular septal \"bounce\". Severe (grade 3) left ventricular  
diastolic dysfunction. · Moderate tricuspid valve regurgitation is present. Mild pulmonary  
hypertension is present. PASP 38mmHg · Mild aortic valve regurgitation is present. · Mild to moderate mitral valve regurgitation. · Pacing wire present Signed by: Filiberto Joshi MD  
 
 
 
Signed By: Toñito RIVAS Phone [phone] May 1, 2019 I have independently evaluated and examined the patient. All relevant labs and testing data's are reviewed. Care plan discussed and updated after review.  
Charleen Can MD

## 2019-05-01 NOTE — PROGRESS NOTES
05/01/19 0200 Vitals Temp 97.2 °F (36.2 °C) Temp Source Oral  
Pulse (Heart Rate) 64 Heart Rate Source Brachial;Monitor Resp Rate 19  
O2 Sat (%) 96 % (on room air) Level of Consciousness Alert /89 MAP (Calculated) 103 BP 1 Location Right arm BP 1 Method Automatic  
BP Patient Position Head of bed elevated (Comment degrees) MEWS Score 1 Received patient from ED via stretcher; alert; unable to answer questions appropriately; on room air; denies pain,SOB or difficulty breathing. Patient has moore attached, placed in the ED for accurate measurement of urine. L EJ noted; dressing clean, dry and intact. Patient has multiple wounds noted verified with Vishnu Shankar RN and [facility]. S/P I/&D on the R upper thighs; dressing clean, dy and intact. RLE is edematous and warm to touch. Patient refused to take scheduled PO meds, but able to received Heparin subQ and breathing treatment. Patient sleep most of the time all night; able to follow commands, alert,  but unable to answer questions appropriately. V/S monitored. Tele box CVNEW 3 attached. Call bell and personal belongings are within patient's reach. Bed alarm activated. Will monitor closely. 0540: Lab called. Patient Potassium level is 2.9. MD notified and ordered 40meq of DIANAI piv.

## 2019-05-01 NOTE — ED NOTES
TRANSFER - OUT REPORT: 
 
Verbal report given toCVT stepdown RN(name) on [place]  being transferred to CVT step down(unit) for routine progression of care Report consisted of patients Situation, Background, Assessment and  
Recommendations(SBAR). Information from the following report(s) SBAR, ED Summary, Intake/Output and MAR was reviewed with the receiving nurse. Lines:  
Peripheral IV 04/30/19 Left Other(comment) (Active) Site Assessment Clean, dry, & intact 4/30/2019  7:07 PM  
Phlebitis Assessment 0 4/30/2019  7:07 PM  
Infiltration Assessment 0 4/30/2019  7:07 PM  
Dressing Status Clean, dry, & intact 4/30/2019  7:07 PM  
Dressing Type 4 X 4;Tape;Transparent 4/30/2019  7:07 PM  
Hub Color/Line Status Pink 4/30/2019  7:07 PM  
Action Taken Blood drawn 4/30/2019  7:07 PM  
  
 
Opportunity for questions and clarification was provided. Patient transported with: 
 Registered Nurse

## 2019-05-01 NOTE — CDMP QUERY
Patient admitted with sepsis  . Documentation reflects severe sepsis in ER  notes. . 
If possible, please specify in the progress notes and d/c summary if severe sepsis  was: 
 
? Ruled out after study ? Still Suspected after study ? Confirmed after study The medical record reflects the following: 
 
  Risk Factors:   advanced age,  multiple medical issues. Clinical Indicators: WBC   20.8; Bands 2;     acute metabolic encephalopathy;   lactic acid   only   0.87 Treatment: IV     ABX Please  if you agree with severe sepsis  link  any   organ   dysfunction   diagnosis     to the sepsis if appropriate. Severe Sepsis = Sepsis with associated Acute Organ Dysfunction 
 
thank you   Manish Sampson RN   CCDS  x [phone]

## 2019-05-02 PROBLEM — R53.81 DEBILITY: Status: ACTIVE | Noted: 2019-05-02

## 2019-05-02 PROBLEM — Z71.89 ADVANCED CARE PLANNING/COUNSELING DISCUSSION: Status: ACTIVE | Noted: 2019-05-02

## 2019-05-02 LAB
ANION GAP SERPL CALC-SCNC: 7 MMOL/L (ref 3–18)
BACTERIA SPEC CULT: NORMAL
BACTERIA SPEC CULT: NORMAL
BASOPHILS # BLD: 0 K/UL (ref 0–0.06)
BASOPHILS NFR BLD: 0 % (ref 0–3)
BUN SERPL-MCNC: 67 MG/DL (ref 7–18)
BUN/CREAT SERPL: 35 (ref 12–20)
CALCIUM SERPL-MCNC: 8.5 MG/DL (ref 8.5–10.1)
CHLORIDE SERPL-SCNC: 105 MMOL/L (ref 100–108)
CO2 SERPL-SCNC: 30 MMOL/L (ref 21–32)
CREAT SERPL-MCNC: 1.9 MG/DL (ref 0.6–1.3)
DIFFERENTIAL METHOD BLD: ABNORMAL
EOSINOPHIL # BLD: 0 K/UL (ref 0–0.4)
EOSINOPHIL NFR BLD: 0 % (ref 0–5)
ERYTHROCYTE [DISTWIDTH] IN BLOOD BY AUTOMATED COUNT: 14.3 % (ref 11.6–14.5)
GLUCOSE SERPL-MCNC: 110 MG/DL (ref 74–99)
HCT VFR BLD AUTO: 24.5 % (ref 35–45)
HGB BLD-MCNC: 7.7 G/DL (ref 12–16)
LYMPHOCYTES # BLD: 0.5 K/UL (ref 0.8–3.5)
LYMPHOCYTES NFR BLD: 3 % (ref 20–51)
MAGNESIUM SERPL-MCNC: 2.1 MG/DL (ref 1.6–2.6)
MCH RBC QN AUTO: 28.9 PG (ref 24–34)
MCHC RBC AUTO-ENTMCNC: 31.4 G/DL (ref 31–37)
MCV RBC AUTO: 92.1 FL (ref 74–97)
MONOCYTES # BLD: 0.4 K/UL (ref 0–1)
MONOCYTES NFR BLD: 2 % (ref 2–9)
NEUTS BAND NFR BLD MANUAL: 7 % (ref 0–5)
NEUTS SEG # BLD: 16.6 K/UL (ref 1.8–8)
NEUTS SEG NFR BLD: 88 % (ref 42–75)
PLATELET # BLD AUTO: 174 K/UL (ref 135–420)
PLATELET COMMENTS,PCOM: ABNORMAL
PMV BLD AUTO: 9.6 FL (ref 9.2–11.8)
POTASSIUM SERPL-SCNC: 3.3 MMOL/L (ref 3.5–5.5)
RBC # BLD AUTO: 2.66 M/UL (ref 4.2–5.3)
RBC MORPH BLD: ABNORMAL
SERVICE CMNT-IMP: NORMAL
SERVICE CMNT-IMP: NORMAL
SODIUM SERPL-SCNC: 142 MMOL/L (ref 136–145)
VANCOMYCIN SERPL-MCNC: 10.7 UG/ML (ref 5–40)
WBC # BLD AUTO: 17.5 K/UL (ref 4.6–13.2)

## 2019-05-02 PROCEDURE — 92610 EVALUATE SWALLOWING FUNCTION: CPT

## 2019-05-02 PROCEDURE — 36415 COLL VENOUS BLD VENIPUNCTURE: CPT

## 2019-05-02 PROCEDURE — 74011250637 HC RX REV CODE- 250/637: Performed by: HOSPITALIST

## 2019-05-02 PROCEDURE — 74011250636 HC RX REV CODE- 250/636: Performed by: EMERGENCY MEDICINE

## 2019-05-02 PROCEDURE — 94640 AIRWAY INHALATION TREATMENT: CPT

## 2019-05-02 PROCEDURE — 94760 N-INVAS EAR/PLS OXIMETRY 1: CPT

## 2019-05-02 PROCEDURE — 97165 OT EVAL LOW COMPLEX 30 MIN: CPT

## 2019-05-02 PROCEDURE — 83735 ASSAY OF MAGNESIUM: CPT

## 2019-05-02 PROCEDURE — 74011250636 HC RX REV CODE- 250/636: Performed by: NURSE PRACTITIONER

## 2019-05-02 PROCEDURE — 74011250636 HC RX REV CODE- 250/636: Performed by: INTERNAL MEDICINE

## 2019-05-02 PROCEDURE — 74011000258 HC RX REV CODE- 258: Performed by: NURSE PRACTITIONER

## 2019-05-02 PROCEDURE — 80202 ASSAY OF VANCOMYCIN: CPT

## 2019-05-02 PROCEDURE — 87040 BLOOD CULTURE FOR BACTERIA: CPT

## 2019-05-02 PROCEDURE — 74011000250 HC RX REV CODE- 250: Performed by: INTERNAL MEDICINE

## 2019-05-02 PROCEDURE — 3331090002 HH PPS REVENUE DEBIT

## 2019-05-02 PROCEDURE — 94762 N-INVAS EAR/PLS OXIMTRY CONT: CPT

## 2019-05-02 PROCEDURE — 65660000004 HC RM CVT STEPDOWN

## 2019-05-02 PROCEDURE — 80048 BASIC METABOLIC PNL TOTAL CA: CPT

## 2019-05-02 PROCEDURE — 3331090001 HH PPS REVENUE CREDIT

## 2019-05-02 PROCEDURE — 87641 MR-STAPH DNA AMP PROBE: CPT

## 2019-05-02 PROCEDURE — 76450000000

## 2019-05-02 PROCEDURE — 97161 PT EVAL LOW COMPLEX 20 MIN: CPT

## 2019-05-02 PROCEDURE — 74011250636 HC RX REV CODE- 250/636: Performed by: HOSPITALIST

## 2019-05-02 PROCEDURE — 85025 COMPLETE CBC W/AUTO DIFF WBC: CPT

## 2019-05-02 PROCEDURE — 92526 ORAL FUNCTION THERAPY: CPT

## 2019-05-02 PROCEDURE — 74011000250 HC RX REV CODE- 250: Performed by: HOSPITALIST

## 2019-05-02 RX ORDER — POTASSIUM CHLORIDE 1.5 G/1.77G
40 POWDER, FOR SOLUTION ORAL ONCE
Status: DISCONTINUED | OUTPATIENT
Start: 2019-05-02 | End: 2019-05-02

## 2019-05-02 RX ORDER — IPRATROPIUM BROMIDE AND ALBUTEROL SULFATE 2.5; .5 MG/3ML; MG/3ML
3 SOLUTION RESPIRATORY (INHALATION)
Status: DISCONTINUED | OUTPATIENT
Start: 2019-05-02 | End: 2019-05-09 | Stop reason: HOSPADM

## 2019-05-02 RX ORDER — VANCOMYCIN HYDROCHLORIDE
1250 ONCE
Status: COMPLETED | OUTPATIENT
Start: 2019-05-02 | End: 2019-05-02

## 2019-05-02 RX ADMIN — METRONIDAZOLE 500 MG: 500 INJECTION, SOLUTION INTRAVENOUS at 09:17

## 2019-05-02 RX ADMIN — SODIUM CHLORIDE 50 ML/HR: 900 INJECTION, SOLUTION INTRAVENOUS at 10:22

## 2019-05-02 RX ADMIN — POTASSIUM CHLORIDE: 2 INJECTION, SOLUTION, CONCENTRATE INTRAVENOUS at 10:25

## 2019-05-02 RX ADMIN — HEPARIN SODIUM 5000 UNITS: 5000 INJECTION INTRAVENOUS; SUBCUTANEOUS at 10:25

## 2019-05-02 RX ADMIN — HYDROCORTISONE SODIUM SUCCINATE 50 MG: 100 INJECTION, POWDER, FOR SOLUTION INTRAMUSCULAR; INTRAVENOUS at 14:20

## 2019-05-02 RX ADMIN — AMITRIPTYLINE HYDROCHLORIDE 25 MG: 50 TABLET, FILM COATED ORAL at 22:13

## 2019-05-02 RX ADMIN — METRONIDAZOLE 500 MG: 500 INJECTION, SOLUTION INTRAVENOUS at 22:12

## 2019-05-02 RX ADMIN — HEPARIN SODIUM 5000 UNITS: 5000 INJECTION INTRAVENOUS; SUBCUTANEOUS at 03:40

## 2019-05-02 RX ADMIN — MEMANTINE 10 MG: 10 TABLET ORAL at 17:01

## 2019-05-02 RX ADMIN — SODIUM CHLORIDE 50 ML/HR: 900 INJECTION, SOLUTION INTRAVENOUS at 14:24

## 2019-05-02 RX ADMIN — HYDROXYCHLOROQUINE SULFATE 200 MG: 200 TABLET, FILM COATED ENTERAL at 17:01

## 2019-05-02 RX ADMIN — HYDROCORTISONE SODIUM SUCCINATE 50 MG: 100 INJECTION, POWDER, FOR SOLUTION INTRAMUSCULAR; INTRAVENOUS at 05:56

## 2019-05-02 RX ADMIN — CARVEDILOL 3.12 MG: 3.12 TABLET, FILM COATED ORAL at 22:14

## 2019-05-02 RX ADMIN — HEPARIN SODIUM 5000 UNITS: 5000 INJECTION INTRAVENOUS; SUBCUTANEOUS at 18:54

## 2019-05-02 RX ADMIN — HYDROCORTISONE SODIUM SUCCINATE 50 MG: 100 INJECTION, POWDER, FOR SOLUTION INTRAMUSCULAR; INTRAVENOUS at 22:12

## 2019-05-02 RX ADMIN — WATER 2 G: 1 INJECTION INTRAMUSCULAR; INTRAVENOUS; SUBCUTANEOUS at 17:00

## 2019-05-02 RX ADMIN — POTASSIUM CHLORIDE: 2 INJECTION, SOLUTION, CONCENTRATE INTRAVENOUS at 11:32

## 2019-05-02 RX ADMIN — VANCOMYCIN HYDROCHLORIDE 1250 MG: 10 INJECTION, POWDER, LYOPHILIZED, FOR SOLUTION INTRAVENOUS at 10:25

## 2019-05-02 RX ADMIN — IPRATROPIUM BROMIDE AND ALBUTEROL SULFATE 3 ML: .5; 3 SOLUTION RESPIRATORY (INHALATION) at 09:26

## 2019-05-02 RX ADMIN — IPRATROPIUM BROMIDE AND ALBUTEROL SULFATE 3 ML: .5; 3 SOLUTION RESPIRATORY (INHALATION) at 03:41

## 2019-05-02 RX ADMIN — Medication: at 17:03

## 2019-05-02 NOTE — PROGRESS NOTES
Pressure sore right lower leg Large hard hematoma/eschar Can debride and place Acel graft if family agreeable

## 2019-05-02 NOTE — CONSULTS
Palliative Medicine Consult [facility]: [phone] ([phone]) [facility]: [phone] [facility]: [phone] Patient Name: [place] YOB: 1931 Date of Initial Consult: 5/2/19 Reason for Consult: Goals of Care Requesting Provider: Bruna Pinto Primary Care Physician: [place] No MD 
  
 SUMMARY:  
[place] [facility] is a 80y.o. year old with a past history of Heart Failure, lupus, thrombus cytopenia, chronic kidney disease stage IV, and dementia,  who was admitted on 4/30/2019 from home to the ED with a diagnosis of right leg infection. Current medical issues leading to Palliative Medicine involvement include: Discussion of goals of care Patient is an 80year old  female brought in from home by the family for infected right leg. Paitent found to be febrile at home and minimally responsive. Was receiving home care for wound care. Patient and family are known to Palliative serves through multiple previous admissions. PALLIATIVE DIAGNOSES:  
1. Goals of Care discussion 2. Infection right leg wounds 3. Dementia 4. Debility PLAN:  
1. Goals of Care discussion: 
Palliative Care team including Mal Alvarado NP, Pranav CURIELW, and Skylar Izquierdo NP met with patients son Benito Butler. Son reports that he is one of 6 children involved in this patients care and patients  Katy Lovelace. Benito Butler needing to reach out to his siblings and father before any discussion of Code Status can be completed. Assured him that patient is still currently a FULL CODE with full excalation of care, and that not making any decision would result in continuation of this path. 2. Right Leg Wounds and infection Seen today by Infectious Disease who is ruling out sepsis and acute right thigh super infection. Vascular consulted to weigh in regarding need for I&D and left leg vascular function. 3.   Dementia Patient with advanced dementia. Reports at home patient was feeding herself with maximal set up and assistance. Dependent for all ADL's, Incontinent of B&B and non ambulatory. At this time patient is minimally verbal, except when in distress. 4.  Debility Patient is currently bed bound. Seen by ST and was able to tolerate sips of thin liquids and pureed foods. Remains on aspiration precautions. If appropriate CM is looking into SNF placement for rehabilitation. 5. Initial consult note routed to primary continuity provider 6. Communicated plan of care with: Palliative IDT FULL CODE at this time GOALS OF CARE: 
Patient/Health Care Proxy Stated Goals: Prolong life TREATMENT PREFERENCES:  
Code Status: Full Code Advance Care Planning: 
[] The [facility] Interdisciplinary Team has updated the ACP Navigator with [address] and Patient Capacity Primary Decision Maker [facility] Agent):  
 
Medical Interventions: Full interventions Attempt to put together a family meeting to best understand and meet patients goals of care Other: As far as possible, the palliative care team has discussed with patient / health care proxy about goals of care / treatment preferences for patient. HISTORY:  
 
History obtained from: Chart Review and family interview CHIEF COMPLAINT: Infection of right leg HPI/SUBJECTIVE: The patient is:  
[] Verbal and participatory [x] Non-participatory due to:  
dementia Clinical Pain Assessment (nonverbal scale for nonverbal patients): Clinical Pain Assessment Severity: 5 Activity (Movement): Restless, excessive activity and/or withdrawal reflexes Duration: for how long has pt been experiencing pain (e.g., 2 days, 1 month, years) Frequency: how often pain is an issue (e.g., several times per day, once every few days, constant) FUNCTIONAL ASSESSMENT:  
 
Palliative Performance Scale (PPS): PPS: 30 
 
ECOG 
 ECOG Status : Completely disabled PSYCHOSOCIAL/SPIRITUAL SCREENING:  
  
Any spiritual / Mandaeism concerns: 
[] Yes /  [x] No 
 
Caregiver Burnout: 
[] Yes /  [x] No /  [] No Caregiver Present Anticipatory grief assessment:  
[x] Normal  / [] Maladaptive REVIEW OF SYSTEMS:  
 
Positive and pertinent negative findings in ROS are noted above in HPI. The following systems were [x] reviewed / [] unable to be reviewed as noted in HPI Other findings are noted below. Systems: constitutional, ears/nose/mouth/throat, respiratory, gastrointestinal, genitourinary, musculoskeletal, integumentary, neurologic, psychiatric, endocrine. Positive findings noted below. Modified ESAS Completed by: provider Fatigue: 8 Drowsiness: 8 Pain: 5 Anxiety: 0 Dyspnea: 0 Constipation: No  
     
 
 
 PHYSICAL EXAM:  
 
Wt Readings from Last 3 Encounters:  
05/02/19 72.8 kg (160 lb 9.6 oz)  
03/27/19 77.1 kg (170 lb)  
03/19/19 77.2 kg (170 lb 4.8 oz) Blood pressure 111/51, pulse 71, temperature 98 °F (36.7 °C), resp. rate 20, height 5' 4\" (1.626 m), weight 72.8 kg (160 lb 9.6 oz), SpO2 97 %, not currently breastfeeding. Pain: 
Pain Scale 1: Adult Nonverbal Pain Scale Pain Intensity 1: 0 Last bowel movement: not available Constitutional: Semi lethargic well developed  woman who appears stated age Eyes: anicteric ENMT: no nasal discharge, moist mucous membranes Cardiovascular: regular rhythm, difficult to palpate distal pulses Respiratory: breathing not labored, symmetric Gastrointestinal: soft non-tender, +bowel sounds Musculoskeletal: no deformity,very tender to touch BLE's 
Skin: warm, dry Neurologic: responsive to some verbal and all touch stimulation. Not oriented Psychiatric: unable to assess HISTORY:  
 
Active Problems: 
  Sepsis ([address] (3/17/2019) Abscess (4/30/2019) Severe sepsis ([facility] [address] (4/30/2019) Past Medical History: Diagnosis Date  Acute on chronic diastolic heart failure ([facility] [address]  Arthritis  Benign hypertensive heart disease without heart failure Better controlled, stable  Chronic diastolic heart failure ([facility] [address] Breathing and edema is improving  Congestive heart failure ([facility] [address]  HTN (hypertension)  Hypercholesteremia  Lupus (systemic lupus erythematosus) ([facility] [address] 6/18/2014  
 followed by dr Lackey  Obesity, unspecified Patient has weight loss Discussed diet ad fluid restriction  Other and unspecified hyperlipidemia F/u per pmd  
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  
Procedure Laterality Date  HX HYSTERECTOMY  PACEMAKER PROCEDURE Family History Problem Relation Age of Onset  Arrhythmia Neg Hx  Asthma Neg Hx  Clotting Disorder Neg Hx  Fainting Neg Hx   
 Heart Attack Neg Hx  High Cholesterol Neg Hx  Pacemaker Neg Hx  Stroke Neg Hx History reviewed, no pertinent family history. Social History Tobacco Use  Smoking status: Never Smoker  Smokeless tobacco: Never Used Substance Use Topics  Alcohol use: No  
 
No Known Allergies Current Facility-Administered Medications Medication Dose Route Frequency  cefTRIAXone (ROCEPHIN) 2 g in sterile water (preservative free) 20 mL IV syringe  2 g IntraVENous Q24H  
 acetaminophen (TYLENOL) tablet 650 mg  650 mg Oral Q6H PRN  
 amiodarone (CORDARONE) tablet 200 mg  200 mg Oral DAILY  amitriptyline (ELAVIL) tablet 25 mg  25 mg Oral QHS  aspirin delayed-release tablet 81 mg  81 mg Oral DAILY  carvedilol (COREG) tablet 3.125 mg  3.125 mg Oral Q12H  
 food supplemt, lactose-reduced (ENSURE ENLIVE) 0.08 gram-1.5 kcal/mL liqd 1 Bottle  1 Bottle Oral BID  hydroxychloroquine (PLAQUENIL) tablet 200 mg  200 mg Oral BID  memantine (NAMENDA) tablet 10 mg  10 mg Oral BID  
  menthol-zinc oxide (CALMOSEPTINE) 0.44-20.6 % ointment   Topical BID  polyethylene glycol (MIRALAX) packet 17 g  17 g Oral DAILY  hydrALAZINE (APRESOLINE) 20 mg/mL injection 10 mg  10 mg IntraVENous Q6H PRN  
 albuterol-ipratropium (DUO-NEB) 2.5 MG-0.5 MG/3 ML  3 mL Nebulization Q4H RT  
 heparin (porcine) injection 5,000 Units  5,000 Units SubCUTAneous Q8H  
 metroNIDAZOLE (FLAGYL) IVPB premix 500 mg  500 mg IntraVENous Q12H  
 0.9% sodium chloride infusion  50 mL/hr IntraVENous CONTINUOUS  
 hydrocortisone Sod Succ (PF) (SOLU-CORTEF) injection 50 mg  50 mg IntraVENous Q8H  
 sodium chloride (NS) flush 5-10 mL  5-10 mL IntraVENous PRN  
 VANCOMYCIN INFORMATION NOTE  1 Each Other Rx Dosing/Monitoring LAB AND IMAGING FINDINGS:  
 
Lab Results Component Value Date/Time WBC 17.5 (H) 05/02/2019 05:40 AM  
 HGB 7.7 (L) 05/02/2019 05:40 AM  
 PLATELET 146 77/46/3621 05:40 AM  
 
Lab Results Component Value Date/Time Sodium 142 05/02/2019 05:40 AM  
 Potassium 3.3 (L) 05/02/2019 05:40 AM  
 Chloride 105 05/02/2019 05:40 AM  
 CO2 30 05/02/2019 05:40 AM  
 BUN 67 (H) 05/02/2019 05:40 AM  
 Creatinine 1.90 (H) 05/02/2019 05:40 AM  
 Calcium 8.5 05/02/2019 05:40 AM  
 Magnesium 2.1 05/02/2019 05:40 AM  
 Phosphorus 3.4 12/28/2018 09:25 AM  
  
Lab Results Component Value Date/Time AST (SGOT) 21 05/01/2019 04:07 AM  
 Alk. phosphatase 67 05/01/2019 04:07 AM  
 Protein, total 6.3 (L) 05/01/2019 04:07 AM  
 Albumin 2.8 (L) 05/01/2019 04:07 AM  
 Globulin 3.5 05/01/2019 04:07 AM  
 
Lab Results Component Value Date/Time INR 1.4 (H) 04/30/2019 07:00 PM  
 Prothrombin time 16.8 (H) 04/30/2019 07:00 PM  
 aPTT 42.5 (H) 04/30/2019 07:00 PM  
  
Lab Results Component Value Date/Time Iron 51 02/10/2019 11:00 AM  
 TIBC 202 (L) 02/10/2019 11:00 AM  
 Iron % saturation 25 02/10/2019 11:00 AM  
  
No results found for: PH, PCO2, PO2 No components found for: [place] Lab Results Component Value Date/Time CK 78 05/01/2019 10:15 AM  
 CK - MB <1.0 05/01/2019 10:15 AM  
  
 
   
 
Total time: 50 
Counseling / coordination time, spent as noted above:  
> 50% counseling / coordination: 40 
 
Prolonged service was provided for  []30 min   []75 min in face to face time in the presence of the patient, spent as noted above. Time Start:  
Time End:  
Note: this can only be billed with 97915 (initial) or 86943 (follow up). If multiple start / stop times, list each separately.

## 2019-05-02 NOTE — PROGRESS NOTES
Problem: Dysphagia (Adult) Goal: *Acute Goals and Plan of Care (Insert Text) Description Dysphagia Present: moderate- severe oral, mild pharyngeal 
Aspiration: none present on b/s eval   
 
Recommendations: 
Diet: puree/ thin Meds: crushed in puree Aspiration Precautions Oral Care TID, HOB >/= 30* at all times Goals:  Patient will: 1. Tolerate PO trials with no overt s/s aspiration or distress in 4/5 trials 2. Complete an objective swallow study (i.e., MBSS) to assess swallow integrity, r/o aspiration, and determine of safest LRD, min A Outcome: Progressing Towards Goal 
 
SPEECH LANGUAGE PATHOLOGY BEDSIDE SWALLOW EVALUATION/TREATMENT Patient: Kerry Romeo (80 y.o. female) Date: 5/2/2019 Primary Diagnosis: Severe sepsis ([facility] [address] [A41.9, R65.20] Abscess [L02.91] Sepsis ([facility] [address] [A41.9] Precautions: aspiration PLOF: As per H&P 
 
ASSESSMENT : 
Based on the objective data described below, the patient presents with moderate- severe oral, mild pharyngeal dysphagia in setting of AMS with h/o dementia. Pt participated in b/s swallow eval 3/2019 with recs of regular solids with thin liquids. Due to level arousal and participation, rec: puree solids with thin liquids, aspiration precautions, assist with feeding as needed, encourage po intake. Pt resistive to po feeds, eventually accepted thin via straw and puree via tsp presentation with moderate- severe oral holding, decreased A-P transfer, mildly delayed swallow response with fair laryngeal elevation, no overt s/sx aspiration following thin or puree trial.  Rec as above. SLP will f/u with diet tolerance and possible advancement.   
 
 
TREATMENT : 
Skilled therapy initiated; Educated pt son on aspiration precautions and importance of compensatory swallow techniques to decrease aspiration risk (decrease rate of intake & sip/bite size, alternate solids/ liquids, use of pipette, HOB ~60* for all po intake and ~30 minutes after po); pt's son verbalized comprehension, pt unable to demonstrate comprehension. Patient will benefit from skilled intervention to address the above impairments. Patient's rehabilitation potential is considered to be Fair Factors which may influence rehabilitation potential include: ? None noted ? Mental ability/status ? Medical condition ? Home/family situation and support systems ? Safety awareness ? Pain tolerance/management ? Other: PLAN : 
Recommendations and Planned Interventions: 
puree solids with thin liquids, aspiration precautions, assist with feeding as needed, encourage po intake. Frequency/Duration: Patient will be followed by speech-language pathology 1-2 times per day/3-5days per week to address goals. Discharge Recommendations: To Be Determined SUBJECTIVE:  
Patient stated: n/a, pt non verbal 
 
OBJECTIVE:  
 
Past Medical History:  
Diagnosis Date Acute on chronic diastolic heart failure ([address] Arthritis Benign hypertensive heart disease without heart failure Better controlled, stable Chronic diastolic heart failure ([address] Breathing and edema is improving Congestive heart failure ([address] HTN (hypertension) Hypercholesteremia Lupus (systemic lupus erythematosus) ([address] 6/18/2014  
 followed by dr Mitzi Hyman Obesity, unspecified Patient has weight loss Discussed diet ad fluid restriction Other and unspecified hyperlipidemia F/u per pmd  
 Tricuspid valve disorders, 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  
Procedure Laterality Date HX HYSTERECTOMY PACEMAKER PROCEDURE Prior Level of Function/Home Situation: as per H&P Home Situation Home Environment: Private residence One/Two Story Residence: One story Living Alone: No 
Support Systems: Family member(s), Home care staff Patient Expects to be Discharged to[de-identified] Private residence Current DME Used/Available at Home: hui Gannon Palmer Dynes Diet prior to admission: soft solids per son report, meds crushed in puree Current Diet:  NPO Cognitive and Communication Status: 
Neurologic State: Confused, Eyes do not open to any stimulus, Irritable Orientation Level: Unable to verbalize Cognition: No command following Perception: (unable to determine) Perseveration: No perseveration noted Safety/Judgement: Fall prevention, Lack of insight into deficits Oral Assessment: 
Oral Assessment Labial: No impairment Oral Hygiene: unable to determine Lingual: No impairment Velum: Unable to visualize Mandible: (clenched tight) P. O. Trials: 
Patient Position: HOB 60* Vocal quality prior to P.O.: Aphonic Consistency Presented: Thin liquid;Puree How Presented: SLP-fed/presented;Straw; Other (comment)(pipette) How Much: (limited) Bolus Acceptance: Impaired Bolus Formation/Control: Impaired Type of Impairment: Delayed;Poor; Incomplete Propulsion: Delayed (# of seconds); Discoordination Oral Residue: (unable to determine) Initiation of Swallow: Delayed (# of seconds) Laryngeal Elevation: Functional 
Aspiration Signs/Symptoms: None Pharyngeal Phase Characteristics: Poor endurance;Easily fatigued Effective Modifications: None Cues for Modifications: Maximal 
  
 
Oral Phase Severity: Moderate-severe Pharyngeal Phase Severity : Mild PAIN: 
Start of Eval: 0 End of Eval: 0 After treatment:  
?            Patient left in no apparent distress sitting up in chair ? Patient left in no apparent distress in bed ? Call bell left within reach ? Nursing notified ? Family present ? Caregiver present ? Bed alarm activated COMMUNICATION/EDUCATION:  
?            Aspiration precautions; swallow safety; compensatory techniques. ?            Patient/family have participated as able in goal setting and plan of care. ?            Patient/family agree to work toward stated goals and plan of care. ?            Patient understands intent and goals of therapy; neutral about participation. ? Patient unable to participate in goal setting/plan of care; educ ongoing with interdisciplinary staff ? Posted safety precautions in patient's room. Thank you for this referral, 
Virgil Cruz Time Calculation: 43 mins: 
Eval: 20 minutes Tx: 23 minutes

## 2019-05-02 NOTE — PROGRESS NOTES
Problem: Self Care Deficits Care Plan (Adult) Goal: *Acute Goals and Plan of Care (Insert Text) Description Occupational Therapy Goals--FMP Provided by rehab technician Initiated 5/2/2019 within 7 day(s). 1.  Patient will perform BUE  PROM on elbow, wrist, and finger joints with Total Assistance 3-5X/week. Prior Level of Function: Per patient's son, she lived at home and had help for all ADLs. Patient's son was unable to assist further. Patient presented as Total Assistance at this OT evaluation. Outcome: Progressing Towards Goal 
 OCCUPATIONAL THERAPY EVALUATION Patient: Oliverio Oconnell (80 y.o. female) Date: 5/2/2019 Primary Diagnosis: Severe sepsis ([facility] [address] [A41.9, R65.20] Abscess [L02.91] Sepsis ([facility] [address] [A41.9] Precautions:   Fall ASSESSMENT : 
Based on the objective data described below, the patient presents with decreased PROM and inability to follow commands. Patient guarded during all BUE movements by OTR. Patient was unable to answer any questions. Patient is not able to participate in skilled Occupational Therapy at this time. Patient will benefit from [facility]. Not appropriate for skilled OT at this time. Patient?s rehabilitation potential is considered to be: Guarded Factors which may influence rehabilitation potential include: ? None noted ? Mental ability/status ? Medical condition ? Home/family situation and support systems ? Safety awareness ? Pain tolerance/management ? Other: PLAN : 
Recommendations and Planned Interventions:  
?               Self Care Training                  ? Therapeutic Activities ? Functional Mobility Training    ? Cognitive Retraining 
? Therapeutic Exercises           ? Endurance Activities ? Balance Training                   ? Neuromuscular Re-Education ?               Visual/Perceptual Training     ? Home Safety Training 
? Patient Education                 ? Family Training/Education ? Other (comment): Frequency/Duration: Patient will be followed by rehab technician 3-5X/week for PROM of BUEs. Discharge Recommendations: Andrey Anil vs. [facility] Further Equipment Recommendations for Discharge: NA Barriers to Learning/Limitations: yes;  altered mental status (i.e.Sedation, Confusion) Compensate with: visual, verbal, tactile, kinesthetic cues/model PATIENT COMPLEXITY Eval Complexity: History: MEDIUM Complexity : Expanded review of history including physical, cognitive and psychosocial  history ; Examination: LOW Complexity : 1-3 performance deficits relating to physical, cognitive , or psychosocial skils that result in activity limitations and / or participation restrictions ; Decision Making:MEDIUM Complexity : Patient may present with comorbidities that affect occupational performnce. Miniml to moderate modification of tasks or assistance (eg, physical or verbal ) with assesment(s) is necessary to enable patient to complete evaluation  Assessment: Low Complexity SUBJECTIVE:  
Patient did not functionally verbalize during OT evaluation. 2 curse words were observed. OBJECTIVE DATA SUMMARY:  
 
Past Medical History:  
Diagnosis Date Acute on chronic diastolic heart failure ([address] Arthritis Benign hypertensive heart disease without heart failure Better controlled, stable Chronic diastolic heart failure ([address] Breathing and edema is improving Congestive heart failure ([address] HTN (hypertension) Hypercholesteremia Lupus (systemic lupus erythematosus) ([address] 6/18/2014  
 followed by dr Maite Todd Obesity, unspecified Patient has weight loss Discussed diet ad fluid restriction Other and unspecified hyperlipidemia  F/u per pmd  
 Tricuspid valve disorders, 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  
Procedure Laterality Date HX HYSTERECTOMY PACEMAKER PROCEDURE Prior Level of Function/Home Situation:  
Home Situation Home Environment: Private residence One/Two Story Residence: One story Living Alone: No 
Support Systems: Family member(s), Home care staff Patient Expects to be Discharged to[de-identified] Private residence Current DME Used/Available at Home: Renny Plummer, hui, She Palmer ? Right hand dominant   ? Left hand dominant Cognitive/Behavioral Status: 
Neurologic State: Eyes open to pain Orientation Level: Unable to verbalize Cognition: No command following Safety/Judgement: Fall prevention;Lack of insight into deficits Skin: Intact BUEs Edema: No edema noted BUEs Vision/Perceptual:   
Not tested Coordination: 
Coordination: Grossly decreased, non-functional 
Strength: 
Strength: Grossly decreased, non-functional 
 
Tone & Sensation: 
Tone: Normal 
Sensation: (Unable to assess) Range of Motion: 
AROM: Grossly decreased, non-functional--Unable to complete. Unable to follow commands. PROM: Grossly decreased, non-functional 
 
Functional Mobility and Transfers for ADLs: 
Bed Mobility: 
Rolling: Total assistance ADL Assessment: 
Feeding: Total assistance Oral Facial Hygiene/Grooming: Total assistance Bathing: Total assistance Upper Body Dressing: Total assistance Lower Body Dressing: Total assistance Toileting: Total assistance ADL Intervention: 
Cognitive Retraining Safety/Judgement: Fall prevention;Lack of insight into deficits Therapeutic Exercise: PROM BUE Elbows, wrists, shoulders. Pain: 
Pt unable to report pain before or during activity. Patient demonstrated guarding/grimacing during BUE PROM by Tamika Samuel Activity Tolerance:  
Poor Please refer to the flowsheet for vital signs taken during this treatment. After treatment: ? Patient left in no apparent distress sitting up in chair ? Patient left in no apparent distress in bed 
? Call bell left within reach ? Nursing notified ? Caregiver present ? Bed alarm activated COMMUNICATION/EDUCATION:  
? Home safety education was provided and the patient/caregiver indicated understanding. ? Patient/family have participated as able in goal setting and plan of care. ? Patient/family agree to work toward stated goals and plan of care. ? Patient understands intent and goals of therapy, but is neutral about his/her participation. ? Patient is unable to participate in goal setting and plan of care. Thank you for this referral. 
Delilah Page, OTR/L Time Calculation: 13 mins

## 2019-05-02 NOTE — NURSE NAVIGATOR
This NN attempted to see the patient for [facility] [place], but patient has dementia; no family in the room. Will reattempt tomorrow.

## 2019-05-02 NOTE — PROGRESS NOTES
Infectious Disease progress Note Requested by:dr. issa 
 
Reason:sepsis, right leg cellulitis, right thigh abscess Current abx Prior abx Cefepime, vancomycin since 4/30 Metronidazole since 5/1 Pip/tazo x 1 - 5/1 Lines:  
 
 
Assessment : 
 
24-year-old AA female with a history of congestive heart failure with EF of 31-35 % on 12/28/18, lupus, thrombus cytopenia, chronic kidney disease admitted to [facility] on 4/30/19 for altered mental status. H/o lupus nodules on LE in 1/2019 Now with high fevers, RLE redness, ulcer right leg Clinical picture consistent with sepsis (POA) due to gram positive bloodstream infection , acute right thigh/leg cellulitis, right thigh abscess, necrotic right leg ulcer with possible superinfection. Right thigh abscess s/p bedside I&D in ed on 4/30/19. No cultures sent. Right leg ulcer with clot/necrosis - cultures 5/1- staph aureus Exact microbial etiology of infeciton unclear. Will send cultures to determine this. decreased erythema, tenderness right thigh/leg - no significant induration around the recently drained right thigh abscess. Decreased wbc. More alert today.  
  
Recommendations: 
  
1. discontinue cefepime, start ceftriaxone. continue vancomycin,metronidazole 2. f/u wound culture right leg 3. Vascular surgery consult to evaluate right leg ulcer - determine need for I&D, evaluate left leg for vascular insufficiency- d/w dr. Ross Early 4. F/u blood culture 5. Monitor right LE clinically 6. Repeat blood cultures today 
  
Advance Care planning: full code: discussed  with patient/surrogate decision maker: zacarias ledbetter: [phone]. Discussed with son at bedside about need to re address goals of care looking at patient's progressive decline in clinical condition, recurrent hospitalization. He will d/w other siblings.  D/w palliative care team. Additional time spent > 30 min.   
 Above plan was discussed in details with  dr Heidi Zia, son at bedside. Please call me if any further questions or concerns. Will continue to participate in the care of this patient. HPI: 
 
 
  
Detailed ros not feasible since patient unable to communicate effectively. home Medication List  
  
  
   
 Details  
polyethylene glycol (MIRALAX) 17 gram/dose powder Take 17 g by mouth daily. torsemide (DEMADEX) 10 mg tablet Take 1 Tab by mouth daily. Qty: 30 Tab, Refills: 6  
  
amoxicillin (AMOXIL) 500 mg capsule Take 500 mg by mouth three (3) times daily. aspirin delayed-release 81 mg tablet Take 81 mg by mouth daily. hydroxychloroquine (PLAQUENIL) 200 mg tablet Take 200 mg by mouth two (2) times a day. metOLazone (ZAROXOLYN) 2.5 mg tablet Take 1 Tab by mouth daily. Qty: 30 Tab, Refills: 5  
  
!! OTHER Incentive Spirometry- Use as directed Qty: 1 Each, Refills: 0  
  
!! OTHER Graded Compression Stockings b/l LE- use as directed Qty: 1 Each, Refills: 0  
  
food supplemt, lactose-reduced (ENSURE ENLIVE) 0.08 gram-1.5 kcal/mL liqd Take 1 Bottle by mouth two (2) times a day. Qty: 60 Bottle, Refills: 0  
  
menthol-zinc oxide (CALMOSEPTINE) 0.44-20.6 % oint Apply  to affected area. carvedilol (COREG) 3.125 mg tablet Take 1 Tab by mouth every twelve (12) hours. Qty: 60 Tab, Refills: 0  
  
acetaminophen (TYLENOL ARTHRITIS PAIN) 650 mg TbER Take 1 Tab by mouth every eight (8) hours. Qty: 15 Tab, Refills: 0  
  
amiodarone (CORDARONE) 200 mg tablet TAKE ONE TABLET EVERY DAY (MAKE AN APPT) Qty: 30 Tab, Refills: 5  
  
memantine (NAMENDA) 10 mg tablet Take 10 mg by mouth two (2) times a day. amitriptyline (ELAVIL) 25 mg tablet Take 25 mg by mouth nightly. !! - Potential duplicate medications found. Please discuss with provider. Current Facility-Administered Medications Medication Dose Route Frequency  vancomycin (VANCOCIN) 1250 mg in  ml infusion  1,250 mg IntraVENous ONCE  potassium chloride 10 mEq, lidocaine (PF) (XYLOCAINE) 10 mg/mL (1 %) 1 mL in 0.9% sodium chloride 100 mL IVPB   IntraVENous Q1H  
 acetaminophen (TYLENOL) tablet 650 mg  650 mg Oral Q6H PRN  
 amiodarone (CORDARONE) tablet 200 mg  200 mg Oral DAILY  amitriptyline (ELAVIL) tablet 25 mg  25 mg Oral QHS  aspirin delayed-release tablet 81 mg  81 mg Oral DAILY  carvedilol (COREG) tablet 3.125 mg  3.125 mg Oral Q12H  
 food supplemt, lactose-reduced (ENSURE ENLIVE) 0.08 gram-1.5 kcal/mL liqd 1 Bottle  1 Bottle Oral BID  hydroxychloroquine (PLAQUENIL) tablet 200 mg  200 mg Oral BID  memantine (NAMENDA) tablet 10 mg  10 mg Oral BID  menthol-zinc oxide (CALMOSEPTINE) 0.44-20.6 % ointment   Topical BID  polyethylene glycol (MIRALAX) packet 17 g  17 g Oral DAILY  hydrALAZINE (APRESOLINE) 20 mg/mL injection 10 mg  10 mg IntraVENous Q6H PRN  
 albuterol-ipratropium (DUO-NEB) 2.5 MG-0.5 MG/3 ML  3 mL Nebulization Q4H RT  
 heparin (porcine) injection 5,000 Units  5,000 Units SubCUTAneous Q8H  
 metroNIDAZOLE (FLAGYL) IVPB premix 500 mg  500 mg IntraVENous Q12H  
 0.9% sodium chloride infusion  50 mL/hr IntraVENous CONTINUOUS  
 hydrocortisone Sod Succ (PF) (SOLU-CORTEF) injection 50 mg  50 mg IntraVENous Q8H  
 sodium chloride (NS) flush 5-10 mL  5-10 mL IntraVENous PRN  
 cefepime (MAXIPIME) 1 g in sterile water (preservative free) 10 mL IV syringe  1 g IntraVENous Q24H  VANCOMYCIN INFORMATION NOTE  1 Each Other Rx Dosing/Monitoring Allergies: Patient has no known allergies. Temp (24hrs), Av.5 °F (36.9 °C), Min:97.3 °F (36.3 °C), Max:99.6 °F (37.6 °C) Visit Vitals /51 Pulse 71 Temp 97.3 °F (36.3 °C) Resp 18 Ht 5' 4\" (1.626 m) Wt 72.8 kg (160 lb 9.6 oz) SpO2 93% Breastfeeding? No  
BMI 27.57 kg/m² ROS: 12 point ROS obtained in details. Pertinent positives as mentioned in HPI,  
otherwise negative Physical Exam: 
 
Constitutional: She appears well-developed and well-nourished. No distress. non verbal 
 
HENT:  
Head: Normocephalic and atraumatic. Right Ear: External ear normal.  
Left Ear: External ear normal.  
Nose: Nose normal.  
Eyes: Pupils are equal, round, and reactive to light. Conjunctivae and EOM are normal. No scleral icterus. Neck: Normal range of motion. Neck supple. No JVD present. No tracheal deviation present. No thyromegaly present. Cardiovascular: Normal rate, regular rhythm, normal heart sounds and intact distal pulses. Exam reveals no gallop and no friction rub. No murmur heard. Pulmonary/Chest: Effort normal and breath sounds normal. She exhibits no tenderness. no rales/rhonchi Abdominal: Soft. Bowel sounds are normal. She exhibits no distension. There is no tenderness. There is no rebound and no guarding. Musculoskeletal: She exhibits no edema or tenderness. Right lower extremity with  erythema of right lateral thigh, right leg with overlying warmth/tenderness, anterior tibial 10 x 5 cm open bloody wound with necrotic center, Left lower extremity  without edema/tenderness Lymphadenopathy:  
  She has no cervical adenopathy. Neurological: She is alert. No cranial nerve deficit. Coordination normal.  
Eyes open but does not follow commands, globally weak, right lower extremity is contracted Skin: Skin is dry. Right lower extremity erythema and warm to touch Psychiatric:  
Unable to assess Nursing note and vitals reviewed. 
  
  
 
 
 
 
Labs: Results:  
Chemistry Recent Labs 05/02/19 
0540 05/01/19 
0407 04/30/19 
1900 * 115* 129*  138 139  
K 3.3* 2.9* 3.2*  
 99* 99* CO2 30 32 34* BUN 67* 62* 61* CREA 1.90* 2.04* 2.14* CA 8.5 8.3* 8.7 AGAP 7 7 6 BUCR 35* 30* 29* AP  --  67 68 TP  --  6.3* 6.2* ALB  --  2.8* 2.7*  
 GLOB  --  3.5 3.5 AGRAT  --  0.8 0.8 CBC w/Diff Recent Labs 05/02/19 
0540 05/01/19 
0407 04/30/19 
1900 WBC 17.5* 22.3* 20.8*  
RBC 2.66* 2.88* 2.82* HGB 7.7* 8.5* 8.4* HCT 24.5* 26.7* 26.0*  
 184 171 GRANS 88* 79* 91* LYMPH 3* 3* 4* EOS 0 0 0 Microbiology Recent Labs 05/01/19 
1050 04/30/19 
1953 04/30/19 
1915 CULT PENDING NO GROWTH 2 DAYS CULTURE IN PROGRESS,FURTHER UPDATES TO FOLLOW RADIOLOGY: 
 
All available imaging studies/reports in [facility] care for this admission were reviewed Dr. Osman Branham, Infectious Disease Specialist 
[phone] May 2, 2019 
7:41 AM

## 2019-05-02 NOTE — PROGRESS NOTES
FOC obtained for [facility] and SNF, pending pt's progress. Sister's preferences  are [facility] or [facility] SNF. Have been matched in [facility] and [place]. Jarda Bettencourt, VANESSA [phone]

## 2019-05-02 NOTE — PROGRESS NOTES
conducted an initial consultation and Spiritual Assessment for [place], who is a 80 y.o.,female. Patients Primary Language is: [place]. According to the patients EMR Voodoo Affiliation is: Buddhism.  
 
The reason the Patient came to the hospital is:  
Patient Active Problem List  
 Diagnosis Date Noted  Amelie 06/08/2015 Priority: 1 - One  Abscess 04/30/2019  Severe sepsis ([facility] 04/30/2019  Diarrhea 03/17/2019  UTI (urinary tract infection) 03/17/2019  Fever 03/17/2019  Sepsis ([address] 03/17/2019  
 Skin lesions 02/06/2019  
 NSTEMI (non-ST elevated myocardial infarction) ([facility] 01/30/2019  Dementia 01/30/2019  CKD (chronic kidney disease) stage 4, GFR 15-29 ml/min ([facility]) 01/21/2019  Uremia 01/05/2019  Left hip pain 01/05/2019  Acute exacerbation of CHF (congestive heart failure) ([facility] 12/27/2018  ARIEL (acute kidney injury) ([facility] 12/27/2018  Diastolic CHF, chronic ([facility] 11/30/2016  High risk medication use 11/30/2016  Acute on chronic diastolic (congestive) heart failure ([facility] 05/09/2016  Paroxysmal atrial flutter ([facility] 01/11/2016  S/P placement of cardiac pacemaker 11/22/2015  Hypertensive heart disease with CHF (congestive heart failure) ([facility] 11/17/2015  Tricuspid valve disorders, non-rheumatic 06/18/2014  Lupus (systemic lupus erythematosus) ([facility] 06/18/2014  
 HTN (hypertension)  Hypercholesteremia The  provided the following Interventions: 
Initiated a relationship of care and support. Explored issues of valeria, belief, spirituality and Baptism/ritual needs while hospitalized. Listened empathically. Provided information about Spiritual Care Services. Chart reviewed. The following outcomes where achieved: 
 confirmed Patient's Voodoo Affiliation. Assessment: 
Patient's valeria in God is very important for her to cope. Strong family support. Patient does not have any Zoroastrianism/cultural needs that will affect patients preferences in health care. There are no spiritual or Zoroastrianism issues which require intervention at this time. Plan: 
Chaplains will continue to follow and will provide pastoral care on an as needed/requested basis.  recommends bedside caregivers page  on duty if patient shows signs of acute spiritual or emotional distress. Kya Salazar Spiritual Care 
([phone])

## 2019-05-02 NOTE — PROGRESS NOTES
PALLIATIVE MEDICINE NOTE Palliative med team consisting of this author, Bee Harris, GALINDO, and Didier Ramírez, GALINDO, met with Pt at bedside. Her son, Jenn Dong, was also present. Dr. Anuradha Salcedo with Infectious Disease also stopped by to assess Pt's wounds. Vascular was consulted re: wound on right shin. Pt got agitated and uncomfortable when having her wounds checked and was swearing at us incoherently. She calmed down easily after that and fell asleep. Pt and her family are known to this team from last hosp stay in Feb, 2019. Pt has a spouse, Aisha Andrade, and 6 adult children. Son, Iveth Nicole, and his girlfriend live with the couple to help with care, as well. Price ambulates w/ a walker but stopped driving last yr \"because he wouldn't take an eye test at the [facility]. He needs cataract surgery\", per Jenn Dong. Pt has been in the ED numerous times since January. We requested a family meeting to discuss goals of care and plans for Pt moving forward as Jenn Dong mentioned that Pt \"may go to a nursing home for a month or so. \"  Jenn Dong will talk with his siblings and dad to coordinate a meeting later today or tomorrow. He denied any other questions or concerns at this time. Thank you for the consult and the opportunity to assist in Ms. Gilma Armstrong care. We will continue to follow to provide support to Pt and family. Lubna Jurado, [facility] Palliative Medicine

## 2019-05-02 NOTE — PROGRESS NOTES
In Patient Progress note Admit Date: 4/30/2019 Impression:  
 
1 Acute kidney injury  prerenal azotemia versus ATN in the setting of sepsis/right lower extremity abscess/SIRS Patient looks dry, holding her diuretics, continue IV fluids. Continue gentle  IV normosol. Strict ins and outs, agree with Mendiola in place, trend renal parameters closely. UA shows no hematuria or proteinuria, Blood pressures are soft, now on stress dose  IV steroids Monitor closely 2 chronic kidney disease stage IV secondary to hypertension nephrosclerosis, cardiorenal syndrome 3 sepsis most likely secondary to right lower extremity abscess/wounds. On antibiotics per ID, vascular also consulted per ID recs 4 history of dementia  
5 history of lupus, immunocompromised on the right long-term steroids. Now on stress dose steroids, plaquenil. No evidence of renal manifestation of lupus Patient follows with Dr Grace Lebron. 
  
 Please call with questions , 
  
Georgette Walsh MD [facility] Cell [phone] Pager: [phone] Subjective: More awake today , still lethargic Current Facility-Administered Medications:  
  cefTRIAXone (ROCEPHIN) 2 g in sterile water (preservative free) 20 mL IV syringe, 2 g, IntraVENous, Q24H, Jolynn Walters MD 
  acetaminophen (TYLENOL) tablet 650 mg, 650 mg, Oral, Q6H PRN, Neto Ford MD 
  amiodarone (CORDARONE) tablet 200 mg, 200 mg, Oral, DAILY, Neto Ford MD, Stopped at 05/01/19 0900 
  amitriptyline (ELAVIL) tablet 25 mg, 25 mg, Oral, QHS, Neto Ford MD, Stopped at 05/01/19 2104   aspirin delayed-release tablet 81 mg, 81 mg, Oral, DAILY, Neto Ford MD, Stopped at 05/01/19 0900   carvedilol (COREG) tablet 3.125 mg, 3.125 mg, Oral, Q12H, Neto Ford MD, Stopped at 05/01/19 0900 
  food supplemt, lactose-reduced (ENSURE ENLIVE) 0.08 gram-1.5 kcal/mL liqd 1 Bottle, 1 Bottle, Oral, BID, Neto Ford MD, Stopped at 05/01/19 0900   hydroxychloroquine (PLAQUENIL) tablet 200 mg, 200 mg, Oral, BID, Anaya Flores MD, Stopped at 05/01/19 0900   memantine (NAMENDA) tablet 10 mg, 10 mg, Oral, BID, Anaya Flores MD, Stopped at 05/01/19 0900 
  menthol-zinc oxide (CALMOSEPTINE) 0.44-20.6 % ointment, , Topical, BID, Anaya Flores MD, Stopped at 05/01/19 0900   polyethylene glycol (MIRALAX) packet 17 g, 17 g, Oral, DAILY, Anaya Flores MD, Stopped at 05/01/19 0900   hydrALAZINE (APRESOLINE) 20 mg/mL injection 10 mg, 10 mg, IntraVENous, Q6H PRN, Anaya Flores MD 
  albuterol-ipratropium (DUO-NEB) 2.5 MG-0.5 MG/3 ML, 3 mL, Nebulization, Q4H RT, ToEdi Villavicencio MD, 3 mL at 05/02/19 0926 
  heparin (porcine) injection 5,000 Units, 5,000 Units, SubCUTAneous, Q8H, Edi Zaidi MD, 5,000 Units at 05/02/19 1025 
  metroNIDAZOLE (FLAGYL) IVPB premix 500 mg, 500 mg, IntraVENous, Q12H, Madeline Cushing R, MD, Last Rate: 100 mL/hr at 05/02/19 0917, 500 mg at 05/02/19 0917 
  0.9% sodium chloride infusion, 50 mL/hr, IntraVENous, CONTINUOUS, Gi Howe MD, Last Rate: 50 mL/hr at 05/02/19 1424, 50 mL/hr at 05/02/19 1424   hydrocortisone Sod Succ (PF) (SOLU-CORTEF) injection 50 mg, 50 mg, IntraVENous, Q8H, Js Tyler MD, 50 mg at 05/02/19 1420 
  sodium chloride (NS) flush 5-10 mL, 5-10 mL, IntraVENous, PRN, Anaya Flores MD 
  VANCOMYCIN INFORMATION NOTE, 1 Each, Other, Rx Dosing/Monitoring, Anaya Flores MD 
 
 
 
Objective:  
 
Visit Vitals /51 Pulse 71 Temp 98 °F (36.7 °C) Resp 20 Ht 5' 4\" (1.626 m) Wt 72.8 kg (160 lb 9.6 oz) SpO2 97% Breastfeeding? No  
BMI 27.57 kg/m² Intake/Output Summary (Last 24 hours) at 5/2/2019 1428 Last data filed at 5/2/2019 1333 Gross per 24 hour Intake 1170 ml Output 475 ml Net 695 ml Physical Exam:  
 
gen NAD, sleepy HENT mmm RS AEBE clear CVS s1 s2 wnl no JVD 
GI soft BS + Ext no edema Data Review: 
 
Recent Labs 05/02/19 
0540 WBC 17.5*  
 RBC 2.66* HCT 24.5* MCV 92.1 MCH 28.9 MCHC 31.4  
RDW 14.3 Recent Labs 05/02/19 
0540 05/01/19 
0407 04/30/19 
1900 BUN 67* 62* 61* CREA 1.90* 2.04* 2.14* CA 8.5 8.3* 8.7 ALB  --  2.8* 2.7*  
K 3.3* 2.9* 3.2*  138 139  99* 99* CO2 30 32 34* * 115* 129* Yeny Goode MD

## 2019-05-02 NOTE — PALLIATIVE CARE
[id number] [facility] 
[phone] Patient Name: [place] YOB: 1931 Age: 80 y.o. Palliative Care met with son at the bedside. Infectious Disease in to evaluate the wounds on right calf, inner thigh. Noted large area reddened and warm to touch upper anterior aspect of right thigh. Patient indicates through verbal expression that the areas and movement were uncomfortable. Requested that we meet with the family including patients  to discuss the burdens and benefits of Full Code status. Attempt to set up a meeting later today or tomorrow. Comprehensive note to follow. Thank you for the opportunity to be of service to this patient. Merari Manriquez MSN, YOHANNES, Vinny Daniels Palliative Medicine Department [facility], [place]

## 2019-05-02 NOTE — PROGRESS NOTES
NUTRITION Nursing Referral: [facility] Nutrition Consult: General Nutrition Management & Supplements RECOMMENDATIONS / PLAN:  
 
- Add nutritional supplements: Ensure Enlive BID 
- Monitor meal intake. Anticipate pt will need a lot of assistance and encouragement. - Continue RD inpatient monitoring and evaluation. NUTRITION INTERVENTIONS & DIAGNOSIS:  
 
[x] Meals/snacks: modified composition 
[x] Medical food supplement therapy: intiate [x] Collaboration and referral of nutrition care: discussed meal intake and assistance with meals with nurse Nutrition Diagnosis: Predicted inadequate energy intake related to pt mentation and current intake as evidenced by pt only consuming a few bites, weight loss PTA, and confusion. ASSESSMENT:  
 
Pt evaluated by SLP and started on diet. Pt with poor intake so far. Family told nurse pt eats at home with a lot of encouragement. Weight loss noted. Noted pt consumes Ensure at home per RD evaluation at last visit. Average po intake adequate to meet patients estimated nutritional needs:   [] Yes     [x] No   [] Unable to determine at this time Diet: DIET DYSPHAGIA PUREED (NDD1) Food Allergies: NKFA Current Appetite:   [] Good     [] Fair     [x] Poor     [] Other: 
Appetite/meal intake prior to admission:   [] Good     [] Fair     [] Poor     [x] Other: unknown Feeding Limitations:  [x] Swallowing difficulty: SLP following    [] Chewing difficulty    [] Other: 
Current Meal Intake:  
Patient Vitals for the past 100 hrs: 
 % Diet Eaten 05/02/19 1333 15 % 05/02/19 0852 0 % 05/01/19 1200 0 % BM: 5/1 Skin Integrity: pressure injury to sacrum, cellulitis to right thigh, moisture associated skin damage to right groin Edema:   [] No     [x] Yes Pertinent Medications: Reviewed Recent Labs 05/02/19 
0540 05/01/19 
0407 04/30/19 
1900  138 139  
K 3.3* 2.9* 3.2*  
 99* 99* CO2 30 32 34* * 115* 129* BUN 67* 62* 61* CREA 1.90* 2.04* 2.14* CA 8.5 8.3* 8.7 MG 2.1  --  2.0 ALB  --  2.8* 2.7* SGOT  --  21 17 ALT  --  14 14 Intake/Output Summary (Last 24 hours) at 5/2/2019 1436 Last data filed at 5/2/2019 1333 Gross per 24 hour Intake 1170 ml Output 475 ml Net 695 ml Anthropometrics: 
Ht Readings from Last 1 Encounters:  
05/01/19 5' 4\" (1.626 m) Last 3 Recorded Weights in this Encounter 04/30/19 1947 05/01/19 7216 05/02/19 0340 Weight: 74.8 kg (165 lb) 68.4 kg (150 lb 14.4 oz) 72.8 kg (160 lb 9.6 oz) Body mass index is 27.57 kg/m². Weight History: -5.9% x 1 month Weight Metrics 5/2/2019 4/30/2019 4/4/2019 3/27/2019 3/19/2019 3/4/2019 2/13/2019 Weight 160 lb 9.6 oz - - 170 lb 170 lb 4.8 oz - 160 lb BMI - 27.57 kg/m2 29.18 kg/m2 29.18 kg/m2 29.23 kg/m2 27.46 kg/m2 27.46 kg/m2 Admitting Diagnosis: Severe sepsis ([facility] [A41.9, R65.20] Abscess [L02.91] Sepsis ([facility] [A41.9] Pertinent PMHx: HTN, hypercholesterolemia, CHF, ARIEL, CKD, NSTEMI, dementia Education Needs:        [x] None identified  [] Identified - Not appropriate at this time  []  Identified and addressed - refer to education log Learning Limitations:   [] None identified  [x] Identified: non-verbal, dementia Cultural, [place] & ethnic food preferences:  [x] None identified    [] Identified and addressed ESTIMATED NUTRITION NEEDS:  
 
Calories: 3949-5085 kcal (MSJx1.2-1.4) based on  [x] Actual BW 73 kg     [] IBW Protein:  gm (1.2-1.5 gm/kg) based on  [x] Actual BW      [] IBW Fluid: 1 mL/kcal 
  
MONITORING & EVALUATION:  
 
Nutrition Goal(s): 1. Po intake of meals will meet >75% of patient estimated nutritional needs within the next 7 days.   Outcome:  [] Met/Ongoing    [] Progressing towards goal    [] Not progressing towards goal    [x] New/Initial goal  
 
Monitoring:   [x] Food and beverage intake   [x] Diet order   [x] Nutrition-focused physical findings   [x] Treatment/therapy   [] Weight   [] Enteral nutrition intake Previous Recommendations (for follow-up assessments only):     []   Implemented       []   Not Implemented (RD to address)      [] No Longer Appropriate     [] No Recommendation Made Discharge Planning: pending plan of care [x] Participated in care planning, discharge planning, & interdisciplinary rounds as appropriate Cayden Silvestre RD, [facility] Pager: [phone]

## 2019-05-02 NOTE — PROGRESS NOTES
Reason for Admission:   Severe sepsis ([facility] [address] [A41.9, R65.20] Abscess [L02.91] Sepsis ([facility] [address] [A41.9] RRAT Score:     45 Resources/supports as identified by patient/family:    
 
Top Challenges facing patient (as identified by patient/family and CM):     Yes, family is waiting on mediciad for personal care. UAI already completed Finances/Medication cost?     No issues Transportation      Per son, family transports Support system or lack thereof? Lives with spouse and other family members who assist pt at home Living arrangements? Lives at home with family Self-care/ADLs/Cognition? Assisted with all ADLs Current Advanced Directive/Advance Care Plan:   no 
                       
Plan for utilizing home health:    yes Likelihood of readmission:   HIGH Transition of Care Plan:               
 
 
Initial assessment completed with son, Presley Pinzon. Cognitive status of patient: disoriented. Face sheet information confirmed:  yes. The patient designates Presley Pinzon, odalis or scar to participate in her discharge plan and to receive any needed information. This patient lives in a single family home with patient, son, daughter and spouse. Patient is not able to navigate steps as needed. Prior to hospitalization, patient was considered to be independent with ADLs/IADLS : no . If not independent,  patient needs assist with : dressing, bathing, food preparation, cooking, toileting and grooming Patient has a current ACP document on file: no The patient and son will be available to transport patient home upon discharge. The patient already has [facility], and  medical equipment available in the home. Patient is currently active with home health. If active, agency name is [facility]. Patient has not stayed in a skilled nursing facility or rehab.     
 
This patient is on dialysis :no 
 
 List of available Home Health agencies were provided and reviewed with the patient prior to discharge. Freedom of choice signed: no. Currently, the discharge plan is Home with Home Health vs SNF. Per son, medicaid was applied for and are hoping medicaid will be active in the next week or two. UAI was already completed on 2/6/19 The patient states that she can obtain her medications from the pharmacy, and take her medications as directed. Patient's current insurance is Medicare/[employer]. Care Management Interventions PCP Verified by CM: Yes Mode of Transport at Discharge: BLS Transition of Care Consult (CM Consult): (family in considering [facility] vs SNF) Discharge Durable Medical Equipment: No 
Physical Therapy Consult: Yes Occupational Therapy Consult: Yes Current Support Network: Lives with Spouse(son and dgt. n law assist with care and live with pt.) Confirm Follow Up Transport: Family Plan discussed with Pt/Family/Caregiver: Yes Discharge Location Discharge Placement: Unable to determine at this time JOSHUA Odom, [address]

## 2019-05-02 NOTE — ROUTINE PROCESS
Bedside and Verbal shift change report given to Mundo Taylor RN and Hedy Bales RN (oncoming nurse) by Kavita Alvarez RN 
 (offgoing nurse). Report included the following information SBAR, Kardex, MAR and Recent Results.

## 2019-05-02 NOTE — PROGRESS NOTES
[facility] 
 
 
CARDIOLOGY PROGRESS NOTE 
RECS: 
 
1. Elevated troponin-likely  demand ischemia in the setting ARIEL, sepsis and chronic CHF-. No further cardiac w/u needed at this time   
2. Chronic combined systolic and diastolic heart failure-mildly decompensated. Hold metolazone and torsemide. Will monitor for s/s fluid overload. Continue with ivf 3. Atrial fibrillation - currently paced continue amiodarone when able to take po. Not on anticoagulation due to high bleeding risk.  
4. CKD on CKD- improving renal indices. Will monitor 5. Borderline BP- will monitor low dose Coreg per parameters 6. Severe cardiomyopathy, progressive, unclear etiology considering age. Talya Bañuelos further workup for that. 7. Hx of symptomatic bradycardia- S/P PPM  1856 Dr Prosper Nevarez- EP recommended  upgrade to biventricular pacemaker as an outpatient.  Patient's family refused. PPM interrogation 5/2/19 dual pacer 99.5 % Rv and Ra paced. No atrial high rates seen. 8. Lupus- follow by Dr. Watts Cast as out patient 9. Hypertension- with normal low bp. Continue low dose Coreg. 10. Dementia- confused non verbal. 
11. Hypokalemia. Given 20 MEQ K+ iv. 12. Sepsis- possible  Related to RLE/Right thigh abscess and wound- on antibiotics- managed by ID  
  
12/27/18 ECHO ADULT COMPLETE 12/28/2018 12/28/2018  
  Narrative · Technically difficult study due to patient compliance. Unable to obtain  
on-axis apical images. Good parasternals, poor subcostal images. · Left ventricular moderate-to-severely decreased global systolic  
function. Estimated left ventricular ejection fraction is 31 - 35%. Visually measured ejection fraction. Left ventricular severe sigmoid  
septum hypertrophy. Abnormal left ventricular wall motion as described on  
the wall scoring diagram below. Abnormal left ventricular septal motion. Interventricular septal \"bounce\". Severe (grade 3) left ventricular  
diastolic dysfunction. · Moderate tricuspid valve regurgitation is present. Mild pulmonary  
hypertension is present. PASP 38mmHg · Mild aortic valve regurgitation is present. · Mild to moderate mitral valve regurgitation. · Pacing wire present 
   
    Signed by: Jerry French MD  
 
ASSESSMENT: 
Hospital Problems  Date Reviewed: 4/4/2019 Codes Class Noted POA Abscess ICD-10-CM: L02.91 
ICD-9-CM: 682.9  4/30/2019 Unknown Severe sepsis (HCC) ICD-10-CM: A41.9, R65.20 ICD-9-CM: 038.9, 995.92  4/30/2019 Unknown Sepsis ([address] ICD-10-CM: A41.9 ICD-9-CM: 038.9, 995.91  3/17/2019 Unknown SUBJECTIVE: 
Sleepy non verbal  
 
OBJECTIVE: 
 
VS:  
Visit Vitals /51 Pulse 71 Temp 97.3 °F (36.3 °C) Resp 18 Ht 5' 4\" (1.626 m) Wt 72.8 kg (160 lb 9.6 oz) SpO2 93% Breastfeeding? No  
BMI 27.57 kg/m² Intake/Output Summary (Last 24 hours) at 5/2/2019 8189 Last data filed at 5/2/2019 1391 Gross per 24 hour Intake 1458.33 ml Output 475 ml Net 983.33 ml  
 
TELE: AVpaced General: chronically ill and in no apparent distress HENT: Normocephalic, atraumatic. Normal external eye. Neck :  increased JVP Cardiac:  regular rate and rhythm, S1, S2 normal, no click, no rub Lungs: diminished breath sounds b/l. Abdomen: Soft, nontender, no masses Extremities:  RLE wound cover with dressing. Mild erythema and edema right thigh  peripheral pulses present Labs: Results:  
   
Chemistry Recent Labs 05/02/19 
0540 05/01/19 
0407 04/30/19 
1900 * 115* 129*  138 139  
K 3.3* 2.9* 3.2*  
 99* 99* CO2 30 32 34* BUN 67* 62* 61* CREA 1.90* 2.04* 2.14* CA 8.5 8.3* 8.7 AGAP 7 7 6 BUCR 35* 30* 29* AP  --  67 68 TP  --  6.3* 6.2* ALB  --  2.8* 2.7*  
GLOB  --  3.5 3.5 AGRAT  --  0.8 0.8 CBC w/Diff Recent Labs 05/02/19 
0540 05/01/19 
0407 04/30/19 
1900 WBC 17.5* 22.3* 20.8*  
RBC 2.66* 2.88* 2.82* HGB 7.7* 8.5* 8.4*  
 HCT 24.5* 26.7* 26.0*  
 184 171 GRANS 88* 79* 91* LYMPH 3* 3* 4* EOS 0 0 0 Cardiac Enzymes Recent Labs 05/01/19 
1015 05/01/19 
0407 CPK 78 79 CKND1 CALCULATION NOT PERFORMED WHEN RESULT IS BELOW LINEAR LIMIT CALCULATION NOT PERFORMED WHEN RESULT IS BELOW LINEAR LIMIT Coagulation Recent Labs 04/30/19 
1900 PTP 16.8* INR 1.4* APTT 42.5* Lipid Panel Lab Results Component Value Date/Time Cholesterol, total 142 01/31/2019 02:24 AM  
 HDL Cholesterol 68 (H) 01/31/2019 02:24 AM  
 LDL, calculated 66.4 01/31/2019 02:24 AM  
 VLDL, calculated 7.6 01/31/2019 02:24 AM  
 Triglyceride 38 01/31/2019 02:24 AM  
 CHOL/HDL Ratio 2.1 01/31/2019 02:24 AM  
  
BNP No results for input(s): BNPP in the last 72 hours. Liver Enzymes Recent Labs 05/01/19 
0407 TP 6.3* ALB 2.8* AP 67 SGOT 21 Thyroid Studies Lab Results Component Value Date/Time TSH 8.86 (H) 11/15/2015 01:45 PM  
    
 
 
 
Becki Roca NP-C Sarah:[phone] supervised I have independently evaluated and examined the patient. All relevant labs and testing data's are reviewed. Care plan discussed and updated after review.  
 
Nemo Elaine MD

## 2019-05-02 NOTE — PROGRESS NOTES
RESPIRATORY MEDICATION PROTOCOL ASSESSMENT Patient  Luda Dang     80 y.o.   female     5/2/2019  2:46 PM 
 
Breath Sounds Pre Procedure:  Breath Sounds Bilateral: Coarse, Lower, Diminished Breath Sounds Post Procedure: Breath Sounds Bilateral: Coarse, Lower, Diminished Breathing pattern: Pre procedure  Breathing Pattern: Regular, Shallow Post procedure  Breathing Pattern: Regular, Shallow Cough: Pre procedure Post procedure Heart Rate: Pre procedure Pulse: 70 Post procedure Pulse: 71 Resp Rate: Pre procedure  Respirations: 12 Post procedure Nebulizer Therapy: Current medications Aerosolized Medications: DuoNeb Problem List:  
Patient Active Problem List  
Diagnosis Code  
 HTN (hypertension) I10  
 Hypercholesteremia E78.00  Tricuspid valve disorders, 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  Lupus (systemic lupus erythematosus) ([facility]) M32.9  Anasarca R60.1  Hypertensive heart disease with CHF (congestive heart failure) ([facility]) I11.0  
 S/P placement of cardiac pacemaker Z95.0  Paroxysmal atrial flutter ([facility]) I48.92  
 Acute on chronic diastolic (congestive) heart failure ([facility]) Z39.72  
 Diastolic CHF, chronic ([facility]) I50.32  
 High risk medication use Z79.899  Acute exacerbation of CHF (congestive heart failure) ([facility]) I50.9  ARIEL (acute kidney injury) ([facility] [address] N17.9  Uremia N19  Left hip pain M25.552  CKD (chronic kidney disease) stage 4, GFR 15-29 ml/min ([facility]) N18.4  NSTEMI (non-ST elevated myocardial infarction) ([facility] [address] I21.4  Dementia F03.90  
 Skin lesions L98.9  Diarrhea R19.7  UTI (urinary tract infection) N39.0  Fever R50.9  Sepsis ([facility] [address] A41.9  Abscess L02.91  
 Severe sepsis ([facility]) A41.9, R65.20  Advanced care planning/counseling discussion Z71.89  
 Debility R53.81  
 
 
 Patient alert and cooperative to use MDI: No 
 
Home Respiratory Therapy Regimen/Frequency:  NO Medication Device Frequency SEVERITY INDEX: 
 
ITEM 0 1 2 3 4 Score Respiratory Pattern and or Rate Regular 10-19 Regular 20-24  
24-30   
30-34 Severe SOB or  
Greater than 35 0 Breath Sounds Clear Occasional Wheeze Mild Wheezing Moderate Wheezing  wheezing/Absent breath sounds 0 Shortness of Breath None Dyspnea on Exertion Dyspnea at Rest Moderate Shortness of Breath at Rest Severe Shortness of Breath - Limited Speech 1 Total Score:  1 
 
* Scoring Guidelines 0-4 pts:  PRN-BID  
5-7 pts:  BID, TID, QID 
8-9 pts:  TID, QID, Q6 
10-12 pts:  Q4-Q6 * - Guidelines used with clinical judgement. PRN Treatments can be ordered to supplement scheduled treatments. Regardless of score, frequency should not be less than normal home regimen. Recommended Order/Frequency:  PRN Comments:   
 
 
 
Respiratory Therapist: Vasiliy Raza, RRT

## 2019-05-02 NOTE — PROGRESS NOTES
Problem: Mobility Impaired (Adult and Pediatric) Goal: *Acute Goals and Plan of Care (Insert Text) Description Physical Therapy Goals Initiated 5/2/2019 and to be accomplished within 7 day(s) 1. Patient will tolerate LE PROM to B LEs. PLOF: per chart it appears patient was bed bound prior to admission however no family is present to verify PHYSICAL THERAPY EVALUATION Patient: Haile Ross (80 y.o. female) Date: 5/2/2019 Primary Diagnosis: Severe sepsis ([facility] [address] [A41.9, R65.20] Abscess [L02.91] Sepsis ([facility] [address] [A41.9] Precautions: wounds on right LE    
 
PLOF: total assistance ASSESSMENT : 
Based on the objective data described below, the patient presents with no active movement in LEs, pt grimaced with movement of right LE but did not resist movement, patient opened eyes during evaluation but was not verbal or following commands. Pt will be seen by rehab tech to maintain LE ROM to prevent contractures and pain from stiffness. Patient will benefit from skilled intervention to address the above impairments. Patient's rehabilitation potential is considered to be Guarded Factors which may influence rehabilitation potential include:  
? None noted ? Mental ability/status ? Medical condition ? Home/family situation and support systems ? Safety awareness 
? Pain tolerance/management 
? Other: PLAN : 
Recommendations and Planned Interventions:  
?           Bed Mobility Training             ? Neuromuscular Re-Education ? Transfer Training                   ? Orthotic/Prosthetic Training 
? Gait Training                          ? Modalities ? Therapeutic Exercises           ? Edema Management/Control ? Therapeutic Activities            ? Family Training/Education ? Patient Education ?            Other (comment): 
 
 Frequency/Duration: Patient will be followed by rehab tech for FMP 3-5 times a week to address goals. Discharge Recommendations: Home Health with 24 hour assistance versus LTC Further Equipment Recommendations for Discharge: N/A  
 
SUBJECTIVE:  
Patient is non-verbal 
 
OBJECTIVE DATA SUMMARY:  
 
Past Medical History:  
Diagnosis Date Acute on chronic diastolic heart failure ([facility] [address] Arthritis Benign hypertensive heart disease without heart failure Better controlled, stable Chronic diastolic heart failure ([facility] [address] Breathing and edema is improving Congestive heart failure ([facility] [address] HTN (hypertension) Hypercholesteremia Lupus (systemic lupus erythematosus) ([facility] [address] 6/18/2014  
 followed by dr Charlotte Yusuf Obesity, unspecified Patient has weight loss Discussed diet ad fluid restriction Other and unspecified hyperlipidemia F/u per pmd  
 Tricuspid valve disorders, 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  
Procedure Laterality Date HX HYSTERECTOMY PACEMAKER PROCEDURE Barriers to Learning/Limitations: yes;  cognitive and altered mental status (i.e.Sedation, Confusion) Compensate with: Verbal Cues and Tactile Cues Home Situation: 
Home Situation Home Environment: Private residence One/Two Story Residence: One story Living Alone: No 
Support Systems: Family member(s), Home care staff Patient Expects to be Discharged to[de-identified] Private residence Current DME Used/Available at Home: hui Gil Sharilyn Ching Critical Behavior: 
Neurologic State: Confused; Eyes open to stimulus; Eyes open to pain;Lethargic Orientation Level: Unable to verbalize Cognition: No command following Strength:   
Strength: (unable to participate in MMT) Tone & Sensation:  
Tone: Normal B LEs Range Of Motion: 
AROM: Grossly decreased, non-functional 
 PROM: Generally decreased, functional(increased grimmacing with movement of right LE) Functional Mobility: 
Bed Mobility: 
Rolling: Total assistance Therapeutic Exercises: PROM to B LEs Pain: 
Pain level pre-treatment: 0/10 Pain level post-treatment: 0/10 Pain Intervention(s) :  Repositioning Response to intervention: Nurse notified Activity Tolerance:  
poor Please refer to the flowsheet for vital signs taken during this treatment. After treatment:  
?         Patient left in no apparent distress sitting up in chair ? Patient left in no apparent distress in bed 
? Call bell left within reach ? Nursing notified ? Caregiver present ? Bed alarm activated ? SCDs applied COMMUNICATION/EDUCATION:  
?         Role of Physical Therapy in the acute care setting. ?         Fall prevention education was provided and the patient/caregiver indicated understanding. ? Patient/family have participated as able in goal setting and plan of care. ?         Patient/family agree to work toward stated goals and plan of care. ?         Patient understands intent and goals of therapy, but is neutral about his/her participation. ? Patient is unable to participate in goal setting/plan of care: ongoing with therapy staff. ?         Other: Thank you for this referral. 
Tavon Bruno, PT Time Calculation: 8 mins Eval Complexity: History: HIGH Complexity :3+ comorbidities / personal factors will impact the outcome/ POC Exam:LOW Complexity : 1-2 Standardized tests and measures addressing body structure, function, activity limitation and / or participation in recreation  Presentation: MEDIUM Complexity : Evolving with changing characteristics  Clinical Decision Making:Low Complexity    Overall Complexity:LOW

## 2019-05-02 NOTE — PROGRESS NOTES
[facility] Hospitalist Group Progress Note Patient: Ankita Villareal Age: 80 y.o. : 1931 MR#: [id number] SSN: [id number] Date/Time: 2019 Subjective:  
 
Patient sitting in bed in NAD, awake Assessment/Plan:  
 
- RLE cellulitis, 
- GPC bacteremia 
-ARIEL on ckd3 
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 
-A fib, rate controlled 
-H/o lupus Dementia - elevated trop, likely 2/2 demand ischemia - Dysphagia- diet as per speech PLAN On abx, ID following repeat cx Vascular following Monitor renal function, nephro following Iv fluids On amiodarone, cardio following Palliative care on case D/w son Prisca Woods over phone [phone] Case discussed with:  []Patient  [x]Family  []Nursing  []Case Management DVT Prophylaxis:  []Lovenox  [x]Hep SQ  []SCDs  []Coumadin   []On Heparin gtt Objective:  
VS:  
Visit Vitals BP 92/52 Pulse 70 Temp 97.7 °F (36.5 °C) Resp 20 Ht 5' 4\" (1.626 m) Wt 72.8 kg (160 lb 9.6 oz) SpO2 98% Breastfeeding? No  
BMI 27.57 kg/m² Tmax/24hrs: Temp (24hrs), Av.1 °F (36.7 °C), Min:97.3 °F (36.3 °C), Max:99.6 °F (37.6 °C) Input/Output:  
 
Intake/Output Summary (Last 24 hours) at 2019 3140 Last data filed at 2019 1757 Gross per 24 hour Intake 1560 ml Output 725 ml Net 835 ml General:  Awake, has dementia Cardiovascular:  S1S2+, Irr 
Pulmonary:  CTA b/l GI:  Soft, BS+, NT, ND Extremities:  Right thigh abscess/cellulitis, RLE ulcer Labs:   
Recent Results (from the past 24 hour(s)) Verta Andree Collection Time: 19  5:40 AM  
Result Value Ref Range Vancomycin, random 10.7 5.0 - 40.0 UG/ML  
CBC WITH AUTOMATED DIFF Collection Time: 19  5:40 AM  
Result Value Ref Range WBC 17.5 (H) 4.6 - 13.2 K/uL  
 RBC 2.66 (L) 4.20 - 5.30 M/uL HGB 7.7 (L) 12.0 - 16.0 g/dL HCT 24.5 (L) 35.0 - 45.0 %  MCV 92.1 74.0 - 97.0 FL  
 MCH 28.9 24.0 - 34.0 PG  
 MCHC 31.4 31.0 - 37.0 g/dL  
 RDW 14.3 11.6 - 14.5 % PLATELET 401 947 - 996 K/uL MPV 9.6 9.2 - 11.8 FL  
 NEUTROPHILS 88 (H) 42 - 75 % BAND NEUTROPHILS 7 (H) 0 - 5 % LYMPHOCYTES 3 (L) 20 - 51 % MONOCYTES 2 2 - 9 % EOSINOPHILS 0 0 - 5 % BASOPHILS 0 0 - 3 %  
 ABS. NEUTROPHILS 16.6 (H) 1.8 - 8.0 K/UL  
 ABS. LYMPHOCYTES 0.5 (L) 0.8 - 3.5 K/UL  
 ABS. MONOCYTES 0.4 0 - 1.0 K/UL  
 ABS. EOSINOPHILS 0.0 0.0 - 0.4 K/UL  
 ABS. BASOPHILS 0.0 0.0 - 0.06 K/UL  
 DF MANUAL PLATELET COMMENTS ADEQUATE PLATELETS    
 RBC COMMENTS NORMOCYTIC, NORMOCHROMIC METABOLIC PANEL, BASIC Collection Time: 05/02/19  5:40 AM  
Result Value Ref Range Sodium 142 136 - 145 mmol/L Potassium 3.3 (L) 3.5 - 5.5 mmol/L Chloride 105 100 - 108 mmol/L  
 CO2 30 21 - 32 mmol/L Anion gap 7 3.0 - 18 mmol/L Glucose 110 (H) 74 - 99 mg/dL BUN 67 (H) 7.0 - 18 MG/DL Creatinine 1.90 (H) 0.6 - 1.3 MG/DL  
 BUN/Creatinine ratio 35 (H) 12 - 20 GFR est AA 30 (L) >60 ml/min/1.73m2 GFR est non-AA 25 (L) >60 ml/min/1.73m2 Calcium 8.5 8.5 - 10.1 MG/DL MAGNESIUM Collection Time: 05/02/19  5:40 AM  
Result Value Ref Range Magnesium 2.1 1.6 - 2.6 mg/dL MRSA SCREEN - PCR (NASAL) Collection Time: 05/02/19  6:27 AM  
Result Value Ref Range Special Requests: NO SPECIAL REQUESTS Culture result: MRSA target DNA is not detected (presumptive not colonized with MRSA) Additional Data Reviewed:   
 
Signed By: Natasha Queen MD   
 May 2, 2019

## 2019-05-03 ENCOUNTER — HOME CARE VISIT (OUTPATIENT)
Dept: HOME HEALTH SERVICES | Facility: HOME HEALTH | Age: 84
End: 2019-05-03
Payer: MEDICARE

## 2019-05-03 LAB
ANION GAP SERPL CALC-SCNC: 7 MMOL/L (ref 3–18)
BACTERIA SPEC CULT: ABNORMAL
BUN SERPL-MCNC: 76 MG/DL (ref 7–18)
BUN/CREAT SERPL: 37 (ref 12–20)
CALCIUM SERPL-MCNC: 8.3 MG/DL (ref 8.5–10.1)
CHLORIDE SERPL-SCNC: 107 MMOL/L (ref 100–108)
CO2 SERPL-SCNC: 28 MMOL/L (ref 21–32)
CREAT SERPL-MCNC: 2.05 MG/DL (ref 0.6–1.3)
GLUCOSE SERPL-MCNC: 180 MG/DL (ref 74–99)
GRAM STN SPEC: ABNORMAL
GRAM STN SPEC: ABNORMAL
POTASSIUM SERPL-SCNC: 3.7 MMOL/L (ref 3.5–5.5)
SERVICE CMNT-IMP: ABNORMAL
SODIUM SERPL-SCNC: 142 MMOL/L (ref 136–145)
VANCOMYCIN SERPL-MCNC: 20.1 UG/ML (ref 5–40)

## 2019-05-03 PROCEDURE — 74011000250 HC RX REV CODE- 250: Performed by: INTERNAL MEDICINE

## 2019-05-03 PROCEDURE — 80048 BASIC METABOLIC PNL TOTAL CA: CPT

## 2019-05-03 PROCEDURE — 74011250636 HC RX REV CODE- 250/636: Performed by: EMERGENCY MEDICINE

## 2019-05-03 PROCEDURE — 3331090001 HH PPS REVENUE CREDIT

## 2019-05-03 PROCEDURE — 3331090002 HH PPS REVENUE DEBIT

## 2019-05-03 PROCEDURE — 77030038269 HC DRN EXT URIN PURWCK BARD -A

## 2019-05-03 PROCEDURE — 65660000004 HC RM CVT STEPDOWN

## 2019-05-03 PROCEDURE — 80202 ASSAY OF VANCOMYCIN: CPT

## 2019-05-03 PROCEDURE — 74011250636 HC RX REV CODE- 250/636: Performed by: HOSPITALIST

## 2019-05-03 PROCEDURE — 74011250637 HC RX REV CODE- 250/637: Performed by: HOSPITALIST

## 2019-05-03 PROCEDURE — 74011250636 HC RX REV CODE- 250/636: Performed by: INTERNAL MEDICINE

## 2019-05-03 PROCEDURE — 36415 COLL VENOUS BLD VENIPUNCTURE: CPT

## 2019-05-03 PROCEDURE — 74011250637 HC RX REV CODE- 250/637: Performed by: EMERGENCY MEDICINE

## 2019-05-03 RX ORDER — VANCOMYCIN/0.9 % SOD CHLORIDE 1 G/100 ML
1000 PLASTIC BAG, INJECTION (ML) INTRAVENOUS ONCE
Status: COMPLETED | OUTPATIENT
Start: 2019-05-03 | End: 2019-05-03

## 2019-05-03 RX ORDER — GUAIFENESIN 100 MG/5ML
81 LIQUID (ML) ORAL DAILY
Status: DISCONTINUED | OUTPATIENT
Start: 2019-05-03 | End: 2019-05-09 | Stop reason: HOSPADM

## 2019-05-03 RX ADMIN — AMIODARONE HYDROCHLORIDE 200 MG: 200 TABLET ORAL at 10:49

## 2019-05-03 RX ADMIN — AMITRIPTYLINE HYDROCHLORIDE 25 MG: 50 TABLET, FILM COATED ORAL at 21:38

## 2019-05-03 RX ADMIN — MEMANTINE 10 MG: 10 TABLET ORAL at 17:54

## 2019-05-03 RX ADMIN — VANCOMYCIN HYDROCHLORIDE 1000 MG: 10 INJECTION, POWDER, LYOPHILIZED, FOR SOLUTION INTRAVENOUS at 17:54

## 2019-05-03 RX ADMIN — CARVEDILOL 3.12 MG: 3.12 TABLET, FILM COATED ORAL at 10:49

## 2019-05-03 RX ADMIN — Medication: at 10:50

## 2019-05-03 RX ADMIN — MEMANTINE 10 MG: 10 TABLET ORAL at 10:49

## 2019-05-03 RX ADMIN — HYDROCORTISONE SODIUM SUCCINATE 50 MG: 100 INJECTION, POWDER, FOR SOLUTION INTRAMUSCULAR; INTRAVENOUS at 06:46

## 2019-05-03 RX ADMIN — METRONIDAZOLE 500 MG: 500 INJECTION, SOLUTION INTRAVENOUS at 10:46

## 2019-05-03 RX ADMIN — Medication: at 18:00

## 2019-05-03 RX ADMIN — METRONIDAZOLE 500 MG: 500 INJECTION, SOLUTION INTRAVENOUS at 21:37

## 2019-05-03 RX ADMIN — HYDROXYCHLOROQUINE SULFATE 200 MG: 200 TABLET, FILM COATED ENTERAL at 10:49

## 2019-05-03 RX ADMIN — WATER 2 G: 1 INJECTION INTRAMUSCULAR; INTRAVENOUS; SUBCUTANEOUS at 16:21

## 2019-05-03 RX ADMIN — HYDROCORTISONE SODIUM SUCCINATE 50 MG: 100 INJECTION, POWDER, FOR SOLUTION INTRAMUSCULAR; INTRAVENOUS at 21:39

## 2019-05-03 RX ADMIN — HEPARIN SODIUM 5000 UNITS: 5000 INJECTION INTRAVENOUS; SUBCUTANEOUS at 17:53

## 2019-05-03 RX ADMIN — ASPIRIN 81 MG 81 MG: 81 TABLET ORAL at 11:14

## 2019-05-03 RX ADMIN — HYDROCORTISONE SODIUM SUCCINATE 50 MG: 100 INJECTION, POWDER, FOR SOLUTION INTRAMUSCULAR; INTRAVENOUS at 16:19

## 2019-05-03 RX ADMIN — ACETAMINOPHEN 650 MG: 325 TABLET ORAL at 21:39

## 2019-05-03 RX ADMIN — HYDROXYCHLOROQUINE SULFATE 200 MG: 200 TABLET, FILM COATED ENTERAL at 17:54

## 2019-05-03 RX ADMIN — CARVEDILOL 3.12 MG: 3.12 TABLET, FILM COATED ORAL at 21:41

## 2019-05-03 RX ADMIN — SODIUM CHLORIDE 50 ML/HR: 900 INJECTION, SOLUTION INTRAVENOUS at 04:29

## 2019-05-03 NOTE — PROGRESS NOTES
NUTRITION Nursing Referral: [facility] Nutrition Consult: General Nutrition Management & Supplements RECOMMENDATIONS / PLAN:  
 
- Continue nutritional supplements: Ensure Enlive BID 
- Monitor meal intake. Anticipate pt will need a lot of assistance and encouragement. - Continue RD inpatient monitoring and evaluation. NUTRITION INTERVENTIONS & DIAGNOSIS:  
 
[x] Meals/snacks: modified composition 
[x] Medical food supplement therapy: continue Nutrition Diagnosis: Predicted inadequate energy intake related to pt mentation and current intake as evidenced by pt only consuming a few bites, weight loss PTA, and confusion. ASSESSMENT:  
 
5/3: NPO today for procedure that was moved to Monday. Diet resumed. Variable intake yesterday. 5/2: Pt evaluated by SLP and started on diet. Pt with poor intake so far. Family told nurse pt eats at home with a lot of encouragement. Weight loss noted. Noted pt consumes Ensure at home per RD evaluation at last visit. Average po intake adequate to meet patients estimated nutritional needs:   [] Yes     [x] No   [] Unable to determine at this time Diet: DIET NUTRITIONAL SUPPLEMENTS Dinner, Breakfast; ENSURE ENLIVE 
DIET DYSPHAGIA PUREED (NDD1) Food Allergies: NKFA Current Appetite:   [] Good     [] Fair     [x] Poor     [] Other: 
Appetite/meal intake prior to admission:   [] Good     [] Fair     [] Poor     [x] Other: unknown Feeding Limitations:  [x] Swallowing difficulty: SLP following    [] Chewing difficulty    [] Other: 
Current Meal Intake:  
Patient Vitals for the past 100 hrs: 
 % Diet Eaten 05/02/19 1757 10 % 05/02/19 1333 90 % 05/02/19 0852 0 % 05/01/19 1200 0 % BM: 5/1 Skin Integrity: pressure injury to sacrum, cellulitis to right thigh, moisture associated skin damage to right groin Edema:   [] No     [x] Yes Pertinent Medications: Reviewed: NS at 50 mL/hr Recent Labs 05/03/19 
0530 05/02/19 
0540 05/01/19 0407 04/30/19 
1900  142 138 139  
K 3.7 3.3* 2.9* 3.2*  
 105 99* 99* CO2 28 30 32 34* * 110* 115* 129* BUN 76* 67* 62* 61* CREA 2.05* 1.90* 2.04* 2.14* CA 8.3* 8.5 8.3* 8.7 MG  --  2.1  --  2.0 ALB  --   --  2.8* 2.7* SGOT  --   --  21 17 ALT  --   --  14 14 Intake/Output Summary (Last 24 hours) at 5/3/2019 1435 Last data filed at 5/3/2019 7860 Gross per 24 hour Intake 140 ml Output 500 ml Net -360 ml Anthropometrics: 
Ht Readings from Last 1 Encounters:  
05/01/19 5' 4\" (1.626 m) Last 3 Recorded Weights in this Encounter 04/30/19 1947 05/01/19 6531 05/02/19 0340 Weight: 74.8 kg (165 lb) 68.4 kg (150 lb 14.4 oz) 72.8 kg (160 lb 9.6 oz) Body mass index is 27.57 kg/m². Weight History: -5.9% x 1 month Weight Metrics 5/2/2019 4/30/2019 4/4/2019 3/27/2019 3/19/2019 3/4/2019 2/13/2019 Weight 160 lb 9.6 oz - - 170 lb 170 lb 4.8 oz - 160 lb BMI - 27.57 kg/m2 29.18 kg/m2 29.18 kg/m2 29.23 kg/m2 27.46 kg/m2 27.46 kg/m2 Admitting Diagnosis: Severe sepsis ([address] [A41.9, R65.20] Abscess [L02.91] Sepsis ([address] [A41.9] Pertinent PMHx: HTN, hypercholesterolemia, CHF, ARIEL, CKD, NSTEMI, dementia Education Needs:        [x] None identified  [] Identified - Not appropriate at this time  []  Identified and addressed - refer to education log Learning Limitations:   [] None identified  [x] Identified: non-verbal, dementia Cultural, Rastafarian & ethnic food preferences:  [x] None identified    [] Identified and addressed ESTIMATED NUTRITION NEEDS:  
 
Calories: 9140-5667 kcal (MSJx1.2-1.4) based on  [x] Actual BW 73 kg     [] IBW Protein:  gm (1.2-1.5 gm/kg) based on  [x] Actual BW      [] IBW Fluid: 1 mL/kcal 
  
MONITORING & EVALUATION:  
 
Nutrition Goal(s): 1. Po intake of meals will meet >75% of patient estimated nutritional needs within the next 7 days.   Outcome:  [] Met/Ongoing    [] Progressing towards goal    [x] Not progressing towards goal    [] New/Initial goal  
 
Monitoring:   [x] Food and beverage intake   [x] Diet order   [x] Nutrition-focused physical findings   [x] Treatment/therapy   [] Weight   [] Enteral nutrition intake Previous Recommendations (for follow-up assessments only):     [x]   Implemented       []   Not Implemented (RD to address)      [] No Longer Appropriate     [] No Recommendation Made Discharge Planning: pending plan of care [x] Participated in care planning, discharge planning, & interdisciplinary rounds as appropriate Levi Wooten RD, [facility] Pager: [phone]

## 2019-05-03 NOTE — PROGRESS NOTES
In Patient Progress note Admit Date: 4/30/2019 Impression:  
 
1 Acute kidney injury ( baseline ~1.3 creat)   Likely from Ischaemic  ATN in the setting of sepsis/right lower extremity abscess/SIRS Patient lethargic , poor intake , holding her diuretics, continue IV fluids @ 50cc/hrs . Continue gentle  IV normosol. Strict ins and outs, agree with Mendiola in place, trend renal parameters closely. UA shows no hematuria or proteinuria, Blood pressures are soft, now on stress dose  IV steroids , Monitor closely Imp to follow vanc levels closely 2 chronic kidney disease stage IV secondary to hypertension nephrosclerosis, cardiorenal syndrome 3 sepsis most likely secondary to right lower extremity abscess/wounds. On antibiotics per ID, vascular also consulted per ID recs Noted plans for debridement on Monday 
 
4 history of dementia  
 
5 history of lupus, immunocompromised on the right long-term steroids. Now on stress dose steroids, plaquenil. No evidence of renal manifestation of lupus Patient follows with Dr Bubba Gar. 
  
 Please call with questions , 
  
Marylin Cole MD [facility] Cell [phone] Pager: [phone] Subjective:  
 
Sleeping Current Facility-Administered Medications:  
  vancomycin (VANCOCIN) 1000 mg in  ml infusion, 1,000 mg, IntraVENous, ONCE, Jolynn Walters MD 
  aspirin chewable tablet 81 mg, 81 mg, Oral, DAILY, Js Tyler MD, 81 mg at 05/03/19 1114   cefTRIAXone (ROCEPHIN) 2 g in sterile water (preservative free) 20 mL IV syringe, 2 g, IntraVENous, Q24H, Jolynn Walters MD, 2 g at 05/02/19 1700 
  albuterol-ipratropium (DUO-NEB) 2.5 MG-0.5 MG/3 ML, 3 mL, Nebulization, Q4H PRN, Js Patterson MD 
  acetaminophen (TYLENOL) tablet 650 mg, 650 mg, Oral, Q6H PRN, Kamila Pratt MD 
  amiodarone (CORDARONE) tablet 200 mg, 200 mg, Oral, DAILY, Kamila Pratt MD, 200 mg at 05/03/19 1049   amitriptyline (ELAVIL) tablet 25 mg, 25 mg, Oral, QHS, Sherrel Skiff, MD, 25 mg at 05/02/19 2213   carvedilol (COREG) tablet 3.125 mg, 3.125 mg, Oral, Q12H, Sherrel Skiff, MD, 3.125 mg at 05/03/19 1049 
  food supplemt, lactose-reduced (ENSURE ENLIVE) 0.08 gram-1.5 kcal/mL liqd 1 Bottle, 1 Bottle, Oral, BID, Sherrel Skiff, MD, Stopped at 05/03/19 0900   hydroxychloroquine (PLAQUENIL) tablet 200 mg, 200 mg, Oral, BID, Sherrel Skiff, MD, 200 mg at 05/03/19 1049   memantine (NAMENDA) tablet 10 mg, 10 mg, Oral, BID, Sherrel Skiff, MD, 10 mg at 05/03/19 1049 
  menthol-zinc oxide (CALMOSEPTINE) 0.44-20.6 % ointment, , Topical, BID, Sherrel Skiff, MD 
  polyethylene glycol (MIRALAX) packet 17 g, 17 g, Oral, DAILY, Sherrel Skiff, MD, Stopped at 05/01/19 0900   hydrALAZINE (APRESOLINE) 20 mg/mL injection 10 mg, 10 mg, IntraVENous, Q6H PRN, Sherrel Skiff, MD 
  heparin (porcine) injection 5,000 Units, 5,000 Units, SubCUTAneous, Q8H, Edi Renteria MD, Stopped at 05/03/19 0300 
  metroNIDAZOLE (FLAGYL) IVPB premix 500 mg, 500 mg, IntraVENous, Q12H, Nelly HER MD, Last Rate: 100 mL/hr at 05/03/19 1046, 500 mg at 05/03/19 1046 
  0.9% sodium chloride infusion, 50 mL/hr, IntraVENous, CONTINUOUS, Leslye Campos MD, Last Rate: 50 mL/hr at 05/03/19 0429, 50 mL/hr at 05/03/19 3368   hydrocortisone Sod Succ (PF) (SOLU-CORTEF) injection 50 mg, 50 mg, IntraVENous, Q8H, Js Tyler MD, 50 mg at 05/03/19 1676   sodium chloride (NS) flush 5-10 mL, 5-10 mL, IntraVENous, PRN, Sherrel Skiff, MD 
  VANCOMYCIN INFORMATION NOTE, 1 Each, Other, Rx Dosing/Monitoring, Sherrel Skiff, MD 
 
 
 
Objective:  
 
Visit Vitals /53 (BP 1 Location: Right arm, BP Patient Position: At rest) Pulse 72 Temp 98.2 °F (36.8 °C) Resp 14 Ht 5' 4\" (1.626 m) Wt 72.8 kg (160 lb 9.6 oz) SpO2 98% Breastfeeding? No  
BMI 27.57 kg/m² Intake/Output Summary (Last 24 hours) at 5/3/2019 1406 Last data filed at 5/3/2019 1880 Gross per 24 hour Intake 510 ml Output 500 ml Net 10 ml Physical Exam:  
 
gen NAD, sleepy HENT mmm RS AEBE clear CVS s1 s2 wnl no JVD 
GI soft BS + Ext no edema Data Review: 
 
Recent Labs 05/02/19 
0540 WBC 17.5*  
RBC 2.66* HCT 24.5* MCV 92.1 MCH 28.9 MCHC 31.4  
RDW 14.3 Recent Labs 05/03/19 
0530 05/02/19 
0540 05/01/19 
0407 04/30/19 
1900 BUN 76* 67* 62* 61* CREA 2.05* 1.90* 2.04* 2.14* CA 8.3* 8.5 8.3* 8.7 ALB  --   --  2.8* 2.7*  
K 3.7 3.3* 2.9* 3.2*  142 138 139  105 99* 99* CO2 28 30 32 34* * 110* 115* 129* Jania Thurman MD

## 2019-05-03 NOTE — PROGRESS NOTES
Infectious Disease progress Note Requested by:dr. issa 
 
Reason:sepsis, right leg cellulitis, right thigh abscess Current abx Prior abx  
vancomycin since 4/30 Metronidazole since 5/1 Ceftriaxone since 5/2 Pip/tazo x 1 - 5/1 Cefepime 4/30-5/2 Lines:  
 
 
Assessment : 
 
49-year-old AA female with a history of congestive heart failure with EF of 31-35 % on 12/28/18, lupus, thrombus cytopenia, chronic kidney disease admitted to [facility] on 4/30/19 for altered mental status. H/o lupus nodules on LE in 1/2019 Now with high fevers, RLE redness, ulcer right leg Clinical picture consistent with sepsis (POA) due to gram positive bloodstream infection , acute right thigh/leg cellulitis, right thigh abscess, necrotic right leg ulcer with possible superinfection. Right thigh abscess s/p bedside I&D in ed on 4/30/19. No cultures sent. Right leg ulcer with clot/necrosis - cultures 5/1- methicillin resistant staph aureus, group A strep. Vascular surgery consult appreciated. Plans for I&D, a cell graft noted. decreased erythema, tenderness right thigh/leg - no significant induration around the recently drained right thigh abscess. Decreased wbc. More alert today.  
  
Recommendations: 
  
1. cont ceftriaxone. continue vancomycin,metronidazole 2. f/u wound culture right leg 3. F/u Vascular surgery recommendation regarding I&D right leg ulcer 4. F/u blood cultures 5. Monitor right LE clinically 6. Repeat blood cultures today 
  
Advance Care planning: full code: discussed  with patient/surrogate decision maker: zacarias ledbetter: [phone]. Discussed with son at bedside about need to re address goals of care looking at patient's progressive decline in clinical condition, recurrent hospitalization. He will d/w other siblings.  D/w palliative care team. Additional time spent > 30 min.   
 Above plan was discussed in details with  dr Gina Almendarez, son at bedside. Please call me if any further questions or concerns. Will continue to participate in the care of this patient. HPI: 
 
Feels better. Decreased right leg/thigh pain. No new complaints 
 
 
home Medication List  
  
  
   
 Details  
polyethylene glycol (MIRALAX) 17 gram/dose powder Take 17 g by mouth daily. torsemide (DEMADEX) 10 mg tablet Take 1 Tab by mouth daily. Qty: 30 Tab, Refills: 6  
  
amoxicillin (AMOXIL) 500 mg capsule Take 500 mg by mouth three (3) times daily. aspirin delayed-release 81 mg tablet Take 81 mg by mouth daily. hydroxychloroquine (PLAQUENIL) 200 mg tablet Take 200 mg by mouth two (2) times a day. metOLazone (ZAROXOLYN) 2.5 mg tablet Take 1 Tab by mouth daily. Qty: 30 Tab, Refills: 5  
  
!! OTHER Incentive Spirometry- Use as directed Qty: 1 Each, Refills: 0  
  
!! OTHER Graded Compression Stockings b/l LE- use as directed Qty: 1 Each, Refills: 0  
  
food supplemt, lactose-reduced (ENSURE ENLIVE) 0.08 gram-1.5 kcal/mL liqd Take 1 Bottle by mouth two (2) times a day. Qty: 60 Bottle, Refills: 0  
  
menthol-zinc oxide (CALMOSEPTINE) 0.44-20.6 % oint Apply  to affected area. carvedilol (COREG) 3.125 mg tablet Take 1 Tab by mouth every twelve (12) hours. Qty: 60 Tab, Refills: 0  
  
acetaminophen (TYLENOL ARTHRITIS PAIN) 650 mg TbER Take 1 Tab by mouth every eight (8) hours. Qty: 15 Tab, Refills: 0  
  
amiodarone (CORDARONE) 200 mg tablet TAKE ONE TABLET EVERY DAY (MAKE AN APPT) Qty: 30 Tab, Refills: 5  
  
memantine (NAMENDA) 10 mg tablet Take 10 mg by mouth two (2) times a day. amitriptyline (ELAVIL) 25 mg tablet Take 25 mg by mouth nightly. !! - Potential duplicate medications found. Please discuss with provider. Current Facility-Administered Medications Medication Dose Route Frequency  cefTRIAXone (ROCEPHIN) 2 g in sterile water (preservative free) 20 mL IV syringe  2 g IntraVENous Q24H  albuterol-ipratropium (DUO-NEB) 2.5 MG-0.5 MG/3 ML  3 mL Nebulization Q4H PRN  
 acetaminophen (TYLENOL) tablet 650 mg  650 mg Oral Q6H PRN  
 amiodarone (CORDARONE) tablet 200 mg  200 mg Oral DAILY  amitriptyline (ELAVIL) tablet 25 mg  25 mg Oral QHS  aspirin delayed-release tablet 81 mg  81 mg Oral DAILY  carvedilol (COREG) tablet 3.125 mg  3.125 mg Oral Q12H  
 food supplemt, lactose-reduced (ENSURE ENLIVE) 0.08 gram-1.5 kcal/mL liqd 1 Bottle  1 Bottle Oral BID  hydroxychloroquine (PLAQUENIL) tablet 200 mg  200 mg Oral BID  memantine (NAMENDA) tablet 10 mg  10 mg Oral BID  menthol-zinc oxide (CALMOSEPTINE) 0.44-20.6 % ointment   Topical BID  polyethylene glycol (MIRALAX) packet 17 g  17 g Oral DAILY  hydrALAZINE (APRESOLINE) 20 mg/mL injection 10 mg  10 mg IntraVENous Q6H PRN  
 heparin (porcine) injection 5,000 Units  5,000 Units SubCUTAneous Q8H  
 metroNIDAZOLE (FLAGYL) IVPB premix 500 mg  500 mg IntraVENous Q12H  
 0.9% sodium chloride infusion  50 mL/hr IntraVENous CONTINUOUS  
 hydrocortisone Sod Succ (PF) (SOLU-CORTEF) injection 50 mg  50 mg IntraVENous Q8H  
 sodium chloride (NS) flush 5-10 mL  5-10 mL IntraVENous PRN  
 VANCOMYCIN INFORMATION NOTE  1 Each Other Rx Dosing/Monitoring Allergies: Patient has no known allergies. Temp (24hrs), Av.1 °F (36.7 °C), Min:97.7 °F (36.5 °C), Max:98.5 °F (36.9 °C) Visit Vitals /53 (BP 1 Location: Right arm, BP Patient Position: At rest) Pulse 72 Temp 98.2 °F (36.8 °C) Resp 14 Ht 5' 4\" (1.626 m) Wt 72.8 kg (160 lb 9.6 oz) SpO2 98% Breastfeeding? No  
BMI 27.57 kg/m² ROS: 12 point ROS obtained in details. Pertinent positives as mentioned in HPI,  
otherwise negative Physical Exam: 
 
Constitutional: She appears well-developed and well-nourished. No distress. non verbal 
 
HENT:  
Head: Normocephalic and atraumatic.   
Right Ear: External ear normal.  
Left Ear: External ear normal.  
 Nose: Nose normal.  
Eyes: Pupils are equal, round, and reactive to light. Conjunctivae and EOM are normal. No scleral icterus. Neck: Normal range of motion. Neck supple. No JVD present. No tracheal deviation present. No thyromegaly present. Cardiovascular: Normal rate, regular rhythm, normal heart sounds and intact distal pulses. Exam reveals no gallop and no friction rub. No murmur heard. Pulmonary/Chest: Effort normal and breath sounds normal. She exhibits no tenderness. no rales/rhonchi Abdominal: Soft. Bowel sounds are normal. She exhibits no distension. There is no tenderness. There is no rebound and no guarding. Musculoskeletal: She exhibits no edema or tenderness. Right lower extremity with  decreased erythema of right lateral thigh, right leg with decreased overlying warmth/tenderness, anterior tibial 10 x 5 cm open bloody wound with necrotic center, Left lower extremity  without edema/tenderness Lymphadenopathy:  
  She has no cervical adenopathy. Neurological: She is alert. No cranial nerve deficit. Coordination normal.  
Eyes open but does not follow commands, globally weak, right lower extremity is contracted Skin: Skin is dry. Right lower extremity erythema and warm to touch Psychiatric:  
Unable to assess Nursing note and vitals reviewed. 
  
  
 
 
 
 
Labs: Results:  
Chemistry Recent Labs 05/03/19 
0530 05/02/19 
0540 05/01/19 0407 04/30/19 
1900 * 110* 115* 129*  142 138 139  
K 3.7 3.3* 2.9* 3.2*  
 105 99* 99* CO2 28 30 32 34* BUN 76* 67* 62* 61* CREA 2.05* 1.90* 2.04* 2.14* CA 8.3* 8.5 8.3* 8.7 AGAP 7 7 7 6 BUCR 37* 35* 30* 29* AP  --   --  67 68 TP  --   --  6.3* 6.2* ALB  --   --  2.8* 2.7*  
GLOB  --   --  3.5 3.5 AGRAT  --   --  0.8 0.8 CBC w/Diff Recent Labs 05/02/19 
0540 05/01/19 0407 04/30/19 
1900 WBC 17.5* 22.3* 20.8*  
RBC 2.66* 2.88* 2.82* HGB 7.7* 8.5* 8.4* HCT 24.5* 26.7* 26.0*  
 184 171 GRANS 88* 79* 91* LYMPH 3* 3* 4* EOS 0 0 0 Microbiology Recent Labs 05/02/19 
1550 05/02/19 
2678 05/01/19 
1050 05/01/19 
0018 04/30/19 1953 CULT NO GROWTH AFTER 13 HOURS MRSA target DNA is not detected (presumptive not colonized with MRSA) MODERATE METHICILLIN RESISTANT STAPHYLOCOCCUS AUREUS*  FEW STREPTOCOCCI, BETA HEMOLYTIC GROUP A*  [facility] CALLED TO AND CORRECTLY REPEATED BY: 
ANÍBAL RN, CVT AT 9223 5/3/19 TO  MELODY 
  NO ANAEROBES ISOLATED 2 DAYS NO GROWTH 1 DAY NO GROWTH 3 DAYS  
  
 
 
RADIOLOGY: 
 
All available imaging studies/reports in [facility] for this admission were reviewed Dr. Ana Smith, Infectious Disease Specialist 
[phone] May 3, 2019 
7:41 AM

## 2019-05-03 NOTE — PROGRESS NOTES
[facility] 
 
 
CARDIOLOGY PROGRESS NOTE 
RECS: 
1. Elevated troponin-likely  demand ischemia in the setting ARIEL, sepsis and chronic CHF-. No further cardiac w/u needed at this time   
2. Chronic combined systolic and diastolic heart failure-mildly decompensated. Hold metolazone and torsemide. Will monitor for s/s fluid overload. Continue low dose with ivf 3. Atrial fibrillation - currently paced continue amiodarone. Not on anticoagulation due to high bleeding risk. on asa. 4. CKD on CKD- improving renal indices. JOHN is higher Will monitor 5. Borderline BP- will monitor low dose Coreg per parameters 6. Severe cardiomyopathy, progressive, unclear etiology considering age. Silverio Quinn further workup for that. 7. Hx of symptomatic bradycardia- S/P PPM  6752 Dr Shree Bell- EP recommended  upgrade to biventricular pacemaker as an outpatient.  Patient's family refused. PPM interrogation 5/2/19 dual pacer 99.5 % Rv and Ra paced. No atrial high rates seen. 8. Lupus- follow by Dr. Princess Marcelino as out patient 9. Hypertension- with normal low bp. Continue low dose Coreg. 10. Dementia- confused non verbal. 
11. Hypokalemia-resolved. 12. Sepsis- possible  Related to RLE pressure sore and/Right thigh abscess and wound- on antibiotics- managed by ID. Plans for debrimenent today.  
 
 
  
12/27/18 ECHO ADULT COMPLETE 12/28/2018 12/28/2018  
  Narrative · Technically difficult study due to patient compliance. Unable to obtain  
on-axis apical images. Good parasternals, poor subcostal images. · Left ventricular moderate-to-severely decreased global systolic  
function. Estimated left ventricular ejection fraction is 31 - 35%. Visually measured ejection fraction. Left ventricular severe sigmoid  
septum hypertrophy. Abnormal left ventricular wall motion as described on  
the wall scoring diagram below. Abnormal left ventricular septal motion. Interventricular septal \"bounce\". Severe (grade 3) left ventricular  
diastolic dysfunction. · Moderate tricuspid valve regurgitation is present. Mild pulmonary  
hypertension is present. PASP 38mmHg · Mild aortic valve regurgitation is present. · Mild to moderate mitral valve regurgitation. · Pacing wire present 
   
    Signed by: Tonia Frederick MD  
 
ASSESSMENT: 
Hospital Problems  Date Reviewed: 4/4/2019 Codes Class Noted POA Advanced care planning/counseling discussion ICD-10-CM: Z71.89 ICD-9-CM: V65.49  5/2/2019 Unknown Debility ICD-10-CM: R53.81 ICD-9-CM: 799.3  5/2/2019 Unknown Abscess ICD-10-CM: L02.91 
ICD-9-CM: 682.9  4/30/2019 Unknown Severe sepsis (HCC) ICD-10-CM: A41.9, R65.20 ICD-9-CM: 038.9, 995.92  4/30/2019 Unknown Sepsis ([address] ICD-10-CM: A41.9 ICD-9-CM: 038.9, 995.91  3/17/2019 Unknown SUBJECTIVE: 
Sleepy non verbal  
 
OBJECTIVE: 
 
VS:  
Visit Vitals /53 (BP 1 Location: Right arm, BP Patient Position: At rest) Pulse 72 Temp 98.2 °F (36.8 °C) Resp 14 Ht 5' 4\" (1.626 m) Wt 72.8 kg (160 lb 9.6 oz) SpO2 98% Breastfeeding? No  
BMI 27.57 kg/m² Intake/Output Summary (Last 24 hours) at 5/3/2019 7525 Last data filed at 5/3/2019 8233 Gross per 24 hour Intake 898.33 ml Output 500 ml Net 398.33 ml  
 
TELE: AVpaced General: chronically ill and in no apparent distress HENT: Normocephalic, atraumatic. Normal external eye. Neck :  increased JVP Cardiac:  regular rate and rhythm, S1, S2 normal, no click, no rub Lungs: diminished breath sounds b/l. Abdomen: Soft, nontender, no masses Extremities:  RLE wound cover with dressing. No edema noted BLE Labs: Results:  
   
Chemistry Recent Labs 05/03/19 
0530 05/02/19 
0540 05/01/19 
0407 04/30/19 
1900 * 110* 115* 129*  142 138 139  
K 3.7 3.3* 2.9* 3.2*  
 105 99* 99* CO2 28 30 32 34* BUN 76* 67* 62* 61*  
 CREA 2.05* 1.90* 2.04* 2.14* CA 8.3* 8.5 8.3* 8.7 AGAP 7 7 7 6 BUCR 37* 35* 30* 29* AP  --   --  67 68 TP  --   --  6.3* 6.2* ALB  --   --  2.8* 2.7*  
GLOB  --   --  3.5 3.5 AGRAT  --   --  0.8 0.8 CBC w/Diff Recent Labs 05/02/19 
0540 05/01/19 
0407 04/30/19 
1900 WBC 17.5* 22.3* 20.8*  
RBC 2.66* 2.88* 2.82* HGB 7.7* 8.5* 8.4* HCT 24.5* 26.7* 26.0*  
 184 171 GRANS 88* 79* 91* LYMPH 3* 3* 4* EOS 0 0 0 Cardiac Enzymes Recent Labs 05/01/19 
1015 05/01/19 
0407 CPK 78 79 CKND1 CALCULATION NOT PERFORMED WHEN RESULT IS BELOW LINEAR LIMIT CALCULATION NOT PERFORMED WHEN RESULT IS BELOW LINEAR LIMIT Coagulation Recent Labs 04/30/19 
1900 PTP 16.8* INR 1.4* APTT 42.5* Lipid Panel Lab Results Component Value Date/Time Cholesterol, total 142 01/31/2019 02:24 AM  
 HDL Cholesterol 68 (H) 01/31/2019 02:24 AM  
 LDL, calculated 66.4 01/31/2019 02:24 AM  
 VLDL, calculated 7.6 01/31/2019 02:24 AM  
 Triglyceride 38 01/31/2019 02:24 AM  
 CHOL/HDL Ratio 2.1 01/31/2019 02:24 AM  
  
BNP No results for input(s): BNPP in the last 72 hours. Liver Enzymes Recent Labs 05/01/19 
0407 TP 6.3* ALB 2.8* AP 67 SGOT 21 Thyroid Studies Lab Results Component Value Date/Time TSH 8.86 (H) 11/15/2015 01:45 PM  
    
 
 
 
[address] NP-C supervised Sarah:[phone] I have independently evaluated and examined the patient. All relevant labs and testing data's are reviewed. Care plan discussed and updated after review.  
 
Aly Ugalde MD

## 2019-05-03 NOTE — PROGRESS NOTES
Palliative Medicine Consult DR. [facility]: [phone] ([phone]) DAYSI PORTILLO [facility]: [phone] [facility]: [phone] Patient Name: [place] YOB: 1931 Date of Initial Consult: 5/2/19 Reason for Consult: Goals of Care Requesting Provider: Juan Carlos Mckenzie Primary Care Physician: Roc Dueñas MD 
  
 SUMMARY:  
[place] Barry Parker is a 80y.o. year old with a past history of Heart Failure, lupus, thrombus cytopenia, chronic kidney disease stage IV, and dementia,  who was admitted on 4/30/2019 from home to the ED with a diagnosis of right leg infection. Current medical issues leading to Palliative Medicine involvement include: Discussion of goals of care Patient is an 80year old  female brought in from home by the family for infected right leg. Paitent found to be febrile at home and minimally responsive. Was receiving home care for wound care. Patient and family are known to Palliative serves through multiple previous admissions. 5/3/19 Met with son Ray Fair at the bedside along with palliative care team Daniella CURIELW and Dayton Weiss NP to continue encouragement of goals of care discussion. At this time patient remains a FULL CODE 
 PALLIATIVE DIAGNOSES:  
1. Goals of Care discussion 2. Infection right leg wounds 3. Dementia 4. Debility PLAN:  
1. Goals of Care discussion: 
5/3/19 Continuation of discussion with son Ray Fair at the bedside regarding goals of care. Son continues to defer to his father and other siblings and states that they may come in sometime this evening or maybe on Saturday. Reinforced importance of discussion about the burdens and benefits of escalation of his mothers care and how it can affect her quality of life. Ray Fair is non committal about getting a meeting with key decision makers, but did take our card and stated he would call us. See below for previous conversations: Palliative Care team including Arben Ng NP, Jessica Edwards LCSW, and Wilfred Garcia NP met with patients son Yue Truong. Son reports that he is one of 6 children involved in this patients care and patients  Molly Jernigan. Yue Truong needing to reach out to his siblings and father before any discussion of Code Status can be completed. Assured him that patient is still currently a FULL CODE with full excalation of care, and that not making any decision would result in continuation of this path. 2. Right Leg Wounds and infection 5/3/19 GPC bacteremia now on contact precautions. Seen by vascular who are offering debridement and grafting. According to son Yue Truong family are choosing this option. 5/2/19 Seen today by Infectious Disease who is ruling out sepsis and acute right thigh super infection. Vascular consulted to weigh in regarding need for I&D and left leg vascular function. 3.   Dementia Patient with advanced dementia. Reports at home patient was feeding herself with maximal set up and assistance. Dependent for all ADL's, Incontinent of B&B and non ambulatory. At this time patient is minimally verbal, except when in distress. 4.  Debility Patient is currently bed bound. Seen by ST and was able to tolerate sips of thin liquids and pureed foods. Remains on aspiration precautions. If appropriate CM is looking into SNF placement for rehabilitation. 5. Initial consult note routed to primary continuity provider 6. Communicated plan of care with: Palliative IDT FULL CODE at this time GOALS OF CARE: 
Patient/Health Care Proxy Stated Goals: Prolong life TREATMENT PREFERENCES:  
Code Status: Full Code Advance Care Planning: 
[] [facility] Interdisciplinary Team has updated the ACP Navigator with [address] and Patient Capacity Primary Decision Maker [facility] Agent):  
 
Medical Interventions: Full interventions Attempt to put together a family meeting to best understand and meet patients goals of care 5/3/19 continue this goal to pull family together for a meeting regarding open discussion of patients ongoing care. Other: As far as possible, the palliative care team has discussed with patient / health care proxy about goals of care / treatment preferences for patient. HISTORY:  
 
History obtained from: Chart Review and family interview CHIEF COMPLAINT: Infection of right leg HPI/SUBJECTIVE: The patient is:  
[] Verbal and participatory [x] Non-participatory due to:  
dementia and minimally responsive Clinical Pain Assessment (nonverbal scale for nonverbal patients): Clinical Pain Assessment Severity: 5 Location: right leg and generalized Character: unable to verbalize Activity (Movement): Restless, excessive activity and/or withdrawal reflexes Duration: for how long has pt been experiencing pain (e.g., 2 days, 1 month, years) Frequency: how often pain is an issue (e.g., several times per day, once every few days, constant) FUNCTIONAL ASSESSMENT:  
 
Palliative Performance Scale (PPS): PPS: 30 
 
ECOG 
ECOG Status : Completely disabled PSYCHOSOCIAL/SPIRITUAL SCREENING:  
  
Any spiritual / Bahai concerns: 
[] Yes /  [x] No 
 
Caregiver Burnout: 
[] Yes /  [x] No /  [] No Caregiver Present Anticipatory grief assessment:  
[x] Normal  / [] Maladaptive REVIEW OF SYSTEMS:  
 
Positive and pertinent negative findings in ROS are noted above in HPI. The following systems were [x] reviewed / [] unable to be reviewed as noted in HPI Other findings are noted below. Systems: constitutional, ears/nose/mouth/throat, respiratory, gastrointestinal, genitourinary, musculoskeletal, integumentary, neurologic, psychiatric, endocrine. Positive findings noted below. Modified ESAS Completed by: provider Fatigue: 8 Drowsiness: 8 Pain: 5 Anxiety: 0 Nausea: 0 Dyspnea: 0 Constipation: No  
  Stool Occurrence(s): 1 PHYSICAL EXAM:  
 
Wt Readings from Last 3 Encounters:  
05/02/19 72.8 kg (160 lb 9.6 oz)  
03/27/19 77.1 kg (170 lb)  
03/19/19 77.2 kg (170 lb 4.8 oz) Blood pressure 101/53, pulse 72, temperature 98.2 °F (36.8 °C), resp. rate 14, height 5' 4\" (1.626 m), weight 72.8 kg (160 lb 9.6 oz), SpO2 98 %, not currently breastfeeding. Pain: 
Pain Scale 1: Numeric (0 - 10) Pain Intensity 1: 0 Last bowel movement: not available Constitutional: Semi lethargic well developed  woman who appears stated age Eyes: anicteric ENMT: no nasal discharge, moist mucous membranes Cardiovascular: regular rhythm, difficult to palpate distal pulses Respiratory: breathing not labored, symmetric Gastrointestinal: soft non-tender, +bowel sounds Musculoskeletal: no deformity,very tender to touch BLE's 
Skin: warm, dry Neurologic: responsive to some verbal and all touch stimulation. Not oriented Psychiatric: unable to assess HISTORY:  
 
Active Problems: 
  Sepsis ([address] (3/17/2019) Abscess (4/30/2019) Severe sepsis ([address] (4/30/2019) Advanced care planning/counseling discussion (5/2/2019) Debility (5/2/2019) Past Medical History:  
Diagnosis Date  Acute on chronic diastolic heart failure ([address]  Arthritis  Benign hypertensive heart disease without heart failure Better controlled, stable  Chronic diastolic heart failure ([address] Breathing and edema is improving  Congestive heart failure ([address]  HTN (hypertension)  Hypercholesteremia  Lupus (systemic lupus erythematosus) ([address] 6/18/2014  
 followed by dr Bubba Gar  Obesity, unspecified Patient has weight loss Discussed diet ad fluid restriction  Other and unspecified hyperlipidemia F/u per pmd  
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  
Procedure Laterality Date  HX HYSTERECTOMY  PACEMAKER PROCEDURE Family History Problem Relation Age of Onset  Arrhythmia Neg Hx  Asthma Neg Hx  Clotting Disorder Neg Hx  Fainting Neg Hx   
 Heart Attack Neg Hx  High Cholesterol Neg Hx  Pacemaker Neg Hx  Stroke Neg Hx History reviewed, no pertinent family history. Social History Tobacco Use  Smoking status: Never Smoker  Smokeless tobacco: Never Used Substance Use Topics  Alcohol use: No  
 
No Known Allergies Current Facility-Administered Medications Medication Dose Route Frequency  vancomycin (VANCOCIN) 1000 mg in  ml infusion  1,000 mg IntraVENous ONCE  
 aspirin chewable tablet 81 mg  81 mg Oral DAILY  cefTRIAXone (ROCEPHIN) 2 g in sterile water (preservative free) 20 mL IV syringe  2 g IntraVENous Q24H  
 albuterol-ipratropium (DUO-NEB) 2.5 MG-0.5 MG/3 ML  3 mL Nebulization Q4H PRN  
 acetaminophen (TYLENOL) tablet 650 mg  650 mg Oral Q6H PRN  
 amiodarone (CORDARONE) tablet 200 mg  200 mg Oral DAILY  amitriptyline (ELAVIL) tablet 25 mg  25 mg Oral QHS  carvedilol (COREG) tablet 3.125 mg  3.125 mg Oral Q12H  
 food supplemt, lactose-reduced (ENSURE ENLIVE) 0.08 gram-1.5 kcal/mL liqd 1 Bottle  1 Bottle Oral BID  hydroxychloroquine (PLAQUENIL) tablet 200 mg  200 mg Oral BID  memantine (NAMENDA) tablet 10 mg  10 mg Oral BID  menthol-zinc oxide (CALMOSEPTINE) 0.44-20.6 % ointment   Topical BID  polyethylene glycol (MIRALAX) packet 17 g  17 g Oral DAILY  hydrALAZINE (APRESOLINE) 20 mg/mL injection 10 mg  10 mg IntraVENous Q6H PRN  
 heparin (porcine) injection 5,000 Units  5,000 Units SubCUTAneous Q8H  
 metroNIDAZOLE (FLAGYL) IVPB premix 500 mg  500 mg IntraVENous Q12H  
 0.9% sodium chloride infusion  50 mL/hr IntraVENous CONTINUOUS  
 hydrocortisone Sod Succ (PF) (SOLU-CORTEF) injection 50 mg  50 mg IntraVENous Q8H  
  sodium chloride (NS) flush 5-10 mL  5-10 mL IntraVENous PRN  
 VANCOMYCIN INFORMATION NOTE  1 Each Other Rx Dosing/Monitoring LAB AND IMAGING FINDINGS:  
 
Lab Results Component Value Date/Time WBC 17.5 (H) 05/02/2019 05:40 AM  
 HGB 7.7 (L) 05/02/2019 05:40 AM  
 PLATELET 799 58/79/0108 05:40 AM  
 
Lab Results Component Value Date/Time Sodium 142 05/03/2019 05:30 AM  
 Potassium 3.7 05/03/2019 05:30 AM  
 Chloride 107 05/03/2019 05:30 AM  
 CO2 28 05/03/2019 05:30 AM  
 BUN 76 (H) 05/03/2019 05:30 AM  
 Creatinine 2.05 (H) 05/03/2019 05:30 AM  
 Calcium 8.3 (L) 05/03/2019 05:30 AM  
 Magnesium 2.1 05/02/2019 05:40 AM  
 Phosphorus 3.4 12/28/2018 09:25 AM  
  
Lab Results Component Value Date/Time AST (SGOT) 21 05/01/2019 04:07 AM  
 Alk. phosphatase 67 05/01/2019 04:07 AM  
 Protein, total 6.3 (L) 05/01/2019 04:07 AM  
 Albumin 2.8 (L) 05/01/2019 04:07 AM  
 Globulin 3.5 05/01/2019 04:07 AM  
 
Lab Results Component Value Date/Time INR 1.4 (H) 04/30/2019 07:00 PM  
 Prothrombin time 16.8 (H) 04/30/2019 07:00 PM  
 aPTT 42.5 (H) 04/30/2019 07:00 PM  
  
Lab Results Component Value Date/Time Iron 51 02/10/2019 11:00 AM  
 TIBC 202 (L) 02/10/2019 11:00 AM  
 Iron % saturation 25 02/10/2019 11:00 AM  
  
No results found for: PH, PCO2, PO2 No components found for: [place] Lab Results Component Value Date/Time CK 78 05/01/2019 10:15 AM  
 CK - MB <1.0 05/01/2019 10:15 AM  
  
 
   
 
Total time: 25  
Counseling / coordination time, spent as noted above:  
> 50% counseling / coordination: 20 Prolonged service was provided for  []30 min   []75 min in face to face time in the presence of the patient, spent as noted above. Time Start:  
Time End:  
Note: this can only be billed with 38789 (initial) or 42799 (follow up). If multiple start / stop times, list each separately.

## 2019-05-03 NOTE — PROGRESS NOTES
[facility] Hospitalist Group Progress Note Patient: Ankita Villareal Age: 80 y.o. : 1931 MR#: [id number] SSN: [id number] Date/Time: 5/3/2019 Subjective: I personally saw and evaluated this patient on 5/3/19. Patient sitting in bed in NAD, awake, confused. David Hoffman at bedside Assessment/Plan:  
 
- RLE cellulitis, 
- GPC bacteremia 
-ARIEL on ckd3 
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 
-A fib, rate controlled 
-H/o lupus Dementia - elevated trop, likely 2/2 demand ischemia - Dysphagia- diet as per speech PLAN On abx, follow cx, ID following Vascular planning debridement Monitor renal function, nephro following Iv fluids On amiodarone, cardio following Palliative care on case D/w david Hoffman at bedside, broached level of care. He states that family wishes full code. Case discussed with:  []Patient  [x]Family  []Nursing  []Case Management DVT Prophylaxis:  []Lovenox  [x]Hep SQ  []SCDs  []Coumadin   []On Heparin gtt Objective:  
VS:  
Visit Vitals BP 99/53 (BP 1 Location: Right arm, BP Patient Position: At rest) Pulse 84 Temp 98.4 °F (36.9 °C) Resp 12 Ht 5' 4\" (1.626 m) Wt 72.8 kg (160 lb 9.6 oz) SpO2 98% Breastfeeding? No  
BMI 27.57 kg/m² Tmax/24hrs: Temp (24hrs), Av.2 °F (36.8 °C), Min:97.8 °F (36.6 °C), Max:98.5 °F (36.9 °C) Input/Output:  
 
Intake/Output Summary (Last 24 hours) at 5/3/2019 1740 Last data filed at 5/3/2019 6676 Gross per 24 hour Intake 120 ml Output 250 ml Net -130 ml General:  Awake, confused Cardiovascular:  S1S2+, RRR Pulmonary:  CTA b/l GI:  Soft, BS+, NT, ND Extremities:  Right thigh cellulitis, RLE ulcer Labs:   
Recent Results (from the past 24 hour(s)) Lon Candelario Collection Time: 19  5:30 AM  
Result Value Ref Range Vancomycin, random 20.1 5.0 - 32.3 UG/ML  
METABOLIC PANEL, BASIC  Collection Time: 19  5:30 AM  
 Result Value Ref Range Sodium 142 136 - 145 mmol/L Potassium 3.7 3.5 - 5.5 mmol/L Chloride 107 100 - 108 mmol/L  
 CO2 28 21 - 32 mmol/L Anion gap 7 3.0 - 18 mmol/L Glucose 180 (H) 74 - 99 mg/dL BUN 76 (H) 7.0 - 18 MG/DL Creatinine 2.05 (H) 0.6 - 1.3 MG/DL  
 BUN/Creatinine ratio 37 (H) 12 - 20 GFR est AA 28 (L) >60 ml/min/1.73m2 GFR est non-AA 23 (L) >60 ml/min/1.73m2 Calcium 8.3 (L) 8.5 - 10.1 MG/DL Additional Data Reviewed:   
 
Signed By: Maggie Arevalo MD   
 May 3, 2019

## 2019-05-03 NOTE — NURSE NAVIGATOR
[facility] provides a tailored remote monitoring and engagement platform that uses an iPad for 30 days to create a meaningful connection between heart failure patients and their ambulatory nurse navigator to facilitate care management. This program was not offered to the patient's son Austyn Oropeza at bedside because he diverts medical decisions to his father and other siblings, who are not at bedside. Maritza Villalpando MSN, RN, [facility]-BC Nurse Navigator [phone]

## 2019-05-03 NOTE — NURSE NAVIGATOR
Transitional Care Team: Initial G Note Date of Assessment: 05/03/19 Time of Assessment:  09:30 AM 
 
Corrie Matamoros is a 80 y.o. female inpatient at [facility]. This assessment was completed with her son: Alessandro Lim. Patient sleeping soundly. Assessment & Plan  
-Social: she lives with her  & son: Chandana Simmons. Family provides transportation and assistance with her ADL/IADLs; she's incontinent of B&B as well. She has a walker and WC; no home oxygen or nebulizer.  
-Unable to ambulate prior to admission; PT/OT/ST ordered 
-No scale, but son does not believe that she can even stand; other s/s of fluid retention that should be monitored discussed w/ son 
-HF & sepsis educational flyers provided to son; discussed 
-R. Leg/thigh cellulitis/abcess; pt. c/o pain prior to adm. as per son; debridement today  
-multiple hospital admissions -albumin 2.8- seen by dietician; Ensure Enlive BID recommended 
-ST recommends puree diet w/ thin liquids 
-seen by palliative; pending discussion with decision makers Note: she has a PPM 
 
Primary Diagnosis: -Chronic combined systolic and diastolic heart failure- proBNP 11,831 
-Sepsis 
-ARIEL on CKD 3 Advance Directive:  not on file. Current Code Status:  Full Code Referral to Hospice/ Palliative Care Appropriate: yes. Awareness of Medical Conditions: (Trajectory of illness and pts expectations). Unable to assess- patient has hx. of dementia and currently sleeping Discharge Needs: (to include safety issues) HH vs. SNF; Medicaid pending. [facility] done on 2/6/19 for personal care. Barriers Identified: she's basically total care; her family are her caregivers currently- Medicaid pending- cannot get personal care aides until active.  
-lack of knowledge about disease process Patient is willing to go to SNF/Inpatient Rehab if recommended: unable to assess Medication Review:  was not performed, awaiting final med list on AVS. Can patient afford medications:  yes. Patient is Compliant with Medication regimen: yes Who manages medications at home: family Best Patient Contact Number: [phone] and [phone] HUG (Healthy Understanding of Goals) program introduced to patient/family. The Transitional Care Team bridges the gaps in care and education surrounding discharge from the acute care facility. The objective is to empower the patient and family in taking a proactive role in preventing readmission within the first thirty days after discharge. The team is also involved in the efforts to reduce readmission to the acute care setting after stabilization and discharge from the acute care environment either to skilled nursing facilities or community. [facility] RN/CNS will return with [facility] Calendar/ follow up appointments/ Ambulatory Nurse Navigator name and contact information when the patient is ready for discharge. Future Appointments Date Time Provider Win Mukherjee 5/6/2019 To Be Determined Elvia Bazzi RN [address]  
5/9/2019 To Be Determined Elvia Bazzi RN [address]  
6/24/2019 10:45 AM Tessa Galvan MD [facility] SCHED  
7/11/2019 11:00 AM Tessa Galvan MD CAP [facility] 10. Non-[facility] follow up appointment(s): to be determined Patient education focused on readmission zones as described as: The Red Zone: High risk for readmission, days 1-21 The Yellow Zone: Moderate  risk for readmission, days 22-29 The Green Zone: Lower risk for readmission, days 30 and after [facility] Team will attempt to follow the patient from a distance while inpatient as well as be available for further transition/disposition needs. The [facility] team will continue to offer support during the 30- 90 day discharge from acute care setting. Notified the appropriate MANNY team members. Past Medical History:  
Diagnosis Date  Acute on chronic diastolic heart failure ([address]  Arthritis  Benign hypertensive heart disease without heart failure Better controlled, stable  Chronic diastolic heart failure ([address] Breathing and edema is improving  Congestive heart failure ([address]  HTN (hypertension)  Hypercholesteremia  Lupus (systemic lupus erythematosus) [facility] 6/18/2014  
 followed by dr Severa Art  Obesity, unspecified Patient has weight loss Discussed diet ad fluid restriction  Other and unspecified hyperlipidemia F/u per pmd  
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  
 tr with moderate pulmonary htn   
 
 
Gildardo Esparza MSN, RN, [facility]-BC Nurse Navigator [phone]

## 2019-05-03 NOTE — PROGRESS NOTES
Son at bedside Pt sleeping so did not arouse They agreed on debridement At this time, will not take place today and cancelled NPO status Explained we can arrange for this on Monday Will follow up over weekend with new orders for Monday

## 2019-05-03 NOTE — PROGRESS NOTES
Problem: Self Care Deficits Care Plan (Adult) Goal: *Acute Goals and Plan of Care (Insert Text) Description Occupational Therapy Goals--FMP Provided by rehab technician Initiated 5/2/2019 within 7 day(s). 1.  Patient will perform BUE  PROM on elbow, wrist, and finger joints with Total Assistance 3-5X/week. Prior Level of Function: Per patient's son, she lived at home and had help for all ADLs. Patient's son was unable to assist further. Patient presented as Total Assistance at this OT evaluation. Outcome: Progressing Towards Goal 
Patient: Madeline Belcher (80 y.o. female) Date: 5/3/2019 Subjective: Pt was awake and mumbling throughout the session, son was present. Patient cleared by nursing Wash Logan) for participation in [place]. Pt tolerated all BUE PROM exercises well. Pt was unable to initiate the motion but was able to assist tech with the exercises. Therapeutic Exercises:  
 
 
EXERCISE Sets Reps Active Active Assist  
Passive Self ROM Comments Finger Flex/Ext 1 10  ? ? ? ? BUE Wrist Flex/Ext 1 10 ? ? ? ? BUE Elbow Flex/Ext 1 10 ? ? ? ? BUE Shoulder Flex/Ext 1 10 ? ? ? ? BUE  
   ? ? ? ?   
   ? ? ? ? Pain: 
Pre Session: No pain noted Post session: No pain noted After treatment:  
? Patient left in no apparent distress in bed 
? Call bell left within reach ? Nursing notified ? Caregiver present (Son) ? Bed alarm activated [place] Sensor

## 2019-05-03 NOTE — PROGRESS NOTES
1920 bedside turnover given to me by KAREEM Reardon. Pt is on the cardiac telemetry monitor in stable condition. Bed is in the lowest position with the wheels locked and call bell within reach. 2020 assisted pt with a few sips of water, assessed pain and completed physical assessment 2115 hrly round assessed patient, she is smiling, said hello, not much else 2230 medications given in apple sauce with a few sips of water, patient awake picking at her cuticles on her nails, again when I walked in I said hi and she smiled and say Hi back 2301 in bed resting  
0020 hrly round in bed resting no pain no needs 0130 in bed sleeping 
 0230 patient's right leg, wound  was cleansed with wound cleanser, patted dry and a moist Vaseline dressing with a non-adherent dressing was placed over it and the leg was wrapped in gauze, Kerlix. 0325 patient's bandage over the abscess on her right thigh was wet with discharge, pink to red serosanguinous. Changed dressing. 0430 assessed pt changed gown cleaned her up and tidied her room 0715 bedside turnover given to KAREEM Reardon, pt is on the bedside cardiac monitor, the bed is in the lowest position with the wheels locked and call bell within reach. No signs of distress SBAR, MAR, ED summary given with a chance to ask questions

## 2019-05-03 NOTE — PROGRESS NOTES
PALLIATIVE MEDICINE NOTE Palliative Med team consisting of this author, Ki Kim, ANP, and Erick Hair, NP, met with Pt and son, Avelino Londono, at bedside. Pt was asleep and only roused briefly when Avelino Londono touched her face and hair at which point she said, \"Quit messing with my hair! \" and went back to sleep. Avelino Londono hadn't spoken with his siblings or dad about coordinating a family meeting per our request yesterday. \"They'll probably be here later tonight or on Saturday. \"  Stressed the importance of us having a discussion about Pt's health and pros/cons of care options moving forward, given Pt's overall poor medical condition and advanced dementia. We informed him that we're just in the next building so he could call us if his siblings arrive this afternoon, and He has our contact info. No further concerns noted at this time. Thank you for the consult and the opportunity to assist in Ms. Eliud Kline care. We will continue to follow to provide support to Pt and family. Jigar Westfall, KENDRA Palliative Medicine

## 2019-05-03 NOTE — PROGRESS NOTES
Problem: Mobility Impaired (Adult and Pediatric) Goal: *Acute Goals and Plan of Care (Insert Text) Description Physical Therapy Goals Initiated 5/2/2019 and to be accomplished within 7 day(s) 1. Patient will tolerate LE PROM to B LEs. PLOF: per chart it appears patient was bed bound prior to admission however no family is present to verify Outcome: Progressing Towards Goal 
Patient: Madeline Belcher (80 y.o. female) Date: 5/3/2019 Subjective: Pt was awake and mumbling throughout the session, son was present. Patient cleared by nursing Wash Logan) for participation in [place]. Pt tolerated all BLE PROM exercises well. Pt was unable to initiate the motion but was able to assist tech with the exercises. Therapeutic Exercises:  
 
 
EXERCISE Sets Reps Active Active Assist  
Passive Self ROM Comments Ankle Pumps/Circles 1 10  ? ? ? ? BLE Heel Slides 1 10 ? ? ? ? BLE Hip IR/ER   ? ? ? ? Hip ABD/ADD 1 5 ? ? ? ? BLE Hip Flex/Ext   ? ? ? ?   
   ? ? ? ? Pain: 
Pre Session: No pain noted Post session: No pain noted After treatment:  
? Patient left in no apparent distress in bed 
? Call bell left within reach ? Nursing notified ? Caregiver present (Son) ? Bed alarm activated [place] Sensor

## 2019-05-03 NOTE — PROGRESS NOTES
Speech Pathology Note 
 
 
7731:  Pt currently NPO for possible surgery. SLP will f/u with tolerance of puree/ thin liquids and trials soft solids for possible diet advancement when pt cleared for po feeds. Thank you, 
Daniel Obando MS, CCC/SLP Speech- Language Pathologist

## 2019-05-03 NOTE — ROUTINE PROCESS
Bedside shift change report given to Via Annmarie Sosa (oncoming nurse) by Leora Pedraza (offgoing nurse). Report included the following information SBAR, Kardex, MAR and Cardiac Rhythm AV paced.

## 2019-05-04 LAB
ANION GAP SERPL CALC-SCNC: 7 MMOL/L (ref 3–18)
BACTERIA SPEC CULT: ABNORMAL
BASOPHILS # BLD: 0 K/UL (ref 0–0.06)
BASOPHILS NFR BLD: 0 % (ref 0–3)
BUN SERPL-MCNC: 81 MG/DL (ref 7–18)
BUN/CREAT SERPL: 42 (ref 12–20)
CALCIUM SERPL-MCNC: 8.5 MG/DL (ref 8.5–10.1)
CHLORIDE SERPL-SCNC: 108 MMOL/L (ref 100–108)
CO2 SERPL-SCNC: 29 MMOL/L (ref 21–32)
CREAT SERPL-MCNC: 1.92 MG/DL (ref 0.6–1.3)
DIFFERENTIAL METHOD BLD: ABNORMAL
EOSINOPHIL # BLD: 0 K/UL (ref 0–0.4)
EOSINOPHIL NFR BLD: 0 % (ref 0–5)
ERYTHROCYTE [DISTWIDTH] IN BLOOD BY AUTOMATED COUNT: 14.3 % (ref 11.6–14.5)
GLUCOSE SERPL-MCNC: 165 MG/DL (ref 74–99)
GRAM STN SPEC: ABNORMAL
GRAM STN SPEC: ABNORMAL
HCT VFR BLD AUTO: 24.6 % (ref 35–45)
HGB BLD-MCNC: 7.9 G/DL (ref 12–16)
LYMPHOCYTES # BLD: 1.3 K/UL (ref 0.8–3.5)
LYMPHOCYTES NFR BLD: 10 % (ref 20–51)
MCH RBC QN AUTO: 29.3 PG (ref 24–34)
MCHC RBC AUTO-ENTMCNC: 32.1 G/DL (ref 31–37)
MCV RBC AUTO: 91.1 FL (ref 74–97)
MONOCYTES # BLD: 0.5 K/UL (ref 0–1)
MONOCYTES NFR BLD: 4 % (ref 2–9)
NEUTS BAND NFR BLD MANUAL: 6 % (ref 0–5)
NEUTS SEG # BLD: 11 K/UL (ref 1.8–8)
NEUTS SEG NFR BLD: 80 % (ref 42–75)
PLATELET # BLD AUTO: 210 K/UL (ref 135–420)
PLATELET COMMENTS,PCOM: ABNORMAL
PMV BLD AUTO: 9.9 FL (ref 9.2–11.8)
POTASSIUM SERPL-SCNC: 3.5 MMOL/L (ref 3.5–5.5)
RBC # BLD AUTO: 2.7 M/UL (ref 4.2–5.3)
RBC MORPH BLD: ABNORMAL
RBC MORPH BLD: ABNORMAL
SERVICE CMNT-IMP: ABNORMAL
SODIUM SERPL-SCNC: 144 MMOL/L (ref 136–145)
WBC # BLD AUTO: 12.8 K/UL (ref 4.6–13.2)

## 2019-05-04 PROCEDURE — 74011250637 HC RX REV CODE- 250/637: Performed by: HOSPITALIST

## 2019-05-04 PROCEDURE — 77030037878 HC DRSG MEPILEX >48IN BORD MOLN -B

## 2019-05-04 PROCEDURE — 85025 COMPLETE CBC W/AUTO DIFF WBC: CPT

## 2019-05-04 PROCEDURE — 74011250637 HC RX REV CODE- 250/637: Performed by: EMERGENCY MEDICINE

## 2019-05-04 PROCEDURE — 77030020186 HC BOOT HL PROTCT SAGE -B

## 2019-05-04 PROCEDURE — 74011000250 HC RX REV CODE- 250: Performed by: INTERNAL MEDICINE

## 2019-05-04 PROCEDURE — 77030037875 HC DRSG MEPILEX <16IN BORD MOLN -A

## 2019-05-04 PROCEDURE — 3331090001 HH PPS REVENUE CREDIT

## 2019-05-04 PROCEDURE — 74011250636 HC RX REV CODE- 250/636: Performed by: HOSPITALIST

## 2019-05-04 PROCEDURE — 36415 COLL VENOUS BLD VENIPUNCTURE: CPT

## 2019-05-04 PROCEDURE — 65660000004 HC RM CVT STEPDOWN

## 2019-05-04 PROCEDURE — 74011250636 HC RX REV CODE- 250/636: Performed by: INTERNAL MEDICINE

## 2019-05-04 PROCEDURE — 74011250636 HC RX REV CODE- 250/636: Performed by: EMERGENCY MEDICINE

## 2019-05-04 PROCEDURE — 3331090002 HH PPS REVENUE DEBIT

## 2019-05-04 PROCEDURE — 80048 BASIC METABOLIC PNL TOTAL CA: CPT

## 2019-05-04 RX ORDER — VANCOMYCIN/0.9 % SOD CHLORIDE 1 G/100 ML
1000 PLASTIC BAG, INJECTION (ML) INTRAVENOUS
Status: DISCONTINUED | OUTPATIENT
Start: 2019-05-05 | End: 2019-05-09 | Stop reason: HOSPADM

## 2019-05-04 RX ADMIN — METRONIDAZOLE 500 MG: 500 INJECTION, SOLUTION INTRAVENOUS at 09:52

## 2019-05-04 RX ADMIN — HEPARIN SODIUM 5000 UNITS: 5000 INJECTION INTRAVENOUS; SUBCUTANEOUS at 03:13

## 2019-05-04 RX ADMIN — CARVEDILOL 3.12 MG: 3.12 TABLET, FILM COATED ORAL at 09:58

## 2019-05-04 RX ADMIN — HYDROXYCHLOROQUINE SULFATE 200 MG: 200 TABLET, FILM COATED ENTERAL at 17:11

## 2019-05-04 RX ADMIN — HYDROXYCHLOROQUINE SULFATE 200 MG: 200 TABLET, FILM COATED ENTERAL at 09:58

## 2019-05-04 RX ADMIN — SODIUM CHLORIDE 50 ML/HR: 900 INJECTION, SOLUTION INTRAVENOUS at 09:59

## 2019-05-04 RX ADMIN — ASPIRIN 81 MG 81 MG: 81 TABLET ORAL at 09:58

## 2019-05-04 RX ADMIN — METRONIDAZOLE 500 MG: 500 INJECTION, SOLUTION INTRAVENOUS at 22:21

## 2019-05-04 RX ADMIN — HEPARIN SODIUM 5000 UNITS: 5000 INJECTION INTRAVENOUS; SUBCUTANEOUS at 10:01

## 2019-05-04 RX ADMIN — HEPARIN SODIUM 5000 UNITS: 5000 INJECTION INTRAVENOUS; SUBCUTANEOUS at 17:20

## 2019-05-04 RX ADMIN — Medication: at 09:52

## 2019-05-04 RX ADMIN — AMIODARONE HYDROCHLORIDE 200 MG: 200 TABLET ORAL at 09:00

## 2019-05-04 RX ADMIN — HYDROCORTISONE SODIUM SUCCINATE 50 MG: 100 INJECTION, POWDER, FOR SOLUTION INTRAMUSCULAR; INTRAVENOUS at 15:11

## 2019-05-04 RX ADMIN — WATER 2 G: 1 INJECTION INTRAMUSCULAR; INTRAVENOUS; SUBCUTANEOUS at 17:15

## 2019-05-04 RX ADMIN — MEMANTINE 10 MG: 10 TABLET ORAL at 17:11

## 2019-05-04 RX ADMIN — CARVEDILOL 3.12 MG: 3.12 TABLET, FILM COATED ORAL at 22:30

## 2019-05-04 RX ADMIN — HYDROCORTISONE SODIUM SUCCINATE 50 MG: 100 INJECTION, POWDER, FOR SOLUTION INTRAMUSCULAR; INTRAVENOUS at 05:15

## 2019-05-04 RX ADMIN — Medication: at 17:21

## 2019-05-04 RX ADMIN — AMITRIPTYLINE HYDROCHLORIDE 25 MG: 50 TABLET, FILM COATED ORAL at 22:31

## 2019-05-04 RX ADMIN — HYDROCORTISONE SODIUM SUCCINATE 50 MG: 100 INJECTION, POWDER, FOR SOLUTION INTRAMUSCULAR; INTRAVENOUS at 22:23

## 2019-05-04 RX ADMIN — MEMANTINE 10 MG: 10 TABLET ORAL at 09:00

## 2019-05-04 NOTE — PROGRESS NOTES
In Patient Progress note Admit Date: 4/30/2019 Impression:  
 
1 Acute kidney injury ( baseline ~1.3 creat)   Likely from Ischaemic  ATN in the setting of sepsis/right lower extremity abscess/SIRS Sitting and Eating lunch today , holding her diuretics, continue IV fluids but decrease rate to  @ 30cc/hrs Strict ins and outs, agree with Mendiola in place, trend renal parameters closely. UA shows no hematuria or proteinuria, Blood pressures are soft, now on stress dose  IV steroids , Monitor closely Imp to follow vanc levels closely 2 chronic kidney disease stage IV secondary to hypertension nephrosclerosis, cardiorenal syndrome 3 sepsis most likely secondary to right lower extremity abscess/wounds. On antibiotics per ID, vascular also consulted per ID recs Noted plans for debridement on Monday 
4 history of dementia  
5 history of lupus, immunocompromised on the right long-term steroids. Now on stress dose steroids, plaquenil. No evidence of renal manifestation of lupus Patient follows with Dr Mac Henderson. 
  
 Please call with questions , 
  
Ginny Lai MD FASN Cell [phone] Pager: [phone] Subjective:  
 
Sitting and eating lunch Current Facility-Administered Medications:  
  [START ON 5/5/2019] vancomycin (VANCOCIN) 1000 mg in  ml infusion, 1,000 mg, IntraVENous, Q36H, Brandon Garvey MD 
  aspirin chewable tablet 81 mg, 81 mg, Oral, DAILY, Js Tyler MD, 81 mg at 05/04/19 0408   cefTRIAXone (ROCEPHIN) 2 g in sterile water (preservative free) 20 mL IV syringe, 2 g, IntraVENous, Q24H, Jolynn Walters MD, 2 g at 05/03/19 1621 
  albuterol-ipratropium (DUO-NEB) 2.5 MG-0.5 MG/3 ML, 3 mL, Nebulization, Q4H PRN, Js Tyler MD 
  acetaminophen (TYLENOL) tablet 650 mg, 650 mg, Oral, Q6H PRN, Shakir Condon MD, 650 mg at 05/03/19 2139 
  amiodarone (CORDARONE) tablet 200 mg, 200 mg, Oral, DAILY, Shakir Condon MD, 200 mg at 05/04/19 0900   amitriptyline (ELAVIL) tablet 25 mg, 25 mg, Oral, QHS, Neto Ford MD, 25 mg at 05/03/19 2138   carvedilol (COREG) tablet 3.125 mg, 3.125 mg, Oral, Q12H, Neto Ford MD, 3.125 mg at 05/04/19 2262   food supplemt, lactose-reduced (ENSURE ENLIVE) 0.08 gram-1.5 kcal/mL liqd 1 Bottle, 1 Bottle, Oral, BID, Neto Ford MD, 1 Bottle at 05/04/19 0900   hydroxychloroquine (PLAQUENIL) tablet 200 mg, 200 mg, Oral, BID, Neto Ford MD, 200 mg at 05/04/19 1282   memantine (NAMENDA) tablet 10 mg, 10 mg, Oral, BID, Neto Ford MD, 10 mg at 05/04/19 0900 
  menthol-zinc oxide (CALMOSEPTINE) 0.44-20.6 % ointment, , Topical, BID, Neto Ford MD 
  polyethylene glycol (MIRALAX) packet 17 g, 17 g, Oral, DAILY, Neto Ford MD, Stopped at 05/01/19 0900   hydrALAZINE (APRESOLINE) 20 mg/mL injection 10 mg, 10 mg, IntraVENous, Q6H PRN, Neto Ford MD 
  heparin (porcine) injection 5,000 Units, 5,000 Units, SubCUTAneous, Q8H, Neto Ford MD, 5,000 Units at 05/04/19 1001   metroNIDAZOLE (FLAGYL) IVPB premix 500 mg, 500 mg, IntraVENous, Q12H, Shyann HER MD, Last Rate: 100 mL/hr at 05/04/19 0952, 500 mg at 05/04/19 0952 
  0.9% sodium chloride infusion, 50 mL/hr, IntraVENous, CONTINUOUS, Zoltan Johnson MD, Last Rate: 50 mL/hr at 05/04/19 0959, 50 mL/hr at 05/04/19 0232   hydrocortisone Sod Succ (PF) (SOLU-CORTEF) injection 50 mg, 50 mg, IntraVENous, Q8H, Js Tyler MD, 50 mg at 05/04/19 2255   sodium chloride (NS) flush 5-10 mL, 5-10 mL, IntraVENous, PRN, Neto Ford MD 
  VANCOMYCIN INFORMATION NOTE, 1 Each, Other, Rx Dosing/Monitoring, Neto Ford MD 
 
 
 
Objective:  
 
Visit Vitals /58 (BP 1 Location: Right arm, BP Patient Position: At rest) Pulse 70 Temp 97.3 °F (36.3 °C) Resp 18 Ht 5' 4\" (1.626 m) Wt 73.9 kg (162 lb 14.4 oz) SpO2 98% Breastfeeding? No  
BMI 27.96 kg/m² Intake/Output Summary (Last 24 hours) at 5/4/2019 6545 Last data filed at 5/4/2019 1053 Gross per 24 hour Intake 2034.17 ml Output 720 ml Net 1314.17 ml Physical Exam:  
 
gen NAD, sleepy HENT mmm RS AEBE clear CVS s1 s2 wnl no JVD 
GI soft BS + Ext no edema Data Review: 
 
Recent Labs 05/04/19 
7617 WBC 12.8 RBC 2.70* HCT 24.6* MCV 91.1 MCH 29.3 MCHC 32.1 RDW 14.3 Recent Labs 05/04/19 
9446 05/03/19 
0530 05/02/19 
0540 BUN 81* 76* 67* CREA 1.92* 2.05* 1.90* CA 8.5 8.3* 8.5  
K 3.5 3.7 3.3*  142 142  107 105 CO2 29 28 30 * 180* 110* Rayna Ormond, MD

## 2019-05-04 NOTE — PROGRESS NOTES
[facility] Hospitalist Group Progress Note Patient: Jitendra Stearns Age: 80 y.o. : 1931 MR#: [id number] SSN: [id number] Date/Time: 2019 Subjective: I personally saw and evaluated this patient on 19. Patient lying in bed in NAD, awake, confused Assessment/Plan:  
 
- RLE cellulitis, MRSA in wound - Staph Epidermidis bacteremia 
-ARIEL on ckd3 
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 
-A fib, rate controlled 
-H/o lupus Dementia - elevated trop, likely 2/2 demand ischemia - Dysphagia- diet as per speech PLAN On vanco and ceftriaxone. Id on case Vascular planning debridement Monitor renal function, nephro following Iv fluids On coreg, off diuretics, cardio following Palliative care on case D/w RN Case discussed with:  []Patient  [x]Family  []Nursing  []Case Management DVT Prophylaxis:  []Lovenox  [x]Hep SQ  []SCDs  []Coumadin   []On Heparin gtt Objective:  
VS:  
Visit Vitals /56 (BP 1 Location: Right arm, BP Patient Position: At rest) Pulse 69 Temp 97.3 °F (36.3 °C) Resp 18 Ht 5' 4\" (1.626 m) Wt 73.9 kg (162 lb 14.4 oz) SpO2 96% Breastfeeding? No  
BMI 27.96 kg/m² Tmax/24hrs: Temp (24hrs), Av.8 °F (36.6 °C), Min:97.3 °F (36.3 °C), Max:98.1 °F (36.7 °C) Input/Output:  
 
Intake/Output Summary (Last 24 hours) at 2019 1850 Last data filed at 2019 1728 Gross per 24 hour Intake 2034.17 ml Output 920 ml Net 1114.17 ml General:  Awake, confused Cardiovascular:  S1S2+, RRR Pulmonary:  CTA b/l GI:  Soft, BS+, NT, ND Extremities:  Right thigh cellulitis, RLE ulcer Labs:   
Recent Results (from the past 24 hour(s)) METABOLIC PANEL, BASIC Collection Time: 19  5:24 AM  
Result Value Ref Range Sodium 144 136 - 145 mmol/L Potassium 3.5 3.5 - 5.5 mmol/L Chloride 108 100 - 108 mmol/L  
 CO2 29 21 - 32 mmol/L  Anion gap 7 3.0 - 18 mmol/L  
 Glucose 165 (H) 74 - 99 mg/dL BUN 81 (H) 7.0 - 18 MG/DL Creatinine 1.92 (H) 0.6 - 1.3 MG/DL  
 BUN/Creatinine ratio 42 (H) 12 - 20 GFR est AA 30 (L) >60 ml/min/1.73m2 GFR est non-AA 25 (L) >60 ml/min/1.73m2 Calcium 8.5 8.5 - 10.1 MG/DL  
CBC WITH AUTOMATED DIFF Collection Time: 05/04/19  5:24 AM  
Result Value Ref Range WBC 12.8 4.6 - 13.2 K/uL  
 RBC 2.70 (L) 4.20 - 5.30 M/uL HGB 7.9 (L) 12.0 - 16.0 g/dL HCT 24.6 (L) 35.0 - 45.0 % MCV 91.1 74.0 - 97.0 FL  
 MCH 29.3 24.0 - 34.0 PG  
 MCHC 32.1 31.0 - 37.0 g/dL  
 RDW 14.3 11.6 - 14.5 % PLATELET 283 808 - 587 K/uL MPV 9.9 9.2 - 11.8 FL  
 NEUTROPHILS 80 (H) 42 - 75 % BAND NEUTROPHILS 6 (H) 0 - 5 % LYMPHOCYTES 10 (L) 20 - 51 % MONOCYTES 4 2 - 9 % EOSINOPHILS 0 0 - 5 % BASOPHILS 0 0 - 3 %  
 ABS. NEUTROPHILS 11.0 (H) 1.8 - 8.0 K/UL  
 ABS. LYMPHOCYTES 1.3 0.8 - 3.5 K/UL  
 ABS. MONOCYTES 0.5 0 - 1.0 K/UL  
 ABS. EOSINOPHILS 0.0 0.0 - 0.4 K/UL  
 ABS. BASOPHILS 0.0 0.0 - 0.06 K/UL  
 DF MANUAL PLATELET COMMENTS ADEQUATE PLATELETS    
 RBC COMMENTS POLYCHROMASIA 1+ 
    
 RBC COMMENTS LUÍS CELLS 1+ Additional Data Reviewed:   
 
Signed By: Mehrdad George MD   
 May 4, 2019

## 2019-05-04 NOTE — PROGRESS NOTES
Problem: Falls - Risk of 
Goal: *Absence of Falls Description Document Nelly Parsons Fall Risk and appropriate interventions in the flowsheet. Outcome: Progressing Towards Goal 
  
Problem: Patient Education: Go to Patient Education Activity Goal: Patient/Family Education Outcome: Progressing Towards Goal 
  
Problem: Pressure Injury - Risk of 
Goal: *Prevention of pressure injury Description Document Rodríguez Scale and appropriate interventions in the flowsheet. Outcome: Progressing Towards Goal 
  
Problem: Patient Education: Go to Patient Education Activity Goal: Patient/Family Education Outcome: Progressing Towards Goal 
  
Problem: Patient Education: Go to Patient Education Activity Goal: Patient/Family Education Outcome: Progressing Towards Goal 
  
Problem: Patient Education: Go to Patient Education Activity Goal: Patient/Family Education Outcome: Progressing Towards Goal 
  
Problem: Patient Education: Go to Patient Education Activity Goal: Patient/Family Education Outcome: Progressing Towards Goal 
  
Problem: Nutrition Deficit Goal: *Optimize nutritional status Outcome: Progressing Towards Goal 
  
Problem: Risk for Spread of Infection Goal: Prevent transmission of infectious organism to others Description Prevent the transmission of infectious organisms to other patients, staff members, and visitors. Outcome: Progressing Towards Goal 
  
Problem: Patient Education:  Go to Education Activity Goal: Patient/Family Education Outcome: Progressing Towards Goal 
  
Problem: Activity Intolerance Goal: *Oxygen saturation during activity within specified parameters Outcome: Progressing Towards Goal 
Goal: *Able to remain out of bed as prescribed Outcome: Progressing Towards Goal 
  
Problem: Patient Education: Go to Patient Education Activity Goal: Patient/Family Education Outcome: Progressing Towards Goal

## 2019-05-04 NOTE — PROGRESS NOTES
[facility] 
 
 
CARDIOLOGY PROGRESS NOTE 
RECS: 
1. Elevated troponin-likely  demand ischemia in the setting ARIEL, sepsis and chronic CHF-. No further cardiac w/u needed at this time   
2. Chronic combined systolic and diastolic heart failure-mildly decompensated. continue to hold metolazone and torsemide. Will monitor for s/s fluid overload. Continue low dose with ivf 3. Atrial fibrillation - currently paced continue amiodarone. Not on anticoagulation due to high bleeding risk. on asa. 4. CKD on CKD- improving renal indices. JOHN is higher Will monitor 5. Borderline BP- will monitor low dose Coreg per parameters 6. Severe cardiomyopathy, progressive, unclear etiology considering age. Haider Bejarano further workup for that. 7. Hx of symptomatic bradycardia- S/P PPM  6902 Dr Cherry Foreman- EP recommended  upgrade to biventricular pacemaker as an outpatient.  Patient's family refused. PPM interrogation 5/2/19 dual pacer 99.5 % Rv and Ra paced. No atrial high rates seen. 8. Lupus- follow by Dr. Malissa Mitchell as out patient 9. Hypertension- with normal low bp. Continue low dose Coreg. 10. Dementia- confused non verbal. 
11. Hypokalemia-resolved. Sepsis- possible  Related to RLE pressure sore and/Right thigh abscess and wound- on antibiotics- managed by ID.  
 
 
  
12/27/18 ECHO ADULT COMPLETE 12/28/2018 12/28/2018  
  Narrative · Technically difficult study due to patient compliance. Unable to obtain  
on-axis apical images. Good parasternals, poor subcostal images. · Left ventricular moderate-to-severely decreased global systolic  
function. Estimated left ventricular ejection fraction is 31 - 35%. Visually measured ejection fraction. Left ventricular severe sigmoid  
septum hypertrophy. Abnormal left ventricular wall motion as described on  
the wall scoring diagram below. Abnormal left ventricular septal motion. Interventricular septal \"bounce\". Severe (grade 3) left ventricular diastolic dysfunction. · Moderate tricuspid valve regurgitation is present. Mild pulmonary  
hypertension is present. PASP 38mmHg · Mild aortic valve regurgitation is present. · Mild to moderate mitral valve regurgitation. · Pacing wire present 
   
    Signed by: Nidia Fernandez MD  
 
ASSESSMENT: 
Hospital Problems  Date Reviewed: 4/4/2019 Codes Class Noted POA Advanced care planning/counseling discussion ICD-10-CM: Z71.89 ICD-9-CM: V65.49  5/2/2019 Unknown Debility ICD-10-CM: R53.81 ICD-9-CM: 799.3  5/2/2019 Unknown Abscess ICD-10-CM: L02.91 
ICD-9-CM: 682.9  4/30/2019 Unknown Severe sepsis (HCC) ICD-10-CM: A41.9, R65.20 ICD-9-CM: 038.9, 995.92  4/30/2019 Unknown Sepsis ([address] ICD-10-CM: A41.9 ICD-9-CM: 038.9, 995.91  3/17/2019 Unknown SUBJECTIVE: 
Sleepy non verbal  
 
OBJECTIVE: 
 
VS:  
Visit Vitals /55 (BP 1 Location: Right arm, BP Patient Position: At rest) Pulse 86 Temp 98 °F (36.7 °C) Resp 18 Ht 5' 4\" (1.626 m) Wt 73.9 kg (162 lb 14.4 oz) SpO2 98% Breastfeeding? No  
BMI 27.96 kg/m² Intake/Output Summary (Last 24 hours) at 5/4/2019 7109 Last data filed at 5/4/2019 0400 Gross per 24 hour Intake 2034.17 ml Output 470 ml Net 1564.17 ml  
 
TELE: AVpaced General: chronically ill and in no apparent distress HENT: Normocephalic, atraumatic. Normal external eye. Neck :  increased JVP Cardiac:  regular rate and rhythm, S1, S2 normal, no click, no rub Lungs: diminished breath sounds b/l. Abdomen: Soft, nontender, no masses Extremities:  RLE wound cover with dressing. No edema noted BLE Labs: Results:  
   
Chemistry Recent Labs 05/04/19 
0264 05/03/19 
0530 05/02/19 
0540 * 180* 110*  142 142  
K 3.5 3.7 3.3*  
 107 105 CO2 29 28 30 BUN 81* 76* 67* CREA 1.92* 2.05* 1.90* CA 8.5 8.3* 8.5 AGAP 7 7 7 BUCR 42* 37* 35* CBC w/Diff Recent Labs 05/04/19 2994 05/02/19 
0540 WBC 12.8 17.5*  
RBC 2.70* 2.66* HGB 7.9* 7.7* HCT 24.6* 24.5*  
 174 GRANS PENDING 88* LYMPH PENDING 3*  
EOS PENDING 0 Cardiac Enzymes Recent Labs 05/01/19 
1015 CPK 78 CKND1 CALCULATION NOT PERFORMED WHEN RESULT IS BELOW LINEAR LIMIT Coagulation No results for input(s): PTP, INR, APTT in the last 72 hours. No lab exists for component: INREXT, INREXT Lipid Panel Lab Results Component Value Date/Time Cholesterol, total 142 01/31/2019 02:24 AM  
 HDL Cholesterol 68 (H) 01/31/2019 02:24 AM  
 LDL, calculated 66.4 01/31/2019 02:24 AM  
 VLDL, calculated 7.6 01/31/2019 02:24 AM  
 Triglyceride 38 01/31/2019 02:24 AM  
 CHOL/HDL Ratio 2.1 01/31/2019 02:24 AM  
  
BNP No results for input(s): BNPP in the last 72 hours. Liver Enzymes No results for input(s): TP, ALB, TBIL, AP, SGOT, GPT in the last 72 hours. No lab exists for component: DBIL Thyroid Studies Lab Results Component Value Date/Time  TSH 8.86 (H) 11/15/2015 01:45 PM  
    
 
 
 
Terrence Alfredo MD

## 2019-05-05 ENCOUNTER — ANESTHESIA EVENT (OUTPATIENT)
Dept: CARDIOTHORACIC SURGERY | Age: 84
DRG: 853 | End: 2019-05-05
Payer: MEDICARE

## 2019-05-05 LAB
ANION GAP SERPL CALC-SCNC: 6 MMOL/L (ref 3–18)
BUN SERPL-MCNC: 83 MG/DL (ref 7–18)
BUN/CREAT SERPL: 46 (ref 12–20)
CALCIUM SERPL-MCNC: 8.4 MG/DL (ref 8.5–10.1)
CHLORIDE SERPL-SCNC: 109 MMOL/L (ref 100–108)
CO2 SERPL-SCNC: 28 MMOL/L (ref 21–32)
CREAT SERPL-MCNC: 1.82 MG/DL (ref 0.6–1.3)
GLUCOSE SERPL-MCNC: 194 MG/DL (ref 74–99)
POTASSIUM SERPL-SCNC: 3.6 MMOL/L (ref 3.5–5.5)
SODIUM SERPL-SCNC: 143 MMOL/L (ref 136–145)
VANCOMYCIN SERPL-MCNC: 19.2 UG/ML (ref 5–40)

## 2019-05-05 PROCEDURE — 65660000004 HC RM CVT STEPDOWN

## 2019-05-05 PROCEDURE — 74011250636 HC RX REV CODE- 250/636: Performed by: HOSPITALIST

## 2019-05-05 PROCEDURE — 74011250636 HC RX REV CODE- 250/636: Performed by: EMERGENCY MEDICINE

## 2019-05-05 PROCEDURE — 74011000250 HC RX REV CODE- 250: Performed by: INTERNAL MEDICINE

## 2019-05-05 PROCEDURE — 74011250637 HC RX REV CODE- 250/637: Performed by: HOSPITALIST

## 2019-05-05 PROCEDURE — 74011250636 HC RX REV CODE- 250/636: Performed by: INTERNAL MEDICINE

## 2019-05-05 PROCEDURE — 74011250637 HC RX REV CODE- 250/637: Performed by: EMERGENCY MEDICINE

## 2019-05-05 PROCEDURE — 36415 COLL VENOUS BLD VENIPUNCTURE: CPT

## 2019-05-05 PROCEDURE — 80202 ASSAY OF VANCOMYCIN: CPT

## 2019-05-05 PROCEDURE — 80048 BASIC METABOLIC PNL TOTAL CA: CPT

## 2019-05-05 PROCEDURE — 3331090002 HH PPS REVENUE DEBIT

## 2019-05-05 PROCEDURE — 3331090001 HH PPS REVENUE CREDIT

## 2019-05-05 RX ORDER — FAMOTIDINE 20 MG/1
20 TABLET, FILM COATED ORAL ONCE
Status: CANCELLED | OUTPATIENT
Start: 2019-05-05 | End: 2019-05-05

## 2019-05-05 RX ORDER — SODIUM CHLORIDE, SODIUM LACTATE, POTASSIUM CHLORIDE, CALCIUM CHLORIDE 600; 310; 30; 20 MG/100ML; MG/100ML; MG/100ML; MG/100ML
50 INJECTION, SOLUTION INTRAVENOUS CONTINUOUS
Status: CANCELLED | OUTPATIENT
Start: 2019-05-05 | End: 2019-05-06

## 2019-05-05 RX ORDER — LIDOCAINE HYDROCHLORIDE 10 MG/ML
0.1 INJECTION, SOLUTION EPIDURAL; INFILTRATION; INTRACAUDAL; PERINEURAL AS NEEDED
Status: CANCELLED | OUTPATIENT
Start: 2019-05-05

## 2019-05-05 RX ADMIN — CARVEDILOL 3.12 MG: 3.12 TABLET, FILM COATED ORAL at 20:44

## 2019-05-05 RX ADMIN — ASPIRIN 81 MG 81 MG: 81 TABLET ORAL at 09:04

## 2019-05-05 RX ADMIN — Medication: at 18:00

## 2019-05-05 RX ADMIN — HYDROXYCHLOROQUINE SULFATE 200 MG: 200 TABLET, FILM COATED ENTERAL at 09:04

## 2019-05-05 RX ADMIN — HEPARIN SODIUM 5000 UNITS: 5000 INJECTION INTRAVENOUS; SUBCUTANEOUS at 18:07

## 2019-05-05 RX ADMIN — HYDROCORTISONE SODIUM SUCCINATE 50 MG: 100 INJECTION, POWDER, FOR SOLUTION INTRAMUSCULAR; INTRAVENOUS at 05:49

## 2019-05-05 RX ADMIN — CARVEDILOL 3.12 MG: 3.12 TABLET, FILM COATED ORAL at 09:04

## 2019-05-05 RX ADMIN — WATER 2 G: 1 INJECTION INTRAMUSCULAR; INTRAVENOUS; SUBCUTANEOUS at 18:08

## 2019-05-05 RX ADMIN — HEPARIN SODIUM 5000 UNITS: 5000 INJECTION INTRAVENOUS; SUBCUTANEOUS at 11:00

## 2019-05-05 RX ADMIN — HEPARIN SODIUM 5000 UNITS: 5000 INJECTION INTRAVENOUS; SUBCUTANEOUS at 03:00

## 2019-05-05 RX ADMIN — VANCOMYCIN HYDROCHLORIDE 1000 MG: 10 INJECTION, POWDER, LYOPHILIZED, FOR SOLUTION INTRAVENOUS at 05:53

## 2019-05-05 RX ADMIN — HYDROCORTISONE SODIUM SUCCINATE 50 MG: 100 INJECTION, POWDER, FOR SOLUTION INTRAMUSCULAR; INTRAVENOUS at 22:00

## 2019-05-05 RX ADMIN — AMITRIPTYLINE HYDROCHLORIDE 25 MG: 50 TABLET, FILM COATED ORAL at 22:02

## 2019-05-05 RX ADMIN — HYDROXYCHLOROQUINE SULFATE 200 MG: 200 TABLET, FILM COATED ENTERAL at 18:07

## 2019-05-05 RX ADMIN — Medication: at 09:00

## 2019-05-05 RX ADMIN — MEMANTINE 10 MG: 10 TABLET ORAL at 09:04

## 2019-05-05 RX ADMIN — HYDROCORTISONE SODIUM SUCCINATE 50 MG: 100 INJECTION, POWDER, FOR SOLUTION INTRAMUSCULAR; INTRAVENOUS at 15:10

## 2019-05-05 RX ADMIN — METRONIDAZOLE 500 MG: 500 INJECTION, SOLUTION INTRAVENOUS at 20:53

## 2019-05-05 RX ADMIN — MEMANTINE 10 MG: 10 TABLET ORAL at 18:07

## 2019-05-05 RX ADMIN — AMIODARONE HYDROCHLORIDE 200 MG: 200 TABLET ORAL at 09:04

## 2019-05-05 RX ADMIN — METRONIDAZOLE 500 MG: 500 INJECTION, SOLUTION INTRAVENOUS at 09:04

## 2019-05-05 NOTE — PROGRESS NOTES
1930 bedside turnover given to me by KAREEM honeycutt. Pt is on the cardiac telemetry monitor in stable condition, denies shortness of breath and denies chest pain. Bed is in the lowest position wheels locked and call bell on the bed on her abdomen. Will continue to monitor, no pain at this time and no signs of distress 2030 in bed resting, turned on the tv and offered her water,  She took a few sips and said thank you many times, she is much more verbal tonight than last night, offered her pudding or apple sauce she did not want anything to eat 
2130 medications given, assisted her to drink about half of an ensure 2230 hrly round, checked moore, physical assessment completed, abscess has not had any new bleeding nor discharge and leg wound is still dry clean and intact 2335 dimmed lights, assessed pain and needs 0030 pt is in bed sleeping, no signs of distress 0130 in bed sleeping, no signs of distress 0230 in bed sleeping, hrly round 0330 in bed sleeping, hrly round 0430 vitals assessed, pt is in bed sleeping on cardiac monitor call bell within reach 
0530 vancomycin started, pt is in bed sleeping, no signs of distress 0630 in bed sleeping on monitor call bell within reach 
0730 bedside turnover given to [address] SBAR< MAR< ED summary given with a chance to ask questions. Pt is on the cardiac monitor stable condition, uneventful evening, got her to drink some ensure and have a few bites of apple sauce with her medications.

## 2019-05-05 NOTE — PROGRESS NOTES
In Patient Progress note Admit Date: 4/30/2019 Impression:  
 
1 Acute kidney injury ( baseline ~1.3 creat)   Likely from Ischaemic  ATN in the setting of sepsis/right lower extremity abscess/SIRS, Sitting and Eating lunch today , holding her diuretics, d/c IVF today , encourage po intake  
trend renal parameters closely. UA shows no hematuria or proteinuria, Blood pressures are soft, now on stress dose  IV steroids , Monitor closely 2 chronic kidney disease stage IV secondary to hypertension nephrosclerosis, cardiorenal syndrome 3 sepsis most likely secondary to right lower extremity abscess/wounds. On antibiotics per ID, vascular also consulted per ID recs Noted plans for debridement on Monday 
4 history of dementia  
5 history of lupus, immunocompromised on the long-term steroids. Now on stress dose steroids, plaquenil. No evidence of renal manifestation of lupus Patient follows with Dr Khang Barnes. 
  
 Please call with questions , 
  
Sharia Angelucci, MD FASN Cell [phone] Pager: [phone] Subjective:  
 
Sitting and eating lunch Current Facility-Administered Medications:  
  vancomycin (VANCOCIN) 1000 mg in  ml infusion, 1,000 mg, IntraVENous, Q36H, Stefan La MD, Last Rate: 250 mL/hr at 05/05/19 0553, 1,000 mg at 05/05/19 8707   aspirin chewable tablet 81 mg, 81 mg, Oral, DAILY, Js Tyler MD, 81 mg at 05/05/19 5423   cefTRIAXone (ROCEPHIN) 2 g in sterile water (preservative free) 20 mL IV syringe, 2 g, IntraVENous, Q24H, Stefan La MD, 2 g at 05/04/19 1715   albuterol-ipratropium (DUO-NEB) 2.5 MG-0.5 MG/3 ML, 3 mL, Nebulization, Q4H PRN, Js Tyler MD 
  acetaminophen (TYLENOL) tablet 650 mg, 650 mg, Oral, Q6H PRN, Anaya Flores MD, 650 mg at 05/03/19 2139 
  amiodarone (CORDARONE) tablet 200 mg, 200 mg, Oral, DAILY, Anyaa Flores MD, 200 mg at 05/05/19 5132   amitriptyline (ELAVIL) tablet 25 mg, 25 mg, Oral, QHS, Anaya Flores, MD, 25 mg at 05/04/19 2231   carvedilol (COREG) tablet 3.125 mg, 3.125 mg, Oral, Q12H, Maria Ines Ackerman MD, 3.125 mg at 05/05/19 1621   hydroxychloroquine (PLAQUENIL) tablet 200 mg, 200 mg, Oral, BID, Maria Ines Ackerman MD, 200 mg at 05/05/19 3719   memantine (NAMENDA) tablet 10 mg, 10 mg, Oral, BID, Maria Ines Ackerman MD, 10 mg at 05/05/19 6054   menthol-zinc oxide (CALMOSEPTINE) 0.44-20.6 % ointment, , Topical, BID, Maria Ines Ackerman MD 
  polyethylene glycol (MIRALAX) packet 17 g, 17 g, Oral, DAILY, Maria Ines Ackerman MD, Stopped at 05/01/19 0900   hydrALAZINE (APRESOLINE) 20 mg/mL injection 10 mg, 10 mg, IntraVENous, Q6H PRN, Maria Ines Ackerman MD 
  heparin (porcine) injection 5,000 Units, 5,000 Units, SubCUTAneous, Q8H, Edi Zaidi MD, 5,000 Units at 05/05/19 1100 
  metroNIDAZOLE (FLAGYL) IVPB premix 500 mg, 500 mg, IntraVENous, Q12H, Good HER MD, Last Rate: 100 mL/hr at 05/05/19 0904, 500 mg at 05/05/19 0904 
  0.9% sodium chloride infusion, 30 mL/hr, IntraVENous, CONTINUOUS, Eloy Kirby MD, Last Rate: 30 mL/hr at 05/04/19 2221, 30 mL/hr at 05/04/19 2221   hydrocortisone Sod Succ (PF) (SOLU-CORTEF) injection 50 mg, 50 mg, IntraVENous, Q8H, Js Tyler MD, 50 mg at 05/05/19 0549 
  sodium chloride (NS) flush 5-10 mL, 5-10 mL, IntraVENous, PRN, Maria Ines Ackerman MD 
  VANCOMYCIN INFORMATION NOTE, 1 Each, Other, Rx Dosing/Monitoring, Maria Ines Ackerman MD 
 
 
 
Objective:  
 
Visit Vitals /62 (BP 1 Location: Left arm, BP Patient Position: At rest) Pulse 70 Temp 97.8 °F (36.6 °C) Resp 10 Ht 5' 4\" (1.626 m) Wt 73.6 kg (162 lb 4.1 oz) SpO2 99% Breastfeeding? No  
BMI 27.85 kg/m² Intake/Output Summary (Last 24 hours) at 5/5/2019 1507 Last data filed at 5/5/2019 1253 Gross per 24 hour Intake 1737.83 ml Output 1350 ml Net 387.83 ml Physical Exam:  
 
gen NAD, sleepy HENT mmm RS AEBE clear CVS s1 s2 wnl no JVD 
GI soft BS + Ext no edema Data Review: Recent Labs 05/04/19 
7984 WBC 12.8 RBC 2.70* HCT 24.6* MCV 91.1 MCH 29.3 MCHC 32.1 RDW 14.3 Recent Labs 05/05/19 
1883 05/04/19 
0524 05/03/19 
0530 BUN 83* 81* 76* CREA 1.82* 1.92* 2.05* CA 8.4* 8.5 8.3*  
K 3.6 3.5 3.7  144 142 * 108 107 CO2 28 29 28 * 165* 180* Swathi Calvert MD

## 2019-05-05 NOTE — PROGRESS NOTES
Pt seen and examined. Pt awake and alert but not oriented. No complaints. Visit Vitals /61 (BP 1 Location: Left arm, BP Patient Position: At rest) Pulse 70 Temp 96.8 °F (36 °C) Resp 18 Ht 5' 4\" (1.626 m) Wt 162 lb 4.1 oz (73.6 kg) SpO2 99% Breastfeeding? No  
BMI 27.85 kg/m² NAD 
BLE in heel protectors. Right leg wound dressed. 80 F with R leg pressure ulcer for debridement and graft placement tomorrow.

## 2019-05-05 NOTE — PROGRESS NOTES
[facility] Hospitalist Group Progress Note Patient: Luda Dang Age: 80 y.o. : 1931 MR#: [id number] SSN: [id number] Date/Time: 2019 Subjective:  
 
Patient sitting in bed in NAD, awake, has dementia. Son Jameson Sena at bedside Assessment/Plan:  
 
- RLE cellulitis, MRSA in wound - Staph Epidermidis bacteremia 
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 
-A fib, rate controlled 
-H/o lupus Dementia - elevated trop, likely 2/2 demand ischemia - Dysphagia- diet as per speech Severe sepsis at admission with cellulitis and ARIEL PLAN On abx, follow cx, id following Vascular planning I&D Monitor renal function, nephro following On coreg, off diuretics, cardio following Palliative care on case D/w son at beside, d/w RN Case discussed with:  []Patient  [x]Family  []Nursing  []Case Management DVT Prophylaxis:  []Lovenox  [x]Hep SQ  []SCDs  []Coumadin   []On Heparin gtt Objective:  
VS:  
Visit Vitals /63 (BP 1 Location: Left arm, BP Patient Position: At rest) Pulse 72 Temp 97.2 °F (36.2 °C) Resp 12 Ht 5' 4\" (1.626 m) Wt 73.6 kg (162 lb 4.1 oz) SpO2 99% Breastfeeding? No  
BMI 27.85 kg/m² Tmax/24hrs: Temp (24hrs), Av.4 °F (36.3 °C), Min:96.8 °F (36 °C), Max:97.9 °F (36.6 °C) Input/Output:  
 
Intake/Output Summary (Last 24 hours) at 2019 1740 Last data filed at 2019 1511 Gross per 24 hour Intake 1998.33 ml Output 900 ml Net 1098.33 ml General:  Awake, has dementia Cardiovascular:  S1S2+, RRR Pulmonary:  CTA b/l GI:  Soft, BS+, NT, ND Extremities:  Right thigh cellulitis, RLE ulcer Labs:   
Recent Results (from the past 24 hour(s)) Raya Purchase Collection Time: 19  3:54 AM  
Result Value Ref Range Vancomycin, random 19.2 5.0 - 27.4 UG/ML  
METABOLIC PANEL, BASIC  Collection Time: 19  9:05 AM  
 Result Value Ref Range Sodium 143 136 - 145 mmol/L Potassium 3.6 3.5 - 5.5 mmol/L Chloride 109 (H) 100 - 108 mmol/L  
 CO2 28 21 - 32 mmol/L Anion gap 6 3.0 - 18 mmol/L Glucose 194 (H) 74 - 99 mg/dL BUN 83 (H) 7.0 - 18 MG/DL Creatinine 1.82 (H) 0.6 - 1.3 MG/DL  
 BUN/Creatinine ratio 46 (H) 12 - 20 GFR est AA 32 (L) >60 ml/min/1.73m2 GFR est non-AA 26 (L) >60 ml/min/1.73m2 Calcium 8.4 (L) 8.5 - 10.1 MG/DL Additional Data Reviewed:   
 
Signed By: Grover Brady MD   
 May 5, 2019

## 2019-05-06 ENCOUNTER — HOME CARE VISIT (OUTPATIENT)
Dept: HOME HEALTH SERVICES | Facility: HOME HEALTH | Age: 84
End: 2019-05-06
Payer: MEDICARE

## 2019-05-06 ENCOUNTER — ANESTHESIA (OUTPATIENT)
Dept: CARDIOTHORACIC SURGERY | Age: 84
DRG: 853 | End: 2019-05-06
Payer: MEDICARE

## 2019-05-06 VITALS
OXYGEN SATURATION: 92 % | TEMPERATURE: 98.1 F | RESPIRATION RATE: 16 BRPM | DIASTOLIC BLOOD PRESSURE: 70 MMHG | HEART RATE: 77 BPM | SYSTOLIC BLOOD PRESSURE: 100 MMHG

## 2019-05-06 LAB
ANION GAP SERPL CALC-SCNC: 5 MMOL/L (ref 3–18)
BACTERIA SPEC CULT: NORMAL
BACTERIA SPEC CULT: NORMAL
BASOPHILS # BLD: 0 K/UL (ref 0–0.06)
BASOPHILS NFR BLD: 0 % (ref 0–3)
BUN SERPL-MCNC: 88 MG/DL (ref 7–18)
BUN/CREAT SERPL: 45 (ref 12–20)
CALCIUM SERPL-MCNC: 8.4 MG/DL (ref 8.5–10.1)
CHLORIDE SERPL-SCNC: 108 MMOL/L (ref 100–108)
CO2 SERPL-SCNC: 29 MMOL/L (ref 21–32)
CREAT SERPL-MCNC: 1.95 MG/DL (ref 0.6–1.3)
DIFFERENTIAL METHOD BLD: ABNORMAL
EOSINOPHIL # BLD: 0 K/UL (ref 0–0.4)
EOSINOPHIL NFR BLD: 0 % (ref 0–5)
ERYTHROCYTE [DISTWIDTH] IN BLOOD BY AUTOMATED COUNT: 14.8 % (ref 11.6–14.5)
GLUCOSE SERPL-MCNC: 227 MG/DL (ref 74–99)
HCT VFR BLD AUTO: 28.2 % (ref 35–45)
HGB BLD-MCNC: 8.7 G/DL (ref 12–16)
LYMPHOCYTES # BLD: 0.4 K/UL (ref 0.8–3.5)
LYMPHOCYTES NFR BLD: 3 % (ref 20–51)
MCH RBC QN AUTO: 28.1 PG (ref 24–34)
MCHC RBC AUTO-ENTMCNC: 30.9 G/DL (ref 31–37)
MCV RBC AUTO: 91 FL (ref 74–97)
METAMYELOCYTES NFR BLD MANUAL: 5 %
MONOCYTES # BLD: 0.9 K/UL (ref 0–1)
MONOCYTES NFR BLD: 7 % (ref 2–9)
MYELOCYTES NFR BLD MANUAL: 2 %
NEUTS BAND NFR BLD MANUAL: 2 % (ref 0–5)
NEUTS SEG # BLD: 10.4 K/UL (ref 1.8–8)
NEUTS SEG NFR BLD: 81 % (ref 42–75)
PLATELET # BLD AUTO: 259 K/UL (ref 135–420)
PLATELET COMMENTS,PCOM: ABNORMAL
PMV BLD AUTO: 10.4 FL (ref 9.2–11.8)
POTASSIUM SERPL-SCNC: 4.1 MMOL/L (ref 3.5–5.5)
RBC # BLD AUTO: 3.1 M/UL (ref 4.2–5.3)
RBC MORPH BLD: ABNORMAL
RBC MORPH BLD: ABNORMAL
SERVICE CMNT-IMP: NORMAL
SERVICE CMNT-IMP: NORMAL
SODIUM SERPL-SCNC: 142 MMOL/L (ref 136–145)
WBC # BLD AUTO: 12.5 K/UL (ref 4.6–13.2)

## 2019-05-06 PROCEDURE — 0JUN0KZ SUPPLEMENT OF RIGHT LOWER LEG SUBCUTANEOUS TISSUE AND FASCIA WITH NONAUTOLOGOUS TISSUE SUBSTITUTE, OPEN APPROACH: ICD-10-PCS | Performed by: SURGERY

## 2019-05-06 PROCEDURE — 77030018836 HC SOL IRR NACL ICUM -A: Performed by: SURGERY

## 2019-05-06 PROCEDURE — 74011250636 HC RX REV CODE- 250/636: Performed by: EMERGENCY MEDICINE

## 2019-05-06 PROCEDURE — 74011250636 HC RX REV CODE- 250/636: Performed by: NURSE PRACTITIONER

## 2019-05-06 PROCEDURE — 74011250637 HC RX REV CODE- 250/637: Performed by: HOSPITALIST

## 2019-05-06 PROCEDURE — 76010000107 HC CV SURG FIRST 0.5 HR: Performed by: SURGERY

## 2019-05-06 PROCEDURE — 74011250636 HC RX REV CODE- 250/636

## 2019-05-06 PROCEDURE — 76060000031 HC ANESTHESIA FIRST 0.5 HR: Performed by: SURGERY

## 2019-05-06 PROCEDURE — 74011000250 HC RX REV CODE- 250

## 2019-05-06 PROCEDURE — 3331090002 HH PPS REVENUE DEBIT

## 2019-05-06 PROCEDURE — 74011000258 HC RX REV CODE- 258

## 2019-05-06 PROCEDURE — 80048 BASIC METABOLIC PNL TOTAL CA: CPT

## 2019-05-06 PROCEDURE — 85025 COMPLETE CBC W/AUTO DIFF WBC: CPT

## 2019-05-06 PROCEDURE — 74011250636 HC RX REV CODE- 250/636: Performed by: HOSPITALIST

## 2019-05-06 PROCEDURE — 77030037878 HC DRSG MEPILEX >48IN BORD MOLN -B

## 2019-05-06 PROCEDURE — 74011250636 HC RX REV CODE- 250/636: Performed by: INTERNAL MEDICINE

## 2019-05-06 PROCEDURE — 65660000000 HC RM CCU STEPDOWN

## 2019-05-06 PROCEDURE — 77030008462 HC STPLR SKN PROX J&J -A: Performed by: SURGERY

## 2019-05-06 PROCEDURE — 0JUL0KZ SUPPLEMENT OF RIGHT UPPER LEG SUBCUTANEOUS TISSUE AND FASCIA WITH NONAUTOLOGOUS TISSUE SUBSTITUTE, OPEN APPROACH: ICD-10-PCS | Performed by: SURGERY

## 2019-05-06 PROCEDURE — 36415 COLL VENOUS BLD VENIPUNCTURE: CPT

## 2019-05-06 PROCEDURE — 3331090001 HH PPS REVENUE CREDIT

## 2019-05-06 PROCEDURE — 74011000250 HC RX REV CODE- 250: Performed by: INTERNAL MEDICINE

## 2019-05-06 PROCEDURE — 74011250637 HC RX REV CODE- 250/637: Performed by: EMERGENCY MEDICINE

## 2019-05-06 PROCEDURE — 0KBS0ZZ EXCISION OF RIGHT LOWER LEG MUSCLE, OPEN APPROACH: ICD-10-PCS | Performed by: SURGERY

## 2019-05-06 PROCEDURE — 77030011258 HC DRSG ZN UN INLC -A: Performed by: SURGERY

## 2019-05-06 PROCEDURE — 76210000006 HC OR PH I REC 0.5 TO 1 HR: Performed by: SURGERY

## 2019-05-06 DEVICE — GRAFT MICRO MATRX 1000MG EA --: Type: IMPLANTABLE DEVICE | Site: LEG | Status: FUNCTIONAL

## 2019-05-06 DEVICE — DRESSING WND 3 LAYR 7X10 CM MTRX CYTAL: Type: IMPLANTABLE DEVICE | Site: LEG | Status: FUNCTIONAL

## 2019-05-06 RX ORDER — KETAMINE HYDROCHLORIDE 50 MG/ML
INJECTION, SOLUTION INTRAMUSCULAR; INTRAVENOUS AS NEEDED
Status: DISCONTINUED | OUTPATIENT
Start: 2019-05-06 | End: 2019-05-06 | Stop reason: HOSPADM

## 2019-05-06 RX ORDER — FENTANYL CITRATE 50 UG/ML
INJECTION, SOLUTION INTRAMUSCULAR; INTRAVENOUS AS NEEDED
Status: DISCONTINUED | OUTPATIENT
Start: 2019-05-06 | End: 2019-05-06 | Stop reason: HOSPADM

## 2019-05-06 RX ORDER — PROPOFOL 10 MG/ML
INJECTION, EMULSION INTRAVENOUS AS NEEDED
Status: DISCONTINUED | OUTPATIENT
Start: 2019-05-06 | End: 2019-05-06 | Stop reason: HOSPADM

## 2019-05-06 RX ORDER — PROPOFOL 10 MG/ML
INJECTION, EMULSION INTRAVENOUS
Status: DISCONTINUED | OUTPATIENT
Start: 2019-05-06 | End: 2019-05-06 | Stop reason: HOSPADM

## 2019-05-06 RX ORDER — SODIUM CHLORIDE 9 MG/ML
30 INJECTION, SOLUTION INTRAVENOUS CONTINUOUS
Status: DISCONTINUED | OUTPATIENT
Start: 2019-05-06 | End: 2019-05-09 | Stop reason: HOSPADM

## 2019-05-06 RX ADMIN — PROPOFOL 50 MCG/KG/MIN: 10 INJECTION, EMULSION INTRAVENOUS at 14:16

## 2019-05-06 RX ADMIN — KETAMINE HYDROCHLORIDE 25 MG: 50 INJECTION, SOLUTION INTRAMUSCULAR; INTRAVENOUS at 14:14

## 2019-05-06 RX ADMIN — POLYETHYLENE GLYCOL 3350 17 G: 17 POWDER, FOR SOLUTION ORAL at 09:00

## 2019-05-06 RX ADMIN — HEPARIN SODIUM 5000 UNITS: 5000 INJECTION INTRAVENOUS; SUBCUTANEOUS at 04:49

## 2019-05-06 RX ADMIN — HYDROCORTISONE SODIUM SUCCINATE 50 MG: 100 INJECTION, POWDER, FOR SOLUTION INTRAMUSCULAR; INTRAVENOUS at 21:11

## 2019-05-06 RX ADMIN — SODIUM CHLORIDE 30 ML/HR: 900 INJECTION, SOLUTION INTRAVENOUS at 10:32

## 2019-05-06 RX ADMIN — MEMANTINE 10 MG: 10 TABLET ORAL at 08:10

## 2019-05-06 RX ADMIN — HYDROXYCHLOROQUINE SULFATE 200 MG: 200 TABLET, FILM COATED ENTERAL at 08:10

## 2019-05-06 RX ADMIN — METRONIDAZOLE 500 MG: 500 INJECTION, SOLUTION INTRAVENOUS at 08:10

## 2019-05-06 RX ADMIN — METRONIDAZOLE 500 MG: 500 INJECTION, SOLUTION INTRAVENOUS at 21:16

## 2019-05-06 RX ADMIN — HEPARIN SODIUM 5000 UNITS: 5000 INJECTION INTRAVENOUS; SUBCUTANEOUS at 10:34

## 2019-05-06 RX ADMIN — CARVEDILOL 3.12 MG: 3.12 TABLET, FILM COATED ORAL at 08:10

## 2019-05-06 RX ADMIN — Medication: at 09:00

## 2019-05-06 RX ADMIN — ASPIRIN 81 MG 81 MG: 81 TABLET ORAL at 08:10

## 2019-05-06 RX ADMIN — HYDROCORTISONE SODIUM SUCCINATE 50 MG: 100 INJECTION, POWDER, FOR SOLUTION INTRAMUSCULAR; INTRAVENOUS at 05:05

## 2019-05-06 RX ADMIN — VANCOMYCIN HYDROCHLORIDE 1000 MG: 10 INJECTION, POWDER, LYOPHILIZED, FOR SOLUTION INTRAVENOUS at 22:36

## 2019-05-06 RX ADMIN — AMIODARONE HYDROCHLORIDE 200 MG: 200 TABLET ORAL at 08:10

## 2019-05-06 RX ADMIN — WATER 2 G: 1 INJECTION INTRAMUSCULAR; INTRAVENOUS; SUBCUTANEOUS at 18:03

## 2019-05-06 RX ADMIN — PROPOFOL 50 MG: 10 INJECTION, EMULSION INTRAVENOUS at 14:14

## 2019-05-06 RX ADMIN — FENTANYL CITRATE 50 MCG: 50 INJECTION, SOLUTION INTRAMUSCULAR; INTRAVENOUS at 14:12

## 2019-05-06 NOTE — ANESTHESIA PREPROCEDURE EVALUATION
Relevant Problems No relevant active problems Anesthetic History No history of anesthetic complications Review of Systems / Medical History Patient summary reviewed and pertinent labs reviewed Pulmonary Within defined limits Neuro/Psych Dementia Cardiovascular Hypertension CHF: NYHA Classification III Dysrhythmias : atrial fibrillation and atrial flutter Pacemaker, past MI and CAD Exercise tolerance: >4 METS Comments:  
NSTEMI 
2018 Echo: EF 30-35%, ddx III 
2015 Stress test: EF 59%, neg. GI/Hepatic/Renal 
  
 
 
Renal disease: CRI Endo/Other Obesity Other Findings Comments: Lupus on solucortef 50 mg q 8 hr. 
Cellulitis RLE,  
anasarca Physical Exam 
 
Airway Mallampati: II 
TM Distance: 4 - 6 cm Neck ROM: normal range of motion Mouth opening: Normal 
 
 Cardiovascular Regular rate and rhythm,  S1 and S2 normal,  no murmur, click, rub, or gallop Rhythm: regular Rate: normal 
 
 
 
 Dental 
 
Dentition: Poor dentition Comments: The patient has very poor dentition. Loose, broken, and or missing teeth. I explained the risk of dental damage and or loss. The patient understands and accepts these risks. Pulmonary Breath sounds clear to auscultation Abdominal 
GI exam deferred Other Findings Anesthetic Plan ASA: 4 Anesthesia type: MAC and general - backup Induction: Intravenous Anesthetic plan and risks discussed with: Son / Daughter R/Bs including but not limited to post op prolong intubation are d/w pt, agreed to proceed.

## 2019-05-06 NOTE — PROGRESS NOTES
Problem: Self Care Deficits Care Plan (Adult) Goal: *Acute Goals and Plan of Care (Insert Text) Description Occupational Therapy Goals--FMP Provided by rehab technician Initiated 5/2/2019 within 7 day(s). 1.  Patient will perform BUE  PROM on elbow, wrist, and finger joints with Total Assistance 3-5X/week. Prior Level of Function: Per patient's son, she lived at home and had help for all ADLs. Patient's son was unable to assist further. Patient presented as Total Assistance at this OT evaluation. Outcome: Progressing Towards Goal 
Patient: Sahra Rolon (80 y.o. female) Date: 5/6/2019 Subjective: 
Pt opened eyes throughout session but did not talk. Patient cleared by nursing Jaquelin Oconnell) for participation in [facility]. Pt tolerated most exercises well but was resistive to all shoulder Flex/Ext. Pt was left resting comfortably in bed and session was discussed with nursing. Therapeutic Exercises:  
 
 
EXERCISE Sets Reps Active Active Assist  
Passive Self ROM Comments Finger Flex/Ext 1 15  ? ? ? ? BUE Wrist Flex/Ext 1 15 ? ? ? ? BUE Elbow Flex/Ext 1 10 ? ? ? ? BUE Shoulder Flex/Ext   ? ? ? ?   
   ? ? ? ?   
   ? ? ? ? Pain: 
Pre Session: No pain noted Post session: No pain noted After treatment:  
? Patient left in no apparent distress in bed 
? Call bell left within reach ? Nursing notified ? Caregiver present (Children) ? Bed alarm activated Kasey Cummings

## 2019-05-06 NOTE — PROGRESS NOTES
[facility] Hospitalist Group Progress Note Patient: Kirsten Jon Age: 80 y.o. : 1931 MR#: [id number] SSN: [id number] Date/Time: 2019 Subjective: I personally saw and evaluated this patient on 19. Patient lying in bed in NAD Assessment/Plan:  
 
- RLE cellulitis, MRSA in wound - Staph Epidermidis bacteremia 
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 
-A fib, rate controlled 
-H/o lupus Dementia - elevated trop, likely 2/2 demand ischemia - Dysphagia- diet as per speech Severe sepsis at admission with cellulitis and ARIEL PLAN On abx per ID, wound care S/p debridement by vascular Monitor renal function, nephro following On amiodarone On coreg, off diuretics, cardio following Palliative care on case Case discussed with:  []Patient  []Family  [x]Nursing  []Case Management DVT Prophylaxis:  []Lovenox  [x]Hep SQ  []SCDs  []Coumadin   []On Heparin gtt Objective:  
VS:  
Visit Vitals /67 (BP 1 Location: Left arm) Pulse 74 Temp 97.5 °F (36.4 °C) Resp 12 Ht 5' 4\" (1.626 m) Wt 80.1 kg (176 lb 9.6 oz) SpO2 100% Breastfeeding? No  
BMI 30.31 kg/m² Tmax/24hrs: Temp (24hrs), Av.7 °F (36.5 °C), Min:97.2 °F (36.2 °C), Max:98.1 °F (36.7 °C) Input/Output:  
 
Intake/Output Summary (Last 24 hours) at 2019 1645 Last data filed at 2019 1430 Gross per 24 hour Intake 150 ml Output 650 ml Net -500 ml General:  Awake, has dementia Cardiovascular:  S1S2+, RRR Pulmonary:  CTA b/l GI:  Soft, BS+, NT, ND Extremities:  Right thigh cellulitis improving, RLE ulcer Labs:   
Recent Results (from the past 24 hour(s)) METABOLIC PANEL, BASIC Collection Time: 19  5:17 AM  
Result Value Ref Range Sodium 142 136 - 145 mmol/L Potassium 4.1 3.5 - 5.5 mmol/L  Chloride 108 100 - 108 mmol/L  
 CO2 29 21 - 32 mmol/L  
 Anion gap 5 3.0 - 18 mmol/L Glucose 227 (H) 74 - 99 mg/dL BUN 88 (H) 7.0 - 18 MG/DL Creatinine 1.95 (H) 0.6 - 1.3 MG/DL  
 BUN/Creatinine ratio 45 (H) 12 - 20 GFR est AA 29 (L) >60 ml/min/1.73m2 GFR est non-AA 24 (L) >60 ml/min/1.73m2 Calcium 8.4 (L) 8.5 - 10.1 MG/DL  
CBC WITH AUTOMATED DIFF Collection Time: 05/06/19  5:17 AM  
Result Value Ref Range WBC 12.5 4.6 - 13.2 K/uL  
 RBC 3.10 (L) 4.20 - 5.30 M/uL HGB 8.7 (L) 12.0 - 16.0 g/dL HCT 28.2 (L) 35.0 - 45.0 % MCV 91.0 74.0 - 97.0 FL  
 MCH 28.1 24.0 - 34.0 PG  
 MCHC 30.9 (L) 31.0 - 37.0 g/dL  
 RDW 14.8 (H) 11.6 - 14.5 % PLATELET 629 698 - 748 K/uL MPV 10.4 9.2 - 11.8 FL  
 NEUTROPHILS 81 (H) 42 - 75 % BAND NEUTROPHILS 2 0 - 5 % LYMPHOCYTES 3 (L) 20 - 51 % MONOCYTES 7 2 - 9 % EOSINOPHILS 0 0 - 5 % BASOPHILS 0 0 - 3 % METAMYELOCYTES 5 (H) 0 % MYELOCYTES 2 (H) 0 %  
 ABS. NEUTROPHILS 10.4 (H) 1.8 - 8.0 K/UL  
 ABS. LYMPHOCYTES 0.4 (L) 0.8 - 3.5 K/UL  
 ABS. MONOCYTES 0.9 0 - 1.0 K/UL  
 ABS. EOSINOPHILS 0.0 0.0 - 0.4 K/UL  
 ABS. BASOPHILS 0.0 0.0 - 0.06 K/UL  
 DF AUTOMATED PLATELET COMMENTS LARGE PLATELETS    
 RBC COMMENTS ANISOCYTOSIS 1+ 
    
 RBC COMMENTS OVALOCYTES 1+ Additional Data Reviewed:   
 
Signed By: Karime Caballero MD   
 May 6, 2019

## 2019-05-06 NOTE — ROUTINE PROCESS
Received report from [facility]. Pt's lethargic, NAD, breathing non labored, on O2 NC at 3L, HOB up. IV site clean, dry and intact. IVF going per order. Pt's s/p I&D right lower leg Bed at the lowest level on lock position, call bell w/i reach. Bedside and Verbal shift change report given to KAREEM Fuentes (oncoming nurse) by me (offgoing nurse).  Report included the following information SBAR, Kardex, Procedure Summary, Intake/Output, MAR, Recent Results and Cardiac Rhythm SR.

## 2019-05-06 NOTE — ANESTHESIA POSTPROCEDURE EVALUATION
Procedure(s): RIGHT LEG DEBRIDEMENT WITH A CELL PLACEMENT. MAC Anesthesia Post Evaluation Multimodal analgesia: multimodal analgesia used between 6 hours prior to anesthesia start to PACU discharge Patient location during evaluation: bedside Patient participation: complete - patient participated Level of consciousness: awake Pain management: adequate Airway patency: patent Anesthetic complications: no 
Cardiovascular status: stable Respiratory status: acceptable Hydration status: acceptable Post anesthesia nausea and vomiting:  controlled Vitals Value Taken Time /65 5/6/2019  3:30 PM  
Temp 36.3 °C (97.4 °F) 5/6/2019  2:42 PM  
Pulse 68 5/6/2019  3:30 PM  
Resp 12 5/6/2019  3:30 PM  
SpO2 100 % 5/6/2019  3:30 PM

## 2019-05-06 NOTE — PROGRESS NOTES
Infectious Disease progress Note Requested by:dr. issa 
 
Reason:sepsis, right leg cellulitis, right thigh abscess Current abx Prior abx  
vancomycin since 4/30 Metronidazole since 5/1 Ceftriaxone since 5/2 Pip/tazo x 1 - 5/1 Cefepime 4/30-5/2 Lines:  
 
 
Assessment : 
 
71-year-old AA female with a history of congestive heart failure with EF of 31-35 % on 12/28/18, lupus, thrombus cytopenia, chronic kidney disease admitted to [facility] on 4/30/19 for altered mental status. H/o lupus nodules on LE in 1/2019 Now with high fevers, RLE redness, ulcer right leg Clinical picture consistent with sepsis (POA) due to acute right thigh/leg cellulitis, right thigh abscess, necrotic right leg ulcer with possible superinfection. Single positive blood culture for staph epidermidis on 4/30 likely contaminant Right thigh abscess s/p bedside I&D in ed on 4/30/19. No cultures sent. Right leg ulcer with clot/necrosis - cultures 5/1- methicillin resistant staph aureus, group A strep. Vascular surgery consult appreciated. Plans for I&D, a cell graft noted. decreased erythema, tenderness right thigh/leg - no significant induration around the recently drained right thigh abscess. Decreased wbc. More alert today. Clinically better.  
  
Recommendations: 
  
1. cont ceftriaxone, vancomycin,metronidazole 2. F/u Vascular surgery recommendation regarding I&D right leg ulcer 3. Will switch to oral abx after right leg surgery once cleared for discharge by vascular surgery 
  
Advance Care planning: full code: discussed  with patient/surrogate decision maker: zacarias ledbetter: [phone]. Above plan was discussed in details with  son at bedside. Please call me if any further questions or concerns. Will continue to participate in the care of this patient. HPI: 
 
Feels better. Decreased right leg/thigh pain. No new complaints 
 
 
home Medication List  
  
  
   
 Details polyethylene glycol (MIRALAX) 17 gram/dose powder Take 17 g by mouth daily. torsemide (DEMADEX) 10 mg tablet Take 1 Tab by mouth daily. Qty: 30 Tab, Refills: 6  
  
amoxicillin (AMOXIL) 500 mg capsule Take 500 mg by mouth three (3) times daily. aspirin delayed-release 81 mg tablet Take 81 mg by mouth daily. hydroxychloroquine (PLAQUENIL) 200 mg tablet Take 200 mg by mouth two (2) times a day. metOLazone (ZAROXOLYN) 2.5 mg tablet Take 1 Tab by mouth daily. Qty: 30 Tab, Refills: 5  
  
!! OTHER Incentive Spirometry- Use as directed Qty: 1 Each, Refills: 0  
  
!! OTHER Graded Compression Stockings b/l LE- use as directed Qty: 1 Each, Refills: 0  
  
food supplemt, lactose-reduced (ENSURE ENLIVE) 0.08 gram-1.5 kcal/mL liqd Take 1 Bottle by mouth two (2) times a day. Qty: 60 Bottle, Refills: 0  
  
menthol-zinc oxide (CALMOSEPTINE) 0.44-20.6 % oint Apply  to affected area. carvedilol (COREG) 3.125 mg tablet Take 1 Tab by mouth every twelve (12) hours. Qty: 60 Tab, Refills: 0  
  
acetaminophen (TYLENOL ARTHRITIS PAIN) 650 mg TbER Take 1 Tab by mouth every eight (8) hours. Qty: 15 Tab, Refills: 0  
  
amiodarone (CORDARONE) 200 mg tablet TAKE ONE TABLET EVERY DAY (MAKE AN APPT) Qty: 30 Tab, Refills: 5  
  
memantine (NAMENDA) 10 mg tablet Take 10 mg by mouth two (2) times a day. amitriptyline (ELAVIL) 25 mg tablet Take 25 mg by mouth nightly. !! - Potential duplicate medications found. Please discuss with provider. Current Facility-Administered Medications Medication Dose Route Frequency  vancomycin (VANCOCIN) 1000 mg in  ml infusion  1,000 mg IntraVENous Q36H  
 aspirin chewable tablet 81 mg  81 mg Oral DAILY  cefTRIAXone (ROCEPHIN) 2 g in sterile water (preservative free) 20 mL IV syringe  2 g IntraVENous Q24H  
 albuterol-ipratropium (DUO-NEB) 2.5 MG-0.5 MG/3 ML  3 mL Nebulization Q4H PRN  
  acetaminophen (TYLENOL) tablet 650 mg  650 mg Oral Q6H PRN  
 amiodarone (CORDARONE) tablet 200 mg  200 mg Oral DAILY  amitriptyline (ELAVIL) tablet 25 mg  25 mg Oral QHS  carvedilol (COREG) tablet 3.125 mg  3.125 mg Oral Q12H  hydroxychloroquine (PLAQUENIL) tablet 200 mg  200 mg Oral BID  memantine (NAMENDA) tablet 10 mg  10 mg Oral BID  menthol-zinc oxide (CALMOSEPTINE) 0.44-20.6 % ointment   Topical BID  polyethylene glycol (MIRALAX) packet 17 g  17 g Oral DAILY  hydrALAZINE (APRESOLINE) 20 mg/mL injection 10 mg  10 mg IntraVENous Q6H PRN  
 heparin (porcine) injection 5,000 Units  5,000 Units SubCUTAneous Q8H  
 metroNIDAZOLE (FLAGYL) IVPB premix 500 mg  500 mg IntraVENous Q12H  hydrocortisone Sod Succ (PF) (SOLU-CORTEF) injection 50 mg  50 mg IntraVENous Q8H  
 sodium chloride (NS) flush 5-10 mL  5-10 mL IntraVENous PRN  
 VANCOMYCIN INFORMATION NOTE  1 Each Other Rx Dosing/Monitoring Allergies: Patient has no known allergies. Temp (24hrs), Av.8 °F (36.6 °C), Min:97.2 °F (36.2 °C), Max:98.1 °F (36.7 °C) Visit Vitals /79 Pulse 69 Temp 98.1 °F (36.7 °C) Resp 17 Ht 5' 4\" (1.626 m) Wt 80.1 kg (176 lb 9.6 oz) SpO2 99% Breastfeeding? No  
BMI 30.31 kg/m² ROS: 12 point ROS obtained in details. Pertinent positives as mentioned in HPI,  
otherwise negative Physical Exam: 
 
Constitutional: She appears well-developed and well-nourished. No distress. non verbal 
 
HENT:  
Head: Normocephalic and atraumatic. Right Ear: External ear normal.  
Left Ear: External ear normal.  
Nose: Nose normal.  
Eyes: Pupils are equal, round, and reactive to light. Conjunctivae and EOM are normal. No scleral icterus. Neck: Normal range of motion. Neck supple. No JVD present. No tracheal deviation present. No thyromegaly present.   
Cardiovascular: Normal rate, regular rhythm, normal heart sounds and intact distal pulses. Exam reveals no gallop and no friction rub. No murmur heard. Pulmonary/Chest: Effort normal and breath sounds normal. She exhibits no tenderness. no rales/rhonchi Abdominal: Soft. Bowel sounds are normal. She exhibits no distension. There is no tenderness. There is no rebound and no guarding. Musculoskeletal: She exhibits no edema or tenderness. Right lower extremity with  decreased erythema of right lateral thigh, right leg with decreased overlying warmth/tenderness, anterior tibial 10 x 5 cm open bloody wound with necrotic center, Left lower extremity  without edema/tenderness Lymphadenopathy:  
  She has no cervical adenopathy. Neurological: She is alert. No cranial nerve deficit. Coordination normal.  
Eyes open but does not follow commands, globally weak, right lower extremity is contracted Skin: Skin is dry. Right lower extremity erythema and warm to touch Psychiatric:  
Unable to assess Nursing note and vitals reviewed. 
  
  
 
 
 
 
Labs: Results:  
Chemistry Recent Labs 05/06/19 
6359 05/05/19 
5991 05/04/19 
6015 * 194* 165*  143 144  
K 4.1 3.6 3.5  109* 108 CO2 29 28 29 BUN 88* 83* 81* CREA 1.95* 1.82* 1.92* CA 8.4* 8.4* 8.5 AGAP 5 6 7 BUCR 45* 46* 42* CBC w/Diff Recent Labs 05/06/19 
3933 05/04/19 
8013 WBC 12.5 12.8 RBC 3.10* 2.70* HGB 8.7* 7.9*  
HCT 28.2* 24.6*  210 GRANS 81* 80* LYMPH 3* 10* EOS 0 0 Microbiology Recent Labs 05/02/19 
1550 05/02/19 
5693 05/01/19 
1050 05/01/19 
0018 04/30/19 
1953 CULT NO GROWTH AFTER 13 HOURS MRSA target DNA is not detected (presumptive not colonized with MRSA) MODERATE METHICILLIN RESISTANT STAPHYLOCOCCUS AUREUS*  FEW STREPTOCOCCI, BETA HEMOLYTIC GROUP A*  MRSA CALLED TO AND CORRECTLY REPEATED BY: 
ANÍBAL, RN, CVT AT 2886 5/3/19 TO  MELODY 
  NO ANAEROBES ISOLATED 2 DAYS NO GROWTH 1 DAY NO GROWTH 3 DAYS  
  
 
 
RADIOLOGY: 
 
 All available imaging studies/reports in [facility] for this admission were reviewed Dr. Edward Templeton, Infectious Disease Specialist 
[phone] May 6, 2019 
7:41 AM

## 2019-05-06 NOTE — PROGRESS NOTES
1930 bedside turnover given to  Me by RN Miranda Farrell and KAREEM Francisco. Pt is on the cardiac monitor in bed watching tv and sleeping on and off. Bed is in the lowest position with the wheels locked and call bell on the bed with the patient within reach. Unable to educate patient, her verbal responses are very minimal and she shows no signs of learning at this time 2030 physical assessment completed, pt is not as talkative today as she was yesterday, she is more lethargic, smiles and says hi but then closes her eyes. 2145 meds given, offered her some ensure she did not want it, In report day RN reported pt was spitting out everything during the day shift 2200 put meds in a bite of apple sauce and pt took that one bite and a few sips of water 2310 in bed sleeping, went in to clean her room up and check her wounds for drainage. 0020 in bed sleeping hrly round 0150 hrly round in bed sleeping 
0230 Day shift RNs had the patient's son sign her consents for surgery due to her neuro status 0300 pt turned onto her left side, pillows placed behind her back denies pain and denies shortness of breath, no signs of distress 0430 pt in bed sleeping, no signs of distress, vitals stable on cardiac telemetry monitor bed is in the lowest position and call bell within reach on the bed

## 2019-05-06 NOTE — PROGRESS NOTES
[facility] 
 
 
CARDIOLOGY PROGRESS NOTE 
RECS: 
1. Elevated troponin-likely  demand ischemia in the setting ARIEL, sepsis and chronic CHF-. No further cardiac w/u needed at this time   
2. Chronic combined systolic and diastolic heart failure. Will monitor for s/s fluid overload. Will resume diuresis tomorrow. added low dose NS 
3. Atrial fibrillation - currently paced continue amiodarone. Not on anticoagulation due to high bleeding risk. on asa. 4. CKD on CKD- improving renal indices. JOHN is higher Will monitor 5. Borderline BP- will monitor low dose Coreg per parameters 6. Severe cardiomyopathy, progressive, unclear etiology considering age. Michael Padilla further workup for that. 7. Hx of symptomatic bradycardia- S/P PPM  6640 Dr Chico Becerra- EP recommended  upgrade to biventricular pacemaker as an outpatient. Patient's family refused. PPM interrogation 5/2/19 dual pacer 99.5 % Rv and Ra paced. No atrial high rates seen. 8. Lupus- follow by Dr. Baljit Salazar as out patient 9. Hypertension- with normal low bp. Continue low dose Coreg. 10. Dementia- confused non verbal. 
11. Hypokalemia-resolved. 12. Sepsis- possible  Related to RLE pressure sore and/Right thigh abscess and wound- on antibiotics- managed by ID.  
 
 
  
12/27/18 ECHO ADULT COMPLETE 12/28/2018 12/28/2018  
  Narrative · Technically difficult study due to patient compliance. Unable to obtain  
on-axis apical images. Good parasternals, poor subcostal images. · Left ventricular moderate-to-severely decreased global systolic  
function. Estimated left ventricular ejection fraction is 31 - 35%. Visually measured ejection fraction. Left ventricular severe sigmoid  
septum hypertrophy. Abnormal left ventricular wall motion as described on  
the wall scoring diagram below. Abnormal left ventricular septal motion. Interventricular septal \"bounce\". Severe (grade 3) left ventricular  
diastolic dysfunction. · Moderate tricuspid valve regurgitation is present. Mild pulmonary  
hypertension is present. PASP 38mmHg · Mild aortic valve regurgitation is present. · Mild to moderate mitral valve regurgitation. · Pacing wire present 
   
    Signed by: Salvador Viveros MD  
 
ASSESSMENT: 
Hospital Problems  Date Reviewed: 4/4/2019 Codes Class Noted POA Advanced care planning/counseling discussion ICD-10-CM: Z71.89 ICD-9-CM: V65.49  5/2/2019 Unknown Debility ICD-10-CM: R53.81 ICD-9-CM: 799.3  5/2/2019 Unknown Abscess ICD-10-CM: L02.91 
ICD-9-CM: 682.9  4/30/2019 Unknown Severe sepsis (HCC) ICD-10-CM: A41.9, R65.20 ICD-9-CM: 038.9, 995.92  4/30/2019 Unknown Sepsis ([address] ICD-10-CM: A41.9 ICD-9-CM: 038.9, 995.91  3/17/2019 Unknown SUBJECTIVE: 
Sleepy non verbal  
 
OBJECTIVE: 
 
VS:  
Visit Vitals /79 Pulse 69 Temp 98.1 °F (36.7 °C) Resp 17 Ht 5' 4\" (1.626 m) Wt 80.1 kg (176 lb 9.6 oz) SpO2 99% Breastfeeding? No  
BMI 30.31 kg/m² Intake/Output Summary (Last 24 hours) at 5/6/2019 1052 Last data filed at 5/6/2019 7909 Gross per 24 hour Intake 260.5 ml Output 800 ml Net -539.5 ml  
 
TELE: AVpaced General: chronically ill and in no apparent distress HENT: Normocephalic, atraumatic. Normal external eye. Neck :  increased JVP Cardiac:  regular rate and rhythm, S1, S2 normal, no click, no rub Lungs: diminished breath sounds b/l. Abdomen: Soft, nontender, no masses Extremities:  RLE wound cover with dressing. No edema noted BLE Labs: Results:  
   
Chemistry Recent Labs 05/06/19 
7656 05/05/19 
5533 05/04/19 
1632 * 194* 165*  143 144  
K 4.1 3.6 3.5  109* 108 CO2 29 28 29 BUN 88* 83* 81* CREA 1.95* 1.82* 1.92* CA 8.4* 8.4* 8.5 AGAP 5 6 7 BUCR 45* 46* 42* CBC w/Diff Recent Labs 05/06/19 
3601 05/04/19 
8508 WBC 12.5 12.8 RBC 3.10* 2.70* HGB 8.7* 7.9*  
 HCT 28.2* 24.6*  210 GRANS 81* 80* LYMPH 3* 10* EOS 0 0 Cardiac Enzymes No results for input(s): CPK, CKND1, YAN in the last 72 hours. No lab exists for component: Zackary Minium Coagulation No results for input(s): PTP, INR, APTT in the last 72 hours. No lab exists for component: INREXT, INREXT Lipid Panel Lab Results Component Value Date/Time Cholesterol, total 142 01/31/2019 02:24 AM  
 HDL Cholesterol 68 (H) 01/31/2019 02:24 AM  
 LDL, calculated 66.4 01/31/2019 02:24 AM  
 VLDL, calculated 7.6 01/31/2019 02:24 AM  
 Triglyceride 38 01/31/2019 02:24 AM  
 CHOL/HDL Ratio 2.1 01/31/2019 02:24 AM  
  
BNP No results for input(s): BNPP in the last 72 hours. Liver Enzymes No results for input(s): TP, ALB, TBIL, AP, SGOT, GPT in the last 72 hours. No lab exists for component: DBIL Thyroid Studies Lab Results Component Value Date/Time  TSH 8.86 (H) 11/15/2015 01:45 PM  
    
 
 
Becki RIVAS

## 2019-05-06 NOTE — PROGRESS NOTES
1500 - Received verbal report from Lissa Hanley RN. [id number] - Patient arrived to unit and placed in room 203. Patient is lethargic/ sleepy responsive to pain by opening eye, but immediately closes her eyes. No evidence of pain or discomfort noted. Temp 97.5, HR-74, Resp 12, /67, Oxygen saturation 100% on 3L via nasal cannula. Telemetry - AV pacing. Will continue to monitor 1800 - Pt still lethargic/sleepy post surgery unable to give PO medications at this time. 1928 - Bedside and Verbal shift change report given to James Ambrocio RN  (oncoming nurse) by Van Man (offgoing nurse). Report included the following information SBAR, Kardex, Intake/Output, MAR, Recent Results and Cardiac Rhythm AV-pacing. skin assessment done with oncoming nurse.

## 2019-05-06 NOTE — PROGRESS NOTES
In Patient Progress note Admit Date: 4/30/2019 Impression:  
 
1 Acute kidney injury ( baseline ~1.3 creat)   Likely from Ischaemic  ATN in the setting of sepsis/right lower extremity abscess/SIRS Scheduled for surgery today , NPO so keep gentle IVF on  
Discussed with cardiology plan to restart diuretics once stable after surgery Renal parameters stable , creat ~ 1.9, continue monitor 2 chronic kidney disease stage IV secondary to hypertension nephrosclerosis, cardiorenal syndrome 3 sepsis  secondary to right lower extremity abscess/wounds. On antibiotics per ID, vascular also consulted per ID recs Noted plans for debridement surgery today 4 history of dementia  
5 history of lupus, immunocompromised on the long-term steroids. Now on stress dose steroids, plaquenil. No evidence of renal manifestation of lupus Patient follows with Dr Mercedes Sanchez. 
  
 Please call with questions , 
  
Taina Jiménez MD [facility] Cell [phone] Pager: [phone] Subjective:  
 
Lying down comfortably Current Facility-Administered Medications:  
  0.9% sodium chloride infusion, 30 mL/hr, IntraVENous, CONTINUOUS, Becki Roca, NP, Stopped at 05/06/19 1430 
  vancomycin (VANCOCIN) 1000 mg in  ml infusion, 1,000 mg, IntraVENous, Q36H, Camilo Salcedo MD, Stopped at 05/05/19 8047   aspirin chewable tablet 81 mg, 81 mg, Oral, DAILY, Js Tyler MD, 81 mg at 05/06/19 8334   cefTRIAXone (ROCEPHIN) 2 g in sterile water (preservative free) 20 mL IV syringe, 2 g, IntraVENous, Q24H, Camilo Salcedo MD, 2 g at 05/05/19 9501   albuterol-ipratropium (DUO-NEB) 2.5 MG-0.5 MG/3 ML, 3 mL, Nebulization, Q4H PRN, Js Tyler MD 
  acetaminophen (TYLENOL) tablet 650 mg, 650 mg, Oral, Q6H PRN, Audra Valadez MD, 650 mg at 05/03/19 2139 
  amiodarone (CORDARONE) tablet 200 mg, 200 mg, Oral, DAILY, Audra Valadez MD, 200 mg at 05/06/19 1432   amitriptyline (ELAVIL) tablet 25 mg, 25 mg, Oral, QHS, Deland Aschoff, MD, 25 mg at 05/05/19 2202   carvedilol (COREG) tablet 3.125 mg, 3.125 mg, Oral, Q12H, Deland Aschoff, MD, 3.125 mg at 05/06/19 5461   hydroxychloroquine (PLAQUENIL) tablet 200 mg, 200 mg, Oral, BID, Deland Aschoff, MD, 200 mg at 05/06/19 0495   memantine (NAMENDA) tablet 10 mg, 10 mg, Oral, BID, Deland Aschoff, MD, 10 mg at 05/06/19 0810 
  menthol-zinc oxide (CALMOSEPTINE) 0.44-20.6 % ointment, , Topical, BID, Deland Aschoff, MD 
  polyethylene glycol (MIRALAX) packet 17 g, 17 g, Oral, DAILY, Deland Aschoff, MD, 17 g at 05/06/19 0900   hydrALAZINE (APRESOLINE) 20 mg/mL injection 10 mg, 10 mg, IntraVENous, Q6H PRN, Deland Aschoff, MD 
  heparin (porcine) injection 5,000 Units, 5,000 Units, SubCUTAneous, Q8H, Edi Ryan MD, 5,000 Units at 05/06/19 1034 
  metroNIDAZOLE (FLAGYL) IVPB premix 500 mg, 500 mg, IntraVENous, Q12H, Behzad HER MD, Last Rate: 100 mL/hr at 05/06/19 0810, 500 mg at 05/06/19 0810   hydrocortisone Sod Succ (PF) (SOLU-CORTEF) injection 50 mg, 50 mg, IntraVENous, Q8H, Js Tyler MD, Stopped at 05/06/19 1400 
  sodium chloride (NS) flush 5-10 mL, 5-10 mL, IntraVENous, PRN, Deland Aschoff, MD 
  VANCOMYCIN INFORMATION NOTE, 1 Each, Other, Rx Dosing/Monitoring, Deland Aschoff, MD 
 
 
 
Objective:  
 
Visit Vitals /66 Pulse 74 Temp 97.4 °F (36.3 °C) Resp 11 Ht 5' 4\" (1.626 m) Wt 80.1 kg (176 lb 9.6 oz) SpO2 100% Breastfeeding? No  
BMI 30.31 kg/m² Intake/Output Summary (Last 24 hours) at 5/6/2019 1516 Last data filed at 5/6/2019 1430 Gross per 24 hour Intake 150 ml Output 650 ml Net -500 ml Physical Exam:  
 
Gen NAD, sleepy HENT mmm RS AEBE clear CVS s1 s2 wnl no JVD 
GI soft BS + Ext no edema Data Review: 
 
Recent Labs 05/06/19 
8655 WBC 12.5 RBC 3.10* HCT 28.2*  
MCV 91.0 MCH 28.1 MCHC 30.9*  
RDW 14.8* Recent Labs 05/06/19 8180 05/05/19 
9186 05/04/19 
9470 BUN 88* 83* 81* CREA 1.95* 1.82* 1.92* CA 8.4* 8.4* 8.5  
K 4.1 3.6 3.5  143 144  109* 108 CO2 29 28 29 * 194* 165* Florence Ramirez MD

## 2019-05-06 NOTE — PROGRESS NOTES
Speech Therapy: Unable to complete follow up dysphagia management as pt is currently NPO for debridement. Will attempt at a later date or as medical condition permits. Thank you for this referral. 
 
Erika Walsh M.S. CCC-SLP/L Speech-Language Pathologist

## 2019-05-06 NOTE — NURSE NAVIGATOR
Transitional Care Team: [facility] Review Date: 05/06/19 Time:  10:14 AM 
Hospital Nurse Navigator: Priyank Alvarez MSN, RN, [facility] Yamile Guzman is a 80 y.o. female inpatient at [facility] with Severe sepsis ([facility] [address] [A41.9, R65.20] Abscess [L02.91] Sepsis ([address] [A41.9] on  4/30/2019. Primary Diagnosis 
-Chronic combined systolic and diastolic heart failure- proBNP 11,831 
-Sepsis- right leg cellulitis, right thigh abscess 
-ARIEL on CKD 3 
 
 
-Chart reviewed. Will continue to monitor. -I&D planned for today by vascular team. 
-Jessica Horvath,  states that the patient's Medicaid is active, and that the patient's family will choose a personal care agency prior to discharge (see her note from today). This Nurse Navigator accessed the patient's chart to offer support and placement in the [address] [place] [address] Team bridges the gaps in care and education surrounding discharge from the acute care facility. The objective is to empower the patient and family in taking a proactive role in the task of preventing readmission within the first thirty days after discharge from the acute care setting. The Transitional Care Team is also involved in the efforts to reduce readmission to the acute care setting after stabilization and discharge from the acute care environment either to the skilled nursing facilities or community. PLAN: 
The patient will be discharged home health vs. SNF; to be determined. The TC Team will follow the patient from a distance while inpatient as well as be available for further transition disposition as needed. The TC Team will continue to offer support 30- 90 days post discharge from the acute care setting. The appropriate TC Team members were notified. Past Medical History:  
Diagnosis Date  Acute on chronic diastolic heart failure ([address]  Arthritis  Benign hypertensive heart disease without heart failure Better controlled, stable  Chronic diastolic heart failure ([address] Breathing and edema is improving  Congestive heart failure ([address]  HTN (hypertension)  Hypercholesteremia  Lupus (systemic lupus erythematosus) ([facility] [address] 6/18/2014  
 followed by dr Patricia Stack  Obesity, unspecified Patient has weight loss Discussed diet ad fluid restriction  Other and unspecified hyperlipidemia F/u per pmd  
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  
 tr with moderate pulmonary htn Advance Care Planning 5/2/2019 Patient's Healthcare Decision Maker is: -  
Primary Decision Maker Name -  
Primary Decision Maker Phone Number -  
Primary Decision Maker Relationship to Patient - Secondary Decision Maker Name - Secondary Decision Maker Phone Number - Secondary Decision Maker Relationship to Patient -  
Confirm Advance Directive None Patient Would Like to Complete Advance Directive Unable Does the patient have other document types Other (comment) Angelique Michelle MSN, RN, [facility] Nurse Navigator [phone]

## 2019-05-06 NOTE — OP NOTES
Preoperative diagnosis: Chronic ulcer right lower extremity, pressure sore right lower extremity Postoperative diagnosis: Same Procedures performed: 
#1  Excisional Debridement of right lower extremity ulcer. Application of ACell skin graft and matrix right lower extremity ulcer. Application of Unna boot right lower extremity. #2   
#3 #4 Cultures: None Specimens: None Drains: None Estimated blood loss: Less than 50 mL Assistants: None Implants: Please see above Complications: None Anesthesia: Moderate sedation per anesthesia Indications for the procedure: Tabby Ross is a 80 y.o. debilitated patient with pressure sore right lower extremity in need of debridement. .  Patient was given the appropriate risk and benefits of the procedure including but not limited to bleeding, infection, damage to adjacent structures, MI, stroke, death, loss of lower extremity, need for further surgery. Patient was understanding of all the risks and underwent a procedure. Operative findings:  
#1  Wound was sharply debrided using a 10 blade scalpel there is mostly hematoma and some loose debris but some fixated on the layer this included skin soft tissue down the muscle I do not see tendon or bone. The dimensions of the wound were 10 x 4 x 0.5. Next applied 2 applications of the matrix because of such a really vibrant and dramatic wound. Then applied the 10 x 5 skin graft sheet of ACell and stapled in place for fixation applied a nonstick layer and then Surgicel and then I wrapped the Unna boot from the base of the toes to the knee. She tolerated this nicely Procedure: 
Patient was correctly identified in the precath area and taken to the Cath Lab in stable condition. Patient had pre-incision timeout prior to any incision. Patient was prepped and draped in the normal sterile fashion according to CDC guidelines aseptic technique. See above

## 2019-05-06 NOTE — PROGRESS NOTES
NUTRITION Nursing Referral: [facility] Nutrition Consult: General Nutrition Management & Supplements RECOMMENDATIONS / PLAN:  
 
- Recommend resuming diet as medically appropriate- per SLP recommendations. - Continue RD inpatient monitoring and evaluation. NUTRITION INTERVENTIONS & DIAGNOSIS:  
 
[x] Meals/snacks: modified composition, NPO [x] Medical food supplement therapy: Ensure Enlive BID- held Nutrition Diagnosis: Predicted inadequate energy intake related to pt mentation and current intake as evidenced by pt only consuming a few bites, weight loss PTA, and confusion. ASSESSMENT:  
 
5/6: Pt NPO and out of room at time of visit- off the unit for procedure, debridement planned today. 5/3: NPO today for procedure that was moved to Monday. Diet resumed. Variable intake yesterday. 5/2: Pt evaluated by SLP and started on diet. Pt with poor intake so far. Family told nurse pt eats at home with a lot of encouragement. Weight loss noted. Noted pt consumes Ensure at home per RD evaluation at last visit. Average po intake adequate to meet patients estimated nutritional needs:   [] Yes     [x] No   [] Unable to determine at this time Diet: DIET NUTRITIONAL SUPPLEMENTS Dinner, Breakfast; ENSURE Nathaneil Malag[place] Food Allergies: NKFA Current Appetite:   [] Good     [] Fair     [x] Poor     [] Other: 
Appetite/meal intake prior to admission:   [] Good     [] Fair     [] Poor     [x] Other: unknown Feeding Limitations:  [x] Swallowing difficulty: SLP following    [] Chewing difficulty    [] Other: 
Current Meal Intake:  
Patient Vitals for the past 100 hrs: 
 % Diet Eaten 05/06/19 1313 0 % 05/06/19 0817 0 % 05/05/19 1249 100 % 05/04/19 1930 100 % 05/04/19 0820 100 % 05/02/19 1757 10 % 05/02/19 1333 90 % BM: 5/4 Skin Integrity: pressure injury to sacrum, cellulitis to right thigh, moisture associated skin damage to right groin Edema:   [] No     [x] Yes Pertinent Medications: Reviewed: NS at 30 mL/hr, steroid, miralax Recent Labs 05/06/19 
1680 05/05/19 
1060 05/04/19 
1950  143 144  
K 4.1 3.6 3.5  109* 108 CO2 29 28 29 * 194* 165* BUN 88* 83* 81* CREA 1.95* 1.82* 1.92* CA 8.4* 8.4* 8.5 Intake/Output Summary (Last 24 hours) at 5/6/2019 1620 Last data filed at 5/6/2019 1430 Gross per 24 hour Intake 150 ml Output 650 ml Net -500 ml Anthropometrics: 
Ht Readings from Last 1 Encounters:  
05/01/19 5' 4\" (1.626 m) Last 3 Recorded Weights in this Encounter 05/04/19 0400 05/05/19 0400 05/06/19 0735 Weight: 73.9 kg (162 lb 14.4 oz) 73.6 kg (162 lb 4.1 oz) 80.1 kg (176 lb 9.6 oz) Body mass index is 30.31 kg/m². Weight History: -5.9% x 1 month Weight Metrics 5/6/2019 4/30/2019 4/4/2019 3/27/2019 3/19/2019 3/4/2019 2/13/2019 Weight 176 lb 9.6 oz - - 170 lb 170 lb 4.8 oz - 160 lb BMI - 30.31 kg/m2 29.18 kg/m2 29.18 kg/m2 29.23 kg/m2 27.46 kg/m2 27.46 kg/m2 Admitting Diagnosis: Severe sepsis ([facility] [address] [A41.9, R65.20] Abscess [L02.91] Sepsis ([facility] [address] [A41.9] Pertinent PMHx: HTN, hypercholesterolemia, CHF, ARIEL, CKD, NSTEMI, dementia Education Needs:        [x] None identified  [] Identified - Not appropriate at this time  []  Identified and addressed - refer to education log Learning Limitations:   [] None identified  [x] Identified: non-verbal, dementia Cultural, Faith & ethnic food preferences:  [x] None identified    [] Identified and addressed ESTIMATED NUTRITION NEEDS:  
 
Calories: 0793-6649 kcal (MSJx1.2-1.4) based on  [x] Actual BW 73 kg     [] IBW Protein:  gm (1.2-1.5 gm/kg) based on  [x] Actual BW      [] IBW Fluid: 1 mL/kcal 
  
MONITORING & EVALUATION:  
 
Nutrition Goal(s): 1. Po intake of meals will meet >75% of patient estimated nutritional needs within the next 7 days.   Outcome:  [] Met/Ongoing    [] Progressing towards goal    [x] Not progressing towards goal    [] New/Initial goal  
 
Monitoring:   [x] Food and beverage intake   [x] Diet order   [x] Nutrition-focused physical findings   [x] Treatment/therapy   [] Weight   [] Enteral nutrition intake Previous Recommendations (for follow-up assessments only):     [x]   Implemented       []   Not Implemented (RD to address)      [] No Longer Appropriate     [] No Recommendation Made Discharge Planning: pending plan of care [x] Participated in care planning, discharge planning, & interdisciplinary rounds as appropriate Carey Trujillo RD, [facility] Pager: [phone]

## 2019-05-06 NOTE — PROGRESS NOTES
Problem: Mobility Impaired (Adult and Pediatric) Goal: *Acute Goals and Plan of Care (Insert Text) Description Physical Therapy Goals Initiated 5/2/2019 and to be accomplished within 7 day(s) 1. Patient will tolerate LE PROM to B LEs. PLOF: per chart it appears patient was bed bound prior to admission however no family is present to verify Outcome: Progressing Towards Goal 
Patient: Joyce Phelan (80 y.o. female) Date: 5/6/2019 Subjective: 
Pt opened eyes throughout session but did not talk. Patient cleared by nursing Antoine Robertson) for participation in [facility]. Pt tolerated ankle pumps well but was resistive to all other attempted exercises. Pt was left resting comfortably in bed and session was discussed with nursing. Therapeutic Exercises:  
 
 
EXERCISE Sets Reps Active Active Assist  
Passive Self ROM Comments Ankle Pumps/Circles 1 15  ? ? ? ? BLE Heel Slides   ? ? ? ? Hip IR/ER   ? ? ? ? Hip ABD/ADD   ? ? ? ? Hip Flex/Ext   ? ? ? ?   
   ? ? ? ? Pain: 
Pre Session: No pain noted Post session: No pain noted After treatment:  
? Patient left in no apparent distress in bed 
? Call bell left within reach ? Nursing notified ? Caregiver present (Children) ? Bed alarm activated Mary Perkins

## 2019-05-06 NOTE — PALLIATIVE CARE
Thank you for consult. Patient and family have been seen and evaluated for support. No changes in goals of care: FULL CODE. As goals of care are firmly established, palliative care will sign off at this time. Please re-consult if circumstances change.

## 2019-05-06 NOTE — PERIOP NOTES
TRANSFER - OUT REPORT: 
 
Verbal report given to Maria Ines Villa RN(name) on [place]  being transferred to [facility](unit) for routine post - op Report consisted of patients Situation, Background, Assessment and  
Recommendations(SBAR). Information from the following report(s) SBAR, Kardex and OR Summary was reviewed with the receiving nurse. Lines:  
Peripheral IV Left Hand (Active) Site Assessment Clean, dry, & intact 5/6/2019  2:40 PM  
Phlebitis Assessment 0 5/6/2019  2:40 PM  
Infiltration Assessment 0 5/6/2019  2:40 PM  
Dressing Status Clean, dry, & intact 5/6/2019  2:40 PM  
Dressing Type Tape;Transparent 5/6/2019  2:40 PM  
Hub Color/Line Status Pink; Infusing 5/6/2019  2:40 PM  
Action Taken Open ports on tubing capped 5/6/2019 12:00 PM  
Alcohol Cap Used Yes 5/6/2019 12:00 PM  
  
 
Opportunity for questions and clarification was provided. Patient transported with: 
 Monitor O2 @ 3 liters Registered Nurse

## 2019-05-06 NOTE — PROGRESS NOTES
Met with family at bedside. Daughter indicated that she thinks medicaid is active now, but pt may have a co-payment for personal care services. Called medicaid hotline and pt's medicaid is currently active ( [phone]). Per hotline, may not have a copayment, but family is aware they might. [employer] was completed on 2/6/19 for personal care. Discharge will likely be home with personal care services. Will have family choose personal care agency. Pt will likely need medical transport home. PLOF is Bed bound. 1337; Krystal Morrison is the name of the personal care agency that family wants to use : [phone] or [phone].  
 
VANESSA Bay [phone]

## 2019-05-07 LAB
ANION GAP SERPL CALC-SCNC: 7 MMOL/L (ref 3–18)
BASOPHILS # BLD: 0 K/UL (ref 0–0.06)
BASOPHILS NFR BLD: 0 % (ref 0–3)
BUN SERPL-MCNC: 87 MG/DL (ref 7–18)
BUN/CREAT SERPL: 51 (ref 12–20)
CALCIUM SERPL-MCNC: 8.4 MG/DL (ref 8.5–10.1)
CHLORIDE SERPL-SCNC: 109 MMOL/L (ref 100–108)
CO2 SERPL-SCNC: 28 MMOL/L (ref 21–32)
CREAT SERPL-MCNC: 1.71 MG/DL (ref 0.6–1.3)
DIFFERENTIAL METHOD BLD: ABNORMAL
EOSINOPHIL # BLD: 0 K/UL (ref 0–0.4)
EOSINOPHIL NFR BLD: 0 % (ref 0–5)
ERYTHROCYTE [DISTWIDTH] IN BLOOD BY AUTOMATED COUNT: 14.8 % (ref 11.6–14.5)
GLUCOSE SERPL-MCNC: 159 MG/DL (ref 74–99)
HCT VFR BLD AUTO: 27.8 % (ref 35–45)
HGB BLD-MCNC: 8.8 G/DL (ref 12–16)
LYMPHOCYTES # BLD: 1.3 K/UL (ref 0.8–3.5)
LYMPHOCYTES NFR BLD: 11 % (ref 20–51)
MCH RBC QN AUTO: 29.1 PG (ref 24–34)
MCHC RBC AUTO-ENTMCNC: 31.7 G/DL (ref 31–37)
MCV RBC AUTO: 92.1 FL (ref 74–97)
MONOCYTES # BLD: 0.2 K/UL (ref 0–1)
MONOCYTES NFR BLD: 2 % (ref 2–9)
NEUTS BAND NFR BLD MANUAL: 1 % (ref 0–5)
NEUTS SEG # BLD: 10.1 K/UL (ref 1.8–8)
NEUTS SEG NFR BLD: 86 % (ref 42–75)
PLATELET # BLD AUTO: 230 K/UL (ref 135–420)
PLATELET COMMENTS,PCOM: ABNORMAL
PMV BLD AUTO: 9.5 FL (ref 9.2–11.8)
POTASSIUM SERPL-SCNC: 4.6 MMOL/L (ref 3.5–5.5)
RBC # BLD AUTO: 3.02 M/UL (ref 4.2–5.3)
RBC MORPH BLD: ABNORMAL
SODIUM SERPL-SCNC: 144 MMOL/L (ref 136–145)
WBC # BLD AUTO: 11.6 K/UL (ref 4.6–13.2)

## 2019-05-07 PROCEDURE — 77010033678 HC OXYGEN DAILY

## 2019-05-07 PROCEDURE — 92526 ORAL FUNCTION THERAPY: CPT

## 2019-05-07 PROCEDURE — 74011250636 HC RX REV CODE- 250/636: Performed by: INTERNAL MEDICINE

## 2019-05-07 PROCEDURE — 80048 BASIC METABOLIC PNL TOTAL CA: CPT

## 2019-05-07 PROCEDURE — 85025 COMPLETE CBC W/AUTO DIFF WBC: CPT

## 2019-05-07 PROCEDURE — 3331090002 HH PPS REVENUE DEBIT

## 2019-05-07 PROCEDURE — 74011250636 HC RX REV CODE- 250/636: Performed by: HOSPITALIST

## 2019-05-07 PROCEDURE — 74011250637 HC RX REV CODE- 250/637: Performed by: EMERGENCY MEDICINE

## 2019-05-07 PROCEDURE — 65660000000 HC RM CCU STEPDOWN

## 2019-05-07 PROCEDURE — 74011250637 HC RX REV CODE- 250/637: Performed by: HOSPITALIST

## 2019-05-07 PROCEDURE — 77030038269 HC DRN EXT URIN PURWCK BARD -A

## 2019-05-07 PROCEDURE — 74011250636 HC RX REV CODE- 250/636: Performed by: EMERGENCY MEDICINE

## 2019-05-07 PROCEDURE — 36415 COLL VENOUS BLD VENIPUNCTURE: CPT

## 2019-05-07 PROCEDURE — 77030011256 HC DRSG MEPILEX <16IN NO BORD MOLN -A

## 2019-05-07 PROCEDURE — 3331090001 HH PPS REVENUE CREDIT

## 2019-05-07 PROCEDURE — 74011250636 HC RX REV CODE- 250/636: Performed by: NURSE PRACTITIONER

## 2019-05-07 RX ADMIN — CARVEDILOL 3.12 MG: 3.12 TABLET, FILM COATED ORAL at 08:05

## 2019-05-07 RX ADMIN — HEPARIN SODIUM 5000 UNITS: 5000 INJECTION INTRAVENOUS; SUBCUTANEOUS at 11:48

## 2019-05-07 RX ADMIN — ACETAMINOPHEN 650 MG: 325 TABLET ORAL at 17:18

## 2019-05-07 RX ADMIN — ASPIRIN 81 MG 81 MG: 81 TABLET ORAL at 08:05

## 2019-05-07 RX ADMIN — METRONIDAZOLE 500 MG: 500 INJECTION, SOLUTION INTRAVENOUS at 08:05

## 2019-05-07 RX ADMIN — Medication: at 17:19

## 2019-05-07 RX ADMIN — HEPARIN SODIUM 5000 UNITS: 5000 INJECTION INTRAVENOUS; SUBCUTANEOUS at 03:37

## 2019-05-07 RX ADMIN — MEMANTINE 10 MG: 10 TABLET ORAL at 17:18

## 2019-05-07 RX ADMIN — AMIODARONE HYDROCHLORIDE 200 MG: 200 TABLET ORAL at 08:05

## 2019-05-07 RX ADMIN — POLYETHYLENE GLYCOL 3350 17 G: 17 POWDER, FOR SOLUTION ORAL at 08:05

## 2019-05-07 RX ADMIN — HYDROXYCHLOROQUINE SULFATE 200 MG: 200 TABLET, FILM COATED ENTERAL at 11:48

## 2019-05-07 RX ADMIN — MEMANTINE 10 MG: 10 TABLET ORAL at 08:05

## 2019-05-07 RX ADMIN — HYDROCORTISONE SODIUM SUCCINATE 50 MG: 100 INJECTION, POWDER, FOR SOLUTION INTRAMUSCULAR; INTRAVENOUS at 17:17

## 2019-05-07 RX ADMIN — SODIUM CHLORIDE 30 ML/HR: 900 INJECTION, SOLUTION INTRAVENOUS at 17:16

## 2019-05-07 RX ADMIN — HYDROXYCHLOROQUINE SULFATE 200 MG: 200 TABLET, FILM COATED ENTERAL at 17:18

## 2019-05-07 RX ADMIN — CARVEDILOL 3.12 MG: 3.12 TABLET, FILM COATED ORAL at 21:39

## 2019-05-07 RX ADMIN — HEPARIN SODIUM 5000 UNITS: 5000 INJECTION INTRAVENOUS; SUBCUTANEOUS at 20:11

## 2019-05-07 RX ADMIN — HYDROCORTISONE SODIUM SUCCINATE 50 MG: 100 INJECTION, POWDER, FOR SOLUTION INTRAMUSCULAR; INTRAVENOUS at 21:40

## 2019-05-07 RX ADMIN — ACETAMINOPHEN 650 MG: 325 TABLET ORAL at 08:24

## 2019-05-07 RX ADMIN — Medication: at 08:05

## 2019-05-07 RX ADMIN — HYDROCORTISONE SODIUM SUCCINATE 50 MG: 100 INJECTION, POWDER, FOR SOLUTION INTRAMUSCULAR; INTRAVENOUS at 05:29

## 2019-05-07 RX ADMIN — AMITRIPTYLINE HYDROCHLORIDE 25 MG: 50 TABLET, FILM COATED ORAL at 21:39

## 2019-05-07 NOTE — ROUTINE PROCESS
Bedside shift change report given to Deepa Sarkar (oncoming nurse) by Amparo Melton (offgoing nurse). Report included the following information SBAR, Kardex and MAR.

## 2019-05-07 NOTE — PROGRESS NOTES
Problem: Falls - Risk of 
Goal: *Absence of Falls Description Document Jazmine Roque Fall Risk and appropriate interventions in the flowsheet. Outcome: Progressing Towards Goal 
  
Problem: Patient Education: Go to Patient Education Activity Goal: Patient/Family Education Outcome: Progressing Towards Goal 
  
Problem: Pressure Injury - Risk of 
Goal: *Prevention of pressure injury Description Document Rodríguez Scale and appropriate interventions in the flowsheet. Outcome: Progressing Towards Goal 
  
Problem: Patient Education: Go to Patient Education Activity Goal: Patient/Family Education Outcome: Progressing Towards Goal 
  
Problem: Patient Education: Go to Patient Education Activity Goal: Patient/Family Education Outcome: Progressing Towards Goal 
  
Problem: Patient Education: Go to Patient Education Activity Goal: Patient/Family Education Outcome: Progressing Towards Goal 
  
Problem: Patient Education: Go to Patient Education Activity Goal: Patient/Family Education Outcome: Progressing Towards Goal 
  
Problem: Nutrition Deficit Goal: *Optimize nutritional status Outcome: Progressing Towards Goal 
  
Problem: Risk for Spread of Infection Goal: Prevent transmission of infectious organism to others Description Prevent the transmission of infectious organisms to other patients, staff members, and visitors. Outcome: Progressing Towards Goal 
  
Problem: Patient Education:  Go to Education Activity Goal: Patient/Family Education Outcome: Progressing Towards Goal 
  
Problem: Activity Intolerance Goal: *Oxygen saturation during activity within specified parameters Outcome: Progressing Towards Goal 
Goal: *Able to remain out of bed as prescribed Outcome: Progressing Towards Goal 
  
Problem: Patient Education: Go to Patient Education Activity Goal: Patient/Family Education Outcome: Progressing Towards Goal 
  
Problem: Patient Education: Go to Patient Education Activity Goal: Patient/Family Education Outcome: Progressing Towards Goal 
  
Problem: Sepsis: Discharge Outcomes Goal: *Vital signs within defined limits Outcome: Progressing Towards Goal 
Goal: *Tolerating diet Outcome: Progressing Towards Goal 
Goal: *Verbalizes understanding and describes prescribed diet Outcome: Progressing Towards Goal 
Goal: *Demonstrates progressive activity Outcome: Progressing Towards Goal 
Goal: *Describes follow-up/return visits to physicians Outcome: Progressing Towards Goal 
Goal: *Verbalizes name, dosage, time, side effects, and number of days to continue medications Outcome: Progressing Towards Goal 
Goal: *Influenza immunization (Oct-Mar only) Outcome: Progressing Towards Goal 
Goal: *Pneumococcal immunization Outcome: Progressing Towards Goal 
Goal: *Lungs clear or at baseline Outcome: Progressing Towards Goal 
Goal: *Oxygen saturation returns to baseline or 90% or better on room air Outcome: Progressing Towards Goal 
Goal: *Glycemic control Outcome: Progressing Towards Goal 
Goal: *Absence of deep venous thrombosis signs and symptoms(Stroke Metric) Outcome: Progressing Towards Goal 
Goal: *Describes available resources and support systems Outcome: Progressing Towards Goal 
Goal: *Optimal pain control at patient's stated goal 
Outcome: Progressing Towards Goal

## 2019-05-07 NOTE — PROGRESS NOTES
In Patient Progress note Admit Date: 4/30/2019 Impression:  
 
1 Acute kidney injury ( baseline ~1.3 creat) Likely from Ischaemic ATN in the setting of sepsis/right lower extremity abscess/SIRS S/o wound debridement per vascular yesterday , volume status looks ok , hold off any IVF or diuretics today Encourage po intake , Renal parameters stable BUN proportionately higher d/t steroids , creat ~ 1.7, continue monitor 2 chronic kidney disease stage IV secondary to hypertension nephrosclerosis, cardiorenal syndrome 3 sepsis  secondary to right lower extremity abscess/wounds. On antibiotics per ID, vascular also consulted per ID recs 4 history of dementia  
5 history of lupus, immunocompromised on the long-term steroids. Now on stress dose steroids, plaquenil. No evidence of renal manifestation of lupus Patient follows with Dr Precious Bowman. 
  
 Please call with questions , 
  
Dilcia Marquez MD [facility] Cell [phone] Pager: [phone] Subjective:  
 
Sleepy this AM 
Did take his medications this AM 
 
 
Current Facility-Administered Medications:  
  [START ON 5/8/2019] VANCOMYCIN, TROUGH, , , ONE TIME **AND** [START ON 5/8/2019] Vancomycin Trough Level Due, 1 Each, Other, ONCE, Marily Phelps MD 
  0.9% sodium chloride infusion, 30 mL/hr, IntraVENous, CONTINUOUS, Becki Roca, NP, Last Rate: 30 mL/hr at 05/06/19 1807, 30 mL/hr at 05/06/19 1807 
  vancomycin (VANCOCIN) 1000 mg in  ml infusion, 1,000 mg, IntraVENous, Q36H, Lalita Girard MD, Last Rate: 250 mL/hr at 05/06/19 2236, 1,000 mg at 05/06/19 2236   aspirin chewable tablet 81 mg, 81 mg, Oral, DAILY, Js Tyler MD, 81 mg at 05/07/19 9451   cefTRIAXone (ROCEPHIN) 2 g in sterile water (preservative free) 20 mL IV syringe, 2 g, IntraVENous, Q24H, Lorie Gilford, Manali R, MD, 2 g at 05/06/19 1803 
  albuterol-ipratropium (DUO-NEB) 2.5 MG-0.5 MG/3 ML, 3 mL, Nebulization, Q4H PRN, Brock Keller MD 
   acetaminophen (TYLENOL) tablet 650 mg, 650 mg, Oral, Q6H PRN, Boby Guidry MD, 650 mg at 05/07/19 0824 
  amiodarone (CORDARONE) tablet 200 mg, 200 mg, Oral, DAILY, Boby Guidry MD, 200 mg at 05/07/19 4948   amitriptyline (ELAVIL) tablet 25 mg, 25 mg, Oral, QHS, Boby Guidry MD, Stopped at 05/06/19 2108   carvedilol (COREG) tablet 3.125 mg, 3.125 mg, Oral, Q12H, Boby Guidry MD, 3.125 mg at 05/07/19 0637   hydroxychloroquine (PLAQUENIL) tablet 200 mg, 200 mg, Oral, BID, Boby Guidry MD, Stopped at 05/06/19 1800   memantine (NAMENDA) tablet 10 mg, 10 mg, Oral, BID, Boby Guidry MD, 10 mg at 05/07/19 8883   menthol-zinc oxide (CALMOSEPTINE) 0.44-20.6 % ointment, , Topical, BID, Boby Guidry MD 
  polyethylene glycol (MIRALAX) packet 17 g, 17 g, Oral, DAILY, Boby Guidry MD, 17 g at 05/07/19 8687   hydrALAZINE (APRESOLINE) 20 mg/mL injection 10 mg, 10 mg, IntraVENous, Q6H PRN, Boby Guidry MD 
  heparin (porcine) injection 5,000 Units, 5,000 Units, SubCUTAneous, Q8H, Boby Guidry MD, 5,000 Units at 05/07/19 0337 
  metroNIDAZOLE (FLAGYL) IVPB premix 500 mg, 500 mg, IntraVENous, Q12H, Prerna HER MD, Last Rate: 100 mL/hr at 05/07/19 0805, 500 mg at 05/07/19 3680   hydrocortisone Sod Succ (PF) (SOLU-CORTEF) injection 50 mg, 50 mg, IntraVENous, Q8H, Js Tyler MD, 50 mg at 05/07/19 0529 
  sodium chloride (NS) flush 5-10 mL, 5-10 mL, IntraVENous, PRN, Boby Guidry MD 
 
 
 
Objective:  
 
Visit Vitals /69 (BP 1 Location: Left arm, BP Patient Position: At rest) Pulse 77 Temp 97.6 °F (36.4 °C) Resp 16 Ht 5' 4\" (1.626 m) Wt 80.4 kg (177 lb 3.2 oz) SpO2 98% Breastfeeding? No  
BMI 30.42 kg/m² Intake/Output Summary (Last 24 hours) at 5/7/2019 4646 Last data filed at 5/7/2019 9174 Gross per 24 hour Intake 848.5 ml Output 1400 ml Net -551.5 ml Physical Exam:  
 
Sleepy, NAD HENT mmm RS AEBE clear CVS s1 s2 wnl no JVD GI soft BS + Ext no edema Data Review: 
 
Recent Labs 05/07/19 
0431 WBC 11.6 RBC 3.02* HCT 27.8* MCV 92.1 MCH 29.1 MCHC 31.7 RDW 14.8* Recent Labs 05/07/19 
4870 05/06/19 
1506 05/05/19 
3019 BUN 87* 88* 83* CREA 1.71* 1.95* 1.82* CA 8.4* 8.4* 8.4*  
K 4.6 4.1 3.6  142 143 * 108 109* CO2 28 29 28 * 227* 194* Raymundo Portillo MD

## 2019-05-07 NOTE — PROGRESS NOTES
Problem: Dysphagia (Adult) Goal: *Acute Goals and Plan of Care (Insert Text) Description Dysphagia Present: moderate- severe oral, mild pharyngeal 
Aspiration: none present on b/s eval   
 
Recommendations: 
Diet: puree/ thin Meds: crushed in puree Aspiration Precautions Oral Care TID, HOB >/= 30* at all times Goals:  Patient will: 1. Tolerate PO trials with no overt s/s aspiration or distress in 4/5 trials 2. Complete an objective swallow study (i.e., MBSS) to assess swallow integrity, r/o aspiration, and determine of safest LRD, min A Outcome: Progressing Towards Goal 
SPEECH LANGUAGE PATHOLOGY DYSPHAGIA TREATMENT Patient: Fran Pena (80 y.o. female) Date: 5/7/2019 Diagnosis: Severe sepsis ([facility] [address] [A41.9, R65.20] Abscess [L02.91] Sepsis ([facility] [address] [A41.9] <principal problem not specified> Procedure(s) (LRB): 
RIGHT LEG DEBRIDEMENT WITH A CELL PLACEMENT (Right) 1 Day Post-Op Precautions: aspiration Fall PLOF: As per H&P   
 
ASSESSMENT: 
Pt was seen at bedside for follow up dysphagia management. Pt lethargic upon SLP arrival, requiring hard sternal rub and verbal stimuli to alert for PO. Pt accepted thin liquids via cup/sip and syringe as well as puree without any overt s/sx of aspiration and positive oral clearance. Increased bolus manipulation time noted secondary to lethargy. Laryngeal elevation appeared functional/timely to palpation. Rec puree diet with thin liquids, aspiration precautions, oral care TID, and meds crushed in puree. Rec to feed only when alert and accepting to PO. Pt may require assistance with Aly Waddell will continue to follow. Progression toward goals: 
?         Improving appropriately and progressing toward goals ? Improving slowly and progressing toward goals ? Not making progress toward goals and plan of care will be adjusted PLAN: 
Recommendations and Planned Interventions: See above Patient continues to benefit from skilled intervention to address the above impairments. Continue treatment per established plan of care. Discharge Recommendations:  Andrey Ma and To Be Determined SUBJECTIVE:  
Patient stated ? Hi? . 
 
OBJECTIVE:  
Cognitive and Communication Status: 
Neurologic State: Drowsy, Eyes open to hard sternal rub and verbal stimuli Orientation Level: Unable to verbalize Cognition: Unable to assess Perception: (unable to determine) Perseveration: No perseveration noted Safety/Judgement: Fall prevention, Lack of insight into deficits Dysphagia Treatment: 
Oral Assessment: 
Oral Assessment Labial: No impairment Oral Hygiene: unable to determine Lingual: No impairment Velum: Unable to visualize Mandible: (clenched tight) P. O. Trials: 
Patient Position: HOB 60* Vocal quality prior to P.O.: functional 
 Consistency Presented: Thin liquid, Puree How Presented: SLP-fed/presented, Straw, syringe How Much: (limited) Bolus Acceptance: Impaired Bolus Formation/Control: Impaired Type of Impairment: Delayed, Poor, Incomplete Propulsion: Delayed (# of seconds), Discoordination Oral Residue: (unable to determine) Initiation of Swallow: Delayed (# of seconds) Laryngeal Elevation: Functional 
 Aspiration Signs/Symptoms: None Pharyngeal Phase Characteristics: Poor endurance, Easily fatigued Effective Modifications: None Cues for Modifications: Maximal 
   
 Oral Phase Severity: Moderate-severe Pharyngeal Phase Severity : Mild PAIN: 
Start of Tx: 0 End of Tx: 0 After treatment:  
?              Patient left in no apparent distress sitting up in chair ? Patient left in no apparent distress in bed 
? Call bell left within reach ? Nursing notified ? Family present ? Caregiver present ? Bed alarm activated COMMUNICATION/EDUCATION:  
 ? Aspiration precautions; swallow safety; compensatory techniques ? Patient unable to participate in training and education, education ongoing with staff Thank you for this referral. 
 
Erika Walsh M.S. CCC-SLP/L Speech-Language Pathologist

## 2019-05-07 NOTE — PROGRESS NOTES
Son, Dannielle Malvin, asking to speak with vascular surgeon regarding patient's procedure. Vascular's office notified and has son's phone number [phone].

## 2019-05-07 NOTE — PROGRESS NOTES
Pt doing well. S/p debridement of right lower extremity ulcer with application of ACell skin graft and matrix right lower extremity ulcer. POD #1 Family member at bedside. Pt denies pain 
VSS. Labs reviewed. Right leg dressing clean and dry. No drainage noted. Discussed with family member dressing to be changed at one week. Wound care team consulted. Appreciate assistance.

## 2019-05-07 NOTE — ROUTINE PROCESS
Received report from [facility]. Pt AAOx1-self only, NAD, breathing non labored, on O2 NC at 3L, HOB up. IV site clean, dry and intact. IVF going per order. Family members at the bedside. Bed at the lowest level on lock position, call bell w/i reach. Bedside and Verbal shift change report given to KAREEM Fuentes (oncoming nurse) by me (offgoing nurse). Report included the following information SBAR, Kardex, Procedure Summary, Intake/Output, MAR, Recent Results and Cardiac Rhythm Paced.

## 2019-05-07 NOTE — PROGRESS NOTES
[facility] 
 
 
CARDIOLOGY PROGRESS NOTE 
RECS: 
1. Elevated troponin-likely  demand ischemia in the setting ARIEL, sepsis and chronic CHF-. No further cardiac w/u needed at this time   
2. Chronic combined systolic and diastolic heart failure. Will monitor for s/s fluid overload. Diuretics on hold. Can resume torsemide as needed as outpatient. 3. Atrial fibrillation - currently paced continue amiodarone. Not on anticoagulation due to high bleeding risk. on asa. 4. CKD on CKD- improving renal indices. JOHN is higher Will monitor 5. Borderline BP- will monitor low dose Coreg per parameters 6. Severe cardiomyopathy, progressive, unclear etiology considering age. Elisha Holiday further workup for that. 7. Hx of symptomatic bradycardia- S/P PPM  0036 Dr Darline Hawkins- EP recommended  upgrade to biventricular pacemaker as an outpatient. Patient's family refused. PPM interrogation 5/2/19 dual pacer 99.5 % Rv and Ra paced. No atrial high rates seen. 8. Lupus- follow by Dr. Breana Negron as out patient 9. Hypertension- with normal low bp. Continue low dose Coreg. 10. Dementia- confused . 11. Hypokalemia-resolved. 12. RLE wound- s/p  debridement  with application of ACell skin graft and matrix right lower extremity ulcer.  
 
 stable from cardiac [place] will follow as needed. Little more to add from the [facility] . We will sign off. Should the clinical stituation change or there be any need for further cardiac input please do not hesitate to contact us. 12/27/18 ECHO ADULT COMPLETE 12/28/2018 12/28/2018  
  Narrative · Technically difficult study due to patient compliance. Unable to obtain  
on-axis apical images. Good parasternals, poor subcostal images. · Left ventricular moderate-to-severely decreased global systolic  
function. Estimated left ventricular ejection fraction is 31 - 35%. Visually measured ejection fraction. Left ventricular severe sigmoid septum hypertrophy. Abnormal left ventricular wall motion as described on  
the wall scoring diagram below. Abnormal left ventricular septal motion. Interventricular septal \"bounce\". Severe (grade 3) left ventricular  
diastolic dysfunction. · Moderate tricuspid valve regurgitation is present. Mild pulmonary  
hypertension is present. PASP 38mmHg · Mild aortic valve regurgitation is present. · Mild to moderate mitral valve regurgitation. · Pacing wire present 
   
    Signed by: Guillermo Zuluaga MD  
 
ASSESSMENT: 
Hospital Problems  Date Reviewed: 4/4/2019 Codes Class Noted POA Advanced care planning/counseling discussion ICD-10-CM: Z71.89 ICD-9-CM: V65.49  5/2/2019 Unknown Debility ICD-10-CM: R53.81 ICD-9-CM: 799.3  5/2/2019 Unknown Abscess ICD-10-CM: L02.91 
ICD-9-CM: 682.9  4/30/2019 Unknown Severe sepsis (HCC) ICD-10-CM: A41.9, R65.20 ICD-9-CM: 038.9, 995.92  4/30/2019 Unknown Sepsis ([address] ICD-10-CM: A41.9 ICD-9-CM: 038.9, 995.91  3/17/2019 Unknown SUBJECTIVE: 
Confused no distress noted OBJECTIVE: 
 
VS:  
Visit Vitals /56 (BP 1 Location: Left arm, BP Patient Position: At rest) Pulse 72 Temp 97.6 °F (36.4 °C) Resp 16 Ht 5' 4\" (1.626 m) Wt 80.4 kg (177 lb 3.2 oz) SpO2 100% Breastfeeding? No  
BMI 30.42 kg/m² Intake/Output Summary (Last 24 hours) at 5/7/2019 1200 Last data filed at 5/7/2019 2451 Gross per 24 hour Intake 848.5 ml Output 1400 ml Net -551.5 ml  
 
TELE: AVpaced General: chronically ill and in no apparent distress HENT: Normocephalic, atraumatic. Normal external eye. Neck :  increased JVP Cardiac:  regular rate and rhythm, S1, S2 normal, no click, no rub Lungs: diminished breath sounds b/l. Abdomen: Soft, nontender, no masses Extremities:  RLE wound cover with dressing. No edema noted BLE Labs: Results:  
   
Chemistry Recent Labs 05/07/19 
0431 05/06/19 
0517 05/05/19 8919 * 227* 194*  142 143  
K 4.6 4.1 3.6 * 108 109* CO2 28 29 28 BUN 87* 88* 83* CREA 1.71* 1.95* 1.82* CA 8.4* 8.4* 8.4* AGAP 7 5 6 BUCR 51* 45* 46* CBC w/Diff Recent Labs 05/07/19 
5000 05/06/19 
3062 WBC 11.6 12.5 RBC 3.02* 3.10* HGB 8.8* 8.7* HCT 27.8* 28.2*  
 259 GRANS 86* 81* LYMPH 11* 3*  
EOS 0 0 Cardiac Enzymes No results for input(s): CPK, CKND1, YAN in the last 72 hours. No lab exists for component: Latonia Garcia Coagulation No results for input(s): PTP, INR, APTT in the last 72 hours. No lab exists for component: INREXT, INREXT Lipid Panel Lab Results Component Value Date/Time Cholesterol, total 142 01/31/2019 02:24 AM  
 HDL Cholesterol 68 (H) 01/31/2019 02:24 AM  
 LDL, calculated 66.4 01/31/2019 02:24 AM  
 VLDL, calculated 7.6 01/31/2019 02:24 AM  
 Triglyceride 38 01/31/2019 02:24 AM  
 CHOL/HDL Ratio 2.1 01/31/2019 02:24 AM  
  
BNP No results for input(s): BNPP in the last 72 hours. Liver Enzymes No results for input(s): TP, ALB, TBIL, AP, SGOT, GPT in the last 72 hours. No lab exists for component: DBIL Thyroid Studies Lab Results Component Value Date/Time TSH 8.86 (H) 11/15/2015 01:45 PM  
    
 
 
Becki RIVAS I have independently evaluated and examined the patient. All relevant labs and testing data's are reviewed. Care plan discussed and updated after review.  
Negar Temple MD

## 2019-05-07 NOTE — CDMP QUERY
Based upon the clinical indicators and/or documentation outlined below, please clarify the patient's procedure as: 
 
      \"  op note \"   Wound was sharply debrided using a 10 blade scalpel there is mostly hematoma and some loose debris but some fixated on the layer this included skin soft tissue down the muscle I do not see tendon or bone. The dimensions of the wound were 10 x 4 x 0.5.     
 
=> Excisional Debridement (definite cutting away of tissue) 
=> => Other Procedure (please define) => Unable to Determine (no explanation of clinical findings) REFERENCE: 
Best Practice Documentation includes: 
Depth of the tissu e that was actually excised (skin, subcutaneous tissue, fascia, soft tissue, muscle, tendon, bone) Debridement technique, instrument(s) used, nature of tissue removed, and appearance/size of wound pre and post excisional debridement. Thank you,   Merry Bosworth RN   CCDS   x [phone]

## 2019-05-07 NOTE — PROGRESS NOTES
[facility] Hospitalist Group Progress Note Patient: Kirsten Jon Age: 80 y.o. : 1931 MR#: [id number] SSN: [id number] Date/Time: 2019 Subjective:  
 
Patient in NAD, awake. David Jernigan at bedside Assessment/Plan:  
 
- RLE cellulitis, MRSA in wound - Staph Epidermidis bacteremia 
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 
-A fib, rate controlled 
-H/o lupus Dementia - elevated trop, likely 2/2 demand ischemia - Dysphagia- diet as per speech Severe sepsis at admission with cellulitis and ARIEL PLAN On abx, wound care S/p debridement by vascular Monitor renal function, nephro following On amiodarone On coreg, off diuretics, cardio following Palliative care on case PT, OT Dispo- Family requests SNF 
D/w david Guerrier D/w  Case discussed with:  []Patient  [x]Family  [x]Nursing  []Case Management DVT Prophylaxis:  []Lovenox  [x]Hep SQ  []SCDs  []Coumadin   []On Heparin gtt Objective:  
VS:  
Visit Vitals /56 (BP 1 Location: Left arm, BP Patient Position: At rest) Pulse 72 Temp 97.6 °F (36.4 °C) Resp 16 Ht 5' 4\" (1.626 m) Wt 80.4 kg (177 lb 3.2 oz) SpO2 100% Breastfeeding? No  
BMI 30.42 kg/m² Tmax/24hrs: Temp (24hrs), Av.6 °F (36.4 °C), Min:97.2 °F (36.2 °C), Max:98.1 °F (36.7 °C) Input/Output:  
 
Intake/Output Summary (Last 24 hours) at 2019 1403 Last data filed at 2019 1011 Gross per 24 hour Intake 1328.5 ml Output 850 ml Net 478.5 ml  
 
 
General:  Awake, has dementia Cardiovascular:  S1S2+, RRR Pulmonary:  CTA b/l GI:  Soft, BS+, NT, ND Extremities:  Right thigh cellulitis improving, RLE ulcer Labs:   
Recent Results (from the past 24 hour(s)) METABOLIC PANEL, BASIC Collection Time: 19  4:31 AM  
Result Value Ref Range Sodium 144 136 - 145 mmol/L  Potassium 4.6 3.5 - 5.5 mmol/L  
 Chloride 109 (H) 100 - 108 mmol/L  
 CO2 28 21 - 32 mmol/L Anion gap 7 3.0 - 18 mmol/L Glucose 159 (H) 74 - 99 mg/dL BUN 87 (H) 7.0 - 18 MG/DL Creatinine 1.71 (H) 0.6 - 1.3 MG/DL  
 BUN/Creatinine ratio 51 (H) 12 - 20 GFR est AA 34 (L) >60 ml/min/1.73m2 GFR est non-AA 28 (L) >60 ml/min/1.73m2 Calcium 8.4 (L) 8.5 - 10.1 MG/DL  
CBC WITH AUTOMATED DIFF Collection Time: 05/07/19  4:31 AM  
Result Value Ref Range WBC 11.6 4.6 - 13.2 K/uL  
 RBC 3.02 (L) 4.20 - 5.30 M/uL HGB 8.8 (L) 12.0 - 16.0 g/dL HCT 27.8 (L) 35.0 - 45.0 % MCV 92.1 74.0 - 97.0 FL  
 MCH 29.1 24.0 - 34.0 PG  
 MCHC 31.7 31.0 - 37.0 g/dL  
 RDW 14.8 (H) 11.6 - 14.5 % PLATELET 923 016 - 808 K/uL MPV 9.5 9.2 - 11.8 FL  
 NEUTROPHILS 86 (H) 42 - 75 % BAND NEUTROPHILS 1 0 - 5 % LYMPHOCYTES 11 (L) 20 - 51 % MONOCYTES 2 2 - 9 % EOSINOPHILS 0 0 - 5 % BASOPHILS 0 0 - 3 %  
 ABS. NEUTROPHILS 10.1 (H) 1.8 - 8.0 K/UL  
 ABS. LYMPHOCYTES 1.3 0.8 - 3.5 K/UL  
 ABS. MONOCYTES 0.2 0 - 1.0 K/UL  
 ABS. EOSINOPHILS 0.0 0.0 - 0.4 K/UL  
 ABS. BASOPHILS 0.0 0.0 - 0.06 K/UL  
 DF AUTOMATED PLATELET COMMENTS ADEQUATE PLATELETS    
 RBC COMMENTS ANISOCYTOSIS 
1+ Additional Data Reviewed:   
 
Signed By: Helen Ramirez MD   
 May 7, 2019

## 2019-05-08 LAB
ANION GAP SERPL CALC-SCNC: 4 MMOL/L (ref 3–18)
BACTERIA SPEC CULT: NORMAL
BASOPHILS # BLD: 0 K/UL (ref 0–0.06)
BASOPHILS NFR BLD: 0 % (ref 0–3)
BUN SERPL-MCNC: 86 MG/DL (ref 7–18)
BUN/CREAT SERPL: 56 (ref 12–20)
CALCIUM SERPL-MCNC: 8.7 MG/DL (ref 8.5–10.1)
CHLORIDE SERPL-SCNC: 113 MMOL/L (ref 100–108)
CO2 SERPL-SCNC: 31 MMOL/L (ref 21–32)
CREAT SERPL-MCNC: 1.53 MG/DL (ref 0.6–1.3)
DATE LAST DOSE: ABNORMAL
DIFFERENTIAL METHOD BLD: ABNORMAL
EOSINOPHIL # BLD: 0 K/UL (ref 0–0.4)
EOSINOPHIL NFR BLD: 0 % (ref 0–5)
ERYTHROCYTE [DISTWIDTH] IN BLOOD BY AUTOMATED COUNT: 14.9 % (ref 11.6–14.5)
GLUCOSE SERPL-MCNC: 190 MG/DL (ref 74–99)
HCT VFR BLD AUTO: 26.6 % (ref 35–45)
HGB BLD-MCNC: 8.4 G/DL (ref 12–16)
LYMPHOCYTES # BLD: 0.4 K/UL (ref 0.8–3.5)
LYMPHOCYTES NFR BLD: 4 % (ref 20–51)
MAGNESIUM SERPL-MCNC: 2.6 MG/DL (ref 1.6–2.6)
MCH RBC QN AUTO: 29.3 PG (ref 24–34)
MCHC RBC AUTO-ENTMCNC: 31.6 G/DL (ref 31–37)
MCV RBC AUTO: 92.7 FL (ref 74–97)
METAMYELOCYTES NFR BLD MANUAL: 1 %
MONOCYTES # BLD: 0.4 K/UL (ref 0–1)
MONOCYTES NFR BLD: 4 % (ref 2–9)
NEUTS BAND NFR BLD MANUAL: 2 % (ref 0–5)
NEUTS SEG # BLD: 10 K/UL (ref 1.8–8)
NEUTS SEG NFR BLD: 89 % (ref 42–75)
PLATELET # BLD AUTO: 251 K/UL (ref 135–420)
PLATELET COMMENTS,PCOM: ABNORMAL
PMV BLD AUTO: 10.1 FL (ref 9.2–11.8)
POTASSIUM SERPL-SCNC: 4.3 MMOL/L (ref 3.5–5.5)
RBC # BLD AUTO: 2.87 M/UL (ref 4.2–5.3)
RBC MORPH BLD: ABNORMAL
REPORTED DOSE,DOSE: ABNORMAL UNITS
REPORTED DOSE/TIME,TMG: 2236
SERVICE CMNT-IMP: NORMAL
SODIUM SERPL-SCNC: 148 MMOL/L (ref 136–145)
VANCOMYCIN TROUGH SERPL-MCNC: 20.4 UG/ML (ref 10–20)
WBC # BLD AUTO: 11 K/UL (ref 4.6–13.2)

## 2019-05-08 PROCEDURE — 36415 COLL VENOUS BLD VENIPUNCTURE: CPT

## 2019-05-08 PROCEDURE — 74011250636 HC RX REV CODE- 250/636: Performed by: HOSPITALIST

## 2019-05-08 PROCEDURE — 74011250637 HC RX REV CODE- 250/637: Performed by: EMERGENCY MEDICINE

## 2019-05-08 PROCEDURE — 80048 BASIC METABOLIC PNL TOTAL CA: CPT

## 2019-05-08 PROCEDURE — 83735 ASSAY OF MAGNESIUM: CPT

## 2019-05-08 PROCEDURE — 3331090001 HH PPS REVENUE CREDIT

## 2019-05-08 PROCEDURE — 3331090002 HH PPS REVENUE DEBIT

## 2019-05-08 PROCEDURE — 65660000000 HC RM CCU STEPDOWN

## 2019-05-08 PROCEDURE — 77030020186 HC BOOT HL PROTCT SAGE -B

## 2019-05-08 PROCEDURE — 85025 COMPLETE CBC W/AUTO DIFF WBC: CPT

## 2019-05-08 PROCEDURE — 74011250636 HC RX REV CODE- 250/636: Performed by: EMERGENCY MEDICINE

## 2019-05-08 PROCEDURE — 74011250637 HC RX REV CODE- 250/637: Performed by: HOSPITALIST

## 2019-05-08 PROCEDURE — 74011636637 HC RX REV CODE- 636/637: Performed by: EMERGENCY MEDICINE

## 2019-05-08 PROCEDURE — 74011250636 HC RX REV CODE- 250/636: Performed by: INTERNAL MEDICINE

## 2019-05-08 PROCEDURE — 80202 ASSAY OF VANCOMYCIN: CPT

## 2019-05-08 PROCEDURE — 92526 ORAL FUNCTION THERAPY: CPT

## 2019-05-08 RX ORDER — AMOXICILLIN AND CLAVULANATE POTASSIUM 500; 125 MG/1; MG/1
1 TABLET, FILM COATED ORAL 2 TIMES DAILY
Qty: 12 TAB | Refills: 0 | Status: SHIPPED
Start: 2019-05-08 | End: 2019-05-09 | Stop reason: SDUPTHER

## 2019-05-08 RX ORDER — PREDNISONE 5 MG/1
TABLET ORAL
Qty: 10 TAB | Refills: 0 | Status: SHIPPED
Start: 2019-05-08 | End: 2019-05-09

## 2019-05-08 RX ORDER — DOXYCYCLINE 100 MG/1
100 CAPSULE ORAL 2 TIMES DAILY
Qty: 12 CAP | Refills: 0 | Status: SHIPPED
Start: 2019-05-08 | End: 2019-05-09 | Stop reason: SDUPTHER

## 2019-05-08 RX ORDER — PREDNISONE 10 MG/1
10 TABLET ORAL
Status: DISCONTINUED | OUTPATIENT
Start: 2019-05-08 | End: 2019-05-09 | Stop reason: HOSPADM

## 2019-05-08 RX ADMIN — AMITRIPTYLINE HYDROCHLORIDE 25 MG: 50 TABLET, FILM COATED ORAL at 21:21

## 2019-05-08 RX ADMIN — MEMANTINE 10 MG: 10 TABLET ORAL at 10:21

## 2019-05-08 RX ADMIN — ACETAMINOPHEN 650 MG: 325 TABLET ORAL at 17:31

## 2019-05-08 RX ADMIN — Medication: at 10:24

## 2019-05-08 RX ADMIN — VANCOMYCIN HYDROCHLORIDE 1000 MG: 10 INJECTION, POWDER, LYOPHILIZED, FOR SOLUTION INTRAVENOUS at 06:38

## 2019-05-08 RX ADMIN — HEPARIN SODIUM 5000 UNITS: 5000 INJECTION INTRAVENOUS; SUBCUTANEOUS at 10:21

## 2019-05-08 RX ADMIN — CARVEDILOL 3.12 MG: 3.12 TABLET, FILM COATED ORAL at 21:21

## 2019-05-08 RX ADMIN — HYDROXYCHLOROQUINE SULFATE 200 MG: 200 TABLET, FILM COATED ENTERAL at 17:31

## 2019-05-08 RX ADMIN — HYDROXYCHLOROQUINE SULFATE 200 MG: 200 TABLET, FILM COATED ENTERAL at 10:21

## 2019-05-08 RX ADMIN — PREDNISONE 10 MG: 10 TABLET ORAL at 13:27

## 2019-05-08 RX ADMIN — CARVEDILOL 3.12 MG: 3.12 TABLET, FILM COATED ORAL at 10:21

## 2019-05-08 RX ADMIN — AMIODARONE HYDROCHLORIDE 200 MG: 200 TABLET ORAL at 10:20

## 2019-05-08 RX ADMIN — ASPIRIN 81 MG 81 MG: 81 TABLET ORAL at 10:21

## 2019-05-08 RX ADMIN — HEPARIN SODIUM 5000 UNITS: 5000 INJECTION INTRAVENOUS; SUBCUTANEOUS at 21:20

## 2019-05-08 RX ADMIN — MEMANTINE 10 MG: 10 TABLET ORAL at 17:31

## 2019-05-08 RX ADMIN — ACETAMINOPHEN 650 MG: 325 TABLET ORAL at 10:20

## 2019-05-08 RX ADMIN — HEPARIN SODIUM 5000 UNITS: 5000 INJECTION INTRAVENOUS; SUBCUTANEOUS at 03:47

## 2019-05-08 RX ADMIN — HYDROCORTISONE SODIUM SUCCINATE 50 MG: 100 INJECTION, POWDER, FOR SOLUTION INTRAMUSCULAR; INTRAVENOUS at 06:38

## 2019-05-08 RX ADMIN — POLYETHYLENE GLYCOL 3350 17 G: 17 POWDER, FOR SOLUTION ORAL at 10:21

## 2019-05-08 RX ADMIN — Medication: at 17:31

## 2019-05-08 NOTE — PROGRESS NOTES
Spoke with son who would like pt to go to rehab. Pt is being considered by [facility], however has been minimally working with Pt/OT, trying to find reason to skill pt. Called PT/OT, pt is not appropriate for services, is being seen by [facility] to keep pt moving, but is unable to participate in therapy. Annemarie Garber, MSW Case Management [phone]

## 2019-05-08 NOTE — PROGRESS NOTES
HOSPITAL BED MEDICAL NECESSITY DOCUMENTATION Patient has been evaluated to have a hospital bed for home. Patient has a chronic medical condition(s) of: ______________________________________________________________________ Patient's medical condition requires positioning of the body in ways not feasible with an ordinary bed due to (please select a condition)  
___ Requires positioning of the body in ways not feasible with an ordinary bed to alleviate pain. ___ Requires the head of the bed to be elevated more than 30 degrees most of the time due to congestive heart failure, chronic pulmonary disease, or problems with aspiration. ___ Requires traction equipment, which can only be attached to a hospital bed. AND  
__X_ Patient has need for frequent changes of body position and/or an immediate need for a change in body position. Physician Signature________________________________ NPI________________ Name Printed _________________________________ Date___________________

## 2019-05-08 NOTE — PROGRESS NOTES
Problem: Self Care Deficits Care Plan (Adult) Goal: *Acute Goals and Plan of Care (Insert Text) Description Occupational Therapy Goals--FMP Provided by rehab technician Initiated 5/2/2019 within 7 day(s). 1.  Patient will perform BUE  PROM on elbow, wrist, and finger joints with Total Assistance 3-5X/week. Prior Level of Function: Per patient's son, she lived at home and had help for all ADLs. Patient's son was unable to assist further. Patient presented as Total Assistance at this OT evaluation. Outcome: Progressing Towards Goal 
Patient: Juarez Bai (80 y.o. female) Date: 5/8/2019 Subjective: Pt was awake throughout the session and answered yes to several questions. Patient cleared by nursing Angie Coronado for participation in [facility]. Pt was agreeable to working with tech today. When tech asked pt to perform each exercise pt answered \"Yes\" and then was unable to perform any of the exercises. All BUE exercises were performed passively and pt was left resting comfortably in bed. Session was discussed with nursing and care management. Therapeutic Exercises:  
 
 
EXERCISE Sets Reps Active Active Assist  
Passive Self ROM Comments Finger Flex/Ext 1 10  ? ? ? ? BUE Wrist Flex/Ext 1 10 ? ? ? ? BUE Elbow Flex/Ext 1 10 ? ? ? ? BUE Shoulder Flex/Ext 1 10 ? ? ? ? BUE  
   ? ? ? ?   
   ? ? ? ? Pain: 
Pre Session: No pain noted Post session: No pain noted After treatment:  
? Patient left in no apparent distress in bed 
? Call bell left within reach ? Nursing notified ? Caregiver present (Son) ? Bed alarm activated Najma Panda

## 2019-05-08 NOTE — PROGRESS NOTES
Problem: Falls - Risk of 
Goal: *Absence of Falls Description Document Tia Manus Fall Risk and appropriate interventions in the flowsheet. Outcome: Progressing Towards Goal 
  
Problem: Patient Education: Go to Patient Education Activity Goal: Patient/Family Education Outcome: Progressing Towards Goal 
  
Problem: Pressure Injury - Risk of 
Goal: *Prevention of pressure injury Description Document Rodríguez Scale and appropriate interventions in the flowsheet. Outcome: Progressing Towards Goal 
  
Problem: Patient Education: Go to Patient Education Activity Goal: Patient/Family Education Outcome: Progressing Towards Goal 
  
Problem: Patient Education: Go to Patient Education Activity Goal: Patient/Family Education Outcome: Progressing Towards Goal 
  
Problem: Patient Education: Go to Patient Education Activity Goal: Patient/Family Education Outcome: Progressing Towards Goal 
  
Problem: Patient Education: Go to Patient Education Activity Goal: Patient/Family Education Outcome: Progressing Towards Goal 
  
Problem: Nutrition Deficit Goal: *Optimize nutritional status Outcome: Progressing Towards Goal 
  
Problem: Risk for Spread of Infection Goal: Prevent transmission of infectious organism to others Description Prevent the transmission of infectious organisms to other patients, staff members, and visitors. Outcome: Progressing Towards Goal 
  
Problem: Patient Education:  Go to Education Activity Goal: Patient/Family Education Outcome: Progressing Towards Goal 
  
Problem: Activity Intolerance Goal: *Oxygen saturation during activity within specified parameters Outcome: Progressing Towards Goal 
Goal: *Able to remain out of bed as prescribed Outcome: Progressing Towards Goal 
  
Problem: Patient Education: Go to Patient Education Activity Goal: Patient/Family Education Outcome: Progressing Towards Goal 
  
Problem: Patient Education: Go to Patient Education Activity Goal: Patient/Family Education Outcome: Progressing Towards Goal 
  
Problem: Sepsis: Discharge Outcomes Goal: *Vital signs within defined limits Outcome: Progressing Towards Goal 
Goal: *Tolerating diet Outcome: Progressing Towards Goal 
Goal: *Verbalizes understanding and describes prescribed diet Outcome: Progressing Towards Goal 
Goal: *Demonstrates progressive activity Outcome: Progressing Towards Goal 
Goal: *Describes follow-up/return visits to physicians Outcome: Progressing Towards Goal 
Goal: *Verbalizes name, dosage, time, side effects, and number of days to continue medications Outcome: Progressing Towards Goal 
Goal: *Influenza immunization (Oct-Mar only) Outcome: Progressing Towards Goal 
Goal: *Pneumococcal immunization Outcome: Progressing Towards Goal 
Goal: *Lungs clear or at baseline Outcome: Progressing Towards Goal 
Goal: *Oxygen saturation returns to baseline or 90% or better on room air Outcome: Progressing Towards Goal 
Goal: *Glycemic control Outcome: Progressing Towards Goal 
Goal: *Absence of deep venous thrombosis signs and symptoms(Stroke Metric) Outcome: Progressing Towards Goal 
Goal: *Describes available resources and support systems Outcome: Progressing Towards Goal 
Goal: *Optimal pain control at patient's stated goal 
Outcome: Progressing Towards Goal 
  
Problem: Falls - Risk of 
Goal: *Absence of Falls Description Document [place] Kehinde Fall Risk and appropriate interventions in the flowsheet. Outcome: Progressing Towards Goal 
  
Problem: Patient Education: Go to Patient Education Activity Goal: Patient/Family Education Outcome: Progressing Towards Goal 
  
Problem: Pressure Injury - Risk of 
Goal: *Prevention of pressure injury Description Document Rodríguez Scale and appropriate interventions in the flowsheet. Outcome: Progressing Towards Goal 
  
Problem: Patient Education: Go to Patient Education Activity Goal: Patient/Family Education Outcome: Progressing Towards Goal 
  
Problem: Patient Education: Go to Patient Education Activity Goal: Patient/Family Education Outcome: Progressing Towards Goal 
  
Problem: Patient Education: Go to Patient Education Activity Goal: Patient/Family Education Outcome: Progressing Towards Goal 
  
Problem: Patient Education: Go to Patient Education Activity Goal: Patient/Family Education Outcome: Progressing Towards Goal 
  
Problem: Nutrition Deficit Goal: *Optimize nutritional status Outcome: Progressing Towards Goal 
  
Problem: Risk for Spread of Infection Goal: Prevent transmission of infectious organism to others Description Prevent the transmission of infectious organisms to other patients, staff members, and visitors. Outcome: Progressing Towards Goal 
  
Problem: Patient Education:  Go to Education Activity Goal: Patient/Family Education Outcome: Progressing Towards Goal 
  
Problem: Activity Intolerance Goal: *Oxygen saturation during activity within specified parameters Outcome: Progressing Towards Goal 
Goal: *Able to remain out of bed as prescribed Outcome: Progressing Towards Goal 
  
Problem: Patient Education: Go to Patient Education Activity Goal: Patient/Family Education Outcome: Progressing Towards Goal 
  
Problem: Patient Education: Go to Patient Education Activity Goal: Patient/Family Education Outcome: Progressing Towards Goal 
  
Problem: Sepsis: Discharge Outcomes Goal: *Vital signs within defined limits Outcome: Progressing Towards Goal 
Goal: *Tolerating diet Outcome: Progressing Towards Goal 
Goal: *Verbalizes understanding and describes prescribed diet Outcome: Progressing Towards Goal 
Goal: *Demonstrates progressive activity Outcome: Progressing Towards Goal 
Goal: *Describes follow-up/return visits to physicians Outcome: Progressing Towards Goal 
Goal: *Verbalizes name, dosage, time, side effects, and number of days to continue medications Outcome: Progressing Towards Goal 
Goal: *Influenza immunization (Oct-Mar only) Outcome: Progressing Towards Goal 
Goal: *Pneumococcal immunization Outcome: Progressing Towards Goal 
Goal: *Lungs clear or at baseline Outcome: Progressing Towards Goal 
Goal: *Oxygen saturation returns to baseline or 90% or better on room air Outcome: Progressing Towards Goal 
Goal: *Glycemic control Outcome: Progressing Towards Goal 
Goal: *Absence of deep venous thrombosis signs and symptoms(Stroke Metric) Outcome: Progressing Towards Goal 
Goal: *Describes available resources and support systems Outcome: Progressing Towards Goal 
Goal: *Optimal pain control at patient's stated goal 
Outcome: Progressing Towards Goal

## 2019-05-08 NOTE — PROGRESS NOTES
Pt with pending transfer to snf Wound dressing c/d/i Will follow up with snf for wound orders and what they are capable of management regarding wound vs arrangements to come to our office for follow up

## 2019-05-08 NOTE — DIABETES MGMT
GLYCEMIC CONTROL SCREENING INITIATED: 
 
No results found for: HBA1C, HGBE8, IAB3QDOD Lab Results Component Value Date/Time Glucose 190 (H) 05/08/2019 05:52 AM  
 Glucose 88 03/17/2016 11:15 AM  
 
 
  [x]  Glucose values exceeding inpatient target range: -180mg/dl   
        non-ICU 70-180mg/dl [x]  Recommend corrective insulin per IP Insulin order set. [x]  Standard insulin scale []  Very insuln resistant scale [x]  Recommend blood glucose monitoring while patient is on steroids.  
 
Tulio Hess, ANGY, CDE

## 2019-05-08 NOTE — NURSE NAVIGATOR
Transitional Care Team: [facility] Discharge Review Date: 05/08/19 Time:  11:53 AM 
Hospital Nurse Navigator: Selin Pierre MSN, RN, [facility] Aixa Trinh is a 80 y.o. female [facility] with Severe sepsis ([facility] [address] [A41.9, R65.20] Abscess [L02.91] Sepsis ([facility] [address] [A41.9] on  4/30/2019. Primary Diagnosis 
-Chronic combined systolic and diastolic heart failure- proBNP 11,831 
-Sepsis- right leg cellulitis, right thigh abscess 
-ARIEL on CKD 3 Review and Plan as below: 
1. S/p debridement of right lower extremity ulcer with application of ACell skin graft and matrix right lower extremity ulcer. POD #2. Dressing to be changed at one week; appt. scheduled with Dr. Beaver January for next week. 2. ST recommends puree diet w/ thin liquids; medications to be crushed in puree. Needs monitoring to make sure that her outpatient medications are crushable. 3. Family requests hospital bed. Haleigh Tamez requested orders. 4. Follow up needed: 
Future Appointments Date Time Provider Win Loi 5/14/2019  8:45 AM Sailaja Garcia [place] [id number]  
5/29/2019  8:45 AM MD LUIS F Ware  
6/24/2019 10:45 AM MD LUIS F Ware  
PCP on 5/16/19 at 1 PM 
 
 
This Nurse Navigator accessed the patient's chart to offer support and placement in the [address] [place] [address] Team bridges the gaps in care and education surrounding discharge from the acute care facility. The objective is to empower the patient and family in taking a proactive role in the task of preventing readmission within the first thirty days after discharge from the acute care setting. The Transitional Care Team is also involved in the efforts to reduce readmission to the acute care setting after stabilization and discharge from the acute care environment either to the skilled nursing facilities or community.   
 
EDUCATION / INTERVENTIONS OFFERED:   
 
 1. Discharge medication list was not reviewed; no AVS or discharge summary available. 2. Family Vandana Franks, son) was instructed on specific signs/symptoms to look for with regards to worsening of their medical conditions. 3. Family was given all educational material from the [facility] with services identified for complete wrap around services during their 30 day recovery phase. The patient and I discussed wrap around services including nurse navigation, care coordination, home health, transitional care appointments with their primary care provider and specialist team.  
 
 Patient education focused on readmission zones as described as: The Red Zone: High risk for readmission, days 1-21 The Yellow Zone: Moderate risk for readmission, days 22-29 The Green Zone: Lower risk for readmission, days 30 and after 4. Advance Care Planning:  not on file, but pt does not have capacity to complete at this time BARRIERS IDENTIFIED: 
 
Deysi Dong expressed the following barriers to her discharge:  
lack of knowledge about disease PLAN: 
A written individual care plan including education materials, a calendar individualized with their follow up appointments with primary care providers, specialist, and telephone numbers of healthcare personnel that are available for them to ask questions during the recovery phase was provided to Carli Honeycutt, odalis. The patient will be discharged home with HH : [facility] for her personal care aide services. The TC Team will follow the patient from a distance while inpatient as well as be available for further transition disposition as needed. The TC Team will continue to offer support 30- 90 days post discharge from the acute care setting. The appropriate TC Team members were notified. Past Medical History:  
Diagnosis Date  Acute on chronic diastolic heart failure ([facility] [address]  Arthritis  Benign hypertensive heart disease without heart failure Better controlled, stable  Chronic diastolic heart failure ([facility] [address] Breathing and edema is improving  Congestive heart failure ([address]  HTN (hypertension)  Hypercholesteremia  Lupus (systemic lupus erythematosus) ([facility] [address] 6/18/2014  
 followed by dr Charlotte Yusuf  Obesity, unspecified Patient has weight loss Discussed diet ad fluid restriction  Other and unspecified hyperlipidemia F/u per pmd  
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  
 tr with moderate pulmonary htn Advance Care Planning 5/2/2019 Patient's Healthcare Decision Maker is: -  
Primary Decision Maker Name -  
Primary Decision Maker Phone Number -  
Primary Decision Maker Relationship to Patient - Secondary Decision Maker Name - Secondary Decision Maker Phone Number - Secondary Decision Maker Relationship to Patient -  
Confirm Advance Directive None Patient Would Like to Complete Advance Directive Unable Does the patient have other document types Other (comment) Tennille Casas MSN, RN, [facility] Nurse Navigator [phone]

## 2019-05-08 NOTE — PROGRESS NOTES
Problem: Mobility Impaired (Adult and Pediatric) Goal: *Acute Goals and Plan of Care (Insert Text) Description Physical Therapy Goals Initiated 5/2/2019 and to be accomplished within 7 day(s) 1. Patient will tolerate LE PROM to B LEs. PLOF: per chart it appears patient was bed bound prior to admission however no family is present to verify Outcome: Progressing Towards Goal 
Patient: Aixa Trinh (80 y.o. female) Date: 5/8/2019 Subjective: Pt was awake throughout session and answered yes to several questions. Patient cleared by nursing Mary Colindres) for participation in [facility]. Pt was agreeable to working with tech today. When tech asked pt to perform each exercise pt answered \"Yes\" and then was unable to perform any of the exercises. All BLE exercises were performed passively and pt was left resting comfortably in bed. Session was discussed with nursing and care management. Therapeutic Exercises:  
 
 
EXERCISE Sets Reps Active Active Assist  
Passive Self ROM Comments Ankle Pumps/Circles 1 15  ? ? ? ? BLE Heel Slides 1 10 ? ? ? ? BLE Hip IR/ER   ? ? ? ? Hip ABD/ADD 1 10 ? ? ? ? BLE Hip Flex/Ext   ? ? ? ?   
   ? ? ? ? Pain: 
Pre Session: No pain noted Post session: No pain noted After treatment:  
? Patient left in no apparent distress in bed 
? Call bell left within reach ? Nursing notified ? Caregiver present (Son) ? Bed alarm activated Flavio Davies

## 2019-05-08 NOTE — PROGRESS NOTES
In Patient Progress note Admit Date: 4/30/2019 Impression:  
 
1 Acute kidney injury ( baseline ~1.3 creat) Likely from Ischaemic ATN in the setting of sepsis/right lower extremity abscess/SIRS S/o wound debridement per vascular yesterday , volume status looks ok , d/c IVF Encourage po intake , Renal parameters stable BUN proportionately higher d/t steroids , 
creat ~ 1.5, add back diuretics tomorrow 2 Chronic kidney disease stage IV secondary to hypertension nephrosclerosis, cardiorenal syndrome 3 sepsis secondary to right lower extremity abscess/wounds. On antibiotics per ID, vascular also consulted per ID recs 4 history of dementia  
5 history of lupus, immunocompromised on the long-term steroids. Now on stress dose steroids, plaquenil. No evidence of renal manifestation of lupus Patient follows with Dr Severa Art. 
  
 Please call with questions , 
  
Saskia Agarwal MD [facility] Cell [phone] Pager: [phone] Subjective:  
 
Awake eating breakfast 
 
 
Current Facility-Administered Medications:  
  predniSONE (DELTASONE) tablet 10 mg, 10 mg, Oral, DAILY WITH BREAKFAST, Js Tyler MD, 10 mg at 05/08/19 1327 
  0.9% sodium chloride infusion, 30 mL/hr, IntraVENous, CONTINUOUS, Becki Roca, NP, Last Rate: 30 mL/hr at 05/07/19 1716, 30 mL/hr at 05/07/19 1716 
  vancomycin (VANCOCIN) 1000 mg in  ml infusion, 1,000 mg, IntraVENous, Q36H, Michelle Flores MD, Last Rate: 250 mL/hr at 05/08/19 0638, 1,000 mg at 05/08/19 2298   aspirin chewable tablet 81 mg, 81 mg, Oral, DAILY, Js Tyler MD, 81 mg at 05/08/19 1021 
  albuterol-ipratropium (DUO-NEB) 2.5 MG-0.5 MG/3 ML, 3 mL, Nebulization, Q4H PRN, Js Tyler MD 
  acetaminophen (TYLENOL) tablet 650 mg, 650 mg, Oral, Q6H PRN, Kelly Cheng MD, 650 mg at 05/08/19 1020 
  amiodarone (CORDARONE) tablet 200 mg, 200 mg, Oral, DAILY, Kelly Cheng MD, 200 mg at 05/08/19 1028   amitriptyline (ELAVIL) tablet 25 mg, 25 mg, Oral, QHS, Johanne Randle MD, 25 mg at 05/07/19 2139   carvedilol (COREG) tablet 3.125 mg, 3.125 mg, Oral, Q12H, Johanne Randel MD, 3.125 mg at 05/08/19 1021   hydroxychloroquine (PLAQUENIL) tablet 200 mg, 200 mg, Oral, BID, Johanne Randle MD, 200 mg at 05/08/19 1021   memantine (NAMENDA) tablet 10 mg, 10 mg, Oral, BID, Johanne Randle MD, 10 mg at 05/08/19 1021 
  menthol-zinc oxide (CALMOSEPTINE) 0.44-20.6 % ointment, , Topical, BID, Johanne Randle MD 
  polyethylene glycol (MIRALAX) packet 17 g, 17 g, Oral, DAILY, Johanne Randle MD, 17 g at 05/08/19 1021   hydrALAZINE (APRESOLINE) 20 mg/mL injection 10 mg, 10 mg, IntraVENous, Q6H PRN, Johanne Randle MD 
  heparin (porcine) injection 5,000 Units, 5,000 Units, SubCUTAneous, Q8H, Edi Reid MD, 5,000 Units at 05/08/19 1021 
  sodium chloride (NS) flush 5-10 mL, 5-10 mL, IntraVENous, PRN, Johanne Randle MD 
 
 
 
Objective:  
 
Visit Vitals /61 (BP 1 Location: Left arm, BP Patient Position: At rest) Pulse 69 Temp 96.6 °F (35.9 °C) Resp 20 Ht 5' 4\" (1.626 m) Wt 83.5 kg (184 lb) SpO2 100% Breastfeeding? No  
BMI 31.58 kg/m² Intake/Output Summary (Last 24 hours) at 5/8/2019 1636 Last data filed at 5/8/2019 0152 Gross per 24 hour Intake 1070 ml Output 400 ml Net 670 ml Physical Exam:  
 
Sleepy, NAD HENT mmm RS AEBE clear CVS s1 s2 wnl no JVD 
GI soft BS + Ext no edema Data Review: 
 
Recent Labs 05/08/19 
9516 WBC 11.0  
RBC 2.87* HCT 26.6*  
MCV 92.7 MCH 29.3 MCHC 31.6 RDW 14.9* Recent Labs 05/08/19 
9970 05/07/19 
1077 05/06/19 
0966 BUN 86* 87* 88* CREA 1.53* 1.71* 1.95* CA 8.7 8.4* 8.4*  
K 4.3 4.6 4.1 * 144 142 * 109* 108 CO2 31 28 29 * 159* 227* Aj Martin MD

## 2019-05-08 NOTE — PROGRESS NOTES
[facility] Hospitalist Group Progress Note Patient: Frankie Post Age: 80 y.o. : 1931 MR#: [id number] SSN: [id number] Date/Time: 2019 Subjective:  
 
Patient lying in bed in NAD, awake, has demntia Assessment/Plan:  
 
- RLE cellulitis, MRSA in wound - Staph Epidermidis bacteremia 
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 
-A fib, rate controlled 
-H/o lupus, on chronic steroids at home Dementia - elevated trop, likely 2/2 demand ischemia - Dysphagia- diet as per speech Severe sepsis at admission with cellulitis and ARIEL PLAN 
 
abx per ID, wound care S/p debridement by vascular Monitor renal function, nephro following On amiodarone On coreg, off diuretics, cardio following Palliative care on case Taper steroids PT, OT Dispo- Family requests SNF 
D/w  Mejia Yip D/w RN Case discussed with:  []Patient  [x]Family  [x]Nursing  []Case Management DVT Prophylaxis:  []Lovenox  [x]Hep SQ  []SCDs  []Coumadin   []On Heparin gtt Objective:  
VS:  
Visit Vitals /61 (BP 1 Location: Left arm, BP Patient Position: At rest) Pulse 69 Temp 96.6 °F (35.9 °C) Resp 20 Ht 5' 4\" (1.626 m) Wt 83.5 kg (184 lb) SpO2 100% Breastfeeding? No  
BMI 31.58 kg/m² Tmax/24hrs: Temp (24hrs), Av.9 °F (36.6 °C), Min:96.6 °F (35.9 °C), Max:99.2 °F (37.3 °C) Input/Output:  
 
Intake/Output Summary (Last 24 hours) at 2019 1234 Last data filed at 2019 3843 Gross per 24 hour Intake 1070 ml Output 400 ml Net 670 ml General:  Awake, has dementia Cardiovascular:  S1S2+, RRR Pulmonary:  CTA b/l GI:  Soft, BS+, NT, ND Extremities:  Right thigh cellulitis improving, RLE ulcer Labs:   
Recent Results (from the past 24 hour(s)) Sameera Pérez Collection Time: 19  5:52 AM  
Result Value Ref Range Vancomycin,trough 20.4 () 10.0 - 20.0 ug/mL Reported dose date: [id number] Reported dose time: 2236 Reported dose: 1000 MG UNITS  
CBC WITH AUTOMATED DIFF Collection Time: 05/08/19  5:52 AM  
Result Value Ref Range WBC 11.0 4.6 - 13.2 K/uL  
 RBC 2.87 (L) 4.20 - 5.30 M/uL HGB 8.4 (L) 12.0 - 16.0 g/dL HCT 26.6 (L) 35.0 - 45.0 % MCV 92.7 74.0 - 97.0 FL  
 MCH 29.3 24.0 - 34.0 PG  
 MCHC 31.6 31.0 - 37.0 g/dL  
 RDW 14.9 (H) 11.6 - 14.5 % PLATELET 343 245 - 746 K/uL MPV 10.1 9.2 - 11.8 FL  
 NEUTROPHILS 89 (H) 42 - 75 % BAND NEUTROPHILS 2 0 - 5 % LYMPHOCYTES 4 (L) 20 - 51 % MONOCYTES 4 2 - 9 % EOSINOPHILS 0 0 - 5 % BASOPHILS 0 0 - 3 % METAMYELOCYTES 1 (H) 0 %  
 ABS. NEUTROPHILS 10.0 (H) 1.8 - 8.0 K/UL  
 ABS. LYMPHOCYTES 0.4 (L) 0.8 - 3.5 K/UL  
 ABS. MONOCYTES 0.4 0 - 1.0 K/UL  
 ABS. EOSINOPHILS 0.0 0.0 - 0.4 K/UL  
 ABS. BASOPHILS 0.0 0.0 - 0.06 K/UL  
 DF MANUAL PLATELET COMMENTS ADEQUATE PLATELETS    
 RBC COMMENTS ANISOCYTOSIS 1+ 
    
 RBC COMMENTS POIKILOCYTOSIS 1+ 
    
 RBC COMMENTS OVALOCYTES 1+ RBC COMMENTS SCHISTOCYTES 
FEW METABOLIC PANEL, BASIC Collection Time: 05/08/19  5:52 AM  
Result Value Ref Range Sodium 148 (H) 136 - 145 mmol/L Potassium 4.3 3.5 - 5.5 mmol/L Chloride 113 (H) 100 - 108 mmol/L  
 CO2 31 21 - 32 mmol/L Anion gap 4 3.0 - 18 mmol/L Glucose 190 (H) 74 - 99 mg/dL BUN 86 (H) 7.0 - 18 MG/DL Creatinine 1.53 (H) 0.6 - 1.3 MG/DL  
 BUN/Creatinine ratio 56 (H) 12 - 20 GFR est AA 39 (L) >60 ml/min/1.73m2 GFR est non-AA 32 (L) >60 ml/min/1.73m2 Calcium 8.7 8.5 - 10.1 MG/DL MAGNESIUM Collection Time: 05/08/19  5:52 AM  
Result Value Ref Range Magnesium 2.6 1.6 - 2.6 mg/dL Additional Data Reviewed:   
 
Signed By: Breanna Saucedo MD   
 May 8, 2019

## 2019-05-08 NOTE — PROGRESS NOTES
Patient is not skillable for SNF. Discussed option of LTC vs home health/personal care with pt's son. Pt's son is opting to take pt home but stated he needs help and is requesting a hospital bed. Discussed this plan with attending MD.   Talon Espinoza order for hospital bed and home health. [facility]. [facility] active with [facility] health. VANESSA Osborn Case Management [phone]

## 2019-05-08 NOTE — PROGRESS NOTES
Problem: Dysphagia (Adult) Goal: *Acute Goals and Plan of Care (Insert Text) Description Dysphagia Present: moderate- severe oral, mild pharyngeal 
Aspiration: none present on b/s eval   
 
Recommendations: 
Diet: puree/ thin Meds: crushed in puree Aspiration Precautions Oral Care TID, HOB >/= 30* at all times Goals:  Patient will: 1. Tolerate PO trials with no overt s/s aspiration or distress in 4/5 trials 2. Complete an objective swallow study (i.e., MBSS) to assess swallow integrity, r/o aspiration, and determine of safest LRD, min A Outcome: Progressing Towards Goal 
 
SPEECH LANGUAGE PATHOLOGY DYSPHAGIA TREATMENT Patient: Vikki Agee (80 y.o. female) Date: 5/8/2019 Diagnosis: Severe sepsis ([facility] [address] [A41.9, R65.20] Abscess [L02.91] Sepsis ([facility] [address] [A41.9] <principal problem not specified> Procedure(s) (LRB): 
RIGHT LEG DEBRIDEMENT WITH A CELL PLACEMENT (Right) 2 Days Post-Op Precautions: aspiration Fall PLOF: As per H&P   
 
ASSESSMENT: 
Pt was seen at bedside for follow up dysphagia management. Pt ate 100% of breakfast tray of puree with thin liquids without difficulty per nursing staff. She tolerated puree and thin liquids from SLP without any overt s/sx of aspiration and positive oral clearance. Laryngeal elevation appeared functional/timely to palpation. [facility] despite max cues from clinician. Continue to rec puree diet with thin liquids, aspiration precautions, oral care TID, and meds as tolerated. D/w son who is in agreement with puree diet, but did state that she was eating regular solids prior to admission. ST will continue to follow. Progression toward goals: 
?         Improving appropriately and progressing toward goals ? Improving slowly and progressing toward goals ? Not making progress toward goals and plan of care will be adjusted PLAN: 
Recommendations and Planned Interventions: See above Patient continues to benefit from skilled intervention to address the above impairments. Continue treatment per established plan of care. Discharge Recommendations:  Andrey Ma and To Be Determined SUBJECTIVE:  
Patient aphonic OBJECTIVE:  
Cognitive and Communication Status: 
Neurologic State: Eyes open spontaneously Orientation Level: Oriented to person, Disoriented to time, Disoriented to situation, Disoriented to place Cognition: Decreased attention/concentration, Decreased command following, Impaired decision making, No command following Perception: (unable to determine) Perseveration: No perseveration noted Safety/Judgement: Fall prevention, Lack of insight into deficits Dysphagia Treatment: 
Oral Assessment: 
Oral Assessment Labial: No impairment Oral Hygiene: unable to determine Lingual: No impairment Velum: Unable to visualize Mandible: (clenched tight) P. O. Trials: 
Patient Position: HOB 60* Vocal quality prior to P.O.: Aphonic Consistency Presented: Thin liquid, Puree, mech soft How Presented: SLP-fed/presented, Straw, spoon How Much: (limited) Bolus Acceptance: Impaired Bolus Formation/Control: Impaired Type of Impairment: Delayed, Poor, Incomplete Propulsion: Delayed (# of seconds), Discoordination Oral Residue: (unable to determine) Initiation of Swallow: Delayed (# of seconds) Laryngeal Elevation: Functional 
 Aspiration Signs/Symptoms: None Pharyngeal Phase Characteristics: Poor endurance, Easily fatigued Effective Modifications: None Cues for Modifications: Maximal 
   
 Oral Phase Severity: Moderate Pharyngeal Phase Severity : Mild PAIN: 
Start of Tx: 0 End of Tx: 0 After treatment:  
?              Patient left in no apparent distress sitting up in chair ? Patient left in no apparent distress in bed 
? Call bell left within reach ? Nursing notified ? Family present ?              Caregiver present ? Bed alarm activated COMMUNICATION/EDUCATION:  
? Aspiration precautions; swallow safety; compensatory techniques ? Patient/family able to participate in training and education Thank you for this referral. 
 
Sameer Isidro M.S. CCC-SLP/L Speech-Language Pathologist

## 2019-05-08 NOTE — ROUTINE PROCESS
Bedside shift change report given to Fransisco (oncoming nurse) by Zakiya Molina (offgoing nurse). Report included the following information SBAR, Kardex and MAR. Patient resting quietly in bed. Dual skin assessment completed. Small skin tear noted under pannus.

## 2019-05-09 VITALS
BODY MASS INDEX: 31.94 KG/M2 | HEIGHT: 64 IN | WEIGHT: 187.1 LBS | HEART RATE: 70 BPM | SYSTOLIC BLOOD PRESSURE: 104 MMHG | DIASTOLIC BLOOD PRESSURE: 55 MMHG | TEMPERATURE: 97.1 F | RESPIRATION RATE: 20 BRPM | OXYGEN SATURATION: 100 %

## 2019-05-09 LAB
ANION GAP SERPL CALC-SCNC: 4 MMOL/L (ref 3–18)
BUN SERPL-MCNC: 88 MG/DL (ref 7–18)
BUN/CREAT SERPL: 58 (ref 12–20)
CALCIUM SERPL-MCNC: 8.8 MG/DL (ref 8.5–10.1)
CHLORIDE SERPL-SCNC: 111 MMOL/L (ref 100–108)
CO2 SERPL-SCNC: 31 MMOL/L (ref 21–32)
CREAT SERPL-MCNC: 1.51 MG/DL (ref 0.6–1.3)
GLUCOSE SERPL-MCNC: 222 MG/DL (ref 74–99)
POTASSIUM SERPL-SCNC: 4.3 MMOL/L (ref 3.5–5.5)
SODIUM SERPL-SCNC: 146 MMOL/L (ref 136–145)

## 2019-05-09 PROCEDURE — 3331090002 HH PPS REVENUE DEBIT

## 2019-05-09 PROCEDURE — 80048 BASIC METABOLIC PNL TOTAL CA: CPT

## 2019-05-09 PROCEDURE — 36415 COLL VENOUS BLD VENIPUNCTURE: CPT

## 2019-05-09 PROCEDURE — 97168 OT RE-EVAL EST PLAN CARE: CPT

## 2019-05-09 PROCEDURE — 74011636637 HC RX REV CODE- 636/637: Performed by: EMERGENCY MEDICINE

## 2019-05-09 PROCEDURE — 74011250637 HC RX REV CODE- 250/637: Performed by: HOSPITALIST

## 2019-05-09 PROCEDURE — 77030038269 HC DRN EXT URIN PURWCK BARD -A

## 2019-05-09 PROCEDURE — 74011250636 HC RX REV CODE- 250/636: Performed by: HOSPITALIST

## 2019-05-09 PROCEDURE — 74011250637 HC RX REV CODE- 250/637: Performed by: EMERGENCY MEDICINE

## 2019-05-09 PROCEDURE — 97165 OT EVAL LOW COMPLEX 30 MIN: CPT

## 2019-05-09 PROCEDURE — 3331090001 HH PPS REVENUE CREDIT

## 2019-05-09 RX ORDER — TORSEMIDE 10 MG/1
10 TABLET ORAL DAILY
Qty: 30 TAB | Refills: 0 | Status: SHIPPED | OUTPATIENT
Start: 2019-05-09 | End: 2019-05-29 | Stop reason: SDUPTHER

## 2019-05-09 RX ORDER — DOXYCYCLINE 100 MG/1
100 CAPSULE ORAL 2 TIMES DAILY
Qty: 12 CAP | Refills: 0 | Status: SHIPPED | OUTPATIENT
Start: 2019-05-09 | End: 2019-05-15

## 2019-05-09 RX ORDER — AMOXICILLIN AND CLAVULANATE POTASSIUM 500; 125 MG/1; MG/1
1 TABLET, FILM COATED ORAL 2 TIMES DAILY
Qty: 12 TAB | Refills: 0 | Status: SHIPPED | OUTPATIENT
Start: 2019-05-09 | End: 2019-05-15

## 2019-05-09 RX ORDER — PREDNISONE 5 MG/1
TABLET ORAL
Qty: 10 TAB | Refills: 0 | Status: SHIPPED | OUTPATIENT
Start: 2019-05-09 | End: 2019-07-15

## 2019-05-09 RX ADMIN — MEMANTINE 10 MG: 10 TABLET ORAL at 11:16

## 2019-05-09 RX ADMIN — AMIODARONE HYDROCHLORIDE 200 MG: 200 TABLET ORAL at 11:16

## 2019-05-09 RX ADMIN — PREDNISONE 10 MG: 10 TABLET ORAL at 11:17

## 2019-05-09 RX ADMIN — ASPIRIN 81 MG 81 MG: 81 TABLET ORAL at 11:16

## 2019-05-09 RX ADMIN — HEPARIN SODIUM 5000 UNITS: 5000 INJECTION INTRAVENOUS; SUBCUTANEOUS at 13:02

## 2019-05-09 RX ADMIN — POLYETHYLENE GLYCOL 3350 17 G: 17 POWDER, FOR SOLUTION ORAL at 11:16

## 2019-05-09 RX ADMIN — Medication: at 11:21

## 2019-05-09 RX ADMIN — HEPARIN SODIUM 5000 UNITS: 5000 INJECTION INTRAVENOUS; SUBCUTANEOUS at 04:02

## 2019-05-09 RX ADMIN — HYDROXYCHLOROQUINE SULFATE 200 MG: 200 TABLET, FILM COATED ENTERAL at 11:16

## 2019-05-09 NOTE — PROGRESS NOTES
Bedside and verbal [facility], with use of kardex. Rounded on pt Q1 hour throughout shift, no s/s distress nor discomfort noted, call bell in reach.

## 2019-05-09 NOTE — HOME CARE
Pt is active to [facility] for SN/PT/OT - noted orders for SN/PT/OT/Aide - d/c noted for today [facility]DA [facility] will resume care - call to Jarrod Hamilton - voice mail left about wound care orders - will update referral when I hear back from her - D El SERNA

## 2019-05-09 NOTE — DISCHARGE SUMMARY
Discharge Summary Patient ID: Mike Kessler 
[id number] 
4 y.o. 
6/26/1931 Body mass index is 32.12 kg/m². PCP on record: Sapphire Longo MD 
 
Admit date: 4/30/2019 Discharge date and time: 5/10/2019 Discharge Diagnoses:                                          
RLE cellulitis, MRSA in wound - Staph Epidermidis bacteremia 
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 
-A fib, rate controlled 
-H/o lupus, on chronic steroids at home Dementia - elevated trop, likely 2/2 demand ischemia - Dysphagia- diet as per speech Severe sepsis at admission with cellulitis and ARIEL Consults: Cardiology, ID and Cardiac Surgery Hospital Course by problems: 
 
See HPI for reason for admission Patient with Lupus admitted with h/o right LL cellulitis , Right thigh abscess s/p bedside I& D . Doing well post procedure . Now on PO antibiotics . Patient will continue her home meds , prior to hospitalization . 02 sats have remained at 93% on RA for an hour. No resp distress noted. Pt is ready for transport home ID Recs : doxycycline, augmentin till 5/14/19 when discharge planned Patient seen and examined by me on discharge day. Pertinent Findings: 
Patient is Alert Awake but poor mentation HEENT - NAD   
RS - Clear , no rales no rhonchi CVS - regular rhythm and rate acceptable   
abd - benign, BS present , no Distension EXT - wound healing Neuro - alert and awake , grossly motor and sensory intact Significant Diagnostic Studies: 
 
 
Pertinent Lab Data: 
Recent Labs 05/08/19 
1869 WBC 11.0 HGB 8.4* HCT 26.6*  
 Recent Labs 05/09/19 
0154 05/08/19 
9137 * 148* K 4.3 4.3 * 113* CO2 31 31 * 190* BUN 88* 86* CREA 1.51* 1.53* CA 8.8 8.7 MG  --  2.6 DISCHARGE MEDICATIONS:  
@ Discharge Medication List as of 5/9/2019  3:52 PM  
 CONTINUE these medications which have CHANGED Details  
predniSONE (DELTASONE) 5 mg tablet 10 mg po daily for 3 days , after that continue 5 mg po daily, Print, Disp-10 Tab, R-0  
  
torsemide (DEMADEX) 10 mg tablet Take 1 Tab by mouth daily. , Print, Disp-30 Tab, R-0  
  
doxycycline (MONODOX) 100 mg capsule Take 1 Cap by mouth two (2) times a day for 6 days. , Print, Disp-12 Cap, R-0  
  
amoxicillin-clavulanate (AUGMENTIN) 500-125 mg per tablet Take 1 Tab by mouth two (2) times a day for 6 days. , Print, Disp-12 Tab, R-0  
  
  
CONTINUE these medications which have NOT CHANGED Details  
polyethylene glycol (MIRALAX) 17 gram/dose powder Take 17 g by mouth daily. , Historical Med  
  
aspirin delayed-release 81 mg tablet Take 81 mg by mouth daily. , Historical Med  
  
hydroxychloroquine (PLAQUENIL) 200 mg tablet Take 200 mg by mouth two (2) times a day., Historical Med OTHER Incentive Spirometry- Use as directed, Print, Disp-1 Each, R-0  
  
food supplemt, lactose-reduced (ENSURE ENLIVE) 0.08 gram-1.5 kcal/mL liqd Take 1 Bottle by mouth two (2) times a day., Print, Disp-60 Bottle, R-0  
  
menthol-zinc oxide (CALMOSEPTINE) 0.44-20.6 % oint Apply  to affected area., Historical Med  
  
carvedilol (COREG) 3.125 mg tablet Take 1 Tab by mouth every twelve (12) hours. , Print, Disp-60 Tab, R-0  
  
acetaminophen (TYLENOL ARTHRITIS PAIN) 650 mg TbER Take 1 Tab by mouth every eight (8) hours. , Print, Disp-15 Tab, R-0  
  
amiodarone (CORDARONE) 200 mg tablet TAKE ONE TABLET EVERY DAY (MAKE AN APPT), Normal, Disp-30 Tab, R-5  
  
memantine (NAMENDA) 10 mg tablet Take 10 mg by mouth two (2) times a day., Historical Med  
  
amitriptyline (ELAVIL) 25 mg tablet Take 25 mg by mouth nightly., Historical Med  
  
  
STOP taking these medications  
  
 amoxicillin (AMOXIL) 500 mg capsule Comments:  
Reason for Stopping:   
   
 metOLazone (ZAROXOLYN) 2.5 mg tablet Comments:  
Reason for Stopping: My Recommended Diet, Activity, Wound Care, and follow-up labs are listed in the patient's Discharge Insturctions which I have personally completed and reviewed. Disposition:    
[x] Home with family     [] [facility] PT/RN   [] SNF/NH   [] Inpatient Rehab/EDUARDO Condition at Discharge:  Stable Follow up with: PCP : [place] No MD 
 
 
Please follow-up tests/labs that are still pendin. None 2. 
 
>30 minutes spent coordinating this discharge (review instructions/follow-up, prescriptions, preparing report for sign off) Signed: 
Sandy Mclaughlin MD 
5/10/2019 
9:43 AM

## 2019-05-09 NOTE — PROGRESS NOTES
Thank you for your referral for a hospital bed. Spoke to patients son-he is aware that [facility] will call him to arranged delivery and time frame today. Marquis Ray [facility]

## 2019-05-09 NOTE — DISCHARGE INSTRUCTIONS
DISCHARGE SUMMARY from Nurse    PATIENT INSTRUCTIONS:    After general anesthesia or intravenous sedation, for 24 hours or while taking prescription Narcotics:  · Limit your activities  · Do not drive and operate hazardous machinery  · Do not make important personal or business decisions  · Do  not drink alcoholic beverages  · If you have not urinated within 8 hours after discharge, please contact your surgeon on call. Report the following to your surgeon:  · Excessive pain, swelling, redness or odor of or around the surgical area  · Temperature over 100.5  · Nausea and vomiting lasting longer than 4 hours or if unable to take medications  · Any signs of decreased circulation or nerve impairment to extremity: change in color, persistent  numbness, tingling, coldness or increase pain  · Any questions    What to do at Home:  Recommended activity: Activity as tolerated,     If you experience any of the following symptoms increases pain, shortness of breath, fevers, vomiting, , please follow up with Home health nurse, MS or call 911 if an emergency. *  Please give a list of your current medications to your Primary Care Provider. *  Please update this list whenever your medications are discontinued, doses are      changed, or new medications (including over-the-counter products) are added. *  Please carry medication information at all times in case of emergency situations. These are general instructions for a healthy lifestyle:    No smoking/ No tobacco products/ Avoid exposure to second hand smoke  Surgeon General's Warning:  Quitting smoking now greatly reduces serious risk to your health.     Obesity, smoking, and sedentary lifestyle greatly increases your risk for illness    A healthy diet, regular physical exercise & weight monitoring are important for maintaining a healthy lifestyle    You may be retaining fluid if you have a history of heart failure or if you experience any of the following symptoms:  Weight gain of 3 pounds or more overnight or 5 pounds in a week, increased swelling in our hands or feet or shortness of breath while lying flat in bed. Please call your doctor as soon as you notice any of these symptoms; do not wait until your next office visit. Recognize signs and symptoms of STROKE:    F-face looks uneven    A-arms unable to move or move unevenly    S-speech slurred or non-existent    T-time-call 911 as soon as signs and symptoms begin-DO NOT go       Back to bed or wait to see if you get better-TIME IS BRAIN. Warning Signs of HEART ATTACK     Call 911 if you have these symptoms:   Chest discomfort. Most heart attacks involve discomfort in the center of the chest that lasts more than a few minutes, or that goes away and comes back. It can feel like uncomfortable pressure, squeezing, fullness, or pain.  Discomfort in other areas of the upper body. Symptoms can include pain or discomfort in one or both arms, the back, neck, jaw, or stomach.  Shortness of breath with or without chest discomfort.  Other signs may include breaking out in a cold sweat, nausea, or lightheadedness. Don't wait more than five minutes to call 911 - MINUTES MATTER! Fast action can save your life. Calling 911 is almost always the fastest way to get lifesaving treatment. Emergency Medical Services staff can begin treatment when they arrive -- up to an hour sooner than if someone gets to the hospital by car. The discharge information has been reviewed with the caregiver. The caregiver verbalized understanding. Discharge medications reviewed with the caregiver and appropriate educational materials and side effects teaching were provided.   ___________________________________________________________________________________________________________________________________

## 2019-05-09 NOTE — PROGRESS NOTES
Infectious Disease progress Note Requested by:dr. issa 
 
Reason:sepsis, right leg cellulitis, right thigh abscess Current abx Prior abx  
vancomycin since 4/30 Pip/tazo x 1 - 5/1 Cefepime 4/30-5/2 Metronidazole 5/1-5/7 Ceftriaxone 5/2-5/7 Lines:  
 
 
Assessment : 
 
45-year-old AA female with a history of congestive heart failure with EF of 31-35 % on 12/28/18, lupus, thrombus cytopenia, chronic kidney disease admitted to [facility] on 4/30/19 for altered mental status. H/o lupus nodules on LE in 1/2019 Now with high fevers, RLE redness, ulcer right leg Clinical picture consistent with sepsis (POA) due to acute right thigh/leg cellulitis, right thigh abscess, necrotic right leg ulcer with possible superinfection. Single positive blood culture for staph epidermidis on 4/30 likely contaminant Right thigh abscess s/p bedside I&D in ed on 4/30/19. No cultures sent. Right leg ulcer with clot/necrosis - cultures 5/1- methicillin resistant staph aureus, group A strep. Vascular surgery consult appreciated. s/p I&D, a cell graft 5/6. No intra op purulence, tissue necrosis or exposed tendon/bone noted. Significantly decreased erythema, tenderness right thigh/leg - no significant induration around the recently drained right thigh abscess. Decreased wbc. Clinically better.  
  
Recommendations: 
  
1. Continue vancomycin 2. F/u Vascular surgery recommendation regarding wound care right leg ulcer 3. Ok to switch to po doxycycline, augmentin till 5/14/19 when discharge planned 
  
Advance Care planning: full code: discussed  with patient/surrogate decision maker: zacarias ledbetter: [phone]. Above plan was discussed in details with  son at bedside. Please call me if any further questions or concerns. Will continue to participate in the care of this patient. HPI: 
 
Feels better. Decreased right leg/thigh pain. No new complaints 
 
 
home Medication List  
  
  
   
 Details polyethylene glycol (MIRALAX) 17 gram/dose powder Take 17 g by mouth daily. torsemide (DEMADEX) 10 mg tablet Take 1 Tab by mouth daily. Qty: 30 Tab, Refills: 6  
  
amoxicillin (AMOXIL) 500 mg capsule Take 500 mg by mouth three (3) times daily. aspirin delayed-release 81 mg tablet Take 81 mg by mouth daily. hydroxychloroquine (PLAQUENIL) 200 mg tablet Take 200 mg by mouth two (2) times a day. metOLazone (ZAROXOLYN) 2.5 mg tablet Take 1 Tab by mouth daily. Qty: 30 Tab, Refills: 5  
  
!! OTHER Incentive Spirometry- Use as directed Qty: 1 Each, Refills: 0  
  
!! OTHER Graded Compression Stockings b/l LE- use as directed Qty: 1 Each, Refills: 0  
  
food supplemt, lactose-reduced (ENSURE ENLIVE) 0.08 gram-1.5 kcal/mL liqd Take 1 Bottle by mouth two (2) times a day. Qty: 60 Bottle, Refills: 0  
  
menthol-zinc oxide (CALMOSEPTINE) 0.44-20.6 % oint Apply  to affected area. carvedilol (COREG) 3.125 mg tablet Take 1 Tab by mouth every twelve (12) hours. Qty: 60 Tab, Refills: 0  
  
acetaminophen (TYLENOL ARTHRITIS PAIN) 650 mg TbER Take 1 Tab by mouth every eight (8) hours. Qty: 15 Tab, Refills: 0  
  
amiodarone (CORDARONE) 200 mg tablet TAKE ONE TABLET EVERY DAY (MAKE AN APPT) Qty: 30 Tab, Refills: 5  
  
memantine (NAMENDA) 10 mg tablet Take 10 mg by mouth two (2) times a day. amitriptyline (ELAVIL) 25 mg tablet Take 25 mg by mouth nightly. !! - Potential duplicate medications found. Please discuss with provider. Current Facility-Administered Medications Medication Dose Route Frequency  predniSONE (DELTASONE) tablet 10 mg  10 mg Oral DAILY WITH BREAKFAST  0.9% sodium chloride infusion  30 mL/hr IntraVENous CONTINUOUS  
 vancomycin (VANCOCIN) 1000 mg in  ml infusion  1,000 mg IntraVENous Q36H  
 aspirin chewable tablet 81 mg  81 mg Oral DAILY  albuterol-ipratropium (DUO-NEB) 2.5 MG-0.5 MG/3 ML  3 mL Nebulization Q4H PRN  
  acetaminophen (TYLENOL) tablet 650 mg  650 mg Oral Q6H PRN  
 amiodarone (CORDARONE) tablet 200 mg  200 mg Oral DAILY  amitriptyline (ELAVIL) tablet 25 mg  25 mg Oral QHS  carvedilol (COREG) tablet 3.125 mg  3.125 mg Oral Q12H  hydroxychloroquine (PLAQUENIL) tablet 200 mg  200 mg Oral BID  memantine (NAMENDA) tablet 10 mg  10 mg Oral BID  menthol-zinc oxide (CALMOSEPTINE) 0.44-20.6 % ointment   Topical BID  polyethylene glycol (MIRALAX) packet 17 g  17 g Oral DAILY  hydrALAZINE (APRESOLINE) 20 mg/mL injection 10 mg  10 mg IntraVENous Q6H PRN  
 heparin (porcine) injection 5,000 Units  5,000 Units SubCUTAneous Q8H  
 sodium chloride (NS) flush 5-10 mL  5-10 mL IntraVENous PRN Allergies: Patient has no known allergies. Temp (24hrs), Av.9 °F (36.6 °C), Min:96.6 °F (35.9 °C), Max:98.8 °F (37.1 °C) Visit Vitals /66 (BP 1 Location: Left arm, BP Patient Position: At rest) Pulse 64 Temp 97.9 °F (36.6 °C) Resp 20 Ht 5' 4\" (1.626 m) Wt 84.9 kg (187 lb 1.6 oz) SpO2 98% Breastfeeding? No  
BMI 32.12 kg/m² ROS: 12 point ROS obtained in details. Pertinent positives as mentioned in HPI,  
otherwise negative Physical Exam: 
 
Constitutional: She appears well-developed and well-nourished. No distress. non verbal 
 
HENT:  
Head: Normocephalic and atraumatic. Right Ear: External ear normal.  
Left Ear: External ear normal.  
Nose: Nose normal.  
Eyes: Pupils are equal, round, and reactive to light. Conjunctivae and EOM are normal. No scleral icterus. Neck: Normal range of motion. Neck supple. No JVD present. No tracheal deviation present. No thyromegaly present. Cardiovascular: Normal rate, regular rhythm, normal heart sounds and intact distal pulses. Exam reveals no gallop and no friction rub. No murmur heard. Pulmonary/Chest: Effort normal and breath sounds normal. She exhibits no tenderness. no rales/rhonchi Abdominal: Soft. Bowel sounds are normal. She exhibits no distension. There is no tenderness. There is no rebound and no guarding. Musculoskeletal: She exhibits no edema or tenderness. Right lower extremity with  decreased erythema of right lateral thigh, right leg with decreased overlying warmth/tenderness, anterior tibial 10 x 5 cm open bloody wound with necrotic center, Left lower extremity  without edema/tenderness Lymphadenopathy:  
  She has no cervical adenopathy. Neurological: She is alert. No cranial nerve deficit. Coordination normal.  
Eyes open but does not follow commands, globally weak, right lower extremity is contracted Skin: Skin is dry. Right lower extremity erythema and warm to touch Psychiatric:  
Unable to assess Nursing note and vitals reviewed. 
  
  
 
 
 
 
Labs: Results:  
Chemistry Recent Labs 05/09/19 
0154 05/08/19 
2726 05/07/19 
7691 * 190* 159* * 148* 144  
K 4.3 4.3 4.6 * 113* 109* CO2 31 31 28 BUN 88* 86* 87* CREA 1.51* 1.53* 1.71* CA 8.8 8.7 8.4* AGAP 4 4 7 BUCR 58* 56* 51* CBC w/Diff Recent Labs 05/08/19 
8101 05/07/19 
6822 WBC 11.0 11.6 RBC 2.87* 3.02* HGB 8.4* 8.8* HCT 26.6* 27.8*  230 GRANS 89* 86* LYMPH 4* 11* EOS 0 0 Microbiology Recent Labs 05/02/19 
1550 05/02/19 
1484 05/01/19 
1050 05/01/19 
0018 04/30/19 1953 CULT NO GROWTH AFTER 13 HOURS MRSA target DNA is not detected (presumptive not colonized with MRSA) MODERATE METHICILLIN RESISTANT STAPHYLOCOCCUS AUREUS*  FEW STREPTOCOCCI, BETA HEMOLYTIC GROUP A*  [facility] CALLED TO AND CORRECTLY REPEATED BY: 
ANÍBAL RN, CVT AT 9337 5/3/19 TO  MELODY 
  NO ANAEROBES ISOLATED 2 DAYS NO GROWTH 1 DAY NO GROWTH 3 DAYS  
  
 
 
RADIOLOGY: 
 
All available imaging studies/reports in [facility] for this admission were reviewed Dr. Edward Templeton, Infectious Disease Specialist 
[phone] May 9, 2019 
7:41 AM

## 2019-05-09 NOTE — ROUTINE PROCESS
Bedside and verbal received from [address] [place] [address] to on coming Mckayla Murillo RN which included SBAR, in/output and heart rhythm.

## 2019-05-09 NOTE — PROGRESS NOTES
02 sats have remained at 93% on RA for an hour. No resp distress noted. Pt is ready for transport home. Daughter at bedside. 1605 discharge instructions and prescriptions given to pts daughter. Pt transported home per medical transport. No distress noted at time of transport.

## 2019-05-09 NOTE — PROGRESS NOTES
Per [facility] (JANINE) called [facility] for transport spoke with Shanice Anderson, patient is scheduled for 3:30pm . Transport to patient's home ([address]). Informed [facility] of transportation arrangements.

## 2019-05-09 NOTE — PROGRESS NOTES
Problem: Self Care Deficits Care Plan (Adult) Goal: *Acute Goals and Plan of Care (Insert Text) Description Occupational Therapy Goals--FMP Provided by rehab technician Re-evaluated 5/9/2019 goal to be continued as written below. Initiated 5/2/2019 within 7 day(s). 1.  Patient will perform BUE  PROM on elbow, wrist, and finger joints with Total Assistance 3-5X/week. Prior Level of Function: Per patient's son, she lived at home and had help for all ADLs. Patient's son was unable to assist further. Patient presented as Total Assistance at this OT evaluation. Outcome: Progressing Towards Goal 
 OCCUPATIONAL THERAPY RE-EVALUATION Patient: Hernandez Obando (80 y.o. female) Date: 5/9/2019 Primary Diagnosis: Severe sepsis ([address] [A41.9, R65.20] Abscess [L02.91] Sepsis ([address] [A41.9] Procedure(s) (LRB): 
RIGHT LEG DEBRIDEMENT WITH A CELL PLACEMENT (Right) 3 Days Post-Op Precautions:   Fall PLOF: see goal section above ASSESSMENT : 
Based on the objective data described below, the patient presents with lethargy and opening eyes to verbal and tactile stimulus infrequently with gross BUEs AROM deficit, WFL B wrist/digit/elbow PROM and limited shld PROM due to resistance and crepitus. Pt did not verbalize during session, however, did not demonstrate sign/symptoms of pain during PROM performed to BUEs. Pt noted to have edema of B forearms and medical bracelets loosened to accommodate circulation. Pt would benefit from continued FMP to maintain joint mobility. Attempted education to patient regarding PROM routine, however, pt does not show signs of retention. Patient's rehabilitation potential is considered to be Guarded Factors which may influence rehabilitation potential include: ? None noted ? Mental ability/status ? Medical condition ? Home/family situation and support systems ? Safety awareness ?             Pain tolerance/management ? Other: PLAN : 
Recommendations and Planned Interventions:  
?               Self Care Training                  ? Therapeutic Activities ? Functional Mobility Training   ? Cognitive Retraining 
? Therapeutic Exercises           ? Endurance Activities ? Balance Training                    ? Neuromuscular Re-Education ? Visual/Perceptual Training     ? Home Safety Training 
? Patient Education                   ? Family Training/Education ? Other (comment): Frequency/Duration: Patient will be followed by occupational therapy/rehab tech 1-2 times per day/4-7 days per week to address goals. Discharge Recommendations: LTC Further Equipment Recommendations for Discharge: hospital bed SUBJECTIVE:  
Patient did not verbalize during session. OBJECTIVE DATA SUMMARY:  
Hospital course since last seen and reason for reevaluation: Pt has participated in [facility] with rehab tech 3-5 x /week since start of care in order to maintain joint mobility in [address] [place] [address]. PROM provided as pt is unable to participate at this time. Past Medical History:  
Diagnosis Date  Acute on chronic diastolic heart failure ([address]  Arthritis  Benign hypertensive heart disease without heart failure Better controlled, stable  Chronic diastolic heart failure ([address] Breathing and edema is improving  Congestive heart failure ([address]  HTN (hypertension)  Hypercholesteremia  Lupus (systemic lupus erythematosus) ([address] 6/18/2014  
 followed by dr Severa Art  Obesity, unspecified Patient has weight loss Discussed diet ad fluid restriction  Other and unspecified hyperlipidemia F/u per pmd  
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  
Procedure Laterality Date  HX HYSTERECTOMY  PACEMAKER PROCEDURE Barriers to Learning/Limitations: yes;  cognitive and altered mental status (i.e.Sedation, Confusion) Compensate with: visual, verbal, tactile, kinesthetic cues/model Home Situation:  
Home Situation Home Environment: Private residence One/Two Story Residence: One story Living Alone: No 
Support Systems: Family member(s), Home care staff Patient Expects to be Discharged to[de-identified] Private residence Current DME Used/Available at Home: Fran Krishna, hui, Pat Valdovinosn ? Right hand dominant   ? Left hand dominant Cognitive/Behavioral Status: 
Neurologic State: Lethargic;Sleeping Skin: intact BUEs Edema: 1+ edema B forearms and wrist and digits Coordination: BUE Coordination: Grossly decreased, non-functional 
Fine Motor Skills-Upper: Left Impaired;Right Impaired Gross Motor Skills-Upper: Left Impaired;Right Impaired Strength: BUE Strength: Grossly decreased, non-functional 
 Tone & Sensation: BUE Tone: Normal 
Sensation: (unable to assess due to cognition) Range of Motion: BUE 
AROM: Grossly decreased, non-functional 
PROM: Generally decreased, functional 
 ADL Assessment:  
Feeding: Total assistance Oral Facial Hygiene/Grooming: Total assistance Bathing: Total assistance Upper Body Dressing: Total assistance Lower Body Dressing: Total assistance Toileting: Total assistance Therapeutic Exercise: 
B PROM to digits, wrist, elbow, and shld x 10 reps each. Pain: 
Pain level pre-treatment: pt did not indicate pain Pain level post-treatment: pt did not indicate pain Pain Intervention(s): Medication (see MAR); Rest, Repositioning Response to intervention: Nurse notified Activity Tolerance:  
poor Please refer to the flowsheet for vital signs taken during this treatment. After treatment:  
? Patient left in no apparent distress sitting up in chair ? Patient left in no apparent distress in bed 
? Call bell left within reach ? Nursing notified ? Caregiver present ? Bed alarm activated COMMUNICATION/EDUCATION:  
? Role of Occupational Therapy in the acute care setting 
? Home safety education was provided and the patient/caregiver indicated understanding. ? Patient/family have participated as able in goal setting and plan of care. ? Patient/family agree to work toward stated goals and plan of care. ? Patient understands intent and goals of therapy, but is neutral about his/her participation. ? Patient is unable to participate in goal setting and plan of care. Thank you for this referral. 
Siomara Toribio OT Time Calculation: 11 mins

## 2019-05-09 NOTE — PROGRESS NOTES
Personal care agency, annointed and mayteed, referral sent. [address] [facility], referral sent. Hospital bed being delivered today by [facility]. Confirmed d/c home today with pt's son, will need medical transport, son would like to wait for bed to be delivered, plan for d/c later this afternoon. VANESSA Mckeon Case Management [phone]

## 2019-05-10 ENCOUNTER — HOME CARE VISIT (OUTPATIENT)
Dept: SCHEDULING | Facility: HOME HEALTH | Age: 84
End: 2019-05-10
Payer: MEDICARE

## 2019-05-10 ENCOUNTER — PATIENT OUTREACH (OUTPATIENT)
Dept: CASE MANAGEMENT | Age: 84
End: 2019-05-10

## 2019-05-10 PROCEDURE — 3331090002 HH PPS REVENUE DEBIT

## 2019-05-10 PROCEDURE — 3331090001 HH PPS REVENUE CREDIT

## 2019-05-10 PROCEDURE — G0299 HHS/HOSPICE OF RN EA 15 MIN: HCPCS

## 2019-05-10 NOTE — PROGRESS NOTES
Transitional Care Team: [facility] Discharge Review 
  
Date: 05/10/19 Time:  09:00 AM 
Hospital Nurse Navigator: Brea Jones MSN, RN, [facility] 
  
Vero Pac is a 80 y.o. female inpatient at [facility] with Severe sepsis ([facility] [A41.9, R65.20] Abscess [L02.91] Sepsis ([facility] [A41.9] on  4/30/2019. Primary Diagnosis 
-Chronic combined systolic and diastolic heart failure- proBNP 11,831 
-Sepsis- right leg cellulitis, right thigh abscess 
-ARIEL on CKD 3 Note: this is a continuation of my discharge note from 5/8/19. 
 
-Discharge medication list was reviewed for accuracy and potential interactions. 
 
-As mentioned in my 5/8/19 note, ST recommends that her medications are crushed in pureed. Needs monitoring to make sure that her outpatient medications are crushable. Brea TOLBERT, RN, [facility] Nurse Navigator [phone]

## 2019-05-10 NOTE — PROGRESS NOTES
Received report on pt.from off going RN. Resting quietly in bed on rounds. Denies c/o pain or SOB at this time. No acute distress noted. Son at bedside. Call bell at [facility]. Drowsy bur awakens quickly to voice and nods head or occ answers a few words. Will cont to monitor for any changes in status. 0900 fed breakfast by son. Ate well w/o noted swallowing difficuty

## 2019-05-11 PROCEDURE — 3331090001 HH PPS REVENUE CREDIT

## 2019-05-11 PROCEDURE — 3331090002 HH PPS REVENUE DEBIT

## 2019-05-12 VITALS
SYSTOLIC BLOOD PRESSURE: 128 MMHG | TEMPERATURE: 97.6 F | OXYGEN SATURATION: 98 % | DIASTOLIC BLOOD PRESSURE: 78 MMHG | RESPIRATION RATE: 20 BRPM | HEART RATE: 78 BPM

## 2019-05-12 PROCEDURE — 3331090002 HH PPS REVENUE DEBIT

## 2019-05-12 PROCEDURE — 3331090001 HH PPS REVENUE CREDIT

## 2019-05-13 ENCOUNTER — HOME CARE VISIT (OUTPATIENT)
Dept: HOME HEALTH SERVICES | Facility: HOME HEALTH | Age: 84
End: 2019-05-13
Payer: MEDICARE

## 2019-05-13 ENCOUNTER — PATIENT OUTREACH (OUTPATIENT)
Dept: CARDIOLOGY CLINIC | Age: 84
End: 2019-05-13

## 2019-05-13 ENCOUNTER — HOME CARE VISIT (OUTPATIENT)
Dept: SCHEDULING | Facility: HOME HEALTH | Age: 84
End: 2019-05-13
Payer: MEDICARE

## 2019-05-13 PROCEDURE — A6456 ZINC PASTE BAND W >=3"<5"/YD: HCPCS

## 2019-05-13 PROCEDURE — A4649 SURGICAL SUPPLIES: HCPCS

## 2019-05-13 PROCEDURE — A6402 STERILE GAUZE <= 16 SQ IN: HCPCS

## 2019-05-13 PROCEDURE — 3331090001 HH PPS REVENUE CREDIT

## 2019-05-13 PROCEDURE — 3331090002 HH PPS REVENUE DEBIT

## 2019-05-13 PROCEDURE — A4927 NON-STERILE GLOVES: HCPCS

## 2019-05-13 PROCEDURE — A6454 SELF-ADHER BAND W>=3" <5"/YD: HCPCS

## 2019-05-13 PROCEDURE — G0151 HHCP-SERV OF PT,EA 15 MIN: HCPCS

## 2019-05-13 PROCEDURE — A4452 WATERPROOF TAPE: HCPCS

## 2019-05-13 PROCEDURE — A6216 NON-STERILE GAUZE<=16 SQ IN: HCPCS

## 2019-05-13 PROCEDURE — A9270 NON-COVERED ITEM OR SERVICE: HCPCS

## 2019-05-14 ENCOUNTER — HOME CARE VISIT (OUTPATIENT)
Dept: HOME HEALTH SERVICES | Facility: HOME HEALTH | Age: 84
End: 2019-05-14
Payer: MEDICARE

## 2019-05-14 VITALS
HEART RATE: 70 BPM | DIASTOLIC BLOOD PRESSURE: 60 MMHG | OXYGEN SATURATION: 95 % | SYSTOLIC BLOOD PRESSURE: 110 MMHG | TEMPERATURE: 97.6 F

## 2019-05-14 PROCEDURE — G0299 HHS/HOSPICE OF RN EA 15 MIN: HCPCS

## 2019-05-14 PROCEDURE — 3331090001 HH PPS REVENUE CREDIT

## 2019-05-14 PROCEDURE — 3331090002 HH PPS REVENUE DEBIT

## 2019-05-15 PROCEDURE — 3331090002 HH PPS REVENUE DEBIT

## 2019-05-15 PROCEDURE — 3331090001 HH PPS REVENUE CREDIT

## 2019-05-16 ENCOUNTER — HOME CARE VISIT (OUTPATIENT)
Dept: HOME HEALTH SERVICES | Facility: HOME HEALTH | Age: 84
End: 2019-05-16
Payer: MEDICARE

## 2019-05-16 VITALS
OXYGEN SATURATION: 96 % | SYSTOLIC BLOOD PRESSURE: 118 MMHG | TEMPERATURE: 98.1 F | HEART RATE: 74 BPM | DIASTOLIC BLOOD PRESSURE: 70 MMHG

## 2019-05-16 PROCEDURE — 3331090001 HH PPS REVENUE CREDIT

## 2019-05-16 PROCEDURE — G0152 HHCP-SERV OF OT,EA 15 MIN: HCPCS

## 2019-05-16 PROCEDURE — 3331090002 HH PPS REVENUE DEBIT

## 2019-05-17 ENCOUNTER — PATIENT OUTREACH (OUTPATIENT)
Dept: CARDIOLOGY CLINIC | Age: 84
End: 2019-05-17

## 2019-05-17 ENCOUNTER — HOME CARE VISIT (OUTPATIENT)
Dept: SCHEDULING | Facility: HOME HEALTH | Age: 84
End: 2019-05-17
Payer: MEDICARE

## 2019-05-17 VITALS
SYSTOLIC BLOOD PRESSURE: 120 MMHG | RESPIRATION RATE: 20 BRPM | TEMPERATURE: 97 F | DIASTOLIC BLOOD PRESSURE: 70 MMHG | OXYGEN SATURATION: 97 % | HEART RATE: 72 BPM

## 2019-05-17 PROCEDURE — 3331090002 HH PPS REVENUE DEBIT

## 2019-05-17 PROCEDURE — G0299 HHS/HOSPICE OF RN EA 15 MIN: HCPCS

## 2019-05-17 PROCEDURE — 3331090001 HH PPS REVENUE CREDIT

## 2019-05-18 PROCEDURE — 3331090002 HH PPS REVENUE DEBIT

## 2019-05-18 PROCEDURE — 3331090001 HH PPS REVENUE CREDIT

## 2019-05-19 PROCEDURE — 3331090001 HH PPS REVENUE CREDIT

## 2019-05-19 PROCEDURE — 3331090003 HH PPS REVENUE ADJ

## 2019-05-19 PROCEDURE — 3331090002 HH PPS REVENUE DEBIT

## 2019-05-20 ENCOUNTER — HOME CARE VISIT (OUTPATIENT)
Dept: SCHEDULING | Facility: HOME HEALTH | Age: 84
End: 2019-05-20
Payer: MEDICARE

## 2019-05-20 PROCEDURE — 3331090001 HH PPS REVENUE CREDIT

## 2019-05-20 PROCEDURE — G0299 HHS/HOSPICE OF RN EA 15 MIN: HCPCS

## 2019-05-20 PROCEDURE — 400014 HH F/U

## 2019-05-20 PROCEDURE — 3331090002 HH PPS REVENUE DEBIT

## 2019-05-21 ENCOUNTER — PATIENT OUTREACH (OUTPATIENT)
Dept: CARDIOLOGY CLINIC | Age: 84
End: 2019-05-21

## 2019-05-21 ENCOUNTER — OFFICE VISIT (OUTPATIENT)
Dept: VASCULAR SURGERY | Age: 84
End: 2019-05-21

## 2019-05-21 VITALS
DIASTOLIC BLOOD PRESSURE: 62 MMHG | WEIGHT: 187 LBS | HEART RATE: 76 BPM | BODY MASS INDEX: 31.92 KG/M2 | SYSTOLIC BLOOD PRESSURE: 120 MMHG | HEIGHT: 64 IN | RESPIRATION RATE: 16 BRPM

## 2019-05-21 VITALS
SYSTOLIC BLOOD PRESSURE: 130 MMHG | OXYGEN SATURATION: 98 % | RESPIRATION RATE: 16 BRPM | TEMPERATURE: 98 F | HEART RATE: 80 BPM | DIASTOLIC BLOOD PRESSURE: 80 MMHG

## 2019-05-21 DIAGNOSIS — L89.899 PRESSURE ULCER OF RIGHT LEG: Primary | ICD-10-CM

## 2019-05-21 PROCEDURE — 3331090002 HH PPS REVENUE DEBIT

## 2019-05-21 PROCEDURE — 3331090001 HH PPS REVENUE CREDIT

## 2019-05-21 NOTE — PROGRESS NOTES
Cleansed wound to right medial lower leg with wound cleanser and gauze, patient tolerated well. Trimmed graft that was loose. Applied thick layer of wound gel, calcium alginate with silver to open area, applied ABD, wrapped with kerlix and coban from base of toes to bend of knee, patient tolerated well. Will send new wound care orders to [address] [place] [address].

## 2019-05-21 NOTE — PROGRESS NOTES
Patient attended appointments with Dr Neeru Daniel, her vascular MD on 5/21/19, Nurse Navigator reviewed EMR and will follow up on next scheduled outreach.

## 2019-05-21 NOTE — PROGRESS NOTES
Ms. Libia Hannah is a 55-year-old female following up from the hospital.  Is been consulted for pressure sore on her right lower extrema debridement and placement of Acell graft with matrix. Today her wound really has responded very nicely. Some process of how well it looks today to get the staples and drain the edges. Apply a light dressing at home health until it heals over her regimen as well.

## 2019-05-21 NOTE — PROGRESS NOTES
1. Have you been to an emergency room or urgent care clinic since your last visit? NO Hospitalized since your last visit? If yes, where, when, and reason for visit? NO 
2. Have you seen or consulted any other health care providers outside of the [facility] since your last visit including any procedures, health maintenance items. If yes, where, when and reason for visit?  NO

## 2019-05-22 ENCOUNTER — HOME CARE VISIT (OUTPATIENT)
Dept: SCHEDULING | Facility: HOME HEALTH | Age: 84
End: 2019-05-22
Payer: MEDICARE

## 2019-05-22 VITALS
TEMPERATURE: 97 F | SYSTOLIC BLOOD PRESSURE: 120 MMHG | DIASTOLIC BLOOD PRESSURE: 64 MMHG | RESPIRATION RATE: 20 BRPM | HEART RATE: 80 BPM | OXYGEN SATURATION: 97 %

## 2019-05-22 PROCEDURE — 3331090002 HH PPS REVENUE DEBIT

## 2019-05-22 PROCEDURE — 3331090001 HH PPS REVENUE CREDIT

## 2019-05-23 PROCEDURE — 3331090002 HH PPS REVENUE DEBIT

## 2019-05-23 PROCEDURE — 3331090001 HH PPS REVENUE CREDIT

## 2019-05-24 ENCOUNTER — PATIENT OUTREACH (OUTPATIENT)
Dept: CARDIOLOGY CLINIC | Age: 84
End: 2019-05-24

## 2019-05-24 PROCEDURE — 3331090002 HH PPS REVENUE DEBIT

## 2019-05-24 PROCEDURE — 3331090001 HH PPS REVENUE CREDIT

## 2019-05-25 PROCEDURE — 3331090002 HH PPS REVENUE DEBIT

## 2019-05-25 PROCEDURE — 3331090001 HH PPS REVENUE CREDIT

## 2019-05-26 PROCEDURE — 3331090001 HH PPS REVENUE CREDIT

## 2019-05-26 PROCEDURE — 3331090002 HH PPS REVENUE DEBIT

## 2019-05-27 ENCOUNTER — HOME CARE VISIT (OUTPATIENT)
Dept: SCHEDULING | Facility: HOME HEALTH | Age: 84
End: 2019-05-27
Payer: MEDICARE

## 2019-05-27 PROCEDURE — 3331090001 HH PPS REVENUE CREDIT

## 2019-05-27 PROCEDURE — G0300 HHS/HOSPICE OF LPN EA 15 MIN: HCPCS

## 2019-05-27 PROCEDURE — 3331090002 HH PPS REVENUE DEBIT

## 2019-05-28 PROCEDURE — 3331090001 HH PPS REVENUE CREDIT

## 2019-05-28 PROCEDURE — 3331090002 HH PPS REVENUE DEBIT

## 2019-05-29 ENCOUNTER — OFFICE VISIT (OUTPATIENT)
Dept: CARDIOLOGY CLINIC | Age: 84
End: 2019-05-29

## 2019-05-29 ENCOUNTER — PATIENT OUTREACH (OUTPATIENT)
Dept: CARDIOLOGY CLINIC | Age: 84
End: 2019-05-29

## 2019-05-29 ENCOUNTER — HOME CARE VISIT (OUTPATIENT)
Dept: SCHEDULING | Facility: HOME HEALTH | Age: 84
End: 2019-05-29
Payer: MEDICARE

## 2019-05-29 VITALS
HEIGHT: 64 IN | HEART RATE: 68 BPM | SYSTOLIC BLOOD PRESSURE: 96 MMHG | WEIGHT: 187 LBS | DIASTOLIC BLOOD PRESSURE: 42 MMHG | BODY MASS INDEX: 31.92 KG/M2

## 2019-05-29 VITALS
DIASTOLIC BLOOD PRESSURE: 72 MMHG | OXYGEN SATURATION: 95 % | TEMPERATURE: 97.5 F | SYSTOLIC BLOOD PRESSURE: 102 MMHG | HEART RATE: 74 BPM

## 2019-05-29 DIAGNOSIS — I50.22 CHRONIC SYSTOLIC CONGESTIVE HEART FAILURE (HCC): Primary | ICD-10-CM

## 2019-05-29 DIAGNOSIS — I10 ESSENTIAL HYPERTENSION: ICD-10-CM

## 2019-05-29 DIAGNOSIS — Z95.0 S/P PLACEMENT OF CARDIAC PACEMAKER: ICD-10-CM

## 2019-05-29 DIAGNOSIS — I48.92 PAROXYSMAL ATRIAL FLUTTER (HCC): ICD-10-CM

## 2019-05-29 DIAGNOSIS — Z79.899 HIGH RISK MEDICATION USE: ICD-10-CM

## 2019-05-29 PROCEDURE — 3331090002 HH PPS REVENUE DEBIT

## 2019-05-29 PROCEDURE — G0299 HHS/HOSPICE OF RN EA 15 MIN: HCPCS

## 2019-05-29 PROCEDURE — 3331090001 HH PPS REVENUE CREDIT

## 2019-05-29 RX ORDER — TORSEMIDE 10 MG/1
10 TABLET ORAL
Qty: 30 TAB | Refills: 3 | Status: SHIPPED | OUTPATIENT
Start: 2019-05-29 | End: 2019-07-17

## 2019-05-29 NOTE — PROGRESS NOTES
Patient attended appointments with her Cardiologist, Dr Enrique Nagel. on 5/29/19, Nurse Navigator reviewed EMR and will follow up on next scheduled outreach.

## 2019-05-29 NOTE — PROGRESS NOTES
HISTORY OF PRESENT ILLNESS Ervin Keene is a 80 y.o. female. Patient with  chf,had persistent junctional ryhtm ,atrial flutter , s/p pacemaker,feels better Sob better Admitted to hospital 1/2019 with acute CHF. Low ejection fraction with systolic heart failure. Feels less short of breath since discharge. 3/4/2019 - admitted to hospital 2/10/19 for NSTEMI , Anemia, CKD stage IV and chronic systolic CHF. Has not had diuretics since d/c and c/o increased edema to upper and lower extremties. 3/19/2019-admitted to hospital for acute metabolic encephalopathy, sepsis, UTI. 
4/2/2019-was in emergency room with UTI and metabolic encephalopathy. Feels much better today. More alert. Denies any shortness of breath. Edema is better except for right upper extremity. 5/2019 Patient discharged 5/10/2019 with Discharge Diagnoses:                                          
RLE cellulitis, MRSA in wound - Staph Epidermidis bacteremia 
-ARIEL on ckd3 in the setting of severe sepsis - chronic mixed systolic and diastolic chf- compensated Recovering since discharge. Currently not on diuresis. Edema is significantly better. Hospital Follow Up The history is provided by the patient. CHF The history is provided by the patient. This is a chronic problem. The problem occurs constantly. The problem has been gradually improving. The symptoms are aggravated by exertion. Palpitations The history is provided by the patient. This is a chronic problem. The problem occurs constantly. Associated symptoms include lower extremity edema. Pertinent negatives include no fever, no claudication, no orthopnea, no PND, no nausea, no vomiting, no dizziness, no weakness, no cough, no hemoptysis and no sputum production. Her past medical history is significant for hypertension. Hypertension The history is provided by the patient. This is a chronic problem.  The problem occurs constantly. The problem has not changed since onset. Shortness of Breath The history is provided by the patient. This is a recurrent problem. The problem occurs intermittently. The problem has been gradually improving. Associated symptoms include leg swelling. Pertinent negatives include no fever, no cough, no sputum production, no hemoptysis, no wheezing, no PND, no orthopnea, no vomiting, no rash and no claudication. Precipitated by: exertion. Associated medical issues include heart failure. Leg Swelling The history is provided by the patient. This is a chronic problem. The problem occurs daily. The problem has been gradually improving. Review of Systems Constitutional: Negative for chills and fever. HENT: Negative for nosebleeds. Eyes: Negative for blurred vision and double vision. Respiratory: Negative for cough, hemoptysis, sputum production and wheezing. Cardiovascular: Positive for leg swelling. Negative for palpitations, orthopnea, claudication and PND. Gastrointestinal: Negative for heartburn, nausea and vomiting. Musculoskeletal: Negative for myalgias. Skin: Negative for rash. Neurological: Negative for dizziness and weakness. Endo/Heme/Allergies: Does not bruise/bleed easily. Family History Problem Relation Age of Onset  Arrhythmia Neg Hx  Asthma Neg Hx  Clotting Disorder Neg Hx  Fainting Neg Hx   
 Heart Attack Neg Hx  High Cholesterol Neg Hx  Pacemaker Neg Hx  Stroke Neg Hx Past Medical History:  
Diagnosis Date  Acute on chronic diastolic heart failure ([address]  Arthritis  Benign hypertensive heart disease without heart failure Better controlled, stable  Chronic diastolic heart failure ([address] Breathing and edema is improving  Congestive heart failure ([address]  HTN (hypertension)  Hypercholesteremia  Lupus (systemic lupus erythematosus) ([address] 6/18/2014  
 followed by dr Augustine Raymond  Obesity, unspecified Patient has weight loss Discussed diet ad fluid restriction  Other and unspecified hyperlipidemia F/u per pmd  
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  
Procedure Laterality Date  HX HYSTERECTOMY  PACEMAKER PROCEDURE No Known Allergies Current Outpatient Medications Medication Sig  torsemide (DEMADEX) 10 mg tablet Take 1 Tab by mouth daily as needed (edema).  predniSONE (DELTASONE) 5 mg tablet 10 mg po daily for 3 days , after that continue 5 mg po daily  polyethylene glycol (MIRALAX) 17 gram/dose powder Take 17 g by mouth daily.  aspirin delayed-release 81 mg tablet Take 81 mg by mouth daily.  OTHER Incentive Spirometry- Use as directed  food supplemt, lactose-reduced (ENSURE ENLIVE) 0.08 gram-1.5 kcal/mL liqd Take 1 Bottle by mouth two (2) times a day.  menthol-zinc oxide (CALMOSEPTINE) 0.44-20.6 % oint Apply  to affected area.  carvedilol (COREG) 3.125 mg tablet Take 1 Tab by mouth every twelve (12) hours.  acetaminophen (TYLENOL ARTHRITIS PAIN) 650 mg TbER Take 1 Tab by mouth every eight (8) hours. (Patient taking differently: Take 1 Tab by mouth daily.)  amiodarone (CORDARONE) 200 mg tablet TAKE ONE TABLET EVERY DAY (MAKE AN APPT) (Patient taking differently: TAKE ONE TABLET EVERY DAY by mouth)  memantine (NAMENDA) 10 mg tablet Take 10 mg by mouth two (2) times a day.  amitriptyline (ELAVIL) 25 mg tablet Take 25 mg by mouth nightly. No current facility-administered medications for this visit. Visit Vitals BP 96/42 Pulse 68 Ht 5' 4\" (1.626 m) Wt 84.8 kg (187 lb) BMI 32.10 kg/m² Physical Exam  
Constitutional: She is oriented to person, place, and time. She appears well-developed and well-nourished. HENT:  
Head: Normocephalic and atraumatic.   
Eyes: Conjunctivae are normal.  
 Neck: Neck supple. No JVD present. No tracheal deviation present. No thyromegaly present. Cardiovascular: Normal rate and regular rhythm. PMI is not displaced. Exam reveals no gallop and no decreased pulses. Murmur heard. Holosystolic murmur is present at the lower left sternal border. Pulmonary/Chest: No respiratory distress. She has no wheezes. She has no rales. She exhibits no tenderness. Abdominal: Soft. There is no tenderness. Musculoskeletal: She exhibits edema (3+ BLE edema - extending to thighs, BL upper ext edema). Neurological: She is alert and oriented to person, place, and time. Skin: Skin is warm. Psychiatric: She has a normal mood and affect. No flowsheet data found. Ms. Ute Agarwal has a reminder for a \"due or due soon\" health maintenance. I have asked that she contact her primary care provider for follow-up on this health maintenance. SUMMARY:4/2014 Left ventricle: Ejection fraction was estimated to be 60 %. No obvious 
wall motion abnormalities identified in the views obtained. There was mild 
concentric hypertrophy. Features were consistent with a pseudonormal left 
ventricular filling pattern, with concomitant abnormal relaxation and 
increased filling pressure (grade 2 diastolic dysfunction). Tricuspid valve: There was moderate regurgitation. Pulmonary artery 
systolic pressure: 50 mmHg. 11/2015:stress test 
1. Normal perfusion scan. 2. Normal wall motion and ejection fraction. 3. Gated images reveal normal wall motion and ejection fraction is 
calculated at 59%. 12/07/2015 Pacer check Noted with A Flutter. D/w daughter on phone 7/2019 Pacemaker check normal function. No A. fib. Atrial heart rate up to 159. Interpretation Summary 12/2018 · Technically difficult study due to patient compliance. Unable to obtain on-axis apical images. Good parasternals, poor subcostal images.  
· Left ventricular moderate-to-severely decreased global systolic function. Estimated left ventricular ejection fraction is 31 - 35%. Visually measured ejection fraction. Left ventricular severe sigmoid septum hypertrophy. Abnormal left ventricular wall motion as described on the wall scoring diagram below. Abnormal left ventricular septal motion. Interventricular septal \"bounce\". Severe (grade 3) left ventricular diastolic dysfunction. · Moderate tricuspid valve regurgitation is present. Mild pulmonary hypertension is present. PASP 38mmHg · Mild aortic valve regurgitation is present. · Mild to moderate mitral valve regurgitation. Assessment ICD-10-CM ICD-9-CM 1. Chronic systolic congestive heart failure (HCC) I50.22 428.22   
  428.0 Stable continue current treatment compensated limited activity 2. Paroxysmal atrial flutter (HCC) I48.92 427.32 Stable continue treatment 3. S/P placement of cardiac pacemaker Z95.0 V45.01 Normal function. Last check 5/2/19 4. Essential hypertension I10 401.9 Stable continue treatment 5. High risk medication use Z79.899 V58.69 Maintain on amiodarone for atrial arrhythmia. No high-risk episode noted on recent pacer check  
continue amiodarone to maintain sr,risk of anticoagulation high and would not anticoagulate-discussed with family I have discussed risk benefit and option of use of amiodarone for arrythmia. Risk of toxicity with medication are informed. Patient will require careful monitoring. Lasix and metolazone used as needed- 
1/2019 Recent admission with CHF. Continue to monitor. Due to dementia would not be a candidate for ICD upgrade. Patient has increased edema since discharge. I have increase Lasix from 20 mg to 40 mg 1-2 tablets a day. Patient currently nursing home 3/4/2019 - Recent admission for acute systolic CHF and CKD with hypotension. She has had increased edema since discharge and has not had diuretics in 3 weeks. Begin Demadex 20 mg and Metolazone 2.5 mg/ day.  Have BMP drawn in 1 weeks. F/U in our office in 2 weeks - to ER for new or worsening symptoms. All discussed with daughter and son in room. 5/2019 CHF compensated. Recent admission with cellulitis. Edema is significantly better. Continue to hold torsemide and use it as needed Medications Discontinued During This Encounter Medication Reason  hydroxychloroquine (PLAQUENIL) 200 mg tablet Not A Current Medication  torsemide (DEMADEX) 10 mg tablet Reorder Orders Placed This Encounter  torsemide (DEMADEX) 10 mg tablet Sig: Take 1 Tab by mouth daily as needed (edema). Dispense:  30 Tab Refill:  3 Follow-up and Dispositions · Return in about 4 months (around 9/29/2019).  
  
 
 
Kathy Barnard MD

## 2019-05-30 PROCEDURE — 3331090001 HH PPS REVENUE CREDIT

## 2019-05-30 PROCEDURE — 3331090002 HH PPS REVENUE DEBIT

## 2019-05-31 PROCEDURE — 3331090002 HH PPS REVENUE DEBIT

## 2019-05-31 PROCEDURE — 3331090001 HH PPS REVENUE CREDIT

## 2019-06-01 PROCEDURE — 3331090001 HH PPS REVENUE CREDIT

## 2019-06-01 PROCEDURE — 3331090002 HH PPS REVENUE DEBIT

## 2019-06-02 VITALS
RESPIRATION RATE: 18 BRPM | DIASTOLIC BLOOD PRESSURE: 80 MMHG | OXYGEN SATURATION: 98 % | HEART RATE: 80 BPM | TEMPERATURE: 98.1 F | SYSTOLIC BLOOD PRESSURE: 124 MMHG

## 2019-06-02 PROCEDURE — 3331090001 HH PPS REVENUE CREDIT

## 2019-06-02 PROCEDURE — 3331090002 HH PPS REVENUE DEBIT

## 2019-06-03 ENCOUNTER — HOME CARE VISIT (OUTPATIENT)
Dept: SCHEDULING | Facility: HOME HEALTH | Age: 84
End: 2019-06-03
Payer: MEDICARE

## 2019-06-03 PROCEDURE — G0299 HHS/HOSPICE OF RN EA 15 MIN: HCPCS

## 2019-06-03 PROCEDURE — 3331090002 HH PPS REVENUE DEBIT

## 2019-06-03 PROCEDURE — 3331090001 HH PPS REVENUE CREDIT

## 2019-06-04 ENCOUNTER — PATIENT OUTREACH (OUTPATIENT)
Dept: CARDIOLOGY CLINIC | Age: 84
End: 2019-06-04

## 2019-06-04 PROCEDURE — 3331090001 HH PPS REVENUE CREDIT

## 2019-06-04 PROCEDURE — 3331090002 HH PPS REVENUE DEBIT

## 2019-06-04 NOTE — PROGRESS NOTES
NN contacted the patient by telephone to perform CHF follow Up. Spoke to pt's son Trudy Kapadia. Noted Priorities/Plan:  Continued return to baseline. F/u appts     Daily Weight: Pt unstable to stand and son states no safe to lift by himself. Zone: green     Signs/Symptoms: Edema none; SOB none; orthopnea none. Son states no issues at this time and pt is doing well. Goals      Attends follow-up appointments as directed. 6/3/19 Patient will attend all scheduled appointments through 8/3/19       Knowledge and adherence medication (ie. action, side effects, missed dose, etc.)      6/3/19  Pt will take all medications prescribed to be evaluated on each outreach through 8/3/19          Needs addressed from pathway:   Week 1-4   Provide Daily Disease Management  (NN initiated)  ? Reviewed importance of Daily weight (Before Breakfast-  Reviewed Daily Zone Identification (symptom management; weight, edema, SOB, activity/sleep changes)-notify provider immediately as indicated  ? Reviewed Cardiac Low-sodium Diet   ? Reviewed importance of Fluid restriction  ? Comorbidity Management  ? Confirmed follow-up appointments/transportation. Additional assessment   ? Reinforced Fall precautions    ? Activity tolerance assessment: pt's son reports at baseline  ? Reviewed Energy conservation management and balancing activity with rest  ? Labs/diagnostics per MD office  ? Medication Therapy: no issues identified  ? Diet/appetite assessment: pt reports no issues  ? Reviewed appropriate ED/Hospital utilization  ? Immunizations not up to date   ? Home assessment/modifications: no needs identified  ? Pt denies transportation assistance needs  ? Pt denies medication assistance needs  ? Home health services are in place     Psychosocial: Reassurance/emotional support     Monitoring:  ? My Chart    Education:  ? Reviewed Advanced care planning status   ?  Support system identification ( eg: Caregiver, meal planning, community resources, family, friends, Baptism, support group)   ? Health literacy for heart failure  ? Caregiver education and preparation       Have you been to an ER/Hospital since discharge from [facility]? No      Have you followed up with PCP/Cardiologist/Specialist?   19 Vas Surg appt w/ Dr Vidya Cameron  19 Card appt w/ DANIELLE Valdez [facility]19 Card appt w/ Dr Linda Barroso  19 Vas Surg appt w/ Dr Alcides Trammell:  son/family  Diet: no salt added  Activity/ADLs: needs assistance from family. Medications Reconciled at this time:  son declined  Home health:  Company/Completion: [employer]  Social Support: family    Advanced Care Plan: pt baseline demented    Patient reminded that there is a physician on call 24 hours a day / 7 days a week (M-F 5pm to 8am and from Friday 5pm until Monday 8a for the weekend) should the patient have questions or concerns. Patient reminded to call 911 if situation is emergent or patient feels the situation is emergent. Pt verbalizes understanding.

## 2019-06-05 ENCOUNTER — HOME CARE VISIT (OUTPATIENT)
Dept: HOME HEALTH SERVICES | Facility: HOME HEALTH | Age: 84
End: 2019-06-05
Payer: MEDICARE

## 2019-06-05 PROCEDURE — 3331090001 HH PPS REVENUE CREDIT

## 2019-06-05 PROCEDURE — 3331090002 HH PPS REVENUE DEBIT

## 2019-06-06 PROCEDURE — 3331090002 HH PPS REVENUE DEBIT

## 2019-06-06 PROCEDURE — 3331090001 HH PPS REVENUE CREDIT

## 2019-06-07 VITALS
DIASTOLIC BLOOD PRESSURE: 80 MMHG | SYSTOLIC BLOOD PRESSURE: 120 MMHG | HEART RATE: 71 BPM | TEMPERATURE: 98.2 F | RESPIRATION RATE: 20 BRPM | OXYGEN SATURATION: 98 %

## 2019-06-07 PROCEDURE — 3331090002 HH PPS REVENUE DEBIT

## 2019-06-07 PROCEDURE — 3331090001 HH PPS REVENUE CREDIT

## 2019-06-08 PROCEDURE — 3331090001 HH PPS REVENUE CREDIT

## 2019-06-08 PROCEDURE — 3331090002 HH PPS REVENUE DEBIT

## 2019-06-09 PROCEDURE — 3331090002 HH PPS REVENUE DEBIT

## 2019-06-09 PROCEDURE — 3331090001 HH PPS REVENUE CREDIT

## 2019-06-10 PROCEDURE — 3331090001 HH PPS REVENUE CREDIT

## 2019-06-10 PROCEDURE — 3331090002 HH PPS REVENUE DEBIT

## 2019-06-11 ENCOUNTER — PATIENT OUTREACH (OUTPATIENT)
Dept: CARDIOLOGY CLINIC | Age: 84
End: 2019-06-11

## 2019-06-11 ENCOUNTER — HOME CARE VISIT (OUTPATIENT)
Dept: HOME HEALTH SERVICES | Facility: HOME HEALTH | Age: 84
End: 2019-06-11
Payer: MEDICARE

## 2019-06-11 PROCEDURE — A6216 NON-STERILE GAUZE<=16 SQ IN: HCPCS

## 2019-06-11 PROCEDURE — A4452 WATERPROOF TAPE: HCPCS

## 2019-06-11 PROCEDURE — A6248 HYDROGEL DRSG GEL FILLER: HCPCS

## 2019-06-11 PROCEDURE — A6454 SELF-ADHER BAND W>=3" <5"/YD: HCPCS

## 2019-06-11 PROCEDURE — 3331090002 HH PPS REVENUE DEBIT

## 2019-06-11 PROCEDURE — A6197 ALGINATE DRSG >16 <=48 SQ IN: HCPCS

## 2019-06-11 PROCEDURE — G0299 HHS/HOSPICE OF RN EA 15 MIN: HCPCS

## 2019-06-11 PROCEDURE — A6212 FOAM DRG <=16 SQ IN W/BORDER: HCPCS

## 2019-06-11 PROCEDURE — 3331090001 HH PPS REVENUE CREDIT

## 2019-06-11 PROCEDURE — A6446 CONFORM BAND S W>=3" <5"/YD: HCPCS

## 2019-06-11 NOTE — PROGRESS NOTES
NN contacted the patient by telephone to perform CHF follow Up. Spoke to pt's son Mitesh Hunter Noted Priorities/Plan:  Continued function at baseline. Daily Weight: pt not stable to stand w/ just son. Zone: green Signs/Symptoms: Edema none; SOB none; orthopnea none. Son states no issues at this time Goals  Attends follow-up appointments as directed. 6/3/19 Patient will attend all scheduled appointments through 8/3/19  Knowledge and adherence medication (ie. action, side effects, missed dose, etc.)   
  6/3/19  Pt will take all medications prescribed to be evaluated on each outreach through 8/3/19 Needs addressed from pathway:  
Week 5-8 Provide Daily Disease Management (patient/caregiver initiated) ? Reviewed and reinforced importance of Daily weight (Before Breakfast 
? Reviewed Daily Zone Identification (symptom management; weight, edema, SOB, activity/sleep changes)-notify provider immediately as indicated ? Cardiac Low-sodium Diet (No added sodium; 1500mg as indicated) also educated on carbohydrate controlled diet ? Reviewed importance of Fluid restriction ? Comorbidity Management ? Confirmed follow-up appointments/transportation. Additional Assessments ? Reinforced Fall precautions ? Activity tolerance assessment: pt's son reports pt at baseline ? Reviewed Energy conservation management and balancing activity w/ rest 
? Labs/diagnostics per MD office ? Medication Therapy: no issues identified ? Diet/appetite assessment: no issues noted ? Reviewed appropriate ED/Hospital utilization ? Immunizations not up to date ? Home re-assessment/modifications: no needs identified Psychosocial: Reassurance and emotional support; depression screening. Monitoring: ? Home health ? My Chart Education: 
? Advanced Care Planning status reviewed ? Patient/Caregiver verifies support systems (meal planning, medication and transportation needs, community resources) ? Health literacy for heart failure Have you been to an ER/Hospital since discharge from [facility]? No   
 
Have you followed up with PCP/Cardiologist/Specialist?  
19 Vasc Surg appt w/ Dr Lion Tee 19 Card appt w/ DANIELLE Posada [facility]19 Card appt w/ Dr Tanika Zimmer 19 Vas Surg appt w/ Dr Lion Tee Transportation:  son/family Diet: no salt added Activity/ADLs: needs assistance from family. Medications Reconciled at this time:  son declined Home health:  Company/Completion: [facility] [employer] Social Support: family Advanced Care Plan: pt baseline demented Patient reminded that there is a physician on call 24 hours a day / 7 days a week (M-F 5pm to 8am and from Friday 5pm until Monday 8a for the weekend) should the patient have questions or concerns. Patient reminded to call 911 if situation is emergent or patient feels the situation is emergent. Pt verbalizes understanding.

## 2019-06-12 PROCEDURE — 3331090001 HH PPS REVENUE CREDIT

## 2019-06-12 PROCEDURE — 3331090002 HH PPS REVENUE DEBIT

## 2019-06-13 PROCEDURE — 3331090002 HH PPS REVENUE DEBIT

## 2019-06-13 PROCEDURE — 3331090001 HH PPS REVENUE CREDIT

## 2019-06-14 ENCOUNTER — HOME CARE VISIT (OUTPATIENT)
Dept: SCHEDULING | Facility: HOME HEALTH | Age: 84
End: 2019-06-14
Payer: MEDICARE

## 2019-06-14 PROCEDURE — G0299 HHS/HOSPICE OF RN EA 15 MIN: HCPCS

## 2019-06-14 PROCEDURE — 3331090001 HH PPS REVENUE CREDIT

## 2019-06-14 PROCEDURE — 3331090002 HH PPS REVENUE DEBIT

## 2019-06-15 PROCEDURE — 3331090001 HH PPS REVENUE CREDIT

## 2019-06-15 PROCEDURE — 3331090002 HH PPS REVENUE DEBIT

## 2019-06-16 VITALS
HEART RATE: 74 BPM | OXYGEN SATURATION: 97 % | DIASTOLIC BLOOD PRESSURE: 58 MMHG | TEMPERATURE: 97.7 F | SYSTOLIC BLOOD PRESSURE: 107 MMHG | RESPIRATION RATE: 14 BRPM

## 2019-06-16 PROCEDURE — 3331090001 HH PPS REVENUE CREDIT

## 2019-06-16 PROCEDURE — 3331090002 HH PPS REVENUE DEBIT

## 2019-06-17 PROCEDURE — 3331090001 HH PPS REVENUE CREDIT

## 2019-06-17 PROCEDURE — 3331090002 HH PPS REVENUE DEBIT

## 2019-06-18 ENCOUNTER — PATIENT OUTREACH (OUTPATIENT)
Dept: CARDIOLOGY CLINIC | Age: 84
End: 2019-06-18

## 2019-06-18 VITALS
OXYGEN SATURATION: 96 % | SYSTOLIC BLOOD PRESSURE: 124 MMHG | RESPIRATION RATE: 20 BRPM | DIASTOLIC BLOOD PRESSURE: 68 MMHG | TEMPERATURE: 97.7 F | HEART RATE: 71 BPM

## 2019-06-18 PROCEDURE — 3331090001 HH PPS REVENUE CREDIT

## 2019-06-18 PROCEDURE — 3331090002 HH PPS REVENUE DEBIT

## 2019-06-18 NOTE — PROGRESS NOTES
NN contacted the patient by telephone to perform CHF follow Up. Spoke to pt's son Roxanne Pena Noted Priorities/Plan:  Continued function at baseline. Daily Weight: pt not stable to stand w/ just son. Zone: green Signs/Symptoms: Edema none; SOB none; orthopnea none. Son states no issues at this time. Reports pt is doing well Goals  Attends follow-up appointments as directed. 6/3/19 Patient will attend all scheduled appointments through 8/3/19  Knowledge and adherence medication (ie. action, side effects, missed dose, etc.)   
  6/3/19  Pt will take all medications prescribed to be evaluated on each outreach through 8/3/19 Needs addressed from pathway:  
Week 5-8 Provide Daily Disease Management (patient/caregiver initiated) ? Reviewed and reinforced importance of Daily weight (Before Breakfast 
? Reviewed Daily Zone Identification (symptom management; weight, edema, SOB, activity/sleep changes)-notify provider immediately as indicated ? Cardiac Low-sodium Diet (No added sodium; 1500mg as indicated) ? Reviewed importance of Fluid restriction ? Comorbidity Management ? Confirmed follow-up appointments/transportation. Additional Assessments ? Reinforced Fall precautions ? Activity tolerance assessment: pt's son reports pt at baseline ? Reviewed Energy conservation management and balancing activity w/ rest 
? Labs/diagnostics per MD office ? Medication Therapy: no issues identified ? Diet/appetite assessment: no issues noted ? Reviewed appropriate ED/Hospital utilization ? Immunizations not up to date ? Home re-assessment/modifications: no needs identified Psychosocial: Reassurance and emotional support; depression screening. Monitoring: ? Home health ? My Chart Education: 
? Advanced Care Planning status reviewed ? Patient/Caregiver verifies support systems (meal planning, medication and transportation needs, community resources) ? Health literacy for heart failure Have you been to an ER/Hospital since discharge from [facility]? No   
 
Have you followed up with PCP/Cardiologist/Specialist?  
19 Vas Surg appt w/ Dr Ras Lacy 19 Card appt w/ Dr Lorie Gilford, A. [facility]19 Card appt w/ Dr Lorie Gilford 19 Vas Surg appt w/ Dr Ras Lacy Transportation:  son/family Diet: no salt added Activity/ADLs: needs assistance from family. Medications Reconciled at this time:  son declined Home health:  Company/Completion: [employer] Social Support: family Advanced Care Plan: pt baseline demented Patient reminded that there is a physician on call 24 hours a day / 7 days a week (M-F 5pm to 8am and from Friday 5pm until Monday 8a for the weekend) should the patient have questions or concerns. Patient reminded to call 911 if situation is emergent or patient feels the situation is emergent. Pt verbalizes understanding.

## 2019-06-19 ENCOUNTER — HOME CARE VISIT (OUTPATIENT)
Dept: SCHEDULING | Facility: HOME HEALTH | Age: 84
End: 2019-06-19
Payer: MEDICARE

## 2019-06-19 PROCEDURE — G0300 HHS/HOSPICE OF LPN EA 15 MIN: HCPCS

## 2019-06-19 PROCEDURE — 3331090002 HH PPS REVENUE DEBIT

## 2019-06-19 PROCEDURE — 3331090001 HH PPS REVENUE CREDIT

## 2019-06-20 PROCEDURE — 3331090001 HH PPS REVENUE CREDIT

## 2019-06-20 PROCEDURE — 3331090002 HH PPS REVENUE DEBIT

## 2019-06-21 ENCOUNTER — HOME CARE VISIT (OUTPATIENT)
Dept: SCHEDULING | Facility: HOME HEALTH | Age: 84
End: 2019-06-21
Payer: MEDICARE

## 2019-06-21 PROCEDURE — G0300 HHS/HOSPICE OF LPN EA 15 MIN: HCPCS

## 2019-06-21 PROCEDURE — 3331090002 HH PPS REVENUE DEBIT

## 2019-06-21 PROCEDURE — 3331090001 HH PPS REVENUE CREDIT

## 2019-06-22 PROCEDURE — 3331090002 HH PPS REVENUE DEBIT

## 2019-06-22 PROCEDURE — 3331090001 HH PPS REVENUE CREDIT

## 2019-06-23 PROCEDURE — 3331090001 HH PPS REVENUE CREDIT

## 2019-06-23 PROCEDURE — 3331090002 HH PPS REVENUE DEBIT

## 2019-06-24 ENCOUNTER — OFFICE VISIT (OUTPATIENT)
Dept: CARDIOLOGY CLINIC | Age: 84
End: 2019-06-24

## 2019-06-24 DIAGNOSIS — Z95.0 S/P PLACEMENT OF CARDIAC PACEMAKER: Primary | ICD-10-CM

## 2019-06-24 PROCEDURE — 3331090002 HH PPS REVENUE DEBIT

## 2019-06-24 PROCEDURE — 3331090001 HH PPS REVENUE CREDIT

## 2019-06-24 NOTE — PROGRESS NOTES
HISTORY OF PRESENT ILLNESS Fran Pena is a 80 y.o. female. Patient with  chf,had persistent junctional ryhtm ,atrial flutter , s/p pacemaker,feels better Sob better Admitted to hospital 1/2019 with acute CHF. Low ejection fraction with systolic heart failure. Feels less short of breath since discharge. 3/4/2019 - admitted to hospital 2/10/19 for NSTEMI , Anemia, CKD stage IV and chronic systolic CHF. Has not had diuretics since d/c and c/o increased edema to upper and lower extremties. 3/19/2019-admitted to hospital for acute metabolic encephalopathy, sepsis, UTI. 
4/2/2019-was in emergency room with UTI and metabolic encephalopathy. Feels much better today. More alert. Denies any shortness of breath. Edema is better except for right upper extremity. 5/2019 Patient discharged 5/10/2019 with Discharge Diagnoses:                                          
RLE cellulitis, MRSA in wound - Staph Epidermidis bacteremia 
-ARIEL on ckd3 in the setting of severe sepsis - chronic mixed systolic and diastolic chf- compensated Recovering since discharge. Currently not on diuresis. Edema is significantly better. Hospital Follow Up The history is provided by the patient. Associated symptoms include shortness of breath. CHF The history is provided by the patient. This is a chronic problem. The problem occurs constantly. The problem has been gradually improving. Associated symptoms include shortness of breath. The symptoms are aggravated by exertion. Palpitations The history is provided by the patient. This is a chronic problem. The problem occurs constantly. Associated symptoms include lower extremity edema and shortness of breath. Pertinent negatives include no fever, no claudication, no orthopnea, no PND, no nausea, no vomiting, no dizziness, no weakness, no cough, no hemoptysis and no sputum production. Her past medical history is significant for hypertension. Hypertension The history is provided by the patient. This is a chronic problem. The problem occurs constantly. The problem has not changed since onset. Associated symptoms include shortness of breath. Shortness of Breath The history is provided by the patient. This is a recurrent problem. The problem occurs intermittently. The problem has been gradually improving. Associated symptoms include leg swelling. Pertinent negatives include no fever, no cough, no sputum production, no hemoptysis, no wheezing, no PND, no orthopnea, no vomiting, no rash and no claudication. Precipitated by: exertion. Associated medical issues include heart failure. Leg Swelling The history is provided by the patient. This is a chronic problem. The problem occurs daily. The problem has been gradually improving. Associated symptoms include shortness of breath. Review of Systems Constitutional: Negative for chills and fever. HENT: Negative for nosebleeds. Eyes: Negative for blurred vision and double vision. Respiratory: Positive for shortness of breath. Negative for cough, hemoptysis, sputum production and wheezing. Cardiovascular: Positive for leg swelling. Negative for palpitations, orthopnea, claudication and PND. Gastrointestinal: Negative for heartburn, nausea and vomiting. Musculoskeletal: Negative for myalgias. Skin: Negative for rash. Neurological: Negative for dizziness and weakness. Endo/Heme/Allergies: Does not bruise/bleed easily. Family History Problem Relation Age of Onset  Arrhythmia Neg Hx  Asthma Neg Hx  Clotting Disorder Neg Hx  Fainting Neg Hx   
 Heart Attack Neg Hx  High Cholesterol Neg Hx  Pacemaker Neg Hx  Stroke Neg Hx Past Medical History:  
Diagnosis Date  Acute on chronic diastolic heart failure ([address]  Arthritis  Benign hypertensive heart disease without heart failure Better controlled, stable  Chronic diastolic heart failure ([address] Breathing and edema is improving  [facility] heart failure [facility]  HTN (hypertension)  Hypercholesteremia  Lupus (systemic lupus erythematosus) ([facility] [address] [facility] 6/18/2014  
 followed by dr Thea Ruth  Obesity, unspecified Patient has weight loss Discussed diet ad fluid restriction  Other and unspecified hyperlipidemia F/u per pmd  
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  
Procedure Laterality Date  HX HYSTERECTOMY  PACEMAKER PROCEDURE No Known Allergies Current Outpatient Medications Medication Sig  torsemide (DEMADEX) 10 mg tablet Take 1 Tab by mouth daily as needed (edema).  predniSONE (DELTASONE) 5 mg tablet 10 mg po daily for 3 days , after that continue 5 mg po daily  polyethylene glycol (MIRALAX) 17 gram/dose powder Take 17 g by mouth daily.  aspirin delayed-release 81 mg tablet Take 81 mg by mouth daily.  OTHER Incentive Spirometry- Use as directed  food supplemt, lactose-reduced (ENSURE ENLIVE) 0.08 gram-1.5 kcal/mL liqd Take 1 Bottle by mouth two (2) times a day.  menthol-zinc oxide (CALMOSEPTINE) 0.44-20.6 % oint Apply  to affected area.  carvedilol (COREG) 3.125 mg tablet Take 1 Tab by mouth every twelve (12) hours.  acetaminophen (TYLENOL ARTHRITIS PAIN) 650 mg TbER Take 1 Tab by mouth every eight (8) hours. (Patient taking differently: Take 1 Tab by mouth daily.)  amiodarone (CORDARONE) 200 mg tablet TAKE ONE TABLET EVERY DAY (MAKE AN APPT) (Patient taking differently: TAKE ONE TABLET EVERY DAY by mouth)  memantine (NAMENDA) 10 mg tablet Take 10 mg by mouth two (2) times a day.  amitriptyline (ELAVIL) 25 mg tablet Take 25 mg by mouth nightly. No current facility-administered medications for this visit. There were no vitals taken for this visit. Physical Exam  
Constitutional: She is oriented to person, place, and time.  She appears well-developed and well-nourished. HENT:  
Head: Normocephalic and atraumatic. Eyes: Conjunctivae are normal.  
Neck: Neck supple. No JVD present. No tracheal deviation present. No thyromegaly present. Cardiovascular: Normal rate and regular rhythm. PMI is not displaced. Exam reveals no gallop and no decreased pulses. Murmur heard. Holosystolic murmur is present at the lower left sternal border. Pulmonary/Chest: No respiratory distress. She has no wheezes. She has no rales. She exhibits no tenderness. Abdominal: Soft. There is no tenderness. Musculoskeletal: She exhibits edema (3+ BLE edema - extending to thighs, BL upper ext edema). Neurological: She is alert and oriented to person, place, and time. Skin: Skin is warm. Psychiatric: She has a normal mood and affect. No flowsheet data found. Ms. Ute Agarwal has a reminder for a \"due or due soon\" health maintenance. I have asked that she contact her primary care provider for follow-up on this health maintenance. SUMMARY:4/2014 Left ventricle: Ejection fraction was estimated to be 60 %. No obvious 
wall motion abnormalities identified in the views obtained. There was mild 
concentric hypertrophy. Features were consistent with a pseudonormal left 
ventricular filling pattern, with concomitant abnormal relaxation and 
increased filling pressure (grade 2 diastolic dysfunction). Tricuspid valve: There was moderate regurgitation. Pulmonary artery 
systolic pressure: 50 mmHg. 11/2015:stress test 
1. Normal perfusion scan. 2. Normal wall motion and ejection fraction. 3. Gated images reveal normal wall motion and ejection fraction is 
calculated at 59%. 12/07/2015 Pacer check Noted with A Flutter. D/w daughter on phone 7/2019 Pacemaker check normal function. No A. fib. Atrial heart rate up to 159. Interpretation Summary 12/2018 · Technically difficult study due to patient compliance.  Unable to obtain on-axis apical images. Good parasternals, poor subcostal images. · Left ventricular moderate-to-severely decreased global systolic function. Estimated left ventricular ejection fraction is 31 - 35%. Visually measured ejection fraction. Left ventricular severe sigmoid septum hypertrophy. Abnormal left ventricular wall motion as described on the wall scoring diagram below. Abnormal left ventricular septal motion. Interventricular septal \"bounce\". Severe (grade 3) left ventricular diastolic dysfunction. · Moderate tricuspid valve regurgitation is present. Mild pulmonary hypertension is present. PASP 38mmHg · Mild aortic valve regurgitation is present. · Mild to moderate mitral valve regurgitation. Assessment ICD-10-CM ICD-9-CM 1. Chronic systolic congestive heart failure (HCC) I50.22 428.22   
  428.0 2. Paroxysmal atrial flutter (HCC) I48.92 427.32   
3. S/P placement of cardiac pacemaker Z95.0 V45.01   
4. Essential hypertension I10 401.9   
continue amiodarone to maintain sr,risk of anticoagulation high and would not anticoagulate-discussed with family I have discussed risk benefit and option of use of amiodarone for arrythmia. Risk of toxicity with medication are informed. Patient will require careful monitoring. Lasix and metolazone used as needed- 
1/2019 Recent admission with CHF. Continue to monitor. Due to dementia would not be a candidate for ICD upgrade. Patient has increased edema since discharge. I have increase Lasix from 20 mg to 40 mg 1-2 tablets a day. Patient currently nursing home 3/4/2019 - Recent admission for acute systolic CHF and CKD with hypotension. She has had increased edema since discharge and has not had diuretics in 3 weeks. Begin Demadex 20 mg and Metolazone 2.5 mg/ day. Have BMP drawn in 1 weeks. F/U in our office in 2 weeks - to ER for new or worsening symptoms. All discussed with daughter and son in room. 5/2019 CHF compensated. Recent admission with cellulitis. Edema is significantly better. Continue to hold torsemide and use it as needed There are no discontinued medications. No orders of the defined types were placed in this encounter.  
 
 
 
 
Bruno Negrete MD

## 2019-06-25 ENCOUNTER — HOME CARE VISIT (OUTPATIENT)
Dept: SCHEDULING | Facility: HOME HEALTH | Age: 84
End: 2019-06-25
Payer: MEDICARE

## 2019-06-25 VITALS
SYSTOLIC BLOOD PRESSURE: 127 MMHG | TEMPERATURE: 97.7 F | RESPIRATION RATE: 18 BRPM | DIASTOLIC BLOOD PRESSURE: 63 MMHG | HEART RATE: 61 BPM | OXYGEN SATURATION: 97 %

## 2019-06-25 PROCEDURE — A6454 SELF-ADHER BAND W>=3" <5"/YD: HCPCS

## 2019-06-25 PROCEDURE — A6212 FOAM DRG <=16 SQ IN W/BORDER: HCPCS

## 2019-06-25 PROCEDURE — A6446 CONFORM BAND S W>=3" <5"/YD: HCPCS

## 2019-06-25 PROCEDURE — A6252 ABSORPT DRG >16 <=48 W/O BDR: HCPCS

## 2019-06-25 PROCEDURE — 3331090001 HH PPS REVENUE CREDIT

## 2019-06-25 PROCEDURE — G0299 HHS/HOSPICE OF RN EA 15 MIN: HCPCS

## 2019-06-25 PROCEDURE — A6216 NON-STERILE GAUZE<=16 SQ IN: HCPCS

## 2019-06-25 PROCEDURE — A6260 WOUND CLEANSER ANY TYPE/SIZE: HCPCS

## 2019-06-25 PROCEDURE — 3331090002 HH PPS REVENUE DEBIT

## 2019-06-25 PROCEDURE — A6402 STERILE GAUZE <= 16 SQ IN: HCPCS

## 2019-06-26 VITALS
SYSTOLIC BLOOD PRESSURE: 100 MMHG | RESPIRATION RATE: 20 BRPM | DIASTOLIC BLOOD PRESSURE: 58 MMHG | HEART RATE: 70 BPM | OXYGEN SATURATION: 97 % | TEMPERATURE: 97.9 F

## 2019-06-26 VITALS — DIASTOLIC BLOOD PRESSURE: 64 MMHG | HEART RATE: 100 BPM | SYSTOLIC BLOOD PRESSURE: 108 MMHG | TEMPERATURE: 98 F

## 2019-06-26 PROCEDURE — 3331090002 HH PPS REVENUE DEBIT

## 2019-06-26 PROCEDURE — 3331090001 HH PPS REVENUE CREDIT

## 2019-06-27 ENCOUNTER — PATIENT OUTREACH (OUTPATIENT)
Dept: CARDIOLOGY CLINIC | Age: 84
End: 2019-06-27

## 2019-06-27 PROCEDURE — 3331090001 HH PPS REVENUE CREDIT

## 2019-06-27 PROCEDURE — 3331090002 HH PPS REVENUE DEBIT

## 2019-06-27 NOTE — PROGRESS NOTES
Patient attended appointments with her cardiologist, Dr Steph Marcelo. on 6/24/19, Nurse Navigator reviewed EMR and will follow up on next scheduled outreach.

## 2019-06-28 ENCOUNTER — HOME CARE VISIT (OUTPATIENT)
Dept: SCHEDULING | Facility: HOME HEALTH | Age: 84
End: 2019-06-28
Payer: MEDICARE

## 2019-06-28 PROCEDURE — 3331090001 HH PPS REVENUE CREDIT

## 2019-06-28 PROCEDURE — 3331090002 HH PPS REVENUE DEBIT

## 2019-06-28 PROCEDURE — G0299 HHS/HOSPICE OF RN EA 15 MIN: HCPCS

## 2019-06-29 VITALS
SYSTOLIC BLOOD PRESSURE: 140 MMHG | HEART RATE: 68 BPM | TEMPERATURE: 98.3 F | RESPIRATION RATE: 20 BRPM | OXYGEN SATURATION: 97 % | DIASTOLIC BLOOD PRESSURE: 60 MMHG

## 2019-06-29 PROCEDURE — 3331090002 HH PPS REVENUE DEBIT

## 2019-06-29 PROCEDURE — 3331090001 HH PPS REVENUE CREDIT

## 2019-06-30 PROCEDURE — 3331090001 HH PPS REVENUE CREDIT

## 2019-06-30 PROCEDURE — 3331090002 HH PPS REVENUE DEBIT

## 2019-07-01 PROCEDURE — 3331090002 HH PPS REVENUE DEBIT

## 2019-07-01 PROCEDURE — 3331090001 HH PPS REVENUE CREDIT

## 2019-07-02 ENCOUNTER — PATIENT OUTREACH (OUTPATIENT)
Dept: CARDIOLOGY CLINIC | Age: 84
End: 2019-07-02

## 2019-07-02 ENCOUNTER — HOME CARE VISIT (OUTPATIENT)
Dept: SCHEDULING | Facility: HOME HEALTH | Age: 84
End: 2019-07-02
Payer: MEDICARE

## 2019-07-02 PROCEDURE — 3331090001 HH PPS REVENUE CREDIT

## 2019-07-02 PROCEDURE — 3331090002 HH PPS REVENUE DEBIT

## 2019-07-02 PROCEDURE — G0300 HHS/HOSPICE OF LPN EA 15 MIN: HCPCS

## 2019-07-03 PROCEDURE — 3331090002 HH PPS REVENUE DEBIT

## 2019-07-03 PROCEDURE — 3331090001 HH PPS REVENUE CREDIT

## 2019-07-04 PROCEDURE — 3331090002 HH PPS REVENUE DEBIT

## 2019-07-04 PROCEDURE — 3331090001 HH PPS REVENUE CREDIT

## 2019-07-05 ENCOUNTER — HOME CARE VISIT (OUTPATIENT)
Dept: SCHEDULING | Facility: HOME HEALTH | Age: 84
End: 2019-07-05
Payer: MEDICARE

## 2019-07-05 PROCEDURE — G0299 HHS/HOSPICE OF RN EA 15 MIN: HCPCS

## 2019-07-05 PROCEDURE — A4927 NON-STERILE GLOVES: HCPCS

## 2019-07-05 PROCEDURE — 3331090002 HH PPS REVENUE DEBIT

## 2019-07-05 PROCEDURE — A6212 FOAM DRG <=16 SQ IN W/BORDER: HCPCS

## 2019-07-05 PROCEDURE — A6197 ALGINATE DRSG >16 <=48 SQ IN: HCPCS

## 2019-07-05 PROCEDURE — 3331090001 HH PPS REVENUE CREDIT

## 2019-07-06 PROCEDURE — 3331090002 HH PPS REVENUE DEBIT

## 2019-07-06 PROCEDURE — 3331090001 HH PPS REVENUE CREDIT

## 2019-07-07 PROCEDURE — 3331090001 HH PPS REVENUE CREDIT

## 2019-07-07 PROCEDURE — 3331090002 HH PPS REVENUE DEBIT

## 2019-07-08 PROCEDURE — 3331090001 HH PPS REVENUE CREDIT

## 2019-07-08 PROCEDURE — 3331090002 HH PPS REVENUE DEBIT

## 2019-07-09 ENCOUNTER — OFFICE VISIT (OUTPATIENT)
Dept: VASCULAR SURGERY | Age: 84
End: 2019-07-09

## 2019-07-09 VITALS
DIASTOLIC BLOOD PRESSURE: 72 MMHG | HEIGHT: 64 IN | BODY MASS INDEX: 31.92 KG/M2 | WEIGHT: 187 LBS | SYSTOLIC BLOOD PRESSURE: 140 MMHG | RESPIRATION RATE: 15 BRPM | HEART RATE: 70 BPM

## 2019-07-09 VITALS
DIASTOLIC BLOOD PRESSURE: 68 MMHG | OXYGEN SATURATION: 98 % | TEMPERATURE: 98 F | RESPIRATION RATE: 20 BRPM | HEART RATE: 70 BPM | SYSTOLIC BLOOD PRESSURE: 140 MMHG

## 2019-07-09 DIAGNOSIS — L89.899 PRESSURE ULCER OF RIGHT LEG: Primary | ICD-10-CM

## 2019-07-09 PROCEDURE — 3331090001 HH PPS REVENUE CREDIT

## 2019-07-09 PROCEDURE — 3331090002 HH PPS REVENUE DEBIT

## 2019-07-09 NOTE — PROGRESS NOTES
Mrs. Dotty Sykes has this right lower extremity venous ulcer. I examined it today it is 3 x 7 cm superficial with no signs of infection. There is no necrosis. It is vibrant and pink. She wears a modified compression as she cannot tolerate the Unna boot well. She has excellent pulses in her foot no arterial insufficiency. My recommendation will be another application of ACell to speed along the healing process I think she is done very nicely with this. Overall what I see is a noninfected healing slowly but slowly right lower extremity venous ulcer. We will make arrangements for next grafting.

## 2019-07-09 NOTE — PROGRESS NOTES
Cleansed wound to right medial lower leg with wound cleanser and gauze, patient tolerated well. Applied wound gel, calcium alginate with silver, ABD, wrapped with kerlix and coban, patient tolerated well.

## 2019-07-09 NOTE — LETTER
7/9/19 Patient: Daniel Jimenez YOB: 1931 Date of Visit: 7/9/2019 Julian Schwartz MD 
[address] [place] [address] VIA Facsimile: [phone] Dear Julian Schwartz MD, Thank you for referring Ms. Daniel Jimenez to [facility] for evaluation. My notes for this consultation are attached. If you have questions, please do not hesitate to call me. I look forward to following your patient along with you. Sincerely, Sandhya Ramírez MD

## 2019-07-09 NOTE — PROGRESS NOTES
1. Have you been to an emergency room or urgent care clinic since your last visit? No 
 
Hospitalized since your last visit? If yes, where, when, and reason for visit? NO 
2. Have you seen or consulted any other health care providers outside of the [facility] since your last visit including any procedures, health maintenance items. If yes, where, when and reason for visit?  No

## 2019-07-10 ENCOUNTER — PATIENT OUTREACH (OUTPATIENT)
Dept: CARDIOLOGY CLINIC | Age: 84
End: 2019-07-10

## 2019-07-10 ENCOUNTER — HOME CARE VISIT (OUTPATIENT)
Dept: HOME HEALTH SERVICES | Facility: HOME HEALTH | Age: 84
End: 2019-07-10
Payer: MEDICARE

## 2019-07-10 PROCEDURE — 3331090001 HH PPS REVENUE CREDIT

## 2019-07-10 PROCEDURE — 3331090002 HH PPS REVENUE DEBIT

## 2019-07-10 NOTE — PROGRESS NOTES
Patient attended appointments with her Vascular Surgeon, Dr Saint Evangelist. on 7/9/19, Nurse Navigator reviewed EMR and will follow up on next scheduled outreach.

## 2019-07-11 PROCEDURE — 3331090002 HH PPS REVENUE DEBIT

## 2019-07-11 PROCEDURE — 3331090001 HH PPS REVENUE CREDIT

## 2019-07-12 ENCOUNTER — HOME CARE VISIT (OUTPATIENT)
Dept: SCHEDULING | Facility: HOME HEALTH | Age: 84
End: 2019-07-12
Payer: MEDICARE

## 2019-07-12 PROCEDURE — 3331090002 HH PPS REVENUE DEBIT

## 2019-07-12 PROCEDURE — G0299 HHS/HOSPICE OF RN EA 15 MIN: HCPCS

## 2019-07-12 PROCEDURE — 3331090001 HH PPS REVENUE CREDIT

## 2019-07-13 PROCEDURE — 3331090002 HH PPS REVENUE DEBIT

## 2019-07-13 PROCEDURE — 3331090001 HH PPS REVENUE CREDIT

## 2019-07-14 VITALS
TEMPERATURE: 97.7 F | DIASTOLIC BLOOD PRESSURE: 60 MMHG | SYSTOLIC BLOOD PRESSURE: 120 MMHG | RESPIRATION RATE: 16 BRPM | OXYGEN SATURATION: 94 % | HEART RATE: 68 BPM

## 2019-07-14 PROCEDURE — 3331090001 HH PPS REVENUE CREDIT

## 2019-07-14 PROCEDURE — 3331090002 HH PPS REVENUE DEBIT

## 2019-07-15 ENCOUNTER — APPOINTMENT (OUTPATIENT)
Dept: GENERAL RADIOLOGY | Age: 84
DRG: 291 | End: 2019-07-15
Attending: EMERGENCY MEDICINE
Payer: MEDICARE

## 2019-07-15 ENCOUNTER — HOME CARE VISIT (OUTPATIENT)
Dept: SCHEDULING | Facility: HOME HEALTH | Age: 84
End: 2019-07-15
Payer: MEDICARE

## 2019-07-15 ENCOUNTER — HOSPITAL ENCOUNTER (EMERGENCY)
Age: 84
Discharge: HOME | DRG: 291 | End: 2019-07-15
Attending: EMERGENCY MEDICINE
Payer: MEDICARE

## 2019-07-15 VITALS
HEART RATE: 73 BPM | TEMPERATURE: 98.1 F | SYSTOLIC BLOOD PRESSURE: 129 MMHG | OXYGEN SATURATION: 100 % | RESPIRATION RATE: 20 BRPM | DIASTOLIC BLOOD PRESSURE: 72 MMHG

## 2019-07-15 DIAGNOSIS — I50.9 ACUTE ON CHRONIC CONGESTIVE HEART FAILURE, UNSPECIFIED HEART FAILURE TYPE (HCC): ICD-10-CM

## 2019-07-15 DIAGNOSIS — R06.02 SOB (SHORTNESS OF BREATH): Primary | ICD-10-CM

## 2019-07-15 LAB
ANION GAP SERPL CALC-SCNC: 4 MMOL/L (ref 3–18)
BASOPHILS # BLD: 0 K/UL (ref 0–0.1)
BASOPHILS NFR BLD: 0 % (ref 0–2)
BNP SERPL-MCNC: ABNORMAL PG/ML (ref 0–1800)
BUN SERPL-MCNC: 34 MG/DL (ref 7–18)
BUN/CREAT SERPL: 24 (ref 12–20)
CALCIUM SERPL-MCNC: 8.4 MG/DL (ref 8.5–10.1)
CHLORIDE SERPL-SCNC: 101 MMOL/L (ref 100–108)
CK MB CFR SERPL CALC: ABNORMAL % (ref 0–4)
CK MB SERPL-MCNC: <1 NG/ML (ref 5–25)
CK SERPL-CCNC: 86 U/L (ref 26–192)
CO2 SERPL-SCNC: 36 MMOL/L (ref 21–32)
CREAT SERPL-MCNC: 1.43 MG/DL (ref 0.6–1.3)
DIFFERENTIAL METHOD BLD: ABNORMAL
EOSINOPHIL # BLD: 0.1 K/UL (ref 0–0.4)
EOSINOPHIL NFR BLD: 2 % (ref 0–5)
ERYTHROCYTE [DISTWIDTH] IN BLOOD BY AUTOMATED COUNT: 16.8 % (ref 11.6–14.5)
GLUCOSE SERPL-MCNC: 117 MG/DL (ref 74–99)
HCT VFR BLD AUTO: 28.7 % (ref 35–45)
HGB BLD-MCNC: 8.8 G/DL (ref 12–16)
LYMPHOCYTES # BLD: 1.2 K/UL (ref 0.9–3.6)
LYMPHOCYTES NFR BLD: 20 % (ref 21–52)
MCH RBC QN AUTO: 27.9 PG (ref 24–34)
MCHC RBC AUTO-ENTMCNC: 30.7 G/DL (ref 31–37)
MCV RBC AUTO: 91.1 FL (ref 74–97)
MONOCYTES # BLD: 1 K/UL (ref 0.05–1.2)
MONOCYTES NFR BLD: 17 % (ref 3–10)
NEUTS SEG # BLD: 3.7 K/UL (ref 1.8–8)
NEUTS SEG NFR BLD: 61 % (ref 40–73)
PLATELET # BLD AUTO: 189 K/UL (ref 135–420)
PMV BLD AUTO: 9.1 FL (ref 9.2–11.8)
POTASSIUM SERPL-SCNC: 4.3 MMOL/L (ref 3.5–5.5)
RBC # BLD AUTO: 3.15 M/UL (ref 4.2–5.3)
SODIUM SERPL-SCNC: 141 MMOL/L (ref 136–145)
TROPONIN I SERPL-MCNC: 0.09 NG/ML (ref 0–0.04)
WBC # BLD AUTO: 6.1 K/UL (ref 4.6–13.2)

## 2019-07-15 PROCEDURE — 3331090002 HH PPS REVENUE DEBIT

## 2019-07-15 PROCEDURE — 80048 BASIC METABOLIC PNL TOTAL CA: CPT

## 2019-07-15 PROCEDURE — 85025 COMPLETE CBC W/AUTO DIFF WBC: CPT

## 2019-07-15 PROCEDURE — G0299 HHS/HOSPICE OF RN EA 15 MIN: HCPCS

## 2019-07-15 PROCEDURE — 93005 ELECTROCARDIOGRAM TRACING: CPT

## 2019-07-15 PROCEDURE — 99285 EMERGENCY DEPT VISIT HI MDM: CPT

## 2019-07-15 PROCEDURE — 3331090001 HH PPS REVENUE CREDIT

## 2019-07-15 PROCEDURE — 83880 ASSAY OF NATRIURETIC PEPTIDE: CPT

## 2019-07-15 PROCEDURE — 96374 THER/PROPH/DIAG INJ IV PUSH: CPT

## 2019-07-15 PROCEDURE — 82550 ASSAY OF CK (CPK): CPT

## 2019-07-15 PROCEDURE — 74011250636 HC RX REV CODE- 250/636: Performed by: EMERGENCY MEDICINE

## 2019-07-15 PROCEDURE — 71045 X-RAY EXAM CHEST 1 VIEW: CPT

## 2019-07-15 RX ORDER — FUROSEMIDE 10 MG/ML
40 INJECTION INTRAMUSCULAR; INTRAVENOUS
Status: COMPLETED | OUTPATIENT
Start: 2019-07-15 | End: 2019-07-15

## 2019-07-15 RX ADMIN — FUROSEMIDE 40 MG: 10 INJECTION, SOLUTION INTRAMUSCULAR; INTRAVENOUS at 20:09

## 2019-07-15 NOTE — ED TRIAGE NOTES
Pt arrived via EMS from home with c/o shortness of breath. Per family \"we want her to have an x-ray. \"

## 2019-07-15 NOTE — ED PROVIDER NOTES
EMERGENCY DEPARTMENT HISTORY AND PHYSICAL EXAM 
 
4:49 PM 
 
 
Date: 7/15/2019 Patient Name: [facility] History of Presenting Illness Chief Complaint Patient presents with  Shortness of Breath History Provided By:Patient, Patient's Son and Patient's Daughter Additional History (Context): [place] is a 80 y.o. female with Past medical history of congestive heart failure, hypertension, who presents with chief complaint of shortness of breath per family and patient. Daughter states that patient is short of breath with some swelling in her legs and abdomen which generally happens when she is get getting fluid on her lungs. Daughter states that she is taking furosemide and sees Dr. Huseyin Herrera  Cardiologist.  No chest pain, dizziness, cough, fever, leg pain, dysuria and no other complaints. Family is requesting a chest x-ray PCP: Ying Kent MD 
 
 
 
Past History Past Medical History: 
Past Medical History:  
Diagnosis Date  Acute on chronic diastolic heart failure ([address]  Arthritis  Benign hypertensive heart disease without heart failure Better controlled, stable  Chronic diastolic heart failure ([address] Breathing and edema is improving  Congestive heart failure ([address]  HTN (hypertension)  Hypercholesteremia  Lupus (systemic lupus erythematosus) ([address] 6/18/2014  
 followed by dr Rudy Harrison  Obesity, unspecified Patient has weight loss Discussed diet ad fluid restriction  Other and unspecified hyperlipidemia F/u per pmd  
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 
Past Surgical History:  
Procedure Laterality Date  HX HYSTERECTOMY  PACEMAKER PROCEDURE Family History: 
Family History Problem Relation Age of Onset  Arrhythmia Neg Hx  Asthma Neg Hx  Clotting Disorder Neg Hx  Fainting Neg Hx   
 Heart Attack Neg Hx  High Cholesterol Neg Hx  Pacemaker Neg Hx  Stroke Neg Hx Social History: 
Social History Tobacco Use  Smoking status: Never Smoker  Smokeless tobacco: Never Used Substance Use Topics  Alcohol use: No  
 Drug use: No  
 
 
Allergies: 
No Known Allergies Review of Systems Review of Systems Constitutional: Negative for chills and fever. HENT: Negative for congestion, rhinorrhea, sore throat and trouble swallowing. Eyes: Negative for visual disturbance. Respiratory: Positive for shortness of breath. Negative for cough and wheezing. Cardiovascular: Negative for chest pain. Abdominal edema, and leg edema Gastrointestinal: Negative for abdominal pain, nausea and vomiting. Endocrine: Negative for polyuria. Genitourinary: Negative for difficulty urinating and dysuria. Musculoskeletal: Negative for arthralgias, neck pain and neck stiffness. Skin: Negative for pallor and rash. Neurological: Negative for dizziness, weakness, numbness and headaches. Hematological: Does not bruise/bleed easily. Psychiatric/Behavioral: Negative for confusion and dysphoric mood. All other systems reviewed and are negative. Physical Exam  
 
Visit Vitals /60 Pulse 73 Resp 18 SpO2 97% Physical Exam  
Constitutional: She is oriented to person, place, and time. She appears well-developed and well-nourished. No distress. HENT:  
Head: Normocephalic and atraumatic. Mouth/Throat: Oropharynx is clear and moist.  
Eyes: Pupils are equal, round, and reactive to light. Conjunctivae are normal. No scleral icterus. Neck: Normal range of motion. Neck supple. Cardiovascular: Normal rate and intact distal pulses. Capillary refill < 3 seconds Pulmonary/Chest: Effort normal and breath sounds normal. No respiratory distress. She has no wheezes. Abdominal: Soft. Bowel sounds are normal. She exhibits no distension. There is no tenderness. Musculoskeletal: Normal range of motion. She exhibits no edema. Lymphadenopathy:  
  She has no cervical adenopathy. Neurological: She is alert and oriented to person, place, and time. No cranial nerve deficit. Coordination normal.  
No slurred speech No facial droop EOMI Strength 5 out of 5 Skin: Skin is warm and dry. She is not diaphoretic. Psychiatric: She has a normal mood and affect. Her behavior is normal.  
Nursing note and vitals reviewed. Diagnostic Study Results Labs - Recent Results (from the past 12 hour(s)) EKG, 12 LEAD, INITIAL Collection Time: 07/15/19  5:11 PM  
Result Value Ref Range Ventricular Rate 71 BPM  
 Atrial Rate 64 BPM  
 QRS Duration 198 ms Q-T Interval 494 ms QTC Calculation (Bezet) 536 ms Calculated R Axis -55 degrees Calculated T Axis 131 degrees Diagnosis AV dual-paced rhythm Abnormal ECG When compared with ECG of 30-APR-2019 19:03, 
Vent. rate has decreased BY   4 BPM 
  
CBC WITH AUTOMATED DIFF Collection Time: 07/15/19  5:45 PM  
Result Value Ref Range WBC 6.1 4.6 - 13.2 K/uL  
 RBC 3.15 (L) 4.20 - 5.30 M/uL HGB 8.8 (L) 12.0 - 16.0 g/dL HCT 28.7 (L) 35.0 - 45.0 % MCV 91.1 74.0 - 97.0 FL  
 MCH 27.9 24.0 - 34.0 PG  
 MCHC 30.7 (L) 31.0 - 37.0 g/dL  
 RDW 16.8 (H) 11.6 - 14.5 % PLATELET 089 128 - 144 K/uL MPV 9.1 (L) 9.2 - 11.8 FL  
 NEUTROPHILS 61 40 - 73 % LYMPHOCYTES 20 (L) 21 - 52 % MONOCYTES 17 (H) 3 - 10 % EOSINOPHILS 2 0 - 5 % BASOPHILS 0 0 - 2 %  
 ABS. NEUTROPHILS 3.7 1.8 - 8.0 K/UL  
 ABS. LYMPHOCYTES 1.2 0.9 - 3.6 K/UL  
 ABS. MONOCYTES 1.0 0.05 - 1.2 K/UL  
 ABS. EOSINOPHILS 0.1 0.0 - 0.4 K/UL  
 ABS. BASOPHILS 0.0 0.0 - 0.1 K/UL  
 DF AUTOMATED METABOLIC PANEL, BASIC Collection Time: 07/15/19  5:45 PM  
Result Value Ref Range Sodium 141 136 - 145 mmol/L Potassium 4.3 3.5 - 5.5 mmol/L  Chloride 101 100 - 108 mmol/L  
 CO2 36 (H) 21 - 32 mmol/L  
 Anion gap 4 3.0 - 18 mmol/L Glucose 117 (H) 74 - 99 mg/dL BUN 34 (H) 7.0 - 18 MG/DL Creatinine 1.43 (H) 0.6 - 1.3 MG/DL  
 BUN/Creatinine ratio 24 (H) 12 - 20 GFR est AA 42 (L) >60 ml/min/1.73m2 GFR est non-AA 35 (L) >60 ml/min/1.73m2 Calcium 8.4 (L) 8.5 - 10.1 MG/DL  
NT-PRO BNP Collection Time: 07/15/19  5:45 PM  
Result Value Ref Range NT pro-BNP 11,805 (H) 0 - 1,800 PG/ML  
CARDIAC PANEL,(CK, CKMB & TROPONIN) Collection Time: 07/15/19  5:45 PM  
Result Value Ref Range CK 86 26 - 192 U/L  
 CK - MB <1.0 <3.6 ng/ml CK-MB Index  0.0 - 4.0 % CALCULATION NOT PERFORMED WHEN RESULT IS BELOW LINEAR LIMIT Troponin-I, QT 0.09 (H) 0.0 - 0.045 NG/ML Radiologic Studies -  
XR CHEST PORT Final Result IMPRESSION:  
  
1. Pacemaker in place. 2.  Prominent enlargement of the cardiac silhouette. 3.  Streaky densities in the retrocardiac region suggestive of atelectatic  
changes vs. infiltrate. Medical Decision Making I am the first provider for this patient. I reviewed the vital signs, available nursing notes, past medical history, past surgical history, family history and social history. Vital Signs-Reviewed the patient's vital signs. Pulse Oximetry Analysis -  97 on room air (Interpretation) normal 
 
Cardiac Monitor: 
Rate: 73 Rhythm:  Paced EKG: Interpreted by the EP Dr. Marii Singh. Time Interpreted: 5:13 PM 
 Rate: 71 Rhythm: Paced Interpretation: Paced Records Reviewed: Nursing Notes and Old Medical Records (Time of Review: 4:49 PM) Provider Notes (Medical Decision Making): DDX: Acute on chronic CHF, URI, other cardiac, metabolic Labs, EKG, chest x-ray, Lasix MDM Medications  
furosemide (LASIX) injection 40 mg (has no administration in time range) ED Course: Progress Notes, Reevaluation, and Consults: WBC within normal limits Creatinine at baseline proBNP 11,800 which appears to be baseline at her last proBNP Oxygen level is normal.  Feel patient can be discharged home continue her diuretic at home. I have reassessed the patient. I have discussed the workup, results and plan with the patient and patient is in agreement. Patient was discharge in stable condition. Patient was given outpatient follow up. Patient is to return to emergency department if any new or worsening condition. Diagnosis Clinical Impression:  
1. SOB (shortness of breath) 2. Acute on chronic congestive heart failure, unspecified heart failure type ([address] Disposition: Discharged Follow-up Information Follow up With Specialties Details Why Contact Info Leigh Ann Dias MD Internal Medicine Schedule an appointment as soon as possible for a visit in 2 days  [address] [place] [address] 
[phone] Patient's Medications Start Taking No medications on file Continue Taking ACETAMINOPHEN (TYLENOL ARTHRITIS PAIN) 650 MG TBER    Take 1 Tab by mouth every eight (8) hours. AMIODARONE (CORDARONE) 200 MG TABLET    TAKE ONE TABLET EVERY DAY (MAKE AN APPT) AMITRIPTYLINE (ELAVIL) 25 MG TABLET    Take 25 mg by mouth nightly. ASPIRIN DELAYED-RELEASE 81 MG TABLET    Take 81 mg by mouth daily. CARVEDILOL (COREG) 3.125 MG TABLET    Take 1 Tab by mouth every twelve (12) hours. FOOD SUPPLEMT, LACTOSE-REDUCED (ENSURE ENLIVE) 0.08 GRAM-1.5 KCAL/ML LIQD    Take 1 Bottle by mouth two (2) times a day. MEMANTINE (NAMENDA) 10 MG TABLET    Take 10 mg by mouth two (2) times a day. MENTHOL-ZINC OXIDE (CALMOSEPTINE) 0.44-20.6 % OINT    Apply  to affected area. OTHER    Incentive Spirometry- Use as directed POLYETHYLENE GLYCOL (MIRALAX) 17 GRAM/DOSE POWDER    Take 17 g by mouth daily. PREDNISONE (DELTASONE) 5 MG TABLET    10 mg po daily for 3 days , after that continue 5 mg po daily TORSEMIDE (DEMADEX) 10 MG TABLET    Take 1 Tab by mouth daily as needed (edema). These Medications have changed No medications on file Stop Taking No medications on file DO Joan Langford medical dictation software was used for portions of this report. Unintended transcription errors may occur. My signature above authenticates this document and my orders, the final   
diagnosis (es), discharge prescription (s), and instructions in the Epic   
record.

## 2019-07-16 VITALS
TEMPERATURE: 98.1 F | OXYGEN SATURATION: 96 % | SYSTOLIC BLOOD PRESSURE: 122 MMHG | DIASTOLIC BLOOD PRESSURE: 67 MMHG | RESPIRATION RATE: 16 BRPM | HEART RATE: 74 BPM

## 2019-07-16 PROCEDURE — 3331090002 HH PPS REVENUE DEBIT

## 2019-07-16 PROCEDURE — 3331090001 HH PPS REVENUE CREDIT

## 2019-07-16 NOTE — ED NOTES
I have reviewed discharge instructions with the caregiver. The caregiver verbalized understanding. Motley setting up EMS transport at this time.

## 2019-07-17 ENCOUNTER — HOSPITAL ENCOUNTER (INPATIENT)
Age: 84
LOS: 8 days | Discharge: HOME HEALTH | DRG: 291 | End: 2019-07-25
Attending: EMERGENCY MEDICINE | Admitting: INTERNAL MEDICINE
Payer: MEDICARE

## 2019-07-17 ENCOUNTER — OFFICE VISIT (OUTPATIENT)
Dept: CARDIOLOGY CLINIC | Age: 84
End: 2019-07-17

## 2019-07-17 ENCOUNTER — APPOINTMENT (OUTPATIENT)
Dept: GENERAL RADIOLOGY | Age: 84
DRG: 291 | End: 2019-07-17
Attending: EMERGENCY MEDICINE
Payer: MEDICARE

## 2019-07-17 ENCOUNTER — APPOINTMENT (OUTPATIENT)
Dept: CT IMAGING | Age: 84
DRG: 291 | End: 2019-07-17
Attending: EMERGENCY MEDICINE
Payer: MEDICARE

## 2019-07-17 VITALS
HEART RATE: 75 BPM | DIASTOLIC BLOOD PRESSURE: 71 MMHG | HEIGHT: 64 IN | BODY MASS INDEX: 32.1 KG/M2 | SYSTOLIC BLOOD PRESSURE: 136 MMHG

## 2019-07-17 DIAGNOSIS — R53.81 DEBILITY: ICD-10-CM

## 2019-07-17 DIAGNOSIS — F03.90 DEMENTIA WITHOUT BEHAVIORAL DISTURBANCE, UNSPECIFIED DEMENTIA TYPE: ICD-10-CM

## 2019-07-17 DIAGNOSIS — I48.92 PAROXYSMAL ATRIAL FLUTTER (HCC): ICD-10-CM

## 2019-07-17 DIAGNOSIS — I10 ESSENTIAL HYPERTENSION: ICD-10-CM

## 2019-07-17 DIAGNOSIS — Z95.0 S/P PLACEMENT OF CARDIAC PACEMAKER: ICD-10-CM

## 2019-07-17 DIAGNOSIS — J96.01 ACUTE RESPIRATORY FAILURE WITH HYPOXIA (HCC): ICD-10-CM

## 2019-07-17 DIAGNOSIS — J44.1 COPD EXACERBATION (HCC): ICD-10-CM

## 2019-07-17 DIAGNOSIS — R31.9 URINARY TRACT INFECTION WITH HEMATURIA, SITE UNSPECIFIED: ICD-10-CM

## 2019-07-17 DIAGNOSIS — N39.0 URINARY TRACT INFECTION WITH HEMATURIA, SITE UNSPECIFIED: ICD-10-CM

## 2019-07-17 DIAGNOSIS — R06.03 RESPIRATORY DISTRESS: Primary | ICD-10-CM

## 2019-07-17 DIAGNOSIS — Z79.899 HIGH RISK MEDICATION USE: ICD-10-CM

## 2019-07-17 DIAGNOSIS — Z71.89 ADVANCED CARE PLANNING/COUNSELING DISCUSSION: ICD-10-CM

## 2019-07-17 DIAGNOSIS — D64.9 ANEMIA, UNSPECIFIED TYPE: ICD-10-CM

## 2019-07-17 DIAGNOSIS — I50.22 CHRONIC SYSTOLIC CONGESTIVE HEART FAILURE (HCC): Primary | ICD-10-CM

## 2019-07-17 DIAGNOSIS — J18.9 COMMUNITY ACQUIRED PNEUMONIA OF RIGHT LUNG, UNSPECIFIED PART OF LUNG: ICD-10-CM

## 2019-07-17 LAB
ALBUMIN SERPL-MCNC: 3.1 G/DL (ref 3.4–5)
ALBUMIN/GLOB SERPL: 1 {RATIO} (ref 0.8–1.7)
ALP SERPL-CCNC: 60 U/L (ref 45–117)
ALT SERPL-CCNC: 15 U/L (ref 13–56)
ANION GAP SERPL CALC-SCNC: 3 MMOL/L (ref 3–18)
ARTERIAL PATENCY WRIST A: YES
ARTERIAL PATENCY WRIST A: YES
AST SERPL-CCNC: 15 U/L (ref 15–37)
ATRIAL RATE: 288 BPM
ATRIAL RATE: 64 BPM
BASE EXCESS BLD CALC-SCNC: 10 MMOL/L
BASE EXCESS BLD CALC-SCNC: 8 MMOL/L
BASOPHILS # BLD: 0 K/UL (ref 0–0.1)
BASOPHILS NFR BLD: 0 % (ref 0–2)
BDY SITE: ABNORMAL
BDY SITE: ABNORMAL
BILIRUB SERPL-MCNC: 0.4 MG/DL (ref 0.2–1)
BUN SERPL-MCNC: 38 MG/DL (ref 7–18)
BUN/CREAT SERPL: 26 (ref 12–20)
CALCIUM SERPL-MCNC: 8.3 MG/DL (ref 8.5–10.1)
CALCULATED R AXIS, ECG10: -51 DEGREES
CALCULATED R AXIS, ECG10: -55 DEGREES
CALCULATED T AXIS, ECG11: 130 DEGREES
CALCULATED T AXIS, ECG11: 131 DEGREES
CHLORIDE SERPL-SCNC: 99 MMOL/L (ref 100–108)
CO2 SERPL-SCNC: 36 MMOL/L (ref 21–32)
CREAT SERPL-MCNC: 1.48 MG/DL (ref 0.6–1.3)
DIAGNOSIS, 93000: NORMAL
DIAGNOSIS, 93000: NORMAL
DIFFERENTIAL METHOD BLD: ABNORMAL
EOSINOPHIL # BLD: 0.1 K/UL (ref 0–0.4)
EOSINOPHIL NFR BLD: 2 % (ref 0–5)
ERYTHROCYTE [DISTWIDTH] IN BLOOD BY AUTOMATED COUNT: 16.8 % (ref 11.6–14.5)
GAS FLOW.O2 O2 DELIVERY SYS: ABNORMAL L/MIN
GAS FLOW.O2 O2 DELIVERY SYS: ABNORMAL L/MIN
GAS FLOW.O2 SETTING OXYMISER: 3 L/M
GAS FLOW.O2 SETTING OXYMISER: 6 L/M
GLOBULIN SER CALC-MCNC: 3.1 G/DL (ref 2–4)
GLUCOSE SERPL-MCNC: 115 MG/DL (ref 74–99)
HCO3 BLD-SCNC: 35.9 MMOL/L (ref 22–26)
HCO3 BLD-SCNC: 37.1 MMOL/L (ref 22–26)
HCT VFR BLD AUTO: 26.9 % (ref 35–45)
HGB BLD-MCNC: 8.3 G/DL (ref 12–16)
LACTATE BLD-SCNC: 0.85 MMOL/L (ref 0.4–2)
LYMPHOCYTES # BLD: 1.3 K/UL (ref 0.9–3.6)
LYMPHOCYTES NFR BLD: 22 % (ref 21–52)
MCH RBC QN AUTO: 28.2 PG (ref 24–34)
MCHC RBC AUTO-ENTMCNC: 30.9 G/DL (ref 31–37)
MCV RBC AUTO: 91.5 FL (ref 74–97)
MONOCYTES # BLD: 1.1 K/UL (ref 0.05–1.2)
MONOCYTES NFR BLD: 17 % (ref 3–10)
NEUTS SEG # BLD: 3.6 K/UL (ref 1.8–8)
NEUTS SEG NFR BLD: 59 % (ref 40–73)
P-R INTERVAL, ECG05: 212 MS
PCO2 BLD: 67.8 MMHG (ref 35–45)
PCO2 BLD: 68.9 MMHG (ref 35–45)
PH BLD: 7.33 [PH] (ref 7.35–7.45)
PH BLD: 7.34 [PH] (ref 7.35–7.45)
PLATELET # BLD AUTO: 166 K/UL (ref 135–420)
PMV BLD AUTO: 9.1 FL (ref 9.2–11.8)
PO2 BLD: 278 MMHG (ref 80–100)
PO2 BLD: 77 MMHG (ref 80–100)
POTASSIUM SERPL-SCNC: 4.6 MMOL/L (ref 3.5–5.5)
PROT SERPL-MCNC: 6.2 G/DL (ref 6.4–8.2)
Q-T INTERVAL, ECG07: 480 MS
Q-T INTERVAL, ECG07: 494 MS
QRS DURATION, ECG06: 198 MS
QRS DURATION, ECG06: 198 MS
QTC CALCULATION (BEZET), ECG08: 518 MS
QTC CALCULATION (BEZET), ECG08: 536 MS
RBC # BLD AUTO: 2.94 M/UL (ref 4.2–5.3)
SAO2 % BLD: 100 % (ref 92–97)
SAO2 % BLD: 94 % (ref 92–97)
SERVICE CMNT-IMP: ABNORMAL
SERVICE CMNT-IMP: ABNORMAL
SODIUM SERPL-SCNC: 138 MMOL/L (ref 136–145)
SPECIMEN TYPE: ABNORMAL
SPECIMEN TYPE: ABNORMAL
TOTAL RESP. RATE, ITRR: 11
TOTAL RESP. RATE, ITRR: 15
VENTRICULAR RATE, ECG03: 70 BPM
VENTRICULAR RATE, ECG03: 71 BPM
WBC # BLD AUTO: 6.1 K/UL (ref 4.6–13.2)

## 2019-07-17 PROCEDURE — 96365 THER/PROPH/DIAG IV INF INIT: CPT

## 2019-07-17 PROCEDURE — 71045 X-RAY EXAM CHEST 1 VIEW: CPT

## 2019-07-17 PROCEDURE — 77030013033 HC MSK BPAP/CPAP MMKA -B

## 2019-07-17 PROCEDURE — 5A09457 ASSISTANCE WITH RESPIRATORY VENTILATION, 24-96 CONSECUTIVE HOURS, CONTINUOUS POSITIVE AIRWAY PRESSURE: ICD-10-PCS | Performed by: EMERGENCY MEDICINE

## 2019-07-17 PROCEDURE — 3331090001 HH PPS REVENUE CREDIT

## 2019-07-17 PROCEDURE — 36600 WITHDRAWAL OF ARTERIAL BLOOD: CPT

## 2019-07-17 PROCEDURE — 85025 COMPLETE CBC W/AUTO DIFF WBC: CPT

## 2019-07-17 PROCEDURE — 74011000250 HC RX REV CODE- 250: Performed by: EMERGENCY MEDICINE

## 2019-07-17 PROCEDURE — 77030013140 HC MSK NEB VYRM -A

## 2019-07-17 PROCEDURE — 74011250636 HC RX REV CODE- 250/636: Performed by: INTERNAL MEDICINE

## 2019-07-17 PROCEDURE — 94640 AIRWAY INHALATION TREATMENT: CPT

## 2019-07-17 PROCEDURE — 77030037870 HC GLD SHT PREVALON SAGE -B

## 2019-07-17 PROCEDURE — 87040 BLOOD CULTURE FOR BACTERIA: CPT

## 2019-07-17 PROCEDURE — 82803 BLOOD GASES ANY COMBINATION: CPT

## 2019-07-17 PROCEDURE — 99285 EMERGENCY DEPT VISIT HI MDM: CPT

## 2019-07-17 PROCEDURE — 3331090002 HH PPS REVENUE DEBIT

## 2019-07-17 PROCEDURE — 80053 COMPREHEN METABOLIC PANEL: CPT

## 2019-07-17 PROCEDURE — 94660 CPAP INITIATION&MGMT: CPT

## 2019-07-17 PROCEDURE — 70450 CT HEAD/BRAIN W/O DYE: CPT

## 2019-07-17 PROCEDURE — 83605 ASSAY OF LACTIC ACID: CPT

## 2019-07-17 PROCEDURE — 65660000004 HC RM CVT STEPDOWN

## 2019-07-17 PROCEDURE — 96367 TX/PROPH/DG ADDL SEQ IV INF: CPT

## 2019-07-17 PROCEDURE — 74011000258 HC RX REV CODE- 258: Performed by: EMERGENCY MEDICINE

## 2019-07-17 PROCEDURE — 93005 ELECTROCARDIOGRAM TRACING: CPT

## 2019-07-17 PROCEDURE — 96375 TX/PRO/DX INJ NEW DRUG ADDON: CPT

## 2019-07-17 PROCEDURE — 74011250636 HC RX REV CODE- 250/636: Performed by: EMERGENCY MEDICINE

## 2019-07-17 RX ORDER — VANCOMYCIN/0.9 % SOD CHLORIDE 1 G/100 ML
1000 PLASTIC BAG, INJECTION (ML) INTRAVENOUS ONCE
Status: DISCONTINUED | OUTPATIENT
Start: 2019-07-17 | End: 2019-07-17 | Stop reason: DRUGHIGH

## 2019-07-17 RX ORDER — TORSEMIDE 20 MG/1
20 TABLET ORAL
Qty: 30 TAB | Refills: 3 | Status: SHIPPED | OUTPATIENT
Start: 2019-07-17 | End: 2020-01-01 | Stop reason: SDUPTHER

## 2019-07-17 RX ORDER — VANCOMYCIN/0.9 % SOD CHLORIDE 1.5G/250ML
1500 PLASTIC BAG, INJECTION (ML) INTRAVENOUS
Status: DISCONTINUED | OUTPATIENT
Start: 2019-07-19 | End: 2019-07-18

## 2019-07-17 RX ORDER — AMIODARONE HYDROCHLORIDE 200 MG/1
200 TABLET ORAL DAILY
Status: DISCONTINUED | OUTPATIENT
Start: 2019-07-18 | End: 2019-07-25 | Stop reason: HOSPADM

## 2019-07-17 RX ORDER — LEVOFLOXACIN 5 MG/ML
750 INJECTION, SOLUTION INTRAVENOUS EVERY 24 HOURS
Status: DISCONTINUED | OUTPATIENT
Start: 2019-07-17 | End: 2019-07-17

## 2019-07-17 RX ORDER — HEPARIN SODIUM 5000 [USP'U]/ML
5000 INJECTION, SOLUTION INTRAVENOUS; SUBCUTANEOUS EVERY 8 HOURS
Status: DISCONTINUED | OUTPATIENT
Start: 2019-07-17 | End: 2019-07-25 | Stop reason: HOSPADM

## 2019-07-17 RX ORDER — ALBUTEROL SULFATE 0.83 MG/ML
10 SOLUTION RESPIRATORY (INHALATION)
Status: COMPLETED | OUTPATIENT
Start: 2019-07-17 | End: 2019-07-17

## 2019-07-17 RX ORDER — MEMANTINE HYDROCHLORIDE 10 MG/1
10 TABLET ORAL 2 TIMES DAILY
Status: DISCONTINUED | OUTPATIENT
Start: 2019-07-18 | End: 2019-07-25 | Stop reason: HOSPADM

## 2019-07-17 RX ORDER — ALBUTEROL SULFATE 0.83 MG/ML
SOLUTION RESPIRATORY (INHALATION)
Status: DISPENSED
Start: 2019-07-17 | End: 2019-07-18

## 2019-07-17 RX ORDER — ALBUTEROL SULFATE 2.5 MG/.5ML
10 SOLUTION RESPIRATORY (INHALATION)
Status: COMPLETED | OUTPATIENT
Start: 2019-07-17 | End: 2019-07-17

## 2019-07-17 RX ORDER — CARVEDILOL 3.12 MG/1
3.12 TABLET ORAL EVERY 12 HOURS
Status: DISCONTINUED | OUTPATIENT
Start: 2019-07-17 | End: 2019-07-25 | Stop reason: HOSPADM

## 2019-07-17 RX ORDER — VANCOMYCIN 1.75 GRAM/500 ML IN 0.9 % SODIUM CHLORIDE INTRAVENOUS
1750 ONCE
Status: COMPLETED | OUTPATIENT
Start: 2019-07-17 | End: 2019-07-18

## 2019-07-17 RX ORDER — IPRATROPIUM BROMIDE 0.5 MG/2.5ML
0.5 SOLUTION RESPIRATORY (INHALATION)
Status: COMPLETED | OUTPATIENT
Start: 2019-07-17 | End: 2019-07-17

## 2019-07-17 RX ORDER — IPRATROPIUM BROMIDE AND ALBUTEROL SULFATE 2.5; .5 MG/3ML; MG/3ML
12 SOLUTION RESPIRATORY (INHALATION)
Status: DISCONTINUED | OUTPATIENT
Start: 2019-07-17 | End: 2019-07-17

## 2019-07-17 RX ORDER — SODIUM CHLORIDE 0.9 % (FLUSH) 0.9 %
5-10 SYRINGE (ML) INJECTION AS NEEDED
Status: DISCONTINUED | OUTPATIENT
Start: 2019-07-17 | End: 2019-07-25 | Stop reason: HOSPADM

## 2019-07-17 RX ORDER — ASPIRIN 81 MG/1
81 TABLET ORAL DAILY
Status: DISCONTINUED | OUTPATIENT
Start: 2019-07-18 | End: 2019-07-25 | Stop reason: HOSPADM

## 2019-07-17 RX ORDER — POLYETHYLENE GLYCOL 3350 17 G/17G
17 POWDER, FOR SOLUTION ORAL DAILY
Status: DISCONTINUED | OUTPATIENT
Start: 2019-07-18 | End: 2019-07-25 | Stop reason: HOSPADM

## 2019-07-17 RX ORDER — TORSEMIDE 20 MG/1
20 TABLET ORAL
Status: DISCONTINUED | OUTPATIENT
Start: 2019-07-17 | End: 2019-07-19

## 2019-07-17 RX ORDER — FUROSEMIDE 10 MG/ML
40 INJECTION INTRAMUSCULAR; INTRAVENOUS
Status: COMPLETED | OUTPATIENT
Start: 2019-07-17 | End: 2019-07-17

## 2019-07-17 RX ADMIN — IPRATROPIUM BROMIDE 0.5 MG: 0.5 SOLUTION RESPIRATORY (INHALATION) at 14:49

## 2019-07-17 RX ADMIN — VANCOMYCIN HYDROCHLORIDE 1750 MG: 10 INJECTION, POWDER, LYOPHILIZED, FOR SOLUTION INTRAVENOUS at 18:43

## 2019-07-17 RX ADMIN — HEPARIN SODIUM 5000 UNITS: 5000 INJECTION, SOLUTION INTRAVENOUS; SUBCUTANEOUS at 22:46

## 2019-07-17 RX ADMIN — PIPERACILLIN, TAZOBACTAM 4.5 G: 4; .5 INJECTION, POWDER, LYOPHILIZED, FOR SOLUTION INTRAVENOUS at 16:32

## 2019-07-17 RX ADMIN — ALBUTEROL SULFATE 10 MG: 2.5 SOLUTION RESPIRATORY (INHALATION) at 14:49

## 2019-07-17 RX ADMIN — METHYLPREDNISOLONE SODIUM SUCCINATE 125 MG: 125 INJECTION, POWDER, FOR SOLUTION INTRAMUSCULAR; INTRAVENOUS at 16:36

## 2019-07-17 RX ADMIN — ALBUTEROL SULFATE 10 MG: 2.5 SOLUTION RESPIRATORY (INHALATION) at 16:35

## 2019-07-17 RX ADMIN — PIPERACILLIN, TAZOBACTAM 4.5 G: 4; .5 INJECTION, POWDER, LYOPHILIZED, FOR SOLUTION INTRAVENOUS at 22:27

## 2019-07-17 RX ADMIN — ALBUTEROL SULFATE 10 MG: 2.5 SOLUTION RESPIRATORY (INHALATION) at 16:06

## 2019-07-17 RX ADMIN — FUROSEMIDE 40 MG: 10 INJECTION, SOLUTION INTRAMUSCULAR; INTRAVENOUS at 16:36

## 2019-07-17 RX ADMIN — LEVOFLOXACIN 750 MG: 5 INJECTION, SOLUTION INTRAVENOUS at 17:14

## 2019-07-17 NOTE — ED PROVIDER NOTES
EMERGENCY DEPARTMENT HISTORY AND PHYSICAL EXAM    4:58 PM  Date: 7/17/2019  Patient Name: Ron Crawley    History of Presenting Illness     Chief Complaint   Patient presents with    Shortness of Breath        History Provided By: Patient's Daughter    HPI: Ron Crawley is a 80 y.o. female with multiple medical problems including CHF and COPD. Patient was brought in by ambulance for worsening of her shortness of breath. Her oxygen level went from 97-92 per the family members and she was patient was seen here for days ago and was diuresed and discharged. They also have been to her cardiologist this morning who increased her Lasix dose and told him that she has pulmonary edema. However the patient did not improve so the family brought her into the ER. Upon arrival the patient was having audible wheezing and respiratory distress with prolonged expiratory phase and she was only responsive to painful stimuli. Per EMS she was able to answer yes or no questions when they first got there but she was still confused. .     Location:  Severity:  Timing/course:    Onset/Duration:     PCP: Lashawn Cool MD    Past History     Past Medical History:  Past Medical History:   Diagnosis Date    Acute on chronic diastolic heart failure ([address]     Arthritis     Benign hypertensive heart disease without heart failure     Better controlled, stable    Chronic diastolic heart failure (HCC)     Breathing and edema is improving    Congestive heart failure (HCC)     HTN (hypertension)     Hypercholesteremia     Lupus (systemic lupus erythematosus) ([address] 6/18/2014    followed by dr Lisa Paredes     Obesity, unspecified     Patient has weight loss Discussed diet ad fluid restriction    Other and unspecified hyperlipidemia     F/u per pmd    Tricuspid valve disorders, 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    tr with moderate pulmonary htn        Past Surgical History:  Past Surgical History:   Procedure Laterality Date    HX HYSTERECTOMY      PACEMAKER PROCEDURE         Family History:  Family History   Problem Relation Age of Onset    Arrhythmia Neg Hx     Asthma Neg Hx     Clotting Disorder Neg Hx     Fainting Neg Hx     Heart Attack Neg Hx     High Cholesterol Neg Hx     Pacemaker Neg Hx     Stroke Neg Hx        Social History:  Social History     Tobacco Use    Smoking status: Never Smoker    Smokeless tobacco: Never Used   Substance Use Topics    Alcohol use: No    Drug use: No       Allergies:  No Known Allergies    Review of Systems   Review of Systems   Unable to perform ROS: Mental status change        Physical Exam     Patient Vitals for the past 12 hrs:   Pulse BP SpO2   07/17/19 1635 70 107/48    07/17/19 1606   100 %   07/17/19 1500   100 %   07/17/19 1450   100 %       Physical Exam   Constitutional: She appears distressed. HENT:   Head: Normocephalic and atraumatic. Eyes: Conjunctivae are normal.   Neck: Neck supple. Cardiovascular: Normal rate and intact distal pulses. Pulmonary/Chest: She is in respiratory distress. She has wheezes. She has rales. Abdominal: Soft. She exhibits no distension. There is no tenderness. Musculoskeletal: She exhibits edema. She exhibits no deformity. Neurological: GCS eye subscore is 2. GCS verbal subscore is 3. GCS motor subscore is 5. Skin: Skin is warm and dry.        Diagnostic Study Results     Labs -  Recent Results (from the past 12 hour(s))   EKG, 12 LEAD, INITIAL    Collection Time: 07/17/19  2:46 PM   Result Value Ref Range    Ventricular Rate 70 BPM    Atrial Rate 288 BPM    P-R Interval 212 ms    QRS Duration 198 ms    Q-T Interval 480 ms    QTC Calculation (Bezet) 518 ms    Calculated R Axis -51 degrees    Calculated T Axis 130 degrees    Diagnosis       AV dual-paced rhythm with prolonged AV conduction  Abnormal ECG  When compared with ECG of 15-JUL-2019 17:11,  No significant change was found  Confirmed by Ashley Chahal ([id number]) on 7/17/2019 5:31:03 PM     POC G3    Collection Time: 07/17/19  2:56 PM   Result Value Ref Range    Device: NASAL CANNULA      Flow rate (POC) 6 L/M    pH (POC) 7.332 (L) 7.35 - 7.45      pCO2 (POC) 67.8 (H) 35.0 - 45.0 MMHG    pO2 (POC) 77 (L) 80 - 100 MMHG    HCO3 (POC) 35.9 (H) 22 - 26 MMOL/L    sO2 (POC) 94 92 - 97 %    Base excess (POC) 8 mmol/L    Allens test (POC) YES      Total resp. rate 15      Site LEFT RADIAL      Specimen type (POC) ARTERIAL      Performed by Amairani Byrd    METABOLIC PANEL, COMPREHENSIVE    Collection Time: 07/17/19  3:45 PM   Result Value Ref Range    Sodium 138 136 - 145 mmol/L    Potassium 4.6 3.5 - 5.5 mmol/L    Chloride 99 (L) 100 - 108 mmol/L    CO2 36 (H) 21 - 32 mmol/L    Anion gap 3 3.0 - 18 mmol/L    Glucose 115 (H) 74 - 99 mg/dL    BUN 38 (H) 7.0 - 18 MG/DL    Creatinine 1.48 (H) 0.6 - 1.3 MG/DL    BUN/Creatinine ratio 26 (H) 12 - 20      GFR est AA 40 (L) >60 ml/min/1.73m2    GFR est non-AA 33 (L) >60 ml/min/1.73m2    Calcium 8.3 (L) 8.5 - 10.1 MG/DL    Bilirubin, total 0.4 0.2 - 1.0 MG/DL    ALT (SGPT) 15 13 - 56 U/L    AST (SGOT) 15 15 - 37 U/L    Alk. phosphatase 60 45 - 117 U/L    Protein, total 6.2 (L) 6.4 - 8.2 g/dL    Albumin 3.1 (L) 3.4 - 5.0 g/dL    Globulin 3.1 2.0 - 4.0 g/dL    A-G Ratio 1.0 0.8 - 1.7     CBC WITH AUTOMATED DIFF    Collection Time: 07/17/19  3:45 PM   Result Value Ref Range    WBC 6.1 4.6 - 13.2 K/uL    RBC 2.94 (L) 4.20 - 5.30 M/uL    HGB 8.3 (L) 12.0 - 16.0 g/dL    HCT 26.9 (L) 35.0 - 45.0 %    MCV 91.5 74.0 - 97.0 FL    MCH 28.2 24.0 - 34.0 PG    MCHC 30.9 (L) 31.0 - 37.0 g/dL    RDW 16.8 (H) 11.6 - 14.5 %    PLATELET 563 221 - 285 K/uL    MPV 9.1 (L) 9.2 - 11.8 FL    NEUTROPHILS 59 40 - 73 %    LYMPHOCYTES 22 21 - 52 %    MONOCYTES 17 (H) 3 - 10 %    EOSINOPHILS 2 0 - 5 %    BASOPHILS 0 0 - 2 %    ABS. NEUTROPHILS 3.6 1.8 - 8.0 K/UL    ABS. LYMPHOCYTES 1.3 0.9 - 3.6 K/UL    ABS. MONOCYTES 1.1 0.05 - 1.2 K/UL    ABS.  EOSINOPHILS 0.1 0.0 - 0.4 K/UL ABS. BASOPHILS 0.0 0.0 - 0.1 K/UL    DF AUTOMATED     POC LACTIC ACID    Collection Time: 07/17/19  3:57 PM   Result Value Ref Range    Lactic Acid (POC) 0.85 0.40 - 2.00 mmol/L   POC G3    Collection Time: 07/17/19  4:39 PM   Result Value Ref Range    Device: NASAL CANNULA      Flow rate (POC) 3 L/M    pH (POC) 7.339 (L) 7.35 - 7.45      pCO2 (POC) 68.9 (H) 35.0 - 45.0 MMHG    pO2 (POC) 278 (H) 80 - 100 MMHG    HCO3 (POC) 37.1 (H) 22 - 26 MMOL/L    sO2 (POC) 100 (H) 92 - 97 %    Base excess (POC) 10 mmol/L    Allens test (POC) YES      Total resp. rate 11      Site LEFT RADIAL      Specimen type (POC) ARTERIAL      Performed by Zion Mathias        Radiologic Studies -   Xr Chest Port    Result Date: 7/17/2019  Impression: Stable massive cardiomegaly. Pulmonary venous congestion, and mild pulmonary interstitial edema. Thank you for this referral.         Medical Decision Making     ED Course: Progress Notes, Reevaluation, and Consults:    4:58 PM Initial assessment performed. The patients presenting problems have been discussed, and they/their family are in agreement with the care plan formulated and outlined with them. I have encouraged them to ask questions as they arise throughout their visit. Provider Notes (Medical Decision Making): 66-year-old female with history of CHF and COPD presenting in respiratory distress and audible wheezing probably because of COPD exacerbation and fluid overload by the edema and JVD. Patient is only responsive to painful stimuli. Respiratory was immediately called to start her on BiPAP and initiated as the patient had increased work of breathing. Initial blood gas did not show significant abnormalities close to her baseline in terms of CO2. Septic work-up is based on chart review she might have had a small infiltrate. Antibiotics were initiated as well as treatment of her COPD exacerbation. The patient will require admission.     After 40 minutes on BiPAP the patient had significantly improved and her work of breathing is much better however the mental status is still the same. Repeat ABG does not show significant changes. Mental status is likely due to pneumonia however will obtain head CT as well and admit to [facility]    I had a discussion with the family members regarding the patient's wishes he does not have a living will or an advanced directive. The family verbalized that she would not be on life support or have breathing to I will have a facility to talk to 1 of her sons to give a final recommendation. Procedures:     Critical Care Time: Upon my evaluation, this patient had a high probability of imminent or life-threatening deterioration due to respiratory distress, which required my direct attention, intervention, and personal management. I have personally provided 35 minutes of critical care time exclusive of time spent on separately billable procedures. Time includes review of laboratory data, radiology results, discussion with consultants, and monitoring for potential decompensation. Interventions were performed as documented above. Kat Mcdaniels MD  5:34 PM        Vital Signs-Reviewed the patient's vital signs. Reviewed pt's pulse ox reading. EKG: Interpreted by the EP. Time Interpreted:    Rate:    Rhythm: AV paced at 71   Interpretation:   Comparison: No significant interval changes    Records Reviewed: Nursing Notes, Old Medical Records, Previous electrocardiograms, Previous Radiology Studies and Previous Laboratory Studies (Time of Review: 4:58 PM)  -I am the first provider for this patient.  -I reviewed the vital signs, available nursing notes, past medical history, past surgical history, family history and social history.     Current Facility-Administered Medications   Medication Dose Route Frequency Provider Last Rate Last Dose    sodium chloride (NS) flush 5-10 mL  5-10 mL IntraVENous PRN Kat Mcdaniels MD        piperacillin-tazobactam (ZOSYN) 4.5 g in 0.9% sodium chloride (MBP/ADV) 100 mL MBP  4.5 g IntraVENous Q6H Kat Mcdaniels MD        levoFLOXacin (LEVAQUIN) 750 mg in D5W IVPB  750 mg IntraVENous Q24H Kat Mcdaniels  mL/hr at 07/17/19 1714 750 mg at 07/17/19 1714    vancomycin (VANCOCIN) 1750 mg in  ml infusion  1,750 mg IntraVENous ONCE Kat Mcdaniels MD        VANCOMYCIN INFORMATION NOTE   Other Rx Dosing/Monitoring Kat Mcdaniels MD        albuterol (PROVENTIL VENTOLIN) 2.5 mg /3 mL (0.083 %) nebulizer solution              Current Outpatient Medications   Medication Sig Dispense Refill    torsemide (DEMADEX) 20 mg tablet Take 1 Tab by mouth daily as needed (edema). 30 Tab 3    polyethylene glycol (MIRALAX) 17 gram/dose powder Take 17 g by mouth daily.  aspirin delayed-release 81 mg tablet Take 81 mg by mouth daily.  OTHER Incentive Spirometry- Use as directed 1 Each 0    food supplemt, lactose-reduced (ENSURE ENLIVE) 0.08 gram-1.5 kcal/mL liqd Take 1 Bottle by mouth two (2) times a day. 60 Bottle 0    menthol-zinc oxide (CALMOSEPTINE) 0.44-20.6 % oint Apply  to affected area.  carvedilol (COREG) 3.125 mg tablet Take 1 Tab by mouth every twelve (12) hours. 60 Tab 0    acetaminophen (TYLENOL ARTHRITIS PAIN) 650 mg TbER Take 1 Tab by mouth every eight (8) hours. (Patient taking differently: Take 1 Tab by mouth daily. ) 15 Tab 0    amiodarone (CORDARONE) 200 mg tablet TAKE ONE TABLET EVERY DAY (MAKE AN APPT) (Patient taking differently: TAKE ONE TABLET EVERY DAY by mouth) 30 Tab 5    memantine (NAMENDA) 10 mg tablet Take 10 mg by mouth two (2) times a day.  amitriptyline (ELAVIL) 25 mg tablet Take 25 mg by mouth nightly. Clinical Impression     Clinical Impression: No diagnosis found. Disposition: Admit      This note was dictated utilizing voice recognition software which may lead to typographical errors. I apologize in advance if the situation occurs.   If questions arise please do not hesitate to contact me or call our department.     Yamil Bryan MD  4:58 PM

## 2019-07-17 NOTE — PROGRESS NOTES
Bipap mask removed from pt, per MD. Pt respiratory rate improved  Expiratory wheeze present, prolonged expiratory phase  Made MD aware, ordered Albuterol treatment  Pt on 3L nasal cannula   Will continue to monitor

## 2019-07-17 NOTE — PROGRESS NOTES
Pt assessed, ABG acquired. Pt placed on Bipap for hypercapnia and increased work of breathing  Will repeat ABG in approx. 20-30 minutes, per MD     07/17/19 1500   Oxygen Therapy   O2 Sat (%) 100 %   Pulse via Oximetry 76 beats per minute   O2 Device BIPAP   FIO2 (%) 35 %   Respiratory   Respiratory (WDL) X   Respiratory Pattern Accessory muscles; Labored   Chest/Tracheal Assessment Chest expansion, symmetrical   Breath Sounds Bilateral Inspiratory wheezing;Coarse   Skin Integumentary   Skin Integumentary (WDL) WDL   CPAP/BIPAP   CPAP/BIPAP Start/Stop On   Device Mode BIPAP;S/T   $$ Bipap Daily Yes   Mask Type and Size Small;Full face   Skin Condition intact   PIP Observed 14 cm H20   IPAP (cm H2O) 14 cm H2O   EPAP (cm H2O) 5 cm H2O   Inspiratory Time (sec) 1 seconds   Vt Spont (ml) 765 ml   Ve Observed (l/min) 10.8 l/min   Backup Rate 8   Total RR (Spontaneous) 14 breaths per minute   Insp Rise Time (sec) 3   Leak (Estimated) 30 L/min   Pt's Home Machine No   Biomedical Check Performed Yes   Settings Verified Yes   Alarm Settings   High Pressure 18   Low Pressure 10   Apnea 20   Low Ve 2   High Rate 35   Low Rate 8   Pulmonary Toilet   Pulmonary Toilet H. O.B elevated

## 2019-07-17 NOTE — ED NOTES
Initial contact with pt. Pt is alert but not oriented. She has a hx of dementia. Daughter and son are at the bedside. Pt VSS. She presents to the ED with SOB. She was here in the ED two days ago for the same thing. In addition, pt still remains SOB. Will continue to monitor.

## 2019-07-17 NOTE — H&P
History & Physical    Patient: Krystal Cheek MRN: [id number]  CSN: [id number]    YOB: 1931  Age: 80 y.o. Sex: female      DOA: 7/17/2019    Chief Complaint:   Chief Complaint   Patient presents with    Shortness of Breath and fever           HPI:     Krystal Cheek is a 80 y.o. Krystal Cheek is a 80 y.o. female with multiple medical problems including CHF and COPD. Patient was brought in by ambulance for worsening of her shortness of breath. Her oxygen level went from 97-92 per the family members and she was patient was seen here for days ago and was diuresed and discharged. They also have been to her cardiologist this morning who increased her Lasix dose and told him that she has pulmonary edema. However the patient did not improve so the family brought her into the ER. Upon arrival the patient was having audible wheezing and respiratory distress with prolonged expiratory phase and she was only responsive to painful stimuli. Per EMS she was able to answer yes or no questions when they first got there but she was still confused. .        Past Medical History:   Diagnosis Date    Acute on chronic diastolic heart failure (HCC)     Arthritis     Benign hypertensive heart disease without heart failure     Better controlled, stable    Chronic diastolic heart failure (HCC)     Breathing and edema is improving    Congestive heart failure (HCC)     HTN (hypertension)     Hypercholesteremia     [facility]    followed by dr Fide Vaca     Obesity, unspecified     Patient has weight loss Discussed diet ad fluid restriction    Other and unspecified hyperlipidemia     F/u per pmd    Tricuspid valve disorders, 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    tr with moderate pulmonary htn        Past Surgical History:   Procedure Laterality Date    HX HYSTERECTOMY      PACEMAKER PROCEDURE         Family History   Problem Relation Age of Onset    Arrhythmia Neg Hx     Asthma Neg Hx     Clotting Disorder Neg Hx     Fainting Neg Hx     Heart Attack Neg Hx     High Cholesterol Neg Hx     Pacemaker Neg Hx     Stroke Neg Hx        Social History     Socioeconomic History    Marital status:      Spouse name: Not on file    Number of children: Not on file    Years of education: Not on file    Highest education level: Not on file   Tobacco Use    Smoking status: Never Smoker    Smokeless tobacco: Never Used   Substance and Sexual Activity    Alcohol use: No    Drug use: No    Sexual activity: Never       Prior to Admission medications    Medication Sig Start Date End Date Taking? Authorizing Provider   torsemide (DEMADEX) 20 mg tablet Take 1 Tab by mouth daily as needed (edema). 7/17/19   Ene Parra MD   polyethylene glycol (MIRALAX) 17 gram/dose powder Take 17 g by mouth daily. Provider, Historical   aspirin delayed-release 81 mg tablet Take 81 mg by mouth daily. Provider, Historical   OTHER Incentive Spirometry- Use as directed 2/10/19   Hero Mak MD   food supplemt, lactose-reduced (ENSURE ENLIVE) 0.08 gram-1.5 kcal/mL liqd Take 1 Bottle by mouth two (2) times a day. 2/10/19   Hero Mak MD   menthol-zinc oxide (CALMOSEPTINE) 0.44-20.6 % oint Apply  to affected area. Provider, Historical   carvedilol (COREG) 3.125 mg tablet Take 1 Tab by mouth every twelve (12) hours. 12/31/18   Herrera Barbosa MD   acetaminophen (TYLENOL ARTHRITIS PAIN) 650 mg TbER Take 1 Tab by mouth every eight (8) hours. Patient taking differently: Take 1 Tab by mouth daily. 12/4/18   Ruthy METCALF, PAJessicaC   amiodarone (CORDARONE) 200 mg tablet TAKE ONE TABLET EVERY DAY (MAKE AN APPT)  Patient taking differently: TAKE ONE TABLET EVERY DAY by mouth 11/2/18   Becki Roca, NP   memantine (NAMENDA) 10 mg tablet Take 10 mg by mouth two (2) times a day. 7/10/18   Provider, Historical   amitriptyline (ELAVIL) 25 mg tablet Take 25 mg by mouth nightly.     Provider, Historical       No Known Allergies      Review of Systems  GENERAL: Patient alert, awake and oriented times 3, able to communicate full sentences and not in distress. HEENT: No change in vision, no earache, tinnitus, sore throat or sinus congestion. NECK: No pain or stiffness. PULMONARY: No shortness of breath, cough or wheeze. Cardiovascular: no pnd or orthopnea, no CP  GASTROINTESTINAL: No abdominal pain, nausea, vomiting or diarrhea, melena or bright red blood per rectum. GENITOURINARY: No urinary frequency, urgency, hesitancy or dysuria. MUSCULOSKELETAL: No joint or muscle pain, no back pain, no recent trauma. DERMATOLOGIC: No rash, no itching, no lesions. ENDOCRINE: No polyuria, polydipsia, no heat or cold intolerance. No recent change in weight. HEMATOLOGICAL: No anemia or easy bruising or bleeding. NEUROLOGIC: No headache, seizures, numbness, tingling or weakness. Physical Exam:     Physical Exam:  Visit Vitals  /45   Pulse 70   Resp 10   Ht 5' 4\" (1.626 m)   Wt 80.7 kg (178 lb)   SpO2 100%   BMI 30.55 kg/m²    O2 Flow Rate (L/min): 3 l/min O2 Device: Nasal cannula    No data recorded. No intake/output data recorded. No intake/output data recorded. General:  Sleepy               Head: Normocephalic, without obvious abnormality, atraumatic. Eyes:  Conjunctivae/corneas clear. PERRL, EOMs intact. Nose: Nares normal. No drainage or sinus tenderness. Neck: Supple, symmetrical, trachea midline, no adenopathy, thyroid: no enlargement, no carotid bruit and no JVD. Lungs:   Clear to auscultation bilaterally. Heart:  Regular rate and rhythm, S1, S2 normal.     Abdomen: Soft, non-tender. Bowel sounds normal.    Extremities: Extremities normal, atraumatic, no cyanosis or edema. Pulses: 2+ and symmetric all extremities. Skin:  No rashes or lesions   Neurologic: AAOx3, No focal motor or sensory deficit.        Labs Reviewed:    BMP:   Lab Results   Component Value Date/Time     07/17/2019 03:45 PM    K 4.6 07/17/2019 03:45 PM    CL 99 (L) 07/17/2019 03:45 PM    CO2 36 (H) 07/17/2019 03:45 PM    AGAP 3 07/17/2019 03:45 PM     (H) 07/17/2019 03:45 PM    BUN 38 (H) 07/17/2019 03:45 PM    CREA 1.48 (H) 07/17/2019 03:45 PM    GFRAA 40 (L) 07/17/2019 03:45 PM    GFRNA 33 (L) 07/17/2019 03:45 PM     CMP:   Lab Results   Component Value Date/Time     07/17/2019 03:45 PM    K 4.6 07/17/2019 03:45 PM    CL 99 (L) 07/17/2019 03:45 PM    CO2 36 (H) 07/17/2019 03:45 PM    AGAP 3 07/17/2019 03:45 PM     (H) 07/17/2019 03:45 PM    BUN 38 (H) 07/17/2019 03:45 PM    CREA 1.48 (H) 07/17/2019 03:45 PM    GFRAA 40 (L) 07/17/2019 03:45 PM    GFRNA 33 (L) 07/17/2019 03:45 PM    CA 8.3 (L) 07/17/2019 03:45 PM    ALB 3.1 (L) 07/17/2019 03:45 PM    TP 6.2 (L) 07/17/2019 03:45 PM    GLOB 3.1 07/17/2019 03:45 PM    AGRAT 1.0 07/17/2019 03:45 PM    SGOT 15 07/17/2019 03:45 PM    ALT 15 07/17/2019 03:45 PM     COAGS: No results found for: APTT, PTP, INR  Liver Panel:   Lab Results   Component Value Date/Time    ALB 3.1 (L) 07/17/2019 03:45 PM    TP 6.2 (L) 07/17/2019 03:45 PM    GLOB 3.1 07/17/2019 03:45 PM    AGRAT 1.0 07/17/2019 03:45 PM    SGOT 15 07/17/2019 03:45 PM    ALT 15 07/17/2019 03:45 PM    AP 60 07/17/2019 03:45 PM       Assessment/Plan     Active Problems:    Respiratory failure with hypoxia     Sepsis     COPD exacerbation    CAP (community acquired pneumonia)    CHFrEF 30-35%    Chronic A fib     Dementia     Admit patient to stepdown   Continue Zosyn and vanc   Nebs Q 4 hrs   Oxygen 2 l/ min   DC Levaquin QT is prolonged   Cultures are sent, follow   Continue: Coreg, ASA, Torsemide,   Continue: amio   Continue: NAMENDA       DVT/GI Prophylaxis: Hep SQ   Code: family discussing Goals of care: Full Code for now       Discussed with children at bedside about hospital admission and my plan care, both understood and agree with my plan care.     Margie Hooker, MD  7/17/2019 6:37 PM

## 2019-07-17 NOTE — PROGRESS NOTES
HISTORY OF PRESENT ILLNESS  Breonna Lawrencetingham is a 80 y.o. female. Patient with  chf,had persistent junctional ryhtm ,atrial flutter , s/p pacemaker,feels better   Sob better  Admitted to hospital 1/2019 with acute CHF. Low ejection fraction with systolic heart failure. Feels less short of breath since discharge. 3/4/2019 - admitted to hospital 2/10/19 for NSTEMI , Anemia, CKD stage IV and chronic systolic CHF. Has not had diuretics since d/c and c/o increased edema to upper and lower extremties. 3/19/2019-admitted to hospital for acute metabolic encephalopathy, sepsis, UTI.  7/2019  Patient was in emergency room with complaint of shortness of breath. Evaluation noted. Patient has shortness of breath at baseline. Limited activity. She has edema on and off.  4/2/2019-was in emergency room with UTI and metabolic encephalopathy. Feels much better today. More alert. Denies any shortness of breath. Edema is better except for right upper extremity. 5/2019  Patient discharged 5/10/2019 with  Discharge Diagnoses:                                           RLE cellulitis, MRSA in wound  - Staph Epidermidis bacteremia  -ARIEL on ckd3 in the setting of severe sepsis  - chronic mixed systolic and diastolic chf- compensated  Recovering since discharge. Currently not on diuresis. Edema is significantly better. Hospital Follow Up   The history is provided by the patient. Associated symptoms include shortness of breath. CHF   The history is provided by the patient. This is a chronic problem. The problem occurs constantly. The problem has been gradually improving. Associated symptoms include shortness of breath. The symptoms are aggravated by exertion. Palpitations    The history is provided by the patient. This is a chronic problem. The problem occurs constantly. Associated symptoms include lower extremity edema and shortness of breath.  Pertinent negatives include no fever, no claudication, no orthopnea, no PND, no nausea, no vomiting, no dizziness, no weakness, no cough, no hemoptysis and no sputum production. Her past medical history is significant for hypertension. Hypertension   The history is provided by the patient. This is a chronic problem. The problem occurs constantly. The problem has not changed since onset. Associated symptoms include shortness of breath. Shortness of Breath   The history is provided by the patient. This is a recurrent problem. The problem occurs intermittently. The problem has been gradually improving. Associated symptoms include leg swelling. Pertinent negatives include no fever, no cough, no sputum production, no hemoptysis, no wheezing, no PND, no orthopnea, no vomiting, no rash and no claudication. Precipitated by: exertion. Associated medical issues include heart failure. Leg Swelling   The history is provided by the patient. This is a chronic problem. The problem occurs daily. The problem has been gradually improving. Associated symptoms include shortness of breath. Review of Systems   Constitutional: Negative for chills and fever. HENT: Negative for nosebleeds. Eyes: Negative for blurred vision and double vision. Respiratory: Positive for shortness of breath. Negative for cough, hemoptysis, sputum production and wheezing. Cardiovascular: Positive for leg swelling. Negative for palpitations, orthopnea, claudication and PND. Gastrointestinal: Negative for heartburn, nausea and vomiting. Musculoskeletal: Negative for myalgias. Skin: Negative for rash. Neurological: Negative for dizziness and weakness. Endo/Heme/Allergies: Does not bruise/bleed easily.      Family History   Problem Relation Age of Onset    Arrhythmia Neg Hx     Asthma Neg Hx     Clotting Disorder Neg Hx     Fainting Neg Hx     Heart Attack Neg Hx     High Cholesterol Neg Hx     Pacemaker Neg Hx     Stroke Neg Hx        Past Medical History:   Diagnosis Date    Acute on chronic diastolic heart failure (HCC)     Arthritis     Benign hypertensive heart disease without heart failure     Better controlled, stable    Chronic diastolic heart failure (HCC)     Breathing and edema is improving    Congestive heart failure (HCC)     HTN (hypertension)     Hypercholesteremia     [facility]    followed by dr Emy Peñaloza     Obesity, unspecified     Patient has weight loss Discussed diet ad fluid restriction    Other and unspecified hyperlipidemia     F/u per pmd    Tricuspid valve disorders, 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    tr with moderate pulmonary htn        Past Surgical History:   Procedure Laterality Date    HX HYSTERECTOMY      PACEMAKER PROCEDURE         No Known Allergies    Current Outpatient Medications   Medication Sig    torsemide (DEMADEX) 20 mg tablet Take 1 Tab by mouth daily as needed (edema).  polyethylene glycol (MIRALAX) 17 gram/dose powder Take 17 g by mouth daily.  aspirin delayed-release 81 mg tablet Take 81 mg by mouth daily.  food supplemt, lactose-reduced (ENSURE ENLIVE) 0.08 gram-1.5 kcal/mL liqd Take 1 Bottle by mouth two (2) times a day.  menthol-zinc oxide (CALMOSEPTINE) 0.44-20.6 % oint Apply  to affected area.  carvedilol (COREG) 3.125 mg tablet Take 1 Tab by mouth every twelve (12) hours.  acetaminophen (TYLENOL ARTHRITIS PAIN) 650 mg TbER Take 1 Tab by mouth every eight (8) hours. (Patient taking differently: Take 1 Tab by mouth daily.)    amiodarone (CORDARONE) 200 mg tablet TAKE ONE TABLET EVERY DAY (MAKE AN APPT) (Patient taking differently: TAKE ONE TABLET EVERY DAY by mouth)    memantine (NAMENDA) 10 mg tablet Take 10 mg by mouth two (2) times a day.  amitriptyline (ELAVIL) 25 mg tablet Take 25 mg by mouth nightly.  OTHER Incentive Spirometry- Use as directed     No current facility-administered medications for this visit.         Visit Vitals  /71   Pulse 75   Ht 5' 4\" (1.626 m) BMI 32.10 kg/m²         Physical Exam   Constitutional: She is oriented to person, place, and time. She appears well-developed and well-nourished. HENT:   Head: Normocephalic and atraumatic. Eyes: Conjunctivae are normal.   Neck: Neck supple. No JVD present. No tracheal deviation present. No thyromegaly present. Cardiovascular: Normal rate and regular rhythm. PMI is not displaced. Exam reveals no gallop and no decreased pulses. Murmur heard. Holosystolic murmur is present at the lower left sternal border. Pulmonary/Chest: No respiratory distress. She has no wheezes. She has no rales. She exhibits no tenderness. Abdominal: Soft. There is no tenderness. Musculoskeletal: She exhibits edema (3+ BLE edema - extending to thighs, BL upper ext edema). Neurological: She is alert and oriented to person, place, and time. Skin: Skin is warm. Psychiatric: She has a normal mood and affect. No flowsheet data found. Ms. Margot Dunn has a reminder for a \"due or due soon\" health maintenance. I have asked that she contact her primary care provider for follow-up on this health maintenance. SUMMARY:4/2014  Left ventricle: Ejection fraction was estimated to be 60 %. No obvious  wall motion abnormalities identified in the views obtained. There was mild  concentric hypertrophy. Features were consistent with a pseudonormal left  ventricular filling pattern, with concomitant abnormal relaxation and  increased filling pressure (grade 2 diastolic dysfunction). Tricuspid valve: There was moderate regurgitation. Pulmonary artery  systolic pressure: 50 mmHg. 11/2015:stress test  1. Normal perfusion scan. 2. Normal wall motion and ejection fraction. 3. Gated images reveal normal wall motion and ejection fraction is  calculated at 59%. 12/07/2015  Pacer check   Noted with A Flutter. D/w daughter on phone  7/2019  Pacemaker check normal function. No A. fib. Atrial heart rate up to 159.   Interpretation Summary 12/2018    · Technically difficult study due to patient compliance. Unable to obtain on-axis apical images. Good parasternals, poor subcostal images. · Left ventricular moderate-to-severely decreased global systolic function. Estimated left ventricular ejection fraction is 31 - 35%. Visually measured ejection fraction. Left ventricular severe sigmoid septum hypertrophy. Abnormal left ventricular wall motion as described on the wall scoring diagram below. Abnormal left ventricular septal motion. Interventricular septal \"bounce\". Severe (grade 3) left ventricular diastolic dysfunction. · Moderate tricuspid valve regurgitation is present. Mild pulmonary hypertension is present. PASP 38mmHg  · Mild aortic valve regurgitation is present. · Mild to moderate mitral valve regurgitation. Assessment       ICD-10-CM ICD-9-CM    1. Chronic systolic congestive heart failure (HCC) U85.65 863.76 METABOLIC PANEL, BASIC     428.0     Recent emergency room visit. Class III. Limited activity. increase demadex   2. Paroxysmal atrial flutter (HCC) U29.73 580.09 METABOLIC PANEL, BASIC    Stable continue treatment   3. Essential hypertension I10 401.9     Stable   4. S/P placement of cardiac pacemaker Z95.0 V45.01     Normal function. Last 6/2019   5. High risk medication use Z79.899 V58.69     Maintained on amiodarone for atrial arrhythmia   6. Anemia, unspecified type D64.9 285.9     Recent H&H stable. Continue to monitor possibly from CKD   continue amiodarone to maintain sr,risk of anticoagulation high and would not anticoagulate-discussed with family  I have discussed risk benefit and option of use of amiodarone for arrythmia. Risk of toxicity with medication are informed. Patient will require careful monitoring. Lasix and metolazone used as needed-  1/2019  Recent admission with CHF. Continue to monitor. Due to dementia would not be a candidate for ICD upgrade. Patient has increased edema since discharge.   I have increase Lasix from 20 mg to 40 mg 1-2 tablets a day. Patient currently nursing home    3/4/2019 - Recent admission for acute systolic CHF and CKD with hypotension. She has had increased edema since discharge and has not had diuretics in 3 weeks. Begin Demadex 20 mg and Metolazone 2.5 mg/ day. Have BMP drawn in 1 weeks. F/U in our office in 2 weeks - to ER for new or worsening symptoms. All discussed with daughter and son in room. 5/2019  CHF compensated. Recent admission with cellulitis. Edema is significantly better. Continue to hold torsemide and use it as needed    7/2019  Emergency room with shortness of breath and wheezing. Appears to be upper airway sounds. Mild edema. Will increase Demadex to 20 mg a day. Discussed that kidney function will likely get worse with increased dose of diuretic. Will check BMP in 1 week    Medications Discontinued During This Encounter   Medication Reason    torsemide (DEMADEX) 10 mg tablet        Orders Placed This Encounter    METABOLIC PANEL, BASIC     Standing Status:   Future     Standing Expiration Date:   8/17/2019    torsemide (DEMADEX) 20 mg tablet     Sig: Take 1 Tab by mouth daily as needed (edema). Dispense:  30 Tab     Refill:  3       Follow-up and Dispositions    · Return in about 1 month (around 8/14/2019).          Anuja Bruno MD

## 2019-07-18 ENCOUNTER — HOME CARE VISIT (OUTPATIENT)
Dept: HOME HEALTH SERVICES | Facility: HOME HEALTH | Age: 84
End: 2019-07-18
Payer: MEDICARE

## 2019-07-18 LAB
APPEARANCE UR: CLEAR
BACTERIA URNS QL MICRO: ABNORMAL /HPF
BILIRUB UR QL: NEGATIVE
COLOR UR: YELLOW
EPITH CASTS URNS QL MICRO: ABNORMAL /LPF (ref 0–5)
GLUCOSE UR STRIP.AUTO-MCNC: NEGATIVE MG/DL
HGB UR QL STRIP: ABNORMAL
KETONES UR QL STRIP.AUTO: NEGATIVE MG/DL
LEUKOCYTE ESTERASE UR QL STRIP.AUTO: ABNORMAL
NITRITE UR QL STRIP.AUTO: NEGATIVE
PH UR STRIP: 6 [PH] (ref 5–8)
PROT UR STRIP-MCNC: NEGATIVE MG/DL
RBC #/AREA URNS HPF: ABNORMAL /HPF (ref 0–5)
SP GR UR REFRACTOMETRY: 1.01 (ref 1–1.03)
UROBILINOGEN UR QL STRIP.AUTO: 0.2 EU/DL (ref 0.2–1)
WBC URNS QL MICRO: ABNORMAL /HPF (ref 0–4)

## 2019-07-18 PROCEDURE — 76450000000

## 2019-07-18 PROCEDURE — 74011250636 HC RX REV CODE- 250/636: Performed by: EMERGENCY MEDICINE

## 2019-07-18 PROCEDURE — 81001 URINALYSIS AUTO W/SCOPE: CPT

## 2019-07-18 PROCEDURE — 3331090001 HH PPS REVENUE CREDIT

## 2019-07-18 PROCEDURE — 74011250636 HC RX REV CODE- 250/636: Performed by: INTERNAL MEDICINE

## 2019-07-18 PROCEDURE — 65660000004 HC RM CVT STEPDOWN

## 2019-07-18 PROCEDURE — 3331090002 HH PPS REVENUE DEBIT

## 2019-07-18 PROCEDURE — 77030029211 HC GEL MEDIH TU INLC -B

## 2019-07-18 PROCEDURE — 74011250637 HC RX REV CODE- 250/637: Performed by: INTERNAL MEDICINE

## 2019-07-18 PROCEDURE — 77030020186 HC BOOT HL PROTCT SAGE -B

## 2019-07-18 PROCEDURE — 74011250636 HC RX REV CODE- 250/636

## 2019-07-18 PROCEDURE — 74011000258 HC RX REV CODE- 258: Performed by: EMERGENCY MEDICINE

## 2019-07-18 PROCEDURE — 77030012935 HC DRSG AQUACEL BMS -B

## 2019-07-18 PROCEDURE — 74011000258 HC RX REV CODE- 258

## 2019-07-18 RX ORDER — FUROSEMIDE 10 MG/ML
20 INJECTION INTRAMUSCULAR; INTRAVENOUS ONCE
Status: COMPLETED | OUTPATIENT
Start: 2019-07-18 | End: 2019-07-18

## 2019-07-18 RX ORDER — VANCOMYCIN/0.9 % SOD CHLORIDE 1 G/100 ML
1000 PLASTIC BAG, INJECTION (ML) INTRAVENOUS
Status: DISCONTINUED | OUTPATIENT
Start: 2019-07-19 | End: 2019-07-19

## 2019-07-18 RX ADMIN — FUROSEMIDE 20 MG: 10 INJECTION, SOLUTION INTRAMUSCULAR; INTRAVENOUS at 16:45

## 2019-07-18 RX ADMIN — PIPERACILLIN SODIUM,TAZOBACTAM SODIUM 2.25 G: 2; .25 INJECTION, POWDER, FOR SOLUTION INTRAVENOUS at 17:00

## 2019-07-18 RX ADMIN — MEMANTINE 10 MG: 10 TABLET ORAL at 09:00

## 2019-07-18 RX ADMIN — PIPERACILLIN SODIUM,TAZOBACTAM SODIUM 2.25 G: 2; .25 INJECTION, POWDER, FOR SOLUTION INTRAVENOUS at 11:00

## 2019-07-18 RX ADMIN — PIPERACILLIN SODIUM,TAZOBACTAM SODIUM 2.25 G: 2; .25 INJECTION, POWDER, FOR SOLUTION INTRAVENOUS at 22:22

## 2019-07-18 RX ADMIN — CARVEDILOL 3.12 MG: 3.12 TABLET, FILM COATED ORAL at 22:17

## 2019-07-18 RX ADMIN — ASPIRIN 81 MG: 81 TABLET ORAL at 09:00

## 2019-07-18 RX ADMIN — AMIODARONE HYDROCHLORIDE 200 MG: 200 TABLET ORAL at 09:00

## 2019-07-18 RX ADMIN — PIPERACILLIN, TAZOBACTAM 4.5 G: 4; .5 INJECTION, POWDER, LYOPHILIZED, FOR SOLUTION INTRAVENOUS at 04:39

## 2019-07-18 RX ADMIN — CARVEDILOL 3.12 MG: 3.12 TABLET, FILM COATED ORAL at 09:00

## 2019-07-18 RX ADMIN — MEMANTINE 10 MG: 10 TABLET ORAL at 18:00

## 2019-07-18 RX ADMIN — HEPARIN SODIUM 5000 UNITS: 5000 INJECTION, SOLUTION INTRAVENOUS; SUBCUTANEOUS at 22:19

## 2019-07-18 RX ADMIN — HEPARIN SODIUM 5000 UNITS: 5000 INJECTION, SOLUTION INTRAVENOUS; SUBCUTANEOUS at 16:45

## 2019-07-18 RX ADMIN — HEPARIN SODIUM 5000 UNITS: 5000 INJECTION, SOLUTION INTRAVENOUS; SUBCUTANEOUS at 07:00

## 2019-07-18 NOTE — PROGRESS NOTES
visited with the family of [facility], who is a 80 y.o.,female. The  provided the following Interventions:  Initiated a relationship of care and support with patient's daughter/caregiver, Claudette. Patient was resting/sleeping with nasal oxygen during my visit. Daughter said that patient had not sleep for three days. She was released from the ER last Monday with nothing shown on x-ray. However by Tuesday, patient had increased difficulty of breathing, with audible wheezing enough for them to know that she needs to be admitted. Offered prayer and assurance of continued prayers on patient's behalf. Plan:  Chaplains will continue to follow and will provide pastoral care on an as needed/requested basis.  recommends bedside caregivers page  on duty if patient shows signs of acute spiritual or emotional distress.     [address]

## 2019-07-18 NOTE — ED NOTES
TRANSFER - OUT REPORT:    Verbal report given to KAREEM Owen (name) on [place]  being transferred to [facility] ([facility]) for routine progression of care       Report consisted of patients Situation, Background, Assessment and   Recommendations(SBAR). Information from the following report(s) SBAR, Kardex, ED Summary, Intake/Output, MAR and Recent Results was reviewed with the receiving nurse. Lines:   Peripheral IV 07/17/19 Right Forearm (Active)   Site Assessment Clean, dry, & intact 7/17/2019  4:32 PM   Phlebitis Assessment 0 7/17/2019  4:32 PM   Infiltration Assessment 0 7/17/2019  4:32 PM   Dressing Status Clean, dry, & intact 7/17/2019  4:32 PM   Dressing Type Transparent;Tape 7/17/2019  4:32 PM   Hub Color/Line Status Blue;Patent; Flushed 7/17/2019  4:32 PM   Action Taken Blood drawn 7/17/2019  4:32 PM        Opportunity for questions and clarification was provided.       Patient transported with:   Monitor  O2 @ 3 liters  Registered Nurse

## 2019-07-18 NOTE — NURSE NAVIGATOR
Transitional Care Team: Initial G Note    Date of Assessment: 07/18/19  Time of Assessment:  10:15 AM    Cathy Villatoro is a 80 y.o. female [facility]. Assessment & Plan   -Social: she lives with her  & son: Kaylyn Linda. Family provides transportation and assistance with her ADL/IADLs; she's incontinent of B&B as well. She has a walker and WC; no home oxygen or nebulizer.   -Unable to ambulate prior to admission; PT/OT/ST ordered  -No scale, but son does not believe that she can even stand; other s/s of fluid retention that should be monitored discussed w/ son last admission  -Community acquired Pneumonia  -multiple hospital admissions  -albumin 3.1 previous admission albumin 2.8 - seen by dietician last admission; Ensure Enlive BID recommended  -ST recommends puree diet w/ thin liquids  -seen by palliative; pending discussion with decision makers  - patient family feels that she might need O2 at home, spoke with [facility],  requested O2 Eval prior to discharge. - patient on albuterol via neb here in hospital - will need nebulizer at home to continue     Primary Diagnosis: Sepsis, pneumonia, COPD exacerbation    Advance Directive:  not on file. Current Code Status:  Full Code    Referral to Hospice/ Palliative Care Appropriate: yes. Awareness of Medical Conditions: (Trajectory of illness and pts expectations). Unable to assess due to dementia. Discharge Needs: (to include safety issues) family feels that patient needs oxygen at home. Patient is on Nebulizer medication in the hospital for that to continue at home patient will need a nebulizer. Barriers Identified: Dementia - patient has family support as well as personal care aids in day and ovrnight    Patient is willing to go to SNF/Inpatient Rehab if recommended: unsure    Medication Review:  was not performed, awaiting final med list on AVS.    Can patient afford medications:  yes.     Patient is Compliant with Medication regimen:yes    Who manages medications at home: family    Best Patient Contact Number: [phone]  And [phone]       HUG (Healthy Understanding of Goals) program introduced to patient/family. The Transitional Care Team bridges the gaps in care and education surrounding discharge from the acute care facility. The objective is to empower the patient and family in taking a proactive role in preventing readmission within the first thirty days after discharge. The team is also involved in the efforts to reduce readmission to the acute care setting after stabilization and discharge from the acute care environment either to skilled nursing facilities or community. HUG RN will return with [facility] Calendar/ follow up appointments/ Ambulatory Nurse Navigator name and contact information when the patient is ready for discharge. Future Appointments   Date Time [facility] Department [place]   7/26/2019 11:00 AM Elina Vasquez  [facility]   8/1/2019 11:00 AM CLARIBEL Benavidez 2VV [facility]   8/22/2019 11:15 AM Leobardo Lindsey MD [facility]   9/19/2019  9:30 AM Leobardo Lindsey MD [facility].         Patient education focused on readmission zones as described as: The Red Zone: High risk for readmission, days 1-21  The Yellow Zone: Moderate  risk for readmission, days 22-29   The Green Zone: Lower risk for readmission, days                30 and after    The [facility] Team will attempt to follow the patient from a distance while inpatient as well as be available for further transition/disposition needs. The [facility] team will continue to offer support during the 30- 90 day discharge from acute care setting. Will notify Ambulatory HF Nurse Navigator, Oxana Torres RN.     Past Medical History:   Diagnosis Date    Acute on chronic diastolic heart failure (HCC)     Arthritis     Benign hypertensive heart disease without heart failure     Better controlled, stable    Chronic diastolic heart failure (HCC)     Breathing and edema is improving    Congestive heart failure (HCC)     HTN (hypertension)     Hypercholesteremia     Lupus (systemic lupus erythematosus) ([facility] [address] 6/18/2014    followed by dr Li Dry     Obesity, unspecified     Patient has weight loss Discussed diet ad fluid restriction    Other and unspecified hyperlipidemia     F/u per pmd    Tricuspid valve disorders, 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    tr with moderate pulmonary htn        Jim Connolly BSN, RN, [address] [place] [address] program Care Transition Nurse  [phone]

## 2019-07-18 NOTE — PROGRESS NOTES
[facility] Hospitalist Group  Progress Note    Patient: Leon Raymond Age: 80 y.o. : 1931 MR#: [id number] SSN: [id number]  Date/Time: 2019    Subjective:     Patient lying in bed in NAD, opens eyes to commands.  Family at bedside    Assessment/Plan:     - PNA  - Chronic systolc chf, compensated  - CKD3 to 4  - COPD without acute exacerbation  - Dementia  - h/o Lupus  - Dysphagia    PLAN  On abx, O2  Bronchial hygiene, follow cx  Monitor renal function  ST eval  Wound care  D/w family at bedside  Full code  Palliative care consult    Case discussed with:  [x]Patient  []Family  []Nursing  []Case Management  DVT Prophylaxis:  []Lovenox  []Hep SQ  []SCDs  []Coumadin   []On Heparin gtt    Objective:   VS:   Visit Vitals  /71 (BP 1 Location: Left arm, BP Patient Position: At rest)   Pulse 70   Temp 97.6 °F (36.4 °C)   Resp 16   Ht 5' 4\" (1.626 m)   Wt 75.6 kg (166 lb 11.2 oz)   SpO2 100%   BMI 28.61 kg/m²      Tmax/24hrs: Temp (24hrs), Av °F (36.7 °C), Min:97.5 °F (36.4 °C), Max:99.2 °F (37.3 °C)    Input/Output: No intake or output data in the 24 hours ending 19 1317    General:  Opens eyes to verbal commands, has dementia  Cardiovascular:  S1S2+, RRR  Pulmonary:coarse BS b/l  GI:  Soft, BS+, NT, ND  Extremities:  + edema      Labs:    Recent Results (from the past 24 hour(s))   EKG, 12 LEAD, INITIAL    Collection Time: 19  2:46 PM   Result Value Ref Range    Ventricular Rate 70 BPM    Atrial Rate 288 BPM    P-R Interval 212 ms    QRS Duration 198 ms    Q-T Interval 480 ms    QTC Calculation (Bezet) 518 ms    Calculated R Axis -51 degrees    Calculated T Axis 130 degrees    Diagnosis       AV dual-paced rhythm with prolonged AV conduction  Abnormal ECG  When compared with ECG of 15-JUL-2019 17:11,  No significant change was found  Confirmed by Estefany Clifford ([id number]) on 2019 5:31:03 PM     POC G3    Collection Time: 19  2:56 PM   Result Value Ref Range Device: NASAL CANNULA      Flow rate (POC) 6 L/M    pH (POC) 7.332 (L) 7.35 - 7.45      pCO2 (POC) 67.8 (H) 35.0 - 45.0 MMHG    pO2 (POC) 77 (L) 80 - 100 MMHG    HCO3 (POC) 35.9 (H) 22 - 26 MMOL/L    sO2 (POC) 94 92 - 97 %    Base excess (POC) 8 mmol/L    Allens test (POC) YES      Total resp. rate 15      Site LEFT RADIAL      Specimen type (POC) ARTERIAL      Performed by Stephen Mcduffie    CULTURE, BLOOD    Collection Time: 07/17/19  3:45 PM   Result Value Ref Range    Special Requests: NO SPECIAL REQUESTS      Culture result: NO GROWTH AFTER 14 HOURS     METABOLIC PANEL, COMPREHENSIVE    Collection Time: 07/17/19  3:45 PM   Result Value Ref Range    Sodium 138 136 - 145 mmol/L    Potassium 4.6 3.5 - 5.5 mmol/L    Chloride 99 (L) 100 - 108 mmol/L    CO2 36 (H) 21 - 32 mmol/L    Anion gap 3 3.0 - 18 mmol/L    Glucose 115 (H) 74 - 99 mg/dL    BUN 38 (H) 7.0 - 18 MG/DL    Creatinine 1.48 (H) 0.6 - 1.3 MG/DL    BUN/Creatinine ratio 26 (H) 12 - 20      GFR est AA 40 (L) >60 ml/min/1.73m2    GFR est non-AA 33 (L) >60 ml/min/1.73m2    Calcium 8.3 (L) 8.5 - 10.1 MG/DL    Bilirubin, total 0.4 0.2 - 1.0 MG/DL    ALT (SGPT) 15 13 - 56 U/L    AST (SGOT) 15 15 - 37 U/L    Alk. phosphatase 60 45 - 117 U/L    Protein, total 6.2 (L) 6.4 - 8.2 g/dL    Albumin 3.1 (L) 3.4 - 5.0 g/dL    Globulin 3.1 2.0 - 4.0 g/dL    A-G Ratio 1.0 0.8 - 1.7     CBC WITH AUTOMATED DIFF    Collection Time: 07/17/19  3:45 PM   Result Value Ref Range    WBC 6.1 4.6 - 13.2 K/uL    RBC 2.94 (L) 4.20 - 5.30 M/uL    HGB 8.3 (L) 12.0 - 16.0 g/dL    HCT 26.9 (L) 35.0 - 45.0 %    MCV 91.5 74.0 - 97.0 FL    MCH 28.2 24.0 - 34.0 PG    MCHC 30.9 (L) 31.0 - 37.0 g/dL    RDW 16.8 (H) 11.6 - 14.5 %    PLATELET 769 676 - 669 K/uL    MPV 9.1 (L) 9.2 - 11.8 FL    NEUTROPHILS 59 40 - 73 %    LYMPHOCYTES 22 21 - 52 %    MONOCYTES 17 (H) 3 - 10 %    EOSINOPHILS 2 0 - 5 %    BASOPHILS 0 0 - 2 %    ABS. NEUTROPHILS 3.6 1.8 - 8.0 K/UL    ABS.  LYMPHOCYTES 1.3 0.9 - 3.6 K/UL ABS. MONOCYTES 1.1 0.05 - 1.2 K/UL    ABS. EOSINOPHILS 0.1 0.0 - 0.4 K/UL    ABS. BASOPHILS 0.0 0.0 - 0.1 K/UL    DF AUTOMATED     POC LACTIC ACID    Collection Time: 07/17/19  3:57 PM   Result Value Ref Range    Lactic Acid (POC) 0.85 0.40 - 2.00 mmol/L   CULTURE, BLOOD    Collection Time: 07/17/19  4:00 PM   Result Value Ref Range    Special Requests: NO SPECIAL REQUESTS      Culture result: NO GROWTH AFTER 14 HOURS     POC G3    Collection Time: 07/17/19  4:39 PM   Result Value Ref Range    Device: NASAL CANNULA      Flow rate (POC) 3 L/M    pH (POC) 7.339 (L) 7.35 - 7.45      pCO2 (POC) 68.9 (H) 35.0 - 45.0 MMHG    pO2 (POC) 278 (H) 80 - 100 MMHG    HCO3 (POC) 37.1 (H) 22 - 26 MMOL/L    sO2 (POC) 100 (H) 92 - 97 %    Base excess (POC) 10 mmol/L    Allens test (POC) YES      Total resp.  rate 11      Site LEFT RADIAL      Specimen type (POC) ARTERIAL      Performed by Killian Figueredo W/ RFLX MICROSCOPIC    Collection Time: 07/18/19  6:00 AM   Result Value Ref Range    Color YELLOW      Appearance CLEAR      Specific gravity 1.006 1.005 - 1.030      pH (UA) 6.0 5.0 - 8.0      Protein NEGATIVE  NEG mg/dL    Glucose NEGATIVE  NEG mg/dL    Ketone NEGATIVE  NEG mg/dL    Bilirubin NEGATIVE  NEG      Blood TRACE (A) NEG      Urobilinogen 0.2 0.2 - 1.0 EU/dL    Nitrites NEGATIVE  NEG      Leukocyte Esterase LARGE (A) NEG     URINE MICROSCOPIC ONLY    Collection Time: 07/18/19  6:00 AM   Result Value Ref Range    WBC 30 to 40 0 - 4 /hpf    RBC 0 to 3 0 - 5 /hpf    Epithelial cells 2+ 0 - 5 /lpf    Bacteria 2+ (A) NEG /hpf     Additional Data Reviewed:      Signed By: Matthew Javed MD     July 18, 2019

## 2019-07-18 NOTE — ACP (ADVANCE CARE PLANNING)
[facility]: [phone] ([phone])  [facility]: [phone]    Palliative Medicine consult with Cinthya Waddell NP and this writer. Pt has dementia and is unable to complete an AMD.  Mrs. Lakeisha Mcgovern is awake, smiling and laughing. She stated feeling better today but only aware of self. Her legal decision maker at this time is her spouse, Carlee Apley, who was not present at bedside. There are 6 living adult children 4 boys and 2 girls, eldest son is . Family members present, are Claudia Hilliard (son) and Judy Alfredo (daughter). They relayed that they would like for Pt to be a DNR/DNI. They will ask their dad to come in to meet with this team (hopefully tomorrow, 19) to go over the DNR form. They shared that Mr. Lakeisha Mcgovern is hard of hearing and suffers from Parkinsons disease, requiring assist with personal care. Mrs Lakeisha Mcgovern lives at home with her spouse and children. According to daughter, Judy Alfredo, pt is able to feed self. She requires total assist with personal care, is incontinence of both bladder and bowel, wear diapers. Uses Brayan lift for transfer out of bed to chair.    Claudia Hilliard stated pt stated wheezing on  and pre the nurse her O2 sat was 90% so EMS was called for transport to the hospital.         Primary Decision Maker ([address]): Defaults to Aby Garcia  Relationship to patient: spouse  Phone number:[phone]  [] Named in a scanned document   [x] Legal Next of Kin  [] Guardian     Secondary Decision Maker ([address]):   Relationship to patient:  Phone number:  [] Named in a scanned document   [] Legal Next of Kin  [] Guardian     ACP documents you current have include: None currently  [] Advance Directive or Living Will  [] Durable Do Not Resuscitate  [] Physician Orders for Scope of Treatment (POST)  [] Medical Power of   [] Other    CODE STATUS- FULL CODE      Thank you for the consult and the opportunity to assist in Ms. Dionne Nation care. PM Team will follow up for family meeting when spouse Jennifer Mosher is available.       Denise Linton RN, [facility]  Palliative Medicine Inpatient RN  [facility]       Palliative [place] Line: [phone]

## 2019-07-18 NOTE — PROGRESS NOTES
Reason for Admission:   Respiratory distress [R06.03]  COPD exacerbation ([facility] [address] [J44.1]  CAP (community acquired pneumonia) [J18.9]               RRAT Score:     40             Resources/supports as identified by patient/family:   Patient has lots of family supports and she has a personal care aide through [employer]. Top Challenges facing patient (as identified by patient/family and CM): Finances/Medication cost?    NO    Transportation     Patient will require stretcher transport. Support system or lack thereof? Lots of family supports and a personal care aide through [employer]. Living arrangements? Lives with her  and her son Juana Walker). Self-care/ADLs/Cognition? Impaired cognition. Needs assistance with ADLs and IADLs       Current Advanced Directive/Advance Care Plan:   no                          Plan for utilizing home health:    Patient has no home health orders in place at this time. This writer will continue to closely monitor for potential home health needs and orders. Likelihood of readmission:   HIGH    Transition of Care Plan:    Patient plans to return home with help from her family and have her personal care aide services restarted. Initial assessment completed with patient and her sons Bertin Valdez and Simona Junior). Cognitive status of patient: Impaired cognition. Face sheet information confirmed:  yes. Her sons Edelmira Fairchild and Jennifer George) and her  Comfort Soler) will participate in his/her discharge plan and to receive any needed information. This patient lives in a house with her  and her son Simona Junior. Patient is not able to navigate steps as needed. Prior to hospitalization, patient was considered to be independent with ADLs/IADLS : no . If not independent,  patient needs assist with : ADLs and IADLs.     Patient has a current ACP document on file: no     Patient's son Bertin Doctor) will be available to transport patient home upon discharge. The patient already has a walker, a wheelchair and a hospital bed available in the home. Family is reporting that she may need home O2. The medical team will need to evaluate for home O2 and nebulizer. Patient is not currently active with home health. Patient has not stayed in a skilled nursing facility or rehab. Currently, the discharge plan is to return home with help from her family. Family will assist with care, as needed, post discharge. She will also have her personal care services restarted. Patient will need a stretcher to transport her home at the time of discharge. Patient's  is Noemy Gamboa, WN#[phone]). Patient's son is Willa Bolivar, EMILY#[phone]). Patient's other son is Mario Granda). Patient's PCP is Dr. Stanley Washburn. Patient is insured through Medicare and she also has [employer]. The patient's family states that she can obtain her medications from the pharmacy, and take her medications as directed, with family assistance. This writer will continue to closely monitor for discharge planning to ensure a safe discharge home from [facility]. Care Management Interventions  PCP Verified by CM: Yes(Sergio Gonzalez MD)  Palliative Care Criteria Met (RRAT>21 & CHF Dx)?: No  Mode of Transport at Discharge: BLS  Transition of Care Consult (CM Consult): Discharge Planning  Current Support Network: Has Personal Caregivers, Own Home, Lives with Caregiver  Confirm Follow Up Transport: Other (see comment)(Patient will need stretcher transport home.)  Plan discussed with Pt/Family/Caregiver: Yes  Discharge Location  Discharge Placement: Home with family assistance(and personal care aide.)        Danisha Esparza.  [facility]  Care Manager  Pager#: [phone]

## 2019-07-18 NOTE — PROGRESS NOTES
NUTRITION    Nursing Referral: Pressure Injury     RECOMMENDATIONS / PLAN:     - Add supplements: Ensure Enlive, BID.  - Downgrade diet consistency to mechanical soft. Monitor diet tolerance. - Continue RD inpatient monitoring and evaluation. NUTRITION INTERVENTIONS & DIAGNOSIS:     [x] Meals/snacks: modify composition  [x] Medical food supplement therapy: initiate    Nutrition Diagnosis: Increased nutrient needs (protein) related to increased demand for wound healing as evidenced by pressure injury. Predicted inadequate energy intake related to chewing difficulty and mentation as evidenced by variable meal intake since admission. ASSESSMENT:     Pt confused with dementia, family members present to answer questions. Reports pt with stable weight hx and good appetite PTA, typically has better meal intake with chopped foods. Feeds herself at home but family helping feed pt today. Fair intake today. Average po intake adequate to meet patients estimated nutritional needs:   [] Yes     [x] No   [] Unable to determine at this time    Diet: No diet orders on file      Food Allergies: NKFA  Current Appetite:   [] Good     [x] Fair     [] Poor     [x] Other: mentation  Appetite/meal intake prior to admission:   [x] Good     [] Fair     [] Poor     [x] Other: occasional nutritional supplement  Feeding Limitations:  [] Swallowing difficulty    [] Chewing difficulty    [x] Other: typically does best with chopped foods  Current Meal Intake: No data found.     BM: 7/17  Skin Integrity: stage 2 pressure injury to sacrum, vascular wound to right leg  Edema:   [x] No     [] Yes   Pertinent Medications: Reviewed: miralax, demadex prn    Recent Labs     07/17/19  1545 07/15/19  1745    141   K 4.6 4.3   CL 99* 101   CO2 36* 36*   * 117*   BUN 38* 34*   CREA 1.48* 1.43*   CA 8.3* 8.4*   ALB 3.1*  --    SGOT 15  --    ALT 15  --      No intake or output data in the 24 hours ending 07/18/19 1417    Anthropometrics:  Ht Readings from Last 1 Encounters:   07/17/19 5' 4\" (1.626 m)     Last 3 Recorded Weights in this Encounter    07/17/19 1450 07/17/19 2236   Weight: 80.7 kg (178 lb) 75.6 kg (166 lb 11.2 oz)     Body mass index is 28.61 kg/m². Weight History: Family reports pt with UBW of around 185 lbs however unsure of weight trends 2/2 inability to weight pt at home. Weight Metrics 7/17/2019 7/17/2019 7/9/2019 5/29/2019 5/21/2019 5/9/2019 4/30/2019   Weight 166 lb 11.2 oz - 187 lb 187 lb 187 lb 187 lb 1.6 oz -   BMI 28.61 kg/m2 32.1 kg/m2 32.1 kg/m2 32.1 kg/m2 32.1 kg/m2 - 32.12 kg/m2        Admitting Diagnosis: Respiratory distress [R06.03]  COPD exacerbation (HCC) [J44.1]  CAP (community acquired pneumonia) [J18.9]  Pertinent PMHx: CHF, COPD, HTN, hyperchoelsterolemia    Education Needs:        [x] None identified  [] Identified - Not appropriate at this time  []  Identified and addressed - refer to education log  Learning Limitations:   [] None identified  [x] Identified: dementia    Cultural, Buddhist & ethnic food preferences:  [x] None identified    [] Identified and addressed     ESTIMATED NUTRITION NEEDS:     Calories: 6765-4530 kcal (MSJx1.2-1.3) based on  [x] Actual BW: 76 kg      [] IBW   Protein: 115-144 gm (1.2-1.5 gm/kg) based on  [x] Actual BW      [] IBW   Fluid: 1 mL/kcal     MONITORING & EVALUATION:     Nutrition Goal(s):   1. Po intake of meals will meet >75% of patient estimated nutritional needs within the next 7 days.   Outcome:  [] Met/Ongoing    [] Progressing towards goal    [] Not progressing towards goal    [x] New/Initial goal     Monitoring:   [x] Food and beverage intake   [x] Diet order   [x] Nutrition-focused physical findings   [x] Treatment/therapy   [] Weight   [] Enteral nutrition intake        Previous Recommendations (for follow-up assessments only):     []   Implemented       []   Not Implemented (RD to address)      [] No Longer Appropriate     [] No Recommendation Made     Discharge Planning: cardiac diet; consistency as tolerated  [x] Participated in care planning, discharge planning, & interdisciplinary rounds as appropriate      Amauri Redman RD   Pager: [phone]

## 2019-07-18 NOTE — WOUND CARE
Physical Exam   Room 2302: wound assess  Wound Leg lower Right;Medial inflammatory lesion POA (Active)   Dressing Status Intact; Removed 7/18/2019 12:15 PM   Dressing Type Silver products;Gauze wrap (arjun); Elastic bandage 7/18/2019 12:15 PM   Non-staged Wound Description Partial thickness 7/18/2019 12:15 PM   Shape oval 7/18/2019 12:15 PM   Wound Length (cm) 8 cm 7/18/2019 12:15 PM   Wound Width (cm) 2.5 cm 7/18/2019 12:15 PM   Wound Depth (cm) 0.1 cm 7/18/2019 12:15 PM   Wound Volume (cm^3) 2 cm^3 7/18/2019 12:15 PM   Condition of Base Cane Beds 7/18/2019 12:15 PM   Condition of Edges Closed 7/18/2019 12:15 PM   Epithelialization (%) 10 7/18/2019 12:15 PM   Assessment Pink 7/18/2019 12:15 PM   Tissue Type Percent Pink 100 7/18/2019 12:15 PM   Drainage Amount Small 7/18/2019 12:15 PM   Drainage Color Serosanguinous 7/18/2019 12:15 PM   Wound Odor None 7/18/2019 12:15 PM   Keisha-wound Assessment Intact 7/18/2019 12:15 PM   Margins Attached edges; Defined edges 7/18/2019 12:15 PM   Dressing Changed Changed/New 7/18/2019 12:15 PM   Dressing Type Applied Honey;Silver products;Gauze wrap (arjun) 7/18/2019 12:15 PM   Procedure Tolerated Well 7/18/2019 12:15 PM   Number of days: 78       Wound Sacrum Mid pressure injury to sacrum POA 07/17/19 (Active)   Dressing Status Intact 7/18/2019 12:15 PM   Dressing Type Silicone 3/50/0339 26:96 PM   Pressure Injury Stage 2 7/18/2019 12:15 PM   Shape round 7/18/2019 12:15 PM   Wound Length (cm) 2 cm 7/18/2019 12:15 PM   Wound Width (cm) 2 cm 7/18/2019 12:15 PM   Wound Depth (cm) 0.1 cm 7/18/2019 12:15 PM   Wound Volume (cm^3) 0.4 cm^3 7/18/2019 12:15 PM   Condition of Base Cane Beds 7/18/2019 12:15 PM   Condition of Edges Closed 7/18/2019 12:15 PM   Epithelialization (%) 50 7/18/2019 12:15 PM   Assessment Pink 7/18/2019 12:15 PM   Tissue Type Percent Pink 100 7/18/2019 12:15 PM   Drainage Amount Scant 7/18/2019 12:15 PM   Drainage Color Serosanguinous 7/18/2019 12:15 PM   Wound Odor None 7/18/2019 12:15 PM   Keisha-wound Assessment Pink scarring 7/18/2019 12:15 PM   Margins Attached edges; Defined edges 7/18/2019 12:15 PM   Dressing Type Applied Silicone 1/41/5042 35:25 PM   Procedure Tolerated Well 7/18/2019 12:15 PM   Number of days: 1   Envision mattress on [employer] frame ordered for delivery today. prevalon boots in place today. Heels intact. Pt unable to participate in wound care planning or education at this time. Wound care education provided to pt's family at this time & agreeable to dressing changes. Will turn over care to nursing staff at this time. yKara SERNA updated on plan of care.    Brandon Castellano BSN, RN, [facility], [address]

## 2019-07-18 NOTE — CONSULTS
[facility]: [phone]  [facility]: [address] [place] [address] Way: [phone]    Patient Name: Cathy Villatoro  YOB: 1931    Date of Initial Consult: 2019  Reason for Consult: goals of care  Requesting Provider: Marilin Barrera MD   Primary Care Physician: Cherelle Ramos MD      SUMMARY:   Cathy Villatoro is a 80 y.o. female with a past history of CHF, COPD, dementia, HTN, HLD, lupus, obesity who was admitted on 2019 from home with a diagnosis of exacerbation of COPD and acute respiratory failure with hypoxia. Patient was seen in the ED 7/15/2019 for complaint of shortness of breath. She was given oxygen, diureses and sent home. She followed up with cardiologist on the day of admission and had lasix dose increased. However, patient continued to have worsening shortness of breath with wheezing and hypoxia so EMS was called and she was brought to ED. Current medical issues leading to Palliative Medicine involvement include: Advanced age, COPD, CHF, dementia and goals of care. PALLIATIVE DIAGNOSES:   1. Advanced care decisions  2. COPD exacerbation  3. Respiratory failure with hypoxia  4. UTI  5. Debility/Dementia       PLAN:   1. Advanced care decisions: Palliative care team including Leticia Shah RN and myself met with patient and a son, Bird Nur and daughter, Jacquie Victor in her room. Patient is  and has 5 sons and 2 daughters with one son being . Patient and  live in a home and have health care aide in addition to son, Kaylyn Linda living there. Discussed functional status of patient and spouse. Palliative care team has worked many times with this family in the past. Theresa Roa (patient's ) refuses to sign any DNR/POST and states \"just bring my [place] home\".  Alberta's children are aware of burdens and benefits of CPR and will talk with Theresa Roa again and possibly bring him to hospital if they can get him to agree. GOALS OF CARE: FULL CODE  2. COPD exacerbation: as described in summary above. ABG on admission: pH 7.332, pCO2 67.8, pO2 77 and HCO3 35.9. CXR: Pulmonary venous congestion and mild pulmonary interstitial edema. IV antibiotics and oxygen initiated. Pulmonology consulted. 3. Respiratory failure with hypoxia: Secondary to #2 above. Pulmonology consulted. Currently on oxygen per nasal cannula. 4. UTI: Urinalysis on admission: large leukocyte esterace, 30-40 WBC, 2+ bacteria. Would recommend sending urine for culture if not already done. On IV vancomycin and zosyn. Primary team managing. 5. Debility/Dementia: patient with dementia and on namenda. She is oriented to self only. Can identify her children in the room as her children but cannot tell us their names. Per family, she is bed bound and requires assistance with all ADLs. 6. Initial consult note routed to primary continuity provider  7. Communicated plan of care with: Palliative IDT    GOALS OF CARE:  Patient/Health Care Proxy Stated Goals: Prolong life      TREATMENT PREFERENCES:   Code Status: Full Code    Advance Care Planning:  Advance Care Planning 7/18/2019   Patient's Healthcare Decision Maker is: -   Primary Decision Maker Name -   Primary Decision Maker Phone Number -   Primary Decision Maker Relationship to Patient -   Secondary Decision Maker Name -   Secondary Decision [address] Phone Number -   Secondary Decision Maker Relationship to Patient -   Confirm Advance Directive None   Patient Would Like to Complete Advance Directive -   Does the patient have other document types -       Medical Interventions: Full interventions           Other: As far as possible, the palliative care team has discussed with patient / health care proxy about goals of care / treatment preferences for patient.      HISTORY:     History obtained from: chart    CHIEF COMPLAINT: shortness of breath    HPI/SUBJECTIVE:    The patient is:   [x] Verbal and participatory but limited by dementia  [] Non-participatory due to:   Elderly AA female sitting up in bed with nasal cannula oxygen on. Audible wheezing present. She is awake and alert but oriented to self only. She is unable to state time/date/location/situation. She denies pain. Clinical Pain Assessment (nonverbal scale for nonverbal patients): Clinical Pain Assessment  Severity: 0          Duration: for how long has pt been experiencing pain (e.g., 2 days, 1 month, years)  Frequency: how often pain is an issue (e.g., several times per day, once every few days, constant)     FUNCTIONAL ASSESSMENT:     Palliative Performance Scale (PPS):  PPS: 30    ECOG  ECOG Status : Completely disabled     PSYCHOSOCIAL/SPIRITUAL SCREENING:      Any spiritual / Anglican concerns:  [] Yes /  [x] No    Caregiver Burnout:  [] Yes /  [x] No /  [] No Caregiver Present      Anticipatory grief assessment:   [x] Normal  / [] Maladaptive        REVIEW OF SYSTEMS:     Positive and pertinent negative findings in ROS are noted above in HPI. The following systems were [] reviewed / [x] unable to be reviewed as noted in HPI  Other findings are noted below. Systems: constitutional, ears/nose/mouth/throat, respiratory, gastrointestinal, genitourinary, musculoskeletal, integumentary, neurologic, psychiatric, endocrine. Positive findings noted below. Modified ESAS Completed by: provider           Pain: 0                                PHYSICAL EXAM:     Wt Readings from Last 3 Encounters:   07/17/19 75.6 kg (166 lb 11.2 oz)   07/09/19 84.8 kg (187 lb)   05/29/19 84.8 kg (187 lb)     Blood pressure 116/76, pulse 83, temperature 98.4 °F (36.9 °C), resp. rate 16, height 5' 4\" (1.626 m), weight 75.6 kg (166 lb 11.2 oz), SpO2 100 %. Pain:  Pain Scale 1: Numeric (0 - 10)  Pain Intensity 1: 0                   Constitutional: Acutely ill AA female sitting up in bed in mild respiratory distress.    Eyes: pupils equal, anicteric  ENMT: no nasal discharge, moist mucous membranes  Respiratory: breathing slightly labored, symmetric, audible wheezing, on nasal cannula  Gastrointestinal: soft non-tender   Skin: warm, dry, did not assess posterior side. Neurologic: oriented to self only. HISTORY:     Active Problems:    Respiratory distress (7/17/2019)      COPD exacerbation ([facility] (7/17/2019)      CAP (community acquired pneumonia) (7/17/2019)      Acute respiratory failure with hypoxia ([facility] (7/17/2019)      Past Medical History:   Diagnosis Date    Acute on chronic diastolic heart failure (HCC)     Arthritis     Benign hypertensive heart disease without heart failure     Better controlled, stable    Chronic diastolic heart failure (HCC)     Breathing and edema is improving    Congestive heart failure (HCC)     HTN (hypertension)     Hypercholesteremia     [facility] 6/18/[facility]    followed by dr Dalila Mcdonald     Obesity, unspecified     Patient has weight loss Discussed diet ad fluid restriction    Other and unspecified hyperlipidemia     F/u per pmd    Tricuspid valve disorders, 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   Social History     Tobacco Use    Smoking status: Never Smoker    Smokeless tobacco: Never Used   Substance Use Topics    Alcohol use: No     No Known Allergies   Current Facility-Administered Medications   Medication Dose Route Frequency    piperacillin-tazobactam (ZOSYN) 2.25 g in 0.9% sodium chloride (MBP/ADV) 50 mL MBP  2.25 g IntraVENous Q6H    [START ON 7/19/2019] vancomycin (VANCOCIN) 1000 mg in  ml infusion  1,000 mg IntraVENous Q36H    sodium chloride (NS) flush 5-10 mL  5-10 mL IntraVENous PRN    amiodarone (CORDARONE) tablet 200 mg  200 mg Oral DAILY    aspirin delayed-release tablet 81 mg  81 mg Oral DAILY    carvedilol (COREG) tablet 3.125 mg  3.125 mg Oral Q12H    memantine (NAMENDA) tablet 10 mg  10 mg Oral BID    torsemide (DEMADEX) tablet 20 mg  20 mg Oral DAILY PRN    polyethylene glycol (MIRALAX) packet 17 g  17 g Oral DAILY    heparin (porcine) injection 5,000 Units  5,000 Units SubCUTAneous Q8H        LAB AND IMAGING FINDINGS:     Lab Results   Component Value Date/Time    WBC 6.1 07/17/2019 03:45 PM    HGB 8.3 (L) 07/17/2019 03:45 PM    PLATELET 399 55/69/4728 03:45 PM     Lab Results   Component Value Date/Time    Sodium 138 07/17/2019 03:45 PM    Potassium 4.6 07/17/2019 03:45 PM    Chloride 99 (L) 07/17/2019 03:45 PM    CO2 36 (H) 07/17/2019 03:45 PM    BUN 38 (H) 07/17/2019 03:45 PM    Creatinine 1.48 (H) 07/17/2019 03:45 PM    Calcium 8.3 (L) 07/17/2019 03:45 PM    Magnesium 2.6 05/08/2019 05:52 AM    Phosphorus 3.4 12/28/2018 09:25 AM      Lab Results   Component Value Date/Time    AST (SGOT) 15 07/17/2019 03:45 PM    Alk.  phosphatase 60 07/17/2019 03:45 PM    Protein, total 6.2 (L) 07/17/2019 03:45 PM    Albumin 3.1 (L) 07/17/2019 03:45 PM    Globulin 3.1 07/17/2019 03:45 PM     Lab Results   Component Value Date/Time    INR 1.4 (H) 04/30/2019 07:00 PM    Prothrombin time 16.8 (H) 04/30/2019 07:00 PM    aPTT 42.5 (H) 04/30/2019 07:00 PM      Lab Results   Component Value Date/Time    Iron 51 02/10/2019 11:00 AM    TIBC 202 (L) 02/10/2019 11:00 AM    Iron % saturation 25 02/10/2019 11:00 AM      No results found for: PH, PCO2, PO2  No components found for: [place]   Lab Results   Component Value Date/Time    CK 86 07/15/2019 05:45 PM    CK - MB <1.0 07/15/2019 05:45 PM              Total time: 50 minutes  Counseling / coordination time, spent as noted above: 40 minutes > 50% counseling / coordination: patient, family and MD    Prolonged service was provided for  []30 min   []75 min in face to face time in the presence of the patient, spent as noted above. Time Start:   Time End:   Note: this can only be billed with 58152 (initial) or 02985 (follow up). If multiple start / stop times, list each separately.

## 2019-07-19 ENCOUNTER — APPOINTMENT (OUTPATIENT)
Dept: GENERAL RADIOLOGY | Age: 84
DRG: 291 | End: 2019-07-19
Attending: EMERGENCY MEDICINE
Payer: MEDICARE

## 2019-07-19 LAB
ANION GAP SERPL CALC-SCNC: 5 MMOL/L (ref 3–18)
BASOPHILS # BLD: 0 K/UL (ref 0–0.1)
BASOPHILS NFR BLD: 0 % (ref 0–2)
BUN SERPL-MCNC: 40 MG/DL (ref 7–18)
BUN/CREAT SERPL: 26 (ref 12–20)
CALCIUM SERPL-MCNC: 8.4 MG/DL (ref 8.5–10.1)
CHLORIDE SERPL-SCNC: 99 MMOL/L (ref 100–111)
CO2 SERPL-SCNC: 36 MMOL/L (ref 21–32)
CREAT SERPL-MCNC: 1.52 MG/DL (ref 0.6–1.3)
DIFFERENTIAL METHOD BLD: ABNORMAL
EOSINOPHIL # BLD: 0 K/UL (ref 0–0.4)
EOSINOPHIL NFR BLD: 0 % (ref 0–5)
ERYTHROCYTE [DISTWIDTH] IN BLOOD BY AUTOMATED COUNT: 16.2 % (ref 11.6–14.5)
GLUCOSE SERPL-MCNC: 90 MG/DL (ref 74–99)
HCT VFR BLD AUTO: 29.8 % (ref 35–45)
HGB BLD-MCNC: 9.2 G/DL (ref 12–16)
LYMPHOCYTES # BLD: 1.3 K/UL (ref 0.9–3.6)
LYMPHOCYTES NFR BLD: 19 % (ref 21–52)
MAGNESIUM SERPL-MCNC: 2.3 MG/DL (ref 1.6–2.6)
MCH RBC QN AUTO: 28.5 PG (ref 24–34)
MCHC RBC AUTO-ENTMCNC: 30.9 G/DL (ref 31–37)
MCV RBC AUTO: 92.3 FL (ref 74–97)
MONOCYTES # BLD: 1.2 K/UL (ref 0.05–1.2)
MONOCYTES NFR BLD: 17 % (ref 3–10)
NEUTS SEG # BLD: 4.5 K/UL (ref 1.8–8)
NEUTS SEG NFR BLD: 64 % (ref 40–73)
PLATELET # BLD AUTO: 178 K/UL (ref 135–420)
PMV BLD AUTO: 9.7 FL (ref 9.2–11.8)
POTASSIUM SERPL-SCNC: 3.8 MMOL/L (ref 3.5–5.5)
RBC # BLD AUTO: 3.23 M/UL (ref 4.2–5.3)
SODIUM SERPL-SCNC: 140 MMOL/L (ref 136–145)
WBC # BLD AUTO: 7 K/UL (ref 4.6–13.2)

## 2019-07-19 PROCEDURE — 3331090001 HH PPS REVENUE CREDIT

## 2019-07-19 PROCEDURE — 74011250636 HC RX REV CODE- 250/636: Performed by: INTERNAL MEDICINE

## 2019-07-19 PROCEDURE — 77030038269 HC DRN EXT URIN PURWCK BARD -A

## 2019-07-19 PROCEDURE — 97165 OT EVAL LOW COMPLEX 30 MIN: CPT

## 2019-07-19 PROCEDURE — 74011000258 HC RX REV CODE- 258

## 2019-07-19 PROCEDURE — 74011250636 HC RX REV CODE- 250/636: Performed by: EMERGENCY MEDICINE

## 2019-07-19 PROCEDURE — 92611 MOTION FLUOROSCOPY/SWALLOW: CPT

## 2019-07-19 PROCEDURE — 92610 EVALUATE SWALLOWING FUNCTION: CPT

## 2019-07-19 PROCEDURE — 74230 X-RAY XM SWLNG FUNCJ C+: CPT

## 2019-07-19 PROCEDURE — 92526 ORAL FUNCTION THERAPY: CPT

## 2019-07-19 PROCEDURE — 74011000258 HC RX REV CODE- 258: Performed by: EMERGENCY MEDICINE

## 2019-07-19 PROCEDURE — 97166 OT EVAL MOD COMPLEX 45 MIN: CPT

## 2019-07-19 PROCEDURE — 80048 BASIC METABOLIC PNL TOTAL CA: CPT

## 2019-07-19 PROCEDURE — 74011000250 HC RX REV CODE- 250: Performed by: INTERNAL MEDICINE

## 2019-07-19 PROCEDURE — 85025 COMPLETE CBC W/AUTO DIFF WBC: CPT

## 2019-07-19 PROCEDURE — 74011250636 HC RX REV CODE- 250/636

## 2019-07-19 PROCEDURE — 74011250637 HC RX REV CODE- 250/637: Performed by: INTERNAL MEDICINE

## 2019-07-19 PROCEDURE — 36415 COLL VENOUS BLD VENIPUNCTURE: CPT

## 2019-07-19 PROCEDURE — 77030037878 HC DRSG MEPILEX >48IN BORD MOLN -B

## 2019-07-19 PROCEDURE — 83735 ASSAY OF MAGNESIUM: CPT

## 2019-07-19 PROCEDURE — 3331090002 HH PPS REVENUE DEBIT

## 2019-07-19 PROCEDURE — 97161 PT EVAL LOW COMPLEX 20 MIN: CPT

## 2019-07-19 PROCEDURE — 74011000255 HC RX REV CODE- 255: Performed by: EMERGENCY MEDICINE

## 2019-07-19 PROCEDURE — 65660000004 HC RM CVT STEPDOWN

## 2019-07-19 RX ORDER — FUROSEMIDE 10 MG/ML
40 INJECTION INTRAMUSCULAR; INTRAVENOUS EVERY 12 HOURS
Status: DISCONTINUED | OUTPATIENT
Start: 2019-07-19 | End: 2019-07-21

## 2019-07-19 RX ADMIN — FUROSEMIDE 40 MG: 10 INJECTION, SOLUTION INTRAMUSCULAR; INTRAVENOUS at 22:08

## 2019-07-19 RX ADMIN — BARIUM SULFATE 30 ML: 400 PASTE ORAL at 13:45

## 2019-07-19 RX ADMIN — FUROSEMIDE 40 MG: 10 INJECTION, SOLUTION INTRAMUSCULAR; INTRAVENOUS at 15:00

## 2019-07-19 RX ADMIN — BARIUM SULFATE 30 G: 960 POWDER, FOR SUSPENSION ORAL at 13:45

## 2019-07-19 RX ADMIN — BARIUM SULFATE 700 MG: 700 TABLET ORAL at 13:45

## 2019-07-19 RX ADMIN — PIPERACILLIN SODIUM,TAZOBACTAM SODIUM 2.25 G: 2; .25 INJECTION, POWDER, FOR SOLUTION INTRAVENOUS at 22:01

## 2019-07-19 RX ADMIN — PIPERACILLIN SODIUM,TAZOBACTAM SODIUM 2.25 G: 2; .25 INJECTION, POWDER, FOR SOLUTION INTRAVENOUS at 05:00

## 2019-07-19 RX ADMIN — BARIUM SULFATE 15 ML: 400 SUSPENSION ORAL at 13:45

## 2019-07-19 RX ADMIN — VANCOMYCIN HYDROCHLORIDE 1000 MG: 10 INJECTION, POWDER, LYOPHILIZED, FOR SOLUTION INTRAVENOUS at 06:25

## 2019-07-19 RX ADMIN — HEPARIN SODIUM 5000 UNITS: 5000 INJECTION, SOLUTION INTRAVENOUS; SUBCUTANEOUS at 07:00

## 2019-07-19 RX ADMIN — MEMANTINE 10 MG: 10 TABLET ORAL at 09:00

## 2019-07-19 RX ADMIN — ACETAZOLAMIDE 500 MG: 500 INJECTION, POWDER, LYOPHILIZED, FOR SOLUTION INTRAVENOUS at 20:12

## 2019-07-19 RX ADMIN — PIPERACILLIN SODIUM,TAZOBACTAM SODIUM 2.25 G: 2; .25 INJECTION, POWDER, FOR SOLUTION INTRAVENOUS at 11:00

## 2019-07-19 RX ADMIN — HEPARIN SODIUM 5000 UNITS: 5000 INJECTION, SOLUTION INTRAVENOUS; SUBCUTANEOUS at 22:10

## 2019-07-19 RX ADMIN — ASPIRIN 81 MG: 81 TABLET ORAL at 09:00

## 2019-07-19 RX ADMIN — CARVEDILOL 3.12 MG: 3.12 TABLET, FILM COATED ORAL at 22:07

## 2019-07-19 RX ADMIN — HEPARIN SODIUM 5000 UNITS: 5000 INJECTION, SOLUTION INTRAVENOUS; SUBCUTANEOUS at 15:00

## 2019-07-19 RX ADMIN — PIPERACILLIN SODIUM,TAZOBACTAM SODIUM 2.25 G: 2; .25 INJECTION, POWDER, FOR SOLUTION INTRAVENOUS at 17:00

## 2019-07-19 RX ADMIN — AMIODARONE HYDROCHLORIDE 200 MG: 200 TABLET ORAL at 09:00

## 2019-07-19 RX ADMIN — MEMANTINE 10 MG: 10 TABLET ORAL at 18:00

## 2019-07-19 RX ADMIN — CARVEDILOL 3.12 MG: 3.12 TABLET, FILM COATED ORAL at 09:00

## 2019-07-19 NOTE — PROGRESS NOTES
Problem: Dysphagia (Adult)  Goal: *Acute Goals and Plan of Care (Insert Text)  Description  Patient will:  1. Tolerate PO trials with 0 s/s overt distress in 4/5 trials  2. Utilize compensatory swallow strategies/maneuvers (decrease bite/sip, size/rate, alt. liq/sol) with min cues in 4/5 trials  3. Perform oral-motor/laryngeal exercises to increase oropharyngeal swallow function with min cues  4. Complete an objective swallow study (i.e., MBSS) to assess swallow integrity, r/o aspiration, and determine of safest LRD, min A    Rec:     Mech-soft diet with nectar-thick liquids  Aspiration precautions  HOB >45 during po intake, remain >30 for 30-45 minutes after po   Small bites/sips; alternate liquid/solid with slow feeding rate   Oral care TID  Meds per pt preference  MBS to further assess oropharyngeal swallow fxn     Outcome: Progressing Towards Goal    SPEECH LANGUAGE PATHOLOGY BEDSIDE SWALLOW EVALUATION/TREATMENT    Patient: Dale Spencer (80 y.o. female)  Date: 7/19/2019  Primary Diagnosis: Respiratory distress [R06.03]  COPD exacerbation (HCC) [J44.1]  CAP (community acquired pneumonia) [J18.9]        Precautions: aspiration  Fall  PLOF: As per H&P    ASSESSMENT :  Based on the objective data described below, the patient presents with mild oral and mod pharyngeal dysphagia. Pt with wheezing upon SLP arrival. Delayed cough/throat clear post thin liquids. Improved tolerance with puree and nectar-thick liquids; however, continued to note delayed/weak laryngeal elevation to palpation. Pt refused mech soft trials secondary to lethargy. Rec mech soft diet with nectar-thick liquids, aspiration precautions, oral care TID, and meds as tolerated. Rec MBS to further assess oropharyngeal swallow fxn and r/o aspiration. D/w RN. ST will continue to follow.      TREATMENT :  Skilled therapy initiated; Educated pt's son on aspiration precautions and importance of compensatory swallow techniques to decrease aspiration risk (decrease rate of intake & sip/bite size, upright @HOB for all po intake and ~30 minutes after po); verbalized comprehension. Patient will benefit from skilled intervention to address the above impairments. Patient's rehabilitation potential is considered to be Fair  Factors which may influence rehabilitation potential include:   ?            Mental ability/status  ? Medical condition     PLAN :  Recommendations and Planned Interventions: See above  Frequency/Duration: Patient will be followed by speech-language pathology 1-2 times per day/4-7 days per week to address goals. Discharge Recommendations: Andrey Ma and To Be Determined     SUBJECTIVE:   Patient stated I'm tired, honey.     OBJECTIVE:     Past Medical History:   Diagnosis Date    Acute on chronic diastolic heart failure (HCC)     Arthritis     Benign hypertensive heart disease without heart failure     Better controlled, stable    Chronic diastolic heart failure (HCC)     Breathing and edema is improving    Congestive heart failure (HCC)     HTN (hypertension)     Hypercholesteremia     [facility] 6/18/[facility]    followed by dr Jessica Buckner     Obesity, unspecified     Patient has weight loss Discussed diet ad fluid restriction    Other and unspecified hyperlipidemia     F/u per pmd    Tricuspid valve disorders, 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    tr with moderate pulmonary htn      Past Surgical History:   Procedure Laterality Date    HX HYSTERECTOMY      PACEMAKER PROCEDURE       Prior Level of Function/Home Situation: see below  [address]: Private residence  One/Two Story Residence: One story  Living Alone: No  Support Systems: Spouse/Significant Other/Partner, Child(marty)  Patient Expects to be Discharged to[de-identified] Private residence  Current DME Used/Available at Home: Hospital bed, Wheelchair(mechanical lift)    Diet prior to admission: mech soft with thin per son  Current Diet:  mech soft with nectar-thick     Cognitive and Communication Status:  Neurologic State: Alert, Drowsy  Orientation Level: Unable to verbalize  Cognition: Follows commands  Safety/Judgement: Fall prevention  Oral Assessment:  Oral Assessment  Labial: No impairment  Dentition: Intact  Oral Hygiene: adequate  Lingual: Decreased rate;Decreased strength  Velum: No impairment  Mandible: No impairment  P.O. Trials:  Patient Position: [facility] at [facility]  Vocal quality prior to P.O.: Breathy  Consistency Presented: Thin liquid; Nectar thick liquid;Puree  How Presented: SLP-fed/presented;Cup/sip;Spoon  Bolus Acceptance: No impairment  Bolus Formation/Control: Impaired  Type of Impairment: Delayed  Propulsion: Delayed (# of seconds)  Oral Residue: None  Initiation of Swallow: Delayed (# of seconds)  Laryngeal Elevation: Weak;Decreased  Aspiration Signs/Symptoms: Delayed cough/throat clear(wheezing)  Pharyngeal Phase Characteristics: Suspected pharyngeal residue;Poor endurance;Easily fatigued   Effective Modifications: Small sips and bites; Alternate liquids/solids  Cues for Modifications: Moderate-maximal     Oral Phase Severity: Mild  Pharyngeal Phase Severity : Moderate    PAIN:  Start of Eval: 0  End of Eval: 0     After treatment:   ?            Patient left in no apparent distress sitting up in chair  ? Patient left in no apparent distress in bed  ? Call bell left within reach  ? Nursing notified  ? Family present  ? Caregiver present  ? Bed alarm activated    COMMUNICATION/EDUCATION:   ?            Aspiration precautions; swallow safety; compensatory techniques. ?            Patient/family have participated as able in goal setting and plan of care.     Thank you for this referral.    Debbie Canales M.S. CCC-SLP/L  Speech-Language Pathologist

## 2019-07-19 NOTE — CONSULTS
[facility]. Pulmonary, Critical Care, and Sleep Medicine    Initial Patient Consult    Name: Leon Raymond MRN: [id number]   : 1931 Hospital: [address] [place] [address] Dr   Date: 2019        This patient has been seen and evaluated at the request of Dr. Judith Vargas for dyspnea/wheezing. IMPRESSION:   · Acute CHF exacerbation - combined systolic and diastolic dysfunction  · Wheezing:  Likely secondary to CHF/pulm edema, other contributor is possible bronchitis. Pt does not have COPD -- lifelong nonsmoker and no other causative exposures. CXR shows no evidence of PNA - only pulm edema.   · Suspect chronic aspiration  · Likely right sided atelectasis:  Given pt is bedbound and is laying on her right side, not moved recently  · Deconditioning and adult failure to thrive  · Chronic metabolic alkalosis with respiratory compensation:  Likely in the setting of chronic diuretic administration, some component of resp acidosis as well - chronic likely due to hypoventilation/weakness  · Global weakness  · Dementia  · Hx of Lupus  · CKD 3-4  · Malnutrition       Patient Active Problem List   Diagnosis Code    HTN (hypertension) I10    Hypercholesteremia E78.00    Tricuspid valve disorders, 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    Lupus (systemic lupus erythematosus) ([facility]) M32.9    Anasarca R60.1    Hypertensive heart disease with CHF (congestive heart failure) ([facility]) I11.0    S/P placement of cardiac pacemaker Z95.0    Paroxysmal atrial flutter ([facility]) I48.92    Acute on chronic diastolic (congestive) heart failure ([facility]) Q00.47    Diastolic CHF, chronic ([facility]) I50.32    High risk medication use Z79.899    Acute exacerbation of CHF (congestive heart failure) ([facility]) I50.9    ARIEL (acute kidney injury) ([facility] [address] N17.9    Uremia N19    Left hip pain M25.552    CKD (chronic kidney disease) stage 4, GFR 15-29 ml/min ([facility]) N18.4    NSTEMI (non-ST elevated myocardial infarction) ([facility] [address] I21.4    Dementia F03.90    Skin lesions L98.9    Diarrhea R19.7    UTI (urinary tract infection) N39.0    Fever R50.9    Sepsis ([facility]) A41.9    Abscess L02.91    Severe sepsis ([facility]) A41.9, R65.20    Advanced care planning/counseling discussion Z71.89    Debility R53.81    Anemia D64.9    Respiratory distress R06.03    COPD exacerbation ([facility]) J44.1    CAP (community acquired pneumonia) J18.9    Acute respiratory failure with hypoxia ([facility]) J96.01      RECOMMENDATIONS:   Maintain net negative fluid balance as renal function tolerates. Diuresis per primary service and Cardiology  Dose of diamox given, please monitor electrolytes daily, will target serum bicarb in lower 30s  Monitor strict ins/outs  Can use bronchodilators PRN while hospitalized  ABx per primary service  Can hold off on steroids at this time  Supplemental oxygen to maintain SpO2 >88%  Please assess for home oxygen need prior to discharge  Aggressive pulmonary toileting/bronchial hygiene  Frequent incentive spirometry  Consider Nephrology consultation  Modified barium swallow for suspected aspiration  Aspiration precautions including elevating HOB >30deg  PT/OT, OOB, ambulate with assistance as tolerated  DVT ppx per primary service  Will follow  Discussed issues above with son and daughter     Subjective:   Hx taken from chart and family - pt's son is a limited historian   Patient is a 80 y.o. female with PMH of dementia, bed/recliner bound, CHF, CKD, presented to [facility] for dyspnea/SOB. They reported that the patient had about 3-4 weeks of worsening SOB, associated with increased swelling. Pt then worsened and had SpO2 go down to 92% while at home. Pt was recently treated for CHF excacerbation. Family reported this to her Cardiologist who increased diuretics without improvement. Pt presented to the ER and was found to have wheezing. Of note, family reports pt had a fever of 100 the day prior to admission and a nonproductive cough.   Pt is a lifelong nonsmoker. Past Medical History:   Diagnosis Date    Acute on chronic diastolic heart failure (HCC)     Arthritis     Benign hypertensive heart disease without heart failure     Better controlled, stable    Chronic diastolic heart failure (HCC)     Breathing and edema is improving    Congestive heart failure (HCC)     HTN (hypertension)     Hypercholesteremia     Lupus (systemic lupus erythematosus) ([facility] [address] 6/18/2014    followed by dr Li Dry     Obesity, unspecified     Patient has weight loss Discussed diet ad fluid restriction    Other and unspecified hyperlipidemia     F/u per pmd    Tricuspid valve disorders, 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    tr with moderate pulmonary htn       Past Surgical History:   Procedure Laterality Date    HX HYSTERECTOMY      PACEMAKER PROCEDURE        Prior to Admission medications    Medication Sig Start Date End Date Taking? Authorizing Provider   torsemide (DEMADEX) 20 mg tablet Take 1 Tab by mouth daily as needed (edema). 7/17/19   Yuniel Palafox MD   polyethylene glycol (MIRALAX) 17 gram/dose powder Take 17 g by mouth daily. Provider, Historical   aspirin delayed-release 81 mg tablet Take 81 mg by mouth daily. Provider, Historical   OTHER Incentive Spirometry- Use as directed 2/10/19   Roberto Abarca MD   food supplemt, lactose-reduced (ENSURE ENLIVE) 0.08 gram-1.5 kcal/mL liqd Take 1 Bottle by mouth two (2) times a day. 2/10/19   Roberto Abarca MD   menthol-zinc oxide (CALMOSEPTINE) 0.44-20.6 % oint Apply  to affected area. Provider, Historical   carvedilol (COREG) 3.125 mg tablet Take 1 Tab by mouth every twelve (12) hours. 12/31/18   Dustin Sauceda MD   acetaminophen (TYLENOL ARTHRITIS PAIN) 650 mg TbER Take 1 Tab by mouth every eight (8) hours. Patient taking differently: Take 1 Tab by mouth daily.  12/4/18   Caitie METCALF PA-C   amiodarone (CORDARONE) 200 mg tablet TAKE ONE TABLET EVERY DAY (MAKE AN APPT)  Patient taking differently: TAKE ONE TABLET EVERY DAY by mouth 18   Becki Roca, NP   memantine (NAMENDA) 10 mg tablet Take 10 mg by mouth two (2) times a day. 7/10/18   Provider, Historical   amitriptyline (ELAVIL) 25 mg tablet Take 25 mg by mouth nightly. Provider, Historical     No Known Allergies   Social History     Tobacco Use    Smoking status: Never Smoker    Smokeless tobacco: Never Used   Substance Use Topics    Alcohol use: No      Family History   Problem Relation Age of Onset    Arrhythmia Neg Hx     Asthma Neg Hx     Clotting Disorder Neg Hx     Fainting Neg Hx     Heart Attack Neg Hx     High Cholesterol Neg Hx     Pacemaker Neg Hx     Stroke Neg Hx         Current Facility-Administered Medications   Medication Dose Route Frequency    [START ON 2019] Vancomycin Lab Information  1 Each Other Daily    furosemide (LASIX) injection 40 mg  40 mg IntraVENous Q12H    piperacillin-tazobactam (ZOSYN) 2.25 g in 0.9% sodium chloride (MBP/ADV) 50 mL MBP  2.25 g IntraVENous Q6H    vancomycin (VANCOCIN) 1000 mg in  ml infusion  1,000 mg IntraVENous Q36H    amiodarone (CORDARONE) tablet 200 mg  200 mg Oral DAILY    aspirin delayed-release tablet 81 mg  81 mg Oral DAILY    carvedilol (COREG) tablet 3.125 mg  3.125 mg Oral Q12H    memantine (NAMENDA) tablet 10 mg  10 mg Oral BID    polyethylene glycol (MIRALAX) packet 17 g  17 g Oral DAILY    heparin (porcine) injection 5,000 Units  5,000 Units SubCUTAneous Q8H       Review of Systems:  ROS not obtained due to patient factors.       Objective:   Vital Signs:    Visit Vitals  /58   Pulse 99   Temp 97.4 °F (36.3 °C)   Resp 20   Ht 5' 4\" (1.626 m)   Wt 75.6 kg (166 lb 10.6 oz)   SpO2 92%   BMI 28.61 kg/m²       O2 Device: Nasal cannula   O2 Flow Rate (L/min): 2 l/min   Temp (24hrs), Av.9 °F (36.6 °C), Min:97.3 °F (36.3 °C), Max:98.4 °F (36.9 °C)       Intake/Output:   Last shift:       0701 -  1900  In: 600 [P.O.:600]  Out: -   Last 3 shifts: 07/17 1901 - 07/19 0700  In: 150 [I.V.:150]  Out: -     Intake/Output Summary (Last 24 hours) at 7/19/2019 1616  Last data filed at 7/19/2019 1247  Gross per 24 hour   Intake 750 ml   Output    Net 750 ml      Physical Exam:   General:  Alert, cooperative and pleasant, no distress, appears stated age, laying in bed with nasal cannula, leaning towards right side   Head:  Normocephalic, without obvious abnormality, atraumatic. Eyes:  Conjunctivae/corneas clear. ANicteric, PERRLA, EOMI   Nose: Nares normal. Mucosa normal. No drainage or sinus tenderness. Throat: Lips, mucosa dry. NO thrush; poor dentition, no oral lesions, mallampati IV   Neck: Supple, symmetrical, trachea midline, no adenopathy, thyroid: no enlargment/tenderness/nodules, no carotid bruit and +JVD. No crepitus   Back:   Symmetric, no curvature, no spine tenderness or flank pain   Lungs:   Poor air entry in B/L bases with coarse rhonchi in RLL, and wheezes B/L   Chest wall:  No tenderness or deformity. NO CREPITUS   Heart:  Regular rate and rhythm, S1, S2 normal, no murmur, click, rub or gallop. Abdomen:   Soft, non-tender. Bowel sounds normal. No masses,  No organomegaly. No paradoxical motion   Extremities: normal, atraumatic, no cyanosis or clubbing. 2+ pitting edema B/L up to thighs   Pulses: 1-2+ and symmetric all extremities.    Skin: Skin color, texture, turgor normal. No rashes or lesions   Lymph nodes: Cervical, supraclavicular, and axillary nodes normal.   Neurologic: Grossly nonfocal, pt alert but disoriented at baseline, globally weak, 1/5 in B/L LE and 2-3/5 in UE          Data review:   Labs:  Recent Results (from the past 24 hour(s))   METABOLIC PANEL, BASIC    Collection Time: 07/19/19  5:25 AM   Result Value Ref Range    Sodium 140 136 - 145 mmol/L    Potassium 3.8 3.5 - 5.5 mmol/L    Chloride 99 (L) 100 - 111 mmol/L    CO2 36 (H) 21 - 32 mmol/L    Anion gap 5 3.0 - 18 mmol/L    Glucose 90 74 - 99 mg/dL    BUN 40 (H) 7.0 - 18 MG/DL    Creatinine 1.52 (H) 0.6 - 1.3 MG/DL    BUN/Creatinine ratio 26 (H) 12 - 20      GFR est AA 39 (L) >60 ml/min/1.73m2    GFR est non-AA 32 (L) >60 ml/min/1.73m2    Calcium 8.4 (L) 8.5 - 10.1 MG/DL   CBC WITH AUTOMATED DIFF    Collection Time: 07/19/19  5:25 AM   Result Value Ref Range    WBC 7.0 4.6 - 13.2 K/uL    RBC 3.23 (L) 4.20 - 5.30 M/uL    HGB 9.2 (L) 12.0 - 16.0 g/dL    HCT 29.8 (L) 35.0 - 45.0 %    MCV 92.3 74.0 - 97.0 FL    MCH 28.5 24.0 - 34.0 PG    MCHC 30.9 (L) 31.0 - 37.0 g/dL    RDW 16.2 (H) 11.6 - 14.5 %    PLATELET 641 995 - 824 K/uL    MPV 9.7 9.2 - 11.8 FL    NEUTROPHILS 64 40 - 73 %    LYMPHOCYTES 19 (L) 21 - 52 %    MONOCYTES 17 (H) 3 - 10 %    EOSINOPHILS 0 0 - 5 %    BASOPHILS 0 0 - 2 %    ABS. NEUTROPHILS 4.5 1.8 - 8.0 K/UL    ABS. LYMPHOCYTES 1.3 0.9 - 3.6 K/UL    ABS. MONOCYTES 1.2 0.05 - 1.2 K/UL    ABS. EOSINOPHILS 0.0 0.0 - 0.4 K/UL    ABS. BASOPHILS 0.0 0.0 - 0.1 K/UL    DF AUTOMATED     MAGNESIUM    Collection Time: 07/19/19  5:25 AM   Result Value Ref Range    Magnesium 2.3 1.6 - 2.6 mg/dL     ABG:  No results found for: PH, PHI, PCO2, PCO2I, PO2, PO2I, HCO3, HCO3I, FIO2, FIO2I        PFT Results  (Last 48 hours)    None        Echo Results  (Last 48 hours)    None        Imaging:  I have personally reviewed the patients radiographs and have reviewed the reports:  Personal review of CXR from 7/17 shows cardiomegaly with vascular congestion, no infiltrates or consolidations or masses. Reviewed CT abd from 2/13/19 ---  No emphysema in lung bases  XR Results (most recent):  Results from Hospital Encounter encounter on 07/17/19   XR SWALLOW FUNC VIDEO    Narrative Modified barium swallow with speech. INDICATION: Dysphagia, respiratory distress, recent abnormal chest x-ray,  concern for aspiration pneumonia, history of previous surgery    Fluoroscopy time 1 minute 12 seconds    Cine loops: 6    FINDINGS: The patient was given multiple consistencies of barium. Probable  prominent cricopharyngeal muscle noted. No penetration or aspiration with thin,  nectar, honey, puree, solid or pill. Impression Impression:    As above. Please see speech pathologist's report for further evaluation and  recommendations. CT Results  (Last 48 hours)               07/17/19 1751  CT HEAD WO CONT Final result    Impression:  IMPRESSION: No acute findings. Stable minor amount of periventricular ischemic   white matter change. Narrative:  EXAM: CT HEAD WO CONT       INDICATION: AMS       COMPARISON: None. TECHNIQUE: Unenhanced CT of the head was performed using 5 mm images. Brain and   bone windows were generated. CT dose reduction was achieved through use of a   standardized protocol tailored for this examination and automatic exposure   control for dose modulation. FINDINGS:   The ventricles and sulci are normal in size, shape and configuration and   midline. Stable mild amount of periventricular ischemic white matter change. .   There is no intracranial hemorrhage, extra-axial collection, mass, mass effect   or midline shift. The basilar cisterns are open. No acute infarct is   identified. The bone windows demonstrate no abnormalities. The visualized   portions of the paranasal sinuses and mastoid air cells are clear. High complexity decision making was performed during the evaluation of this patient at high risk for decompensation with multiple organ involvement     Above mentioned total time spent on reviewing the case/medical record/data/notes/EMR/patient examination/documentation/coordinating care with nurse/consultants, exclusive of procedures with complex decision making performed and > 50% time spent in face to face evaluation.          Clayton Dozier MD/MPH     Pulmonary, Critical Care Medicine  [facility]

## 2019-07-19 NOTE — PROGRESS NOTES
[facility]: [phone]  [facility]: [address] [place] [address] Way: [phone]    Patient Name: Elena Breen  YOB: 1931    Date of Initial Consult: 7/18/2019, follow up 7/19/2019   Reason for Consult: goals of care  Requesting Provider: Lela Guillaume MD   Primary Care Physician: Paula Campos MD      SUMMARY:   Elena Breen is a 80 y.o. female with a past history of CHF, COPD, dementia, HTN, HLD, lupus, obesity who was admitted on 7/17/2019 from home with a diagnosis of exacerbation of COPD and acute respiratory failure with hypoxia. Patient was seen in the ED 7/15/2019 for complaint of shortness of breath. She was given oxygen, diureses and sent home. She followed up with cardiologist on the day of admission and had lasix dose increased. However, patient continued to have worsening shortness of breath with wheezing and hypoxia so EMS was called and she was brought to ED. Current medical issues leading to Palliative Medicine involvement include: Advanced age, COPD, CHF, dementia and goals of care. 7/19/2019 follow up on Ms Zuleyma hackett, Catie Moreau, and Munir at bedside. Re discussed goals of care with family, patient is not able to make complex medical decisions.  is surrogate medical decision maker. He has not been able to come into hospital. All children agree they would not wish to subject her to CPR and intubation. Children do feel he can make this decision. Unfortunately not sure if Dolores Braun will be able to physically come to hospital and we have tried many times to call him, with no avail. Blank DDNR given to children for Price to sign. DDNR form discussed. PALLIATIVE DIAGNOSES:   1. Advanced care decisions  2. COPD exacerbation  3. Respiratory failure with hypoxia  4. UTI  5. Debility/Dementia       PLAN:   7/19/2019 follow up sons Jgiar Fonseca, and daughter Munir in room.  Ms Bradford Galindo just returned from x ray she was not able to participate in conversation but alert, confused. They had no specific questions. All understand she has congestive heart failure and COPD, and is poor health. We have discussed goals of care many times. Patient has not executed an AMD and is not currently able. Her  Bennie Ramos is legal next of kin. We have asked multiple times for family to bring  to bedside, and called the home to speak with Price with no avail. Family again today, shared they feel Bennie Ramos can make these decisions, but he his physically a bit frail. They are unsure if Bennie Ramos will be able to come to the hospital. Re discussed goals of care again today with the above children, who agree they would not wish for Kaitlynn to be subjected to CPR or intubation for any reason. We have recommended DNR/DNI in light of her multiple medical issues. In light of  likely not being able to come into the hospital, no fax or e-mail. Have discussed DDNR form with children who voiced understanding and will take form to Price for possible signature. Children understand Kaitlynn would still be able to seek aggressive treatment up until intubation would be needed for any reason or cardiac / re sp arrest. Currently no change in goals of care remains a full code with full interventions. 1. 2019  2. Advanced care decisions: Palliative care team including Miguel Rose RN and myself met with patient and a son, Ana Laura Lorenzana and daughter, Shirley Tavares in her room. Patient is  and has 5 sons and 2 daughters with one son being . Patient and  live in a home and have health care aide in addition to son, Braden Contreras living there. Discussed functional status of patient and spouse. Palliative care team has worked many times with this family in the past. Bennie Ramos (patient's ) refuses to sign any DNR/POST and states \"just bring my [place] home\".  Alberta's children are aware of burdens and benefits of CPR and will talk with Jerel Payne again and possibly bring him to hospital if they can get him to agree. GOALS OF CARE: FULL CODE  3. COPD exacerbation: as described in summary above. ABG on admission: pH 7.332, pCO2 67.8, pO2 77 and HCO3 35.9. CXR: Pulmonary venous congestion and mild pulmonary interstitial edema. IV antibiotics and oxygen initiated. Pulmonology consulted. 4. Respiratory failure with hypoxia: Secondary to #2 above. Pulmonology consulted. Currently on oxygen per nasal cannula. 5. UTI: Urinalysis on admission: large leukocyte esterace, 30-40 WBC, 2+ bacteria. Would recommend sending urine for culture if not already done. On IV vancomycin and zosyn. Primary team managing. 6. Debility/Dementia: patient with dementia and on namenda. She is oriented to self only. Can identify her children in the room as her children but cannot tell us their names. Per family, she is bed bound and requires assistance with all ADLs. 7. Initial consult note routed to primary continuity provider  8. Communicated plan of care with: Palliative IDT    GOALS OF CARE:  Patient/Health Care Proxy Stated Goals: Prolong life      TREATMENT PREFERENCES:   Code Status: Full Code    Advance Care Planning:  Advance Care Planning 7/18/2019   Patient's Healthcare Decision Maker is: -   Primary Decision Maker Name -   Primary Decision Maker Phone Number -   Primary Decision Maker Relationship to Patient -   Secondary Decision Maker Name -   Secondary Decision [facility] Phone Number -   Secondary Decision Maker Relationship to Patient -   Confirm Advance Directive None   Patient Would Like to Complete Advance Directive -   Does the patient have other document types -       Medical Interventions: Full interventions           Other: As far as possible, the palliative care team has discussed with patient / health care proxy about goals of care / treatment preferences for patient.      HISTORY:     History obtained from: chart    CHIEF COMPLAINT: shortness of breath    HPI/SUBJECTIVE:    The patient is:   [x] Verbal and participatory but limited by dementia  [] Non-participatory due to:   Elderly AA female sitting up in bed with nasal cannula oxygen on. Audible wheezing present. She is awake and alert but oriented to self only. She is unable to state time/date/location/situation. She denies pain. Clinical Pain Assessment (nonverbal scale for nonverbal patients): Clinical Pain Assessment  Severity: 0          Duration: for how long has pt been experiencing pain (e.g., 2 days, 1 month, years)  Frequency: how often pain is an issue (e.g., several times per day, once every few days, constant)     FUNCTIONAL ASSESSMENT:     Palliative Performance Scale (PPS):  PPS: 40    ECOG  ECOG Status : Limited self-care     PSYCHOSOCIAL/SPIRITUAL SCREENING:      Any spiritual / Pentecostalism concerns:  [] Yes /  [x] No    Caregiver Burnout:  [] Yes /  [x] No /  [] No Caregiver Present      Anticipatory grief assessment:   [x] Normal  / [] Maladaptive        REVIEW OF SYSTEMS:     Positive and pertinent negative findings in ROS are noted above in HPI. The following systems were [] reviewed / [x] unable to be reviewed as noted in HPI  Other findings are noted below. Systems: constitutional, ears/nose/mouth/throat, respiratory, gastrointestinal, genitourinary, musculoskeletal, integumentary, neurologic, psychiatric, endocrine. Positive findings noted below. Modified ESAS Completed by: provider   Fatigue: 5       Pain: 0           Dyspnea: 0                    PHYSICAL EXAM:     Wt Readings from Last 3 Encounters:   07/19/19 75.6 kg (166 lb 10.6 oz)   07/09/19 84.8 kg (187 lb)   05/29/19 84.8 kg (187 lb)     Blood pressure 112/70, pulse 70, temperature 97.8 °F (36.6 °C), resp. rate 20, height 5' 4\" (1.626 m), weight 75.6 kg (166 lb 10.6 oz), SpO2 97 %.   Pain:  Pain Scale 1: Numeric (0 - 10)  Pain Intensity 1: 0                   Constitutional: frail appearing, confused mild distress Eyes: pupils equal, anicteric  ENMT: no nasal discharge, moist mucous membranes  Respiratory: breathing slightly labored, continues to have some wheezing   Gastrointestinal: soft non-tender   Skin: warm, dry  Neurologic: oriented to self only. HISTORY:     Active Problems:    Respiratory distress (7/17/2019)      COPD exacerbation ([facility] (7/17/2019)      CAP (community acquired pneumonia) (7/17/2019)      Acute respiratory failure with hypoxia ([facility] (7/17/2019)      Past Medical History:   Diagnosis Date    Acute on chronic diastolic heart failure (HCC)     Arthritis     Benign hypertensive heart disease without heart failure     Better controlled, stable    Chronic diastolic heart failure (HCC)     Breathing and edema is improving    Congestive heart failure (HCC)     HTN (hypertension)     Hypercholesteremia     Lupus (systemic lupus erythematosus) ([facility] 6/18/2014    followed by dr Sonya Dang     Obesity, unspecified     Patient has weight loss Discussed diet ad fluid restriction    Other and unspecified hyperlipidemia     F/u per pmd    Tricuspid valve disorders, 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   Social History     Tobacco Use    Smoking status: Never Smoker    Smokeless tobacco: Never Used   Substance Use Topics    Alcohol use: No     No Known Allergies   Current Facility-Administered Medications   Medication Dose Route Frequency    [START ON 7/20/2019] Vancomycin Lab Information  1 Each Other Daily    furosemide (LASIX) injection 40 mg  40 mg IntraVENous Q12H    piperacillin-tazobactam (ZOSYN) 2.25 g in 0.9% sodium chloride (MBP/ADV) 50 mL MBP  2.25 g IntraVENous Q6H    vancomycin (VANCOCIN) 1000 mg in  ml infusion  1,000 mg IntraVENous Q36H    sodium chloride (NS) flush 5-10 mL  5-10 mL IntraVENous PRN    amiodarone (CORDARONE) tablet 200 mg  200 mg Oral DAILY    aspirin delayed-release tablet 81 mg  81 mg Oral DAILY    carvedilol (COREG) tablet 3.125 mg  3.125 mg Oral Q12H    memantine (NAMENDA) tablet 10 mg  10 mg Oral BID    polyethylene glycol (MIRALAX) packet 17 g  17 g Oral DAILY    heparin (porcine) injection 5,000 Units  5,000 Units SubCUTAneous Q8H        LAB AND IMAGING FINDINGS:     Lab Results   Component Value Date/Time    WBC 7.0 07/19/2019 05:25 AM    HGB 9.2 (L) 07/19/2019 05:25 AM    PLATELET 935 63/38/0479 05:25 AM     Lab Results   Component Value Date/Time    Sodium 140 07/19/2019 05:25 AM    Potassium 3.8 07/19/2019 05:25 AM    Chloride 99 (L) 07/19/2019 05:25 AM    CO2 36 (H) 07/19/2019 05:25 AM    BUN 40 (H) 07/19/2019 05:25 AM    Creatinine 1.52 (H) 07/19/2019 05:25 AM    Calcium 8.4 (L) 07/19/2019 05:25 AM    Magnesium 2.3 07/19/2019 05:25 AM    Phosphorus 3.4 12/28/2018 09:25 AM      Lab Results   Component Value Date/Time    AST (SGOT) 15 07/17/2019 03:45 PM    Alk.  phosphatase 60 07/17/2019 03:45 PM    Protein, total 6.2 (L) 07/17/2019 03:45 PM    Albumin 3.1 (L) 07/17/2019 03:45 PM    Globulin 3.1 07/17/2019 03:45 PM     Lab Results   Component Value Date/Time    INR 1.4 (H) 04/30/2019 07:00 PM    Prothrombin time 16.8 (H) 04/30/2019 07:00 PM    aPTT 42.5 (H) 04/30/2019 07:00 PM      Lab Results   Component Value Date/Time    Iron 51 02/10/2019 11:00 AM    TIBC 202 (L) 02/10/2019 11:00 AM    Iron % saturation 25 02/10/2019 11:00 AM      No results found for: PH, PCO2, PO2  No components found for: [place]   Lab Results   Component Value Date/Time    CK 86 07/15/2019 05:45 PM    CK - MB <1.0 07/15/2019 05:45 PM              Total time: 15 minutes  Counseling / coordination time, spent as noted above: 10 minutes > 50% counseling / coordination: family    Prolonged service was provided for  []30 min   []75 min in face to face time in the presence of the patient, spent as noted above. Time Start:   Time End:   Note: this can only be billed with 13868 (initial) or 91980 (follow up). If multiple start / stop times, list each separately.

## 2019-07-19 NOTE — PROGRESS NOTES
Patient seen for acute on chronic systolic CHF. Will switch to iv lasix. Monitor I/o  Also suspect chronic aspiration  Consider speech and swallow.   Full consult to follow

## 2019-07-19 NOTE — PROGRESS NOTES
1930  Bedside turnover given to me, pt is on the cardiac telemetry  Monitor in stable condition. tv is on and she denies pain and denies shortness of breath. 0500 pt had a bowel movement and was bathed and all linen changed. Mouth care provided. 0715 bedside turnover given to [employer].

## 2019-07-19 NOTE — PROGRESS NOTES
Problem: Dysphagia (Adult)  Goal: *Acute Goals and Plan of Care (Insert Text)  Description  Patient will:  1. Tolerate PO trials with 0 s/s overt distress in 4/5 trials  2. Utilize compensatory swallow strategies/maneuvers (decrease bite/sip, size/rate, alt. liq/sol) with min cues in 4/5 trials  3. Perform oral-motor/laryngeal exercises to increase oropharyngeal swallow function with min cues  4. Complete an objective swallow study (i.e., MBSS) to assess swallow integrity, r/o aspiration, and determine of safest LRD, min A - met 7/19/19    Rec:     Mech-soft diet with thin liquids  May require assistance with PO  Aspiration precautions  HOB >45 during po intake, remain >30 for 30-45 minutes after po   Small bites/sips; alternate liquid/solid with slow feeding rate   Oral care TID  Meds per pt preference         7/19/2019 1158 by Anna MUÑIZ  Outcome: Progressing Towards Goal    SPEECH PATHOLOGY MODIFIED BARIUM SWALLOW STUDY & TREATMENT    Patient: Chandler Akhtar (80 y.o. female)  Date: 7/19/2019  Primary Diagnosis: Respiratory distress [R06.03]  COPD exacerbation (HCC) [J44.1]  CAP (community acquired pneumonia) [J18.9]        Precautions: aspiration  Fall    ASSESSMENT :  Based on the objective data described below, the patient presents with min oral dysphagia. Pt tolerated reg solid, puree, honey-thick, nectar-thick, thin liquid +/- straw, and 13 mm Ba pill with thin liquid wash without aspiration/penetration events. Minimally increased bolus manipulation of solids appreciated. Tongue base retraction, laryngeal elevation, and overall pharyngeal motility/sensation were all functional/timely throughout study. Positive oropharyngeal clearance appreciated. Pt safe for mech soft solid diet with thin liquids, aspiration precautions, oral care TID, and meds as tolerated.  She may require assistance with PO. ST to follow up x 1-2 more visits to ensure safety of PO.     TREATMENT :  Treatment provided post diagnostic testing including oropharyngeal anatomy/physiology, MBS results, diet recommendations and compensatory strategies/positioning. Pt able to verbalize understanding; suspect limited carryover. Will continue to follow. Patient will benefit from skilled intervention to address the above impairments. Patient's rehabilitation potential is considered to be Fair  Factors which may influence rehabilitation potential include:   ? None noted  ? Mental ability/status  ? Medical condition  ? Home/family situation and support systems  ? Safety awareness  ? Pain tolerance/management  ? Other:      PLAN :  Recommendations and Planned Interventions: See above  Frequency/Duration: Patient will be followed by speech-language pathology x 1-2 more visits to address goals. Discharge Recommendations: [address] and To Be Determined     SUBJECTIVE:   Patient stated I want to get back into bed, please.     OBJECTIVE:     Past Medical History:   Diagnosis Date    Acute on chronic diastolic heart failure (HCC)     Arthritis     Benign hypertensive heart disease without heart failure     Better controlled, stable    Chronic diastolic heart failure (HCC)     Breathing and edema is improving    Congestive heart failure (HCC)     HTN (hypertension)     Hypercholesteremia     Lupus (systemic lupus erythematosus) ([facility] 6/18/2014    followed by dr Jessica Buckner     Obesity, unspecified     Patient has weight loss Discussed diet ad fluid restriction    Other and unspecified hyperlipidemia     F/u per pmd    Tricuspid valve disorders, 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    tr with moderate pulmonary htn      Past Surgical History:   Procedure Laterality Date    HX HYSTERECTOMY      PACEMAKER PROCEDURE       Prior Level of Function/Home Situation: see below  Home Situation  Home Environment: Private residence  One/Two Story Residence: One story  Living Alone: No  Support Systems: Spouse/Significant Other/Partner, Child(marty)  Patient Expects to be Discharged to[de-identified] Private residence  Current DME Used/Available at Home: Hospital bed, Wheelchair(mechanical lift)  Diet prior to admission: mech soft with thin  Current Diet:  mech soft with thin   Radiologist:    Film Views: Lateral;Fluoro  Patient Position: 90 in fluoro chair    Trial 1:   Consistency Presented: Thin liquid; Solid;Puree;Nectar thick liquid;Honey thick liquid;Pill/Tablet   How Presented: SLP-fed/presented;Cup/sip;Spoon;Straw       Bolus Acceptance: No impairment   Bolus Formation/Control: Impaired: Mastication   Propulsion: No impairment   Oral Residue: None   Initiation of Swallow: No impairment   Timing: No impairment   Penetration: None   Aspiration/Timing: No evidence of aspiration   Pharyngeal Clearance: No residue       Effective Modifications: Small sips and bites; Alternate liquids/solids   Cues for Modifications: Minimal       Decreased Tongue Base Retraction?: No  Laryngeal Elevation: WFL (within functional limits)  Aspiration/Penetration Score: 1 (No penetration or aspiration-Contrast does not enter the airway)  Pharyngeal Symmetry: Not assessed  Pharyngeal-Esophageal Segment: No impairment  Pharyngeal Dysfunction: None    Oral Phase Severity: Mild  Pharyngeal Phase Severity: N/A    8-point Penetration-Aspiration Scale: Score 1    PAIN:  Pt reports 0/10 pain or discomfort prior to MBS. Pt reports 0/10 pain or discomfort post MBS. COMMUNICATION/EDUCATION:   ?  Patient educated regarding MBS results and diet recommendations. ?  Patient/family have participated as able in goal setting and plan of care.     Thank you for this referral.    Radhames Villalta M.S. CCC-SLP/L  Speech-Language Pathologist

## 2019-07-19 NOTE — PROGRESS NOTES
MBS completed with recs of [facility] soft diet and thin liquids, meds as tolerated. Full report to follow.      Thank you for this referral.    Gino Samuels M.S. CCC-SLP/L  Speech-Language Pathologist

## 2019-07-19 NOTE — PROGRESS NOTES
Dysphagia eval/tx completed with recs of [facility] soft diet and nectar-thick liquids, meds as tolerated. Rec MBS to further assess oropharyngeal swallow fxn and r/o aspiration. Full report to follow.      Thank you for this referral.    Richard Wei M.S. CCC-SLP/L  Speech-Language Pathologist

## 2019-07-19 NOTE — CONSULTS
[facility] - Consult Note    Date of  Admission: 7/17/2019  2:24 PM     Primary Care Physician: Carlos Cummings MD     Plan:       1. 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 added lasix 40 mg iv bid. Has mildly elevated CO2  Monitor closely. 2. Possible aspiration pneumonia - on antibiotics per medical team. Swallowing test report pending   3. Atrial fibrillation - currently paced on amiodarone. Not on anticoagulation due to high bleeding risk. Continue asa. 4. CKD - stable creatinine and BUN   5. Severe cardiomyopathy, progressive, unclear etiology considering age. Gearjulietean Guess further workup for that. 6. Hx of symptomatic bradycardia- S/P PPM  4235 Dr Amber Escobar- EP recommended  upgrade to biventricular pacemaker as an outpatient.  Patient's family refused    9. Lupus- follow by Dr. Emy Peñaloza as out patient   8. Hypertension- with normal low bp. Continue low dose Coreg. 9. Dementia- confused           12/27/18   ECHO ADULT COMPLETE 12/28/2018 12/28/2018    Narrative · Technically difficult study due to patient compliance. Unable to obtain   on-axis apical images. Good parasternals, poor subcostal images. · Left ventricular moderate-to-severely decreased global systolic   function. Estimated left ventricular ejection fraction is 31 - 35%. Visually measured ejection fraction. Left ventricular severe sigmoid   septum hypertrophy. Abnormal left ventricular wall motion as described on   the wall scoring diagram below. Abnormal left ventricular septal motion. Interventricular septal \"bounce\". Severe (grade 3) left ventricular   diastolic dysfunction. · Moderate tricuspid valve regurgitation is present. Mild pulmonary   hypertension is present. PASP 38mmHg  · Mild aortic valve regurgitation is present. · Mild to moderate mitral valve regurgitation.   · Pacing wire present        Signed by: Corrinne Gearing, MD               XR Results (most recent):  Results from Hospital Encounter encounter on 07/17/19   XR CHEST PORT    Narrative Chest portable    History: Difficulty breathing. Concern for infection. Comparisons: 7/15/2019, 4/30/2019    Findings:     Stable massive cardiomegaly. Atherosclerotic calcifications are seen at the  arch. Mediastinum is otherwise unremarkable. A left chest dual lead pacemaker is  stable. Cardiac leads overlie the patient. There is worsened pulmonary venous  congestion, with suggestion of mild pulmonary interstitial edema. No lobar  consolidation, pneumothorax, or definite large effusion. Severe bony osteopenia. Impression Impression:     Stable massive cardiomegaly. Pulmonary venous congestion, and mild pulmonary  interstitial edema. Thank you for this referral.              Assessment:     Hospital Problems  Date Reviewed: 7/17/2019          Codes Class Noted POA    Respiratory distress ICD-10-CM: R06.03  ICD-9-CM: 786.09  7/17/2019 Unknown        COPD exacerbation ([facility] [address] ICD-10-CM: J44.1  ICD-9-CM: 491.21  7/17/2019 Unknown        CAP (community acquired pneumonia) ICD-10-CM: J18.9  ICD-9-CM: 486  7/17/2019 Unknown        Acute respiratory failure with hypoxia [facility]) ICD-10-CM: J96.01  ICD-9-CM: 518.81  7/17/2019 Unknown                   History of Present Illness: This is a 80 y.o. female admitted for Respiratory distress [R06.03]COPD exacerbation ([facility] [address] Heriberto.Can. 1]CAP (community acquired pneumonia) [J18.9]. Patient with PMHx of  CHF and COPD and dementia . Loc Mena was brought in by ambulance for worsening of her shortness of breath. she was seen in the office by Dr Jeramie Layton her diuretics were increase bu patient symptoms did not improved so the family brought her into the ER.  Upon arrival the patient was having audible wheezing and respiratory distress with prolonged expiratory phase and she was only responsive to painful stimuli.  The patient has severe dementia she is non verbal. Patient information obtained from patients' family Past Medical History:     Past Medical History:   Diagnosis Date    Acute on chronic diastolic heart failure (HCC)     Arthritis     Benign hypertensive heart disease without heart failure     Better controlled, stable    Chronic diastolic heart failure (HCC)     Breathing and edema is improving    Congestive heart failure (HCC)     HTN (hypertension)     Hypercholesteremia     [facility] 6/18/2014    followed by dr Fabiano Berkowitz     Obesity, unspecified     Patient has weight loss Discussed diet ad fluid restriction    Other and unspecified hyperlipidemia     F/u per pmd    Tricuspid valve disorders, 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    tr with moderate pulmonary htn          Social History:     Social History     Socioeconomic History    Marital status:      Spouse name: Not on file    Number of children: Not on file    Years of education: Not on file    Highest education level: Not on file   Tobacco Use    Smoking status: Never Smoker    Smokeless tobacco: Never Used   Substance and Sexual Activity    Alcohol use: No    Drug use: No    Sexual activity: Never        Family History:     Family History   Problem Relation Age of Onset    Arrhythmia Neg Hx     Asthma Neg Hx     Clotting Disorder Neg Hx     Fainting Neg Hx     Heart Attack Neg Hx     High Cholesterol Neg Hx     Pacemaker Neg Hx     Stroke Neg Hx         Medications:   No Known Allergies     Current Facility-Administered Medications   Medication Dose Route Frequency    piperacillin-tazobactam (ZOSYN) 2.25 g in 0.9% sodium chloride (MBP/ADV) 50 mL MBP  2.25 g IntraVENous Q6H    vancomycin (VANCOCIN) 1000 mg in  ml infusion  1,000 mg IntraVENous Q36H    sodium chloride (NS) flush 5-10 mL  5-10 mL IntraVENous PRN    amiodarone (CORDARONE) tablet 200 mg  200 mg Oral DAILY    aspirin delayed-release tablet 81 mg  81 mg Oral DAILY    carvedilol (COREG) tablet 3.125 mg  3.125 mg Oral Q12H    memantine (NAMENDA) tablet 10 mg  10 mg Oral BID    torsemide (DEMADEX) tablet 20 mg  20 mg Oral DAILY PRN    polyethylene glycol (MIRALAX) packet 17 g  17 g Oral DAILY    heparin (porcine) injection 5,000 Units  5,000 Units SubCUTAneous Q8H        Review Of Systems:          Physical Exam:     Visit Vitals  /70 (BP 1 Location: Left arm, BP Patient Position: At rest)   Pulse 70   Temp 97.8 °F (36.6 °C)   Resp 20   Ht 5' 4\" (1.626 m)   Wt 75.6 kg (166 lb 10.6 oz)   SpO2 97%   BMI 28.61 kg/m²     BP Readings from Last 3 Encounters:   07/19/19 112/70   07/17/19 136/71   07/15/19 129/72     Pulse Readings from Last 3 Encounters:   07/19/19 70   07/17/19 75   07/15/19 73     Wt Readings from Last 3 Encounters:   07/19/19 75.6 kg (166 lb 10.6 oz)   07/09/19 84.8 kg (187 lb)   05/29/19 84.8 kg (187 lb)       General:  alert, mild distress, appears stated age, confused, disoriented  Skin: Warm and dry, acyanotic, normal color. Head: Normocephalic, atraumatic. Eyes: Sclerae anicteric, conjunctivae without injection. Neck:  nontender, no nuchal rigidity, no masses, no stridor, no carotid bruit, + JVD  Lungs:  Anterior and posterior rales. Scattered  Wheezes, diminished breath sounds b/l. Heart:  regularly irregular rhythm, S1, S2 normal, no S3 or S4, no click, no rub  Abdomen:  abdomen is soft without significant tenderness, masses, organomegaly or guarding  Extremities:  extremities normal, atraumatic, no cyanosis. +1 BLE  edema. Peripheral pulses   Neurological: grossly intact. No focal abnormalities, moves all extremities well.   Psychiatric Affect: The patient is awake, alert and oriented to person only    Data Review:     Recent Results (from the past 48 hour(s))   EKG, 12 LEAD, INITIAL    Collection Time: 07/17/19  2:46 PM   Result Value Ref Range    Ventricular Rate 70 BPM    Atrial Rate 288 BPM    P-R Interval 212 ms    QRS Duration 198 ms    Q-T Interval 480 ms    QTC Calculation (Bezet) 518 ms    Calculated R Axis -51 degrees    Calculated T Axis 130 degrees    Diagnosis       AV dual-paced rhythm with prolonged AV conduction  Abnormal ECG  When compared with ECG of 15-JUL-2019 17:11,  No significant change was found  Confirmed by Dolly Soriano ([id number]) on 7/17/2019 5:31:03 PM     POC G3    Collection Time: 07/17/19  2:56 PM   Result Value Ref Range    Device: NASAL CANNULA      Flow rate (POC) 6 L/M    pH (POC) 7.332 (L) 7.35 - 7.45      pCO2 (POC) 67.8 (H) 35.0 - 45.0 MMHG    pO2 (POC) 77 (L) 80 - 100 MMHG    HCO3 (POC) 35.9 (H) 22 - 26 MMOL/L    sO2 (POC) 94 92 - 97 %    Base excess (POC) 8 mmol/L    Allens test (POC) YES      Total resp. rate 15      Site LEFT RADIAL      Specimen type (POC) ARTERIAL      Performed by Samuel Busby    CULTURE, BLOOD    Collection Time: 07/17/19  3:45 PM   Result Value Ref Range    Special Requests: NO SPECIAL REQUESTS      Culture result: NO GROWTH 2 DAYS     METABOLIC PANEL, COMPREHENSIVE    Collection Time: 07/17/19  3:45 PM   Result Value Ref Range    Sodium 138 136 - 145 mmol/L    Potassium 4.6 3.5 - 5.5 mmol/L    Chloride 99 (L) 100 - 108 mmol/L    CO2 36 (H) 21 - 32 mmol/L    Anion gap 3 3.0 - 18 mmol/L    Glucose 115 (H) 74 - 99 mg/dL    BUN 38 (H) 7.0 - 18 MG/DL    Creatinine 1.48 (H) 0.6 - 1.3 MG/DL    BUN/Creatinine ratio 26 (H) 12 - 20      GFR est AA 40 (L) >60 ml/min/1.73m2    GFR est non-AA 33 (L) >60 ml/min/1.73m2    Calcium 8.3 (L) 8.5 - 10.1 MG/DL    Bilirubin, total 0.4 0.2 - 1.0 MG/DL    ALT (SGPT) 15 13 - 56 U/L    AST (SGOT) 15 15 - 37 U/L    Alk.  phosphatase 60 45 - 117 U/L    Protein, total 6.2 (L) 6.4 - 8.2 g/dL    Albumin 3.1 (L) 3.4 - 5.0 g/dL    Globulin 3.1 2.0 - 4.0 g/dL    A-G Ratio 1.0 0.8 - 1.7     CBC WITH AUTOMATED DIFF    Collection Time: 07/17/19  3:45 PM   Result Value Ref Range    WBC 6.1 4.6 - 13.2 K/uL    RBC 2.94 (L) 4.20 - 5.30 M/uL    HGB 8.3 (L) 12.0 - 16.0 g/dL    HCT 26.9 (L) 35.0 - 45.0 %    MCV 91.5 74.0 - 97.0 FL MCH 28.2 24.0 - 34.0 PG    MCHC 30.9 (L) 31.0 - 37.0 g/dL    RDW 16.8 (H) 11.6 - 14.5 %    PLATELET 664 073 - 497 K/uL    MPV 9.1 (L) 9.2 - 11.8 FL    NEUTROPHILS 59 40 - 73 %    LYMPHOCYTES 22 21 - 52 %    MONOCYTES 17 (H) 3 - 10 %    EOSINOPHILS 2 0 - 5 %    BASOPHILS 0 0 - 2 %    ABS. NEUTROPHILS 3.6 1.8 - 8.0 K/UL    ABS. LYMPHOCYTES 1.3 0.9 - 3.6 K/UL    ABS. MONOCYTES 1.1 0.05 - 1.2 K/UL    ABS. EOSINOPHILS 0.1 0.0 - 0.4 K/UL    ABS. BASOPHILS 0.0 0.0 - 0.1 K/UL    DF AUTOMATED     POC LACTIC ACID    Collection Time: 07/17/19  3:57 PM   Result Value Ref Range    Lactic Acid (POC) 0.85 0.40 - 2.00 mmol/L   CULTURE, BLOOD    Collection Time: 07/17/19  4:00 PM   Result Value Ref Range    Special Requests: NO SPECIAL REQUESTS      Culture result: NO GROWTH 2 DAYS     POC G3    Collection Time: 07/17/19  4:39 PM   Result Value Ref Range    Device: NASAL CANNULA      Flow rate (POC) 3 L/M    pH (POC) 7.339 (L) 7.35 - 7.45      pCO2 (POC) 68.9 (H) 35.0 - 45.0 MMHG    pO2 (POC) 278 (H) 80 - 100 MMHG    HCO3 (POC) 37.1 (H) 22 - 26 MMOL/L    sO2 (POC) 100 (H) 92 - 97 %    Base excess (POC) 10 mmol/L    Allens test (POC) YES      Total resp.  rate 11      Site LEFT RADIAL      Specimen type (POC) ARTERIAL      Performed by Jami Carey W/ RFLX MICROSCOPIC    Collection Time: 07/18/19  6:00 AM   Result Value Ref Range    Color YELLOW      Appearance CLEAR      Specific gravity 1.006 1.005 - 1.030      pH (UA) 6.0 5.0 - 8.0      Protein NEGATIVE  NEG mg/dL    Glucose NEGATIVE  NEG mg/dL    Ketone NEGATIVE  NEG mg/dL    Bilirubin NEGATIVE  NEG      Blood TRACE (A) NEG      Urobilinogen 0.2 0.2 - 1.0 EU/dL    Nitrites NEGATIVE  NEG      Leukocyte Esterase LARGE (A) NEG     URINE MICROSCOPIC ONLY    Collection Time: 07/18/19  6:00 AM   Result Value Ref Range    WBC 30 to 40 0 - 4 /hpf    RBC 0 to 3 0 - 5 /hpf    Epithelial cells 2+ 0 - 5 /lpf    Bacteria 2+ (A) NEG /hpf   METABOLIC PANEL, BASIC Collection Time: 07/19/19  5:25 AM   Result Value Ref Range    Sodium 140 136 - 145 mmol/L    Potassium 3.8 3.5 - 5.5 mmol/L    Chloride 99 (L) 100 - 111 mmol/L    CO2 36 (H) 21 - 32 mmol/L    Anion gap 5 3.0 - 18 mmol/L    Glucose 90 74 - 99 mg/dL    BUN 40 (H) 7.0 - 18 MG/DL    Creatinine 1.52 (H) 0.6 - 1.3 MG/DL    BUN/Creatinine ratio 26 (H) 12 - 20      GFR est AA 39 (L) >60 ml/min/1.73m2    GFR est non-AA 32 (L) >60 ml/min/1.73m2    Calcium 8.4 (L) 8.5 - 10.1 MG/DL   CBC WITH AUTOMATED DIFF    Collection Time: 07/19/19  5:25 AM   Result Value Ref Range    WBC 7.0 4.6 - 13.2 K/uL    RBC 3.23 (L) 4.20 - 5.30 M/uL    HGB 9.2 (L) 12.0 - 16.0 g/dL    HCT 29.8 (L) 35.0 - 45.0 %    MCV 92.3 74.0 - 97.0 FL    MCH 28.5 24.0 - 34.0 PG    MCHC 30.9 (L) 31.0 - 37.0 g/dL    RDW 16.2 (H) 11.6 - 14.5 %    PLATELET 185 031 - 794 K/uL    MPV 9.7 9.2 - 11.8 FL    NEUTROPHILS 64 40 - 73 %    LYMPHOCYTES 19 (L) 21 - 52 %    MONOCYTES 17 (H) 3 - 10 %    EOSINOPHILS 0 0 - 5 %    BASOPHILS 0 0 - 2 %    ABS. NEUTROPHILS 4.5 1.8 - 8.0 K/UL    ABS. LYMPHOCYTES 1.3 0.9 - 3.6 K/UL    ABS. MONOCYTES 1.2 0.05 - 1.2 K/UL    ABS. EOSINOPHILS 0.0 0.0 - 0.4 K/UL    ABS.  BASOPHILS 0.0 0.0 - 0.1 K/UL    DF AUTOMATED     MAGNESIUM    Collection Time: 07/19/19  5:25 AM   Result Value Ref Range    Magnesium 2.3 1.6 - 2.6 mg/dL         Intake/Output Summary (Last 24 hours) at 7/19/2019 1143  Last data filed at 7/19/2019 0915  Gross per 24 hour   Intake 510 ml   Output    Net 510 ml       Cardiographics:       EKG Results     Procedure 720 Value Units Date/Time    EKG, 12 LEAD, INITIAL [[id number]] Collected:  07/17/19 1446    Order Status:  Completed Updated:  07/17/19 1731     Ventricular Rate 70 BPM      Atrial Rate 288 BPM      P-R Interval 212 ms      QRS Duration 198 ms      Q-T Interval 480 ms      QTC Calculation (Bezet) 518 ms      Calculated R Axis -51 degrees      Calculated T Axis 130 degrees      Diagnosis --     AV dual-paced rhythm with prolonged AV conduction  Abnormal ECG  When compared with ECG of 15-JUL-2019 17:11,  No significant change was found  Confirmed by Ana Callaway ([id number]) on 7/17/2019 5:31:03 PM          12/27/18   ECHO ADULT COMPLETE 12/28/2018 12/28/2018    Narrative · Technically difficult study due to patient compliance. Unable to obtain   on-axis apical images. Good parasternals, poor subcostal images. · Left ventricular moderate-to-severely decreased global systolic   function. Estimated left ventricular ejection fraction is 31 - 35%. Visually measured ejection fraction. Left ventricular severe sigmoid   septum hypertrophy. Abnormal left ventricular wall motion as described on   the wall scoring diagram below. Abnormal left ventricular septal motion. Interventricular septal \"bounce\". Severe (grade 3) left ventricular   diastolic dysfunction. · Moderate tricuspid valve regurgitation is present. Mild pulmonary   hypertension is present. PASP 38mmHg  · Mild aortic valve regurgitation is present. · Mild to moderate mitral valve regurgitation.   · Pacing wire present        Signed by: Burt Camacho MD         Signed By: Wilder RIVAS Phone [phone]    July 19, 2019

## 2019-07-19 NOTE — PROGRESS NOTES
Problem: Mobility Impaired (Adult and Pediatric)  Goal: *Acute Goals and Plan of Care (Insert Text)  Description  Physical Therapy Goals  Initiated 7/19/2019 and to be accomplished within 7 day(s)  1. Patient will participate in functional maintenance program for PROM/AAROM B LE 3-5 times a week. Prior Level of Function: Total assistance for mobility including bed mobility and transfers. Pt has a hospital bed and a mechanical lift. Pt is total assist to her WC at home which she sits in for meals. Pt lives with her  and her son. Outcome: Progressing Towards Goal   PHYSICAL THERAPY EVALUATION    Patient: Daniel Jimenez (80 y.o. female)  Date: 7/19/2019  Primary Diagnosis: Respiratory distress [R06.03]  COPD exacerbation ([facility] [address] [J44.1]  CAP (community acquired pneumonia) [J18.9]        Precautions:   Fall      ASSESSMENT :  PT orders received and patient cleared by nursing to participate with therapy. Patient is a 80 y.o. female admitted to the hospital due to SOB and fever. Patient/family/nursing consents to PT evaluation and treatment. Pt is able to follow commands. She is only oriented to self at this time. Pt has decreased AROM, PROM, and strength of B LE and is nonfunctional.  Pt is total assistance for rolling and repositioning in bed. She is currently at her baseline. Family reports family does assist for ROM exercises at home. Recommending functional maintenance program to maintain ROM to prevent contractures and discomfort. Pt ended therapy supine in bed with all needs met. Rehab technician educated on functional maintenance program for pt. Patient will benefit from skilled intervention to address the above impairments. Patient's rehabilitation potential is considered to be fair  Factors which may influence rehabilitation potential include:     ? None noted  ? Mental ability/status  ? Medical condition  ? Home/family situation and support systems  ? Safety awareness  ? Pain tolerance/management  ? Other:      PLAN :  Recommendations and Planned Interventions:    ?           Bed Mobility Training             ? Neuromuscular Re-Education  ? Transfer Training                   ? Orthotic/Prosthetic Training  ? Gait Training                          ? Modalities  ? Therapeutic Exercises           ? Edema Management/Control  ? Therapeutic Activities            ? Family Training/Education  ? Patient Education  ? Other (comment):    Frequency/Duration: Patient will be followed by physical therapy rehab technician 3-5 times a week Monday through Friday to address goals.   Discharge Recommendations: None  Further Equipment Recommendations for Discharge: N/A     SUBJECTIVE:   Patient is nonverbal    OBJECTIVE DATA SUMMARY:     Past Medical History:   Diagnosis Date    Acute on chronic diastolic heart failure ([facility] [address]     Arthritis     Benign hypertensive heart disease without heart failure     Better controlled, stable    Chronic diastolic heart failure (HCC)     Breathing and edema is improving    Congestive heart failure (HCC)     HTN (hypertension)     Hypercholesteremia     Lupus (systemic lupus erythematosus) ([facility] [address] 6/18/2014    followed by dr Altagracia Maldonado     Obesity, unspecified     Patient has weight loss Discussed diet ad fluid restriction    Other and unspecified hyperlipidemia     F/u per pmd    Tricuspid valve disorders, 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    tr with moderate pulmonary htn      Past Surgical History:   Procedure Laterality Date    HX HYSTERECTOMY      PACEMAKER PROCEDURE       Barriers to Learning/Limitations: yes;  altered mental status (i.e.Sedation, Confusion)  Compensate with: Visual Cues, Verbal Cues and Tactile Cues  Home Situation:  Home Situation  Home Environment: Private residence  One/Two Story Residence: One story  Living Alone: No  Support Systems: Spouse/Significant Other/Partner, Child(marty)  Patient Expects to be Discharged to[de-identified] Private residence  Current DME Used/Available at Home: Hospital bed, Wheelchair(mechanical lift)  Critical Behavior:  Neurologic State: Alert  Orientation Level: Oriented to person  Cognition: Follows commands  Safety/Judgement: Fall prevention  Psychosocial  Patient Behaviors: Calm; Cooperative  Family  Behaviors: Calm; Cooperative     Family  Behaviors: Calm; Cooperative           B LE Strength:    Strength: Grossly decreased, non-functional              B LE Tone & Sensation:   Tone: Normal          Sensation: Intact           B LE Range Of Motion:  AROM: Grossly decreased, non-functional        PROM: Grossly decreased, non-functional        Functional Mobility:  Bed Mobility:  Rolling: Total assistance           Transfers:    Will require mechanical lift       Ambulation/Gait Training:   Non-ambulatory                    Therapeutic Exercises:   Performed B LE PROM in all planes 5-10 reps. Pain:  No pain noted before, during, or at end of session. Activity Tolerance:   Fair-  Please refer to the flowsheet for vital signs taken during this treatment. After treatment:   ?         Patient left in no apparent distress sitting up in chair  ? Patient left in no apparent distress in bed  ? Call bell left within reach  ? Personal items in reach   ? Nursing notified Duran Vazquezs  ? Caregiver present  ? Bed/chair alarm activated  ? SCDs applied     COMMUNICATION/EDUCATION:   ?         Role of Physical Therapy in the acute care setting. ?         Fall prevention education was provided. ? Patient/family have participated as able in goal setting and plan of care. ?         Patient/family agree to work toward stated goals and plan of care. ?         Patient understands intent and goals of therapy, but is neutral about his/her participation. ?          Patient is unable to participate in goal setting/plan of care: ongoing with therapy staff. ?         Out of bed with nursing assistance 3-5 times a day. ? Other:     Thank you for this referral.  Argenis Newby, PT, DPT   Time Calculation: 10 mins      Eval Complexity: History: HIGH Complexity :3+ comorbidities / personal factors will impact the outcome/ POC Exam:HIGH Complexity : 4+ Standardized tests and measures addressing body structure, function, activity limitation and / or participation in recreation  Presentation: MEDIUM Complexity : Evolving with changing characteristics  Clinical Decision Making:Low Complexity    Overall Complexity:LOW

## 2019-07-19 NOTE — PROGRESS NOTES
[facility]: [phone] ([phone])  [facility]: [phone]    Time In: 5170  Time Out: [address] Team Melanie Proctor NP, Tal Kate, RN, and this LCSW) met with three of patient's children Mary Marc (daughter), Suhail Christiansen (son), and Jani Rosaod (son). Patient has 6 living children; 1 passed away. The reason for this palliative consult is clarification of goals of care. Patient has PMH of PNA, chronic systolic CHF, CKD stage 3-4, dementia, lupus, and dysphagia. She presented to the ED on 07/17/2019 for worsening SOB. Per family, O2 dropped from 80 to 80. Patient with audible wheezing and respiratory distress and only responsive to painful stimuli. Patient resides at home with  Diane Tompkins. Per children, Diane Tompkins is [facility] and has Parkinson's disease. He requires assistance. Mary Marc states that patient is able to feed herself, but requires full assist with all personal care. She is incontinent of bowel and bladder and requires a Brayan lift for transfer from bed to chair. Patient is unable to complete an AMD d/t dementia. PM Team discussed the benefits/burdens of CPR and intubation with children, who expressed understanding. They shared that they support DNR/DNI. Patient's  Diane Tompkins ([phone]) is her surrogate decision-maker in the absence of an AMD. Patient's  has difficulty coming to the hospital, so PM Team provided a DDNR form with instructions to son that patient's  can sign if he decides that is in patient's best interest. In spite of multiple attempts to get  to come to the hospital, he has been unable to do so. Patient status is FULL CODE, full interventions at this time. PM Team to f/u as appropriate.

## 2019-07-19 NOTE — PROGRESS NOTES
0730- Bedside shift change report given to [address] [place], [place] [address]way (oncoming nurse) by Alisha Fan RN (offgoing nurse). Report included the following information SBAR, Kardex, Procedure Summary, Intake/Output, MAR and Recent Results. 0800- AM assessment performed. Meds passed- pt tolerated. More wet and audible wheeze noted. Speech notified. 1200-Pt off unit for MBS. 1500- IV lasix given per cardiology. 1700- PM meds given. Pt tolerated. Bedside shift change report given to Mc Salinas (oncoming nurse) by Marbella Gustafson RN (offgoing nurse). Report included the following information SBAR, Kardex, Procedure Summary, Intake/Output, MAR and Recent Results.

## 2019-07-19 NOTE — PROGRESS NOTES
Problem: Self Care Deficits Care Plan (Adult)  Goal: *Acute Goals and Plan of Care (Insert Text)  Description  Occupational Therapy Goals  Initiated 7/19/2019 within 7 day(s). 1.  Patient will participate in [address] 3-5X/week. Prior Level of Function: Patient lives with her  and son and requires total assistance for all ADLs. Outcome: Progressing Towards Goal   OCCUPATIONAL THERAPY EVALUATION    Patient: Emily Silvestre (80 y.o. female)  Date: 7/19/2019  Primary Diagnosis: Respiratory distress [R06.03]  COPD exacerbation (HCC) [J44.1]  CAP (community acquired pneumonia) [J18.9]        Precautions: Fall    ASSESSMENT :  Based on the objective data described below, the patient requires total assistance for all ADLs 2/2 decreased BUE AROM and strength, decreased core strength, and confusion. Patient was inconsistently able to follow commands during PROM testing of BUEs. Patient demonstrated some guarding against PROM of BUEs and decreased ROM in shoulders. Patient would benefit from [facility] to maintain ROM in BUEs and prevent contractures. Patient will benefit from [facility] Program 3-5X/week for PROM/AAROM of BUEs    Patient's rehabilitation potential is considered to be Guarded  Factors which may influence rehabilitation potential include:   ? None noted  ? Mental ability/status  ? Medical condition  ? Home/family situation and support systems  ? Safety awareness  ? Pain tolerance/management  ? Other:      PLAN :  Recommendations and Planned Interventions:   ?               Self Care Training                  ? Therapeutic Activities  ? Functional Mobility Training   ? Cognitive Retraining  ? Therapeutic Exercises           ? Endurance Activities  ? Balance Training                    ? Neuromuscular Re-Education  ? Visual/Perceptual Training     ? Home Safety Training  ? Patient Education                   ? Family Training/Education  ? PROM and AAROM of BUEs    Frequency/Duration: Patient will be followed by occupational therapy tech 1-2 times per day/4-7 days per week to address goals. Discharge Recommendations: Home with 24/7 assistance  Further Equipment Recommendations for Discharge: N/A     SUBJECTIVE:   Patient stated Ok, honey I'll do my best always.     OBJECTIVE DATA SUMMARY:     Past Medical History:   Diagnosis Date    Acute on chronic diastolic heart failure (HCC)     Arthritis     Benign hypertensive heart disease without heart failure     Better controlled, stable    Chronic diastolic heart failure (HCC)     Breathing and edema is improving    Congestive heart failure (HCC)     HTN (hypertension)     Hypercholesteremia     [facility]mat[facility] 6/18/2014    followed by dr Flakita Little     Obesity, unspecified     Patient has weight loss Discussed diet ad fluid restriction    Other and unspecified hyperlipidemia     F/u per pmd    Tricuspid valve disorders, 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    tr with moderate pulmonary htn      Past Surgical History:   Procedure Laterality Date    HX HYSTERECTOMY      PACEMAKER PROCEDURE       Barriers to Learning/Limitations: yes;  cognitive and altered mental status (i.e.Sedation, Confusion)  Compensate with: visual, verbal, tactile, kinesthetic cues/model    Home Situation:   Home Situation  Home Environment: Private residence  One/Two Story Residence: One story  Living Alone: No  Support Systems: Family member(s), Child(marty)  Patient Expects to be Discharged to[de-identified] Private residence  Current DME Used/Available at Home: Hospital bed, Wheelchair(mechanical lift)  ? Right hand dominant   ?   Left hand dominant    Cognitive/Behavioral Status:  Neurologic State: Alert  Orientation Level: Unable to verbalize  Cognition: Decreased command following(2/2 confusion)  Safety/Judgement: Fall prevention    Skin: Intact in BUEs  Edema: No edema noted in BUEs    Vision/Perceptual:    Unable to assess    Coordination: BUE  Unable to test    Balance:  Sitting: Impaired    Strength: BUE  Strength: Grossly decreased, non-functional   Strength: Trace  Tone & Sensation: BUE  Tone: Normal  Sensation: Intact    Range of Motion: BUE  AROM: Grossly decreased, non-functional  PROM: Grossly decreased, non-functional    Full PROM of wrists, hands, and elbows. L Shoulder 0-60 degrees flexion    R shoulder 0-90 degrees flexion    Patient flexing against PROM (patient confused and inconsistently able to follow commands) which may have decreased PROM measurements. Functional Mobility and Transfers for ADLs:  Bed Mobility:  Rolling: Total assistance      ADL Assessment:   Feeding: Total assistance    Oral Facial Hygiene/Grooming: Total assistance    Bathing: Total assistance    Upper Body Dressing: Total assistance    Lower Body Dressing: Total assistance    Toileting: Total assistance    ADL Intervention:  Educated patient on role of OT. No evidence of learning. Cognitive Retraining  Safety/Judgement: Fall prevention    Pain:  Pain level pre-treatment: 0/10   Pain level post-treatment: C/o pain in hands when moving arms but unable to rate. Pain Intervention(s): Medication (see MAR); Rest, Ice, Repositioning    Activity Tolerance:   Fair  Please refer to the flowsheet for vital signs taken during this treatment. After treatment:   ? Patient left in no apparent distress sitting up in chair  ? Patient left in no apparent distress in bed  ? Call bell left within reach  ? Nursing notified  ? Caregiver present  ? Bed alarm activated    COMMUNICATION/EDUCATION:   ? Role of Occupational Therapy in the acute care setting  ? Home safety education was provided and the patient/caregiver indicated understanding. ?  Patient/family have participated as able in goal setting and plan of care. ? Patient/family agree to work toward stated goals and plan of care. ? Patient understands intent and goals of therapy, but is neutral about his/her participation. ? Patient is unable to participate in goal setting and plan of care. Thank you for this referral.  Rivera Shaffer, OTR/L  Time Calculation: 17 mins    Eval Complexity: History: MEDIUM Complexity : Expanded review of history including physical, cognitive and psychosocial  history ; Examination: MEDIUM Complexity : 3-5 performance deficits relating to physical, cognitive , or psychosocial skils that result in activity limitations and / or participation restrictions; Decision Making:HIGH Complexity : Patient presents with comorbidities that affect occupational performance.  Signifigant modification of tasks or assistance (eg, physical or verbal) with assessment (s) is necessary to enable patient to complete evaluation

## 2019-07-19 NOTE — PROGRESS NOTES
[facility] Hospitalist Group  Progress Note    Patient: Eder Gutierrez Age: 80 y.o. : 1931 MR#: [id number] SSN: [id number]  Date/Time: 2019    Subjective:     Patient lying in bed in NAD, awake, has dementia    Assessment/Plan:     - ? Bronchitis  - acute on Chronic systolic and diastolic combined  chf,   - CKD3   - COPD without acute exacerbation  - Dementia  - h/o Lupus  - Dysphagia    PLAN  On abx, O2, stop vanco  Bronchial hygiene, follow cx  Monitor renal function, nephro consult  Cardio following, on iv lasix, I/O  Diet as per speech  Wound care  Full code  Palliative care consulted    Case discussed with:  [x]Patient  []Family  []Nursing  []Case Management  DVT Prophylaxis:  []Lovenox  [x]Hep SQ  []SCDs  []Coumadin   []On Heparin gtt    Objective:   VS:   Visit Vitals  /58   Pulse 99   Temp 97.4 °F (36.3 °C)   Resp 20   Ht 5' 4\" (1.626 m)   Wt 75.6 kg (166 lb 10.6 oz)   SpO2 92%   BMI 28.61 kg/m²      Tmax/24hrs: Temp (24hrs), Av.8 °F (36.6 °C), Min:97.3 °F (36.3 °C), Max:98.4 °F (36.9 °C)    Input/Output:     Intake/Output Summary (Last 24 hours) at 2019 194  Last data filed at 2019 1839  Gross per 24 hour   Intake 990 ml   Output 700 ml   Net 290 ml     General:  Awake, has dementia  Cardiovascular:  S1S2+, RRR  Pulmonary:  Coarse bs , some wheezing  GI:  Soft, BS+, NT, ND  Extremities:  + edema        Labs:    Recent Results (from the past 24 hour(s))   METABOLIC PANEL, BASIC    Collection Time: 19  5:25 AM   Result Value Ref Range    Sodium 140 136 - 145 mmol/L    Potassium 3.8 3.5 - 5.5 mmol/L    Chloride 99 (L) 100 - 111 mmol/L    CO2 36 (H) 21 - 32 mmol/L    Anion gap 5 3.0 - 18 mmol/L    Glucose 90 74 - 99 mg/dL    BUN 40 (H) 7.0 - 18 MG/DL    Creatinine 1.52 (H) 0.6 - 1.3 MG/DL    BUN/Creatinine ratio 26 (H) 12 - 20      GFR est AA 39 (L) >60 ml/min/1.73m2    GFR est non-AA 32 (L) >60 ml/min/1.73m2    Calcium 8.4 (L) 8.5 - 10.1 MG/DL   CBC WITH AUTOMATED DIFF    Collection Time: 07/19/19  5:25 AM   Result Value Ref Range    WBC 7.0 4.6 - 13.2 K/uL    RBC 3.23 (L) 4.20 - 5.30 M/uL    HGB 9.2 (L) 12.0 - 16.0 g/dL    HCT 29.8 (L) 35.0 - 45.0 %    MCV 92.3 74.0 - 97.0 FL    MCH 28.5 24.0 - 34.0 PG    MCHC 30.9 (L) 31.0 - 37.0 g/dL    RDW 16.2 (H) 11.6 - 14.5 %    PLATELET 664 614 - 115 K/uL    MPV 9.7 9.2 - 11.8 FL    NEUTROPHILS 64 40 - 73 %    LYMPHOCYTES 19 (L) 21 - 52 %    MONOCYTES 17 (H) 3 - 10 %    EOSINOPHILS 0 0 - 5 %    BASOPHILS 0 0 - 2 %    ABS. NEUTROPHILS 4.5 1.8 - 8.0 K/UL    ABS. LYMPHOCYTES 1.3 0.9 - 3.6 K/UL    ABS. MONOCYTES 1.2 0.05 - 1.2 K/UL    ABS. EOSINOPHILS 0.0 0.0 - 0.4 K/UL    ABS.  BASOPHILS 0.0 0.0 - 0.1 K/UL    DF AUTOMATED     MAGNESIUM    Collection Time: 07/19/19  5:25 AM   Result Value Ref Range    Magnesium 2.3 1.6 - 2.6 mg/dL     Additional Data Reviewed:      Signed By: Hina Cannon MD     July 19, 2019

## 2019-07-19 NOTE — PROGRESS NOTES
\"Important Message from [employer]" reviewed and explained with the patient and/or representative at bedside and signature was obtained. A signed copy provided to patient/representative. Original signed document placed in patient's chart. Lamonte Marquez MSW  Care Manager  Pager#: [phone]

## 2019-07-20 LAB
ANION GAP SERPL CALC-SCNC: 6 MMOL/L (ref 3–18)
BASOPHILS # BLD: 0 K/UL (ref 0–0.1)
BASOPHILS NFR BLD: 0 % (ref 0–2)
BUN SERPL-MCNC: 40 MG/DL (ref 7–18)
BUN/CREAT SERPL: 27 (ref 12–20)
CALCIUM SERPL-MCNC: 8 MG/DL (ref 8.5–10.1)
CHLORIDE SERPL-SCNC: 98 MMOL/L (ref 100–111)
CO2 SERPL-SCNC: 36 MMOL/L (ref 21–32)
CREAT SERPL-MCNC: 1.5 MG/DL (ref 0.6–1.3)
DIFFERENTIAL METHOD BLD: ABNORMAL
EOSINOPHIL # BLD: 0.1 K/UL (ref 0–0.4)
EOSINOPHIL NFR BLD: 1 % (ref 0–5)
ERYTHROCYTE [DISTWIDTH] IN BLOOD BY AUTOMATED COUNT: 16.4 % (ref 11.6–14.5)
GLUCOSE BLD STRIP.AUTO-MCNC: 94 MG/DL (ref 70–110)
GLUCOSE BLD STRIP.AUTO-MCNC: 97 MG/DL (ref 70–110)
GLUCOSE SERPL-MCNC: 110 MG/DL (ref 74–99)
HCT VFR BLD AUTO: 28.9 % (ref 35–45)
HGB BLD-MCNC: 8.3 G/DL (ref 12–16)
LYMPHOCYTES # BLD: 2.4 K/UL (ref 0.9–3.6)
LYMPHOCYTES NFR BLD: 28 % (ref 21–52)
MAGNESIUM SERPL-MCNC: 2.1 MG/DL (ref 1.6–2.6)
MCH RBC QN AUTO: 27.1 PG (ref 24–34)
MCHC RBC AUTO-ENTMCNC: 28.7 G/DL (ref 31–37)
MCV RBC AUTO: 94.4 FL (ref 74–97)
MONOCYTES # BLD: 1.5 K/UL (ref 0.05–1.2)
MONOCYTES NFR BLD: 18 % (ref 3–10)
NEUTS SEG # BLD: 4.6 K/UL (ref 1.8–8)
NEUTS SEG NFR BLD: 53 % (ref 40–73)
PLATELET # BLD AUTO: 179 K/UL (ref 135–420)
PMV BLD AUTO: 9.5 FL (ref 9.2–11.8)
POTASSIUM SERPL-SCNC: 3.6 MMOL/L (ref 3.5–5.5)
RBC # BLD AUTO: 3.06 M/UL (ref 4.2–5.3)
SODIUM SERPL-SCNC: 140 MMOL/L (ref 136–145)
WBC # BLD AUTO: 8.5 K/UL (ref 4.6–13.2)

## 2019-07-20 PROCEDURE — 77030038269 HC DRN EXT URIN PURWCK BARD -A

## 2019-07-20 PROCEDURE — 3331090001 HH PPS REVENUE CREDIT

## 2019-07-20 PROCEDURE — 74011250636 HC RX REV CODE- 250/636: Performed by: INTERNAL MEDICINE

## 2019-07-20 PROCEDURE — 80048 BASIC METABOLIC PNL TOTAL CA: CPT

## 2019-07-20 PROCEDURE — 74011250636 HC RX REV CODE- 250/636

## 2019-07-20 PROCEDURE — 82962 GLUCOSE BLOOD TEST: CPT

## 2019-07-20 PROCEDURE — 94660 CPAP INITIATION&MGMT: CPT

## 2019-07-20 PROCEDURE — 74011000258 HC RX REV CODE- 258: Performed by: EMERGENCY MEDICINE

## 2019-07-20 PROCEDURE — 36415 COLL VENOUS BLD VENIPUNCTURE: CPT

## 2019-07-20 PROCEDURE — 85025 COMPLETE CBC W/AUTO DIFF WBC: CPT

## 2019-07-20 PROCEDURE — 83735 ASSAY OF MAGNESIUM: CPT

## 2019-07-20 PROCEDURE — 74011250637 HC RX REV CODE- 250/637: Performed by: INTERNAL MEDICINE

## 2019-07-20 PROCEDURE — 74011250636 HC RX REV CODE- 250/636: Performed by: EMERGENCY MEDICINE

## 2019-07-20 PROCEDURE — 3331090002 HH PPS REVENUE DEBIT

## 2019-07-20 PROCEDURE — 74011000258 HC RX REV CODE- 258

## 2019-07-20 PROCEDURE — 74011000250 HC RX REV CODE- 250: Performed by: INTERNAL MEDICINE

## 2019-07-20 PROCEDURE — 65660000004 HC RM CVT STEPDOWN

## 2019-07-20 RX ADMIN — PIPERACILLIN SODIUM,TAZOBACTAM SODIUM 2.25 G: 2; .25 INJECTION, POWDER, FOR SOLUTION INTRAVENOUS at 05:21

## 2019-07-20 RX ADMIN — FUROSEMIDE 40 MG: 10 INJECTION, SOLUTION INTRAMUSCULAR; INTRAVENOUS at 22:05

## 2019-07-20 RX ADMIN — Medication 10 ML: at 22:09

## 2019-07-20 RX ADMIN — ACETAZOLAMIDE 500 MG: 500 INJECTION, POWDER, LYOPHILIZED, FOR SOLUTION INTRAVENOUS at 17:45

## 2019-07-20 RX ADMIN — CARVEDILOL 3.12 MG: 3.12 TABLET, FILM COATED ORAL at 22:04

## 2019-07-20 RX ADMIN — PIPERACILLIN SODIUM,TAZOBACTAM SODIUM 2.25 G: 2; .25 INJECTION, POWDER, FOR SOLUTION INTRAVENOUS at 22:08

## 2019-07-20 RX ADMIN — PIPERACILLIN SODIUM,TAZOBACTAM SODIUM 2.25 G: 2; .25 INJECTION, POWDER, FOR SOLUTION INTRAVENOUS at 17:00

## 2019-07-20 RX ADMIN — HEPARIN SODIUM 5000 UNITS: 5000 INJECTION, SOLUTION INTRAVENOUS; SUBCUTANEOUS at 15:00

## 2019-07-20 RX ADMIN — PIPERACILLIN SODIUM,TAZOBACTAM SODIUM 2.25 G: 2; .25 INJECTION, POWDER, FOR SOLUTION INTRAVENOUS at 11:00

## 2019-07-20 RX ADMIN — HEPARIN SODIUM 5000 UNITS: 5000 INJECTION, SOLUTION INTRAVENOUS; SUBCUTANEOUS at 22:05

## 2019-07-20 RX ADMIN — HEPARIN SODIUM 5000 UNITS: 5000 INJECTION, SOLUTION INTRAVENOUS; SUBCUTANEOUS at 07:00

## 2019-07-20 RX ADMIN — FUROSEMIDE 40 MG: 10 INJECTION, SOLUTION INTRAMUSCULAR; INTRAVENOUS at 08:29

## 2019-07-20 NOTE — PROGRESS NOTES
[facility]. Pulmonary, Critical Care, and Sleep Medicine    F/U Patient Consult    Name: Ron Crawley MRN: [id number]   : 1931 Hospital: [address] [place] [address] Dr   Date: 2019        This patient has been seen and evaluated at the request of Dr. Carli Thompson for dyspnea/wheezing. IMPRESSION:   · Acute CHF exacerbation - combined systolic and diastolic dysfunction  · Wheezing:  Likely secondary to CHF/pulm edema, other contributor is possible bronchitis. Pt does not have COPD -- lifelong nonsmoker and no other causative exposures. CXR shows no evidence of PNA - only pulm edema.   · Suspect chronic aspiration  · Likely right sided atelectasis:  Given pt is bedbound and is laying on her right side, not moved recently  · Deconditioning and adult failure to thrive  · Chronic metabolic alkalosis with respiratory compensation:  Likely in the setting of chronic diuretic administration, some component of resp acidosis as well - chronic likely due to hypoventilation/weakness  · Global weakness  · Dementia  · Hx of Lupus  · CKD 3-4  · Malnutrition       Patient Active Problem List   Diagnosis Code    HTN (hypertension) I10    Hypercholesteremia E78.00    Tricuspid valve disorders, 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    Lup[facility]systemic lupus erythematosus) ([facility] M32.9    Anasarca R60.1    Hypertensive heart disease with CHF (congestive heart failure) ([facility]) I11.0    S/P placement of cardiac pacemaker Z95.0    Paroxysmal atrial flutter ([facility]) I48.92    Acute on chronic diastolic (congestive) heart failure ([facility]) S03.76    Diastolic CHF, chronic ([facility]) I50.32    High risk medication use Z79.899    Acute exacerbation of CHF (congestive heart failure) ([facility]) I50.9    ARIEL (acute kidney injury) ([facility] N17.9    Uremia N19    Left hip pain M25.552    CKD (chronic kidney disease) stage 4, GFR 15-29 ml/min ([facility]) N18.4    NSTEMI (non-ST elevated myocardial infarction) ([facility] I21.4    Dementia F03.90    Skin lesions L98.9    Diarrhea R19.7    UTI (urinary tract infection) N39.0    Fever R50.9    Sepsis ([facility]) A41.9    Abscess L02.91    Severe sepsis ([facility]) A41.9, R65.20    Advanced care planning/counseling discussion Z71.89    Debility R53.81    Anemia D64.9    Respiratory distress R06.03    COPD exacerbation ([facility]) J44.1    CAP (community acquired pneumonia) J18.9    Acute respiratory failure with hypoxia ([facility]) J96.01      RECOMMENDATIONS:   Maintain net negative fluid balance as renal function tolerates. Diuresis per primary service and Cardiology  Another dose of diamox given, please monitor electrolytes daily  Monitor strict ins/outs  Can use bronchodilators PRN while hospitalized  ABx per primary service  Can hold off on steroids at this time  Supplemental oxygen to maintain SpO2 >88%  Please assess for home oxygen need prior to discharge  Aggressive pulmonary toileting/bronchial hygiene  Frequent incentive spirometry  Consider Nephrology consultation  Aspiration precautions including elevating HOB >30deg  PT/OT, OOB, ambulate with assistance as tolerated  DVT ppx per primary service  Will follow  Discussed issues above with son and daughter     Subjective:   Nursing notes and flowsheets reviewed  07/20/19  Pt seen and examined at bedside. No acute events overnight. Pt more sleepy today, passed MBS yesterday, hasn't eaten much today. Good urine output. HPI:  Hx taken from chart and family - pt's son is a limited historian   Patient is a 80 y.o. female with PMH of dementia, bed/recliner bound, CHF, CKD, presented to [facility] for dyspnea/SOB. They reported that the patient had about 3-4 weeks of worsening SOB, associated with increased swelling. Pt then worsened and had SpO2 go down to 92% while at home. Pt was recently treated for CHF excacerbation. Family reported this to her Cardiologist who increased diuretics without improvement. Pt presented to the ER and was found to have wheezing.   Of note, family reports pt had a fever of 100 the day prior to admission and a nonproductive cough. Pt is a lifelong nonsmoker. Past Medical History:   Diagnosis Date    Acute on chronic diastolic heart failure (HCC)     Arthritis     Benign hypertensive heart disease without heart failure     Better controlled, stable    Chronic diastolic heart failure (HCC)     Breathing and edema is improving    Congestive heart failure (HCC)     HTN (hypertension)     Hypercholesteremia     Lupus (systemic lupus erythematosus) ([facility] 6/18/2014    followed by dr Baljit Fairchild     Obesity, unspecified     Patient has weight loss Discussed diet ad fluid restriction    Other and unspecified hyperlipidemia     F/u per pmd    Tricuspid valve disorders, 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    tr with moderate pulmonary htn       Past Surgical History:   Procedure Laterality Date    HX HYSTERECTOMY      PACEMAKER PROCEDURE        Prior to Admission medications    Medication Sig Start Date End Date Taking? Authorizing Provider   torsemide (DEMADEX) 20 mg tablet Take 1 Tab by mouth daily as needed (edema). 7/17/19   Noam Perez MD   polyethylene glycol (MIRALAX) 17 gram/dose powder Take 17 g by mouth daily. Provider, Historical   aspirin delayed-release 81 mg tablet Take 81 mg by mouth daily. Provider, Historical   OTHER Incentive Spirometry- Use as directed 2/10/19   Margot Mayorga MD   food supplemt, lactose-reduced (ENSURE ENLIVE) 0.08 gram-1.5 kcal/mL liqd Take 1 Bottle by mouth two (2) times a day. 2/10/19   Margot Mayorga MD   menthol-zinc oxide (CALMOSEPTINE) 0.44-20.6 % oint Apply  to affected area. Provider, Historical   carvedilol (COREG) 3.125 mg tablet Take 1 Tab by mouth every twelve (12) hours. 12/31/18   Pierce Palma MD   acetaminophen (TYLENOL ARTHRITIS PAIN) 650 mg TbER Take 1 Tab by mouth every eight (8) hours. Patient taking differently: Take 1 Tab by mouth daily.  12/4/18   Xiomara Murray MIS   amiodarone (CORDARONE) 200 mg tablet TAKE ONE TABLET EVERY DAY (MAKE AN APPT)  Patient taking differently: TAKE ONE TABLET EVERY DAY by mouth 18   Becki Roca NP   memantine (NAMENDA) 10 mg tablet Take 10 mg by mouth two (2) times a day. 7/10/18   Provider, Historical   amitriptyline (ELAVIL) 25 mg tablet Take 25 mg by mouth nightly. Provider, Historical     No Known Allergies   Social History     Tobacco Use    Smoking status: Never Smoker    Smokeless tobacco: Never Used   Substance Use Topics    Alcohol use: No      Family History   Problem Relation Age of Onset    Arrhythmia Neg Hx     Asthma Neg Hx     Clotting Disorder Neg Hx     Fainting Neg Hx     Heart Attack Neg Hx     High Cholesterol Neg Hx     Pacemaker Neg Hx     Stroke Neg Hx         Current Facility-Administered Medications   Medication Dose Route Frequency    furosemide (LASIX) injection 40 mg  40 mg IntraVENous Q12H    piperacillin-tazobactam (ZOSYN) 2.25 g in 0.9% sodium chloride (MBP/ADV) 50 mL MBP  2.25 g IntraVENous Q6H    amiodarone (CORDARONE) tablet 200 mg  200 mg Oral DAILY    aspirin delayed-release tablet 81 mg  81 mg Oral DAILY    carvedilol (COREG) tablet 3.125 mg  3.125 mg Oral Q12H    memantine (NAMENDA) tablet 10 mg  10 mg Oral BID    polyethylene glycol (MIRALAX) packet 17 g  17 g Oral DAILY    heparin (porcine) injection 5,000 Units  5,000 Units SubCUTAneous Q8H       Review of Systems:  ROS not obtained due to patient factors.       Objective:   Vital Signs:    Visit Vitals  /77 (BP 1 Location: Left arm, BP Patient Position: At rest)   Pulse 70   Temp 98.2 °F (36.8 °C)   Resp 18   Ht 5' 4\" (1.626 m)   Wt 75.9 kg (167 lb 4.8 oz)   SpO2 99%   BMI 28.72 kg/m²       O2 Device: Nasal cannula   O2 Flow Rate (L/min): 2 l/min   Temp (24hrs), Av °F (36.7 °C), Min:97.4 °F (36.3 °C), Max:98.2 °F (36.8 °C)       Intake/Output:   Last shift:       0701 -  1900  In: 300 [I.V.:300]  Out: 600 [Urine:600]  Last 3 shifts: 07/18 1901 - 07/20 0700  In: 1110 [P.O.:960; I.V.:150]  Out: 1100 [Urine:1100]    Intake/Output Summary (Last 24 hours) at 7/20/2019 1428  Last data filed at 7/20/2019 1300  Gross per 24 hour   Intake 660 ml   Output 1300 ml   Net -640 ml      Physical Exam:   General:  Sleeping in bed, arouses with stimulation and goes back to sleep, no distress, appears stated age, wearing nasal cannula, laying in bed with nasal cannula, leaning towards right side   Head:  Normocephalic, without obvious abnormality, atraumatic. Eyes:  Conjunctivae/corneas clear. ANicteric, PERRLA, EOMI   Nose: Nares normal. Mucosa normal. No drainage or sinus tenderness. Throat: Lips, mucosa dry. NO thrush; poor dentition, no oral lesions, mallampati IV   Neck: Supple, symmetrical, trachea midline, no adenopathy, thyroid: no enlargment/tenderness/nodules, no carotid bruit and +JVD. No crepitus   Back:   Symmetric, no curvature, no spine tenderness or flank pain   Lungs:   Poor air entry B/L, CTABL, no wheezes/rales/rhonchi   Chest wall:  No tenderness or deformity. NO CREPITUS   Heart:  Regular rate and rhythm, S1, S2 normal, no murmur, click, rub or gallop. Abdomen:   Soft, non-tender. Bowel sounds normal. No masses,  No organomegaly. No paradoxical motion   Extremities: normal, atraumatic, no cyanosis or clubbing. 2+ pitting edema B/L up to thighs   Pulses: 1-2+ and symmetric all extremities.    Skin: Skin color, texture, turgor normal. No rashes or lesions   Lymph nodes: Cervical, supraclavicular, and axillary nodes normal.   Neurologic: Grossly nonfocal, pt alert but disoriented at baseline, globally weak, 1/5 in B/L LE and 2-3/5 in UE          Data review:   Labs:  Recent Results (from the past 24 hour(s))   CBC WITH AUTOMATED DIFF    Collection Time: 07/20/19  4:08 AM   Result Value Ref Range    WBC 8.5 4.6 - 13.2 K/uL    RBC 3.06 (L) 4.20 - 5.30 M/uL    HGB 8.3 (L) 12.0 - 16.0 g/dL    HCT 28.9 (L) 35.0 - 45.0 %    MCV 94.4 74.0 - 97.0 FL    MCH 27.1 24.0 - 34.0 PG    MCHC 28.7 (L) 31.0 - 37.0 g/dL    RDW 16.4 (H) 11.6 - 14.5 %    PLATELET 996 370 - 957 K/uL    MPV 9.5 9.2 - 11.8 FL    NEUTROPHILS 53 40 - 73 %    LYMPHOCYTES 28 21 - 52 %    MONOCYTES 18 (H) 3 - 10 %    EOSINOPHILS 1 0 - 5 %    BASOPHILS 0 0 - 2 %    ABS. NEUTROPHILS 4.6 1.8 - 8.0 K/UL    ABS. LYMPHOCYTES 2.4 0.9 - 3.6 K/UL    ABS. MONOCYTES 1.5 (H) 0.05 - 1.2 K/UL    ABS. EOSINOPHILS 0.1 0.0 - 0.4 K/UL    ABS. BASOPHILS 0.0 0.0 - 0.1 K/UL    DF AUTOMATED     METABOLIC PANEL, BASIC    Collection Time: 07/20/19  4:08 AM   Result Value Ref Range    Sodium 140 136 - 145 mmol/L    Potassium 3.6 3.5 - 5.5 mmol/L    Chloride 98 (L) 100 - 111 mmol/L    CO2 36 (H) 21 - 32 mmol/L    Anion gap 6 3.0 - 18 mmol/L    Glucose 110 (H) 74 - 99 mg/dL    BUN 40 (H) 7.0 - 18 MG/DL    Creatinine 1.50 (H) 0.6 - 1.3 MG/DL    BUN/Creatinine ratio 27 (H) 12 - 20      GFR est AA 40 (L) >60 ml/min/1.73m2    GFR est non-AA 33 (L) >60 ml/min/1.73m2    Calcium 8.0 (L) 8.5 - 10.1 MG/DL   MAGNESIUM    Collection Time: 07/20/19  4:08 AM   Result Value Ref Range    Magnesium 2.1 1.6 - 2.6 mg/dL   GLUCOSE, POC    Collection Time: 07/20/19 12:53 PM   Result Value Ref Range    Glucose (POC) 97 70 - 110 mg/dL     ABG:  No results found for: PH, PHI, PCO2, PCO2I, PO2, PO2I, HCO3, HCO3I, FIO2, FIO2I        PFT Results  (Last 48 hours)    None        Echo Results  (Last 48 hours)    None        Imaging:  I have personally reviewed the patients radiographs and have reviewed the reports:  Personal review of CXR from 7/17 shows cardiomegaly with vascular congestion, no infiltrates or consolidations or masses. Reviewed CT abd from 2/13/19 ---  No emphysema in lung bases  XR Results (most recent):  Results from Hospital Encounter encounter on 07/17/19   XR SWALLOW FUNC VIDEO    Narrative Modified barium swallow with speech.     INDICATION: Dysphagia, respiratory distress, recent abnormal chest x-ray,  concern for aspiration pneumonia, history of previous surgery    Fluoroscopy time 1 minute 12 seconds    Cine loops: 6    FINDINGS: The patient was given multiple consistencies of barium. Probable  prominent cricopharyngeal muscle noted. No penetration or aspiration with thin,  nectar, honey, puree, solid or pill. Impression Impression:    As above. Please see speech pathologist's report for further evaluation and  recommendations. CT Results  (Last 48 hours)    None            High complexity decision making was performed during the evaluation of this patient at high risk for decompensation with multiple organ involvement     Above mentioned total time spent on reviewing the case/medical record/data/notes/EMR/patient examination/documentation/coordinating care with nurse/consultants, exclusive of procedures with complex decision making performed and > 50% time spent in face to face evaluation.          Yael Ahumada MD/MPH     Pulmonary, Critical Care Medicine  [facility]

## 2019-07-20 NOTE — PROGRESS NOTES
Bedside shift change report given to [place] (oncoming nurse) by Brenton Orantes RN (offgoing nurse). Report included the following information SBAR, Kardex, Procedure Summary, Intake/Output, MAR and Recent Results.

## 2019-07-20 NOTE — PROGRESS NOTES
Patient seen / examined , consult to follow   Admitted with CHF , she has CKD stage 3 with stable parameters   Continue Diuresis and monitor kidney function

## 2019-07-20 NOTE — CONSULTS
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   · Ac combined systolic / diastolic CHF , CXR with cardiomegaly and vascular congestion   · Chronic asp PNA   · Chronic resp acidosis with metabolic alkalosis ( metabolic alkalosis could be related to both chronic resp acidosis as a compensatory mechanism  / CHF )     Recommendations:   · Kidney function is stable at baseline , UA with no proteinuria / hematuria , will check Kidney US on Monday   · Continue with IV lasix , Diamox was added for metabolic alkalosis , I would recommend using BIPAP as well   · Monitor UO closely   · Follow labs and avoid nephrotoxins       Consult requested by: Efrem Bryson MD    ADMIT DATE: 7/17/2019  CONSULT DATE: July 20, 2019                 Admission diagnosis: <principal problem not specified>   Reason for Nephrology Consultation:  CKD stage 3     HPI: Tapan Ray is a 80 y.o. female [address] who is being admitted on 7/17 because of worsening SOB that didn't respond to increase Lasix dose by her cardiologist as OP , in ED she was lethargic in resp distress with Hypoxia , she was started on IV Abx for possible PNA and IV Lasix for Ac / Ch systolic CHF , last echo was done in 12/2018 and showed EF 31-35% , she has pacemaker and according to cardiology function was normal on 05/2019 . In regard to her kidney function , her baseline Cr is around 1.5 and currently she is at baseline , UA with no proteinuria / hematuria . Patient currently on IV Lasix / Diamox for worsening metabolic alkalosis . Off note patient continues to be lethargic and doesn't provide that much information .        Past Medical History:   Diagnosis Date    Acute on chronic diastolic heart failure (HCC)     Arthritis     Benign hypertensive heart disease without heart failure     Better controlled, stable    Chronic diastolic heart failure ([address] Breathing and edema is improving    Congestive heart failure (HCC)     HTN (hypertension)     Hypercholesteremia     Lupus (systemic lupus erythematosus) ([facility] [address] 6/18/2014    followed by dr Jenna Reyes     Obesity, unspecified     Patient has weight loss Discussed diet ad fluid restriction    Other and unspecified hyperlipidemia     F/u per pmd    Tricuspid valve disorders, 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    tr with moderate pulmonary htn       Past Surgical History:   Procedure Laterality Date    HX HYSTERECTOMY      PACEMAKER PROCEDURE         Social History     Socioeconomic History    Marital status:      Spouse name: Not on file    Number of children: Not on file    Years of education: Not on file    Highest education level: Not on file   Occupational History    Not on file   Social Needs    Financial resource strain: Not on file    Food insecurity:     Worry: Not on file     Inability: Not on file    Transportation needs:     Medical: Not on file     Non-medical: Not on file   Tobacco Use    Smoking status: Never Smoker    Smokeless tobacco: Never Used   Substance and Sexual Activity    Alcohol use: No    Drug use: No    Sexual activity: Never   Lifestyle    Physical activity:     Days per week: Not on file     Minutes per session: Not on file    Stress: Not on file   Relationships    Social connections:     Talks on phone: Not on file     Gets together: Not on file     Attends Jain service: Not on file     Active member of club or organization: Not on file     Attends meetings of clubs or organizations: Not on file     Relationship status: Not on file    Intimate partner violence:     Fear of current or ex partner: Not on file     Emotionally abused: Not on file     Physically abused: Not on file     Forced sexual activity: Not on file   Other Topics Concern    Not on file   Social History Narrative    Not on file       Family History   Problem Relation Age of Onset    Arrhythmia Neg Hx     Asthma Neg Hx     Clotting Disorder Neg Hx     Fainting Neg Hx     Heart Attack Neg Hx     High Cholesterol Neg Hx     Pacemaker Neg Hx     Stroke Neg Hx      No Known Allergies     Home Medications:     Medications Prior to Admission   Medication Sig    torsemide (DEMADEX) 20 mg tablet Take 1 Tab by mouth daily as needed (edema).  polyethylene glycol (MIRALAX) 17 gram/dose powder Take 17 g by mouth daily.  aspirin delayed-release 81 mg tablet Take 81 mg by mouth daily.  OTHER Incentive Spirometry- Use as directed    food supplemt, lactose-reduced (ENSURE ENLIVE) 0.08 gram-1.5 kcal/mL liqd Take 1 Bottle by mouth two (2) times a day.  menthol-zinc oxide (CALMOSEPTINE) 0.44-20.6 % oint Apply  to affected area.  carvedilol (COREG) 3.125 mg tablet Take 1 Tab by mouth every twelve (12) hours.  acetaminophen (TYLENOL ARTHRITIS PAIN) 650 mg TbER Take 1 Tab by mouth every eight (8) hours. (Patient taking differently: Take 1 Tab by mouth daily.)    amiodarone (CORDARONE) 200 mg tablet TAKE ONE TABLET EVERY DAY (MAKE AN APPT) (Patient taking differently: TAKE ONE TABLET EVERY DAY by mouth)    memantine (NAMENDA) 10 mg tablet Take 10 mg by mouth two (2) times a day.  amitriptyline (ELAVIL) 25 mg tablet Take 25 mg by mouth nightly.        Current Inpatient Medications:     Current Facility-Administered Medications   Medication Dose Route Frequency    acetaZOLAMIDE (DIAMOX) 500 mg in sterile water (preservative free) 5 mL injection  500 mg IntraVENous ONCE    [START ON 7/21/2019] acetaZOLAMIDE (DIAMOX) 250 mg in sterile water (preservative free) 2.5 mL injection  250 mg IntraVENous Q12H    furosemide (LASIX) injection 40 mg  40 mg IntraVENous Q12H    piperacillin-tazobactam (ZOSYN) 2.25 g in 0.9% sodium chloride (MBP/ADV) 50 mL MBP  2.25 g IntraVENous Q6H    sodium chloride (NS) flush 5-10 mL  5-10 mL IntraVENous PRN    amiodarone (CORDARONE) tablet 200 mg  200 mg Oral DAILY    aspirin delayed-release tablet 81 mg  81 mg Oral DAILY    carvedilol (COREG) tablet 3.125 mg  3.125 mg Oral Q12H    memantine (NAMENDA) tablet 10 mg  10 mg Oral BID    polyethylene glycol (MIRALAX) packet 17 g  17 g Oral DAILY    heparin (porcine) injection 5,000 Units  5,000 Units SubCUTAneous Q8H       Review of Systems:     Unable to obtain since patient is Lethargic and doesn't provide that much information        Physical Assessment:     Vitals:    07/20/19 0355 07/20/19 0738 07/20/19 1133 07/20/19 1557   BP: 104/53 122/78 131/77 132/60   Pulse: 72 70 70 76   Resp: 18 18 18 18   Temp: 97.9 °F (36.6 °C) 98 °F (36.7 °C) 98.2 °F (36.8 °C) 97.9 °F (36.6 °C)   SpO2: 98% 100% 99% 100%   Weight: 75.9 kg (167 lb 4.8 oz)      Height:         Last 3 Recorded Weights in this Encounter    07/17/19 2236 07/19/19 0617 07/20/19 0355   Weight: 75.6 kg (166 lb 11.2 oz) 75.6 kg (166 lb 10.6 oz) 75.9 kg (167 lb 4.8 oz)     Admission weight: Weight: 80.7 kg (178 lb) (07/17/19 1450)      Intake/Output Summary (Last 24 hours) at 7/20/2019 1659  Last data filed at 7/20/2019 1300  Gross per 24 hour   Intake 660 ml   Output 1300 ml   Net -640 ml       Patient is lethargic but laying flat in bed in no acute distress   HEENT: Head is normocephalic and atraumatic. Neck: no cervical lymphadenopathy or thyromegaly. Lungs: good air entry, clear to auscultation bilaterally. Trachea at the midline. Cardiovascular system: S1, S2, regular rate and rhythm. No murmurs, gallops or rubs. No jvd. Abdomen: soft, non tender, non distended. Positive bowel soundsExtremities: no clubbing, cyanosis or edema. LE : No C/C/Edema   Integumentary: skin is grossly intact.    Neurologic: Lethargic , doesn't follow command       Data Review:    Labs: Results:       Chemistry Recent Labs     07/20/19  0408 07/19/19  0525   * 90    140   K 3.6 3.8   CL 98* 99*   CO2 36* 36*   BUN 40* 40*   CREA 1.50* 1.52*   CA 8.0* 8.4*   AGAP 6 5 BUCR 27* 26*         CBC w/Diff Recent Labs     07/20/19  0408 07/19/19  0525   WBC 8.5 7.0   RBC 3.06* 3.23*   HGB 8.3* 9.2*   HCT 28.9* 29.8*    178   GRANS 53 64   LYMPH 28 19*   EOS 1 0         Iron/Ferritin No results for input(s): IRON in the last 72 hours. No lab exists for component: TIBCCALC   PTH/VIT D No results for input(s): PTH in the last 72 hours.     No lab exists for component: Tesfaye Daniels MD  [facility]  July 20, 2019

## 2019-07-20 NOTE — PROGRESS NOTES
[facility]      CARDIOLOGY PROGRESS NOTE  RECS:  1. Congestive heart failure: Acute on chronic systolic dysfunction. No gross fluid overload but as pulmonary service also thinks of CHF, will give IV diuretics and follow closely. Shortness of breath also related to possible bronchitis and aspiration the work-up of which is under progress per medicine/pulmonary. 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  2. Status post permanent pacemaker: Normal function on 5/2/2019  3. Atrial fibrillation: Noted on pacemaker. Sinus rhythm on amiodarone. 4. Hypokalemia: Replace per protocol and follow closely. 5. Hypertension: Controlled. Watch on IV diuresis as it may go low. 6. Mitral regurgitation: Mild to moderate in December 2018. Control blood pressure and CHF. Follow clinically. Not a candidate for any invasive work-up. 7. Hyperlipidemia  8. History of lupus  9. Obesity        EKG Results     Procedure 720 Value Units Date/Time    EKG, 12 LEAD, INITIAL [[id number]] Collected:  07/17/19 1446    Order Status:  Completed Updated:  07/17/19 1731     Ventricular Rate 70 BPM      Atrial Rate 288 BPM      P-R Interval 212 ms      QRS Duration 198 ms      Q-T Interval 480 ms      QTC Calculation (Bezet) 518 ms      Calculated R Axis -51 degrees      Calculated T Axis 130 degrees      Diagnosis --     AV dual-paced rhythm with prolonged AV conduction  Abnormal ECG  When compared with ECG of 15-JUL-2019 17:11,  No significant change was found  Confirmed by Madelaine Roberto ([id number]) on 7/17/2019 5:31:03 PM          XR Results (most recent):  Results from [place] encounter on 07/17/19   XR SWALLOW FUNC VIDEO    Narrative Modified barium swallow with speech.     INDICATION: Dysphagia, respiratory distress, recent abnormal chest x-ray,  concern for aspiration pneumonia, history of previous surgery    Fluoroscopy time 1 minute 12 seconds    Cine loops: 6    FINDINGS: The patient was given multiple consistencies of barium. Probable  prominent cricopharyngeal muscle noted. No penetration or aspiration with thin,  nectar, honey, puree, solid or pill. Impression Impression:    As above. Please see speech pathologist's report for further evaluation and  recommendations. 12/27/18   ECHO ADULT COMPLETE 12/28/2018 12/28/2018    Narrative · Technically difficult study due to patient compliance. Unable to obtain   on-axis apical images. Good parasternals, poor subcostal images. · Left ventricular moderate-to-severely decreased global systolic   function. Estimated left ventricular ejection fraction is 31 - 35%. Visually measured ejection fraction. Left ventricular severe sigmoid   septum hypertrophy. Abnormal left ventricular wall motion as described on   the wall scoring diagram below. Abnormal left ventricular septal motion. Interventricular septal \"bounce\". Severe (grade 3) left ventricular   diastolic dysfunction. · Moderate tricuspid valve regurgitation is present. Mild pulmonary   hypertension is present. PASP 38mmHg  · Mild aortic valve regurgitation is present. · Mild to moderate mitral valve regurgitation.   · Pacing wire present        Signed by: Cathy Small MD                 ASSESSMENT:  Hospital Problems  Date Reviewed: 7/17/2019          Codes Class Noted POA    Respiratory distress ICD-10-CM: R06.03  ICD-9-CM: 786.09  7/17/2019 Unknown        COPD exacerbation ([facility] [address] ICD-10-CM: J44.1  ICD-9-CM: 491.21  7/17/2019 Unknown        CAP (community acquired pneumonia) ICD-10-CM: J18.9  ICD-9-CM: 486  7/17/2019 Unknown        Acute respiratory failure with hypoxia [facility]) ICD-10-CM: J96.01  ICD-9-CM: 518.81  7/17/2019 Unknown                SUBJECTIVE:  No CP  No SOB  Patient is sleepy and so the symptoms may be a unreliable    OBJECTIVE:    VS:   Visit Vitals  /77 (BP 1 Location: Left arm, BP Patient Position: At rest)   Pulse 70   Temp 98.2 °F (36.8 °C) Resp 18   Ht 5' 4\" (1.626 m)   Wt 75.9 kg (167 lb 4.8 oz)   SpO2 99%   BMI 28.72 kg/m²         Intake/Output Summary (Last 24 hours) at 7/20/2019 1502  Last data filed at 7/20/2019 1300  Gross per 24 hour   Intake 660 ml   Output 1300 ml   Net -640 ml     TELE: AV paced    General: Sleepy  HENT: Normocephalic, atraumatic. Normal external eye. Neck :  no bruit, no JVD  Cardiac:  regular rate and rhythm, systolic murmur: early systolic 2/6, crescendo at 2nd right intercostal space  Chest/Lungs:mild expiratory wheezing heard diffusely throughout both lungs  Abdomen: Soft, nontender, no masses  Extremities:  No c/c/edema      Labs: Results:       Chemistry Recent Labs     07/20/19  0408 07/19/19  0525 07/17/19  1545   * 90 115*    140 138   K 3.6 3.8 4.6   CL 98* 99* 99*   CO2 36* 36* 36*   BUN 40* 40* 38*   CREA 1.50* 1.52* 1.48*   CA 8.0* 8.4* 8.3*   MG 2.1 2.3  --    AGAP 6 5 3   BUCR 27* 26* 26*   AP  --   --  60   TP  --   --  6.2*   ALB  --   --  3.1*   GLOB  --   --  3.1   AGRAT  --   --  1.0      CBC w/Diff Recent Labs     07/20/19  0408 07/19/19  0525 07/17/19  1545   WBC 8.5 7.0 6.1   RBC 3.06* 3.23* 2.94*   HGB 8.3* 9.2* 8.3*   HCT 28.9* 29.8* 26.9*    178 166   GRANS 53 64 59   LYMPH 28 19* 22   EOS 1 0 2      Cardiac Enzymes No results for input(s): CPK, CKND1, YAN in the last 72 hours. No lab exists for component: CKRMB, TROIP   Coagulation No results for input(s): PTP, INR, APTT in the last 72 hours. No lab exists for component: INREXT    Lipid Panel Lab Results   Component Value Date/Time    Cholesterol, total 142 01/31/2019 02:24 AM    HDL Cholesterol 68 (H) 01/31/2019 02:24 AM    LDL, calculated 66.4 01/31/2019 02:24 AM    VLDL, calculated 7.6 01/31/2019 02:24 AM    Triglyceride 38 01/31/2019 02:24 AM    CHOL/HDL Ratio 2.1 01/31/2019 02:24 AM      BNP No results for input(s): BNPP in the last 72 hours.    Liver Enzymes Recent Labs     07/17/19  1545   TP 6.2*   ALB 3.1* AP 60   SGOT 15      Digoxin    Thyroid Studies Lab Results   Component Value Date/Time    TSH 8.86 (H) 11/15/2015 01:45 PM              Graciela Jeff MD   Pager # [phone]

## 2019-07-20 NOTE — PROGRESS NOTES
1930 bedside turnover given to me by KAREEM Nguyen. Pt is on the cardiac telemetry monitor in stable condition. She is watching tv. No signs of distress, on oxygen nasal cannula. 2035 hrly round offered drink and snack, pt took a few sips of lemonade then pushed my arm away. Checked for incontinence, she is dry  2125 hrly round, physical assessment completed, checked IV site, checked dressings on wounds, bed is in the lowest position wheels locked bed alarm on and call bell on the bed with patient. Turned on the night light and turned overhead light off. No signs of distress denies chest pain and denies shortness of breath. 2218 pt is in bed, meds given with lemonade, only took a few sips. Gave her some pudding. Checked for incontinence, denies pain, she is very talkative, she doesn't make sense, just talks in circles mostly and it is garbled. Washed her face and assisted her with brushing her teeth. 2315 in bed watching tv, wide awake, no signs of distress, on cardiac telemetry monitor, stable condition all vitals at her baseline. Great urine output. Will continue to assess. Bed alarm is on   0029 in bed, still wide awake, no signs of distress, repositioned to the other side covered up and turned temp up in room, she acted like she was cold. Offered her some water she took a few sips. On cardiac monitor bed alarm on  0130 hrly round pt fell asleep bed is in the lowest position with the wheels locked and call bell on the bed with her. No signs of distress, on nasal cannula and cardiac monitor. 3172 hrly round sleeping in bed  0325 sleeping in bed hrly round  0415 pt was wet, she was bathed changed, all linens changed suction and pure wick changed  0530 fell back asleep hrly round zosyn given  0630 sleeping in bed, hrly round  0720 bedside turnover given to [employer] SBAR< MAR, ED summary and updates as well as a chance to ask questions. Pt is on the cardiac telemetry monitor no signs of distress vitals WNL.  Table set up in front of her for breakfast.

## 2019-07-21 LAB
ANION GAP SERPL CALC-SCNC: 8 MMOL/L (ref 3–18)
BASOPHILS # BLD: 0 K/UL (ref 0–0.06)
BASOPHILS NFR BLD: 0 % (ref 0–3)
BUN SERPL-MCNC: 40 MG/DL (ref 7–18)
BUN/CREAT SERPL: 31 (ref 12–20)
CALCIUM SERPL-MCNC: 8.4 MG/DL (ref 8.5–10.1)
CHLORIDE SERPL-SCNC: 101 MMOL/L (ref 100–111)
CO2 SERPL-SCNC: 34 MMOL/L (ref 21–32)
CREAT SERPL-MCNC: 1.29 MG/DL (ref 0.6–1.3)
DIFFERENTIAL METHOD BLD: ABNORMAL
EOSINOPHIL # BLD: 0 K/UL (ref 0–0.4)
EOSINOPHIL NFR BLD: 0 % (ref 0–5)
ERYTHROCYTE [DISTWIDTH] IN BLOOD BY AUTOMATED COUNT: 16.3 % (ref 11.6–14.5)
GLUCOSE SERPL-MCNC: 68 MG/DL (ref 74–99)
HCT VFR BLD AUTO: 31.5 % (ref 35–45)
HGB BLD-MCNC: 9.5 G/DL (ref 12–16)
LYMPHOCYTES # BLD: 3.5 K/UL (ref 0.8–3.5)
LYMPHOCYTES NFR BLD: 41 % (ref 20–51)
MCH RBC QN AUTO: 28.4 PG (ref 24–34)
MCHC RBC AUTO-ENTMCNC: 30.2 G/DL (ref 31–37)
MCV RBC AUTO: 94 FL (ref 74–97)
MONOCYTES # BLD: 0.7 K/UL (ref 0–1)
MONOCYTES NFR BLD: 8 % (ref 2–9)
NEUTS SEG # BLD: 4.4 K/UL (ref 1.8–8)
NEUTS SEG NFR BLD: 51 % (ref 42–75)
PLATELET # BLD AUTO: 176 K/UL (ref 135–420)
PLATELET COMMENTS,PCOM: ABNORMAL
PMV BLD AUTO: 9.5 FL (ref 9.2–11.8)
POTASSIUM SERPL-SCNC: 3.2 MMOL/L (ref 3.5–5.5)
POTASSIUM SERPL-SCNC: 3.2 MMOL/L (ref 3.5–5.5)
POTASSIUM SERPL-SCNC: 3.8 MMOL/L (ref 3.5–5.5)
RBC # BLD AUTO: 3.35 M/UL (ref 4.2–5.3)
RBC MORPH BLD: ABNORMAL
RBC MORPH BLD: ABNORMAL
SODIUM SERPL-SCNC: 143 MMOL/L (ref 136–145)
WBC # BLD AUTO: 8.6 K/UL (ref 4.6–13.2)

## 2019-07-21 PROCEDURE — 36415 COLL VENOUS BLD VENIPUNCTURE: CPT

## 2019-07-21 PROCEDURE — 77030037878 HC DRSG MEPILEX >48IN BORD MOLN -B

## 2019-07-21 PROCEDURE — 74011250637 HC RX REV CODE- 250/637: Performed by: NURSE PRACTITIONER

## 2019-07-21 PROCEDURE — 74011250636 HC RX REV CODE- 250/636: Performed by: INTERNAL MEDICINE

## 2019-07-21 PROCEDURE — 3331090002 HH PPS REVENUE DEBIT

## 2019-07-21 PROCEDURE — 77030038269 HC DRN EXT URIN PURWCK BARD -A

## 2019-07-21 PROCEDURE — 74011000258 HC RX REV CODE- 258

## 2019-07-21 PROCEDURE — 77030012890

## 2019-07-21 PROCEDURE — 74011000250 HC RX REV CODE- 250: Performed by: INTERNAL MEDICINE

## 2019-07-21 PROCEDURE — 85025 COMPLETE CBC W/AUTO DIFF WBC: CPT

## 2019-07-21 PROCEDURE — 74011250636 HC RX REV CODE- 250/636: Performed by: EMERGENCY MEDICINE

## 2019-07-21 PROCEDURE — 74011250637 HC RX REV CODE- 250/637: Performed by: INTERNAL MEDICINE

## 2019-07-21 PROCEDURE — 84132 ASSAY OF SERUM POTASSIUM: CPT

## 2019-07-21 PROCEDURE — 74011250636 HC RX REV CODE- 250/636

## 2019-07-21 PROCEDURE — 3331090001 HH PPS REVENUE CREDIT

## 2019-07-21 PROCEDURE — 74011000258 HC RX REV CODE- 258: Performed by: EMERGENCY MEDICINE

## 2019-07-21 PROCEDURE — 65660000004 HC RM CVT STEPDOWN

## 2019-07-21 RX ORDER — SPIRONOLACTONE 25 MG/1
25 TABLET ORAL DAILY
Status: DISCONTINUED | OUTPATIENT
Start: 2019-07-21 | End: 2019-07-25 | Stop reason: HOSPADM

## 2019-07-21 RX ORDER — TORSEMIDE 20 MG/1
20 TABLET ORAL DAILY
Status: DISCONTINUED | OUTPATIENT
Start: 2019-07-22 | End: 2019-07-25 | Stop reason: HOSPADM

## 2019-07-21 RX ORDER — POTASSIUM CHLORIDE 1.5 G/1.77G
20 POWDER, FOR SOLUTION ORAL 2 TIMES DAILY WITH MEALS
Status: DISCONTINUED | OUTPATIENT
Start: 2019-07-21 | End: 2019-07-21

## 2019-07-21 RX ORDER — POTASSIUM CHLORIDE 20 MEQ/1
40 TABLET, EXTENDED RELEASE ORAL
Status: COMPLETED | OUTPATIENT
Start: 2019-07-21 | End: 2019-07-21

## 2019-07-21 RX ORDER — POTASSIUM CHLORIDE 1.5 G/1.77G
20 POWDER, FOR SOLUTION ORAL DAILY
Status: DISCONTINUED | OUTPATIENT
Start: 2019-07-22 | End: 2019-07-25 | Stop reason: HOSPADM

## 2019-07-21 RX ADMIN — PIPERACILLIN SODIUM,TAZOBACTAM SODIUM 2.25 G: 2; .25 INJECTION, POWDER, FOR SOLUTION INTRAVENOUS at 11:00

## 2019-07-21 RX ADMIN — MEMANTINE 10 MG: 10 TABLET ORAL at 18:00

## 2019-07-21 RX ADMIN — PIPERACILLIN SODIUM,TAZOBACTAM SODIUM 2.25 G: 2; .25 INJECTION, POWDER, FOR SOLUTION INTRAVENOUS at 16:38

## 2019-07-21 RX ADMIN — POTASSIUM CHLORIDE 20 MEQ: 1.5 POWDER, FOR SOLUTION ORAL at 10:00

## 2019-07-21 RX ADMIN — MEMANTINE 10 MG: 10 TABLET ORAL at 08:22

## 2019-07-21 RX ADMIN — AMIODARONE HYDROCHLORIDE 200 MG: 200 TABLET ORAL at 09:00

## 2019-07-21 RX ADMIN — CARVEDILOL 3.12 MG: 3.12 TABLET, FILM COATED ORAL at 09:00

## 2019-07-21 RX ADMIN — HEPARIN SODIUM 5000 UNITS: 5000 INJECTION, SOLUTION INTRAVENOUS; SUBCUTANEOUS at 15:00

## 2019-07-21 RX ADMIN — POTASSIUM CHLORIDE 40 MEQ: 1500 TABLET, EXTENDED RELEASE ORAL at 16:00

## 2019-07-21 RX ADMIN — PIPERACILLIN SODIUM,TAZOBACTAM SODIUM 2.25 G: 2; .25 INJECTION, POWDER, FOR SOLUTION INTRAVENOUS at 22:20

## 2019-07-21 RX ADMIN — FUROSEMIDE 40 MG: 10 INJECTION, SOLUTION INTRAMUSCULAR; INTRAVENOUS at 08:22

## 2019-07-21 RX ADMIN — CARVEDILOL 3.12 MG: 3.12 TABLET, FILM COATED ORAL at 21:46

## 2019-07-21 RX ADMIN — PIPERACILLIN SODIUM,TAZOBACTAM SODIUM 2.25 G: 2; .25 INJECTION, POWDER, FOR SOLUTION INTRAVENOUS at 05:11

## 2019-07-21 RX ADMIN — HEPARIN SODIUM 5000 UNITS: 5000 INJECTION, SOLUTION INTRAVENOUS; SUBCUTANEOUS at 07:00

## 2019-07-21 RX ADMIN — SPIRONOLACTONE 25 MG: 25 TABLET ORAL at 16:00

## 2019-07-21 RX ADMIN — ASPIRIN 81 MG: 81 TABLET ORAL at 08:22

## 2019-07-21 RX ADMIN — ACETAZOLAMIDE 250 MG: 500 INJECTION, POWDER, LYOPHILIZED, FOR SOLUTION INTRAVENOUS at 09:00

## 2019-07-21 NOTE — PROGRESS NOTES
[facility]  Progress Note    Patient: Alexandra Height Age: 80 y.o. : 1931 MR#: [id number] SSN: [id number]  Date/Time: 2019    Subjective:     Patient lying in bed in NAD, sleepy    Assessment/Plan:     - ? Bronchitis  - acute on Chronic systolic and diastolic combined  chf,   - CKD3   - COPD without acute exacerbation  - Dementia  - h/o Lupus  - Dysphagia    PLAN  O2, on zosyn  Bronchial hygiene, follow cx  Monitor renal function, nephro consult  Cardio following, on iv lasix, I/O  Diet as per speech  Wound care  Full code  Palliative care following  D/w RN    Case discussed with:  [x]Patient  []Family  []Nursing  []Case Management  DVT Prophylaxis:  []Lovenox  [x]Hep SQ  []SCDs  []Coumadin   []On Heparin gtt    Objective:   VS:   Visit Vitals  /59 (BP 1 Location: Left arm, BP Patient Position: At rest)   Pulse 72   Temp 97.8 °F (36.6 °C)   Resp 18   Ht 5' 4\" (1.626 m)   Wt 75.9 kg (167 lb 4.8 oz)   SpO2 99%   BMI 28.72 kg/m²      Tmax/24hrs: Temp (24hrs), Av °F (36.7 °C), Min:97.8 °F (36.6 °C), Max:98.2 °F (36.8 °C)    Input/Output:     Intake/Output Summary (Last 24 hours) at 2019  Last data filed at 2019 1739  Gross per 24 hour   Intake 300 ml   Output 2900 ml   Net -2600 ml     General:  Awake, alert  Cardiovascular:  S1S2+, RRR  Pulmonary:  Coarse bs b/l  GI:  Soft, BS+, NT, ND  Extremities:  + edema        Labs:    Recent Results (from the past 24 hour(s))   CBC WITH AUTOMATED DIFF    Collection Time: 19  4:08 AM   Result Value Ref Range    WBC 8.5 4.6 - 13.2 K/uL    RBC 3.06 (L) 4.20 - 5.30 M/uL    HGB 8.3 (L) 12.0 - 16.0 g/dL    HCT 28.9 (L) 35.0 - 45.0 %    MCV 94.4 74.0 - 97.0 FL    MCH 27.1 24.0 - 34.0 PG    MCHC 28.7 (L) 31.0 - 37.0 g/dL    RDW 16.4 (H) 11.6 - 14.5 %    PLATELET 846 390 - 692 K/uL    MPV 9.5 9.2 - 11.8 FL    NEUTROPHILS 53 40 - 73 %    LYMPHOCYTES 28 21 - 52 %    MONOCYTES 18 (H) 3 - 10 %    EOSINOPHILS 1 0 - 5 %    BASOPHILS 0 0 - 2 %    ABS. NEUTROPHILS 4.6 1.8 - 8.0 K/UL    ABS. LYMPHOCYTES 2.4 0.9 - 3.6 K/UL    ABS. MONOCYTES 1.5 (H) 0.05 - 1.2 K/UL    ABS. EOSINOPHILS 0.1 0.0 - 0.4 K/UL    ABS.  BASOPHILS 0.0 0.0 - 0.1 K/UL    DF AUTOMATED     METABOLIC PANEL, BASIC    Collection Time: 07/20/19  4:08 AM   Result Value Ref Range    Sodium 140 136 - 145 mmol/L    Potassium 3.6 3.5 - 5.5 mmol/L    Chloride 98 (L) 100 - 111 mmol/L    CO2 36 (H) 21 - 32 mmol/L    Anion gap 6 3.0 - 18 mmol/L    Glucose 110 (H) 74 - 99 mg/dL    BUN 40 (H) 7.0 - 18 MG/DL    Creatinine 1.50 (H) 0.6 - 1.3 MG/DL    BUN/Creatinine ratio 27 (H) 12 - 20      GFR est AA 40 (L) >60 ml/min/1.73m2    GFR est non-AA 33 (L) >60 ml/min/1.73m2    Calcium 8.0 (L) 8.5 - 10.1 MG/DL   MAGNESIUM    Collection Time: 07/20/19  4:08 AM   Result Value Ref Range    Magnesium 2.1 1.6 - 2.6 mg/dL   GLUCOSE, POC    Collection Time: 07/20/19 12:53 PM   Result Value Ref Range    Glucose (POC) 97 70 - 110 mg/dL   GLUCOSE, POC    Collection Time: 07/20/19  5:43 PM   Result Value Ref Range    Glucose (POC) 94 70 - 110 mg/dL     Additional Data Reviewed:      Signed By: Lela Guillaume MD     July 20, 2019

## 2019-07-21 NOTE — PROGRESS NOTES
RENAL PROGRESS NOTE        Win Hawley         Assessment  · CKD stage 3 with baseline in the mid one secondary to HTN nephropathy / ischemic nephropathy from CHF   · Ac combined systolic / diastolic CHF , CXR with cardiomegaly and vascular congestion   · Chronic asp PNA   · Chronic resp acidosis with metabolic alkalosis ( metabolic alkalosis could be related to both chronic resp acidosis as a compensatory mechanism  / CHF )     Plan  · Kidney function is stable at baseline , UA with no proteinuria / hematuria   · Continue with IV lasix , Diamox was added for metabolic alkalosis which is little better , continue with CPAP/BIPAP   · Monitor UO closely   · Follow labs and avoid nephrotoxins   · Poor prognosis in general       ·                                                                                                                                Subjective:  Patient with dementia , per nursing staff she is using CPAP / BIPAP      Patient Active Problem List   Diagnosis Code    HTN (hypertension) I10    Hypercholesteremia E78.00    Tricuspid valve disorders, 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    Lupus (systemic lupus erythematosus) ([facility]) M32.9    Anasarca R60.1    Hypertensive heart disease with CHF (congestive heart failure) ([facility]) I11.0    S/P placement of cardiac pacemaker Z95.0    Paroxysmal atrial flutter ([facility]) I48.92    Acute on chronic diastolic (congestive) heart failure ([facility]) P42.82    Diastolic CHF, chronic ([facility]) I50.32    High risk medication use Z79.899    Acute exacerbation of CHF (congestive heart failure) ([facility]) I50.9    ARIEL (acute kidney injury) ([facility] [address] N17.9    Uremia N19    Left hip pain M25.552    CKD (chronic kidney disease) stage 4, GFR 15-29 ml/min ([facility]) N18.4    NSTEMI (non-ST elevated myocardial infarction) ([facility]) I21.4    Dementia F03.90    Skin lesions L98.9    Diarrhea R19.7    UTI (urinary tract infection) N39.0    Fever R50.9    Sepsis ([facility] [address] A41.9    Abscess L02.91    Severe sepsis ([facility] [address] A41.9, R65.20    Advanced care planning/counseling discussion Z71.89    Debility R53.81    Anemia D64.9    Respiratory distress R06.03    COPD exacerbation (HCC) J44.1    CAP (community acquired pneumonia) J18.9    Acute respiratory failure with hypoxia (HCC) J96.01       Current Facility-Administered Medications   Medication Dose Route Frequency Provider Last Rate Last Dose    potassium chloride (KLOR-CON) packet for solution 20 mEq  20 mEq Oral BID WITH MEALS Tasneem Payne NP        acetaZOLAMIDE (DIAMOX) 250 mg in sterile water (preservative free) 2.5 mL injection  250 mg IntraVENous Q12H Anton Montero MD        furosemide (LASIX) injection 40 mg  40 mg IntraVENous Q12H Lachelle Jay MD   40 mg at 07/21/19 8181    piperacillin-tazobactam (ZOSYN) 2.25 g in 0.9% sodium chloride (MBP/ADV) 50 mL MBP  2.25 g IntraVENous Q6H Kenny Reagan  mL/hr at 07/21/19 0511 2.25 g at 07/21/19 0511    sodium chloride (NS) flush 5-10 mL  5-10 mL IntraVENous PRN Kat Mcdaniels MD   10 mL at 07/20/19 2209    amiodarone (CORDARONE) tablet 200 mg  200 mg Oral DAILY Ruthy Mcwilliams MD   200 mg at 07/21/19 0900    aspirin delayed-release tablet 81 mg  81 mg Oral DAILY Ruthy Mcwilliams MD   81 mg at 07/21/19 2940    carvedilol (COREG) tablet 3.125 mg  3.125 mg Oral Q12H Ruthy Mcwilliams MD   3.125 mg at 07/21/19 0900    memantine (NAMENDA) tablet 10 mg  10 mg Oral BID Ruthy Mcwilliams MD   10 mg at 07/21/19 0928    polyethylene glycol (MIRALAX) packet 17 g  17 g Oral DAILY Ruthy Mcwilliams MD   Stopped at 07/18/19 0900    heparin (porcine) injection 5,000 Units  5,000 Units SubCUTAneous Q8H Ruthy Mcwilliams MD   5,000 Units at 07/21/19 0700       Objective  Vitals:    07/20/19 1956 07/21/19 0000 07/21/19 0442 07/21/19 0747   BP: 129/59 108/62 128/81 119/61   Pulse: 72 75 74 72   Resp: 18 18 16 18   Temp: 97.8 °F (36.6 °C) 98.4 °F (36.9 °C) 98 °F (36.7 °C) 98.2 °F (36.8 °C)   SpO2: 99% 100% 98% 98%   Weight:   74.4 kg (164 lb)    Height:             Intake/Output Summary (Last 24 hours) at 7/21/2019 1045  Last data filed at 7/21/2019 0442  Gross per 24 hour   Intake 440 ml   Output 3100 ml   Net -2660 ml           Admission weight: Weight: 80.7 kg (178 lb) (07/17/19 1450)  Last Weight Metrics:  Weight Loss Metrics 7/21/2019 7/17/2019 7/17/2019 7/17/2019 7/9/2019 5/29/2019 5/21/2019   Today's Wt 164 lb - - - 187 lb 187 lb 187 lb   BMI - 28.15 kg/m2 - 32.1 kg/m2 32.1 kg/m2 32.1 kg/m2 32.1 kg/m2             Physical Assessment:     General: NAD, awake   Neck: No jvd. LUNGS: Clear to Auscultation, No rales, rhonchi / + wheezing. CVS EXM: S1, S2  RRR, no murmurs/gallops/rubs. Abdomen: soft, non tender. Lower Extremities:  no edema.        Lab    CBC w/Diff Recent Labs     07/21/19 0226 07/20/19  0408 07/19/19  0525   WBC 8.6 8.5 7.0   RBC 3.35* 3.06* 3.23*   HGB 9.5* 8.3* 9.2*   HCT 31.5* 28.9* 29.8*    179 178   GRANS 51 53 64   LYMPH 41 28 19*   EOS 0 1 0        Chemistry Recent Labs     07/21/19 0226 07/20/19  0408 07/19/19  0525   GLU 68* 110* 90    140 140   K 3.2* 3.6 3.8    98* 99*   CO2 34* 36* 36*   BUN 40* 40* 40*   CREA 1.29 1.50* 1.52*   CA 8.4* 8.0* 8.4*   AGAP 8 6 5   BUCR 31* 27* 26*         Lab Results   Component Value Date/Time    Iron 51 02/10/2019 11:00 AM    TIBC 202 (L) 02/10/2019 11:00 AM    Iron % saturation 25 02/10/2019 11:00 AM      Lab Results   Component Value Date/Time    Calcium 8.4 (L) 07/21/2019 02:26 AM    Phosphorus 3.4 12/28/2018 09:25 AM          Preston Cullen MD  7/21/2019  10:45 AM

## 2019-07-21 NOTE — PROGRESS NOTES
[facility]  Progress Note    Patient: Ed Thomas Age: 80 y.o. : 1931 MR#: [id number] SSN: [id number]  Date/Time: 2019    Subjective:     Patient in NAD, awake, has dementia    Assessment/Plan:     - ? Bronchitis  - acute on Chronic systolic and diastolic combined  chf,   - CKD3   - COPD without acute exacerbation  - Dementia  - h/o Lupus  - Dysphagia    PLAN  O2, on zosyn  Bronchial hygiene, follow cx  nephro following, monitor  Diuretics per cardio  Diet as per speech  Wound care  Full code  Palliative care following  Discharge planning-  consulted    Case discussed with:  [x]Patient  []Family  []Nursing  []Case Management  DVT Prophylaxis:  []Lovenox  [x]Hep SQ  []SCDs  []Coumadin   []On Heparin gtt    Objective:   VS:   Visit Vitals  BP 99/62   Pulse 72   Temp 98.2 °F (36.8 °C)   Resp 16   Ht 5' 4\" (1.626 m)   Wt 74.4 kg (164 lb)   SpO2 100%   BMI 28.15 kg/m²      Tmax/24hrs: Temp (24hrs), Av.1 °F (36.7 °C), Min:97.8 °F (36.6 °C), Max:98.4 °F (36.9 °C)    Input/Output:     Intake/Output Summary (Last 24 hours) at 2019 1816  Last data filed at 2019 0442  Gross per 24 hour   Intake 140 ml   Output 1900 ml   Net -1760 ml   General:  Awake, has dementia  Cardiovascular:  S1S2+, RRR  Pulmonary:  Coarse bs b/l  GI:  Soft, BS+, NT, ND  Extremities:  + edema      Labs:    Recent Results (from the past 24 hour(s))   CBC WITH AUTOMATED DIFF    Collection Time: 19  2:26 AM   Result Value Ref Range    WBC 8.6 4.6 - 13.2 K/uL    RBC 3.35 (L) 4.20 - 5.30 M/uL    HGB 9.5 (L) 12.0 - 16.0 g/dL    HCT 31.5 (L) 35.0 - 45.0 %    MCV 94.0 74.0 - 97.0 FL    MCH 28.4 24.0 - 34.0 PG    MCHC 30.2 (L) 31.0 - 37.0 g/dL    RDW 16.3 (H) 11.6 - 14.5 %    PLATELET 995 675 - 762 K/uL    MPV 9.5 9.2 - 11.8 FL    NEUTROPHILS 51 42 - 75 %    LYMPHOCYTES 41 20 - 51 %    MONOCYTES 8 2 - 9 %    EOSINOPHILS 0 0 - 5 %    BASOPHILS 0 0 - 3 %    ABS.  NEUTROPHILS 4.4 1.8 - 8.0 K/UL    ABS. LYMPHOCYTES 3.5 0.8 - 3.5 K/UL    ABS. MONOCYTES 0.7 0 - 1.0 K/UL    ABS. EOSINOPHILS 0.0 0.0 - 0.4 K/UL    ABS.  BASOPHILS 0.0 0.0 - 0.06 K/UL    DF MANUAL      PLATELET COMMENTS ADEQUATE PLATELETS      RBC COMMENTS ANISOCYTOSIS  1+        RBC COMMENTS LUÍS CELLS  1+       METABOLIC PANEL, BASIC    Collection Time: 07/21/19  2:26 AM   Result Value Ref Range    Sodium 143 136 - 145 mmol/L    Potassium 3.2 (L) 3.5 - 5.5 mmol/L    Chloride 101 100 - 111 mmol/L    CO2 34 (H) 21 - 32 mmol/L    Anion gap 8 3.0 - 18 mmol/L    Glucose 68 (L) 74 - 99 mg/dL    BUN 40 (H) 7.0 - 18 MG/DL    Creatinine 1.29 0.6 - 1.3 MG/DL    BUN/Creatinine ratio 31 (H) 12 - 20      GFR est AA 47 (L) >60 ml/min/1.73m2    GFR est non-AA 39 (L) >60 ml/min/1.73m2    Calcium 8.4 (L) 8.5 - 10.1 MG/DL   POTASSIUM    Collection Time: 07/21/19  4:09 PM   Result Value Ref Range    Potassium 3.2 (L) 3.5 - 5.5 mmol/L     Additional Data Reviewed:      Signed By: Chase Sena MD     July 21, 2019

## 2019-07-21 NOTE — PROGRESS NOTES
07/20/19 1930 bedside turnover given to me by KAREEM Méndez. Pt is on the cardiac telemetry monitor in stable condition, no signs of distress her family is visiting at bedside. Educated them on fluid overload and the early signs. 2025 in bed sleeping family still at bed side no signs of distress on cardiac telemetry monitor bed is in the lowest position with wheels locked and call bell within reach. 2129 in bed sleeping, checked for incontinence she is dry. 2228 awake in bed watching tv and talking to herself, given some ice water and a few bites of pudding. Checked for incontinence, tidied her room, covered   80 pt given antibiotic, in bed watching tv talking to herself on cardiac telemetry monitor   0025 checked for incontinence, pt is in bed, lowest position wheels locked call bell on her bed with her. 0120 asleep in bed, hourly round no signs of distress on cardiac telemetry monitor  0730 bedside turnover given to [employer] SBAR< MAR, ED summary and a chance to ask questions pt is in a paced rhythm, on a nasal cannula all vitals are WNL.

## 2019-07-21 NOTE — PROGRESS NOTES
1925 bedside turnover given to me by RN Josefina Linton while she changed patient's bandages. Vitals assessed IV assessed pain and needs assessed.  She is on the cardiac telemetry monitor in stable condition, lung sounds are wet she has a non productive cough, unable to cough hard enough to bring anything up.          4020 Bedside turnover given to RN

## 2019-07-21 NOTE — PROGRESS NOTES
[facility]      CARDIOLOGY PROGRESS NOTE  RECS:  1. Congestive heart failure: Acute on chronic systolic dysfunction. Improving well. Change IV Lasix to p.o. Demadex. creat stable at 1.29.  2. Status post permanent pacemaker: Normal function on 5/2/2019  3. Atrial fibrillation: Noted on pacemaker. Sinus rhythm on amiodarone. 4. Hypokalemia: Replace  5. Hypertension: Controlled. 6. Mitral regurgitation: Mild to moderate in December 2018. Control blood pressure and CHF. Follow clinically. Not a candidate for any invasive work-up. 7. Hyperlipidemia  8. History of lupus  9. Obesity    Change IV Lasix to p.o. Demadex as CHF is significantly improved. Add Aldactone. Replace electrolytes. Out of bed to chair. ASSESSMENT:  Hospital Problems  Date Reviewed: 7/17/2019          Codes Class Noted POA    Respiratory distress ICD-10-CM: R06.03  ICD-9-CM: 786.09  7/17/2019 Unknown        COPD exacerbation ([facility] [address] ICD-10-CM: J44.1  ICD-9-CM: 491.21  7/17/2019 Unknown        CAP (community acquired pneumonia) ICD-10-CM: J18.9  ICD-9-CM: 806  7/17/2019 Unknown        Acute respiratory failure with hypoxia [facility]) ICD-10-CM: J96.01  ICD-9-CM: 518.81  7/17/2019 Unknown                SUBJECTIVE:  Denies chest javier  Improving shortness of breath  Confused this AM    OBJECTIVE:    VS:   Visit Vitals  /61   Pulse 72   Temp 98.2 °F (36.8 °C)   Resp 18   Ht 5' 4\" (1.626 m)   Wt 74.4 kg (164 lb)   SpO2 98%   BMI 28.15 kg/m²         Intake/Output Summary (Last 24 hours) at 7/21/2019 0851  Last data filed at 7/21/2019 0442  Gross per 24 hour   Intake 440 ml   Output 3100 ml   Net -2660 ml     TELE: AV paced    General: alert, in no apparent distress and Confused  HENT: Normocephalic, atraumatic. Normal external eye.   Neck :  no JVD  Cardiac:  regular rate and rhythm, systolic murmur: early systolic 2/6, crescendo at 2nd right intercostal space  Lungs: Harsh breath sounds, scattered rhonchi, no wheezing today  Abdomen: Soft, nontender, no masses  Extremities:  No c/c/e, peripheral pulses present      Labs: Results:       Chemistry Recent Labs     07/21/19 0226 07/20/19  0408 07/19/19  0525   GLU 68* 110* 90    140 140   K 3.2* 3.6 3.8    98* 99*   CO2 34* 36* 36*   BUN 40* 40* 40*   CREA 1.29 1.50* 1.52*   CA 8.4* 8.0* 8.4*   AGAP 8 6 5   BUCR 31* 27* 26*      CBC w/Diff Recent Labs     07/21/19 0226 07/20/19  0408 07/19/19  0525   WBC 8.6 8.5 7.0   RBC 3.35* 3.06* 3.23*   HGB 9.5* 8.3* 9.2*   HCT 31.5* 28.9* 29.8*    179 178   GRANS 51 53 64   LYMPH 41 28 19*   EOS 0 1 0      Cardiac Enzymes No results for input(s): CPK, CKND1, YAN in the last 72 hours. No lab exists for component: CKRMB, TROIP   Coagulation No results for input(s): PTP, INR, APTT in the last 72 hours. No lab exists for component: INREXT    Lipid Panel Lab Results   Component Value Date/Time    Cholesterol, total 142 01/31/2019 02:24 AM    HDL Cholesterol 68 (H) 01/31/2019 02:24 AM    LDL, calculated 66.4 01/31/2019 02:24 AM    VLDL, calculated 7.6 01/31/2019 02:24 AM    Triglyceride 38 01/31/2019 02:24 AM    CHOL/HDL Ratio 2.1 01/31/2019 02:24 AM      BNP No results for input(s): BNPP in the last 72 hours. Liver Enzymes No results for input(s): TP, ALB, TBIL, AP, SGOT, GPT in the last 72 hours. No lab exists for component: DBIL   Thyroid Studies Lab Results   Component Value Date/Time    TSH 8.86 (H) 11/15/2015 01:45 PM              Ace Hirsch NP   Pager # [phone]  I have independently evaluated and examined the patient. All relevant labs and testing data's are reviewed. Care plan discussed and updated after review.   Joaquín Caballero MD

## 2019-07-21 NOTE — PROGRESS NOTES
[facility]. Pulmonary, Critical Care, and Sleep Medicine    F/U Patient Consult    Name: Krystal Cheek MRN: [id number]   : 1931 Hospital: [address] [place] [address] Dr   Date: 2019        This patient has been seen and evaluated at the request of Dr. Kenn Burns for dyspnea/wheezing. IMPRESSION:   · Acute CHF exacerbation - combined systolic and diastolic dysfunction  · Wheezing:  Likely secondary to CHF/pulm edema, along with aspiration and bronchitis.   Pt does not have COPD -- lifelong nonsmoker and no other causative exposures  · Chronic aspiration--patient has symptoms of wheezing when eating  · Likely right sided atelectasis:  Given pt is bedbound and is laying on her right side, not moved recently  · Deconditioning and adult failure to thrive  · Chronic metabolic alkalosis with respiratory compensation:  Likely in the setting of chronic diuretic administration, some component of resp acidosis as well - chronic likely due to hypoventilation/weakness  · Global weakness  · Dementia  · Hx of Lupus  · CKD 3-4  · Malnutrition       Patient Active Problem List   Diagnosis Code    HTN (hypertension) I10    Hypercholesteremia E78.00    Tricuspid valve disorders, 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    Lupus (systemic lupus erythematosus) ([facility]) M32.9    Anasarca R60.1    Hypertensive heart disease with CHF (congestive heart failure) ([facility]) I11.0    S/P placement of cardiac pacemaker Z95.0    Paroxysmal atrial flutter ([facility]) I48.92    Acute on chronic diastolic (congestive) heart failure ([facility]) J60.98    Diastolic CHF, chronic ([facility]) I50.32    High risk medication use Z79.899    Acute exacerbation of CHF (congestive heart failure) ([facility]) I50.9    ARIEL (acute kidney injury) ([facility] [address] N17.9    Uremia N19    Left hip pain M25.552    CKD (chronic kidney disease) stage 4, GFR 15-29 ml/min ([facility]) N18.4    NSTEMI (non-ST elevated myocardial infarction) ([facility]) I21.4    Dementia F03.90    Skin lesions L98.9    Diarrhea R19.7    UTI (urinary tract infection) N39.0    Fever R50.9    Sepsis ([facility]) A41.9    Abscess L02.91    Severe sepsis ([facility]) A41.9, R65.20    Advanced care planning/counseling discussion Z71.89    Debility R53.81    Anemia D64.9    Respiratory distress R06.03    COPD exacerbation ([facility]) J44.1    CAP (community acquired pneumonia) J18.9    Acute respiratory failure with hypoxia ([facility]) J96.01      RECOMMENDATIONS:   Maintain net negative fluid balance as renal function tolerates. Diuresis per primary service, cardiology, nephrology  Another dose of diamox given, please monitor electrolytes daily  Monitor strict ins/outs  Can use bronchodilators PRN while hospitalized  ABx per primary service - pt on Zosyn for aspiration pneumonia coverage  Can hold off on steroids at this time  Supplemental oxygen to maintain SpO2 >88%  Please assess for home oxygen need prior to discharge  Aggressive pulmonary toileting/bronchial hygiene  Frequent incentive spirometry  Management of CKD per nephrology  Aspiration precautions including elevating HOB >30deg  PT/OT, OOB, ambulate with assistance as tolerated  DVT ppx per primary service  Will follow  Discussed issues above with son and daughter     Subjective:   Nursing notes and flowsheets reviewed  07/21/19  Pt seen and examined at bedside. No acute events overnight. Pt a bit more awake. Patient had breakfast per daughter and son at bedside. They report that the patient wheezes when she eats. HPI:  Hx taken from chart and family - pt's son is a limited historian   Patient is a 80 y.o. female with PMH of dementia, bed/recliner bound, CHF, CKD, presented to [address] [place] [address] for dyspnea/SOB. They reported that the patient had about 3-4 weeks of worsening SOB, associated with increased swelling. Pt then worsened and had SpO2 go down to 92% while at home. Pt was recently treated for CHF excacerbation.   Family reported this to her Cardiologist who increased diuretics without improvement. Pt presented to the ER and was found to have wheezing. Of note, family reports pt had a fever of 100 the day prior to admission and a nonproductive cough. Pt is a lifelong nonsmoker. Past Medical History:   Diagnosis Date    Acute on chronic diastolic heart failure (HCC)     Arthritis     Benign hypertensive heart disease without heart failure     Better controlled, stable    Chronic diastolic heart failure (HCC)     Breathing and edema is improving    Congestive heart failure (HCC)     HTN (hypertension)     Hypercholesteremia     Lupus (systemic lupus erythematosus) ([facility] [address] 6/18/2014    followed by dr Jonny Kendrick     Obesity, unspecified     Patient has weight loss Discussed diet ad fluid restriction    Other and unspecified hyperlipidemia     F/u per pmd    Tricuspid valve disorders, 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    tr with moderate pulmonary htn       Past Surgical History:   Procedure Laterality Date    HX HYSTERECTOMY      PACEMAKER PROCEDURE        Prior to Admission medications    Medication Sig Start Date End Date Taking? Authorizing Provider   torsemide (DEMADEX) 20 mg tablet Take 1 Tab by mouth daily as needed (edema). 7/17/19   Alfred Reed MD   polyethylene glycol (MIRALAX) 17 gram/dose powder Take 17 g by mouth daily. Provider, Historical   aspirin delayed-release 81 mg tablet Take 81 mg by mouth daily. Provider, Historical   OTHER Incentive Spirometry- Use as directed 2/10/19   Keren Rosales MD   food supplemt, lactose-reduced (ENSURE ENLIVE) 0.08 gram-1.5 kcal/mL liqd Take 1 Bottle by mouth two (2) times a day. 2/10/19   Keren Rosales MD   menthol-zinc oxide (CALMOSEPTINE) 0.44-20.6 % oint Apply  to affected area. Provider, Historical   carvedilol (COREG) 3.125 mg tablet Take 1 Tab by mouth every twelve (12) hours.  12/31/18   Nils Tipton MD   acetaminophen (TYLENOL ARTHRITIS PAIN) 650 mg TbER Take 1 Tab by mouth every eight (8) hours. Patient taking differently: Take 1 Tab by mouth daily. 12/4/18   Rosa METCALF PA-C   amiodarone (CORDARONE) 200 mg tablet TAKE ONE TABLET EVERY DAY (MAKE AN APPT)  Patient taking differently: TAKE ONE TABLET EVERY DAY by mouth 11/2/18   Becki Roca, NP   memantine (NAMENDA) 10 mg tablet Take 10 mg by mouth two (2) times a day. 7/10/18   Provider, Historical   amitriptyline (ELAVIL) 25 mg tablet Take 25 mg by mouth nightly. Provider, Historical     No Known Allergies   Social History     Tobacco Use    Smoking status: Never Smoker    Smokeless tobacco: Never Used   Substance Use Topics    Alcohol use: No      Family History   Problem Relation Age of Onset    Arrhythmia Neg Hx     Asthma Neg Hx     Clotting Disorder Neg Hx     Fainting Neg Hx     Heart Attack Neg Hx     High Cholesterol Neg Hx     Pacemaker Neg Hx     Stroke Neg Hx         Current Facility-Administered Medications   Medication Dose Route Frequency    [START ON 7/22/2019] torsemide (DEMADEX) tablet 20 mg  20 mg Oral DAILY    spironolactone (ALDACTONE) tablet 25 mg  25 mg Oral DAILY    [START ON 7/22/2019] potassium chloride (KLOR-CON) packet for solution 20 mEq  20 mEq Oral DAILY    potassium chloride (K-DUR, KLOR-CON) SR tablet 40 mEq  40 mEq Oral NOW    acetaZOLAMIDE (DIAMOX) 250 mg in sterile water (preservative free) 2.5 mL injection  250 mg IntraVENous Q12H    piperacillin-tazobactam (ZOSYN) 2.25 g in 0.9% sodium chloride (MBP/ADV) 50 mL MBP  2.25 g IntraVENous Q6H    amiodarone (CORDARONE) tablet 200 mg  200 mg Oral DAILY    aspirin delayed-release tablet 81 mg  81 mg Oral DAILY    carvedilol (COREG) tablet 3.125 mg  3.125 mg Oral Q12H    memantine (NAMENDA) tablet 10 mg  10 mg Oral BID    polyethylene glycol (MIRALAX) packet 17 g  17 g Oral DAILY    heparin (porcine) injection 5,000 Units  5,000 Units SubCUTAneous Q8H       Review of Systems:  ROS not obtained due to patient factors.       Objective: Vital Signs:    Visit Vitals  /52   Pulse 74   Temp 98.2 °F (36.8 °C)   Resp 18   Ht 5' 4\" (1.626 m)   Wt 74.4 kg (164 lb)   SpO2 100%   BMI 28.15 kg/m²       O2 Device: Nasal cannula   O2 Flow Rate (L/min): 2 l/min   Temp (24hrs), Av.1 °F (36.7 °C), Min:97.8 °F (36.6 °C), Max:98.4 °F (36.9 °C)       Intake/Output:   Last shift:      No intake/output data recorded. Last 3 shifts:  1901 -  0700  In: 560 [P.O.:160; I.V.:400]  Out: 4800 [Urine:4800]    Intake/Output Summary (Last 24 hours) at 2019 1631  Last data filed at 2019 0442  Gross per 24 hour   Intake 140 ml   Output 2500 ml   Net -2360 ml      Physical Exam:   General:   Laying in bed upright, wearing nasal cannula, no acute distress, alert, pulling at things, oriented x1. Her family states this is baseline   Head:  Normocephalic, without obvious abnormality, atraumatic. Eyes:  Conjunctivae/corneas clear. ANicteric, PERRLA, EOMI   Nose: Nares normal. Mucosa normal. No drainage or sinus tenderness. Throat: Lips, mucosa dry. NO thrush; poor dentition, no oral lesions, mallampati IV   Neck: Supple, symmetrical, trachea midline, no adenopathy, thyroid: no enlargment/tenderness/nodules, no carotid bruit and +JVD. No crepitus   Back:   Symmetric, no curvature, no spine tenderness or flank pain   Lungs:   Poor air entry B/L, CTABL, no wheezes/rales/rhonchi   Chest wall:  No tenderness or deformity. NO CREPITUS   Heart:  Regular rate and rhythm, S1, S2 normal, no murmur, click, rub or gallop. Abdomen:   Soft, non-tender. Bowel sounds normal. No masses,  No organomegaly. No paradoxical motion   Extremities: normal, atraumatic, no cyanosis or clubbing. 2+ pitting edema B/L up to thighs   Pulses: 1-2+ and symmetric all extremities.    Skin: Skin color, texture, turgor normal. No rashes or lesions   Lymph nodes: Cervical, supraclavicular, and axillary nodes normal.   Neurologic: Grossly nonfocal, pt alert but disoriented at baseline, globally weak, 1/5 in B/L LE and 2-3/5 in UE          Data review:   Labs:  Recent Results (from the past 24 hour(s))   GLUCOSE, POC    Collection Time: 07/20/19  5:43 PM   Result Value Ref Range    Glucose (POC) 94 70 - 110 mg/dL   CBC WITH AUTOMATED DIFF    Collection Time: 07/21/19  2:26 AM   Result Value Ref Range    WBC 8.6 4.6 - 13.2 K/uL    RBC 3.35 (L) 4.20 - 5.30 M/uL    HGB 9.5 (L) 12.0 - 16.0 g/dL    HCT 31.5 (L) 35.0 - 45.0 %    MCV 94.0 74.0 - 97.0 FL    MCH 28.4 24.0 - 34.0 PG    MCHC 30.2 (L) 31.0 - 37.0 g/dL    RDW 16.3 (H) 11.6 - 14.5 %    PLATELET 808 768 - 710 K/uL    MPV 9.5 9.2 - 11.8 FL    NEUTROPHILS 51 42 - 75 %    LYMPHOCYTES 41 20 - 51 %    MONOCYTES 8 2 - 9 %    EOSINOPHILS 0 0 - 5 %    BASOPHILS 0 0 - 3 %    ABS. NEUTROPHILS 4.4 1.8 - 8.0 K/UL    ABS. LYMPHOCYTES 3.5 0.8 - 3.5 K/UL    ABS. MONOCYTES 0.7 0 - 1.0 K/UL    ABS. EOSINOPHILS 0.0 0.0 - 0.4 K/UL    ABS.  BASOPHILS 0.0 0.0 - 0.06 K/UL    DF MANUAL      PLATELET COMMENTS ADEQUATE PLATELETS      RBC COMMENTS ANISOCYTOSIS  1+        RBC COMMENTS LUÍS CELLS  1+       METABOLIC PANEL, BASIC    Collection Time: 07/21/19  2:26 AM   Result Value Ref Range    Sodium 143 136 - 145 mmol/L    Potassium 3.2 (L) 3.5 - 5.5 mmol/L    Chloride 101 100 - 111 mmol/L    CO2 34 (H) 21 - 32 mmol/L    Anion gap 8 3.0 - 18 mmol/L    Glucose 68 (L) 74 - 99 mg/dL    BUN 40 (H) 7.0 - 18 MG/DL    Creatinine 1.29 0.6 - 1.3 MG/DL    BUN/Creatinine ratio 31 (H) 12 - 20      GFR est AA 47 (L) >60 ml/min/1.73m2    GFR est non-AA 39 (L) >60 ml/min/1.73m2    Calcium 8.4 (L) 8.5 - 10.1 MG/DL     ABG:  No results found for: PH, PHI, PCO2, PCO2I, PO2, PO2I, HCO3, HCO3I, FIO2, FIO2I        PFT Results  (Last 48 hours)    None        Echo Results  (Last 48 hours)    None        Imaging:  I have personally reviewed the patients radiographs and have reviewed the reports:  Personal review of CXR from 7/17 shows cardiomegaly with vascular congestion, no infiltrates or consolidations or masses. Reviewed CT abd from 2/13/19 ---  No emphysema in lung bases  XR Results (most recent):  Results from Hospital Encounter encounter on 07/17/19   XR SWALLOW FUNC VIDEO    Narrative Modified barium swallow with speech. INDICATION: Dysphagia, respiratory distress, recent abnormal chest x-ray,  concern for aspiration pneumonia, history of previous surgery    Fluoroscopy time 1 minute 12 seconds    Cine loops: 6    FINDINGS: The patient was given multiple consistencies of barium. Probable  prominent cricopharyngeal muscle noted. No penetration or aspiration with thin,  nectar, honey, puree, solid or pill. Impression Impression:    As above. Please see speech pathologist's report for further evaluation and  recommendations. CT Results  (Last 48 hours)    None            High complexity decision making was performed during the evaluation of this patient at high risk for decompensation with multiple organ involvement     Above mentioned total time spent on reviewing the case/medical record/data/notes/EMR/patient examination/documentation/coordinating care with nurse/consultants, exclusive of procedures with complex decision making performed and > 50% time spent in face to face evaluation.          Bernabe Rico MD/MPH     Pulmonary, Critical Care Medicine  [facility]

## 2019-07-21 NOTE — PROGRESS NOTES
Bedside turnover given to me by KAREEM Nguyen. Pt is on the cardiac monitor sleeping. Her son and two nephews are at bedside. She lives with her son and his wife. I educated them on observing her fluid intake and medication adherence to avoid readmission due to fluid overload. We also discussed her poor appetite and use of protein shakes.

## 2019-07-22 LAB
ANION GAP SERPL CALC-SCNC: 8 MMOL/L (ref 3–18)
BASOPHILS # BLD: 0 K/UL (ref 0–0.06)
BASOPHILS NFR BLD: 0 % (ref 0–3)
BUN SERPL-MCNC: 39 MG/DL (ref 7–18)
BUN/CREAT SERPL: 31 (ref 12–20)
CALCIUM SERPL-MCNC: 8.7 MG/DL (ref 8.5–10.1)
CHLORIDE SERPL-SCNC: 102 MMOL/L (ref 100–111)
CO2 SERPL-SCNC: 35 MMOL/L (ref 21–32)
CREAT SERPL-MCNC: 1.27 MG/DL (ref 0.6–1.3)
DIFFERENTIAL METHOD BLD: ABNORMAL
EOSINOPHIL # BLD: 0.5 K/UL (ref 0–0.4)
EOSINOPHIL NFR BLD: 6 % (ref 0–5)
ERYTHROCYTE [DISTWIDTH] IN BLOOD BY AUTOMATED COUNT: 16.5 % (ref 11.6–14.5)
GLUCOSE SERPL-MCNC: 83 MG/DL (ref 74–99)
HCT VFR BLD AUTO: 30.3 % (ref 35–45)
HGB BLD-MCNC: 9 G/DL (ref 12–16)
LYMPHOCYTES # BLD: 1.7 K/UL (ref 0.8–3.5)
LYMPHOCYTES NFR BLD: 23 % (ref 20–51)
MCH RBC QN AUTO: 27.7 PG (ref 24–34)
MCHC RBC AUTO-ENTMCNC: 29.7 G/DL (ref 31–37)
MCV RBC AUTO: 93.2 FL (ref 74–97)
MONOCYTES # BLD: 0.7 K/UL (ref 0–1)
MONOCYTES NFR BLD: 9 % (ref 2–9)
NEUTS BAND NFR BLD MANUAL: 1 % (ref 0–5)
NEUTS SEG # BLD: 4.3 K/UL (ref 1.8–8)
NEUTS SEG NFR BLD: 55 % (ref 42–75)
OTHER CELLS NFR BLD MANUAL: 6 %
PLATELET # BLD AUTO: 195 K/UL (ref 135–420)
PLATELET COMMENTS,PCOM: ABNORMAL
PMV BLD AUTO: 10 FL (ref 9.2–11.8)
POTASSIUM SERPL-SCNC: 3.5 MMOL/L (ref 3.5–5.5)
RBC # BLD AUTO: 3.25 M/UL (ref 4.2–5.3)
RBC MORPH BLD: ABNORMAL
SODIUM SERPL-SCNC: 145 MMOL/L (ref 136–145)
WBC # BLD AUTO: 7.6 K/UL (ref 4.6–13.2)

## 2019-07-22 PROCEDURE — 74011250637 HC RX REV CODE- 250/637: Performed by: INTERNAL MEDICINE

## 2019-07-22 PROCEDURE — 74011000258 HC RX REV CODE- 258: Performed by: EMERGENCY MEDICINE

## 2019-07-22 PROCEDURE — 3331090002 HH PPS REVENUE DEBIT

## 2019-07-22 PROCEDURE — 36415 COLL VENOUS BLD VENIPUNCTURE: CPT

## 2019-07-22 PROCEDURE — 74011250636 HC RX REV CODE- 250/636: Performed by: INTERNAL MEDICINE

## 2019-07-22 PROCEDURE — 74011000250 HC RX REV CODE- 250: Performed by: INTERNAL MEDICINE

## 2019-07-22 PROCEDURE — 77030037878 HC DRSG MEPILEX >48IN BORD MOLN -B

## 2019-07-22 PROCEDURE — 65660000004 HC RM CVT STEPDOWN

## 2019-07-22 PROCEDURE — 74011000258 HC RX REV CODE- 258

## 2019-07-22 PROCEDURE — 74011250636 HC RX REV CODE- 250/636

## 2019-07-22 PROCEDURE — 77030011256 HC DRSG MEPILEX <16IN NO BORD MOLN -A

## 2019-07-22 PROCEDURE — 77030012935 HC DRSG AQUACEL BMS -B

## 2019-07-22 PROCEDURE — 80048 BASIC METABOLIC PNL TOTAL CA: CPT

## 2019-07-22 PROCEDURE — 77030038269 HC DRN EXT URIN PURWCK BARD -A

## 2019-07-22 PROCEDURE — 74011250636 HC RX REV CODE- 250/636: Performed by: EMERGENCY MEDICINE

## 2019-07-22 PROCEDURE — 77010033678 HC OXYGEN DAILY: Performed by: INTERNAL MEDICINE

## 2019-07-22 PROCEDURE — 77030037875 HC DRSG MEPILEX <16IN BORD MOLN -A

## 2019-07-22 PROCEDURE — 85025 COMPLETE CBC W/AUTO DIFF WBC: CPT

## 2019-07-22 PROCEDURE — 92526 ORAL FUNCTION THERAPY: CPT

## 2019-07-22 PROCEDURE — 3331090001 HH PPS REVENUE CREDIT

## 2019-07-22 RX ADMIN — HEPARIN SODIUM 5000 UNITS: 5000 INJECTION, SOLUTION INTRAVENOUS; SUBCUTANEOUS at 15:00

## 2019-07-22 RX ADMIN — HEPARIN SODIUM 5000 UNITS: 5000 INJECTION, SOLUTION INTRAVENOUS; SUBCUTANEOUS at 00:02

## 2019-07-22 RX ADMIN — PIPERACILLIN SODIUM,TAZOBACTAM SODIUM 2.25 G: 2; .25 INJECTION, POWDER, FOR SOLUTION INTRAVENOUS at 05:39

## 2019-07-22 RX ADMIN — PIPERACILLIN SODIUM,TAZOBACTAM SODIUM 2.25 G: 2; .25 INJECTION, POWDER, FOR SOLUTION INTRAVENOUS at 17:00

## 2019-07-22 RX ADMIN — AMIODARONE HYDROCHLORIDE 200 MG: 200 TABLET ORAL at 08:24

## 2019-07-22 RX ADMIN — MEMANTINE 10 MG: 10 TABLET ORAL at 18:00

## 2019-07-22 RX ADMIN — CARVEDILOL 3.12 MG: 3.12 TABLET, FILM COATED ORAL at 22:17

## 2019-07-22 RX ADMIN — MEMANTINE 10 MG: 10 TABLET ORAL at 08:24

## 2019-07-22 RX ADMIN — ACETAZOLAMIDE 250 MG: 500 INJECTION, POWDER, LYOPHILIZED, FOR SOLUTION INTRAVENOUS at 08:25

## 2019-07-22 RX ADMIN — HEPARIN SODIUM 5000 UNITS: 5000 INJECTION, SOLUTION INTRAVENOUS; SUBCUTANEOUS at 07:00

## 2019-07-22 RX ADMIN — SPIRONOLACTONE 25 MG: 25 TABLET ORAL at 08:24

## 2019-07-22 RX ADMIN — HEPARIN SODIUM 5000 UNITS: 5000 INJECTION, SOLUTION INTRAVENOUS; SUBCUTANEOUS at 22:18

## 2019-07-22 RX ADMIN — ASPIRIN 81 MG: 81 TABLET ORAL at 08:24

## 2019-07-22 RX ADMIN — PIPERACILLIN SODIUM,TAZOBACTAM SODIUM 2.25 G: 2; .25 INJECTION, POWDER, FOR SOLUTION INTRAVENOUS at 22:18

## 2019-07-22 RX ADMIN — TORSEMIDE 20 MG: 20 TABLET ORAL at 08:24

## 2019-07-22 RX ADMIN — PIPERACILLIN SODIUM,TAZOBACTAM SODIUM 2.25 G: 2; .25 INJECTION, POWDER, FOR SOLUTION INTRAVENOUS at 11:00

## 2019-07-22 RX ADMIN — CARVEDILOL 3.12 MG: 3.12 TABLET, FILM COATED ORAL at 08:24

## 2019-07-22 RX ADMIN — Medication 10 ML: at 00:03

## 2019-07-22 RX ADMIN — POTASSIUM CHLORIDE 20 MEQ: 1.5 POWDER, FOR SOLUTION ORAL at 08:24

## 2019-07-22 NOTE — CONSULTS
[facility]: [phone]  [facility]: [address]: [phone]    Patient Name: Ashia Roblero  YOB: 1931    Date of Initial Consult: 7/18/2019, follow up 7/19/2019, 7/22/20198   Reason for Consult: goals of care  Requesting Provider: Nuha Martinez MD   Primary Care Physician: Marcelle Posada MD      SUMMARY:   Ashia Roblero is a 80 y.o. female with a past history of CHF, COPD, dementia, HTN, HLD, lupus, obesity who was admitted on 7/17/2019 from home with a diagnosis of exacerbation of COPD and acute respiratory failure with hypoxia. Patient was seen in the ED 7/15/2019 for complaint of shortness of breath. She was given oxygen, diureses and sent home. She followed up with cardiologist on the day of admission and had lasix dose increased. However, patient continued to have worsening shortness of breath with wheezing and hypoxia so EMS was called and she was brought to ED. Current medical issues leading to Palliative Medicine involvement include: Advanced age, COPD, CHF, dementia and goals of care. 7/19/2019 follow up on Ms Villalobos Ken children, Mickey Kohli, and Munir at bedside. Re discussed goals of care with family, patient is not able to make complex medical decisions.  is surrogate medical decision maker. He has not been able to come into hospital. All children agree they would not wish to subject her to CPR and intubation. Children do feel he can make this decision. Unfortunately not sure if Wilber Moreno will be able to physically come to hospital and we have tried many times to call him, with no avail. Blank DDNR given to children for Price to sign. DDNR form discussed. 7/22/2019 follow up with MS Zaria Galvan, son Andres Copeland at bedside. She is alert, comfortable, did not follow commands for me. Son shares weekend went well.  Follow up on DDNR form for questions, Natalie Elizabeth tells us his family ( brothers and sisters) tried very hard to have Mr Carol Henley sign DDNR and change her goals of care to DNR/DNI but at this time he wishes ( as family relates) to continue full code. PALLIATIVE DIAGNOSES:   1. Advanced care decisions  2. COPD exacerbation  3. Respiratory failure with hypoxia  4. UTI  5. Debility/Dementia       PLAN:   7/22/2019 Follow up along with Ms Fredo Thibodeaux RN today. Son Sandy Saavedra at bedside. Ms Carol Henley is alert not oriented but appears comfortable. She does not follow commands but will say \" It's OK with almost every question, comment. Swetha Erazo shares he and his siblings attempted discussion on goals of care with . A blank DDNR was given to the children to discuss.  has not been able to physically come to the hospital and we have attempted to call Mr Carol Henley and per children, he is [facility] and understanding conversations on the phone is difficult. Goals of care have been discussed with the children and they agree they would not wish to subject Ms Carol Henley to CPR and intubation. Per Swetha Erazo ( son ) all children tried to speak with Mr Carol Henley about goals of care and shared with him  it is not medically  recommended, but  would not sign DDNR and does not wish to change goals of care. Per children they feel he can understand discussion. [place] has no AMD,  is the legal surrogate medical decision maker. Goals of care have not changed full code with full interventions. ( below are previous discussions)     7/19/2019 follow up sons Fernanda Daniels, and daughter Alex Cooney in room. Ms Carol Henley just returned from x ray she was not able to participate in conversation but alert, confused. They had no specific questions. All understand she has congestive heart failure and COPD, and is poor health. We have discussed goals of care many times. Patient has not executed an AMD and is not currently able. Her  Devon Aguilar is legal next of kin.  We have asked multiple times for family to bring  to bedside, and called the home to speak with Price with no avail. Family again today, shared they feel Wilber Moreno can make these decisions, but he his physically a bit frail. They are unsure if Wilber Moreno will be able to come to the hospital. Re discussed goals of care again today with the above children, who agree they would not wish for Kaitlynn to be subjected to CPR or intubation for any reason. We have recommended DNR/DNI in light of her multiple medical issues. In light of  likely not being able to come into the hospital, no fax or e-mail. Have discussed DDNR form with children who voiced understanding and will take form to Price for possible signature. Children understand Kaitlynn would still be able to seek aggressive treatment up until intubation would be needed for any reason or cardiac / re sp arrest. Currently no change in goals of care remains a full code with full interventions. 1. 2019  2. Advanced care decisions: Palliative care team including Dima Richter RN and myself met with patient and a son, Annelise Guerra and daughter, Stefany Gamino in her room. Patient is  and has 5 sons and 2 daughters with one son being . Patient and  live in a home and have health care aide in addition to son, Andres Copeland living there. Discussed functional status of patient and spouse. Palliative care team has worked many times with this family in the past. Wilber Moreno (patient's ) refuses to sign any DNR/POST and states \"just bring my [place] home\". Alberta's children are aware of burdens and benefits of CPR and will talk with Wilber Moreno again and possibly bring him to hospital if they can get him to agree. GOALS OF CARE: FULL CODE  3. COPD exacerbation: as described in summary above. ABG on admission: pH 7.332, pCO2 67.8, pO2 77 and HCO3 35.9. CXR: Pulmonary venous congestion and mild pulmonary interstitial edema. IV antibiotics and oxygen initiated. Pulmonology consulted. 4. Respiratory failure with hypoxia: Secondary to #2 above. Pulmonology consulted. Currently on oxygen per nasal cannula. 5. UTI: Urinalysis on admission: large leukocyte esterace, 30-40 WBC, 2+ bacteria. Would recommend sending urine for culture if not already done. On IV vancomycin and zosyn. Primary team managing. 6. Debility/Dementia: patient with dementia and on namenda. She is oriented to self only. Can identify her children in the room as her children but cannot tell us their names. Per family, she is bed bound and requires assistance with all ADLs. 7. Initial consult note routed to primary continuity provider  8. Communicated plan of care with: Palliative IDT    GOALS OF CARE:  Patient/Health Care Proxy Stated Goals: Prolong life      TREATMENT PREFERENCES:   Code Status: Full Code    Advance Care Planning:  Advance Care Planning 7/18/2019   Patient's Healthcare Decision Maker is: -   Primary Decision Maker Name -   Primary Decision Maker Phone Number -   Primary Decision Maker Relationship to Patient -   Secondary Decision Maker Name -   Secondary Decision [address] Phone Number -   Secondary Decision Maker Relationship to Patient -   Confirm Advance Directive None   Patient Would Like to Complete Advance Directive -   Does the patient have other document types -       Medical Interventions: Full interventions           Other: As far as possible, the palliative care team has discussed with patient / health care proxy about goals of care / treatment preferences for patient. HISTORY:     History obtained from: chart    CHIEF COMPLAINT: shortness of breath    HPI/SUBJECTIVE:    The patient is:   [x] Verbal and participatory but limited by dementia  [] Non-participatory due to:   Elderly AA female sitting up in bed with nasal cannula oxygen on. Audible wheezing present. She is awake and alert but oriented to self only. She is unable to state time/date/location/situation. She denies pain.     Clinical Pain Assessment (nonverbal scale for nonverbal patients): Clinical Pain Assessment  Severity: 0     Activity (Movement): Lying quietly, normal position    Duration: for how long has pt been experiencing pain (e.g., 2 days, 1 month, years)  Frequency: how often pain is an issue (e.g., several times per day, once every few days, constant)     FUNCTIONAL ASSESSMENT:     Palliative Performance Scale (PPS):  PPS: 40    ECOG  ECOG Status : Completely disabled     PSYCHOSOCIAL/SPIRITUAL SCREENING:      Any spiritual / Advent concerns:  [] Yes /  [x] No    Caregiver Burnout:  [] Yes /  [x] No /  [] No Caregiver Present      Anticipatory grief assessment:   [x] Normal  / [] Maladaptive        REVIEW OF SYSTEMS:     Positive and pertinent negative findings in ROS are noted above in HPI. The following systems were [] reviewed / [x] unable to be reviewed as noted in HPI  Other findings are noted below. Systems: constitutional, ears/nose/mouth/throat, respiratory, gastrointestinal, genitourinary, musculoskeletal, integumentary, neurologic, psychiatric, endocrine. Positive findings noted below. Modified ESAS Completed by: provider   Fatigue: 3       Pain: 0           Dyspnea: 0           Stool Occurrence(s): 0        PHYSICAL EXAM:     Wt Readings from Last 3 Encounters:   07/21/19 73 kg (160 lb 14.8 oz)   07/09/19 84.8 kg (187 lb)   05/29/19 84.8 kg (187 lb)     Blood pressure 112/68, pulse 70, temperature 97.5 °F (36.4 °C), resp. rate 18, height 5' 4\" (1.626 m), weight 73 kg (160 lb 14.8 oz), SpO2 99 %. Pain:  Pain Scale 1: Numeric (0 - 10)  Pain Intensity 1: 0                   Constitutional: chronically ill but comfortable appearing female who is reclining in bed in NAD  Eyes: pupils equal, anicteric  ENMT: no nasal discharge, moist mucous membranes  Respiratory: breathing not labored on nasal cannula   Gastrointestinal: soft non-tender   Skin: warm, dry  Neurologic: alert, eyes open does not follow commands, verbal but does not specially answer questions.         HISTORY:     Active Problems:    Respiratory distress (7/17/2019)      COPD exacerbation ([address] (7/17/2019)      CAP (community acquired pneumonia) (7/17/2019)      Acute respiratory failure with hypoxia ([facility] (7/17/2019)      Past Medical History:   Diagnosis Date    Acute on chronic diastolic heart failure (HCC)     Arthritis     Benign hypertensive heart disease without heart failure     Better controlled, stable    Chronic diastolic heart failure (HCC)     Breathing and edema is improving    Congestive heart failure (HCC)     HTN (hypertension)     Hypercholesteremia     Lupus (systemic lupus erythematosus) ([facility] 6/18/2014    followed by dr Jenna Reyes     Obesity, unspecified     Patient has weight loss Discussed diet ad fluid restriction    Other and unspecified hyperlipidemia     F/u per pmd    Tricuspid valve disorders, 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   Social History     Tobacco Use    Smoking status: Never Smoker    Smokeless tobacco: Never Used   Substance Use Topics    Alcohol use: No     No Known Allergies   Current Facility-Administered Medications   Medication Dose Route Frequency    torsemide (DEMADEX) tablet 20 mg  20 mg Oral DAILY    spironolactone (ALDACTONE) tablet 25 mg  25 mg Oral DAILY    potassium chloride (KLOR-CON) packet for solution 20 mEq  20 mEq Oral DAILY    acetaZOLAMIDE (DIAMOX) 250 mg in sterile water (preservative free) 2.5 mL injection  250 mg IntraVENous Q12H    piperacillin-tazobactam (ZOSYN) 2.25 g in 0.9% sodium chloride (MBP/ADV) 50 mL MBP  2.25 g IntraVENous Q6H    sodium chloride (NS) flush 5-10 mL  5-10 mL IntraVENous PRN    amiodarone (CORDARONE) tablet 200 mg  200 mg Oral DAILY    aspirin delayed-release tablet 81 mg  81 mg Oral DAILY    carvedilol (COREG) tablet 3.125 mg  3.125 mg Oral Q12H    memantine (NAMENDA) tablet 10 mg  10 mg Oral BID    polyethylene glycol (MIRALAX) packet 17 g  17 g Oral DAILY    heparin (porcine) injection 5,000 Units  5,000 Units SubCUTAneous Q8H        LAB AND IMAGING FINDINGS:     Lab Results   Component Value Date/Time    WBC 7.6 07/22/2019 05:24 AM    HGB 9.0 (L) 07/22/2019 05:24 AM    PLATELET 803 08/24/9685 05:24 AM     Lab Results   Component Value Date/Time    Sodium 145 07/22/2019 05:24 AM    Potassium 3.5 07/22/2019 05:24 AM    Chloride 102 07/22/2019 05:24 AM    CO2 35 (H) 07/22/2019 05:24 AM    BUN 39 (H) 07/22/2019 05:24 AM    Creatinine 1.27 07/22/2019 05:24 AM    Calcium 8.7 07/22/2019 05:24 AM    Magnesium 2.1 07/20/2019 04:08 AM    Phosphorus 3.4 12/28/2018 09:25 AM      Lab Results   Component Value Date/Time    AST (SGOT) 15 07/17/2019 03:45 PM    Alk.  phosphatase 60 07/17/2019 03:45 PM    Protein, total 6.2 (L) 07/17/2019 03:45 PM    Albumin 3.1 (L) 07/17/2019 03:45 PM    Globulin 3.1 07/17/2019 03:45 PM     Lab Results   Component Value Date/Time    INR 1.4 (H) 04/30/2019 07:00 PM    Prothrombin time 16.8 (H) 04/30/2019 07:00 PM    aPTT 42.5 (H) 04/30/2019 07:00 PM      Lab Results   Component Value Date/Time    Iron 51 02/10/2019 11:00 AM    TIBC 202 (L) 02/10/2019 11:00 AM    Iron % saturation 25 02/10/2019 11:00 AM      No results found for: PH, PCO2, PO2  No components found for: [place]   Lab Results   Component Value Date/Time    CK 86 07/15/2019 05:45 PM    CK - MB <1.0 07/15/2019 05:45 PM              Total time: 15 minutes  Counseling / coordination time, spent as noted above: 10 minutes > 50% counseling / coordination: family    Prolonged service was provided for  []30 min   []75 min in face to face time in the presence of the patient, spent as noted above. Time Start:   Time End:   Note: this can only be billed with 28953 (initial) or 31700 (follow up). If multiple start / stop times, list each separately.

## 2019-07-22 NOTE — PROGRESS NOTES
Bedside shift change report given to [address] [place] [address] (oncoming nurse) by Felipe Agarwal RN (offgoing nurse). Report included the following information SBAR, Kardex, Procedure Summary, Intake/Output, MAR and Recent Results.

## 2019-07-22 NOTE — PROGRESS NOTES
[facility]      CARDIOLOGY PROGRESS NOTE  RECS:  1. Congestive heart failure: Acute on chronic systolic dysfunction. Improving well. Continue Demadex po .  2. Status post permanent pacemaker: Normal function on 5/2/2019  3. Atrial fibrillation: Noted on pacemaker. Sinus rhythm on amiodarone. 4. Hypokalemia: Resolved on K+ replacement daily   5. Hypertension: Controlled. 6. CKD- stable creatinine  7. Mitral regurgitation: Mild to moderate in December 2018. Control blood pressure and CHF. Follow clinically. Not a candidate for any invasive work-up. 8. Hyperlipidemia  9. History of lupus  10. Obesity        ASSESSMENT:  Hospital Problems  Date Reviewed: 7/17/2019          Codes Class Noted POA    Respiratory distress ICD-10-CM: R06.03  ICD-9-CM: 786.09  7/17/2019 Unknown        COPD exacerbation ([facility] ICD-10-CM: J44.1  ICD-9-CM: 491.21  7/17/2019 Unknown        CAP (community acquired pneumonia) ICD-10-CM: J18.9  ICD-9-CM: 291  7/17/2019 Unknown        Acute respiratory failure with hypoxia [facility]) ICD-10-CM: J96.01  ICD-9-CM: 518.81  7/17/2019 Unknown                SUBJECTIVE:  Denies chest pain  Confused this AM    OBJECTIVE:    VS:   Visit Vitals  /66 (BP 1 Location: Left arm, BP Patient Position: At rest)   Pulse 78   Temp 97.7 °F (36.5 °C)   Resp 18   Ht 5' 4\" (1.626 m)   Wt 73 kg (160 lb 14.8 oz)   SpO2 96%   BMI 27.62 kg/m²         Intake/Output Summary (Last 24 hours) at 7/22/2019 3884  Last data filed at 7/22/2019 0413  Gross per 24 hour   Intake 320 ml   Output 1600 ml   Net -1280 ml     TELE: AV paced    General: alert, in no apparent distress and Confused  HENT: Normocephalic, atraumatic. Normal external eye.   Neck :  no JVD  Cardiac:  regular rate and rhythm, systolic murmur: early systolic 2/6, crescendo at 2nd right intercostal space  Lungs: Harsh breath sounds, scattered rhonchi, no wheezing today  Abdomen: Soft, nontender, no masses  Extremities:  No edema noted. peripheral pulses present      Labs: Results:       Chemistry Recent Labs     07/22/19 0524 07/21/19 2017 07/21/19  1609 07/21/19 0226 07/20/19  0408   GLU 83  --   --  68* 110*     --   --  143 140   K 3.5 3.8 3.2* 3.2* 3.6     --   --  101 98*   CO2 35*  --   --  34* 36*   BUN 39*  --   --  40* 40*   CREA 1.27  --   --  1.29 1.50*   CA 8.7  --   --  8.4* 8.0*   AGAP 8  --   --  8 6   BUCR 31*  --   --  31* 27*      CBC w/Diff Recent Labs     07/22/19 0524 07/21/19 0226 07/20/19  0408   WBC 7.6 8.6 8.5   RBC 3.25* 3.35* 3.06*   HGB 9.0* 9.5* 8.3*   HCT 30.3* 31.5* 28.9*    176 179   GRANS 55 51 53   LYMPH 23 41 28   EOS 6* 0 1      Cardiac Enzymes No results for input(s): CPK, CKND1, YAN in the last 72 hours. No lab exists for component: CKRMB, TROIP   Coagulation No results for input(s): PTP, INR, APTT in the last 72 hours. No lab exists for component: INREXT, INREXT    Lipid Panel Lab Results   Component Value Date/Time    Cholesterol, total 142 01/31/2019 02:24 AM    HDL Cholesterol 68 (H) 01/31/2019 02:24 AM    LDL, calculated 66.4 01/31/2019 02:24 AM    VLDL, calculated 7.6 01/31/2019 02:24 AM    Triglyceride 38 01/31/2019 02:24 AM    CHOL/HDL Ratio 2.1 01/31/2019 02:24 AM      BNP No results for input(s): BNPP in the last 72 hours. Liver Enzymes No results for input(s): TP, ALB, TBIL, AP, SGOT, GPT in the last 72 hours. No lab exists for component: DBIL   Thyroid Studies Lab Results   Component Value Date/Time    TSH 8.86 (H) 11/15/2015 01:45 PM              Becki Roca, NPJessicaC supervised    I have independently evaluated and examined the patient. All relevant labs and testing data's are reviewed. Care plan discussed and updated after review.     Esequiel Bang MD

## 2019-07-22 NOTE — PROGRESS NOTES
RENAL PROGRESS NOTE        Dale Kennedymussen         Assessment  1. 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 Ac combined systolic / diastolic CHF , CXR with cardiomegaly and vascular congestion   3. Chronic asp PNA   4.  Chronic resp acidosis with metabolic alkalosis ( metabolic alkalosis could be related to both chronic resp acidosis as a compensatory mechanism  / CHF )             Plan:  1) continue torsemide 20 mg PO daily  2) follow renal parameters daily     Please call with questions    Canelo James MD FASN  Cell [phone]  Pager: [phone]                                                                              Subjective  Denies SOB        Patient Active Problem List   Diagnosis Code    HTN (hypertension) I10    Hypercholesteremia E78.00    Tricuspid valve disorders, 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    Lupus (systemic lupus erythematosus) ([facility] M32.9    Anasarca R60.1    Hypertensive heart disease with CHF (congestive heart failure) ([facility] I11.0    S/P placement of cardiac pacemaker Z95.0    Paroxysmal atrial flutter (HCC) I48.92    Acute on chronic diastolic (congestive) heart failure (HCC) B41.48    Diastolic CHF, chronic (HCC) I50.32    High risk medication use Z79.899    Acute exacerbation of CHF (congestive heart failure) ([facility] I50.9    ARIEL (acute kidney injury) ([facility] N17.9    Uremia N19    Left hip pain M25.552    CKD (chronic kidney disease) stage 4, GFR 15-29 ml/min ([facility]) N18.4    NSTEMI (non-ST elevated myocardial infarction) ([facility]) I21.4    Dementia F03.90    Skin lesions L98.9    Diarrhea R19.7    UTI (urinary tract infection) N39.0    Fever R50.9    Sepsis ([facility] A41.9    Abscess L02.91    Severe sepsis ([address] A41.9, R65.20    Advanced care planning/counseling discussion Z71.89    Debility R53.81    Anemia D64.9    Respiratory distress R06.03    COPD exacerbation ([address] J44.1    CAP (community acquired pneumonia) J18.9    Acute respiratory failure with hypoxia (HCC) J96.01       Current Facility-Administered Medications   Medication Dose Route Frequency Provider Last Rate Last Dose    torsemide (DEMADEX) tablet 20 mg  20 mg Oral DAILY Tg Hunter MD   20 mg at 07/22/19 9526    spironolactone (ALDACTONE) tablet 25 mg  25 mg Oral DAILY Tg Hunter MD   25 mg at 07/22/19 4264    potassium chloride (KLOR-CON) packet for solution 20 mEq  20 mEq Oral DAILY Tg Hunter MD   20 mEq at 07/22/19 0824    acetaZOLAMIDE (DIAMOX) 250 mg in sterile water (preservative free) 2.5 mL injection  250 mg IntraVENous Q12H Tg Hunter MD   250 mg at 07/22/19 0825    piperacillin-tazobactam (ZOSYN) 2.25 g in 0.9% sodium chloride (MBP/ADV) 50 mL MBP  2.25 g IntraVENous Q6H Ita Mata  mL/hr at 07/22/19 1100 2.25 g at 07/22/19 1100    sodium chloride (NS) flush 5-10 mL  5-10 mL IntraVENous PRN Kat Mcdaniels MD   10 mL at 07/22/19 0003    amiodarone (CORDARONE) tablet 200 mg  200 mg Oral DAILY Carrie Bautista MD   200 mg at 07/22/19 1348    aspirin delayed-release tablet 81 mg  81 mg Oral DAILY Carrie Bautista MD   81 mg at 07/22/19 0824    carvedilol (COREG) tablet 3.125 mg  3.125 mg Oral Q12H Carrie Bautista MD   3.125 mg at 07/22/19 4168    memantine (NAMENDA) tablet 10 mg  10 mg Oral BID Carrie Bautista MD   10 mg at 07/22/19 4272    polyethylene glycol (MIRALAX) packet 17 g  17 g Oral DAILY Carrie Bautista MD   Stopped at 07/18/19 0900    heparin (porcine) injection 5,000 Units  5,000 Units SubCUTAneous Q8H Carrie Bautista MD   5,000 Units at 07/22/19 0700       Objective  Vitals:    07/21/19 2255 07/22/19 0413 07/22/19 0738 07/22/19 1109   BP: 100/58 104/57 120/66 112/68   Pulse: 78 74 78 70   Resp: 18 18 18 18   Temp: 98 °F (36.7 °C) 98.7 °F (37.1 °C) 97.7 °F (36.5 °C) 97.5 °F (36.4 °C)   SpO2: 100% 100% 96% 99%   Weight:       Height:             Intake/Output Summary (Last 24 hours) at 7/22/2019 1629  Last data filed at 7/22/2019 0413  Gross per 24 hour   Intake 320 ml   Output 1600 ml   Net -1280 ml           Admission weight: Weight: 80.7 kg (178 lb) (07/17/19 1450)  Last Weight Metrics:  Weight Loss Metrics 7/21/2019 7/17/2019 7/17/2019 7/17/2019 7/9/2019 5/29/2019 5/21/2019   Today's Wt 160 lb 14.8 oz - - - 187 lb 187 lb 187 lb   BMI - 27.62 kg/m2 - 32.1 kg/m2 32.1 kg/m2 32.1 kg/m2 32.1 kg/m2             Physical Assessment:     General: NAD, awake   Neck: No jvd. LUNGS: Clear to Auscultation, No rales, rhonchi / + wheezing. CVS EXM: S1, S2  RRR, no murmurs/gallops/rubs. Abdomen: soft, non tender. Lower Extremities:  no edema.        Lab    CBC w/Diff Recent Labs     07/22/19  0524 07/21/19 0226 07/20/19  0408   WBC 7.6 8.6 8.5   RBC 3.25* 3.35* 3.06*   HGB 9.0* 9.5* 8.3*   HCT 30.3* 31.5* 28.9*    176 179   GRANS 55 51 53   LYMPH 23 41 28   EOS 6* 0 1        Chemistry Recent Labs     07/22/19  0524 07/21/19 2017 07/21/19  1609 07/21/19 0226 07/20/19  0408   GLU 83  --   --  68* 110*     --   --  143 140   K 3.5 3.8 3.2* 3.2* 3.6     --   --  101 98*   CO2 35*  --   --  34* 36*   BUN 39*  --   --  40* 40*   CREA 1.27  --   --  1.29 1.50*   CA 8.7  --   --  8.4* 8.0*   AGAP 8  --   --  8 6   BUCR 31*  --   --  31* 27*         Lab Results   Component Value Date/Time    Iron 51 02/10/2019 11:00 AM    TIBC 202 (L) 02/10/2019 11:00 AM    Iron % saturation 25 02/10/2019 11:00 AM      Lab Results   Component Value Date/Time    Calcium 8.7 07/22/2019 05:24 AM    Phosphorus 3.4 12/28/2018 09:25 AM          Ollie Bruno MD  7/22/2019  10:45 AM

## 2019-07-22 NOTE — PROGRESS NOTES
Problem: Dysphagia (Adult)  Goal: *Acute Goals and Plan of Care (Insert Text)  Description  Patient will:  1. Tolerate PO trials with 0 s/s overt distress in 4/5 trials  2. Utilize compensatory swallow strategies/maneuvers (decrease bite/sip, size/rate, alt. liq/sol) with min cues in 4/5 trials  3. Perform oral-motor/laryngeal exercises to increase oropharyngeal swallow function with min cues  4. Complete an objective swallow study (i.e., MBSS) to assess swallow integrity, r/o aspiration, and determine of safest LRD, min A - met 7/19/19    Rec:     [facility]-soft diet with thin liquids  May require assistance with PO  Aspiration precautions  HOB >45 during po intake, remain >30 for 30-45 minutes after po   Small bites/sips; alternate liquid/solid with slow feeding rate   Oral care TID  Meds per pt preference    Outcome: Resolved/Met    Asher Correa    Patient: Marcial Estrada (80 y.o. female)  Date: 7/22/2019  Diagnosis: Respiratory distress [R06.03]  COPD exacerbation (HCC) [J44.1]  CAP (community acquired pneumonia) [J18.9] <principal problem not specified>       Precautions: aspiration, Fall  PLOF: As per H&P     ASSESSMENT:  Pt seen b/s for dysphagia tx. Pt son feeding pt lunch. Pt HOB elevated to 50*, pt son independently feeding pt small bites and alternating solids/ liquids 2:1 or 3:1. Pt demo mildly prolonged  mastication with decreased bolus formation/ control, slow A-P transit with mild lingual residue following soft solids, fairly timely swallow response with weak laryngeal elevation. Pt without overt s/sx aspiration following single or serial straw sips thin or soft solids. Rec: continue soft solids with thin liquids as ordered, basic aspiration precautions. Pt currently able to tolerate highest diet level appropriate without overt s/sx aspiration. Skilled SLP services no longer warranted a this time. Will sign off and be avail in the future if needed. Thank you. Progression toward goals:  ?         Improving appropriately - goals met/approximated  ? Not making progress/Not appropriate - will d/c POC     PLAN:  Recommendations and Planned Interventions:  Maximum therapeutic gains met; safest, least restrictive diet achieved. D/C ST intervention at this time. Discharge Recommendations:  Home Health and To Be Determined     SUBJECTIVE:   Patient stated What else would you expect?   When SLP stated how much better pt looked since this SLP last visit during previous admission. OBJECTIVE:   Cognitive and Communication Status:  Neurologic State: Alert  Orientation Level: Oriented to person  Cognition: Follows commands  Perception: Appears intact  Perseveration: No perseveration noted  Safety/Judgement: Fall prevention  Dysphagia Treatment:  Oral Assessment:  Oral Assessment  Labial: No impairment  Dentition: Limited, Natural  Oral Hygiene: fair  Lingual: Decreased rate  Velum: No impairment  Mandible: No impairment  P.O. Trials:   Patient Position: HOB 50*   Vocal quality prior to P.O.: No impairment   Consistency Presented: Mechanical soft, Thin liquid   How Presented: SLP-fed/presented, Straw, Spoon, Successive swallows       Bolus Acceptance: No impairment   Bolus Formation/Control: Impaired   Type of Impairment: Mastication   Propulsion: Delayed (# of seconds)   Oral Residue: Less than 10% of bolus, Lingual   Initiation of Swallow: No impairment   Laryngeal Elevation: Weak   Aspiration Signs/Symptoms: None   Pharyngeal Phase Characteristics: No impairment, issues, or problems    Effective Modifications: Alternate liquids/solids, Small sips and bites   Cues for Modifications: Minimal       Oral Phase Severity: Mild   Pharyngeal Phase Severity : Mild     PAIN:  Start of Tx: 0  End of Tx: 0     After treatment:   ?              Patient left in no apparent distress sitting up in chair  ? Patient left in no apparent distress in bed  ? Call bell left within reach  ? Nursing notified  ? Caregiver present  ? Bed alarm activated      COMMUNICATION/EDUCATION:   ? Aspiration precautions; swallow safety; compensatory techniques  ? Family able to participate in training and education   ? Patient unable to participate in education; education ongoing with staff  ?  Patient understands goals/intent of therapy; neutral about participation     Thank you for this referral,  Christy Centeno MS, CCC/SLP  Time Calculation: 15 mins

## 2019-07-22 NOTE — PROGRESS NOTES
[facility]. Pulmonary, Critical Care, and Sleep Medicine    F/U Patient Consult    Name: Eder Gutierrez MRN: [id number]   : 1931 Hospital: [address] [place] [address] Dr   Date: 2019        This patient has been seen and evaluated at the request of Dr. Cassia Wilson for dyspnea/wheezing. IMPRESSION:   · Acute CHF exacerbation - combined systolic and diastolic dysfunction  · Wheezing:  Likely secondary to CHF/pulm edema, along with aspiration and bronchitis.   Pt does not have COPD -- lifelong nonsmoker and no other causative exposures  · Chronic aspiration--patient has symptoms of wheezing when eating-barium swallow without any evident aspiration  · Likely right sided atelectasis:  Given pt is bedbound and is laying on her right side, not moved recently  · Deconditioning and adult failure to thrive  · Chronic metabolic alkalosis with respiratory compensation:  Likely in the setting of chronic diuretic administration, some component of resp acidosis as well - chronic likely due to hypoventilation/weakness  · Global weakness  · Dementia  · Hx of Lupus  · CKD 3-4  · Malnutrition       Patient Active Problem List   Diagnosis Code    HTN (hypertension) I10    Hypercholesteremia E78.00    Tricuspid valve disorders, 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    Lupus (systemic lupus erythematosus) ([facility]) M32.9    Anasarca R60.1    Hypertensive heart disease with CHF (congestive heart failure) ([facility]) I11.0    S/P placement of cardiac pacemaker Z95.0    Paroxysmal atrial flutter ([facility]) I48.92    Acute on chronic diastolic (congestive) heart failure ([facility]) X63.01    Diastolic CHF, chronic ([facility]) I50.32    High risk medication use Z79.899    Acute exacerbation of CHF (congestive heart failure) ([facility]) I50.9    ARIEL (acute kidney injury) ([facility] N17.9    Uremia N19    Left hip pain M25.552    CKD (chronic kidney disease) stage 4, GFR 15-29 ml/min ([facility]) N18.4    NSTEMI (non-ST elevated myocardial infarction) ([facility] I21.4  Dementia F03.90    Skin lesions L98.9    Diarrhea R19.7    UTI (urinary tract infection) N39.0    Fever R50.9    Sepsis ([facility]) A41.9    Abscess L02.91    Severe sepsis (HCC) A41.9, R65.20    Advanced care planning/counseling discussion Z71.89    Debility R53.81    Anemia D64.9    Respiratory distress R06.03    COPD exacerbation ([facility]) J44.1    CAP (community acquired pneumonia) J18.9    Acute respiratory failure with hypoxia ([facility]) J96.01      RECOMMENDATIONS:     Continue bronchodilators and bronchial hygiene protocol   maintain net negative fluid balance as renal function tolerates. Diuresis per primary service, cardiology, nephrology  Monitor strict ins/outs  ABx per primary service - pt on Zosyn for aspiration pneumonia coverage  Can hold off on steroids at this time but will consider adding if continues to wheeze  Supplemental oxygen to maintain SpO2 >88%  Please assess for home oxygen need prior to discharge  Frequent incentive spirometry  Management of CKD per nephrology  Aspiration precautions including elevating HOB >30deg  PT/OT, OOB, ambulate with assistance as tolerated  DVT ppx per primary service  Will follow  Discussed issues above with son at bedside     Subjective:   Nursing notes and flowsheets reviewed    07/22/19     Pt seen and examined at bedside. No acute events overnight. Awake alert, interactive pleasantly  Patient currently eating breakfast-swallowing slowly without any coughing spells but tells me that she continues to wheeze  They report that the patient wheezes when she eats. Her son has not noticed any aspiration. Inform them of barium swallow results      HPI:  Hx taken from chart and family - pt's son is a limited historian   Patient is a 80 y.o. female with PMH of dementia, bed/recliner bound, CHF, CKD, presented to [facility] for dyspnea/SOB. They reported that the patient had about 3-4 weeks of worsening SOB, associated with increased swelling.   Pt then worsened and had SpO2 go down to 92% while at home. Pt was recently treated for CHF excacerbation. Family reported this to her Cardiologist who increased diuretics without improvement. Pt presented to the ER and was found to have wheezing. Of note, family reports pt had a fever of 100 the day prior to admission and a nonproductive cough. Pt is a lifelong nonsmoker.       Past Medical History:   Diagnosis Date    Acute on chronic diastolic heart failure (HCC)     Arthritis     Benign hypertensive heart disease without heart failure     Better controlled, stable    Chronic diastolic heart failure (HCC)     Breathing and edema is improving    Congestive heart failure (HCC)     HTN (hypertension)     Hypercholesteremia     Lupus (systemic lupus erythematosus) ([facility] 6/18/2014    followed by dr Chelsea Conn     Obesity, unspecified     Patient has weight loss Discussed diet ad fluid restriction    Other and unspecified hyperlipidemia     F/u per pmd    Tricuspid valve disorders, 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    tr with moderate pulmonary htn       Past Surgical History:   Procedure Laterality Date    HX HYSTERECTOMY      PACEMAKER PROCEDURE        Current Facility-Administered Medications   Medication Dose Route Frequency    torsemide (DEMADEX) tablet 20 mg  20 mg Oral DAILY    spironolactone (ALDACTONE) tablet 25 mg  25 mg Oral DAILY    potassium chloride (KLOR-CON) packet for solution 20 mEq  20 mEq Oral DAILY    acetaZOLAMIDE (DIAMOX) 250 mg in sterile water (preservative free) 2.5 mL injection  250 mg IntraVENous Q12H    piperacillin-tazobactam (ZOSYN) 2.25 g in 0.9% sodium chloride (MBP/ADV) 50 mL MBP  2.25 g IntraVENous Q6H    amiodarone (CORDARONE) tablet 200 mg  200 mg Oral DAILY    aspirin delayed-release tablet 81 mg  81 mg Oral DAILY    carvedilol (COREG) tablet 3.125 mg  3.125 mg Oral Q12H    memantine (NAMENDA) tablet 10 mg  10 mg Oral BID    polyethylene glycol (MIRALAX) packet 17 g  17 g Oral DAILY    heparin (porcine) injection 5,000 Units  5,000 Units SubCUTAneous Q8H       Review of Systems:  ROS not obtained due to patient factors. Objective:   Vital Signs:    Visit Vitals  /66 (BP 1 Location: Left arm, BP Patient Position: At rest)   Pulse 78   Temp 97.7 °F (36.5 °C)   Resp 18   Ht 5' 4\" (1.626 m)   Wt 73 kg (160 lb 14.8 oz)   SpO2 96%   BMI 27.62 kg/m²       O2 Device: Nasal cannula   O2 Flow Rate (L/min): 2 l/min   Temp (24hrs), Av.2 °F (36.8 °C), Min:97.7 °F (36.5 °C), Max:98.7 °F (37.1 °C)       Intake/Output:   Last shift:      No intake/output data recorded. Last 3 shifts:  1901 -  0700  In: 460 [P.O.:160; I.V.:300]  Out: 3500 [Urine:3500]    Intake/Output Summary (Last 24 hours) at 2019 1019  Last data filed at 2019 0413  Gross per 24 hour   Intake 320 ml   Output 1600 ml   Net -1280 ml      Physical Exam:   General:   Sitting up in bed wearing nasal cannula, no acute distress, alert, pulling at things, oriented x1. Her family states this is baseline   Head:  Normocephalic, without obvious abnormality, atraumatic. Eyes:  Conjunctivae/corneas clear. ANicteric, PERRLA, EOMI   Nose: Nares normal. Mucosa normal. No drainage or sinus tenderness. Throat: Lips, mucosa dry. NO thrush; poor dentition, no oral lesions, mallampati IV   Neck: Supple, symmetrical, trachea midline, no adenopathy, thyroid: no enlargment/tenderness/nodules, no carotid bruit and +JVD. No crepitus   Back:   Symmetric, no curvature, no spine tenderness or flank pain   Lungs:   Poor air entry B/L, CTABL, expiratory wheezes bilaterally   Chest wall:  No tenderness or deformity. NO CREPITUS   Heart:  Regular rate and rhythm, S1, S2 normal, no murmur, click, rub or gallop. Abdomen:   Soft, non-tender. Bowel sounds normal. No masses,  No organomegaly. No paradoxical motion   Extremities: normal, atraumatic, no cyanosis or clubbing.   2+ pitting edema B/L up to thighs   Pulses: 1-2+ and symmetric all extremities. Skin: Skin color, texture, turgor normal. No rashes or lesions   Lymph nodes: Cervical, supraclavicular, and axillary nodes normal.   Neurologic: Grossly nonfocal, pt alert but disoriented at baseline, globally weak, 1/5 in B/L LE and 2-3/5 in UE          Data review:   Labs:  Recent Results (from the past 24 hour(s))   POTASSIUM    Collection Time: 07/21/19  4:09 PM   Result Value Ref Range    Potassium 3.2 (L) 3.5 - 5.5 mmol/L   POTASSIUM    Collection Time: 07/21/19  8:17 PM   Result Value Ref Range    Potassium 3.8 3.5 - 5.5 mmol/L   CBC WITH AUTOMATED DIFF    Collection Time: 07/22/19  5:24 AM   Result Value Ref Range    WBC 7.6 4.6 - 13.2 K/uL    RBC 3.25 (L) 4.20 - 5.30 M/uL    HGB 9.0 (L) 12.0 - 16.0 g/dL    HCT 30.3 (L) 35.0 - 45.0 %    MCV 93.2 74.0 - 97.0 FL    MCH 27.7 24.0 - 34.0 PG    MCHC 29.7 (L) 31.0 - 37.0 g/dL    RDW 16.5 (H) 11.6 - 14.5 %    PLATELET 076 764 - 935 K/uL    MPV 10.0 9.2 - 11.8 FL    NEUTROPHILS 55 42 - 75 %    BAND NEUTROPHILS 1 0 - 5 %    LYMPHOCYTES 23 20 - 51 %    MONOCYTES 9 2 - 9 %    EOSINOPHILS 6 (H) 0 - 5 %    BASOPHILS 0 0 - 3 %    OTHER CELL 6 (H) 0      ABS. NEUTROPHILS 4.3 1.8 - 8.0 K/UL    ABS. LYMPHOCYTES 1.7 0.8 - 3.5 K/UL    ABS. MONOCYTES 0.7 0 - 1.0 K/UL    ABS. EOSINOPHILS 0.5 (H) 0.0 - 0.4 K/UL    ABS.  BASOPHILS 0.0 0.0 - 0.06 K/UL    DF MANUAL      PLATELET COMMENTS ADEQUATE PLATELETS      RBC COMMENTS ANISOCYTOSIS  2+        RBC COMMENTS POIKILOCYTOSIS  2+        RBC COMMENTS HYPOCHROMIA  1+        RBC COMMENTS BASOPHILIC STIPPLING  1+        RBC COMMENTS OVALOCYTES  1+        RBC COMMENTS ACANTHOCYTES  1+       METABOLIC PANEL, BASIC    Collection Time: 07/22/19  5:24 AM   Result Value Ref Range    Sodium 145 136 - 145 mmol/L    Potassium 3.5 3.5 - 5.5 mmol/L    Chloride 102 100 - 111 mmol/L    CO2 35 (H) 21 - 32 mmol/L    Anion gap 8 3.0 - 18 mmol/L    Glucose 83 74 - 99 mg/dL    BUN 39 (H) 7.0 - 18 MG/DL    Creatinine 1.27 0.6 - 1.3 MG/DL    BUN/Creatinine ratio 31 (H) 12 - 20      GFR est AA 48 (L) >60 ml/min/1.73m2    GFR est non-AA 40 (L) >60 ml/min/1.73m2    Calcium 8.7 8.5 - 10.1 MG/DL     ABG:  No results found for: PH, PHI, PCO2, PCO2I, PO2, PO2I, HCO3, HCO3I, FIO2, FIO2I        PFT Results  (Last 48 hours)    None        Echo Results  (Last 48 hours)    None        Imaging:  I have personally reviewed the patients radiographs and have reviewed the reports:  Personal review of CXR from 7/17 shows cardiomegaly with vascular congestion, no infiltrates or consolidations or masses. Reviewed CT abd from 2/13/19 ---  No emphysema in lung bases  XR Results (most recent):  Results from Hospital Encounter encounter on 07/17/19   XR SWALLOW FUNC VIDEO    Narrative Modified barium swallow with speech. INDICATION: Dysphagia, respiratory distress, recent abnormal chest x-ray,  concern for aspiration pneumonia, history of previous surgery    Fluoroscopy time 1 minute 12 seconds    Cine loops: 6    FINDINGS: The patient was given multiple consistencies of barium. Probable  prominent cricopharyngeal muscle noted. No penetration or aspiration with thin,  nectar, honey, puree, solid or pill. Impression Impression:    As above. Please see speech pathologist's report for further evaluation and  recommendations. CT Results  (Last 48 hours)    None            High complexity decision making was performed during the evaluation of this patient at high risk for decompensation with multiple organ involvement     Above mentioned total time spent on reviewing the case/medical record/data/notes/EMR/patient examination/documentation/coordinating care with nurse/consultants, exclusive of procedures with complex decision making performed and > 50% time spent in face to face evaluation.          Meryle Skinner MD    Pulmonary, Critical Care Medicine  [facility]

## 2019-07-22 NOTE — PROGRESS NOTES
Palliative Medicine    Palliative Medicine team Arlen Martinez NP and Kalie Yo RN met briefly with pt at her bedside. Son Yesica Jay was also present. Pt was alert and oriented x 1. She had a productive cough. She had consumed part of her breakfast.  She remains on O2 via NC. DDNR was sent home with the pt's children on Friday in the hopes that the pt's  Shy Haile would sign the document. Shy Haile has been uable to get to the hospital to see the pt or to meet with this team (his health status/mental status are questionable). Phone call attempts to reach Price have also been unsuccessful. All children are in agreement to make the pt a DNR/DNI, HOWEVER the pt's  is the medical decision maker as the legal NOK. According to Yesica Jay, the pt's  did not want to sign the DDNR form over the weekend. The children will continue to follow-up. Pt remains a FULL code. Thank you for the Palliative Medicine consult and allowing us to participate in the care of Mrs. Elly Lewis. Will continue to monitor and provide support.     Kalie Yo RN, BSN  Palliative Medicine Inpatient RN  [facility]  Palliative [facility] Line: [phone] ([phone])

## 2019-07-22 NOTE — PROGRESS NOTES
[facility] Hospitalist Group  Progress Note    Patient: Viola Pastor Age: 80 y.o. : 1931 MR#: [id number] SSN: [id number]  Date/Time: 2019    Subjective:     Patient in NAD, awake, has dementia    Assessment/Plan:     - ? Bronchitis  - acute on Chronic systolic and diastolic combined  chf,   - CKD3   - COPD without acute exacerbation  - Dementia  - h/o Lupus  - Dysphagia    PLAN  O2, on zosyn, switch to po augmentin at discharge  Bronchial hygiene, follow cx  nephro following, monitor  On po demadex  Diet as per speech  Wound care  Full code  Palliative care following  dispo- d/w son at bedside, family declines snf/rehab, home with Ankur Hester soon    Case discussed with:  [x]Patient  []Family  []Nursing  []Case Management  DVT Prophylaxis:  []Lovenox  [x]Hep SQ  []SCDs  []Coumadin   []On Heparin gtt    Objective:   VS:   Visit Vitals  /68 (BP 1 Location: Left arm, BP Patient Position: At rest)   Pulse 70   Temp 97.5 °F (36.4 °C)   Resp 18   Ht 5' 4\" (1.626 m)   Wt 73 kg (160 lb 14.8 oz)   SpO2 99%   BMI 27.62 kg/m²      Tmax/24hrs: Temp (24hrs), Av °F (36.7 °C), Min:97.5 °F (36.4 °C), Max:98.7 °F (37.1 °C)    Input/Output:     Intake/Output Summary (Last 24 hours) at 2019 1757  Last data filed at 2019 0413  Gross per 24 hour   Intake 320 ml   Output 1600 ml   Net -1280 ml   General:  Awake,   Cardiovascular:  S1S2+, RRR  Pulmonary:  Coarse bs b/l  GI:  Soft, BS+, NT, ND  Extremities:  trace edema    Labs:    Recent Results (from the past 24 hour(s))   POTASSIUM    Collection Time: 19  8:17 PM   Result Value Ref Range    Potassium 3.8 3.5 - 5.5 mmol/L   CBC WITH AUTOMATED DIFF    Collection Time: 19  5:24 AM   Result Value Ref Range    WBC 7.6 4.6 - 13.2 K/uL    RBC 3.25 (L) 4.20 - 5.30 M/uL    HGB 9.0 (L) 12.0 - 16.0 g/dL    HCT 30.3 (L) 35.0 - 45.0 %    MCV 93.2 74.0 - 97.0 FL    MCH 27.7 24.0 - 34.0 PG    MCHC 29.7 (L) 31.0 - 37.0 g/dL    RDW 16.5 (H) 11.6 - 14.5 %    PLATELET 558 662 - 432 K/uL    MPV 10.0 9.2 - 11.8 FL    NEUTROPHILS 55 42 - 75 %    BAND NEUTROPHILS 1 0 - 5 %    LYMPHOCYTES 23 20 - 51 %    MONOCYTES 9 2 - 9 %    EOSINOPHILS 6 (H) 0 - 5 %    BASOPHILS 0 0 - 3 %    OTHER CELL 6 (H) 0      ABS. NEUTROPHILS 4.3 1.8 - 8.0 K/UL    ABS. LYMPHOCYTES 1.7 0.8 - 3.5 K/UL    ABS. MONOCYTES 0.7 0 - 1.0 K/UL    ABS. EOSINOPHILS 0.5 (H) 0.0 - 0.4 K/UL    ABS.  BASOPHILS 0.0 0.0 - 0.06 K/UL    DF MANUAL      PLATELET COMMENTS ADEQUATE PLATELETS      RBC COMMENTS ANISOCYTOSIS  2+        RBC COMMENTS POIKILOCYTOSIS  2+        RBC COMMENTS HYPOCHROMIA  1+        RBC COMMENTS BASOPHILIC STIPPLING  1+        RBC COMMENTS OVALOCYTES  1+        RBC COMMENTS ACANTHOCYTES  1+       METABOLIC PANEL, BASIC    Collection Time: 07/22/19  5:24 AM   Result Value Ref Range    Sodium 145 136 - 145 mmol/L    Potassium 3.5 3.5 - 5.5 mmol/L    Chloride 102 100 - 111 mmol/L    CO2 35 (H) 21 - 32 mmol/L    Anion gap 8 3.0 - 18 mmol/L    Glucose 83 74 - 99 mg/dL    BUN 39 (H) 7.0 - 18 MG/DL    Creatinine 1.27 0.6 - 1.3 MG/DL    BUN/Creatinine ratio 31 (H) 12 - 20      GFR est AA 48 (L) >60 ml/min/1.73m2    GFR est non-AA 40 (L) >60 ml/min/1.73m2    Calcium 8.7 8.5 - 10.1 MG/DL     Additional Data Reviewed:      Signed By: Ciarra Calero MD     July 22, 2019

## 2019-07-22 NOTE — PROGRESS NOTES
NUTRITION    Nursing Referral: Pressure Injury     RECOMMENDATIONS / PLAN:     - Continue current nutrition interventions. - Continue RD inpatient monitoring and evaluation. NUTRITION INTERVENTIONS & DIAGNOSIS:     [x] Meals/snacks: modify composition  [x] Medical food supplement therapy: Ensure Enlive, BID    Nutrition Diagnosis: Increased nutrient needs (protein) related to increased demand for wound healing as evidenced by pressure injury. ASSESSMENT:     7/22: Overall good/fair meal intake, consuming supplements. Family assisting with meals and encouraging intake. 7/18: Pt confused with dementia, family members present to answer questions. Reports pt with stable weight hx and good appetite PTA, typically has better meal intake with chopped foods. Feeds herself at home but family helping feed pt today. Fair intake today.     Average po intake adequate to meet patients estimated nutritional needs:   [] Yes     [] No   [x] Unable to determine at this time    Diet: DIET NUTRITIONAL SUPPLEMENTS Breakfast, Dinner; ENSURE ENLIVE  DIET CARDIAC Mechanical Soft      Food Allergies: NKFA  Current Appetite:   [] Good     [x] Fair     [] Poor     [x] Other: mentation  Appetite/meal intake prior to admission:   [x] Good     [] Fair     [] Poor     [x] Other: occasional nutritional supplement  Feeding Limitations:  [x] Swallowing difficulty: SLP following: MBS completed 7/19/19   [] Chewing difficulty    [] Other   Current Meal Intake:   Patient Vitals for the past 100 hrs:   % Diet Eaten   07/21/19 1842 30 %   07/21/19 0845 100 %   07/19/19 1839 85 %   07/19/19 1247 100 %   07/19/19 0915 100 %       BM: 7/18- formed  Skin Integrity: stage 2 pressure injury to sacrum, vascular wound to right leg  Edema:   [x] No     [] Yes   Pertinent Medications: Reviewed: miralax, KCL, aldactone, demadex    Recent Labs     07/22/19  0524 07/21/19  2017 07/21/19  1609 07/21/19  0226 07/20/19  0408     --   --  143 140   K 3.5 3.8 3.2* 3.2* 3.6     --   --  101 98*   CO2 35*  --   --  34* 36*   GLU 83  --   --  68* 110*   BUN 39*  --   --  40* 40*   CREA 1.27  --   --  1.29 1.50*   CA 8.7  --   --  8.4* 8.0*   MG  --   --   --   --  2.1       Intake/Output Summary (Last 24 hours) at 7/22/2019 1253  Last data filed at 7/22/2019 0413  Gross per 24 hour   Intake 320 ml   Output 1600 ml   Net -1280 ml       Anthropometrics:  Ht Readings from Last 1 Encounters:   07/17/19 5' 4\" (1.626 m)     Last 3 Recorded Weights in this Encounter    07/20/19 0355 07/21/19 0442 07/21/19 1917   Weight: 75.9 kg (167 lb 4.8 oz) 74.4 kg (164 lb) 73 kg (160 lb 14.8 oz)     Body mass index is 27.62 kg/m². Weight History: Family reports pt with UBW of around 185 lbs however unsure of weight trends 2/2 inability to weight pt at home. Weight Metrics 7/21/2019 7/17/2019 7/17/2019 7/17/2019 7/9/2019 5/29/2019 5/21/2019   Weight 160 lb 14.8 oz - - - 187 lb 187 lb 187 lb   BMI - 27.62 kg/m2 - 32.1 kg/m2 32.1 kg/m2 32.1 kg/m2 32.1 kg/m2        Admitting Diagnosis: Respiratory distress [R06.03]  COPD exacerbation (HCC) [J44.1]  CAP (community acquired pneumonia) [J18.9]  Pertinent PMHx: CHF, COPD, HTN, hyperchoelsterolemia    Education Needs:        [x] None identified  [] Identified - Not appropriate at this time  []  Identified and addressed - refer to education log  Learning Limitations:   [] None identified  [x] Identified: dementia    Cultural, Restorationist & ethnic food preferences:  [x] None identified    [] Identified and addressed     ESTIMATED NUTRITION NEEDS:     Calories: 0777-5426 kcal (MSJx1.2-1.3) based on  [x] Actual BW: 76 kg      [] IBW   Protein: 115-144 gm (1.2-1.5 gm/kg) based on  [x] Actual BW      [] IBW   Fluid: 1 mL/kcal     MONITORING & EVALUATION:     Nutrition Goal(s):   1. Po intake of meals will meet >75% of patient estimated nutritional needs within the next 7 days.   Outcome:  [x] Met/Ongoing    [] Progressing towards goal    [] Not progressing towards goal    [] New/Initial goal     Monitoring:   [x] Food and beverage intake   [x] Diet order   [x] Nutrition-focused physical findings   [x] Treatment/therapy   [] Weight   [] Enteral nutrition intake        Previous Recommendations (for follow-up assessments only):     [x]   Implemented       []   Not Implemented (RD to address)      [] No Longer Appropriate     [] No Recommendation Made     Discharge Planning: cardiac diet; consistency per SLP  [x] Participated in care planning, discharge planning, & interdisciplinary rounds as appropriate      Dai Lake RD   Pager: [phone]

## 2019-07-23 LAB
ANION GAP SERPL CALC-SCNC: 3 MMOL/L (ref 3–18)
BACTERIA SPEC CULT: NORMAL
BACTERIA SPEC CULT: NORMAL
BASOPHILS # BLD: 0 K/UL (ref 0–0.1)
BASOPHILS NFR BLD: 0 % (ref 0–2)
BUN SERPL-MCNC: 33 MG/DL (ref 7–18)
BUN/CREAT SERPL: 25 (ref 12–20)
CALCIUM SERPL-MCNC: 8.8 MG/DL (ref 8.5–10.1)
CHLORIDE SERPL-SCNC: 104 MMOL/L (ref 100–111)
CO2 SERPL-SCNC: 37 MMOL/L (ref 21–32)
CREAT SERPL-MCNC: 1.34 MG/DL (ref 0.6–1.3)
DIFFERENTIAL METHOD BLD: ABNORMAL
EOSINOPHIL # BLD: 0.4 K/UL (ref 0–0.4)
EOSINOPHIL NFR BLD: 5 % (ref 0–5)
ERYTHROCYTE [DISTWIDTH] IN BLOOD BY AUTOMATED COUNT: 16.5 % (ref 11.6–14.5)
GLUCOSE SERPL-MCNC: 88 MG/DL (ref 74–99)
HCT VFR BLD AUTO: 30.3 % (ref 35–45)
HGB BLD-MCNC: 9 G/DL (ref 12–16)
LYMPHOCYTES # BLD: 2 K/UL (ref 0.9–3.6)
LYMPHOCYTES NFR BLD: 22 % (ref 21–52)
MCH RBC QN AUTO: 27.9 PG (ref 24–34)
MCHC RBC AUTO-ENTMCNC: 29.7 G/DL (ref 31–37)
MCV RBC AUTO: 93.8 FL (ref 74–97)
MONOCYTES # BLD: 1.2 K/UL (ref 0.05–1.2)
MONOCYTES NFR BLD: 14 % (ref 3–10)
NEUTS SEG # BLD: 5.3 K/UL (ref 1.8–8)
NEUTS SEG NFR BLD: 59 % (ref 40–73)
PLATELET # BLD AUTO: 174 K/UL (ref 135–420)
PMV BLD AUTO: 9.7 FL (ref 9.2–11.8)
POTASSIUM SERPL-SCNC: 3.3 MMOL/L (ref 3.5–5.5)
RBC # BLD AUTO: 3.23 M/UL (ref 4.2–5.3)
SERVICE CMNT-IMP: NORMAL
SERVICE CMNT-IMP: NORMAL
SODIUM SERPL-SCNC: 144 MMOL/L (ref 136–145)
WBC # BLD AUTO: 8.9 K/UL (ref 4.6–13.2)

## 2019-07-23 PROCEDURE — 3331090002 HH PPS REVENUE DEBIT

## 2019-07-23 PROCEDURE — 74011250637 HC RX REV CODE- 250/637: Performed by: INTERNAL MEDICINE

## 2019-07-23 PROCEDURE — 74011250636 HC RX REV CODE- 250/636: Performed by: INTERNAL MEDICINE

## 2019-07-23 PROCEDURE — 74011250636 HC RX REV CODE- 250/636: Performed by: EMERGENCY MEDICINE

## 2019-07-23 PROCEDURE — 36415 COLL VENOUS BLD VENIPUNCTURE: CPT

## 2019-07-23 PROCEDURE — 65660000004 HC RM CVT STEPDOWN

## 2019-07-23 PROCEDURE — 77030037878 HC DRSG MEPILEX >48IN BORD MOLN -B

## 2019-07-23 PROCEDURE — 80048 BASIC METABOLIC PNL TOTAL CA: CPT

## 2019-07-23 PROCEDURE — 74011250637 HC RX REV CODE- 250/637: Performed by: EMERGENCY MEDICINE

## 2019-07-23 PROCEDURE — 74011000258 HC RX REV CODE- 258: Performed by: EMERGENCY MEDICINE

## 2019-07-23 PROCEDURE — 3331090001 HH PPS REVENUE CREDIT

## 2019-07-23 PROCEDURE — 85025 COMPLETE CBC W/AUTO DIFF WBC: CPT

## 2019-07-23 RX ORDER — BENZONATATE 100 MG/1
100 CAPSULE ORAL 3 TIMES DAILY
Status: DISCONTINUED | OUTPATIENT
Start: 2019-07-23 | End: 2019-07-25 | Stop reason: HOSPADM

## 2019-07-23 RX ORDER — POTASSIUM CHLORIDE 20 MEQ/1
20 TABLET, EXTENDED RELEASE ORAL
Status: COMPLETED | OUTPATIENT
Start: 2019-07-23 | End: 2019-07-23

## 2019-07-23 RX ORDER — ACETAZOLAMIDE 250 MG/1
250 TABLET ORAL 2 TIMES DAILY
Status: DISCONTINUED | OUTPATIENT
Start: 2019-07-23 | End: 2019-07-24

## 2019-07-23 RX ADMIN — MEMANTINE 10 MG: 10 TABLET ORAL at 09:15

## 2019-07-23 RX ADMIN — PIPERACILLIN SODIUM,TAZOBACTAM SODIUM 2.25 G: 2; .25 INJECTION, POWDER, FOR SOLUTION INTRAVENOUS at 12:22

## 2019-07-23 RX ADMIN — POTASSIUM CHLORIDE 20 MEQ: 20 TABLET, EXTENDED RELEASE ORAL at 23:19

## 2019-07-23 RX ADMIN — METHYLPREDNISOLONE SODIUM SUCCINATE 40 MG: 40 INJECTION, POWDER, FOR SOLUTION INTRAMUSCULAR; INTRAVENOUS at 12:23

## 2019-07-23 RX ADMIN — PIPERACILLIN SODIUM,TAZOBACTAM SODIUM 2.25 G: 2; .25 INJECTION, POWDER, FOR SOLUTION INTRAVENOUS at 18:24

## 2019-07-23 RX ADMIN — AMIODARONE HYDROCHLORIDE 200 MG: 200 TABLET ORAL at 09:15

## 2019-07-23 RX ADMIN — SPIRONOLACTONE 25 MG: 25 TABLET ORAL at 09:15

## 2019-07-23 RX ADMIN — TORSEMIDE 20 MG: 20 TABLET ORAL at 09:15

## 2019-07-23 RX ADMIN — HEPARIN SODIUM 5000 UNITS: 5000 INJECTION, SOLUTION INTRAVENOUS; SUBCUTANEOUS at 06:42

## 2019-07-23 RX ADMIN — POTASSIUM CHLORIDE 20 MEQ: 1.5 POWDER, FOR SOLUTION ORAL at 09:15

## 2019-07-23 RX ADMIN — HEPARIN SODIUM 5000 UNITS: 5000 INJECTION, SOLUTION INTRAVENOUS; SUBCUTANEOUS at 23:19

## 2019-07-23 RX ADMIN — CARVEDILOL 3.12 MG: 3.12 TABLET, FILM COATED ORAL at 09:15

## 2019-07-23 RX ADMIN — METHYLPREDNISOLONE SODIUM SUCCINATE 40 MG: 40 INJECTION, POWDER, FOR SOLUTION INTRAMUSCULAR; INTRAVENOUS at 18:24

## 2019-07-23 RX ADMIN — PIPERACILLIN SODIUM,TAZOBACTAM SODIUM 2.25 G: 2; .25 INJECTION, POWDER, FOR SOLUTION INTRAVENOUS at 06:41

## 2019-07-23 RX ADMIN — BENZONATATE 100 MG: 100 CAPSULE ORAL at 23:19

## 2019-07-23 RX ADMIN — MEMANTINE 10 MG: 10 TABLET ORAL at 18:24

## 2019-07-23 RX ADMIN — POLYETHYLENE GLYCOL 3350 17 G: 17 POWDER, FOR SOLUTION ORAL at 09:15

## 2019-07-23 RX ADMIN — HEPARIN SODIUM 5000 UNITS: 5000 INJECTION, SOLUTION INTRAVENOUS; SUBCUTANEOUS at 14:26

## 2019-07-23 RX ADMIN — ACETAZOLAMIDE 250 MG: 250 TABLET ORAL at 18:24

## 2019-07-23 RX ADMIN — PIPERACILLIN SODIUM,TAZOBACTAM SODIUM 2.25 G: 2; .25 INJECTION, POWDER, FOR SOLUTION INTRAVENOUS at 23:19

## 2019-07-23 RX ADMIN — ACETAZOLAMIDE 250 MG: 250 TABLET ORAL at 12:23

## 2019-07-23 RX ADMIN — ASPIRIN 81 MG: 81 TABLET ORAL at 09:15

## 2019-07-23 RX ADMIN — CARVEDILOL 3.12 MG: 3.12 TABLET, FILM COATED ORAL at 23:19

## 2019-07-23 NOTE — PROGRESS NOTES
1230:  Called to room by patients family with c/o 'my mother is soaking wet'. Son Joaquín Roberto and brother at bedside. Both family members state they entered pt's room and found her gown wet. Family members are angry. Patient found to be resting in bed, HOB elevated, pure wick urine collection device intact and working appropriately, periarea clean and dry, family members had removed pt's gown prior to this RN entering room. Pt sheets and blanket warm and dry. New pt gown applied. Repositioned for comfort and HOB elevated for lunch, son wishes to assist pt eat lunch. This RN inquired to what family found when they entered room; both brothers state gown was 'soaking wet'. Allowed to vent feelings. Reassured family of patient care delivered today, q1hr rounding performed checking on patient, and [facility] as pt is complete care/ weak. Hostile/ angry behavior has been eliminated. Brothers verbalize understanding plan of care. RN asked family to please relay and concerns to staff. RN will contact patient advocate to assure Merry Rice family having all needs and concerns met; Joaquín Roberto is agreeable to this plan of care. CVT SD charge nurse notified. 1600:  Attending provider paged to talk with son per his request for update, concerns, questions. 1630: Attending provider in room with this RN for assessment, spoke with son Prisca Triplett and family at length about patient status, plan of care and family concerns regarding intermittent cough. Family verbalizes understanding all info and agreement plan of care. New order noted. Monitor. 1930:  No acute changes to note. Bedside and Verbal shift change report given to Adrienne Nathan (oncoming nurse) by Maria Eugenia Suresh RN(offgoing nurse). Report included the following information SBAR, Kardex, Intake/Output, MAR, Accordion, Recent Results, Cardiac Rhythm paced. and Alarm Parameters .

## 2019-07-23 NOTE — PROGRESS NOTES
Problem: Mobility Impaired (Adult and Pediatric)  Goal: *Acute Goals and Plan of Care (Insert Text)  Description  Physical Therapy Goals  Initiated 7/19/2019 and to be accomplished within 7 day(s)  1. Patient will participate in functional maintenance program for PROM/AAROM B LE 3-5 times a week. Prior Level of Function: Total assistance for mobility including bed mobility and transfers. Pt has a hospital bed and a mechanical lift. Pt is total assist to her WC at home which she sits in for meals. Pt lives with her  and her son. Outcome: Progressing Towards Goal   Patient: Leon Raymond (80 y.o. female)  Date: 7/23/2019    Subjective:  Pt was repeating \"Yes baby, yes I can. \"    Patient cleared by nursing Facundo Ward) for participation in [facility]. Pt was agreeable to working with tech. Pt was able to tolerate ankle pumps and hip abd/add but was unable to tolerate heel slides. Pt was left resting comfortable in bed with Nursing and CNA in the room. Session was discussed with nursing. Therapeutic Exercises:       EXERCISE   Sets   Reps   Active Active Assist   Passive Self ROM   Comments   Ankle Pumps/Circles 1 15  ? ? ? ? BLE   Heel Slides 1 2 ? ? ? ? BLE   Hip IR/ER   ? ? ? ? Hip ABD/ADD 1 15 ? ? ? ? BLE   Hip Flex/Ext   ? ? ? ? Figure 8s 1 15 ? ? ? ? BLE     Pain:  Pre Session: No pain noted  Post session: No pain noted    After treatment:   ? Patient left in no apparent distress in bed  ? Call bell left within reach  ? Nursing notified  ? Caregiver present  ?  Bed alarm activated      Jarad Freeman

## 2019-07-23 NOTE — PROGRESS NOTES
RENAL PROGRESS NOTE        [facility]         Assessment  1. 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 Ac combined systolic / diastolic CHF , CXR with cardiomegaly and vascular congestion   3. Respiratory failure , d/t CHF   4.  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    Lupus (systemic lupus erythematosus) ([facility] [address] [address] M32.9    Anasarca R60.1    Hypertensive heart disease with CHF (congestive heart failure) ([address] I11.0    S/P placement of cardiac pacemaker Z95.0    Paroxysmal atrial flutter (HCC) I48.92    Acute on chronic diastolic (congestive) heart failure (HCC) R74.65    Diastolic CHF, chronic (HCC) I50.32    High risk medication use Z79.899    Acute exacerbation of CHF (congestive heart failure) ([facility]) I50.9    ARIEL (acute kidney injury) ([facility] [address] N17.9    Uremia N19    Left hip pain M25.552    CKD (chronic kidney disease) stage 4, GFR 15-29 ml/min ([facility]) N18.4    NSTEMI (non-ST elevated myocardial infarction) ([facility]) I21.4    Dementia F03.90    Skin lesions L98.9    Diarrhea R19.7    UTI (urinary tract infection) N39.0    Fever R50.9    Sepsis ([address] A41.9    Abscess L02.91    Severe sepsis ([address] A41.9, R65.20    Advanced care planning/counseling discussion Z71.89    Debility R53.81    Anemia D64.9    Respiratory distress R06.03    COPD exacerbation ([address] J44.1    CAP (community acquired pneumonia) J18.9    Acute respiratory failure with hypoxia (HCC) J96.01       Current Facility-Administered Medications   Medication Dose Route Frequency Provider Last Rate Last Dose    acetaZOLAMIDE (DIAMOX) tablet 250 mg  250 mg Oral BID Bryan Vu MD   250 mg at 07/23/19 1223    methylPREDNISolone (PF) (SOLU-MEDROL) injection 40 mg  40 mg IntraVENous Q6H Ban MUÑIZ MD   40 mg at 07/23/19 1223    torsemide (DEMADEX) tablet 20 mg  20 mg Oral DAILY Bryan Vu MD   20 mg at 07/23/19 0915    spironolactone (ALDACTONE) tablet 25 mg  25 mg Oral DAILY Bryan Vu MD   25 mg at 07/23/19 0915    potassium chloride (KLOR-CON) packet for solution 20 mEq  20 mEq Oral DAILY Bryan Vu MD   20 mEq at 07/23/19 0915    piperacillin-tazobactam (ZOSYN) 2.25 g in 0.9% sodium chloride (MBP/ADV) 50 mL MBP  2.25 g IntraVENous Q6H Robin Lam  mL/hr at 07/23/19 1222 2.25 g at 07/23/19 1222    sodium chloride (NS) flush 5-10 mL  5-10 mL IntraVENous PRN Kat Mcdaniels MD   10 mL at 07/22/19 0003    amiodarone (CORDARONE) tablet 200 mg  200 mg Oral DAILY Breanna Mott MD   200 mg at 07/23/19 0915    aspirin delayed-release tablet 81 mg  81 mg Oral DAILY Breanna Mott MD   81 mg at 07/23/19 0915    carvedilol (COREG) tablet 3.125 mg  3.125 mg Oral Q12H Breanna Mott MD   3.125 mg at 07/23/19 0915    memantine (NAMENDA) tablet 10 mg  10 mg Oral BID Breanna Mott MD   10 mg at 07/23/19 0915    polyethylene glycol (MIRALAX) packet 17 g  17 g Oral DAILY Breanna Mott MD   17 g at 07/23/19 0915    heparin (porcine) injection 5,000 Units  5,000 Units SubCUTAneous Q8H Breanna Mott MD   5,000 Units at 07/23/19 1426       Objective  Vitals:    07/23/19 0130 07/23/19 0500 07/23/19 0814 07/23/19 1151   BP: 116/49 105/70 138/75 155/69   Pulse: 76 92 72 75   Resp: 16 18 18 18   Temp: 97.8 °F (36.6 °C) 98.8 °F (37.1 °C) 97.8 °F (36.6 °C) 97.9 °F (36.6 °C)   SpO2: 100% 97% 95% 95%   Weight:   70.5 kg (155 lb 6.8 oz)    Height:             Intake/Output Summary (Last 24 hours) at 7/23/2019 1512  Last data filed at 7/23/2019 0900  Gross per 24 hour   Intake 60 ml   Output    Net 60 ml           Admission weight: Weight: 80.7 kg (178 lb) (07/17/19 1450)  Last Weight Metrics:  Weight Loss Metrics 7/23/2019 7/17/2019 7/17/2019 7/17/2019 7/9/2019 5/29/2019 5/21/2019   Today's Wt 155 lb 6.8 oz - - - 187 lb 187 lb 187 lb   BMI - 26.68 kg/m2 - 32.1 kg/m2 32.1 kg/m2 32.1 kg/m2 32.1 kg/m2             Physical Assessment:     General: NAD, awake   Neck: No jvd. LUNGS: Clear to Auscultation, No rales, rhonchi / + wheezing. CVS EXM: S1, S2  RRR, no murmurs/gallops/rubs. Abdomen: soft, non tender. Lower Extremities:  no edema. Lab    CBC w/Diff Recent Labs     07/23/19 0528 07/22/19 0524 07/21/19 0226   WBC 8.9 7.6 8.6   RBC 3.23* 3.25* 3.35*   HGB 9.0* 9.0* 9.5*   HCT 30.3* 30.3* 31.5*    195 176   GRANS 59 55 51   LYMPH 22 23 41   EOS 5 6* 0        Chemistry Recent Labs     07/23/19 0528 07/22/19 0524 07/21/19 2017 07/21/19 0226   GLU 88 83  --   --  68*    145  --   --  143   K 3.3* 3.5 3.8   < > 3.2*    102  --   --  101   CO2 37* 35*  --   --  34*   BUN 33* 39*  --   --  40*   CREA 1.34* 1.27  --   --  1.29   CA 8.8 8.7  --   --  8.4*   AGAP 3 8  --   --  8   BUCR 25* 31*  --   --  31*    < > = values in this interval not displayed.          Lab Results   Component Value Date/Time    Iron 51 02/10/2019 11:00 AM    TIBC 202 (L) 02/10/2019 11:00 AM    Iron % saturation 25 02/10/2019 11:00 AM      Lab Results   Component Value Date/Time    Calcium 8.8 07/23/2019 05:28 AM    Phosphorus 3.4 12/28/2018 09:25 AM          Akilah Cruz MD  7/23/2019  10:45 AM

## 2019-07-23 NOTE — PROGRESS NOTES
Problem: Self Care Deficits Care Plan (Adult)  Goal: *Acute Goals and Plan of Care (Insert Text)  Description  Occupational Therapy Goals  Initiated 7/19/2019 within 7 day(s). 1.  Patient will participate in [employer] [facility] 3-5X/week. Prior Level of Function: Patient lives with her  and son and requires total assistance for all ADLs. Outcome: Progressing Towards Goal  Patient: Astrid Henley (80 y.o. female)  Date: 7/23/2019    Subjective:  Pt stated, \"Yes baby, yes I can. \"    Patient cleared by nursing Sonya Hdz) for participation in [facility]. Pt was able to perform finger flex/ext and elbow flex/ext actively for 10 reps after tech performed them passively for 5 reps on the LUE. Pt tolerated all other BUE exercises passively. Pt was left resting comfortably in bed with Nursing and CNA entering the room. Session was discussed with nursing. Therapeutic Exercises:       EXERCISE   Sets   Reps   Active Active Assist   Passive Self ROM   Comments   Finger Flex/Ext 1 15  ? 10(LUE) ? ? ? BUE   Wrist Flex/Ext 1 15 ? ? ? ? BUE   Elbow Flex/Ext 1 15 ? 10(LUE) ? ? ? BUE   Shoulder Flex/Ext 1 15 ? ? ? ? BUE      ? ? ? ?       ? ? ? ? Pain:  Pre Session: No pain noted  Post session: No pain noted    After treatment:   ? Patient left in no apparent distress in bed  ? Call bell left within reach  ? Nursing notified  ? Caregiver present  ?  Bed alarm activated      Laureano Sow

## 2019-07-23 NOTE — PROGRESS NOTES
[facility] Hospitalist Group  Progress Note    Patient: Gracie Sensor Age: 80 y.o. : 1931 MR#: [id number] SSN: [id number]  Date/Time: 2019    Subjective:     Patient lying in bed in NAD. Family at bedside, concerned about cough and wish to start tessalon    Assessment/Plan:     - ? Bronchitis, wheezing  - acute on Chronic systolic and diastolic combined  chf,   - CKD3   - COPD without acute exacerbation  - Dementia  - h/o Lupus  - Dysphagia    PLAN  O2, on zosyn, switch to po augmentin at discharge  Bronchial hygiene, follow cx  Steroids per pulmonary for wheezing  nephro following, monitor  Po demadex  Diet as per speech  Wound care  Full code  Palliative care following  dispo-home per family  D/w son and daughter at bedside  D/w RN    Case discussed with:  [x]Patient  []Family  []Nursing  []Case Management  DVT Prophylaxis:  []Lovenox  [x]Hep SQ  []SCDs  []Coumadin   []On Heparin gtt    Objective:   VS:   Visit Vitals  /73 (BP 1 Location: Left arm, BP Patient Position: At rest)   Pulse 74   Temp 97.8 °F (36.6 °C)   Resp 18   Ht 5' 4\" (1.626 m)   Wt 70.5 kg (155 lb 6.8 oz)   SpO2 94%   BMI 26.68 kg/m²      Tmax/24hrs: Temp (24hrs), Av.1 °F (36.7 °C), Min:97.8 °F (36.6 °C), Max:98.8 °F (37.1 °C)    Input/Output:     Intake/Output Summary (Last 24 hours) at 2019 1802  Last data filed at 2019 1613  Gross per 24 hour   Intake 110 ml   Output 400 ml   Net -290 ml     General:  Awake, has dementia  Cardiovascular:  S1S2+, RRR  Pulmonary:  Coarse bs b/l, ocasional wheezing  GI:  Soft, BS+, NT, ND  Extremities:  No edema      Labs:    Recent Results (from the past 24 hour(s))   CBC WITH AUTOMATED DIFF    Collection Time: 19  5:28 AM   Result Value Ref Range    WBC 8.9 4.6 - 13.2 K/uL    RBC 3.23 (L) 4.20 - 5.30 M/uL    HGB 9.0 (L) 12.0 - 16.0 g/dL    HCT 30.3 (L) 35.0 - 45.0 %    MCV 93.8 74.0 - 97.0 FL    MCH 27.9 24.0 - 34.0 PG    MCHC 29.7 (L) 31.0 - 37.0 g/dL    RDW 16.5 (H) 11.6 - 14.5 %    PLATELET 839 271 - 596 K/uL    MPV 9.7 9.2 - 11.8 FL    NEUTROPHILS 59 40 - 73 %    LYMPHOCYTES 22 21 - 52 %    MONOCYTES 14 (H) 3 - 10 %    EOSINOPHILS 5 0 - 5 %    BASOPHILS 0 0 - 2 %    ABS. NEUTROPHILS 5.3 1.8 - 8.0 K/UL    ABS. LYMPHOCYTES 2.0 0.9 - 3.6 K/UL    ABS. MONOCYTES 1.2 0.05 - 1.2 K/UL    ABS. EOSINOPHILS 0.4 0.0 - 0.4 K/UL    ABS.  BASOPHILS 0.0 0.0 - 0.1 K/UL    DF AUTOMATED     METABOLIC PANEL, BASIC    Collection Time: 07/23/19  5:28 AM   Result Value Ref Range    Sodium 144 136 - 145 mmol/L    Potassium 3.3 (L) 3.5 - 5.5 mmol/L    Chloride 104 100 - 111 mmol/L    CO2 37 (H) 21 - 32 mmol/L    Anion gap 3 3.0 - 18 mmol/L    Glucose 88 74 - 99 mg/dL    BUN 33 (H) 7.0 - 18 MG/DL    Creatinine 1.34 (H) 0.6 - 1.3 MG/DL    BUN/Creatinine ratio 25 (H) 12 - 20      GFR est AA 45 (L) >60 ml/min/1.73m2    GFR est non-AA 37 (L) >60 ml/min/1.73m2    Calcium 8.8 8.5 - 10.1 MG/DL     Additional Data Reviewed:      Signed By: Pauline Jackson MD     July 23, 2019

## 2019-07-23 NOTE — PROGRESS NOTES
[facility]. Pulmonary, Critical Care, and Sleep Medicine    F/U Patient Consult    Name: Yenni Pichardo MRN: [id number]   : 1931 Hospital: [address] [place] [address] Dr   Date: 2019        This patient has been seen and evaluated at the request of Dr. Rony Hassan for dyspnea/wheezing. IMPRESSION:   · Acute CHF exacerbation - combined systolic and diastolic dysfunction  · Wheezing:  Likely secondary to CHF/pulm edema, along with aspiration and bronchitis. Pt does not have COPD -- lifelong nonsmoker and no other causative exposures. She does have persistent significant wheezing.   · Chronic aspiration--patient has symptoms of wheezing when eating-barium swallow without any evident aspiration  · Likely right sided atelectasis:  Given pt is bedbound and is laying on her right side, not moved recently  · Deconditioning and adult failure to thrive  · Chronic metabolic alkalosis with respiratory compensation:  Likely in the setting of chronic diuretic administration, some component of resp acidosis as well - chronic likely due to hypoventilation/weakness  · Global weakness  · Dementia  · Hx of Lupus  · CKD 3-4  · Malnutrition       Patient Active Problem List   Diagnosis Code    HTN (hypertension) I10    Hypercholesteremia E78.00    Tricuspid valve disorders, 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    Lupus (systemic lupus erythematosus) ([facility]) M32.9    Anasarca R60.1    Hypertensive heart disease with CHF (congestive heart failure) ([facility]) I11.0    S/P placement of cardiac pacemaker Z95.0    Paroxysmal atrial flutter ([facility]) I48.92    Acute on chronic diastolic (congestive) heart failure ([facility]) E68.41    Diastolic CHF, chronic ([facility]) I50.32    High risk medication use Z79.899    Acute exacerbation of CHF (congestive heart failure) ([facility]) I50.9    ARIEL (acute kidney injury) ([facility] [address] N17.9    Uremia N19    Left hip pain M25.552    CKD (chronic kidney disease) stage 4, GFR 15-29 ml/min ([facility]) N18.4    NSTEMI (non-ST elevated myocardial infarction) ([facility]) I21.4    Dementia F03.90    Skin lesions L98.9    Diarrhea R19.7    UTI (urinary tract infection) N39.0    Fever R50.9    Sepsis ([facility]) A41.9    Abscess L02.91    Severe sepsis ([facility]) A41.9, R65.20    Advanced care planning/counseling discussion Z71.89    Debility R53.81    Anemia D64.9    Respiratory distress R06.03    COPD exacerbation ([facility]) J44.1    CAP (community acquired pneumonia) J18.9    Acute respiratory failure with hypoxia ([facility]) J96.01      RECOMMENDATIONS:     Continue bronchodilators and bronchial hygiene protocol   Will add steroids- 24 hours for wheezing  Maintain net negative fluid balance as renal function tolerates. Diuresis per primary service, cardiology, nephrology  ABx per primary service - pt on Zosyn for aspiration pneumonia coverage  Supplemental oxygen to maintain SpO2 >88%  Please assess for home oxygen need prior to discharge  Frequent incentive spirometry  Management of CKD per nephrology  Aspiration precautions including elevating HOB >30deg  PT/OT, OOB, ambulate with assistance as tolerated  DVT ppx per primary service  Will follow     Subjective:   Nursing notes and flowsheets reviewed    07/23/19     Pt seen and examined at bedside. No acute events overnight. Awake , not as oriented  Continues to wheeze audibly    HPI:  Hx taken from chart and family - pt's son is a limited historian   Patient is a 80 y.o. female with PMH of dementia, bed/recliner bound, CHF, CKD, presented to [facility] for dyspnea/SOB. They reported that the patient had about 3-4 weeks of worsening SOB, associated with increased swelling. Pt then worsened and had SpO2 go down to 92% while at home. Pt was recently treated for CHF excacerbation. Family reported this to her Cardiologist who increased diuretics without improvement. Pt presented to the ER and was found to have wheezing.   Of note, family reports pt had a fever of 100 the day prior to admission and a nonproductive cough. Pt is a lifelong nonsmoker. Past Medical History:   Diagnosis Date    Acute on chronic diastolic heart failure (HCC)     Arthritis     Benign hypertensive heart disease without heart failure     Better controlled, stable    Chronic diastolic heart failure (HCC)     Breathing and edema is improving    Congestive heart failure (HCC)     HTN (hypertension)     Hypercholesteremia     Lupus (systemic lupus erythematosus) ([facility] [address] 6/18/2014    followed by dr Ramiro West     Obesity, unspecified     Patient has weight loss Discussed diet ad fluid restriction    Other and unspecified hyperlipidemia     F/u per pmd    Tricuspid valve disorders, 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    tr with moderate pulmonary htn       Past Surgical History:   Procedure Laterality Date    HX HYSTERECTOMY      PACEMAKER PROCEDURE        Current Facility-Administered Medications   Medication Dose Route Frequency    acetaZOLAMIDE (DIAMOX) tablet 250 mg  250 mg Oral BID    methylPREDNISolone (PF) (SOLU-MEDROL) injection 40 mg  40 mg IntraVENous Q6H    torsemide (DEMADEX) tablet 20 mg  20 mg Oral DAILY    spironolactone (ALDACTONE) tablet 25 mg  25 mg Oral DAILY    potassium chloride (KLOR-CON) packet for solution 20 mEq  20 mEq Oral DAILY    piperacillin-tazobactam (ZOSYN) 2.25 g in 0.9% sodium chloride (MBP/ADV) 50 mL MBP  2.25 g IntraVENous Q6H    amiodarone (CORDARONE) tablet 200 mg  200 mg Oral DAILY    aspirin delayed-release tablet 81 mg  81 mg Oral DAILY    carvedilol (COREG) tablet 3.125 mg  3.125 mg Oral Q12H    memantine (NAMENDA) tablet 10 mg  10 mg Oral BID    polyethylene glycol (MIRALAX) packet 17 g  17 g Oral DAILY    heparin (porcine) injection 5,000 Units  5,000 Units SubCUTAneous Q8H       Review of Systems:  ROS not obtained due to patient factors.       Objective:   Vital Signs:    Visit Vitals  /75 (BP 1 Location: Left arm, BP Patient Position: At rest)   Pulse 72   Temp 97.8 °F (36.6 °C)   Resp 18   Ht 5' 4\" (1.626 m)   Wt 70.5 kg (155 lb 6.8 oz)   SpO2 95%   BMI 26.68 kg/m²       O2 Device: Nasal cannula   O2 Flow Rate (L/min): 3 l/min   Temp (24hrs), Av.2 °F (36.8 °C), Min:97.8 °F (36.6 °C), Max:98.8 °F (37.1 °C)       Intake/Output:   Last shift:       07 - 1900  In: 60 [P.O.:60]  Out: -   Last 3 shifts: 1901 -  0700  In: 320 [P.O.:120; I.V.:200]  Out: 1600 [Urine:1600]    Intake/Output Summary (Last 24 hours) at 2019 1133  Last data filed at 2019 0900  Gross per 24 hour   Intake 60 ml   Output    Net 60 ml      Physical Exam:   General:   Sitting up in bed wearing nasal cannula, no acute distress, alert, pulling at things, oriented x1. Her family states this is baseline   Head:  Normocephalic, without obvious abnormality, atraumatic. Eyes:  Conjunctivae/corneas clear. ANicteric, PERRLA, EOMI   Nose: Nares normal. Mucosa normal. No drainage or sinus tenderness. Throat: Lips, mucosa dry. NO thrush; poor dentition, no oral lesions, mallampati IV   Neck: Supple, symmetrical, trachea midline, no adenopathy, thyroid: no enlargment/tenderness/nodules, no carotid bruit and +JVD. No crepitus   Back:   Symmetric, no curvature, no spine tenderness or flank pain   Lungs:   Poor air entry B/L, CTABL, expiratory wheezes bilaterally   Chest wall:  No tenderness or deformity. NO CREPITUS   Heart:  Regular rate and rhythm, S1, S2 normal, no murmur, click, rub or gallop. Abdomen:   Soft, non-tender. Bowel sounds normal. No masses,  No organomegaly. No paradoxical motion   Extremities: normal, atraumatic, no cyanosis or clubbing. 2+ pitting edema B/L up to thighs   Pulses: 1-2+ and symmetric all extremities.    Skin: Skin color, texture, turgor normal. No rashes or lesions   Lymph nodes: Cervical, supraclavicular, and axillary nodes normal.   Neurologic: Grossly nonfocal, pt alert but disoriented at baseline, globally weak, 1/5 in B/L LE and 2-3/5 in U          Data review:   Labs:  Recent Results (from the past 24 hour(s))   CBC WITH AUTOMATED DIFF    Collection Time: 07/23/19  5:28 AM   Result Value Ref Range    WBC 8.9 4.6 - 13.2 K/uL    RBC 3.23 (L) 4.20 - 5.30 M/uL    HGB 9.0 (L) 12.0 - 16.0 g/dL    HCT 30.3 (L) 35.0 - 45.0 %    MCV 93.8 74.0 - 97.0 FL    MCH 27.9 24.0 - 34.0 PG    MCHC 29.7 (L) 31.0 - 37.0 g/dL    RDW 16.5 (H) 11.6 - 14.5 %    PLATELET 266 068 - 787 K/uL    MPV 9.7 9.2 - 11.8 FL    NEUTROPHILS 59 40 - 73 %    LYMPHOCYTES 22 21 - 52 %    MONOCYTES 14 (H) 3 - 10 %    EOSINOPHILS 5 0 - 5 %    BASOPHILS 0 0 - 2 %    ABS. NEUTROPHILS 5.3 1.8 - 8.0 K/UL    ABS. LYMPHOCYTES 2.0 0.9 - 3.6 K/UL    ABS. MONOCYTES 1.2 0.05 - 1.2 K/UL    ABS. EOSINOPHILS 0.4 0.0 - 0.4 K/UL    ABS. BASOPHILS 0.0 0.0 - 0.1 K/UL    DF AUTOMATED     METABOLIC PANEL, BASIC    Collection Time: 07/23/19  5:28 AM   Result Value Ref Range    Sodium 144 136 - 145 mmol/L    Potassium 3.3 (L) 3.5 - 5.5 mmol/L    Chloride 104 100 - 111 mmol/L    CO2 37 (H) 21 - 32 mmol/L    Anion gap 3 3.0 - 18 mmol/L    Glucose 88 74 - 99 mg/dL    BUN 33 (H) 7.0 - 18 MG/DL    Creatinine 1.34 (H) 0.6 - 1.3 MG/DL    BUN/Creatinine ratio 25 (H) 12 - 20      GFR est AA 45 (L) >60 ml/min/1.73m2    GFR est non-AA 37 (L) >60 ml/min/1.73m2    Calcium 8.8 8.5 - 10.1 MG/DL     ABG:  No results found for: PH, PHI, PCO2, PCO2I, PO2, PO2I, HCO3, HCO3I, FIO2, FIO2I        PFT Results  (Last 48 hours)    None        Echo Results  (Last 48 hours)    None        Imaging:  I have personally reviewed the patients radiographs and have reviewed the reports:  Personal review of CXR from 7/17 shows cardiomegaly with vascular congestion, no infiltrates or consolidations or masses. Reviewed CT abd from 2/13/19 ---  No emphysema in lung bases  XR Results (most recent):  Results from Hospital Encounter encounter on 07/17/19   XR SWALLOW FUNC VIDEO    Narrative Modified barium swallow with speech.     INDICATION: Dysphagia, respiratory distress, recent abnormal chest x-ray,  concern for aspiration pneumonia, history of previous surgery    Fluoroscopy time 1 minute 12 seconds    Cine loops: 6    FINDINGS: The patient was given multiple consistencies of barium. Probable  prominent cricopharyngeal muscle noted. No penetration or aspiration with thin,  nectar, honey, puree, solid or pill. Impression Impression:    As above. Please see speech pathologist's report for further evaluation and  recommendations. CT Results  (Last 48 hours)    None            High complexity decision making was performed during the evaluation of this patient at high risk for decompensation with multiple organ involvement     Above mentioned total time spent on reviewing the case/medical record/data/notes/EMR/patient examination/documentation/coordinating care with nurse/consultants, exclusive of procedures with complex decision making performed and > 50% time spent in face to face evaluation.          Afua Hernandez MD    Pulmonary, Critical Care Medicine  [facility]

## 2019-07-23 NOTE — PROGRESS NOTES
[facility]      CARDIOLOGY PROGRESS NOTE  RECS:  1. Congestive heart failure: Acute on chronic systolic dysfunction. Improving well. Continue Demadex po . Add p.o. Diamox for metabolic alkalosis. 2. Status post permanent pacemaker: Normal function on 5/2/2019  3. Atrial fibrillation: Noted on pacemaker. Sinus rhythm on amiodarone. 4. Hypokalemia: Resolved on K+ replacement daily   5. Hypertension: Controlled. 6. CKD- stable creatinine  7. Mitral regurgitation: Mild to moderate in December 2018. Control blood pressure and CHF. Follow clinically. Not a candidate for any invasive work-up. 8. Hyperlipidemia  9. History of lupus  10. Obesity    CHF seems to have improved. Discharge planning per medicine. Consider palliative care/DNR. ASSESSMENT:  Hospital Problems  Date Reviewed: 7/17/2019          Codes Class Noted POA    Respiratory distress ICD-10-CM: R06.03  ICD-9-CM: 786.09  7/17/2019 Unknown        COPD exacerbation ([facility] [address] ICD-10-CM: J44.1  ICD-9-CM: 491.21  7/17/2019 Unknown        CAP (community acquired pneumonia) ICD-10-CM: J18.9  ICD-9-CM: 284  7/17/2019 Unknown        Acute respiratory failure with hypoxia [facility]) ICD-10-CM: J96.01  ICD-9-CM: 518.81  7/17/2019 Unknown                SUBJECTIVE:  Denies chest pain  Confused this AM  No significant verbal communication    OBJECTIVE:    VS:   Visit Vitals  /75 (BP 1 Location: Left arm, BP Patient Position: At rest)   Pulse 72   Temp 97.8 °F (36.6 °C)   Resp 18   Ht 5' 4\" (1.626 m)   Wt 70.5 kg (155 lb 6.8 oz)   SpO2 95%   BMI 26.68 kg/m²         Intake/Output Summary (Last 24 hours) at 7/23/2019 1110  Last data filed at 7/23/2019 0900  Gross per 24 hour   Intake 60 ml   Output    Net 60 ml     TELE: AV paced    General: alert, in no apparent distress and Confused  HENT: Normocephalic, atraumatic. Normal external eye.   Neck :  no JVD  Cardiac:  regular rate and rhythm, systolic murmur: early systolic 2/6, crescendo at 2nd right intercostal space  Lungs: No rales or rhonchi  Abdomen: Soft, nontender, no masses  Extremities:  No edema noted. peripheral pulses present      Labs: Results:       Chemistry Recent Labs     07/23/19 0528 07/22/19 0524 07/21/19 2017 07/21/19 0226   GLU 88 83  --   --  68*    145  --   --  143   K 3.3* 3.5 3.8   < > 3.2*    102  --   --  101   CO2 37* 35*  --   --  34*   BUN 33* 39*  --   --  40*   CREA 1.34* 1.27  --   --  1.29   CA 8.8 8.7  --   --  8.4*   AGAP 3 8  --   --  8   BUCR 25* 31*  --   --  31*    < > = values in this interval not displayed. CBC w/Diff Recent Labs     07/23/19 0528 07/22/19 0524 07/21/19 0226   WBC 8.9 7.6 8.6   RBC 3.23* 3.25* 3.35*   HGB 9.0* 9.0* 9.5*   HCT 30.3* 30.3* 31.5*    195 176   GRANS 59 55 51   LYMPH 22 23 41   EOS 5 6* 0      Cardiac Enzymes No results for input(s): CPK, CKND1, YAN in the last 72 hours. No lab exists for component: CKRMB, TROIP   Coagulation No results for input(s): PTP, INR, APTT in the last 72 hours. No lab exists for component: INREXT, INREXT    Lipid Panel Lab Results   Component Value Date/Time    Cholesterol, total 142 01/31/2019 02:24 AM    HDL Cholesterol 68 (H) 01/31/2019 02:24 AM    LDL, calculated 66.4 01/31/2019 02:24 AM    VLDL, calculated 7.6 01/31/2019 02:24 AM    Triglyceride 38 01/31/2019 02:24 AM    CHOL/HDL Ratio 2.1 01/31/2019 02:24 AM      BNP No results for input(s): BNPP in the last 72 hours. Liver Enzymes No results for input(s): TP, ALB, TBIL, AP, SGOT, GPT in the last 72 hours.     No lab exists for component: DBIL   Thyroid Studies Lab Results   Component Value Date/Time    TSH 8.86 (H) 11/15/2015 01:45 PM              Graciela Jeff MD

## 2019-07-24 LAB
ANION GAP SERPL CALC-SCNC: 5 MMOL/L (ref 3–18)
BASOPHILS # BLD: 0 K/UL (ref 0–0.1)
BASOPHILS NFR BLD: 0 % (ref 0–2)
BUN SERPL-MCNC: 32 MG/DL (ref 7–18)
BUN/CREAT SERPL: 29 (ref 12–20)
CALCIUM SERPL-MCNC: 8.9 MG/DL (ref 8.5–10.1)
CHLORIDE SERPL-SCNC: 108 MMOL/L (ref 100–111)
CO2 SERPL-SCNC: 32 MMOL/L (ref 21–32)
CREAT SERPL-MCNC: 1.12 MG/DL (ref 0.6–1.3)
DIFFERENTIAL METHOD BLD: ABNORMAL
EOSINOPHIL # BLD: 0 K/UL (ref 0–0.4)
EOSINOPHIL NFR BLD: 0 % (ref 0–5)
ERYTHROCYTE [DISTWIDTH] IN BLOOD BY AUTOMATED COUNT: 16.4 % (ref 11.6–14.5)
GLUCOSE SERPL-MCNC: 131 MG/DL (ref 74–99)
HCT VFR BLD AUTO: 33.1 % (ref 35–45)
HGB BLD-MCNC: 9.9 G/DL (ref 12–16)
LYMPHOCYTES # BLD: 0.9 K/UL (ref 0.9–3.6)
LYMPHOCYTES NFR BLD: 13 % (ref 21–52)
MAGNESIUM SERPL-MCNC: 2.5 MG/DL (ref 1.6–2.6)
MCH RBC QN AUTO: 28.4 PG (ref 24–34)
MCHC RBC AUTO-ENTMCNC: 29.9 G/DL (ref 31–37)
MCV RBC AUTO: 94.8 FL (ref 74–97)
MONOCYTES # BLD: 0.1 K/UL (ref 0.05–1.2)
MONOCYTES NFR BLD: 2 % (ref 3–10)
NEUTS SEG # BLD: 6 K/UL (ref 1.8–8)
NEUTS SEG NFR BLD: 85 % (ref 40–73)
PLATELET # BLD AUTO: 163 K/UL (ref 135–420)
PMV BLD AUTO: 9.6 FL (ref 9.2–11.8)
POTASSIUM SERPL-SCNC: 3.6 MMOL/L (ref 3.5–5.5)
RBC # BLD AUTO: 3.49 M/UL (ref 4.2–5.3)
SODIUM SERPL-SCNC: 145 MMOL/L (ref 136–145)
WBC # BLD AUTO: 7 K/UL (ref 4.6–13.2)

## 2019-07-24 PROCEDURE — 3331090001 HH PPS REVENUE CREDIT

## 2019-07-24 PROCEDURE — 65660000004 HC RM CVT STEPDOWN

## 2019-07-24 PROCEDURE — 74011000258 HC RX REV CODE- 258: Performed by: EMERGENCY MEDICINE

## 2019-07-24 PROCEDURE — 77010033678 HC OXYGEN DAILY

## 2019-07-24 PROCEDURE — 74011000258 HC RX REV CODE- 258

## 2019-07-24 PROCEDURE — 83735 ASSAY OF MAGNESIUM: CPT

## 2019-07-24 PROCEDURE — 74011250636 HC RX REV CODE- 250/636: Performed by: EMERGENCY MEDICINE

## 2019-07-24 PROCEDURE — 74011250637 HC RX REV CODE- 250/637: Performed by: EMERGENCY MEDICINE

## 2019-07-24 PROCEDURE — 85025 COMPLETE CBC W/AUTO DIFF WBC: CPT

## 2019-07-24 PROCEDURE — 74011250636 HC RX REV CODE- 250/636: Performed by: INTERNAL MEDICINE

## 2019-07-24 PROCEDURE — 36415 COLL VENOUS BLD VENIPUNCTURE: CPT

## 2019-07-24 PROCEDURE — 80048 BASIC METABOLIC PNL TOTAL CA: CPT

## 2019-07-24 PROCEDURE — 74011250637 HC RX REV CODE- 250/637: Performed by: INTERNAL MEDICINE

## 2019-07-24 PROCEDURE — 3331090002 HH PPS REVENUE DEBIT

## 2019-07-24 PROCEDURE — 77030038269 HC DRN EXT URIN PURWCK BARD -A

## 2019-07-24 PROCEDURE — 74011250636 HC RX REV CODE- 250/636

## 2019-07-24 RX ADMIN — ACETAZOLAMIDE 250 MG: 250 TABLET ORAL at 09:00

## 2019-07-24 RX ADMIN — BENZONATATE 100 MG: 100 CAPSULE ORAL at 22:40

## 2019-07-24 RX ADMIN — METHYLPREDNISOLONE SODIUM SUCCINATE 40 MG: 40 INJECTION, POWDER, FOR SOLUTION INTRAMUSCULAR; INTRAVENOUS at 01:30

## 2019-07-24 RX ADMIN — POTASSIUM CHLORIDE 20 MEQ: 1.5 POWDER, FOR SOLUTION ORAL at 09:00

## 2019-07-24 RX ADMIN — SPIRONOLACTONE 25 MG: 25 TABLET ORAL at 09:00

## 2019-07-24 RX ADMIN — CARVEDILOL 3.12 MG: 3.12 TABLET, FILM COATED ORAL at 09:00

## 2019-07-24 RX ADMIN — AMIODARONE HYDROCHLORIDE 200 MG: 200 TABLET ORAL at 09:00

## 2019-07-24 RX ADMIN — BENZONATATE 100 MG: 100 CAPSULE ORAL at 08:00

## 2019-07-24 RX ADMIN — HEPARIN SODIUM 5000 UNITS: 5000 INJECTION, SOLUTION INTRAVENOUS; SUBCUTANEOUS at 06:46

## 2019-07-24 RX ADMIN — ASPIRIN 81 MG: 81 TABLET ORAL at 09:00

## 2019-07-24 RX ADMIN — MEMANTINE 10 MG: 10 TABLET ORAL at 09:00

## 2019-07-24 RX ADMIN — PIPERACILLIN SODIUM,TAZOBACTAM SODIUM 2.25 G: 2; .25 INJECTION, POWDER, FOR SOLUTION INTRAVENOUS at 11:00

## 2019-07-24 RX ADMIN — BENZONATATE 100 MG: 100 CAPSULE ORAL at 14:00

## 2019-07-24 RX ADMIN — PIPERACILLIN SODIUM,TAZOBACTAM SODIUM 2.25 G: 2; .25 INJECTION, POWDER, FOR SOLUTION INTRAVENOUS at 06:46

## 2019-07-24 RX ADMIN — HEPARIN SODIUM 5000 UNITS: 5000 INJECTION, SOLUTION INTRAVENOUS; SUBCUTANEOUS at 15:00

## 2019-07-24 RX ADMIN — MEMANTINE 10 MG: 10 TABLET ORAL at 18:00

## 2019-07-24 RX ADMIN — HEPARIN SODIUM 5000 UNITS: 5000 INJECTION, SOLUTION INTRAVENOUS; SUBCUTANEOUS at 22:40

## 2019-07-24 RX ADMIN — METHYLPREDNISOLONE SODIUM SUCCINATE 40 MG: 40 INJECTION, POWDER, FOR SOLUTION INTRAMUSCULAR; INTRAVENOUS at 06:47

## 2019-07-24 RX ADMIN — TORSEMIDE 20 MG: 20 TABLET ORAL at 09:00

## 2019-07-24 RX ADMIN — CARVEDILOL 3.12 MG: 3.12 TABLET, FILM COATED ORAL at 22:40

## 2019-07-24 NOTE — PROGRESS NOTES
Palliative Medicine    Goals of care defined. Will sign off. Please reconsult team as needed/if appropriate. Thank you for the Palliative Medicine consult and allowing us to participate in the care of Mrs. Daniel Marcelino.       Carmen Ngo RN, BSN  Palliative Medicine Inpatient RN  [facility]  Palliative [place] Line: [phone])

## 2019-07-24 NOTE — HOME CARE
Patient is Active to [facility], [facility],CHF pathway,wound care,PT,OT,HH aide and labs, patients son Constance Age) states pt already has hosp bed,W/C,margaret lift RW ans BSC, [facility] will continue to follow. SUZETTE ARRIETA.

## 2019-07-24 NOTE — PROGRESS NOTES
[facility]      CARDIOLOGY PROGRESS NOTE  RECS:      1. Congestive heart failure: Acute on chronic systolic dysfunction. Improving well. Continue Demadex po . metabolic alkalosis resolved CO2 normal.   2. Status post permanent pacemaker: Normal function on 5/2/2019  3. Atrial fibrillation: Noted on pacemaker. Sinus rhythm on amiodarone. 4. Hypokalemia: Resolved on K+ replacement daily   5. Hypertension: Controlled. 6. CKD- stable creatinine  7. Mitral regurgitation: Mild to moderate in December 2018. Control blood pressure and CHF. Follow clinically. Not a candidate for any invasive work-up. 8. Hyperlipidemia  9. History of lupus  10. Obesity        ASSESSMENT:  Hospital Problems  Date Reviewed: 7/17/2019          Codes Class Noted POA    Respiratory distress ICD-10-CM: R06.03  ICD-9-CM: 786.09  7/17/2019 Unknown        COPD exacerbation ([facility] [address] ICD-10-CM: J44.1  ICD-9-CM: 491.21  7/17/2019 Unknown        CAP (community acquired pneumonia) ICD-10-CM: J18.9  ICD-9-CM: 963  7/17/2019 Unknown        Acute respiratory failure with hypoxia [facility]) ICD-10-CM: J96.01  ICD-9-CM: 518.81  7/17/2019 Unknown                SUBJECTIVE:      No significant verbal communication    OBJECTIVE:    VS:   Visit Vitals  /79   Pulse 73   Temp 97.7 °F (36.5 °C)   Resp 18   Ht 5' 4\" (1.626 m)   Wt 69.4 kg (153 lb)   SpO2 100%   BMI 26.26 kg/m²         Intake/Output Summary (Last 24 hours) at 7/24/2019 1038  Last data filed at 7/24/2019 0916  Gross per 24 hour   Intake 170 ml   Output 1350 ml   Net -1180 ml     TELE: AV paced    General: alert, in no apparent distress and Confused  HENT: Normocephalic, atraumatic. Normal external eye. Neck :  no JVD  Cardiac:  regular rate and rhythm, systolic murmur: early systolic 2/6, crescendo at 2nd right intercostal space  Lungs: No rales or rhonchi  Abdomen: Soft, nontender, no masses  Extremities:  No edema noted.  peripheral pulses present      Labs: Results:       Chemistry Recent Labs     07/24/19 0352 07/23/19 0528 07/22/19 0524   * 88 83    144 145   K 3.6 3.3* 3.5    104 102   CO2 32 37* 35*   BUN 32* 33* 39*   CREA 1.12 1.34* 1.27   CA 8.9 8.8 8.7   AGAP 5 3 8   BUCR 29* 25* 31*      CBC w/Diff Recent Labs     07/24/19 0352 07/23/19 0528 07/22/19 0524   WBC 7.0 8.9 7.6   RBC 3.49* 3.23* 3.25*   HGB 9.9* 9.0* 9.0*   HCT 33.1* 30.3* 30.3*    174 195   GRANS 85* 59 55   LYMPH 13* 22 23   EOS 0 5 6*      Cardiac Enzymes No results for input(s): CPK, CKND1, YAN in the last 72 hours. No lab exists for component: CKRMB, TROIP   Coagulation No results for input(s): PTP, INR, APTT in the last 72 hours. No lab exists for component: INREXT, INREXT    Lipid Panel Lab Results   Component Value Date/Time    Cholesterol, total 142 01/31/2019 02:24 AM    HDL Cholesterol 68 (H) 01/31/2019 02:24 AM    LDL, calculated 66.4 01/31/2019 02:24 AM    VLDL, calculated 7.6 01/31/2019 02:24 AM    Triglyceride 38 01/31/2019 02:24 AM    CHOL/HDL Ratio 2.1 01/31/2019 02:24 AM      BNP No results for input(s): BNPP in the last 72 hours. Liver Enzymes No results for input(s): TP, ALB, TBIL, AP, SGOT, GPT in the last 72 hours. No lab exists for component: DBIL   Thyroid Studies Lab Results   Component Value Date/Time    TSH 8.86 (H) 11/15/2015 01:45 PM              EVELYN Villarreal    I have independently evaluated taken history and examined the patient. All relevant labs and testing data's are reviewed. Care plan discussed and updated after review.   Anuja Bruno MD

## 2019-07-24 NOTE — PROGRESS NOTES
[facility]. Pulmonary, Critical Care, and Sleep Medicine    F/U Patient Consult    Name: [facility] MRN: [id number]   : 1931 Hospital: [address] [place] [address] Dr   Date: 2019        This patient has been seen and evaluated at the request of Dr. Marcelle Blum for dyspnea/wheezing. IMPRESSION:   · Acute CHF exacerbation - combined systolic and diastolic dysfunction  · Wheezing:  Likely secondary to CHF/pulm edema, along with aspiration and bronchitis. Pt does not have COPD -- lifelong nonsmoker and no other causative exposures. -Resolved today.   · Chronic aspiration--patient has symptoms of wheezing when eating-barium swallow without any evident aspiration  · Likely right sided atelectasis:  Given pt is bedbound and is laying on her right side, not moved recently  · Deconditioning and adult failure to thrive  · Chronic metabolic alkalosis with respiratory compensation:  Likely in the setting of chronic diuretic administration, some component of resp acidosis as well - chronic likely due to hypoventilation/weakness  · Global weakness  · Dementia  · Hx of Lupus  · CKD 3-4  · Malnutrition       Patient Active Problem List   Diagnosis Code    HTN (hypertension) I10    Hypercholesteremia E78.00    Tricuspid valve disorders, 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    Lupus (systemic lupus erythematosus) ([facility]) M32.9    Anasarca R60.1    Hypertensive heart disease with CHF (congestive heart failure) ([facility]) I11.0    S/P placement of cardiac pacemaker Z95.0    Paroxysmal atrial flutter ([facility]) I48.92    Acute on chronic diastolic (congestive) heart failure ([facility]) P53.35    Diastolic CHF, chronic ([facility]) I50.32    High risk medication use Z79.899    Acute exacerbation of CHF (congestive heart failure) ([facility]) I50.9    ARIEL (acute kidney injury) ([facility] [address] N17.9    Uremia N19    Left hip pain M25.552    CKD (chronic kidney disease) stage 4, GFR 15-29 ml/min ([facility]) N18.4    NSTEMI (non-ST elevated myocardial infarction) ([facility] [address] I21.4    Dementia F03.90    Skin lesions L98.9    Diarrhea R19.7    UTI (urinary tract infection) N39.0    Fever R50.9    Sepsis ([facility]) A41.9    Abscess L02.91    Severe sepsis ([facility]) A41.9, R65.20    Advanced care planning/counseling discussion Z71.89    Debility R53.81    Anemia D64.9    Respiratory distress R06.03    COPD exacerbation ([facility]) J44.1    CAP (community acquired pneumonia) J18.9    Acute respiratory failure with hypoxia ([facility]) J96.01      RECOMMENDATIONS:     Continue bronchodilators and bronchial hygiene protocol   Response to steroids- 24 hours for wheezing-will stop  Maintain net negative fluid balance as renal function tolerates. Diuresis per primary service, cardiology, nephrology  ABx per primary service - pt on Zosyn for aspiration pneumonia coverage-can discontinue  Supplemental oxygen to maintain SpO2 >88%  Please assess for home oxygen need prior to discharge  Frequent incentive spirometry  Management of CKD per nephrology  Aspiration precautions including elevating HOB >30deg  PT/OT, OOB, ambulate with assistance as tolerated  DVT ppx per primary service  Will follow     Subjective:   Nursing notes and flowsheets reviewed    07/24/19     Pt seen and examined at bedside. No acute events overnight. Awake , oriented and conversing appropriately  No more audible wheezing today  Family at bedside    HPI:  Hx taken from chart and family - pt's son is a limited historian   Patient is a 80 y.o. female with PMH of dementia, bed/recliner bound, CHF, CKD, presented to [facility] for dyspnea/SOB. They reported that the patient had about 3-4 weeks of worsening SOB, associated with increased swelling. Pt then worsened and had SpO2 go down to 92% while at home. Pt was recently treated for CHF excacerbation. Family reported this to her Cardiologist who increased diuretics without improvement. Pt presented to the ER and was found to have wheezing.   Of note, family reports pt had a fever of 100 the day prior to admission and a nonproductive cough. Pt is a lifelong nonsmoker. Past Medical History:   Diagnosis Date    Acute on chronic diastolic heart failure (HCC)     Arthritis     Benign hypertensive heart disease without heart failure     Better controlled, stable    Chronic diastolic heart failure (HCC)     Breathing and edema is improving    Congestive heart failure (HCC)     HTN (hypertension)     Hypercholesteremia     Lupus (systemic lupus erythematosus) ([facility] [address] 6/18/2014    followed by dr Lisa Paredes     Obesity, unspecified     Patient has weight loss Discussed diet ad fluid restriction    Other and unspecified hyperlipidemia     F/u per pmd    Tricuspid valve disorders, 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    tr with moderate pulmonary htn       Past Surgical History:   Procedure Laterality Date    HX HYSTERECTOMY      PACEMAKER PROCEDURE        Current Facility-Administered Medications   Medication Dose Route Frequency    benzonatate (TESSALON) capsule 100 mg  100 mg Oral TID    torsemide (DEMADEX) tablet 20 mg  20 mg Oral DAILY    spironolactone (ALDACTONE) tablet 25 mg  25 mg Oral DAILY    potassium chloride (KLOR-CON) packet for solution 20 mEq  20 mEq Oral DAILY    piperacillin-tazobactam (ZOSYN) 2.25 g in 0.9% sodium chloride (MBP/ADV) 50 mL MBP  2.25 g IntraVENous Q6H    amiodarone (CORDARONE) tablet 200 mg  200 mg Oral DAILY    aspirin delayed-release tablet 81 mg  81 mg Oral DAILY    carvedilol (COREG) tablet 3.125 mg  3.125 mg Oral Q12H    memantine (NAMENDA) tablet 10 mg  10 mg Oral BID    polyethylene glycol (MIRALAX) packet 17 g  17 g Oral DAILY    heparin (porcine) injection 5,000 Units  5,000 Units SubCUTAneous Q8H       Review of Systems:  ROS not obtained due to patient factors.       Objective:   Vital Signs:    Visit Vitals  /72   Pulse 70   Temp 98 °F (36.7 °C)   Resp 18   Ht 5' 4\" (1.626 m)   Wt 69.4 kg (153 lb)   SpO2 100%   BMI 26.26 kg/m²       O2 Device: Nasal cannula   O2 Flow Rate (L/min): 2 l/min   Temp (24hrs), Av.8 °F (36.6 °C), Min:97.3 °F (36.3 °C), Max:98.2 °F (36.8 °C)       Intake/Output:   Last shift:      701 - 1900  In: 120 [P.O.:120]  Out: 650 [Urine:650]  Last 3 shifts: 1901 -  0700  In: 110 [P.O.:60; I.V.:50]  Out: 700 [Urine:700]    Intake/Output Summary (Last 24 hours) at 2019 1341  Last data filed at 2019 0916  Gross per 24 hour   Intake 120 ml   Output 1350 ml   Net -1230 ml      Physical Exam:   General:   Sitting up in bed wearing nasal cannula, no acute distress, alert, pulling at things, oriented x1. Her family states this is baseline   Head:  Normocephalic, without obvious abnormality, atraumatic. Eyes:  Conjunctivae/corneas clear. ANicteric, PERRLA, EOMI   Nose: Nares normal. Mucosa normal. No drainage or sinus tenderness. Throat: Lips, mucosa dry. NO thrush; poor dentition, no oral lesions, mallampati IV   Neck: Supple, symmetrical, trachea midline, no adenopathy, thyroid: no enlargment/tenderness/nodules, no carotid bruit and +JVD. No crepitus   Back:   Symmetric, no curvature, no spine tenderness or flank pain   Lungs:   Poor air entry B/L, CTABL,    Chest wall:  No tenderness or deformity. NO CREPITUS   Heart:  Regular rate and rhythm, S1, S2 normal, no murmur, click, rub or gallop. Abdomen:   Soft, non-tender. Bowel sounds normal. No masses,  No organomegaly. No paradoxical motion   Extremities: normal, atraumatic, no cyanosis or clubbing. 2+ pitting edema B/L up to thighs   Pulses: 1-2+ and symmetric all extremities.    Skin: Skin color, texture, turgor normal. No rashes or lesions   Lymph nodes: Cervical, supraclavicular, and axillary nodes normal.   Neurologic: Grossly nonfocal, pt alert but disoriented at baseline, globally weak, 1/5 in B/L LE and 2-3/5 in UE          Data review:   Labs:  Recent Results (from the past 24 hour(s))   CBC WITH AUTOMATED DIFF Collection Time: 07/24/19  3:52 AM   Result Value Ref Range    WBC 7.0 4.6 - 13.2 K/uL    RBC 3.49 (L) 4.20 - 5.30 M/uL    HGB 9.9 (L) 12.0 - 16.0 g/dL    HCT 33.1 (L) 35.0 - 45.0 %    MCV 94.8 74.0 - 97.0 FL    MCH 28.4 24.0 - 34.0 PG    MCHC 29.9 (L) 31.0 - 37.0 g/dL    RDW 16.4 (H) 11.6 - 14.5 %    PLATELET 012 954 - 219 K/uL    MPV 9.6 9.2 - 11.8 FL    NEUTROPHILS 85 (H) 40 - 73 %    LYMPHOCYTES 13 (L) 21 - 52 %    MONOCYTES 2 (L) 3 - 10 %    EOSINOPHILS 0 0 - 5 %    BASOPHILS 0 0 - 2 %    ABS. NEUTROPHILS 6.0 1.8 - 8.0 K/UL    ABS. LYMPHOCYTES 0.9 0.9 - 3.6 K/UL    ABS. MONOCYTES 0.1 0.05 - 1.2 K/UL    ABS. EOSINOPHILS 0.0 0.0 - 0.4 K/UL    ABS. BASOPHILS 0.0 0.0 - 0.1 K/UL    DF AUTOMATED     METABOLIC PANEL, BASIC    Collection Time: 07/24/19  3:52 AM   Result Value Ref Range    Sodium 145 136 - 145 mmol/L    Potassium 3.6 3.5 - 5.5 mmol/L    Chloride 108 100 - 111 mmol/L    CO2 32 21 - 32 mmol/L    Anion gap 5 3.0 - 18 mmol/L    Glucose 131 (H) 74 - 99 mg/dL    BUN 32 (H) 7.0 - 18 MG/DL    Creatinine 1.12 0.6 - 1.3 MG/DL    BUN/Creatinine ratio 29 (H) 12 - 20      GFR est AA 56 (L) >60 ml/min/1.73m2    GFR est non-AA 46 (L) >60 ml/min/1.73m2    Calcium 8.9 8.5 - 10.1 MG/DL   MAGNESIUM    Collection Time: 07/24/19  3:52 AM   Result Value Ref Range    Magnesium 2.5 1.6 - 2.6 mg/dL     ABG:  No results found for: PH, PHI, PCO2, PCO2I, PO2, PO2I, HCO3, HCO3I, FIO2, FIO2I        PFT Results  (Last 48 hours)    None        Echo Results  (Last 48 hours)    None        Imaging:  I have personally reviewed the patients radiographs and have reviewed the reports:  Personal review of CXR from 7/17 shows cardiomegaly with vascular congestion, no infiltrates or consolidations or masses. Reviewed CT abd from 2/13/19 ---  No emphysema in lung bases  XR Results (most recent):  Results from Hospital Encounter encounter on 07/17/19   XR SWALLOW FUNC VIDEO    Narrative Modified barium swallow with speech.     INDICATION: Dysphagia, respiratory distress, recent abnormal chest x-ray,  concern for aspiration pneumonia, history of previous surgery    Fluoroscopy time 1 minute 12 seconds    Cine loops: 6    FINDINGS: The patient was given multiple consistencies of barium. Probable  prominent cricopharyngeal muscle noted. No penetration or aspiration with thin,  nectar, honey, puree, solid or pill. Impression Impression:    As above. Please see speech pathologist's report for further evaluation and  recommendations. CT Results  (Last 48 hours)    None            High complexity decision making was performed during the evaluation of this patient at high risk for decompensation with multiple organ involvement     Above mentioned total time spent on reviewing the case/medical record/data/notes/EMR/patient examination/documentation/coordinating care with nurse/consultants, exclusive of procedures with complex decision making performed and > 50% time spent in face to face evaluation.          Dao Ansari MD    Pulmonary, Critical Care Medicine  [facility]

## 2019-07-24 NOTE — PROGRESS NOTES
Bedside shift change report given to [address] [place] [address] (oncoming nurse) by Rojelio Craig RN (offgoing nurse). Report included the following information SBAR, Kardex, Procedure Summary, Intake/Output, MAR and Recent Results.

## 2019-07-24 NOTE — PROGRESS NOTES
RENAL PROGRESS NOTE        Jc Alarcon         Assessment/Plan:     · CKD 3 due to htn/chronic cardiorenal syndrome. Stable. · ADHFrEF/volume overload. Improved, continue oral demadex and spirolactone. · HTN. Controlled, continue current regimen. Subjective:  Patient complaints off: Feels better. No SOB at rest, no CP/N/V. Tolerates some soft diet.        Patient Active Problem List   Diagnosis Code    HTN (hypertension) I10    Hypercholesteremia E78.00    Tricuspid valve disorders, 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    Lupus (systemic lupus erythematosus) ([facility]) M32.9    Anasarca R60.1    Hypertensive heart disease with CHF (congestive heart failure) ([facility]) I11.0    S/P placement of cardiac pacemaker Z95.0    Paroxysmal atrial flutter ([facility]) I48.92    Acute on chronic diastolic (congestive) heart failure ([facility]) M85.53    Diastolic CHF, chronic ([facility]) I50.32    High risk medication use Z79.899    Acute exacerbation of CHF (congestive heart failure) ([facility]) I50.9    ARIEL (acute kidney injury) ([facility] [address] N17.9    Uremia N19    Left hip pain M25.552    CKD (chronic kidney disease) stage 4, GFR 15-29 ml/min ([facility]) N18.4    NSTEMI (non-ST elevated myocardial infarction) ([facility]) I21.4    Dementia F03.90    Skin lesions L98.9    Diarrhea R19.7    UTI (urinary tract infection) N39.0    Fever R50.9    Sepsis ([facility]) A41.9    Abscess L02.91    Severe sepsis ([facility]) A41.9, R65.20    Advanced care planning/counseling discussion Z71.89    Debility R53.81    Anemia D64.9    Respiratory distress R06.03    COPD exacerbation ([facility]) J44.1    CAP (community acquired pneumonia) J18.9    Acute respiratory failure with hypoxia ([facility]) J96.01       Current Facility-Administered Medications   Medication Dose Route Frequency Provider Last Rate Last Dose    benzonatate (TESSALON) capsule 100 mg  100 mg Oral TID Lexi Aguilera MD   100 mg at 07/24/19 1400    torsemide (DEMADEX) tablet 20 mg  20 mg Oral DAILY Deirdre Pichardo MD   20 mg at 07/24/19 0900    spironolactone (ALDACTONE) tablet 25 mg  25 mg Oral DAILY Deirdre Pichardo MD   25 mg at 07/24/19 0900    potassium chloride (KLOR-CON) packet for solution 20 mEq  20 mEq Oral DAILY Deirdre Pichardo MD   20 mEq at 07/24/19 0900    sodium chloride (NS) flush 5-10 mL  5-10 mL IntraVENous PRN Kat Mcdaniels MD   10 mL at 07/22/19 0003    amiodarone (CORDARONE) tablet 200 mg  200 mg Oral DAILY Kavya Moss MD   200 mg at 07/24/19 0900    aspirin delayed-release tablet 81 mg  81 mg Oral DAILY Kavya Moss MD   81 mg at 07/24/19 0900    carvedilol (COREG) tablet 3.125 mg  3.125 mg Oral Q12H Kavya Moss MD   3.125 mg at 07/24/19 0900    memantine (NAMENDA) tablet 10 mg  10 mg Oral BID Kavya Moss MD   10 mg at 07/24/19 0900    polyethylene glycol (MIRALAX) packet 17 g  17 g Oral DAILY Kavya Moss MD   Stopped at 07/24/19 0900    heparin (porcine) injection 5,000 Units  5,000 Units SubCUTAneous Q8H Kavya Moss MD   5,000 Units at 07/24/19 1500       Objective  Vitals:    07/24/19 0800 07/24/19 0808 07/24/19 1146 07/24/19 1626   BP:  154/79 144/72 122/77   Pulse:  73 70 71   Resp:  18 18 16   Temp:  97.7 °F (36.5 °C) 98 °F (36.7 °C) 97.4 °F (36.3 °C)   SpO2: 100% 100% 100% 100%   Weight:       Height:             Intake/Output Summary (Last 24 hours) at 7/24/2019 1741  Last data filed at 7/24/2019 0916  Gross per 24 hour   Intake 120 ml   Output 950 ml   Net -830 ml           Admission weight: Weight: 80.7 kg (178 lb) (07/17/19 1450)  Last Weight Metrics:  Weight Loss Metrics 7/24/2019 7/17/2019 7/17/2019 7/17/2019 7/9/2019 5/29/2019 5/21/2019   Today's Wt 153 lb - - - 187 lb 187 lb 187 lb   BMI - 26.26 kg/m2 - 32.1 kg/m2 32.1 kg/m2 32.1 kg/m2 32.1 kg/m2             Physical Assessment:     General: NAD, alert and oriented. Neck: No jvd. LUNGS: diminished air entry at the bases, no crackles. CVS EXM: S1, S2  RRR. Abdomen: soft, non tender. Lower Extremities:  Trace edema. Pretibial skin is wrinkly. Lab    CBC w/Diff Recent Labs     07/24/19 0352 07/23/19 0528 07/22/19 0524   WBC 7.0 8.9 7.6   RBC 3.49* 3.23* 3.25*   HGB 9.9* 9.0* 9.0*   HCT 33.1* 30.3* 30.3*    174 195   GRANS 85* 59 55   LYMPH 13* 22 23   EOS 0 5 6*        Chemistry Recent Labs     07/24/19 0352 07/23/19 0528 07/22/19 0524   * 88 83    144 145   K 3.6 3.3* 3.5    104 102   CO2 32 37* 35*   BUN 32* 33* 39*   CREA 1.12 1.34* 1.27   CA 8.9 8.8 8.7   AGAP 5 3 8   BUCR 29* 25* 31*         Lab Results   Component Value Date/Time    Iron 51 02/10/2019 11:00 AM    TIBC 202 (L) 02/10/2019 11:00 AM    Iron % saturation 25 02/10/2019 11:00 AM      Lab Results   Component Value Date/Time    Calcium 8.9 07/24/2019 03:52 AM    Phosphorus 3.4 12/28/2018 09:25 AM        Mary Lou Han M.D.   [facility]  Phone

## 2019-07-24 NOTE — PROGRESS NOTES
followed up with Ms Hanane Maldonado who is picking at her hospital [facility]. Son Walker Nair is present at bedside.  not able to understand words that Ms Skye Alli. Offered her words of assurance and orientation. Listening support and compassion offered as Walker Nair shares a little about his mom. He adds that he is grateful that his \"mom's cough is better today. \" Walker Nair is the youngest of 3 children and he and his brother Anny Sifuentes live with his mom and dad and help take care of her. Walker Nair stated that his dad is 80years old and \"still getting around. 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  shared Psalm 23. Walker Nair shared his gratitude for visit and support. Team will continue to follow.     [address] [place] [address] E  [phone]

## 2019-07-24 NOTE — PROGRESS NOTES
Problem: Self Care Deficits Care Plan (Adult)  Goal: *Acute Goals and Plan of Care (Insert Text)  Description  Occupational Therapy Goals  Initiated 7/19/2019 within 7 day(s). 1.  Patient will participate in [address] [employer] 3-5X/week. Prior Level of Function: Patient lives with her  and son and requires total assistance for all ADLs. Outcome: Progressing Towards Goal   Patient: Uma Friedman (80 y.o. female)  Date: 7/24/2019    Subjective:  Pt stated \"Okay honey\"    Patient cleared by nursing Prudencjo Reeder) for participation in [facility]. Pt tolerated all BUE exercises well. Pt was able to actively assist tech in elbow flex/ext bilaterally. Pt was left resting comfortably in bed with son in room. Discussed session with nursing. Therapeutic Exercises:       EXERCISE   Sets   Reps   Active Active Assist   Passive Self ROM   Comments   Finger Flex/Ext 1 15  ? ? ? ? BUE   Wrist Flex/Ext 1 15 ? ? ? ? BUE   Elbow Flex/Ext 1 15 ? ? ? ? BUE   Shoulder Flex/Ext 1 15 ? ? ? ? BUE      ? ? ? ?       ? ? ? ? Pain:  Pre Session: No pain noted  Post session: No pain noted    After treatment:   ? Patient left in no apparent distress in bed  ? Call bell left within reach  ? Nursing notified  ? Caregiver present (Son)  ?  Bed alarm activated      Charanjit Ear

## 2019-07-24 NOTE — PROGRESS NOTES
Problem: Mobility Impaired (Adult and Pediatric)  Goal: *Acute Goals and Plan of Care (Insert Text)  Description  Physical Therapy Goals  Initiated 7/19/2019 and to be accomplished within 7 day(s)  1. Patient will participate in functional maintenance program for PROM/AAROM B LE 3-5 times a week. Prior Level of Function: Total assistance for mobility including bed mobility and transfers. Pt has a hospital bed and a mechanical lift. Pt is total assist to her WC at home which she sits in for meals. Pt lives with her  and her son. Outcome: Progressing Towards Goal   Patient: Cristiana Alva (80 y.o. female)  Date: 7/24/2019    Subjective:  Pt stated \"What are you trying to do with my leg I can't do exercises. \"    Patient cleared by nursing Jose Owusu) for participation in [facility]. When attempting to perform PROM exercises for the LLE, the Pt stated over and over again that she could not do exercises, despite Pt son and tech reassuring Pt that the exercises would be done passively and Pt only needed to relax. Pt only tolerated ankle pumps on the LLE. Pt was much more receptive for exercises on the RLE. However, was unable to tolerate heel slides on the RLE. Pt was left resting comfortably in bed with son in the room. Session was discussed with nursing. Therapeutic Exercises:       EXERCISE   Sets   Reps   Active Active Assist   Passive Self ROM   Comments   Ankle Pumps/Circles 1 15  ? ? ? ? BLE   Heel Slides   ? ? ? ? Hip IR/ER   ? ? ? ? Hip ABD/ADD 1 15 ? ? ? ? RLE   Hip Flex/Ext   ? ? ? ?       ? ? ? ? Pain:  Pre Session: No pain noted  Post session: No pain noted    After treatment:   ? Patient left in no apparent distress in bed  ? Call bell left within reach  ? Nursing notified  ? Caregiver present (Son)  ?  Bed alarm activated      [place]

## 2019-07-24 NOTE — PROGRESS NOTES
Discharge planning:    Patient now has home health orders in place. She is active with a personal care agency. This writer obtained freedom of choice for home health services. [facility] This writer informed patient's family that she may be discharged today. Family is not comfortable for a discharge for today and plans to talk to Dr. Checo Stanley, when he rounds. This writer will continue to closely monitor for discharge planning. When she does discharge, she will need stretcher transport home. Lamonte Turner MSW  Care Manager  Pager#: [phone]

## 2019-07-25 VITALS
SYSTOLIC BLOOD PRESSURE: 140 MMHG | RESPIRATION RATE: 19 BRPM | HEART RATE: 72 BPM | OXYGEN SATURATION: 95 % | BODY MASS INDEX: 26.12 KG/M2 | WEIGHT: 153 LBS | HEIGHT: 64 IN | DIASTOLIC BLOOD PRESSURE: 65 MMHG | TEMPERATURE: 98.1 F

## 2019-07-25 PROCEDURE — 3331090002 HH PPS REVENUE DEBIT

## 2019-07-25 PROCEDURE — 74011250637 HC RX REV CODE- 250/637: Performed by: INTERNAL MEDICINE

## 2019-07-25 PROCEDURE — 3331090001 HH PPS REVENUE CREDIT

## 2019-07-25 PROCEDURE — 74011250636 HC RX REV CODE- 250/636: Performed by: INTERNAL MEDICINE

## 2019-07-25 PROCEDURE — 77030038269 HC DRN EXT URIN PURWCK BARD -A

## 2019-07-25 PROCEDURE — 74011250637 HC RX REV CODE- 250/637: Performed by: EMERGENCY MEDICINE

## 2019-07-25 RX ORDER — SPIRONOLACTONE 25 MG/1
25 TABLET ORAL DAILY
Qty: 30 TAB | Refills: 0 | Status: SHIPPED | OUTPATIENT
Start: 2019-07-26 | End: 2019-08-22 | Stop reason: SDUPTHER

## 2019-07-25 RX ORDER — BENZONATATE 100 MG/1
100 CAPSULE ORAL
Qty: 15 CAP | Refills: 0 | Status: SHIPPED | OUTPATIENT
Start: 2019-07-25 | End: 2019-07-30

## 2019-07-25 RX ORDER — POTASSIUM CHLORIDE 1.5 G/1.77G
20 POWDER, FOR SOLUTION ORAL DAILY
Qty: 3 PACKET | Refills: 0 | Status: SHIPPED | OUTPATIENT
Start: 2019-07-26 | End: 2019-07-29

## 2019-07-25 RX ADMIN — POLYETHYLENE GLYCOL 3350 17 G: 17 POWDER, FOR SOLUTION ORAL at 09:31

## 2019-07-25 RX ADMIN — ASPIRIN 81 MG: 81 TABLET ORAL at 09:26

## 2019-07-25 RX ADMIN — AMIODARONE HYDROCHLORIDE 200 MG: 200 TABLET ORAL at 09:28

## 2019-07-25 RX ADMIN — CARVEDILOL 3.12 MG: 3.12 TABLET, FILM COATED ORAL at 09:26

## 2019-07-25 RX ADMIN — POTASSIUM CHLORIDE 20 MEQ: 1.5 POWDER, FOR SOLUTION ORAL at 09:31

## 2019-07-25 RX ADMIN — BENZONATATE 100 MG: 100 CAPSULE ORAL at 15:53

## 2019-07-25 RX ADMIN — TORSEMIDE 20 MG: 20 TABLET ORAL at 09:28

## 2019-07-25 RX ADMIN — MEMANTINE 10 MG: 10 TABLET ORAL at 09:27

## 2019-07-25 RX ADMIN — BENZONATATE 100 MG: 100 CAPSULE ORAL at 09:25

## 2019-07-25 RX ADMIN — SPIRONOLACTONE 25 MG: 25 TABLET ORAL at 09:27

## 2019-07-25 RX ADMIN — HEPARIN SODIUM 5000 UNITS: 5000 INJECTION, SOLUTION INTRAVENOUS; SUBCUTANEOUS at 09:30

## 2019-07-25 NOTE — HOME CARE
D/c noted for today [facility] will follow for SN/PT/OT/Aide - referral processed with  Central office - TEA grider RN

## 2019-07-25 NOTE — PROGRESS NOTES
Problem: Falls - Risk of  Goal: *Absence of Falls  Description  Document Tiffany Jaffe Fall Risk and appropriate interventions in the flowsheet. Outcome: Progressing Towards Goal  Note:   Fall Risk Interventions:       Mentation Interventions: Adequate sleep, hydration, pain control, More frequent rounding, Reorient patient, Update white board    Medication Interventions: Evaluate medications/consider consulting pharmacy    Elimination Interventions: Call light in reach, Bed/chair exit alarm              Problem: Pressure Injury - Risk of  Goal: *Prevention of pressure injury  Description  Document Rodríguez Scale and appropriate interventions in the flowsheet. Envision mattress on versacare frame; prevalon boots. Outcome: Progressing Towards Goal  Note:   Pressure Injury Interventions:  Sensory Interventions: Assess changes in LOC, Assess need for specialty bed, Keep linens dry and wrinkle-free    Moisture Interventions: Absorbent underpads, Apply protective barrier, creams and emollients    Activity Interventions: Assess need for specialty bed    Mobility Interventions: Assess need for specialty bed    Nutrition Interventions: Document food/fluid/supplement intake    Friction and Shear Interventions: Apply protective barrier, creams and emollients                Problem: Patient Education: Go to Patient Education Activity  Goal: Patient/Family Education  Outcome: Progressing Towards Goal     Problem: Risk for Spread of Infection  Goal: Prevent transmission of infectious organism to others  Description  Prevent the transmission of infectious organisms to other patients, staff members, and visitors.   Outcome: Progressing Towards Goal

## 2019-07-25 NOTE — PROGRESS NOTES
Visit Vitals  /66 (BP 1 Location: Left arm, BP Patient Position: At rest)   Pulse 70   Temp 97.9 °F (36.6 °C)   Resp 18   Ht 5' 4\" (1.626 m)   Wt 69.4 kg (153 lb)   SpO2 99%   BMI 26.26 kg/m²     General:  Awake, has dementia  Cardiovascular:  S1S2+, RRR  Pulmonary:  CTA b/l  GI:  Soft, BS+, NT, ND  Extremities:  No edema      Patient in NAD, appears at baseline. Daughter at bedside. Cardio and pulm input noted. Home [facility] with [facility]. D/w daughter at bedside.  D/w     Carlos Strauss MD None

## 2019-07-25 NOTE — PROGRESS NOTES
NUTRITION    Nursing Referral: Pressure Injury     RECOMMENDATIONS / PLAN:     - Continue Ensure Enlive BID post-discharge, discussed with family. - Continue RD inpatient monitoring and evaluation. NUTRITION INTERVENTIONS & DIAGNOSIS:     [x] Meals/snacks: modify composition  [x] Medical food supplement therapy: Ensure Enlive, BID    Nutrition Diagnosis: Increased nutrient needs (protein) related to increased demand for wound healing as evidenced by pressure injury. ASSESSMENT:     7/25: Fair/good meal intake, consuming supplements. Does better with lunch and dinner meals. Plan for discharge today to home. Family reports they plan to continue to provide Ensure supplements for pt.  7/22: Overall good/fair meal intake, consuming supplements. Family assisting with meals and encouraging intake. 7/18: Pt confused with dementia, family members present to answer questions. Reports pt with stable weight hx and good appetite PTA, typically has better meal intake with chopped foods. Feeds herself at home but family helping feed pt today. Fair intake today.     Average po intake adequate to meet patients estimated nutritional needs:   [x] Yes     [] No   [] Unable to determine at this time    Diet: DIET NUTRITIONAL SUPPLEMENTS Breakfast, Dinner; ENSURE ENLIVE  DIET CARDIAC Mechanical Soft      Food Allergies: NKFA  Current Appetite:   [] Good     [x] Fair     [] Poor     [x] Other: mentation  Appetite/meal intake prior to admission:   [x] Good     [] Fair     [] Poor     [x] Other: occasional nutritional supplement  Feeding Limitations:  [x] Swallowing difficulty: SLP following: MBS completed 7/19/19   [] Chewing difficulty    [] Other   Current Meal Intake:   Patient Vitals for the past 100 hrs:   % Diet Eaten   07/24/19 1842 30 %   07/24/19 1314 95 %   07/24/19 0916 50 %   07/23/19 0900 25 %   07/21/19 1842 30 %   07/21/19 0845 100 %       BM: 7/24- formed  Skin Integrity: stage 2 pressure injury to sacrum, vascular wound to right leg  Edema:   [x] No     [] Yes   Pertinent Medications: Reviewed: miralax, KCL, aldactone, demadex    Recent Labs     07/24/19  0352 07/23/19  0528    144   K 3.6 3.3*    104   CO2 32 37*   * 88   BUN 32* 33*   CREA 1.12 1.34*   CA 8.9 8.8   MG 2.5  --        Intake/Output Summary (Last 24 hours) at 7/25/2019 1242  Last data filed at 7/25/2019 0433  Gross per 24 hour   Intake    Output 700 ml   Net -700 ml       Anthropometrics:  Ht Readings from Last 1 Encounters:   07/17/19 5' 4\" (1.626 m)     Last 3 Recorded Weights in this Encounter    07/23/19 0814 07/24/19 0400 07/25/19 0432   Weight: 70.5 kg (155 lb 6.8 oz) 69.4 kg (153 lb) 69.4 kg (153 lb)     Body mass index is 26.26 kg/m². Weight History: Family reports pt with UBW of around 185 lbs however unsure of weight trends 2/2 inability to weight pt at home. Weight Metrics 7/25/2019 7/17/2019 7/17/2019 7/17/2019 7/9/2019 5/29/2019 5/21/2019   Weight 153 lb - - - 187 lb 187 lb 187 lb   BMI - 26.26 kg/m2 - 32.1 kg/m2 32.1 kg/m2 32.1 kg/m2 32.1 kg/m2        Admitting Diagnosis: Respiratory distress [R06.03]  COPD exacerbation (HCC) [J44.1]  CAP (community acquired pneumonia) [J18.9]  Pertinent PMHx: CHF, COPD, HTN, hyperchoelsterolemia    Education Needs:        [x] None identified  [] Identified - Not appropriate at this time  []  Identified and addressed - refer to education log  Learning Limitations:   [] None identified  [x] Identified: dementia    Cultural, [employer]y[place]ian & ethnic food preferences:  [x] None identified    [] Identified and addressed     ESTIMATED NUTRITION NEEDS:     Calories: 9285-5432 kcal (MSJx1.2-1.3) based on  [x] Actual BW: 76 kg      [] IBW   Protein: 115-144 gm (1.2-1.5 gm/kg) based on  [x] Actual BW      [] IBW   Fluid: 1 mL/kcal     MONITORING & EVALUATION:     Nutrition Goal(s):   1. Po intake of meals will meet >75% of patient estimated nutritional needs within the next 7 days.   Outcome:  [x] Met/Ongoing    [] Progressing towards goal    [] Not progressing towards goal    [] New/Initial goal     Monitoring:   [x] Food and beverage intake   [x] Diet order   [x] Nutrition-focused physical findings   [x] Treatment/therapy   [] Weight   [] Enteral nutrition intake        Previous Recommendations (for follow-up assessments only):     [x]   Implemented       []   Not Implemented (RD to address)      [] No Longer Appropriate     [] No Recommendation Made     Discharge Planning: cardiac diet; consistency per SLP + Ensure Enlive 1-2x daily  [x] Participated in care planning, discharge planning, & interdisciplinary rounds as appropriate      Jeannette Bruno RD   Pager: [phone]

## 2019-07-25 NOTE — PROGRESS NOTES
[facility]      CARDIOLOGY PROGRESS NOTE  RECS:      1. Congestive heart failure: Acute on chronic systolic dysfunction. Improving well. Continue Demadex po . metabolic alkalosis resolved CO2 normal.   2. Status post permanent pacemaker: Normal function on 5/2/2019  3. Atrial fibrillation: Noted on pacemaker. Sinus rhythm on amiodarone. 4. Hypokalemia: Resolved on K+ replacement daily   5. Hypertension: Controlled. 6. CKD- stable creatinine  7. Mitral regurgitation: Mild to moderate in December 2018. Control blood pressure and CHF. Follow clinically. Not a candidate for any invasive work-up. 8. Hyperlipidemia  9. History of lupus  10. Obesity    Stable from cardiac stand point follow in office Dr. Mandie Pitts 1-2 weeks   Will signs off call us if needed     ASSESSMENT:  Hospital Problems  Date Reviewed: 7/17/2019          Codes Class Noted POA    Respiratory distress ICD-10-CM: R06.03  ICD-9-CM: 786.09  7/17/2019 Unknown        COPD exacerbation ([facility] [address] ICD-10-CM: J44.1  ICD-9-CM: 491.21  7/17/2019 Unknown        CAP (community acquired pneumonia) ICD-10-CM: J18.9  ICD-9-CM: 809  7/17/2019 Unknown        Acute respiratory failure with hypoxia [facility]) ICD-10-CM: J96.01  ICD-9-CM: 518.81  7/17/2019 Unknown                SUBJECTIVE:    No significant verbal communication    OBJECTIVE:    VS:   Visit Vitals  /66 (BP 1 Location: Left arm, BP Patient Position: At rest)   Pulse 70   Temp 97.9 °F (36.6 °C)   Resp 18   Ht 5' 4\" (1.626 m)   Wt 69.4 kg (153 lb)   SpO2 99%   BMI 26.26 kg/m²         Intake/Output Summary (Last 24 hours) at 7/25/2019 1014  Last data filed at 7/25/2019 0433  Gross per 24 hour   Intake    Output 700 ml   Net -700 ml     TELE: AV paced    General: alert, in no apparent distress and Confused  HENT: Normocephalic, atraumatic. Normal external eye.   Neck :  no JVD  Cardiac:  regular rate and rhythm, systolic murmur: early systolic 2/6, crescendo at 2nd right intercostal space  Lungs: No rales or rhonchi  Abdomen: Soft, nontender, no masses  Extremities:  No edema noted. peripheral pulses present      Labs: Results:       Chemistry Recent Labs     07/24/19 0352 07/23/19 0528   * 88    144   K 3.6 3.3*    104   CO2 32 37*   BUN 32* 33*   CREA 1.12 1.34*   CA 8.9 8.8   AGAP 5 3   BUCR 29* 25*      CBC w/Diff Recent Labs     07/24/19 0352 07/23/19 0528   WBC 7.0 8.9   RBC 3.49* 3.23*   HGB 9.9* 9.0*   HCT 33.1* 30.3*    174   GRANS 85* 59   LYMPH 13* 22   EOS 0 5      Cardiac Enzymes No results for input(s): CPK, CKND1, YAN in the last 72 hours. No lab exists for component: CKRMB, TROIP   Coagulation No results for input(s): PTP, INR, APTT in the last 72 hours. No lab exists for component: INREXT, INREXT    Lipid Panel Lab Results   Component Value Date/Time    Cholesterol, total 142 01/31/2019 02:24 AM    HDL Cholesterol 68 (H) 01/31/2019 02:24 AM    LDL, calculated 66.4 01/31/2019 02:24 AM    VLDL, calculated 7.6 01/31/2019 02:24 AM    Triglyceride 38 01/31/2019 02:24 AM    CHOL/HDL Ratio 2.1 01/31/2019 02:24 AM      BNP No results for input(s): BNPP in the last 72 hours. Liver Enzymes No results for input(s): TP, ALB, TBIL, AP, SGOT, GPT in the last 72 hours.     No lab exists for component: DBIL   Thyroid Studies Lab Results   Component Value Date/Time    TSH 8.86 (H) 11/15/2015 01:45 PM              EVELYN Villarreal

## 2019-07-25 NOTE — PROGRESS NOTES
Discharge:    Discharge order noted for today. Pt has been accepted to [facility] agency. Met with patient and her family and they are agreeable to the transition plan today. Transport has been arranged through [facility] for a 4PM  time. Patient's discharge summary and home health  orders have been forwarded to [facility] health  agency via [facility] Updated bedside RN, [facility],  to the transition plan. Discharge information has been documented on the AVS.       Lamonte HUANG  [facility] MSW  Care Manager  Pager#: [phone]

## 2019-07-25 NOTE — DISCHARGE INSTRUCTIONS
Diet- Cardiac, MECHANICAL SOFT  Ensure enlive , 1 can PO BID  Activity - as tolerated  FALL PRECAUTIONS  ASPIRATION PRECAUTIONS  DECUBITUS PRECAUTIONS  Incentive Spirometry Q30 minutes when awake  Wound care as directed  PT, OT Evaluate and Treat  Check CBC, CMP, Mg in 4 days

## 2019-07-25 NOTE — PROGRESS NOTES
[facility] Hospitalist Group  Progress Note    Patient: Ron Crawley Age: 80 y.o. : 1931 MR#: [id number] SSN: [id number]  Date/Time:     Subjective:     I personally saw and evaluated this patient on 19. Patient in NAD, lying in bed, daughter at bedside    Assessment/Plan:     - ? Bronchitis,  - acute on Chronic systolic and diastolic combined  chf,   - CKD3   - COPD without acute exacerbation  - Dementia  - h/o Lupus  - Dysphagia    PLAN  O2, on zosyn, switch to po augmentin at discharge  Bronchial hygiene, follow cx  Steroids per pulm  nephro following, monitor  Po demadex  Diet as per speech  Wound care  Full code  Palliative care following  dispo- home soon  D/w daughter    Case discussed with:  [x]Patient  []Family  []Nursing  []Case Management  DVT Prophylaxis:  []Lovenox  [x]Hep SQ  []SCDs  []Coumadin   []On Heparin gtt    Objective:   VS:   Visit Vitals  /66 (BP 1 Location: Left arm, BP Patient Position: At rest)   Pulse 70   Temp 97.9 °F (36.6 °C)   Resp 18   Ht 5' 4\" (1.626 m)   Wt 69.4 kg (153 lb)   SpO2 99%   BMI 26.26 kg/m²      Tmax/24hrs: Temp (24hrs), Av.8 °F (36.6 °C), Min:97.4 °F (36.3 °C), Max:98.3 °F (36.8 °C)    Input/Output:     Intake/Output Summary (Last 24 hours) at 2019 1013  Last data filed at 2019 0433  Gross per 24 hour   Intake    Output 700 ml   Net -700 ml     General:  Awake, has dementia  Cardiovascular:  S1S2+, RRR  Pulmonary:  Coarse bs b/l, no wheezing  GI:  Soft, BS+, NT, ND  Extremities:  No edema        Labs:    No results found for this or any previous visit (from the past 24 hour(s)).   Additional Data Reviewed:      Signed By: Oswaldo Smith MD

## 2019-07-25 NOTE — PROGRESS NOTES
Pt is alert and oriented to self, denies pain or nausea, and vital signs are stable for discharge home with home health. Pt transported home via stretcher with [facility] crew. Daughter at bedside and riding with her. Discharge instructions reviewed and PIV is removed prior to pt leaving. Belongings left with pt.

## 2019-07-25 NOTE — PROGRESS NOTES
[facility]. Pulmonary, Critical Care, and Sleep Medicine    F/U Patient Consult    Name: Breonna Soriano MRN: [id number]   : 1931 Hospital: [address] [place] [address] Dr   Date: 2019        This patient has been seen and evaluated at the request of Dr. Natasha Daily for dyspnea/wheezing. IMPRESSION:   · Acute CHF exacerbation - combined systolic and diastolic dysfunction  · Wheezing:  Likely secondary to CHF/pulm edema, along with aspiration and bronchitis.   Pt does not have COPD -- lifelong nonsmoker and no other causative exposures. -Resolved   · Chronic aspiration--patient has symptoms of wheezing when eating-barium swallow without any evident aspiration  · Likely right sided atelectasis:  Given pt is bedbound and is laying on her right side, not moved recently  · Deconditioning and adult failure to thrive  · Chronic metabolic alkalosis with respiratory compensation:  Likely in the setting of chronic diuretic administration, some component of resp acidosis as well - chronic likely due to hypoventilation/weakness  · Global weakness  · Dementia  · Hx of Lupus  · CKD 3-4  · Malnutrition       Patient Active Problem List   Diagnosis Code    HTN (hypertension) I10    Hypercholesteremia E78.00    Tricuspid valve disorders, 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    Lupus (systemic lupus erythematosus) ([facility]) M32.9    Anasarca R60.1    Hypertensive heart disease with CHF (congestive heart failure) ([facility]) I11.0    S/P placement of cardiac pacemaker Z95.0    Paroxysmal atrial flutter ([facility]) I48.92    Acute on chronic diastolic (congestive) heart failure ([facility]) S49.79    Diastolic CHF, chronic ([facility]) I50.32    High risk medication use Z79.899    Acute exacerbation of CHF (congestive heart failure) ([facility]) I50.9    ARIEL (acute kidney injury) ([facility] [address] N17.9    Uremia N19    Left hip pain M25.552    CKD (chronic kidney disease) stage 4, GFR 15-29 ml/min ([facility]) N18.4    [facility]non[facility] elevated [facility]) [facility] I21.4    Dementia F03.90    Skin lesions L98.9    Diarrhea R19.7    UTI (urinary tract infection) N39.0    Fever R50.9    Sepsis ([facility]) A41.9    Abscess L02.91    Severe sepsis ([facility]) A41.9, R65.20    Advanced care planning/counseling discussion Z71.89    Debility R53.81    Anemia D64.9    Respiratory distress R06.03    COPD exacerbation ([facility]) J44.1    CAP (community acquired pneumonia) J18.9    Acute respiratory failure with hypoxia ([facility]) J96.01      RECOMMENDATIONS:     Continue bronchodilators and bronchial hygiene protocol   Maintain net negative fluid balance as renal function tolerates. Diuresis per primary service, cardiology, nephrology  ABx per primary service - pt on Zosyn for aspiration pneumonia coverage-can discontinue  Supplemental oxygen to maintain SpO2 >88%  Please assess for home oxygen need prior to discharge  Frequent incentive spirometry  Management of CKD per nephrology  Aspiration precautions including elevating HOB >30deg  PT/OT, OOB, ambulate with assistance as tolerated  DVT ppx per primary service  Will follow     Subjective:   Nursing notes and flowsheets reviewed    07/25/19     Pt seen and examined at bedside. No acute events overnight. Awake , oriented and conversing appropriately  No more audible wheezing today    HPI:  Hx taken from chart and family - pt's son is a limited historian   Patient is a 80 y.o. female with PMH of dementia, bed/recliner bound, CHF, CKD, presented to [facility] for dyspnea/SOB. They reported that the patient had about 3-4 weeks of worsening SOB, associated with increased swelling. Pt then worsened and had SpO2 go down to 92% while at home. Pt was recently treated for CHF excacerbation. Family reported this to her Cardiologist who increased diuretics without improvement. Pt presented to the ER and was found to have wheezing. Of note, family reports pt had a fever of 100 the day prior to admission and a nonproductive cough.   Pt is a lifelong nonsmoker. Past Medical History:   Diagnosis Date    Acute on chronic diastolic heart failure (HCC)     Arthritis     Benign hypertensive heart disease without heart failure     Better controlled, stable    Chronic diastolic heart failure (HCC)     Breathing and edema is improving    Congestive heart failure (HCC)     HTN (hypertension)     Hypercholesteremia     Lupus (systemic lupus erythematosus) ([facility] [address] 2014    followed by dr Fabiano Berkowitz     Obesity, unspecified     Patient has weight loss Discussed diet ad fluid restriction    Other and unspecified hyperlipidemia     F/u per pmd    Tricuspid valve disorders, specified as nonrheumatic 2014    tr with moderate pulmonary htn       Past Surgical History:   Procedure Laterality Date    HX HYSTERECTOMY      PACEMAKER PROCEDURE        Current Facility-Administered Medications   Medication Dose Route Frequency    benzonatate (TESSALON) capsule 100 mg  100 mg Oral TID    torsemide (DEMADEX) tablet 20 mg  20 mg Oral DAILY    spironolactone (ALDACTONE) tablet 25 mg  25 mg Oral DAILY    potassium chloride (KLOR-CON) packet for solution 20 mEq  20 mEq Oral DAILY    amiodarone (CORDARONE) tablet 200 mg  200 mg Oral DAILY    aspirin delayed-release tablet 81 mg  81 mg Oral DAILY    carvedilol (COREG) tablet 3.125 mg  3.125 mg Oral Q12H    memantine (NAMENDA) tablet 10 mg  10 mg Oral BID    polyethylene glycol (MIRALAX) packet 17 g  17 g Oral DAILY    heparin (porcine) injection 5,000 Units  5,000 Units SubCUTAneous Q8H       Review of Systems:  ROS not obtained due to patient factors.       Objective:   Vital Signs:    Visit Vitals  /65 (BP 1 Location: Right arm, BP Patient Position: At rest)   Pulse 72   Temp 98.1 °F (36.7 °C)   Resp 19   Ht 5' 4\" (1.626 m)   Wt 69.4 kg (153 lb)   SpO2 95% Comment: room air   BMI 26.26 kg/m²       O2 Device: Room air   O2 Flow Rate (L/min): 0 l/min   Temp (24hrs), Av.9 °F (36.6 °C), Min:97.4 °F (36.3 °C), Max:98.3 °F (36.8 °C)       Intake/Output:   Last shift:      No intake/output data recorded. Last 3 shifts: 07/23 1901 - 07/25 0700  In: 120 [P.O.:120]  Out: 1350 [Urine:1350]    Intake/Output Summary (Last 24 hours) at 7/25/2019 1147  Last data filed at 7/25/2019 0433  Gross per 24 hour   Intake    Output 700 ml   Net -700 ml      Physical Exam:   General:   Sitting up in bed wearing nasal cannula, no acute distress, alert, pulling at things, oriented x1. Her family states this is baseline   Head:  Normocephalic, without obvious abnormality, atraumatic. Eyes:  Conjunctivae/corneas clear. ANicteric, PERRLA, EOMI   Nose: Nares normal. Mucosa normal. No drainage or sinus tenderness. Throat: Lips, mucosa dry. NO thrush; poor dentition, no oral lesions, mallampati IV   Neck: Supple, symmetrical, trachea midline, no adenopathy, thyroid: no enlargment/tenderness/nodules, no carotid bruit and +JVD. No crepitus   Back:   Symmetric, no curvature, no spine tenderness or flank pain   Lungs:   Poor air entry B/L, CTABL,    Chest wall:  No tenderness or deformity. NO CREPITUS   Heart:  Regular rate and rhythm, S1, S2 normal, no murmur, click, rub or gallop. Abdomen:   Soft, non-tender. Bowel sounds normal. No masses,  No organomegaly. No paradoxical motion   Extremities: normal, atraumatic, no cyanosis or clubbing. 2+ pitting edema B/L up to thighs   Pulses: 1-2+ and symmetric all extremities. Skin: Skin color, texture, turgor normal. No rashes or lesions   Lymph nodes: Cervical, supraclavicular, and axillary nodes normal.   Neurologic: Grossly nonfocal, pt alert but disoriented at baseline, globally weak, 1/5 in B/L LE and 2-3/5 in UE          Data review:   Labs:  No results found for this or any previous visit (from the past 24 hour(s)).   ABG:  No results found for: PH, PHI, PCO2, PCO2I, PO2, PO2I, HCO3, HCO3I, FIO2, FIO2I        PFT Results  (Last 48 hours)    None        Echo Results  (Last 48 hours) None        Imaging:  I have personally reviewed the patients radiographs and have reviewed the reports:  Personal review of CXR from 7/17 shows cardiomegaly with vascular congestion, no infiltrates or consolidations or masses. Reviewed CT abd from 2/13/19 ---  No emphysema in lung bases  XR Results (most recent):  Results from Hospital Encounter encounter on 07/17/19   XR SWALLOW FUNC VIDEO    Narrative Modified barium swallow with speech. INDICATION: Dysphagia, respiratory distress, recent abnormal chest x-ray,  concern for aspiration pneumonia, history of previous surgery    Fluoroscopy time 1 minute 12 seconds    Cine loops: 6    FINDINGS: The patient was given multiple consistencies of barium. Probable  prominent cricopharyngeal muscle noted. No penetration or aspiration with thin,  nectar, honey, puree, solid or pill. Impression Impression:    As above. Please see speech pathologist's report for further evaluation and  recommendations. CT Results  (Last 48 hours)    None            High complexity decision making was performed during the evaluation of this patient at high risk for decompensation with multiple organ involvement     Above mentioned total time spent on reviewing the case/medical record/data/notes/EMR/patient examination/documentation/coordinating care with nurse/consultants, exclusive of procedures with complex decision making performed and > 50% time spent in face to face evaluation.          Emerald Alvarez MD    Pulmonary, Critical Care Medicine  [facility]

## 2019-07-26 ENCOUNTER — HOME CARE VISIT (OUTPATIENT)
Dept: SCHEDULING | Facility: HOME HEALTH | Age: 84
End: 2019-07-26
Payer: MEDICARE

## 2019-07-26 ENCOUNTER — PATIENT OUTREACH (OUTPATIENT)
Dept: CASE MANAGEMENT | Age: 84
End: 2019-07-26

## 2019-07-26 ENCOUNTER — HOSPITAL ENCOUNTER (OUTPATIENT)
Dept: LAB | Age: 84
Discharge: HOME | End: 2019-07-26
Payer: MEDICARE

## 2019-07-26 LAB
ALBUMIN SERPL-MCNC: 2.9 G/DL (ref 3.4–5)
ALBUMIN/GLOB SERPL: 1 {RATIO} (ref 0.8–1.7)
ALP SERPL-CCNC: 51 U/L (ref 45–117)
ALT SERPL-CCNC: 34 U/L (ref 13–56)
ANION GAP SERPL CALC-SCNC: 5 MMOL/L (ref 3–18)
AST SERPL-CCNC: 33 U/L (ref 10–38)
BASOPHILS # BLD: 0 K/UL (ref 0–0.06)
BASOPHILS NFR BLD: 0 % (ref 0–3)
BILIRUB SERPL-MCNC: 0.2 MG/DL (ref 0.2–1)
BUN SERPL-MCNC: 48 MG/DL (ref 7–18)
BUN/CREAT SERPL: 37 (ref 12–20)
CALCIUM SERPL-MCNC: 8.7 MG/DL (ref 8.5–10.1)
CHLORIDE SERPL-SCNC: 105 MMOL/L (ref 100–111)
CO2 SERPL-SCNC: 35 MMOL/L (ref 21–32)
CREAT SERPL-MCNC: 1.3 MG/DL (ref 0.6–1.3)
DIFFERENTIAL METHOD BLD: ABNORMAL
EOSINOPHIL # BLD: 0.2 K/UL (ref 0–0.4)
EOSINOPHIL NFR BLD: 2 % (ref 0–5)
ERYTHROCYTE [DISTWIDTH] IN BLOOD BY AUTOMATED COUNT: 16.8 % (ref 11.6–14.5)
GLOBULIN SER CALC-MCNC: 2.8 G/DL (ref 2–4)
GLUCOSE SERPL-MCNC: 89 MG/DL (ref 74–99)
HCT VFR BLD AUTO: 28.9 % (ref 35–45)
HGB BLD-MCNC: 8.7 G/DL (ref 12–16)
LYMPHOCYTES # BLD: 2.2 K/UL (ref 0.8–3.5)
LYMPHOCYTES NFR BLD: 25 % (ref 20–51)
MAGNESIUM SERPL-MCNC: 2.6 MG/DL (ref 1.6–2.6)
MCH RBC QN AUTO: 28.2 PG (ref 24–34)
MCHC RBC AUTO-ENTMCNC: 30.1 G/DL (ref 31–37)
MCV RBC AUTO: 93.8 FL (ref 74–97)
MONOCYTES # BLD: 0.9 K/UL (ref 0–1)
MONOCYTES NFR BLD: 11 % (ref 2–9)
NEUTS SEG # BLD: 5.3 K/UL (ref 1.8–8)
NEUTS SEG NFR BLD: 62 % (ref 42–75)
PLATELET # BLD AUTO: 209 K/UL (ref 135–420)
PLATELET COMMENTS,PCOM: ABNORMAL
PMV BLD AUTO: 11.2 FL (ref 9.2–11.8)
POTASSIUM SERPL-SCNC: 4.6 MMOL/L (ref 3.5–5.5)
PROT SERPL-MCNC: 5.7 G/DL (ref 6.4–8.2)
RBC # BLD AUTO: 3.08 M/UL (ref 4.2–5.3)
RBC MORPH BLD: ABNORMAL
RBC MORPH BLD: ABNORMAL
SODIUM SERPL-SCNC: 145 MMOL/L (ref 136–145)
WBC # BLD AUTO: 8.6 K/UL (ref 4.6–13.2)

## 2019-07-26 PROCEDURE — G0299 HHS/HOSPICE OF RN EA 15 MIN: HCPCS

## 2019-07-26 PROCEDURE — G0151 HHCP-SERV OF PT,EA 15 MIN: HCPCS

## 2019-07-26 PROCEDURE — 3331090002 HH PPS REVENUE DEBIT

## 2019-07-26 PROCEDURE — 80053 COMPREHEN METABOLIC PANEL: CPT

## 2019-07-26 PROCEDURE — 83735 ASSAY OF MAGNESIUM: CPT

## 2019-07-26 PROCEDURE — 400014 HH F/U

## 2019-07-26 PROCEDURE — 3331090001 HH PPS REVENUE CREDIT

## 2019-07-26 PROCEDURE — 85025 COMPLETE CBC W/AUTO DIFF WBC: CPT

## 2019-07-26 NOTE — PROGRESS NOTES
Transitional Care Team: CHARLIE Post Discharge Review    Date: 07/26/19  Time:  11:17 AM  Hospital Nurse Navigator: Constanza Connolly, RN     Ashia Roblero is a 80 y.[facility]. 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    Lupus (systemic lupus erythematosus) ([facility]) M32.9    Anasarca R60.1    Hypertensive heart disease with CHF (congestive heart failure) ([facility]) I11.0    S/P placement of cardiac pacemaker Z95.0    Paroxysmal atrial flutter ([facility]) I48.92    Acute on chronic diastolic (congestive) heart failure ([facility]) H78.97    Diastolic CHF, chronic ([facility]) I50.32    High risk medication use Z79.899    Acute exacerbation of CHF (congestive heart failure) ([facility]) I50.9    ARIEL (acute kidney injury) ([facility]) N17.9    Uremia N19    Left hip pain M25.552    CKD (chronic kidney disease) stage 4, GFR 15-29 ml/min ([facility]) N18.4    NSTEMI (non-ST elevated myocardial infarction) ([facility]) I21.4    Dementia F03.90    Skin lesions L98.9    Diarrhea R19.7    UTI (urinary tract infection) N39.0    Fever R50.9    Sepsis ([facility]) A41.9    Abscess L02.91    Severe sepsis ([facility]) A41.9, R65.20    Advanced care planning/counseling discussion Z71.89    Debility R53.81    Anemia D64.9    Respiratory distress R06.03    COPD exacerbation ([facility]) J44.1    CAP (community acquired pneumonia) J18.9    Acute respiratory failure with hypoxia ([facility]) J96.01         Review and Plan as below:  1. Dr. Funmi Steiner ordered home O2 assessment prior to discharge but it does not look like that was done. 2. Family Crushes her medications. Discharged on Tessalon which is not crushable. Please make sure that family knows not to break gel caps  3.  Registered dietician recommends Ensure Enlive twice a day       This Nurse Navigator accessed the patient's chart to offer support and placement in the [address] [place] [address] Team bridges the gaps in care and education surrounding discharge from the acute care facility. The objective is to empower the patient and family in taking a proactive role in the task of preventing readmission within the first thirty days after discharge from the acute care setting. The Transitional Care Team is also involved in the efforts to reduce readmission to the acute care setting after stabilization and discharge from the acute care environment either to the skilled nursing facilities or community. EDUCATION / INTERVENTIONS OFFERED:      1. Discharge medication list was reviewed for accuracy and potential interactions. See above for one med concern. 2. Patient / Family was instructed on specific signs/symptoms to look for with regards to worsening of their medical conditions. Learning was assessed using teach back. 3. Patient / Family were given all educational material from the [facility] with services identified for complete wrap around services during their 30 day recovery phase. The patient and I discussed wrap around services including nurse navigation, care coordination, home health, transitional care appointments with their primary care provider and specialist team.      Patient education focused on readmission zones as described as: The Red Zone: High risk for readmission, days 1-21    The Yellow Zone: Moderate risk for readmission, days 22-29    The Green Zone: Lower risk for readmission, days 30 and after    4.  Advance Care Planning:  not on file     BARRIERS IDENTIFIED:    Burt Tan expressed the following barriers to her discharge:   no barriers patient has very supportive family and private duty care 16 hours a day    Follow-up Information    None         PLAN:  A written individual care plan including education materials, a calendar individualized with their follow up appointments with primary care providers, specialist, and telephone numbers of healthcare personnel that are available for them to ask questions during the recovery phase was provided to the patient and/or family. The patient will be discharged home with [address] [place] [address]. The TC Team will follow the patient from a distance while inpatient as well as be available for further transition disposition as needed. The TC Team will continue to offer support 30- 90 days post discharge from the acute care setting. The appropriate TC Team members were notified.     Past Medical History:   Diagnosis Date    Acute on chronic diastolic heart failure (HCC)     Arthritis     Benign hypertensive heart disease without heart failure     Better controlled, stable    Chronic diastolic heart failure (HCC)     Breathing and edema is improving    Congestive heart failure (HCC)     HTN (hypertension)     Hypercholesteremia     Lupus (systemic lupus erythematosus) ([facility] [address] 6/18/2014    followed by dr Flakita Little     Obesity, unspecified     Patient has weight loss Discussed diet ad fluid restriction    Other and unspecified hyperlipidemia     F/u per pmd    Tricuspid valve disorders, 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    tr with moderate pulmonary htn        Advance Care Planning 7/18/2019   Patient's Healthcare Decision Maker is: -   Primary Decision Maker Name -   Primary Decision Maker Phone Number -   Primary Decision Maker Relationship to Patient -   Secondary Decision [address] Name -   Secondary Decision Maker Phone Number -   Secondary Decision Maker Relationship to Patient -   Confirm Advance Directive None   Patient Would Like to Complete Advance Directive -   Does the patient have other document types -         [place]ERON, RN, [address] [place] [address] Care Transition Nurse  [phone]

## 2019-07-27 PROCEDURE — 3331090002 HH PPS REVENUE DEBIT

## 2019-07-27 PROCEDURE — 3331090001 HH PPS REVENUE CREDIT

## 2019-07-28 VITALS
DIASTOLIC BLOOD PRESSURE: 80 MMHG | SYSTOLIC BLOOD PRESSURE: 124 MMHG | RESPIRATION RATE: 20 BRPM | OXYGEN SATURATION: 94 % | HEART RATE: 70 BPM | TEMPERATURE: 98.3 F

## 2019-07-28 VITALS
SYSTOLIC BLOOD PRESSURE: 124 MMHG | OXYGEN SATURATION: 94 % | RESPIRATION RATE: 16 BRPM | TEMPERATURE: 98.3 F | DIASTOLIC BLOOD PRESSURE: 80 MMHG | HEART RATE: 70 BPM

## 2019-07-28 PROCEDURE — 3331090001 HH PPS REVENUE CREDIT

## 2019-07-28 PROCEDURE — 3331090002 HH PPS REVENUE DEBIT

## 2019-07-29 ENCOUNTER — HOME CARE VISIT (OUTPATIENT)
Dept: SCHEDULING | Facility: HOME HEALTH | Age: 84
End: 2019-07-29
Payer: MEDICARE

## 2019-07-29 ENCOUNTER — HOME CARE VISIT (OUTPATIENT)
Dept: HOME HEALTH SERVICES | Facility: HOME HEALTH | Age: 84
End: 2019-07-29
Payer: MEDICARE

## 2019-07-29 ENCOUNTER — PATIENT OUTREACH (OUTPATIENT)
Dept: CARDIOLOGY CLINIC | Age: 84
End: 2019-07-29

## 2019-07-29 VITALS
SYSTOLIC BLOOD PRESSURE: 123 MMHG | TEMPERATURE: 98.9 F | OXYGEN SATURATION: 95 % | HEART RATE: 72 BPM | DIASTOLIC BLOOD PRESSURE: 81 MMHG

## 2019-07-29 PROCEDURE — 3331090002 HH PPS REVENUE DEBIT

## 2019-07-29 PROCEDURE — 3331090001 HH PPS REVENUE CREDIT

## 2019-07-29 PROCEDURE — G0152 HHCP-SERV OF OT,EA 15 MIN: HCPCS

## 2019-07-30 ENCOUNTER — HOME CARE VISIT (OUTPATIENT)
Dept: SCHEDULING | Facility: HOME HEALTH | Age: 84
End: 2019-07-30
Payer: MEDICARE

## 2019-07-30 VITALS
HEART RATE: 63 BPM | RESPIRATION RATE: 15 BRPM | SYSTOLIC BLOOD PRESSURE: 110 MMHG | TEMPERATURE: 96.9 F | OXYGEN SATURATION: 99 % | DIASTOLIC BLOOD PRESSURE: 58 MMHG

## 2019-07-30 PROCEDURE — G0300 HHS/HOSPICE OF LPN EA 15 MIN: HCPCS

## 2019-07-30 PROCEDURE — 3331090001 HH PPS REVENUE CREDIT

## 2019-07-30 PROCEDURE — 3331090002 HH PPS REVENUE DEBIT

## 2019-07-31 PROCEDURE — 3331090001 HH PPS REVENUE CREDIT

## 2019-07-31 PROCEDURE — 3331090002 HH PPS REVENUE DEBIT

## 2019-08-01 ENCOUNTER — CLINICAL SUPPORT (OUTPATIENT)
Dept: VASCULAR SURGERY | Age: 84
End: 2019-08-01

## 2019-08-01 ENCOUNTER — PATIENT OUTREACH (OUTPATIENT)
Dept: CARDIOLOGY CLINIC | Age: 84
End: 2019-08-01

## 2019-08-01 VITALS
HEART RATE: 64 BPM | DIASTOLIC BLOOD PRESSURE: 60 MMHG | RESPIRATION RATE: 14 BRPM | WEIGHT: 153 LBS | HEIGHT: 64 IN | SYSTOLIC BLOOD PRESSURE: 122 MMHG | BODY MASS INDEX: 26.12 KG/M2

## 2019-08-01 DIAGNOSIS — L89.899 PRESSURE ULCER OF RIGHT LEG: Primary | ICD-10-CM

## 2019-08-01 PROCEDURE — 3331090001 HH PPS REVENUE CREDIT

## 2019-08-01 PROCEDURE — 3331090002 HH PPS REVENUE DEBIT

## 2019-08-01 NOTE — PROGRESS NOTES
Mrs. Hugo Mendoza has a right lower extremity venous ulcer. On last evaluation over a month ago, it was vibrant and pink. She wears a modified compression as she cannot tolerate the Unna boot well. She has excellent pulses in her foot no arterial insufficiency. We recommended another application of ACell to speed along the healing process as she responded very nicely with this after initial application in the hospital setting when we saw her during a prolonged admission. She is here for topical application today The wound was now only 5 cm in length and 1 cm in width and very superficial, fully granulating. No debridement was necessary. We adhered a small piece of A cell synthetic graft, using Steri-Strips, and then standard Unna boot type dressing applied Instructions were explained verbally to the family and then faxed to home health She will return in 1 week for reassessment of the wound

## 2019-08-01 NOTE — PROGRESS NOTES
1. Have you been to an emergency room or urgent care clinic since your last visit?  
no 
Hospitalized since your last visit? If yes, where, when, and reason for visit?  
no 
2. Have you seen or consulted any other health care providers outside of the [facility] since your last visit including any procedures, health maintenance items. If yes, where, when and reason for visit? [facility] Chart reviewed for the following: 
 Boo ARGUELLES LPN, have reviewed the medications and updated the Allergic reactions for [place] TIME OUT performed immediately prior to start of procedure: 
 Boo ARGUELLES LPN, have performed the following reviews on [place] prior to the start of the procedure: 
         
* Patient was identified by name and date of birth * Agreement that see boot removed and reapplied * Procedure site verified * Patient was positioned for comfort * Verbal consent was given by patient Time: 1100 Date of procedure: 8/1/2019 Procedure performed by:  CLARIBEL Hummel How tolerated by patient: tolerated the procedure well with no complications Comments:  Right medial lower leg wound measures: 5.0x1.0x0.1cm. Applied aquaphor to dry skin of leg, wound gel and adaptic gauze over skin graft, ABD and unna boot and coban, patient tolerated well. Patient will return in one for first dressing change and will fax to [facility] to place patient on hold for one week.

## 2019-08-02 PROCEDURE — 3331090002 HH PPS REVENUE DEBIT

## 2019-08-02 PROCEDURE — 3331090001 HH PPS REVENUE CREDIT

## 2019-08-03 PROCEDURE — 3331090002 HH PPS REVENUE DEBIT

## 2019-08-03 PROCEDURE — 3331090001 HH PPS REVENUE CREDIT

## 2019-08-04 PROCEDURE — 3331090001 HH PPS REVENUE CREDIT

## 2019-08-04 PROCEDURE — 3331090002 HH PPS REVENUE DEBIT

## 2019-08-05 ENCOUNTER — PATIENT OUTREACH (OUTPATIENT)
Dept: CARDIOLOGY CLINIC | Age: 84
End: 2019-08-05

## 2019-08-05 PROCEDURE — 3331090002 HH PPS REVENUE DEBIT

## 2019-08-05 PROCEDURE — 3331090001 HH PPS REVENUE CREDIT

## 2019-08-05 NOTE — PROGRESS NOTES
Heart Failure Follow Up Call NN contacted the patient by telephone to perform CHF Follow Up. Spoke to pt's son, [facility]. Verified  and address as identifiers. Kaitlynn Cobb's son reports pt is doing well. Reports no issues or complaints at this time. Daily Weight:  Pt is unstable standing and is bed bound. Son states unable to safely stand her to weigh the patient. Pt's discharge weight was 153 lbs Zone:(Pt Reported)  madeline Francois  Attends follow-up appointments as directed. 19 Patient will attend all scheduled appointments through 10/5/19  Knowledge and adherence medication (ie. action, side effects, missed dose, etc.)   
  19  Pt will take all medications prescribed to be evaluated on each outreach through 10/5/19 Needs addressed from pathway:   
24-48 Hours- or initial contact Review/Verification: 
? Identified care team 
? Disposition: Home ? Reviewed DC Instructions, Education, and HF Zones ? Verified [address] in place. ? Evaluated DME needs; arrange home equipment: No needs at this time ? Reviewed Advanced care planning ? Labs/Diagnostics to follow per MD office ? Follow-up apts are arranged ? Identified transportation Med Rec ? Ace/Arb: None ? Diuretic: Torsemide 20 mg every day prn ? Beta Blocker: Coreg 3.125 mg BID ? Potassium: Kdur 20 mEq qd ? Anticoagulant: ASA 81 mg every day 
? Other: Amiodarone 200 mg every day, Spironolactone 25 mg qd Baseline Labs available in ERM 
? BMP 
? BUN/Creat. ? Hgb/Hct ? WBC 
? NT Pro-BNP 
 
MANNY Documentation: 
? Goals ? Challenges/Plan ? Weight   
? Edema ? Symptoms Education Disease Management: 
? Reviewed Cardiac Low-sodium Diet ? Also reviewed carb-controlled diet for diabetics ? Reviewed importance of Fluid restriction ? Comorbidity Management ? Reviewed importance of Daily Weights ? Advance medical directive status PCP/Specialist follow up:  
Future Appointments Date Time Provider Win Mukherjee 8/6/2019 To Be Determined Lucie Krabbe, RN [address]  
8/8/2019 10:30 AM Keke Solomon NP [address]  
8/8/2019  3:00 PM CLARIBEL Alvares [address] SCHED  
8/9/2019 To Be Determined Lucie Krabbe, RN [address]  
8/13/2019 To Be Determined Lucie Krabbe, RN [address]  
8/16/2019 To Be Determined Lucie Krabbe, RN [address]  
8/22/2019 11:15 AM Ashley Strickland MD [facility] [place] SCHED  
9/19/2019  9:30 AM Ashley Strickland MD CAP [facility] [address] Cardiologist consult while IP: yes Palliative consult while IP:    yes EF: 31-35% on 12/28/19 Type of HF:   HFrEF Cardiac Device present: pacemaker Disposition of patient:  Home, Home Health [facility] Services/Tele Monitoring:  Home Health/[facility] Social support: Family Does patient have a Scale:    n/a How often do they weigh:  n/a Does patient have an Advance Directive:  pt baseline demetia Advance Directive scanned into patients chart:  n/a Patient reminded that there are physicians on call 24 hours a day / 7 days a week (M-F 5pm to 8am and from Friday 5pm until Monday 8a for the weekend) should the patient have questions or concerns. Patient reminded to call 911 if situation is emergent or patient feels the situation is emergent. Pt verbalizes understanding.

## 2019-08-06 ENCOUNTER — HOME CARE VISIT (OUTPATIENT)
Dept: HOME HEALTH SERVICES | Facility: HOME HEALTH | Age: 84
End: 2019-08-06
Payer: MEDICARE

## 2019-08-06 PROCEDURE — 3331090002 HH PPS REVENUE DEBIT

## 2019-08-06 PROCEDURE — 3331090001 HH PPS REVENUE CREDIT

## 2019-08-07 PROCEDURE — 3331090002 HH PPS REVENUE DEBIT

## 2019-08-07 PROCEDURE — 3331090001 HH PPS REVENUE CREDIT

## 2019-08-07 NOTE — PROGRESS NOTES
Mrs. Lakeisha Mcgovern has a right lower extremity venous ulcer. On last evaluation over a month ago, it was vibrant and pink.  She wears a modified compression as she cannot tolerate the Unna boot well.  She has excellent pulses in her foot no arterial insufficiency. We recommended another application of ACell to speed along the healing process as she responded very nicely with this after initial application in the hospital setting when we saw her during a prolonged admission.   
  
She was here for topical application a week ago 
  The wound was only 5 cm in length and 1 cm in width and very superficial, fully granulating. No debridement was necessary. We adhered a small piece of A cell synthetic graft, using Steri-Strips, and then standard Unna boot type dressing applied Today the wound has already decreased now 4 cm in length it does remain 1 cm in width The surrounding Steri-Strips were removed but the ACell stayed in contact with the wound as it has nicely incorporated As long as this continues to improve and decrease in size towards healing with continued wound care efforts with home health we can see her back as needed Instructions were provided to home health

## 2019-08-08 ENCOUNTER — OFFICE VISIT (OUTPATIENT)
Dept: VASCULAR SURGERY | Age: 84
End: 2019-08-08

## 2019-08-08 VITALS
RESPIRATION RATE: 14 BRPM | HEART RATE: 70 BPM | BODY MASS INDEX: 26.12 KG/M2 | WEIGHT: 153 LBS | DIASTOLIC BLOOD PRESSURE: 58 MMHG | SYSTOLIC BLOOD PRESSURE: 110 MMHG | HEIGHT: 64 IN

## 2019-08-08 DIAGNOSIS — L89.899 PRESSURE ULCER OF RIGHT LEG: Primary | ICD-10-CM

## 2019-08-08 PROCEDURE — 3331090001 HH PPS REVENUE CREDIT

## 2019-08-08 PROCEDURE — 3331090002 HH PPS REVENUE DEBIT

## 2019-08-08 NOTE — PROGRESS NOTES
1. Have you been to an emergency room or urgent care clinic since your last visit?  
no 
Hospitalized since your last visit? If yes, where, when, and reason for visit?  
no 
2. Have you seen or consulted any other health care providers outside of the [facility] system since your last visit including any procedures, health maintenance items. If yes, where, when and reason for visit? [facility] Chart reviewed for the following: 
 Claudine ARGUELLES LPN, have reviewed the medications and updated the Allergic reactions for [place] TIME OUT performed immediately prior to start of procedure: 
 Claudine ARGUELLES LPN, have performed the following reviews on [place] prior to the start of the procedure: 
         
* Patient was identified by name and date of birth * Agreement that see boot removed and reapplied * Procedure site verified * Patient was positioned for comfort * Verbal consent was given by patient Time: 1500 Date of procedure: 8/8/2019 Procedure performed by:  CLARIBEL Anton How tolerated by patient: tolerated the procedure well with no complications Comments:  Rinsed right lower leg wound with NS and patted dry, patient tolerated well. Wound measures: 4.0x1.0x0.1cm. Applied wound gel, adaptic gauze, ABD, unna boot and coban, patient tolerated well. New wound care orders will be faxed to [facility].

## 2019-08-09 ENCOUNTER — PATIENT OUTREACH (OUTPATIENT)
Dept: CARDIOLOGY CLINIC | Age: 84
End: 2019-08-09

## 2019-08-09 PROCEDURE — 3331090002 HH PPS REVENUE DEBIT

## 2019-08-09 PROCEDURE — 3331090001 HH PPS REVENUE CREDIT

## 2019-08-09 NOTE — PROGRESS NOTES
Patient attended appointments with her vascular surgeon's PA, Kishan Clements on 8/8/19, Nurse Navigator reviewed EMR and will follow up on next scheduled outreach.

## 2019-08-10 PROCEDURE — 3331090001 HH PPS REVENUE CREDIT

## 2019-08-10 PROCEDURE — 3331090002 HH PPS REVENUE DEBIT

## 2019-08-11 PROCEDURE — 3331090001 HH PPS REVENUE CREDIT

## 2019-08-11 PROCEDURE — 3331090002 HH PPS REVENUE DEBIT

## 2019-08-12 PROCEDURE — 3331090001 HH PPS REVENUE CREDIT

## 2019-08-12 PROCEDURE — 3331090002 HH PPS REVENUE DEBIT

## 2019-08-13 PROCEDURE — 3331090002 HH PPS REVENUE DEBIT

## 2019-08-13 PROCEDURE — 3331090001 HH PPS REVENUE CREDIT

## 2019-08-14 PROCEDURE — 3331090001 HH PPS REVENUE CREDIT

## 2019-08-14 PROCEDURE — 3331090002 HH PPS REVENUE DEBIT

## 2019-08-15 ENCOUNTER — PATIENT OUTREACH (OUTPATIENT)
Dept: CARDIOLOGY CLINIC | Age: 84
End: 2019-08-15

## 2019-08-15 ENCOUNTER — OFFICE VISIT (OUTPATIENT)
Dept: PULMONOLOGY | Age: 84
End: 2019-08-15

## 2019-08-15 ENCOUNTER — HOME CARE VISIT (OUTPATIENT)
Dept: SCHEDULING | Facility: HOME HEALTH | Age: 84
End: 2019-08-15
Payer: MEDICARE

## 2019-08-15 VITALS
TEMPERATURE: 98.1 F | BODY MASS INDEX: 26.12 KG/M2 | WEIGHT: 153 LBS | SYSTOLIC BLOOD PRESSURE: 120 MMHG | DIASTOLIC BLOOD PRESSURE: 68 MMHG | HEIGHT: 64 IN | RESPIRATION RATE: 18 BRPM | OXYGEN SATURATION: 94 % | HEART RATE: 88 BPM

## 2019-08-15 DIAGNOSIS — Z87.01 HISTORY OF PNEUMONIA: Primary | ICD-10-CM

## 2019-08-15 DIAGNOSIS — T17.908D CHRONIC PULMONARY ASPIRATION, SUBSEQUENT ENCOUNTER: ICD-10-CM

## 2019-08-15 DIAGNOSIS — N18.9 CHRONIC KIDNEY DISEASE, UNSPECIFIED CKD STAGE: ICD-10-CM

## 2019-08-15 DIAGNOSIS — I50.9 CONGESTIVE HEART FAILURE, UNSPECIFIED HF CHRONICITY, UNSPECIFIED HEART FAILURE TYPE (HCC): ICD-10-CM

## 2019-08-15 DIAGNOSIS — M32.9 LUPUS (HCC): ICD-10-CM

## 2019-08-15 PROCEDURE — A6454 SELF-ADHER BAND W>=3" <5"/YD: HCPCS

## 2019-08-15 PROCEDURE — A6446 CONFORM BAND S W>=3" <5"/YD: HCPCS

## 2019-08-15 PROCEDURE — A6197 ALGINATE DRSG >16 <=48 SQ IN: HCPCS

## 2019-08-15 PROCEDURE — A6248 HYDROGEL DRSG GEL FILLER: HCPCS

## 2019-08-15 PROCEDURE — 3331090001 HH PPS REVENUE CREDIT

## 2019-08-15 PROCEDURE — 3331090002 HH PPS REVENUE DEBIT

## 2019-08-15 PROCEDURE — A6456 ZINC PASTE BAND W >=3"<5"/YD: HCPCS

## 2019-08-15 PROCEDURE — A6260 WOUND CLEANSER ANY TYPE/SIZE: HCPCS

## 2019-08-15 PROCEDURE — G0299 HHS/HOSPICE OF RN EA 15 MIN: HCPCS

## 2019-08-15 PROCEDURE — A6216 NON-STERILE GAUZE<=16 SQ IN: HCPCS

## 2019-08-15 PROCEDURE — A6223 GAUZE >16<=48 NO W/SAL W/O B: HCPCS

## 2019-08-15 PROCEDURE — A6212 FOAM DRG <=16 SQ IN W/BORDER: HCPCS

## 2019-08-15 PROCEDURE — A4927 NON-STERILE GLOVES: HCPCS

## 2019-08-15 NOTE — PROGRESS NOTES
Patient attended appointments with her Pulmonologist's NP, JOSHUA Agarwal on 8/15/19, Nurse Navigator reviewed EMR and will follow up on next scheduled outreach.

## 2019-08-15 NOTE — PROGRESS NOTES
[facility] Pulmonary, Critical Care, and Sleep Medicine Pulmonary Office Progress Notes Name: Radha Casper : 1931 Date: 8/15/2019 Subjective:  
 
8/15/2019 HPI Radha Casper  is a 80 y.o. female with PMH of CHF and lupus who presents for hospital follow-up visit. The history is provided by the patient's daughter as the patient is a poor historian due to dementia. She was admitted to [facility] on 7/15/2019 through 2019 with acute exacerbation of CHF and aspiration pneumonia. She was treated with antibiotics, diuretics and inhaled bronchodilators and discharged home. Today she  appears comfortable, no distress and her daughter reports that her symptoms of cough have improved significantly since her hospital discharge. She also reports that the patient's appetite has improved and she continues to adhere to soft diet. Her daughter reports that the patient has had no fevers,  no leg / calf  swelling and no decreased appetite or weight loss. The patient has has no history of smoking or chronic lung disease. Past Medical History:  
Diagnosis Date  Acute on chronic diastolic heart failure ([address]  Arthritis  Benign hypertensive heart disease without heart failure Better controlled, stable  Chronic diastolic heart failure ([address] Breathing and edema is improving  Congestive heart failure ([address]  HTN (hypertension)  Hypercholesteremia  Lupus (systemic lupus erythematosus) ([address] 2014  
 followed by dr Ceci Bowman  Obesity, unspecified Patient has weight loss Discussed diet ad fluid restriction  Other and unspecified hyperlipidemia F/u per pmd  
 Tricuspid valve disorders, specified as nonrheumatic 2014  
 tr with moderate pulmonary htn No Known Allergies Current Outpatient Medications Medication Sig Dispense Refill  spironolactone (ALDACTONE) 25 mg tablet Take 1 Tab by mouth daily. 30 Tab 0  
 torsemide (DEMADEX) 20 mg tablet Take 1 Tab by mouth daily as needed (edema). 30 Tab 3  polyethylene glycol (MIRALAX) 17 gram/dose powder Take 17 g by mouth daily.  aspirin delayed-release 81 mg tablet Take 81 mg by mouth daily.  OTHER Incentive Spirometry- Use as directed 1 Each 0  
 food supplemt, lactose-reduced (ENSURE ENLIVE) 0.08 gram-1.5 kcal/mL liqd Take 1 Bottle by mouth two (2) times a day. 60 Bottle 0  
 menthol-zinc oxide (CALMOSEPTINE) 0.44-20.6 % oint Apply  to affected area.  carvedilol (COREG) 3.125 mg tablet Take 1 Tab by mouth every twelve (12) hours. 60 Tab 0  
 acetaminophen (TYLENOL ARTHRITIS PAIN) 650 mg TbER Take 1 Tab by mouth every eight (8) hours. (Patient taking differently: Take 1 Tab by mouth daily. ) 15 Tab 0  
 amiodarone (CORDARONE) 200 mg tablet TAKE ONE TABLET EVERY DAY (MAKE AN APPT) (Patient taking differently: TAKE ONE TABLET EVERY DAY by mouth) 30 Tab 5  
 memantine (NAMENDA) 10 mg tablet Take 10 mg by mouth two (2) times a day.  amitriptyline (ELAVIL) 25 mg tablet Take 25 mg by mouth nightly. Review of Systems:    Unable to obtain ROS due to patient factors Objective:  
 
Visit Vitals /68 (BP 1 Location: Left arm, BP Patient Position: Sitting) Pulse 88 Temp 98.1 °F (36.7 °C) (Oral) Resp 18 Ht 5' 4\" (1.626 m) Wt 69.4 kg (153 lb) SpO2 94% BMI 26.26 kg/m² PHYSICAL EXAM 
 
 
General: Unable to answer questions or follow simple commands. Well-developed, well-nourished, and in no distress. Unable to ambulatewheelchair-bound Head:   Normocephalic, without obvious abnormality, atraumatic Eyes:   Pupils reactive, conjunctivae / corneas clear. EOM's intact, no scleral icterus Nose:   Nares normal, no drainage.    
  Throat:    Lips, mucosa and tongue normal. Teeth and gums normal 
 Neck:   Supple, symmetrical, trachea midline. No adenopathy or thyroid swelling; no carotid bruit or JVD. CVS:    Regular rate and rhythm. S1S2 normal,  no murmurs RS:      Symmetrical chest rise, good AE bilaterally. Lung sounds clear to auscultation bilaterally. No wheezing, rales or rhonchi, no accessory muscle use Abd:     Soft, non-tender. No hepatosplenomegaly Neuro:   non focal, awake. Alert, not oriented to person , place, time or situation Extrm:   no leg edema,  clubbing or cyanosis Skin:   no rash Data review:  
 
 
 
 
 
 
 
 
 
Imaging: 
I have personally reviewed the patients radiographs and have reviewed the reports: XR Results (most recent): 
Results from Hospital Encounter encounter on 07/17/19 XR SWALLOW FUNC VIDEO Narrative Modified barium swallow with speech. INDICATION: Dysphagia, respiratory distress, recent abnormal chest x-ray, 
concern for aspiration pneumonia, history of previous surgery Fluoroscopy time 1 minute 12 seconds Cine loops: 6 FINDINGS: The patient was given multiple consistencies of barium. Probable 
prominent cricopharyngeal muscle noted. No penetration or aspiration with thin, 
nectar, honey, puree, solid or pill. Impression Impression: As above. Please see speech pathologist's report for further evaluation and 
recommendations. CT Results (most recent): 
Results from Hospital Encounter encounter on 07/17/19 CT HEAD WO CONT Narrative EXAM: CT HEAD WO CONT INDICATION: AMS 
 
COMPARISON: None. TECHNIQUE: Unenhanced CT of the head was performed using 5 mm images. Brain and 
bone windows were generated. CT dose reduction was achieved through use of a 
standardized protocol tailored for this examination and automatic exposure 
control for dose modulation.   
 
FINDINGS: 
The ventricles and sulci are normal in size, shape and configuration and 
 midline. Stable mild amount of periventricular ischemic white matter change. Elige Nicely There is no intracranial hemorrhage, extra-axial collection, mass, mass effect 
or midline shift. The basilar cisterns are open. No acute infarct is 
identified. The bone windows demonstrate no abnormalities. The visualized 
portions of the paranasal sinuses and mastoid air cells are clear. Impression IMPRESSION: No acute findings. Stable minor amount of periventricular ischemic 
white matter change. Patient Active Problem List  
Diagnosis Code  
 HTN (hypertension) I10  
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  
 S/P placement of cardiac pacemaker Z95.0  Paroxysmal atrial flutter ([facility]) I48.92  
 Acute on chronic diastolic (congestive) heart failure ([facility]) A85.32  
 Diastolic CHF, chronic ([facility]) I50.32  
 High risk medication use Z79.899  Acute exacerbation of CHF (congestive heart failure) ([facility]) I50.9  ARIEL (acute kidney injury) ([facility] [address] N17.9  Uremia N19  Left hip pain M25.552  CKD (chronic kidney disease) stage 4, GFR 15-29 ml/min ([facility]) N18.4  NSTEMI (non-ST elevated myocardial infarction) ([facility] [address] I21.4  Dementia F03.90  
 Skin lesions L98.9  Diarrhea R19.7  UTI (urinary tract infection) N39.0  Fever R50.9  Sepsis ([facility] [address] A41.9  Abscess L02.91  
 Severe sepsis ([facility]) A41.9, R65.20  Advanced care planning/counseling discussion Z71.89  
 Debility R53.81  
 Anemia D64.9  Respiratory distress R06.03  
 COPD exacerbation ([facility]) J44.1  CAP (community acquired pneumonia) J18.9  Acute respiratory failure with hypoxia ([facility]) J96.01 IMPRESSION:  
· CHF- s/p hospitalization for acute exacerbation · History of pneumonia · Dementia · Chronic aspiration · Lupus · Chronic kidney disease RECOMMENDATIONS:  
 
· Continue all medications as prescribed · Continue soft dietdiscussed aspiration precautions with patient's daughter · Follow up in pulmonary clinic in 6 months    or sooner with worsening of symptoms · Follow-up with cardiology/nephrology as scheduled · Recommend healthy diet / weight · Report to ER with chest pain or difficulty breathing.   
 
  
Laury Husain NP

## 2019-08-15 NOTE — PATIENT INSTRUCTIONS
·  Continue all medications as prescribed · Follow up in pulmonary clinic in 6 months or sooner with worsening of symptoms · Continue soft diet/aspiration precautions · Follow-up with cardiology and nephrology as scheduled · Recommend healthy diet/weight · Go to the ER with chest pain or difficulty breathing

## 2019-08-16 PROCEDURE — 3331090001 HH PPS REVENUE CREDIT

## 2019-08-16 PROCEDURE — 3331090002 HH PPS REVENUE DEBIT

## 2019-08-17 VITALS
TEMPERATURE: 98.6 F | SYSTOLIC BLOOD PRESSURE: 120 MMHG | RESPIRATION RATE: 20 BRPM | DIASTOLIC BLOOD PRESSURE: 72 MMHG | OXYGEN SATURATION: 95 % | HEART RATE: 98 BPM

## 2019-08-17 PROCEDURE — 3331090001 HH PPS REVENUE CREDIT

## 2019-08-17 PROCEDURE — 3331090002 HH PPS REVENUE DEBIT

## 2019-08-18 PROCEDURE — 3331090002 HH PPS REVENUE DEBIT

## 2019-08-18 PROCEDURE — 3331090001 HH PPS REVENUE CREDIT

## 2019-08-18 NOTE — DISCHARGE SUMMARY
[address] [place] [address]    Name:  Amparo Matt  MR#:   [id number]  :  1931  ACCOUNT #:  [de-identified]  ADMIT DATE:  2019  DISCHARGE DATE:  2019    DIAGNOSES FOR THE PATIENT AT THE TIME OF DISCHARGE:  Include:  1. Acute bronchitis. 2.  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 3.  Chronic kidney disease, stage III. 4.  Chronic obstructive pulmonary disease without acute exacerbation. 5.  Dementia. 6.  History of lupus. 7.  Dysphagia. HOSPITAL COURSE:  This is an 51-year-old female who was brought to the ER with shortness of breath and fevers. The patient was evaluated and was admitted. There was concern for a CHF exacerbation. The patient was also started on broad-spectrum antibiotics. Pulmonary was also consulted. The patient was continued on oxygen. Cardiology was also consulted. The patient received IV Lasix. There was concern for possibility of bronchitis. The patient was also noted to have dysphagia and was seen by Speech Therapy. The patient was continued on antibiotics. Wound care was continued. Nephrology was also consulted. Palliative Care was consulted. Various discussions were held with the patient's family. The patient has advanced dementia. Family wished for the patient to be full code. PT/OT were consulted. Case management was involved. The patient was cleared for discharge by Cardiology and Pulmonary. Home health care was requested, and the patient was discharged to home. DISPOSITION:  Home. CONSULTATIONS:  With Cardiology, Pulmonary, and Nephrology. PROCEDURES:  None. The patient was hemodynamically stable. The patient was advised to follow up with PCP in one week, and with Pulmonary in two weeks, and with Cardiology in two weeks, and with Nephrology in two weeks. DISCHARGE MEDICATIONS:  Included:  1.   Tessalon 100 mg capsules, one capsule p.o. three times daily p.r.n.  2. Aldactone 25 mg p.o. daily. 3.  Klor-Con 20 mEq one packet p.o. daily for three days. 4.  Demadex 20 mg p.o. daily as needed for edema. 5.  MiraLax 17 g p.o. daily. 6.  Aspirin 81 mg p.o. daily. 7.  Incentive spirometry, use as directed. 8.  Ensure Enlive one bottle p.o. twice daily. 9.  Calmoseptine applied topically as directed. 10.  Coreg 3.125 mg p.o. twice daily. 11.  Tylenol Arthritis 650 mg one tablet p.o. every eight hours. 12.  Amiodarone 200 mg p.o. daily. 13.  Namenda 10 mg p.o. twice daily. 14.  Elavil 25 mg p.o. daily at night. The patient was discharged to home as per family wishes. TOTAL TIME FOR DISCHARGE:  More than 35 minutes.       China Parish MD      [id number]  D:  08/18/2019 10:11  T:  08/18/2019 10:38  JOB #:  [id number]  CC:  Irlanda Decker MD

## 2019-08-19 ENCOUNTER — HOME CARE VISIT (OUTPATIENT)
Dept: SCHEDULING | Facility: HOME HEALTH | Age: 84
End: 2019-08-19
Payer: MEDICARE

## 2019-08-19 PROCEDURE — G0299 HHS/HOSPICE OF RN EA 15 MIN: HCPCS

## 2019-08-19 PROCEDURE — 3331090001 HH PPS REVENUE CREDIT

## 2019-08-19 PROCEDURE — 3331090002 HH PPS REVENUE DEBIT

## 2019-08-20 PROCEDURE — 3331090001 HH PPS REVENUE CREDIT

## 2019-08-20 PROCEDURE — 3331090002 HH PPS REVENUE DEBIT

## 2019-08-21 VITALS
OXYGEN SATURATION: 97 % | TEMPERATURE: 98.4 F | HEART RATE: 70 BPM | DIASTOLIC BLOOD PRESSURE: 60 MMHG | SYSTOLIC BLOOD PRESSURE: 104 MMHG | RESPIRATION RATE: 16 BRPM

## 2019-08-21 PROCEDURE — 3331090002 HH PPS REVENUE DEBIT

## 2019-08-21 PROCEDURE — 3331090001 HH PPS REVENUE CREDIT

## 2019-08-22 ENCOUNTER — OFFICE VISIT (OUTPATIENT)
Dept: CARDIOLOGY CLINIC | Age: 84
End: 2019-08-22

## 2019-08-22 ENCOUNTER — HOME CARE VISIT (OUTPATIENT)
Dept: SCHEDULING | Facility: HOME HEALTH | Age: 84
End: 2019-08-22
Payer: MEDICARE

## 2019-08-22 ENCOUNTER — PATIENT OUTREACH (OUTPATIENT)
Dept: CARDIOLOGY CLINIC | Age: 84
End: 2019-08-22

## 2019-08-22 VITALS
DIASTOLIC BLOOD PRESSURE: 46 MMHG | WEIGHT: 153 LBS | SYSTOLIC BLOOD PRESSURE: 101 MMHG | HEIGHT: 64 IN | HEART RATE: 74 BPM | BODY MASS INDEX: 26.12 KG/M2

## 2019-08-22 DIAGNOSIS — I50.22 CHRONIC SYSTOLIC CONGESTIVE HEART FAILURE (HCC): Primary | ICD-10-CM

## 2019-08-22 DIAGNOSIS — Z95.0 S/P PLACEMENT OF CARDIAC PACEMAKER: ICD-10-CM

## 2019-08-22 DIAGNOSIS — I48.92 PAROXYSMAL ATRIAL FLUTTER (HCC): ICD-10-CM

## 2019-08-22 DIAGNOSIS — Z79.899 HIGH RISK MEDICATION USE: ICD-10-CM

## 2019-08-22 DIAGNOSIS — I10 ESSENTIAL HYPERTENSION: ICD-10-CM

## 2019-08-22 PROCEDURE — 3331090002 HH PPS REVENUE DEBIT

## 2019-08-22 PROCEDURE — 3331090001 HH PPS REVENUE CREDIT

## 2019-08-22 PROCEDURE — G0300 HHS/HOSPICE OF LPN EA 15 MIN: HCPCS

## 2019-08-22 RX ORDER — SPIRONOLACTONE 25 MG/1
25 TABLET ORAL DAILY
Qty: 90 TAB | Refills: 3 | Status: SHIPPED | OUTPATIENT
Start: 2019-08-22

## 2019-08-22 NOTE — PROGRESS NOTES
Patient attended appointments with her cardiologist, Dr. Tiffanie Small. on 8/22/19, Nurse Navigator reviewed EMR and will follow up on next scheduled outreach.

## 2019-08-22 NOTE — PROGRESS NOTES
HISTORY OF PRESENT ILLNESS  Cristiana Alva is a 80 y.o. female. Patient with  chf,had persistent junctional ryhtm ,atrial flutter , s/p pacemaker,feels better   Sob better  Admitted to hospital 1/2019 with acute CHF. Low ejection fraction with systolic heart failure. Feels less short of breath since discharge. 3/4/2019 - admitted to hospital 2/10/19 for NSTEMI , Anemia, CKD stage IV and chronic systolic CHF. Has not had diuretics since d/c and c/o increased edema to upper and lower extremties. 3/19/2019-admitted to hospital for acute metabolic encephalopathy, sepsis, UTI.  7/2019  Patient was in emergency room with complaint of shortness of breath. Evaluation noted. Patient has shortness of breath at baseline. Limited activity. She has edema on and off.  4/2/2019-was in emergency room with UTI and metabolic encephalopathy. Feels much better today. More alert. Denies any shortness of breath. Edema is better except for right upper extremity. 5/2019  Patient discharged 5/10/2019 with  Discharge Diagnoses:                                           RLE cellulitis, MRSA in wound  - Staph Epidermidis bacteremia  -ARIEL on ckd3 in the setting of severe sepsis  - chronic mixed systolic and diastolic chf- compensated  Recovering since discharge. Currently not on diuresis. Edema is significantly better. 8/2019  Discharge from hospital 7/2019 with  1. Congestive heart failure: Acute on chronic systolic dysfunction. Improving well. Continue Demadex po . metabolic alkalosis resolved CO2 normal.   2. Status post permanent pacemaker: Normal function on 5/2/2019  3. Atrial fibrillation: Noted on pacemaker.  Sinus rhythm on amiodarone. Hospital Follow Up   The history is provided by the patient. Associated symptoms include shortness of breath. CHF   The history is provided by the patient. This is a chronic problem. The problem occurs constantly. The problem has been gradually improving.  Associated symptoms include shortness of breath. The symptoms are aggravated by exertion. Palpitations    The history is provided by the patient. This is a chronic problem. The problem occurs constantly. Associated symptoms include lower extremity edema and shortness of breath. Pertinent negatives include no fever, no claudication, no orthopnea, no PND, no nausea, no vomiting, no dizziness, no weakness, no cough, no hemoptysis and no sputum production. Her past medical history is significant for hypertension. Hypertension   The history is provided by the patient. This is a chronic problem. The problem occurs constantly. The problem has not changed since onset. Associated symptoms include shortness of breath. Shortness of Breath   The history is provided by the patient. This is a recurrent problem. The problem occurs intermittently. The problem has been gradually improving. Associated symptoms include leg swelling. Pertinent negatives include no fever, no cough, no sputum production, no hemoptysis, no wheezing, no PND, no orthopnea, no vomiting, no rash and no claudication. Precipitated by: exertion. Associated medical issues include heart failure. Leg Swelling   The history is provided by the patient. This is a chronic problem. The problem occurs daily. The problem has been gradually improving. Associated symptoms include shortness of breath. Review of Systems   Constitutional: Negative for chills and fever. HENT: Negative for nosebleeds. Eyes: Negative for blurred vision and double vision. Respiratory: Positive for shortness of breath. Negative for cough, hemoptysis, sputum production and wheezing. Cardiovascular: Positive for leg swelling. Negative for palpitations, orthopnea, claudication and PND. Gastrointestinal: Negative for heartburn, nausea and vomiting. Musculoskeletal: Negative for myalgias. Skin: Negative for rash. Neurological: Negative for dizziness and weakness.    Endo/Heme/Allergies: Does not bruise/bleed easily. Family History   Problem Relation Age of Onset    Arrhythmia Neg Hx     Asthma Neg Hx     Clotting Disorder Neg Hx     Fainting Neg Hx     Heart Attack Neg Hx     High Cholesterol Neg Hx     Pacemaker Neg Hx     Stroke Neg Hx        Past Medical History:   Diagnosis Date    Acute on chronic diastolic heart failure (HCC)     Arthritis     Benign hypertensive heart disease without heart failure     Better controlled, stable    Chronic diastolic heart failure (HCC)     Breathing and edema is improving    Congestive heart failure (HCC)     HTN (hypertension)     Hypercholesteremia     Lupus (systemic lupus erythematosus) ([facility] [address] 6/18/2014    followed by dr Alicia Armstrong     Obesity, unspecified     Patient has weight loss Discussed diet ad fluid restriction    Other and unspecified hyperlipidemia     F/u per pmd    Tricuspid valve disorders, 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    tr with moderate pulmonary htn        Past Surgical History:   Procedure Laterality Date    HX HYSTERECTOMY      PACEMAKER PROCEDURE         No Known Allergies    Current Outpatient Medications   Medication Sig    hydroxychloroquine (PLAQUENIL) 200 mg tablet Take 200 mg by mouth two (2) times a day.  spironolactone (ALDACTONE) 25 mg tablet Take 1 Tab by mouth daily.  torsemide (DEMADEX) 20 mg tablet Take 1 Tab by mouth daily as needed (edema).  polyethylene glycol (MIRALAX) 17 gram/dose powder Take 17 g by mouth daily.  aspirin delayed-release 81 mg tablet Take 81 mg by mouth daily.  OTHER Incentive Spirometry- Use as directed    food supplemt, lactose-reduced (ENSURE ENLIVE) 0.08 gram-1.5 kcal/mL liqd Take 1 Bottle by mouth two (2) times a day.  menthol-zinc oxide (CALMOSEPTINE) 0.44-20.6 % oint Apply  to affected area.  carvedilol (COREG) 3.125 mg tablet Take 1 Tab by mouth every twelve (12) hours.     acetaminophen (TYLENOL ARTHRITIS PAIN) 650 mg TbER Take 1 Tab by mouth every eight (8) hours. (Patient taking differently: Take 1 Tab by mouth daily.)    amiodarone (CORDARONE) 200 mg tablet TAKE ONE TABLET EVERY DAY (MAKE AN APPT) (Patient taking differently: TAKE ONE TABLET EVERY DAY by mouth)    memantine (NAMENDA) 10 mg tablet Take 10 mg by mouth two (2) times a day.  amitriptyline (ELAVIL) 25 mg tablet Take 25 mg by mouth nightly. No current facility-administered medications for this visit. Visit Vitals  /46   Pulse 74   Ht 5' 4\" (1.626 m)   Wt 69.4 kg (153 lb)   BMI 26.26 kg/m²         Physical Exam   Constitutional: She is oriented to person, place, and time. She appears well-developed and well-nourished. HENT:   Head: Normocephalic and atraumatic. Eyes: Conjunctivae are normal.   Neck: Neck supple. No JVD present. No tracheal deviation present. No thyromegaly present. Cardiovascular: Normal rate and regular rhythm. PMI is not displaced. Exam reveals no gallop and no decreased pulses. Murmur heard. Holosystolic murmur is present at the lower left sternal border. Pulmonary/Chest: No respiratory distress. She has no wheezes. She has no rales. She exhibits no tenderness. Abdominal: Soft. There is no tenderness. Musculoskeletal: She exhibits edema (3+ BLE edema - extending to thighs, BL upper ext edema). Neurological: She is alert and oriented to person, place, and time. Skin: Skin is warm. Psychiatric: She has a normal mood and affect. No flowsheet data found. Ms. Zaria Galvan has a reminder for a \"due or due soon\" health maintenance. I have asked that she contact her primary care provider for follow-up on this health maintenance. SUMMARY:4/2014  Left ventricle: Ejection fraction was estimated to be 60 %. No obvious  wall motion abnormalities identified in the views obtained. There was mild  concentric hypertrophy.  Features were consistent with a pseudonormal left  ventricular filling pattern, with concomitant abnormal relaxation and  increased filling pressure (grade 2 diastolic dysfunction). Tricuspid valve: There was moderate regurgitation. Pulmonary artery  systolic pressure: 50 mmHg. 11/2015:stress test  1. Normal perfusion scan. 2. Normal wall motion and ejection fraction. 3. Gated images reveal normal wall motion and ejection fraction is  calculated at 59%. 12/07/2015  Pacer check   Noted with A Flutter. D/w daughter on phone  7/2019  Pacemaker check normal function. No A. fib. Atrial heart rate up to 159. Interpretation Summary 12/2018    · Technically difficult study due to patient compliance. Unable to obtain on-axis apical images. Good parasternals, poor subcostal images. · Left ventricular moderate-to-severely decreased global systolic function. Estimated left ventricular ejection fraction is 31 - 35%. Visually measured ejection fraction. Left ventricular severe sigmoid septum hypertrophy. Abnormal left ventricular wall motion as described on the wall scoring diagram below. Abnormal left ventricular septal motion. Interventricular septal \"bounce\". Severe (grade 3) left ventricular diastolic dysfunction. · Moderate tricuspid valve regurgitation is present. Mild pulmonary hypertension is present. PASP 38mmHg  · Mild aortic valve regurgitation is present. · Mild to moderate mitral valve regurgitation. Assessment       ICD-10-CM ICD-9-CM    1. Chronic systolic congestive heart failure (HCC) I50.22 428.22      428.0    2. Paroxysmal atrial flutter (HCC) I48.92 427.32    3. Essential hypertension I10 401.9    4. S/P placement of cardiac pacemaker Z95.0 V45.01    5. High risk medication use Z79.899 V58.69    continue amiodarone to maintain sr,risk of anticoagulation high and would not anticoagulate-discussed with family  I have discussed risk benefit and option of use of amiodarone for arrythmia. Risk of toxicity with medication are informed. Patient will require careful monitoring.   Lasix and metolazone used as needed-  1/2019  Recent admission with CHF. Continue to monitor. Due to dementia would not be a candidate for ICD upgrade. Patient has increased edema since discharge. I have increase Lasix from 20 mg to 40 mg 1-2 tablets a day. Patient currently nursing home    3/4/2019 - Recent admission for acute systolic CHF and CKD with hypotension. She has had increased edema since discharge and has not had diuretics in 3 weeks. Begin Demadex 20 mg and Metolazone 2.5 mg/ day. Have BMP drawn in 1 weeks. F/U in our office in 2 weeks - to ER for new or worsening symptoms. All discussed with daughter and son in room. 5/2019  CHF compensated. Recent admission with cellulitis. Edema is significantly better. Continue to hold torsemide and use it as needed    7/2019  Emergency room with shortness of breath and wheezing. Appears to be upper airway sounds. Mild edema. Will increase Demadex to 20 mg a day. Discussed that kidney function will likely get worse with increased dose of diuretic. Will check BMP in 1 week  8/2019  Stable after recent admission for CHF. Stable after discharge on Aldactone. Continue along with other medication on amiodarone. Check TSH  There are no discontinued medications. No orders of the defined types were placed in this encounter. Follow-up and Dispositions    · Return in about 3 months (around 11/22/2019).              Narayan Kerns MD

## 2019-08-23 PROCEDURE — 3331090001 HH PPS REVENUE CREDIT

## 2019-08-23 PROCEDURE — 3331090002 HH PPS REVENUE DEBIT

## 2019-08-26 NOTE — PROGRESS NOTES
NN contacted the patient by telephone to perform CHF follow Up. Spoke to pt's son Aldo Scheuermann     Noted Priorities/Plan:  Continued function at baseline. Daily Weight: pt not stable to stand w/ just son. Zone: green     Signs/Symptoms: Edema none; SOB none; orthopnea none. Son states no issues at this time. Reports pt is doing well    Goals      Attends follow-up appointments as directed. 8/5/19 Patient will attend all scheduled appointments through 10/5/19       Knowledge and adherence medication (ie. action, side effects, missed dose, etc.)      8/5/19  Pt will take all medications prescribed to be evaluated on each outreach through 10/5/19            Needs addressed from pathway:   Week 5-8   Provide Daily Disease Management (patient/caregiver initiated)  ? Reviewed and reinforced importance of Daily weight (Before Breakfast  ? Reviewed Daily Zone Identification (symptom management; weight, edema, SOB, activity/sleep changes)-notify provider immediately as indicated  ? Cardiac Low-sodium Diet (No added sodium; 1500mg as indicated)   ? Reviewed importance of Fluid restriction  ? Comorbidity Management  ? Confirmed follow-up appointments/transportation. Additional Assessments  ? Reinforced Fall precautions    ? Activity tolerance assessment: pt's son reports pt at baseline  ? Reviewed Energy conservation management and balancing activity w/ rest  ? Labs/diagnostics per MD office  ? Medication Therapy: no issues identified  ? Diet/appetite assessment: no issues noted  ? Reviewed appropriate ED/Hospital utilization  ? Immunizations not up to date   ? Home re-assessment/modifications: no needs identified    Psychosocial: Reassurance and emotional support; depression screening. Monitoring:  ? Home health  ? My Chart    Education:  ? Advanced Care Planning status reviewed  ? Patient/Caregiver verifies support systems (meal planning, medication and transportation needs, community resources)  ?  Health literacy for heart failure         Have you been to an ER/Hospital since discharge from [facility]? No      Have you followed up with PCP/Cardiologist/Specialist?   8/8/19 Vasc Surg appt w/ J Luis Gaytan., PA  8/15/19 Pulm appt w/ Marbin Rascon. NP  8/22/19 Cardiology appt w/ DANIELLE Manuel Transportation:  son/family  Diet: no salt added  Activity/ADLs: needs assistance from family. Medications Reconciled at this time:  son declined, last completed on 8/22/19 at card appt  Home health:  [facility]  Social Support: family    [address]: pt baseline demented    Patient reminded that there is a physician on call 24 hours a day / 7 days a week (M-F 5pm to 8am and from Friday 5pm until Monday 8a for the weekend) should the patient have questions or concerns. Patient reminded to call 911 if situation is emergent or patient feels the situation is emergent. Pt verbalizes understanding.

## 2019-08-29 NOTE — TELEPHONE ENCOUNTER
----- Message from Gary Beaver MD sent at 8/29/2019  1:35 PM EDT -----  High TSH.   Add Synthroid 25 mcg p.o. once a day repeat TSH 1 month

## 2019-08-29 NOTE — TELEPHONE ENCOUNTER
----- Message from Marciano Blakely MD sent at 8/29/2019 10:49 AM EDT -----  Renal function stable continue monitoring and treatment

## 2019-08-29 NOTE — TELEPHONE ENCOUNTER
Called and spoke to patient daughter regarding TSH lab results per Dr. Amberly Gray, High TSH. Add Synthroid 25 mcg p.o. Once a day and repeat TSH in 1 month. She voices understanding and acceptance of this advice and will call back if any further questions or concerns.

## 2019-08-29 NOTE — TELEPHONE ENCOUNTER
Called and spoke to patient daughter with lab/test per Dr. Yane Hanson, Renal function stable continue monitoring and treatment. She voices understanding and acceptance of this advice and will call back if any further questions or concerns.

## 2019-08-29 NOTE — TELEPHONE ENCOUNTER
Requested Prescriptions     Pending Prescriptions Disp Refills    levothyroxine (SYNTHROID) 25 mcg tablet 30 Tab 3     Sig: Take 1 Tab by mouth Daily (before breakfast).

## 2019-09-09 NOTE — PROGRESS NOTES
NN contacted the patient by telephone to perform CHF follow Up. Spoke to pt's son Claudia Hilliard     Noted Priorities/Plan:  Continued function at baseline. Daily Weight: pt bed bound per pt's son    Zone: green     Signs/Symptoms: Edema none; SOB none; orthopnea none. Son states no issues at this time. Reports pt is doing well    Goals      Attends follow-up appointments as directed. 8/5/19 Patient will attend all scheduled appointments through 10/5/19       Knowledge and adherence medication (ie. action, side effects, missed dose, etc.)      8/5/19  Pt will take all medications prescribed to be evaluated on each outreach through 10/5/19            Needs addressed from pathway:   Week 5-8   Provide Daily Disease Management (patient/caregiver initiated)  ? Reviewed and reinforced importance of Daily weight (Before Breakfast  ? Reviewed Daily Zone Identification (symptom management; weight, edema, SOB, activity/sleep changes)-notify provider immediately as indicated  ? Cardiac Low-sodium Diet (No added sodium; 1500mg as indicated)   ? Reviewed importance of Fluid restriction  ? Comorbidity Management  ? Confirmed follow-up appointments/transportation. Additional Assessments  ? Reinforced Fall precautions    ? Activity tolerance assessment: pt's son reports pt at baseline  ? Reviewed Energy conservation management and balancing activity w/ rest  ? Labs/diagnostics per MD office  ? Medication Therapy: no issues identified  ? Diet/appetite assessment: no issues noted  ? Reviewed appropriate ED/Hospital utilization  ? Immunizations not up to date   ? Home re-assessment/modifications: no needs identified    Psychosocial: Reassurance and emotional support; depression screening. Monitoring:  ? Home health  ? My Chart    Education:  ? Advanced Care Planning status reviewed, pt baseline dementia  ?  Patient/Caregiver verifies support systems (meal planning, medication and transportation needs, community resources)  ? Health literacy for heart failure         Have you been to an ER/Hospital since discharge from [facility]? No      Have you followed up with PCP/Cardiologist/Specialist?   19 Vasc Surg appt w/ Iqra Chiu., PA  8/15/19 Pulm appt w/ Jesse Quiñonez. NP  19 Cardiology appt w/ DANIELLE Pérez [email or web address] Card appt w/ DANIELLE Pérez Transportation:  son/family  Diet: no salt added  Activity/ADLs: needs assistance from family. Medications Reconciled at this time:  son declined, last completed on 19 at card appt  Home health:  Company/Completion: [facility]  Social Support: family    [address]: pt baseline demented    Patient reminded that there is a physician on call 24 hours a day / 7 days a week (M-F 5pm to 8am and from Friday 5pm until Monday 8a for the weekend) should the patient have questions or concerns. Patient reminded to call 911 if situation is emergent or patient feels the situation is emergent. Pt verbalizes understanding.

## 2019-09-10 NOTE — PROGRESS NOTES
Infectious Disease progress Note Requested by:dr. issa 
 
Reason:sepsis, right leg cellulitis, right thigh abscess Current abx Prior abx  
vancomycin since 4/30 Metronidazole since 5/1 Ceftriaxone since 5/2 Pip/tazo x 1 - 5/1 Cefepime 4/30-5/2 Lines:  
 
 
Assessment : 
 
66-year-old AA female with a history of congestive heart failure with EF of 31-35 % on 12/28/18, lupus, thrombus cytopenia, chronic kidney disease admitted to [facility] on 4/30/19 for altered mental status. H/o lupus nodules on LE in 1/2019 Now with high fevers, RLE redness, ulcer right leg Clinical picture consistent with sepsis (POA) due to acute right thigh/leg cellulitis, right thigh abscess, necrotic right leg ulcer with possible superinfection. Single positive blood culture for staph epidermidis on 4/30 likely contaminant Right thigh abscess s/p bedside I&D in ed on 4/30/19. No cultures sent. Right leg ulcer with clot/necrosis - cultures 5/1- methicillin resistant staph aureus, group A strep. Vascular surgery consult appreciated. s/p I&D, a cell graft 5/6. No intra op purulence, tissue necrosis or exposed tendon/bone noted. Significantly decreased erythema, tenderness right thigh/leg - no significant induration around the recently drained right thigh abscess. Decreased wbc. Clinically better.  
  
Recommendations: 
  
1. D/c ceftriaxone, metronidazole. Continue vancomycin 2. F/u Vascular surgery recommendation regarding wound care right leg ulcer 3. Ok to switch to po doxycycline, augmentin till 5/14/19 when discharge planned 
  
Advance Care planning: full code: discussed  with patient/surrogate decision maker: ledbetter, zacarias: [phone]. Above plan was discussed in details with  son at bedside. Please call me if any further questions or concerns. Will continue to participate in the care of this patient. HPI: 
 
Feels better. Decreased right leg/thigh pain. No new complaints [Normal] : normal [___] : [unfilled] home Medication List  
  
  
   
 Details  
polyethylene glycol (MIRALAX) 17 gram/dose powder Take 17 g by mouth daily. torsemide (DEMADEX) 10 mg tablet Take 1 Tab by mouth daily. Qty: 30 Tab, Refills: 6  
  
amoxicillin (AMOXIL) 500 mg capsule Take 500 mg by mouth three (3) times daily. aspirin delayed-release 81 mg tablet Take 81 mg by mouth daily. hydroxychloroquine (PLAQUENIL) 200 mg tablet Take 200 mg by mouth two (2) times a day. metOLazone (ZAROXOLYN) 2.5 mg tablet Take 1 Tab by mouth daily. Qty: 30 Tab, Refills: 5  
  
!! OTHER Incentive Spirometry- Use as directed Qty: 1 Each, Refills: 0  
  
!! OTHER Graded Compression Stockings b/l LE- use as directed Qty: 1 Each, Refills: 0  
  
food supplemt, lactose-reduced (ENSURE ENLIVE) 0.08 gram-1.5 kcal/mL liqd Take 1 Bottle by mouth two (2) times a day. Qty: 60 Bottle, Refills: 0  
  
menthol-zinc oxide (CALMOSEPTINE) 0.44-20.6 % oint Apply  to affected area. carvedilol (COREG) 3.125 mg tablet Take 1 Tab by mouth every twelve (12) hours. Qty: 60 Tab, Refills: 0  
  
acetaminophen (TYLENOL ARTHRITIS PAIN) 650 mg TbER Take 1 Tab by mouth every eight (8) hours. Qty: 15 Tab, Refills: 0  
  
amiodarone (CORDARONE) 200 mg tablet TAKE ONE TABLET EVERY DAY (MAKE AN APPT) Qty: 30 Tab, Refills: 5  
  
memantine (NAMENDA) 10 mg tablet Take 10 mg by mouth two (2) times a day. amitriptyline (ELAVIL) 25 mg tablet Take 25 mg by mouth nightly. !! - Potential duplicate medications found. Please discuss with provider. Current Facility-Administered Medications Medication Dose Route Frequency  [START ON 5/8/2019] Vancomycin Trough Level Due  1 Each Other ONCE  
 0.9% sodium chloride infusion  30 mL/hr IntraVENous CONTINUOUS  
 vancomycin (VANCOCIN) 1000 mg in  ml infusion  1,000 mg IntraVENous Q36H  
 aspirin chewable tablet 81 mg  81 mg Oral DAILY [LVEF ___%] : LVEF [unfilled]%  cefTRIAXone (ROCEPHIN) 2 g in sterile water (preservative free) 20 mL IV syringe  2 g IntraVENous Q24H  
 albuterol-ipratropium (DUO-NEB) 2.5 MG-0.5 MG/3 ML  3 mL Nebulization Q4H PRN  
 acetaminophen (TYLENOL) tablet 650 mg  650 mg Oral Q6H PRN  
 amiodarone (CORDARONE) tablet 200 mg  200 mg Oral DAILY  amitriptyline (ELAVIL) tablet 25 mg  25 mg Oral QHS  carvedilol (COREG) tablet 3.125 mg  3.125 mg Oral Q12H  hydroxychloroquine (PLAQUENIL) tablet 200 mg  200 mg Oral BID  memantine (NAMENDA) tablet 10 mg  10 mg Oral BID  menthol-zinc oxide (CALMOSEPTINE) 0.44-20.6 % ointment   Topical BID  polyethylene glycol (MIRALAX) packet 17 g  17 g Oral DAILY  hydrALAZINE (APRESOLINE) 20 mg/mL injection 10 mg  10 mg IntraVENous Q6H PRN  
 heparin (porcine) injection 5,000 Units  5,000 Units SubCUTAneous Q8H  
 metroNIDAZOLE (FLAGYL) IVPB premix 500 mg  500 mg IntraVENous Q12H  hydrocortisone Sod Succ (PF) (SOLU-CORTEF) injection 50 mg  50 mg IntraVENous Q8H  
 sodium chloride (NS) flush 5-10 mL  5-10 mL IntraVENous PRN Allergies: Patient has no known allergies. Temp (24hrs), Av.6 °F (36.4 °C), Min:97.2 °F (36.2 °C), Max:98.1 °F (36.7 °C) Visit Vitals /69 (BP 1 Location: Left arm, BP Patient Position: At rest) Pulse 77 Temp 97.6 °F (36.4 °C) Resp 16 Ht 5' 4\" (1.626 m) Wt 80.4 kg (177 lb 3.2 oz) SpO2 98% Breastfeeding? No  
BMI 30.42 kg/m² ROS: 12 point ROS obtained in details. Pertinent positives as mentioned in HPI,  
otherwise negative Physical Exam: 
 
Constitutional: She appears well-developed and well-nourished. No distress. non verbal 
 
HENT:  
Head: Normocephalic and atraumatic. Right Ear: External ear normal.  
Left Ear: External ear normal.  
Nose: Nose normal.  
Eyes: Pupils are equal, round, and reactive to light. Conjunctivae and EOM are normal. No scleral icterus. Neck: Normal range of motion. Neck supple. No JVD present.  No tracheal deviation present. No thyromegaly present. Cardiovascular: Normal rate, regular rhythm, normal heart sounds and intact distal pulses. Exam reveals no gallop and no friction rub. No murmur heard. Pulmonary/Chest: Effort normal and breath sounds normal. She exhibits no tenderness. no rales/rhonchi Abdominal: Soft. Bowel sounds are normal. She exhibits no distension. There is no tenderness. There is no rebound and no guarding. Musculoskeletal: She exhibits no edema or tenderness. Right lower extremity with  decreased erythema of right lateral thigh, right leg with decreased overlying warmth/tenderness, anterior tibial 10 x 5 cm open bloody wound with necrotic center, Left lower extremity  without edema/tenderness Lymphadenopathy:  
  She has no cervical adenopathy. Neurological: She is alert. No cranial nerve deficit. Coordination normal.  
Eyes open but does not follow commands, globally weak, right lower extremity is contracted Skin: Skin is dry. Right lower extremity erythema and warm to touch Psychiatric:  
Unable to assess Nursing note and vitals reviewed. 
  
  
 
 
 
 
Labs: Results:  
Chemistry Recent Labs 05/07/19 
0113 05/06/19 
0376 05/05/19 
6697 * 227* 194*  142 143  
K 4.6 4.1 3.6 * 108 109* CO2 28 29 28 BUN 87* 88* 83* CREA 1.71* 1.95* 1.82* CA 8.4* 8.4* 8.4* AGAP 7 5 6 BUCR 51* 45* 46* CBC w/Diff Recent Labs 05/07/19 
9818 05/06/19 
9263 WBC 11.6 12.5 RBC 3.02* 3.10* HGB 8.8* 8.7* HCT 27.8* 28.2*  
 259 GRANS 86* 81* LYMPH 11* 3*  
EOS 0 0 Microbiology Recent Labs 05/02/19 
1550 05/02/19 
5377 05/01/19 
1050 05/01/19 
0018 04/30/19 1953 CULT NO GROWTH AFTER 13 HOURS MRSA target DNA is not detected (presumptive not colonized with MRSA) MODERATE METHICILLIN RESISTANT STAPHYLOCOCCUS AUREUS*  FEW STREPTOCOCCI, BETA HEMOLYTIC GROUP A*  [facility] CALLED TO AND CORRECTLY REPEATED BY: 
 LESLY CASTILLO, RN, CVT AT 2907 5/3/19 TO  MELODY 
  NO ANAEROBES ISOLATED 2 DAYS NO GROWTH 1 DAY NO GROWTH 3 DAYS  
  
 
 
RADIOLOGY: 
 
All available imaging studies/reports in [facility] for this admission were reviewed Dr. Zahira Ruby, Infectious Disease Specialist 
[phone] May 7, 2019 
7:41 AM

## 2019-09-12 NOTE — ED NOTES
Initial contact with the family. Pt son is upset that EMS did not bring the pt license after they gave it to them. I informed [employer] and told them that the license is missing. Tech roseanna looked all over [place] and was unable to locate the license. EMS [employer] stated that they did remember having the license but can not recall where it is. Family is very upset at this time. Unknown if ever smoked

## 2019-10-01 NOTE — PROGRESS NOTES
NN contacted the patient by telephone to perform CHF follow Up. Spoke to pt's son Bina Rojas Noted Priorities/Plan:  Continued function at baseline. Daily Weight: pt bed bound per pt's son Zone: green Signs/Symptoms: Edema none; SOB none; orthopnea none. Son states no issues at this time. Reports pt is doing well Goals  Attends follow-up appointments as directed. 8/5/19 Patient will attend all scheduled appointments through 10/5/19  Knowledge and adherence medication (ie. action, side effects, missed dose, etc.)   
  8/5/19  Pt will take all medications prescribed to be evaluated on each outreach through 10/5/19 Needs addressed from pathway:  
Week 9-12 Provide Daily Disease Management 
(patient/caregiver initiated) ? Pt is maintaining Daily weight measurements (Before Breakfast 
? Reviewed Daily Zone Identification (symptom management; weight, edema, SOB, activity/sleep changes)-notify provider immediately as indicated ? Reviewed Cardiac Low-sodium Diet (No added sodium; 1500mg as indicated). Also included carbohydrate controlled diet education ? Reviewed importance of Fluid restriction ? Comorbidity Management ? Confirmed follow-up appointments and transportation. Additional Assessments ? Reinforced Fall precautions, pt bed bound ? Activity tolerance assessment: reports at baseline ? Reviewed Energy conservation management and balancing rest w/ activity ? Labs/diagnostics per MD office ? Medication Therapy: no issues identified ? Diet/appetite assessment: reports normal appetitie ? Reviewed appropriate ED/Hospital utilization Psychosocial:  Reassurance and emotional support Education/Discharge Planning ? Verify DME needs; arrange home equipment: no needs at this time ? Advanced care planning (e.g.: Palliative Care; Hospice) ? H2H; Home Health  (eg: Telehealth) ? Patient/Caregiver HF education literacy ?  Patient/Caregiver verifies support systems (meals/medication/transportation needs,  
? community resources Have you been to an ER/Hospital since discharge from [facility]? No   
 
Have you followed up with PCP/Cardiologist/Specialist?  
19 Vasc Surg appt w/ Enriqueta Craig., PA 
8/15/19 Pulm appt w/ Fallon Kennedy. NP 
19 Cardiology appt w/ DANIELLE Disla [email or web address] Card appt w/ DANIELLE Disla Transportation:  son/family Diet: no salt added Activity/ADLs: needs assistance from family. Medications Reconciled at this time:  son declined, last completed on 19 by home health Home health:  Company/Completion: [employer] Social Support: family Advanced Care Plan: pt baseline demented Patient reminded that there is a physician on call 24 hours a day / 7 days a week (M-F 5pm to 8am and from Friday 5pm until Monday 8a for the weekend) should the patient have questions or concerns. Patient reminded to call 911 if situation is emergent or patient feels the situation is emergent. Pt verbalizes understanding.

## 2019-10-14 NOTE — ED TRIAGE NOTES
Patient arrived to ER via EMS for complaint of edema in her lower legs. Patient has a history of Alzheimer's.

## 2019-10-14 NOTE — ED PROVIDER NOTES
EMERGENCY DEPARTMENT HISTORY AND PHYSICAL EXAM 
 
9:10 AM 
 
Date: 10/14/2019 Patient Name: [facility] History of Presenting Illness Chief Complaint Patient presents with  Swelling History Provided By: Patient's Daughter Location/Duration/Severity/Modifying factors Patient is a 79 yo AA female with PMH CKD III/IV, CHFrEF (ECHO 31-35% 12/2018), dementia, h/o NSTEMI (02/2019), AFib, s/p pacemaker, lupus, obesity presents who presents with swelling and sacral wound. History provided by patient's daughter who is one of her primary caregivers. Per patient's daughter, she noticed her swelling got worse in her legs starting last Thursday and is concerned a chronic wound on her sacrum is not healing with the wound care provided by a home health nurse. She states her mother has seemed more somnolent than usual. She denies any fevers or dyspnea and states when she checks her blood pressure it has not been lower than 100s SBP. Patient's daughter also notes that one of the caregivers also does not reliably give diuretic (torsemide). She states she seems heavier to turn and patient is also complaining of pain when she is turned which the caregiver thinks is related to her chronic sacral wound. Patient's R eye is red, per daughter she has been evaluated by an opthamologist in the past week who stated she \"burst a blood vessel\" and did not require additional management. PCP: Amilcar Vidal MD 
 
Current Facility-Administered Medications Medication Dose Route Frequency Provider Last Rate Last Dose  furosemide (LASIX) injection 20 mg  20 mg IntraVENous ONCE Sharmila HINES DO      
 
Current Outpatient Medications Medication Sig Dispense Refill  predniSONE (DELTASONE) 1 mg tablet Take 1 mg by mouth daily. 1 pill po daily  levothyroxine (SYNTHROID) 25 mcg tablet Take 1 Tab by mouth Daily (before breakfast).  30 Tab 3  
  spironolactone (ALDACTONE) 25 mg tablet Take 1 Tab by mouth daily. 90 Tab 3  
 hydroxychloroquine (PLAQUENIL) 200 mg tablet Take 200 mg by mouth two (2) times a day.  torsemide (DEMADEX) 20 mg tablet Take 1 Tab by mouth daily as needed (edema). 30 Tab 3  polyethylene glycol (MIRALAX) 17 gram/dose powder Take 17 g by mouth daily.  aspirin delayed-release 81 mg tablet Take 81 mg by mouth daily.  OTHER Incentive Spirometry- Use as directed 1 Each 0  
 food supplemt, lactose-reduced (ENSURE ENLIVE) 0.08 gram-1.5 kcal/mL liqd Take 1 Bottle by mouth two (2) times a day. 60 Bottle 0  
 menthol-zinc oxide (CALMOSEPTINE) 0.44-20.6 % oint Apply  to affected area.  carvedilol (COREG) 3.125 mg tablet Take 1 Tab by mouth every twelve (12) hours. 60 Tab 0  
 acetaminophen (TYLENOL ARTHRITIS PAIN) 650 mg TbER Take 1 Tab by mouth every eight (8) hours. (Patient taking differently: Take 1 Tab by mouth daily. ) 15 Tab 0  
 amiodarone (CORDARONE) 200 mg tablet TAKE ONE TABLET EVERY DAY (MAKE AN APPT) (Patient taking differently: TAKE ONE TABLET EVERY DAY by mouth) 30 Tab 5  
 memantine (NAMENDA) 10 mg tablet Take 10 mg by mouth two (2) times a day.  amitriptyline (ELAVIL) 25 mg tablet Take 25 mg by mouth nightly. Past History Past Medical History: 
Past Medical History:  
Diagnosis Date  Acute on chronic diastolic heart failure ([address]  Arthritis  Benign hypertensive heart disease without heart failure Better controlled, stable  Chronic diastolic heart failure ([address] Breathing and edema is improving  Congestive heart failure ([address]  HTN (hypertension)  Hypercholesteremia  Lupus (systemic lupus erythematosus) ([address] 6/18/2014  
 followed by dr Mckenzie Jj  Obesity, unspecified Patient has weight loss Discussed diet ad fluid restriction  Other and unspecified hyperlipidemia F/u per pmd  
 Tricuspid valve disorders, 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 tr with moderate pulmonary htn Past Surgical History: 
Past Surgical History:  
Procedure Laterality Date  HX HYSTERECTOMY  PACEMAKER PROCEDURE Family History: 
Family History Problem Relation Age of Onset  Arrhythmia Neg Hx  Asthma Neg Hx  Clotting Disorder Neg Hx  Fainting Neg Hx   
 Heart Attack Neg Hx  High Cholesterol Neg Hx  Pacemaker Neg Hx  Stroke Neg Hx Social History: 
Social History Tobacco Use  Smoking status: Never Smoker  Smokeless tobacco: Never Used Substance Use Topics  Alcohol use: No  
 Drug use: No  
 
Allergies: 
No Known Allergies Review of Systems Review of Systems Unable to perform ROS: Dementia Physical Exam  
 
Visit Vitals /58 Pulse 76 Temp 97.6 °F (36.4 °C) Resp 16 Ht 5' 5\" (1.651 m) Wt 74.8 kg (165 lb) SpO2 99% BMI 27.46 kg/m² Physical Exam  
Constitutional: She appears well-developed and well-nourished. No distress. HENT:  
Head: Normocephalic and atraumatic. Poor dentition Eyes: Pupils are equal, round, and reactive to light. EOM are normal. Right eye exhibits no discharge. Left eye exhibits no discharge. No scleral icterus. Injected R eye w/o discharge Neck: Normal range of motion. Neck supple. No JVD present. No tracheal deviation present. No thyromegaly present. Cardiovascular: Normal rate, regular rhythm, normal heart sounds and intact distal pulses. Exam reveals no gallop and no friction rub. No murmur heard. Pulmonary/Chest: Effort normal. No respiratory distress. Minimal effort Abdominal: Soft. Bowel sounds are normal. She exhibits distension. There is no tenderness. There is no rebound and no guarding. Large hernia RUQ with protruding abdominal contents easily reducible and nontender Genitourinary:  
Genitourinary Comments: Stage 2 sacral decubitus ulcer approx [id number] without undermining, clean and dressed without drainage, no malodor, surrounding area with stage 1 pressure injury, no fluctuance Musculoskeletal: She exhibits edema (b/l pitting edema to knees) and tenderness (RLE). She exhibits no deformity. RLE wrapped with pressure dressing, c/d/i, Pressure injuries b/l heels, hematoma left heel Lymphadenopathy:  
  She has no cervical adenopathy. Neurological: She is alert. No cranial nerve deficit. She exhibits normal muscle tone. Coordination normal.  
Oriented to self only (states name is [facility]) Skin: Skin is warm and dry. She is not diaphoretic. There is erythema (area around sacral ulcer). No pallor. Psychiatric: She has a normal mood and affect. Her behavior is normal.  
Advanced dementia (at baseline per caregiver) Diagnostic Study Results Labs - Recent Results (from the past 12 hour(s)) METABOLIC PANEL, COMPREHENSIVE Collection Time: 10/14/19 10:01 AM  
Result Value Ref Range Sodium 138 136 - 145 mmol/L Potassium 5.1 3.5 - 5.5 mmol/L Chloride 105 100 - 111 mmol/L  
 CO2 30 21 - 32 mmol/L Anion gap 3 3.0 - 18 mmol/L Glucose 87 74 - 99 mg/dL BUN 71 (H) 7.0 - 18 MG/DL Creatinine 2.18 (H) 0.6 - 1.3 MG/DL  
 BUN/Creatinine ratio 33 (H) 12 - 20 GFR est AA 26 (L) >60 ml/min/1.73m2 GFR est non-AA 21 (L) >60 ml/min/1.73m2 Calcium 9.0 8.5 - 10.1 MG/DL Bilirubin, total 0.3 0.2 - 1.0 MG/DL  
 ALT (SGPT) 11 (L) 13 - 56 U/L  
 AST (SGOT) 16 10 - 38 U/L Alk. phosphatase 56 45 - 117 U/L Protein, total 6.5 6.4 - 8.2 g/dL Albumin 3.2 (L) 3.4 - 5.0 g/dL Globulin 3.3 2.0 - 4.0 g/dL A-G Ratio 1.0 0.8 - 1.7 NT-PRO BNP Collection Time: 10/14/19 10:01 AM  
Result Value Ref Range NT pro-BNP 4,527 (H) 0 - 1,800 PG/ML  
CBC WITH AUTOMATED DIFF Collection Time: 10/14/19 10:36 AM  
Result Value Ref Range WBC 7.4 4.6 - 13.2 K/uL  
 RBC 3.02 (L) 4.20 - 5.30 M/uL HGB 8.5 (L) 12.0 - 16.0 g/dL HCT 26.7 (L) 35.0 - 45.0 %  MCV 88.4 74.0 - 97.0 FL  
 MCH 28.1 24.0 - 34.0 PG  
 MCHC 31.8 31.0 - 37.0 g/dL  
 RDW 15.3 (H) 11.6 - 14.5 % PLATELET 390 225 - 959 K/uL MPV 8.4 (L) 9.2 - 11.8 FL  
 NEUTROPHILS 59 40 - 73 % LYMPHOCYTES 25 21 - 52 % MONOCYTES 13 (H) 3 - 10 % EOSINOPHILS 3 0 - 5 % BASOPHILS 0 0 - 2 %  
 ABS. NEUTROPHILS 4.3 1.8 - 8.0 K/UL  
 ABS. LYMPHOCYTES 1.8 0.9 - 3.6 K/UL  
 ABS. MONOCYTES 1.0 0.05 - 1.2 K/UL  
 ABS. EOSINOPHILS 0.3 0.0 - 0.4 K/UL  
 ABS. BASOPHILS 0.0 0.0 - 0.1 K/UL  
 DF AUTOMATED Radiologic Studies -  
XR CHEST PORT    (Results Pending) Medical Decision Making I am the first provider for this patient. I reviewed the vital signs, available nursing notes, past medical history, past surgical history, family history and social history. Vital Signs-Reviewed the patient's vital signs. Records Reviewed: Nursing Notes, Old Medical Records, Previous Radiology Studies and Previous Laboratory Studies (Time of Review: 9:10 AM) ED Course: Progress Notes, Reevaluation, and Consults: 
7130 Patient seen and examined at bedside. Initial orders placed including lasix for diuresis and soap suds enema for constipation. Case discussed with Dr. Lisa Mcbride. 1010 Difficult vascular access. Placed ultrasound-guided PIV. CXR reviewed. Awaiting labs and UA. 
 
1120 Spoke with Dr. Darby Rolon regarding creatinine bump. Will instruct patient to f/u in Dr. Ashwin Dutton office a few weeks. Provider Notes (Medical Decision Making): MDM Number of Diagnoses or Management Options CKD (chronic kidney disease) stage 4, GFR 15-29 ml/min (HCC):  
Peripheral edema:  
Pressure injury of skin of sacral region, unspecified injury stage:  
Diagnosis management comments:  
81 yo AA female with PMH CKD III/IV, CHFrEF (ECHO 31-35% 12/2018), dementia, h/o NSTEMI (02/2019), AFib, s/p pacemaker, lupus, obesity presents with stable chronic peripheral edema and stable chronic sacral decubitus ulcer. Patient with questionable medication compliance by caregivers and with some peripheral edema and mild vascular congestion on CXR. Reviewed Dr. Mark Anthony Javed (nephrology) recent notes and patient has CKD3/4 and is on daily spironolactone and torsemide. Gave 20 mg IV push of furosemide for diuresis. Patient's chronic wounds do not show any signs of cellulitis. Labworks significant for chronic anemia (hgb 8.5) and no leukocytosis. Chemistries show mild worsening of chronic renal disease with a creatinine of 2.18. BNP elevated at 4527 but low compared to historical measurements. Discussed patient's case with Dr. Carol Schulz who did not advise any changes to her current diuretic regimen; demadex was preferred over furosemide by cardiology for worsening anasarca. Dr. Carol Schulz requested patient follow-up in his office in the next few weeks. Patient's PCP is Dr. Sharonda Sheppard whom she sees with regularity and she has around the clock care at home. She is safe for discharge home with PCP follow-up. I personally saw and examined the patient. I have reviewed and agree with the residents findings, including all diagnostic interpretations, and plans as written. I was present during the key portions of separately billed procedures. Lucien Rodríguez MD 
 
  
Amount and/or Complexity of Data Reviewed Clinical lab tests: ordered and reviewed Discuss the patient with other providers: yes Edward Rosales (ED Attending)) Independent visualization of images, tracings, or specimens: yes Diagnosis Clinical Impression: 1. Peripheral edema 2. Pressure injury of skin of sacral region, unspecified injury stage 3. CKD (chronic kidney disease) stage 4, GFR 15-29 ml/min ([facility]) Disposition: Home with PCP f/u Follow-up Information Follow up With Specialties Details Why Contact Info  Armand Epley, MD Internal Medicine Schedule an appointment as soon as possible for a visit in 2 days For ED follow-up with primary care physician [address] [facility] [address] [place] [address] 
[phone] Angela Bryan MD Nephrology Call For ED follow-up with nephrologist [address] [facility] [address] [facility] 
[phone] Patient's Medications Start Taking No medications on file Continue Taking ACETAMINOPHEN (TYLENOL ARTHRITIS PAIN) 650 MG TBER    Take 1 Tab by mouth every eight (8) hours. AMIODARONE (CORDARONE) 200 MG TABLET    TAKE ONE TABLET EVERY DAY (MAKE AN APPT) AMITRIPTYLINE (ELAVIL) 25 MG TABLET    Take 25 mg by mouth nightly. ASPIRIN DELAYED-RELEASE 81 MG TABLET    Take 81 mg by mouth daily. CARVEDILOL (COREG) 3.125 MG TABLET    Take 1 Tab by mouth every twelve (12) hours. FOOD SUPPLEMT, LACTOSE-REDUCED (ENSURE ENLIVE) 0.08 GRAM-1.5 KCAL/ML LIQD    Take 1 Bottle by mouth two (2) times a day. HYDROXYCHLOROQUINE (PLAQUENIL) 200 MG TABLET    Take 200 mg by mouth two (2) times a day. LEVOTHYROXINE (SYNTHROID) 25 MCG TABLET    Take 1 Tab by mouth Daily (before breakfast). MEMANTINE (NAMENDA) 10 MG TABLET    Take 10 mg by mouth two (2) times a day. MENTHOL-ZINC OXIDE (CALMOSEPTINE) 0.44-20.6 % OINT    Apply  to affected area. OTHER    Incentive Spirometry- Use as directed POLYETHYLENE GLYCOL (MIRALAX) 17 GRAM/DOSE POWDER    Take 17 g by mouth daily. PREDNISONE (DELTASONE) 1 MG TABLET    Take 1 mg by mouth daily. 1 pill po daily SPIRONOLACTONE (ALDACTONE) 25 MG TABLET    Take 1 Tab by mouth daily. TORSEMIDE (DEMADEX) 20 MG TABLET    Take 1 Tab by mouth daily as needed (edema). These Medications have changed No medications on file Stop Taking No medications on file Roma Abdi DO, PGY-2  
[facility] Medicine October 14, 2019, 9:10 AM

## 2019-10-14 NOTE — DISCHARGE INSTRUCTIONS
Patient Education        Pressure Injuries: Care Instructions  Your Care Instructions    A pressure injury on the skin is caused by constant pressure to that area. These injuries--also called decubitus ulcers or bedsores--may happen when you lie in bed or sit in a wheelchair for a long time. The constant pressure blocks the blood supply to the skin. This causes skin cells to die and creates a sore. Pressure injuries usually occur over bony areas, such as the hips, lower back, elbows, heels, and shoulders. They also can occur in places where the skin folds over on itself. You may have mild redness or open sores that are harder to heal.  Good care at home can help heal pressure injuries. You or your caregiver needs to check your skin every day for sores. You need good nutrition and plenty of fluids to keep your skin healthy and prevent new pressure injuries. Follow-up care is a key part of your treatment and safety. Be sure to make and go to all appointments, and call your doctor if you are having problems. It's also a good idea to know your test results and keep a list of the medicines you take. How can you care for yourself at home? · If your doctor prescribed a medicated ointment or cream, use it exactly as prescribed. Call your doctor if you think you are having a problem with your medicine. · Wash pressure injuries every day, or as often as your doctor recommends. Most tap water is safe, but follow the advice of your doctor or nurse. He or she may recommend that you use a saline solution. This is a salt and water solution that you can buy over the counter. · Put on bandages as your doctor or wound care specialist says. · Keep healthy tissue around the sore clean and dry. · Check your skin every day for sores (or have a caregiver do it). · If you know what is causing the pressure that caused the sore, find a way to remove that pressure.   To prevent pressure injuries  · Change your position or have your caregiver help you change your position often. You may need to do this every 2 hours if you are in bed or every 15 minutes if you are in a wheelchair. This lowers the chance of making sores worse and getting new sores. · Use special mattresses or other support. These may include low-pressure mattresses or cushions made of foam that can be filled with air, water, beads, or fiber. · Eat healthy foods with plenty of protein to help heal damaged skin and to help new skin grow. · Try to stay at a healthy weight. Being overweight can lead to more pressure on your skin. · Do not slide across sheets or slump in a chair or bed. · Do not smoke. Smoking dries the skin and reduces its blood supply. If you need help quitting, talk to your doctor about stop-smoking programs and medicines. These can increase your chances of quitting for good. When should you call for help? Call your doctor now or seek immediate medical care if:    · You have signs of infection, such as:  ? Increased pain, swelling, warmth, or redness. ? Red streaks leading from the sore. ? Pus draining from the sore. ? A fever.    Watch closely for changes in your health, and be sure to contact your doctor if:    · Your pressure injuries are not healing.     · You have new pressure injuries.     · You need help changing positions in bed or in a chair.     · Your caregiver needs help to move you. Where can you learn more? Go to http://renata-antonieta.info/. Enter F114 in the search box to learn more about \"Pressure Injuries: Care Instructions. \"  Current as of: September 26, 2018  Content Version: 12.2  © 4163-1719 [employer]. Care instructions adapted under license by [employer] (which disclaims liability or warranty for this information).  If you have questions about a medical condition or this instruction, always ask your healthcare professional. [facility] disclaims any warranty or liability for your use of this information. Patient Education        Learning About Preventing Pressure Injuries  What are pressure injuries? A pressure injury to the skin is caused by constant pressure over a period of time. The constant pressure blocks the blood supply to the skin. This causes skin cells to die and creates a sore. Pressure injuries are also called bedsores. Pressure injuries usually occur over bony areas, such as the hips, lower back, elbows, heels, and shoulders. Pressure injuries can also occur in places where the skin folds over on itself, or where medical equipment presses on the skin, such as when oxygen tubes press on the ears or cheeks. Pressure injuries can range from red areas on the surface of the skin to severe tissue damage that goes deep into muscle and bone. Severe sores are hard to treat and slow to heal. When pressure injuries do not heal properly, problems such as bone, blood, and skin infections can develop. What causes pressure injuries? Things that cause pressure injuries include:  · Pressure on the skin and tissues. For example, the sores may happen when a person lies in bed or sits in a wheelchair for a long time. This is the most common cause of pressure injuries. · Sliding down in a bed or chair, forcing the skin to fold over itself (shear force). · Being pulled across bed sheets or other surfaces (friction burns). As we get older, our skin gets more thin and dry and less elastic, so it is easier to damage. Poor nutrition and not getting enough fluids make these natural changes in the skin worse. Skin in this condition may easily develop a pressure injury. Skin can also be damaged by sweat, feces, or urine, making pressure injuries more likely and harder to heal.  How can you help prevent pressure injuries? If you are not able to do these things yourself, ask a family member or friend for help.   Change position often  · In a bed, change position every 2 hours.  · In a wheelchair or other type of chair, shift your weight every 15 minutes, and give yourself a full relief of weight every hour. ? For a weight shift, lean forward and to the left and right. Push up out of the chair with your arms. If you have a chair that tilts, use the tilt control to help relieve pressure. ? For a full relief of weight, stand up or move to another chair or bed if you are able to. Personal care  · Check your skin every day, especially around bony areas. When a pressure injury is forming, skin temperature can be different than nearby skin. It might be warmer or cooler. The skin can feel either firmer or softer than the surrounding skin. · Keep your skin clean and free of sweat, wound drainage, urine, and feces. · Use skin lotions to keep your skin from drying out and cracking. Barrier lotions or creams have ingredients that can act as a shield to help protect the skin from moisture or irritation. · Try not to slide or slump across sheets in a chair or bed. And try not to sleep in a recliner chair. Lifestyle choices  · Eat healthy foods with plenty of protein to help heal damaged skin and to help new skin grow. · Get plenty of fluids. · Stay at a healthy weight. Being either overweight or underweight can make pressure injuries more likely. · If you smoke, stop. Smoking dries the skin and reduces its blood supply. If you need help quitting, talk to your doctor about stop-smoking programs and medicines. These can increase your chances of quitting for good. Ask about using cushions or pads  · Overlays are special pads you put on top of a mattress. They provide a softer surface that will fit your body's shape better than a regular mattress. · Cushions or devices can be used to reduce pressure on certain areas of the body. For example, you can use a \"medical heel pillow\" to help prevent pressure injuries on heels.  You can also get cushions for seating surfaces, such as wheelchair seats. Talk with your doctor about cushions and pads. Some products, such as doughnut-type devices, may actually cause pressure injuries or make them worse. Where can you learn more? Go to http://renata-antonieta.info/. Enter 368 0492 in the search box to learn more about \"Learning About Preventing Pressure Injuries. \"  Current as of: September 26, 2018  Content Version: 12.2  © 7561-3266 [employer]. Care instructions adapted under license by [employer] (which disclaims liability or warranty for this information). If you have questions about a medical condition or this instruction, always ask your healthcare professional. [place] [address] any warranty or liability for your use of this information. Patient Education        Learning About Turning a Person in Bed  Why is it important to turn a person in bed? People sometimes have to stay in bed for long periods of time. They may be very sick, in pain, or very weak and not be able to move themselves into different positions. It's very important that your loved one changes positions. Lying in one position for a long time can cause pressure injuries (also called pressure sores). Pressure injuries are damage to the skin. They can range from red areas on the surface of the skin to severe tissue damage that goes deep into muscle and bone. These problems are hard to treat and slow to heal. When pressure injuries don't heal well, they can cause problems such as bone, blood, and skin infections. Pressure injuries usually occur over bony areas, such as the hips, lower back, elbows, heels, and shoulders. They can also occur in places where the skin folds over on itself. You can help your loved one avoid pressure injuries by helping him or her turn and change position in bed. A drawsheet can help. What is a drawsheet? A drawsheet makes it easier to \"roll\" your loved one into another position.  You can buy a drawsheet, or you can make one with a sheet. You then make the bed using the drawsheet. To make a drawsheet:  · Fold a sheet in half lengthwise. · Place the sheet on top of the fitted bottom sheet so that the top and bottom of the drawsheet go across the bed (perpendicular to the bed). Position the drawsheet so that it will be between your loved one's head and knees. · Tuck in the drawsheet tightly on both sides. Smooth out any wrinkles to reduce possible skin irritation. How do you turn a person in bed? Before getting started, tell your loved one that you want him or her to roll into another position. If your loved one has any drains, tubes, or other medical equipment, adjust them so they don't get in the way. If your loved one can help  · You may need to help your loved one scoot toward the opposite side of the bed so that he or she will have room to roll. · Go to the side of the bed you want your love one to roll toward. · Ask your loved one to lie on his or her back with the knees bent. Have your loved one place his or her arms across the body. · Ask your loved one to roll toward you while keeping the knees bent. If you have a rail on the bed, have your loved one reach toward the rail. · Help your loved one as needed. Gently place your hands on the shoulders and hips, and guide him or her toward you. If your loved one can't help  It is best to turn your loved one every 2 hours. If your loved one cannot move or finds it very hard, you can use your drawsheet (see \"What is a drawsheet? \"). Have a family member or friend help you. It is easier for two people to turn someone, and it can be dangerous for one person to do it. Position your loved one  · One person stands on each side of the bed. If your loved one is in a hospital bed, lower the height of the bed. This will make it easier to turn the person. · Untuck the drawsheet on both sides of the bed.  Each person gathers up one side so you both almost have a \"handle\" to grab. You may also need to make sure your loved one is high enough up in the bed. If not, lift him or her toward the head of the bed. · Agree on a count, and then lift and move your loved one to the side of the bed you're going to roll away from. · Tuck in the drawsheet on the side of the bed that your loved one will roll toward. Move your loved one  When you and your assistant are ready:  · Help your loved one lie on his or her back with the knees bent. If your loved one can't bend the knees, cross one ankle over the other in the direction of the turn. Position your loved one's arms across his or her body. · Stand on opposite sides of the bed. One person will pull and the other push. Be sure that you and your assistant have your feet shoulder-width apart. This will help you avoid straining your back. ? If you're pulling your loved one toward you, lean from your hips (don't bend your back), reach over your loved one, and grab the drawsheet at your loved one's hip and shoulder areas. Slowly pull the drawsheet toward you to roll your loved one over. ? If you're rolling your loved one away from you, slowly push at the hip and shoulder areas. Moving someone in bed is best as a two-person job. If your loved one can help, even a little, you may be able to do it yourself. But do all you can to find someone to help you. Positioning a drawsheet  Positioning a drawsheet    1. When you make someone's bed with a drawsheet, position it so that it is between the person's head and knees. Turning or moving a person in bed    1. The drawsheet can then be used to turn or move the person you're caring for. What do you do after turning someone? You can use pillows to help your loved one get comfortable and avoid pressure injuries. If your loved one is on his or her side:  · Place pillows in front of your loved one, at chest level, with the top arm draped over a pillow.   · If needed, tuck one edge of a pillow under the buttock, lengthwise. Then fold the pillow under and tuck the other edge under the first edge. That creates a \"roll\" that stays in place better and helps keep your loved one from rolling back. · Place a pillow between your loved one's knees, with the legs slightly bent. · Put the top leg a little in front of the bottom leg. This takes pressure off the bottom leg. · Put a pillow under the bottom leg so that the bottom ankle is off the bed. If your loved one is on his or her back:  · Put a pillow under your loved one's legs between the knees and ankles. · Do not put anything under the heels. · If you have a hospital bed, don't adjust the top end above 30 degrees. This helps prevent your loved one from sliding down. When you are finished, smooth out the drawsheet in its original position and tuck it in. Where can you learn more? Go to http://renata-antonieta.info/. Enter X515 in the search box to learn more about \"Learning About Turning a Person in Bed. \"  Current as of: April 1, 2019  Content Version: 12.2  © 8909-6729 [employer]. Care instructions adapted under license by [employer] (which disclaims liability or warranty for this information). If you have questions about a medical condition or this instruction, always ask your healthcare professional. [place] [address] any warranty or liability for your use of this information.

## 2019-10-15 NOTE — PROGRESS NOTES
NN contacted the patient by telephone to perform CHF follow Up. Spoke to pt's son aHlley Galvan Noted Priorities/Plan:  Continued function at baseline. Daily Weight: pt bed bound per pt's son Zone: green Signs/Symptoms: Edema none; SOB none; orthopnea none. Son states no issues at this time. Reports pt is doing well Goals  Attends follow-up appointments as directed. 8/5/19 Patient will attend all scheduled appointments through 10/5/19  Knowledge and adherence medication (ie. action, side effects, missed dose, etc.)   
  8/5/19  Pt will take all medications prescribed to be evaluated on each outreach through 10/5/19 Needs addressed from pathway:  
Week 9-12 Provide Daily Disease Management 
(patient/caregiver initiated) ? Pt is maintaining Daily weight measurements (Before Breakfast 
? Reviewed Daily Zone Identification (symptom management; weight, edema, SOB, activity/sleep changes)-notify provider immediately as indicated ? Reviewed Cardiac Low-sodium Diet (No added sodium; 1500mg as indicated). Also included carbohydrate controlled diet education ? Reviewed importance of Fluid restriction ? Comorbidity Management ? Confirmed follow-up appointments and transportation. Additional Assessments ? Reinforced Fall precautions, pt bed bound ? Activity tolerance assessment: reports at baseline ? Reviewed Energy conservation management and balancing rest w/ activity ? Labs/diagnostics per MD office ? Medication Therapy: no issues identified ? Diet/appetite assessment: reports normal appetitie ? Reviewed appropriate ED/Hospital utilization Psychosocial:  Reassurance and emotional support Education/Discharge Planning ? Verify DME needs; arrange home equipment: no needs at this time ? Advanced care planning (e.g.: Palliative Care; Hospice) ? H2H; Home Health  (eg: Telehealth) ? Patient/Caregiver HF education literacy ?  Patient/Caregiver verifies support systems (meals/medication/transportation needs,  
? community resources Have you been to an ER/Hospital since discharge from [facility]?   
10/14/19 To [facility] ED Have you followed up with PCP/Cardiologist/Specialist?  
19 Vasc Surg appt w/ Kely Saucedo., PA 
8/15/19 Pulm appt w/ Morgan Woo. NP 
19 Cardiology appt w/ DANIELLE Estrella [facility]19 Card appt w/ DANIELLE Estrella Transportation:  son/family Diet: no salt added Activity/ADLs: needs assistance from family. Medications Reconciled at this time:  son declined, last completed on 10/14/19 by ED Home health:  Company/Completion: [facility] Social Support: family Advanced Care Plan: pt baseline demented Patient reminded that there is a physician on call 24 hours a day / 7 days a week (M-F 5pm to 8am and from Friday 5pm until Monday 8a for the weekend) should the patient have questions or concerns. Patient reminded to call 911 if situation is emergent or patient feels the situation is emergent. Pt verbalizes understanding.

## 2019-10-18 NOTE — DISCHARGE INSTRUCTIONS
Patient Education        Pressure Injuries: Care Instructions  Your Care Instructions    A pressure injury on the skin is caused by constant pressure to that area. These injuries--also called decubitus ulcers or bedsores--may happen when you lie in bed or sit in a wheelchair for a long time. The constant pressure blocks the blood supply to the skin. This causes skin cells to die and creates a sore. Pressure injuries usually occur over bony areas, such as the hips, lower back, elbows, heels, and shoulders. They also can occur in places where the skin folds over on itself. You may have mild redness or open sores that are harder to heal.  Good care at home can help heal pressure injuries. You or your caregiver needs to check your skin every day for sores. You need good nutrition and plenty of fluids to keep your skin healthy and prevent new pressure injuries. Follow-up care is a key part of your treatment and safety. Be sure to make and go to all appointments, and call your doctor if you are having problems. It's also a good idea to know your test results and keep a list of the medicines you take. How can you care for yourself at home? · If your doctor prescribed a medicated ointment or cream, use it exactly as prescribed. Call your doctor if you think you are having a problem with your medicine. · Wash pressure injuries every day, or as often as your doctor recommends. Most tap water is safe, but follow the advice of your doctor or nurse. He or she may recommend that you use a saline solution. This is a salt and water solution that you can buy over the counter. · Put on bandages as your doctor or wound care specialist says. · Keep healthy tissue around the sore clean and dry. · Check your skin every day for sores (or have a caregiver do it). · If you know what is causing the pressure that caused the sore, find a way to remove that pressure.   To prevent pressure injuries  · Change your position or have your caregiver help you change your position often. You may need to do this every 2 hours if you are in bed or every 15 minutes if you are in a wheelchair. This lowers the chance of making sores worse and getting new sores. · Use special mattresses or other support. These may include low-pressure mattresses or cushions made of foam that can be filled with air, water, beads, or fiber. · Eat healthy foods with plenty of protein to help heal damaged skin and to help new skin grow. · Try to stay at a healthy weight. Being overweight can lead to more pressure on your skin. · Do not slide across sheets or slump in a chair or bed. · Do not smoke. Smoking dries the skin and reduces its blood supply. If you need help quitting, talk to your doctor about stop-smoking programs and medicines. These can increase your chances of quitting for good. When should you call for help? Call your doctor now or seek immediate medical care if:    · You have signs of infection, such as:  ? Increased pain, swelling, warmth, or redness. ? Red streaks leading from the sore. ? Pus draining from the sore. ? A fever.    Watch closely for changes in your health, and be sure to contact your doctor if:    · Your pressure injuries are not healing.     · You have new pressure injuries.     · You need help changing positions in bed or in a chair.     · Your caregiver needs help to move you. Where can you learn more? Go to http://renata-antonieta.info/. Enter F114 in the search box to learn more about \"Pressure Injuries: Care Instructions. \"  Current as of: September 26, 2018  Content Version: 12.2  © 7187-8369 [employer]. Care instructions adapted under license by [employer] (which disclaims liability or warranty for this information).  If you have questions about a medical condition or this instruction, always ask your healthcare professional. Leoncio Friedman disclaims any warranty or liability for your use of this information. Patient Education        Learning About Preventing Pressure Injuries  What are pressure injuries? A pressure injury to the skin is caused by constant pressure over a period of time. The constant pressure blocks the blood supply to the skin. This causes skin cells to die and creates a sore. Pressure injuries are also called bedsores. Pressure injuries usually occur over bony areas, such as the hips, lower back, elbows, heels, and shoulders. Pressure injuries can also occur in places where the skin folds over on itself, or where medical equipment presses on the skin, such as when oxygen tubes press on the ears or cheeks. Pressure injuries can range from red areas on the surface of the skin to severe tissue damage that goes deep into muscle and bone. Severe sores are hard to treat and slow to heal. When pressure injuries do not heal properly, problems such as bone, blood, and skin infections can develop. What causes pressure injuries? Things that cause pressure injuries include:  · Pressure on the skin and tissues. For example, the sores may happen when a person lies in bed or sits in a wheelchair for a long time. This is the most common cause of pressure injuries. · Sliding down in a bed or chair, forcing the skin to fold over itself (shear force). · Being pulled across bed sheets or other surfaces (friction burns). As we get older, our skin gets more thin and dry and less elastic, so it is easier to damage. Poor nutrition and not getting enough fluids make these natural changes in the skin worse. Skin in this condition may easily develop a pressure injury. Skin can also be damaged by sweat, feces, or urine, making pressure injuries more likely and harder to heal.  How can you help prevent pressure injuries? If you are not able to do these things yourself, ask a family member or friend for help.   Change position often  · In a bed, change position every 2 hours.  · In a wheelchair or other type of chair, shift your weight every 15 minutes, and give yourself a full relief of weight every hour. ? For a weight shift, lean forward and to the left and right. Push up out of the chair with your arms. If you have a chair that tilts, use the tilt control to help relieve pressure. ? For a full relief of weight, stand up or move to another chair or bed if you are able to. Personal care  · Check your skin every day, especially around bony areas. When a pressure injury is forming, skin temperature can be different than nearby skin. It might be warmer or cooler. The skin can feel either firmer or softer than the surrounding skin. · Keep your skin clean and free of sweat, wound drainage, urine, and feces. · Use skin lotions to keep your skin from drying out and cracking. Barrier lotions or creams have ingredients that can act as a shield to help protect the skin from moisture or irritation. · Try not to slide or slump across sheets in a chair or bed. And try not to sleep in a recliner chair. Lifestyle choices  · Eat healthy foods with plenty of protein to help heal damaged skin and to help new skin grow. · Get plenty of fluids. · Stay at a healthy weight. Being either overweight or underweight can make pressure injuries more likely. · If you smoke, stop. Smoking dries the skin and reduces its blood supply. If you need help quitting, talk to your doctor about stop-smoking programs and medicines. These can increase your chances of quitting for good. Ask about using cushions or pads  · Overlays are special pads you put on top of a mattress. They provide a softer surface that will fit your body's shape better than a regular mattress. · Cushions or devices can be used to reduce pressure on certain areas of the body. For example, you can use a \"medical heel pillow\" to help prevent pressure injuries on heels.  You can also get cushions for seating surfaces, such as wheelchair seats. Talk with your doctor about cushions and pads. Some products, such as doughnut-type devices, may actually cause pressure injuries or make them worse. Where can you learn more? Go to http://renata-antonieta.info/. Enter 537 4587 in the search box to learn more about \"Learning About Preventing Pressure Injuries. \"  Current as of: September 26, 2018  Content Version: 12.2  © 1685-5467 [employer]. Care instructions adapted under license by [employer] (which disclaims liability or warranty for this information). If you have questions about a medical condition or this instruction, always ask your healthcare professional. [address] any warranty or liability for your use of this information.

## 2019-10-18 NOTE — ED PROVIDER NOTES
EMERGENCY DEPARTMENT HISTORY AND PHYSICAL EXAM 
 
 
Date: 10/18/2019 Patient Name: [facility] History of Presenting Illness Chief Complaint Patient presents with  Foot Pain  
  left heel blister, skin graft right foot History Provided By: Patient and Patient's Son Chief Complaint: Blister to left heel Additional History (Context): [place] Minor Lorena Parker is a 80 y.o. female with Multiple medical problems as below, dementia, nonambulatory at baseline living in a nursing facility who presents with a pressure ulcer to her left heel that family noted became unroofed while they were bathing her this morning. There had been a clear blister over the area, and when was being washed the blister broke releasing some clear fluid has a open area over her pressure ulcer now. The patient is otherwise been in her usual state of health, pleasantly demented, no acute complaints. Specifically denies any fevers, chills, sweats. Does not have any pain at the site. PCP: Tez Childs MD 
 
Current Outpatient Medications Medication Sig Dispense Refill  predniSONE (DELTASONE) 1 mg tablet Take 1 mg by mouth daily. 1 pill po daily  levothyroxine (SYNTHROID) 25 mcg tablet Take 1 Tab by mouth Daily (before breakfast). 30 Tab 3  
 spironolactone (ALDACTONE) 25 mg tablet Take 1 Tab by mouth daily. 90 Tab 3  
 hydroxychloroquine (PLAQUENIL) 200 mg tablet Take 200 mg by mouth two (2) times a day.  torsemide (DEMADEX) 20 mg tablet Take 1 Tab by mouth daily as needed (edema). 30 Tab 3  polyethylene glycol (MIRALAX) 17 gram/dose powder Take 17 g by mouth daily.  aspirin delayed-release 81 mg tablet Take 81 mg by mouth daily.  OTHER Incentive Spirometry- Use as directed 1 Each 0  
 food supplemt, lactose-reduced (ENSURE ENLIVE) 0.08 gram-1.5 kcal/mL liqd Take 1 Bottle by mouth two (2) times a day. 60 Bottle 0  
 menthol-zinc oxide (CALMOSEPTINE) 0.44-20.6 % oint Apply  to affected area.  carvedilol (COREG) 3.125 mg tablet Take 1 Tab by mouth every twelve (12) hours. 60 Tab 0  
 acetaminophen (TYLENOL ARTHRITIS PAIN) 650 mg TbER Take 1 Tab by mouth every eight (8) hours. (Patient taking differently: Take 1 Tab by mouth daily. ) 15 Tab 0  
 amiodarone (CORDARONE) 200 mg tablet TAKE ONE TABLET EVERY DAY (MAKE AN APPT) (Patient taking differently: TAKE ONE TABLET EVERY DAY by mouth) 30 Tab 5  
 memantine (NAMENDA) 10 mg tablet Take 10 mg by mouth two (2) times a day.  amitriptyline (ELAVIL) 25 mg tablet Take 25 mg by mouth nightly. Past History Past Medical History: 
Past Medical History:  
Diagnosis Date  Acute on chronic diastolic heart failure ([facility]  Arthritis  Benign hypertensive heart disease without heart failure Better controlled, stable  Chronic diastolic heart failure ([address] Breathing and edema is improving  Congestive heart failure ([facility] [address]  HTN (hypertension)  Hypercholesteremia  Lupus (systemic lupus erythematosus) ([facility] [address] 6/18/2014  
 followed by dr Geo Oneal  Obesity, unspecified Patient has weight loss Discussed diet ad fluid restriction  Other and unspecified hyperlipidemia F/u per pmd  
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 
Past Surgical History:  
Procedure Laterality Date  HX FREE SKIN GRAFT    
 right foot  HX HYSTERECTOMY  PACEMAKER PROCEDURE Family History: 
Family History Problem Relation Age of Onset  Arrhythmia Neg Hx  Asthma Neg Hx  Clotting Disorder Neg Hx  Fainting Neg Hx   
 Heart Attack Neg Hx  High Cholesterol Neg Hx  Pacemaker Neg Hx  Stroke Neg Hx Social History: 
Social History Tobacco Use  Smoking status: Never Smoker  Smokeless tobacco: Never Used Substance Use Topics  Alcohol use: No  
 Drug use: No  
 
 
Allergies: 
No Known Allergies Review of Systems Review of Systems Constitutional: Negative for chills, fatigue and fever. HENT: Negative for congestion, rhinorrhea, sore throat and trouble swallowing. Eyes: Negative for discharge, redness and itching. Respiratory: Negative for cough, shortness of breath, wheezing and stridor. Cardiovascular: Negative for chest pain, palpitations and leg swelling. Gastrointestinal: Negative for abdominal pain, blood in stool, diarrhea, nausea and vomiting. Genitourinary: Negative for difficulty urinating and dysuria. Musculoskeletal: Negative for back pain. Skin: Positive for wound. Negative for rash. Neurological: Negative for syncope and light-headedness. Psychiatric/Behavioral: Negative for behavioral problems and confusion. All other systems reviewed and are negative. Physical Exam  
 
Vitals:  
 10/18/19 0809 BP: 131/62 Pulse: 77 Resp: 20 Temp: 97.2 °F (36.2 °C) SpO2: 100% Weight: 63.5 kg (140 lb) Height: 5' 7\" (1.702 m) Physical Exam  
Constitutional: She appears well-developed and well-nourished. No distress. HENT:  
Head: Normocephalic and atraumatic. Eyes: Pupils are equal, round, and reactive to light. Neck: Normal range of motion. Neck supple. Cardiovascular: Normal rate, regular rhythm and normal heart sounds. Pulmonary/Chest: Effort normal and breath sounds normal. She has no wheezes. She has no rales. Abdominal: Soft. Bowel sounds are normal. She exhibits no distension. There is no tenderness. Musculoskeletal: Normal range of motion. Lymphadenopathy:  
  She has no cervical adenopathy. Neurological: She is alert. Skin: Skin is warm and dry. She is not diaphoretic. Left posterior heel with approximately 8 x 10 superficial decubitus ulcer with denuded skin. No erythema, warmth, fluctuance, or discharge. Nursing note and vitals reviewed. Diagnostic Study Results Labs - 
 No results found for this or any previous visit (from the past 12 hour(s)). Radiologic Studies - No orders to display CT Results  (Last 48 hours) None CXR Results  (Last 48 hours) None Medical Decision Making I am the first provider for this patient. I reviewed the vital signs, available nursing notes, past medical history, past surgical history, family history and social history. Records Reviewed: Old Medical Records ED Course:  
Remained stable and without complaint during her emergency department stay Disposition: 
Discharge DISCHARGE NOTE:  
 
Pt has been reexamined. Patient has no new complaints, changes, or physical findings. Care plan outlined and precautions discussed. All medications were reviewed with the patient and family; will d/c home with outpatient PCP and wound care follow-up. All of pt's questions and concerns were addressed. Patient was instructed and agrees to follow up with her primary care provider, as well as to return to the ED upon further deterioration. Patient is ready to go home. Follow-up Information Follow up With Specialties Details Why Contact Info Rich Nayak MD Internal Medicine Call in 1 day  [address] [place] [address] 
[phone] [facility] EMERGENCY DEPT Emergency Medicine  As needed, If symptoms worsen [address] [place] [id number] 
[phone] Current Discharge Medication List  
  
 
 
Provider Notes (Medical Decision Making):  
Left heel decubitus ulcer. No signs of infection or other indication for emergent intervention. With since it it daytime and wound care is available, I placed a consult for them to see her in the emergency department. Appreciate their consult and placement of a silicone dressing and plan for outpatient follow-up; family was also at bedside and instructed on wound care. Diagnosis Clinical Impression: 1. Pressure injury of skin of left heel, unspecified injury stage

## 2019-10-18 NOTE — WOUND CARE
Physical Exam  
Musculoskeletal:  
     Legs: 
     Feet: 
 
  
Room ER07: wound assess Applied mepilex heel dressing to left heel. Applied aquacel ag, 3 flex three layer compression wrap right lower leg from base of toes to tibia, 50% overlap, 25% stretch. Pt agreeable to recommended treatment. Wound care education provided to pt & son Ning Rodriguez at this time. Will turn over care to nursing staff at this time. Updated primary nurse Lennox Binning primary physician Dr. Chula Barros.   
Flaco CALERON, RN, [facility], [address]

## 2019-10-28 NOTE — PROGRESS NOTES
NN contacted the patient by telephone to perform CHF follow Up. Spoke to pt's son Jocelyn Moon Noted Priorities/Plan:  Continued function at baseline. Daily Weight: pt bed bound per pt's son Zone: green Signs/Symptoms: Edema none; SOB none; orthopnea none. Son states no issues at this time. Reports pt is doing well Goals  Attends follow-up appointments as directed. 8/5/19 Patient will attend all scheduled appointments through 10/5/19  Knowledge and adherence medication (ie. action, side effects, missed dose, etc.)   
  8/5/19  Pt will take all medications prescribed to be evaluated on each outreach through 10/5/19 Needs addressed from pathway:  
Week 9-12 Provide Daily Disease Management 
(patient/caregiver initiated) ? Pt is maintaining Daily weight measurements (Before Breakfast 
? Reviewed Daily Zone Identification (symptom management; weight, edema, SOB, activity/sleep changes)-notify provider immediately as indicated ? Reviewed Cardiac Low-sodium Diet (No added sodium; 1500mg as indicated). Also included carbohydrate controlled diet education ? Reviewed importance of Fluid restriction ? Comorbidity Management ? Confirmed follow-up appointments and transportation. Additional Assessments ? Reinforced Fall precautions, pt bed bound ? Activity tolerance assessment: reports at baseline ? Reviewed Energy conservation management and balancing rest w/ activity ? Labs/diagnostics per MD office ? Medication Therapy: no issues identified ? Diet/appetite assessment: reports normal appetitie ? Reviewed appropriate ED/Hospital utilization Psychosocial:  Reassurance and emotional support Education/Discharge Planning ? Verify DME needs; arrange home equipment: no needs at this time ? Advanced care planning (e.g.: Palliative Care; Hospice) ? H2H; Home Health  (eg: Telehealth) ? Patient/Caregiver HF education literacy ?  Patient/Caregiver verifies support systems (meals/medication/transportation needs,  
? community resources Have you been to an ER/Hospital since discharge from [facility]?   
10/14/19 To [facility] ED   
10/18/19 to [facility] ED for heel blister Have you followed up with PCP/Cardiologist/Specialist?  
19 Vasc Surg appt w/ Cierra Castellanos., PA 
8/15/19 Pulm appt w/ Phil Diaz. NP 
19 Cardiology appt w/ ANTONINO Jaeger. Upcomin19 Card appt w/ DANIELLE Jaeger Transportation:  son/family Diet: no salt added Activity/ADLs: needs assistance from family. Medications Reconciled at this time:  son declined, last completed on 10/14/19 by ED Home health:  Company/Completion: [employer] Social Support: family Advanced Care Plan: pt baseline demented Patient reminded that there is a physician on call 24 hours a day / 7 days a week (M-F 5pm to 8am and from Friday 5pm until Monday 8a for the weekend) should the patient have questions or concerns. Patient reminded to call 911 if situation is emergent or patient feels the situation is emergent. Pt verbalizes understanding.

## 2019-11-04 NOTE — PROGRESS NOTES
NN contacted the patient by telephone to perform CHF follow Up. Spoke to pt's son Wendy Sanchez Noted Priorities/Plan:  Continued function at baseline. Daily Weight: pt bed bound per pt's son Zone: green Signs/Symptoms: Edema none; SOB none; orthopnea none. Son states no issues at this time. Reports pt is doing well Goals  Attends follow-up appointments as directed. 8/5/19 Patient will attend all scheduled appointments through 10/5/19  Knowledge and adherence medication (ie. action, side effects, missed dose, etc.)   
  8/5/19  Pt will take all medications prescribed to be evaluated on each outreach through 10/5/19 Needs addressed from pathway:  
Week 9-12 Provide Daily Disease Management 
(patient/caregiver initiated) ? Pt is maintaining Daily weight measurements (Before Breakfast 
? Reviewed Daily Zone Identification (symptom management; weight, edema, SOB, activity/sleep changes)-notify provider immediately as indicated ? Reviewed Cardiac Low-sodium Diet (No added sodium; 1500mg as indicated). Also included carbohydrate controlled diet education ? Reviewed importance of Fluid restriction ? Comorbidity Management ? Confirmed follow-up appointments and transportation. Additional Assessments ? Reinforced Fall precautions, pt bed bound ? Activity tolerance assessment: reports at baseline ? Reviewed Energy conservation management and balancing rest w/ activity ? Labs/diagnostics per MD office ? Medication Therapy: no issues identified ? Diet/appetite assessment: reports normal appetitie ? Reviewed appropriate ED/Hospital utilization Psychosocial:  Reassurance and emotional support Education/Discharge Planning ? Verify DME needs; arrange home equipment: no needs at this time ? Advanced care planning (e.g.: Palliative Care; Hospice) ? H2H; Home Health  (eg: Telehealth) ? Patient/Caregiver HF education literacy ?  Patient/Caregiver verifies support systems (meals/medication/transportation needs,  
? community resources Have you been to an ER/Hospital since discharge from [facility]?   
10/14/19 To [facility] ED   
10/18/19 to [facility] ED for heel blister Have you followed up with PCP/Cardiologist/Specialist?  
19 Vasc Surg appt w/ Sean Ojeda, PA 
8/15/19 Pulm appt w/ Beth Habermann. NP 
19 Cardiology appt w/ ANTONINO Kevin. Upcomin19 Card appt w/ DANIELLE Kevin Transportation:  son/family Diet: no salt added Activity/ADLs: needs assistance from family. Medications Reconciled at this time:  son declined, last completed on 10/14/19 by ED Home health:  Company/Completion: [employer] Social Support: family Advanced Care Plan: pt baseline demented Patient reminded that there is a physician on call 24 hours a day / 7 days a week (M-F 5pm to 8am and from Friday 5pm until Monday 8a for the weekend) should the patient have questions or concerns. Patient reminded to call 911 if situation is emergent or patient feels the situation is emergent. Pt verbalizes understanding.

## 2019-11-14 NOTE — PROGRESS NOTES
HISTORY OF PRESENT ILLNESS Chapito Quinn is a 80 y.o. female. Patient with  chf,had persistent junctional ryhtm ,atrial flutter , s/p pacemaker,feels better Sob better Admitted to hospital 1/2019 with acute CHF. Low ejection fraction with systolic heart failure. Feels less short of breath since discharge. 3/4/2019 - admitted to hospital 2/10/19 for NSTEMI , Anemia, CKD stage IV and chronic systolic CHF. Has not had diuretics since d/c and c/o increased edema to upper and lower extremties. 3/19/2019-admitted to hospital for acute metabolic encephalopathy, sepsis, UTI. 
7/2019 Patient was in emergency room with complaint of shortness of breath. Evaluation noted. Patient has shortness of breath at baseline. Limited activity. She has edema on and off. 
4/2/2019-was in emergency room with UTI and metabolic encephalopathy. Feels much better today. More alert. Denies any shortness of breath. Edema is better except for right upper extremity. 5/2019 Patient discharged 5/10/2019 with Discharge Diagnoses:                                          
RLE cellulitis, MRSA in wound - Staph Epidermidis bacteremia 
-ARIEL on ckd3 in the setting of severe sepsis - chronic mixed systolic and diastolic chf- compensated Recovering since discharge. Currently not on diuresis. Edema is significantly better. 8/2019 Discharge from hospital 7/2019 with 1. Congestive heart failure: Acute on chronic systolic dysfunction. Improving well. Continue Demadex po . metabolic alkalosis resolved CO2 normal.  
2. Status post permanent pacemaker: Normal function on 5/2/2019 3. Atrial fibrillation: Noted on pacemaker.  Sinus rhythm on amiodarone. Hospital Follow Up The history is provided by the patient. Associated symptoms include shortness of breath. CHF The history is provided by the patient. This is a chronic problem. The problem occurs constantly. The problem has been gradually improving. Associated symptoms include shortness of breath. The symptoms are aggravated by exertion. Palpitations The history is provided by the patient. This is a chronic problem. The problem occurs constantly. Associated symptoms include lower extremity edema and shortness of breath. Pertinent negatives include no fever, no claudication, no orthopnea, no PND, no nausea, no vomiting, no dizziness, no weakness, no cough, no hemoptysis and no sputum production. Her past medical history is significant for hypertension. Hypertension The history is provided by the patient. This is a chronic problem. The problem occurs constantly. The problem has not changed since onset. Associated symptoms include shortness of breath. Shortness of Breath The history is provided by the patient. This is a recurrent problem. The problem occurs intermittently. The problem has been gradually improving. Associated symptoms include leg swelling. Pertinent negatives include no fever, no cough, no sputum production, no hemoptysis, no wheezing, no PND, no orthopnea, no vomiting, no rash and no claudication. Precipitated by: exertion. Associated medical issues include heart failure. Leg Swelling The history is provided by the patient. This is a chronic problem. The problem occurs daily. The problem has been gradually improving. Associated symptoms include shortness of breath. Review of Systems Constitutional: Negative for chills and fever. HENT: Negative for nosebleeds. Eyes: Negative for blurred vision and double vision. Respiratory: Positive for shortness of breath. Negative for cough, hemoptysis, sputum production and wheezing. Cardiovascular: Positive for leg swelling. Negative for palpitations, orthopnea, claudication and PND. Gastrointestinal: Negative for heartburn, nausea and vomiting. Musculoskeletal: Negative for myalgias. Skin: Negative for rash. Neurological: Negative for dizziness and weakness. Endo/Heme/Allergies: Does not bruise/bleed easily. Family History Problem Relation Age of Onset  Arrhythmia Neg Hx  Asthma Neg Hx  Clotting Disorder Neg Hx  Fainting Neg Hx   
 Heart Attack Neg Hx  High Cholesterol Neg Hx  Pacemaker Neg Hx  Stroke Neg Hx Past Medical History:  
Diagnosis Date  Acute on chronic diastolic heart failure ([facility] [address]  Arthritis  Benign hypertensive heart disease without heart failure Better controlled, stable  Chronic diastolic heart failure ([address] Breathing and edema is improving  Congestive heart failure ([address]  HTN (hypertension)  Hypercholesteremia  Lupus (systemic lupus erythematosus) ([facility] [address] 6/18/2014  
 followed by dr Shasta Osullivan  Obesity, unspecified Patient has weight loss Discussed diet ad fluid restriction  Other and unspecified hyperlipidemia F/u per pmd  
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  
Procedure Laterality Date  HX FREE SKIN GRAFT    
 right foot  HX HYSTERECTOMY  PACEMAKER PROCEDURE No Known Allergies Current Outpatient Medications Medication Sig  predniSONE (DELTASONE) 1 mg tablet Take 1 mg by mouth daily. 1 pill po daily  levothyroxine (SYNTHROID) 25 mcg tablet Take 1 Tab by mouth Daily (before breakfast).  spironolactone (ALDACTONE) 25 mg tablet Take 1 Tab by mouth daily.  hydroxychloroquine (PLAQUENIL) 200 mg tablet Take 200 mg by mouth two (2) times a day.  torsemide (DEMADEX) 20 mg tablet Take 1 Tab by mouth daily as needed (edema).  polyethylene glycol (MIRALAX) 17 gram/dose powder Take 17 g by mouth daily.  aspirin delayed-release 81 mg tablet Take 81 mg by mouth daily.  food supplemt, lactose-reduced (ENSURE ENLIVE) 0.08 gram-1.5 kcal/mL liqd Take 1 Bottle by mouth two (2) times a day.  menthol-zinc oxide (CALMOSEPTINE) 0.44-20.6 % oint Apply  to affected area.  carvedilol (COREG) 3.125 mg tablet Take 1 Tab by mouth every twelve (12) hours.  acetaminophen (TYLENOL ARTHRITIS PAIN) 650 mg TbER Take 1 Tab by mouth every eight (8) hours. (Patient taking differently: Take 1 Tab by mouth daily.)  amiodarone (CORDARONE) 200 mg tablet TAKE ONE TABLET EVERY DAY (MAKE AN APPT) (Patient taking differently: TAKE ONE TABLET EVERY DAY by mouth)  memantine (NAMENDA) 10 mg tablet Take 10 mg by mouth two (2) times a day.  amitriptyline (ELAVIL) 25 mg tablet Take 25 mg by mouth nightly. No current facility-administered medications for this visit. Visit Vitals BP 97/44 Pulse 73 Ht 5' 7\" (1.702 m) Wt 70.3 kg (155 lb) BMI 24.28 kg/m² Physical Exam  
Constitutional: She is oriented to person, place, and time. She appears well-developed and well-nourished. HENT:  
Head: Normocephalic and atraumatic. Eyes: Conjunctivae are normal.  
Neck: Neck supple. No JVD present. No tracheal deviation present. No thyromegaly present. Cardiovascular: Normal rate and regular rhythm. PMI is not displaced. Exam reveals no gallop and no decreased pulses. Murmur heard. Holosystolic murmur is present at the lower left sternal border. Pulmonary/Chest: No respiratory distress. She has no wheezes. She has no rales. She exhibits no tenderness. Abdominal: Soft. There is no tenderness. Musculoskeletal: She exhibits edema (3+ BLE edema - extending to thighs, BL upper ext edema). Neurological: She is alert and oriented to person, place, and time. Skin: Skin is warm. Psychiatric: She has a normal mood and affect. No flowsheet data found. Ms. Ajit Calero has a reminder for a \"due or due soon\" health maintenance. I have asked that she contact her primary care provider for follow-up on this health maintenance. SUMMARY:4/2014 Left ventricle: Ejection fraction was estimated to be 60 %. No obvious 
wall motion abnormalities identified in the views obtained. There was mild 
concentric hypertrophy. Features were consistent with a pseudonormal left 
ventricular filling pattern, with concomitant abnormal relaxation and 
increased filling pressure (grade 2 diastolic dysfunction). Tricuspid valve: There was moderate regurgitation. Pulmonary artery 
systolic pressure: 50 mmHg. 11/2015:stress test 
1. Normal perfusion scan. 2. Normal wall motion and ejection fraction. 3. Gated images reveal normal wall motion and ejection fraction is 
calculated at 59%. 12/07/2015 Pacer check Noted with A Flutter. D/w daughter on phone 7/2019 Pacemaker check normal function. No A. fib. Atrial heart rate up to 159. Interpretation Summary 12/2018 · Technically difficult study due to patient compliance. Unable to obtain on-axis apical images. Good parasternals, poor subcostal images. · Left ventricular moderate-to-severely decreased global systolic function. Estimated left ventricular ejection fraction is 31 - 35%. Visually measured ejection fraction. Left ventricular severe sigmoid septum hypertrophy. Abnormal left ventricular wall motion as described on the wall scoring diagram below. Abnormal left ventricular septal motion. Interventricular septal \"bounce\". Severe (grade 3) left ventricular diastolic dysfunction. · Moderate tricuspid valve regurgitation is present. Mild pulmonary hypertension is present. PASP 38mmHg · Mild aortic valve regurgitation is present. · Mild to moderate mitral valve regurgitation. Assessment ICD-10-CM ICD-9-CM 1. Chronic systolic congestive heart failure (HCC) I50.22 428.22   
  428.0 Stable continue to monitor limited activity 2. Paroxysmal atrial flutter (HCC) I48.92 427.32 Stable maintained on amiodarone monitor 3. Essential hypertension I10 401.9 Stable on treatment 4. S/P placement of cardiac pacemaker Z95.0 V45.01   
5. High risk medication use Z79.899 V58.69 Amiodarone for A. fib  
continue amiodarone to maintain sr,risk of anticoagulation high and would not anticoagulate-discussed with family I have discussed risk benefit and option of use of amiodarone for arrythmia. Risk of toxicity with medication are informed. Patient will require careful monitoring. Lasix and metolazone used as needed- 
1/2019 Recent admission with CHF. Continue to monitor. Due to dementia would not be a candidate for ICD upgrade. Patient has increased edema since discharge. I have increase Lasix from 20 mg to 40 mg 1-2 tablets a day. Patient currently nursing home 3/4/2019 - Recent admission for acute systolic CHF and CKD with hypotension. She has had increased edema since discharge and has not had diuretics in 3 weeks. Begin Demadex 20 mg and Metolazone 2.5 mg/ day. Have BMP drawn in 1 weeks. F/U in our office in 2 weeks - to ER for new or worsening symptoms. All discussed with daughter and son in room. 5/2019 CHF compensated. Recent admission with cellulitis. Edema is significantly better. Continue to hold torsemide and use it as needed 7/2019 Emergency room with shortness of breath and wheezing. Appears to be upper airway sounds. Mild edema. Will increase Demadex to 20 mg a day. Discussed that kidney function will likely get worse with increased dose of diuretic. Will check BMP in 1 week 8/2019 Stable after recent admission for CHF. Stable after discharge on Aldactone. Continue along with other medication on amiodarone. Check TSH 
11/2019 Cardiac status stable. Elevated TSH with normal free T4. Started on levothyroxine continue therapy follow-up with PCP. CHF compensated limited activity Medications Discontinued During This Encounter Medication Reason  OTHER Duplicate Order No orders of the defined types were placed in this encounter. Jaleesa Foster MD

## 2019-11-14 NOTE — PROGRESS NOTES
1. Have you been to the ER, urgent care clinic since your last visit? Hospitalized since your last visit? [facility] ER Fever 2. Have you seen or consulted any other health care providers outside of the [address] [place] [address] since your last visit? Include any pap smears or colon screening. Yes Where: PCP Routine / Nephrology Routine

## 2019-11-15 NOTE — PROGRESS NOTES
Patient attended appointments with her cardiologist, Dr. Lyssa Henriquez. on 11/13/19, Nurse Navigator reviewed EMR and will follow up on next scheduled outreach.

## 2019-11-20 NOTE — PROGRESS NOTES
Patient has graduated from the Disease Specific Care Management  program on 11/20/19. Patient's symptoms are stable at this time. Patient/family has the ability to self-manage. Care management goals have been completed at this time. No further nurse navigator follow up scheduled. Goals Addressed This Visit's Progress  COMPLETED: Attends follow-up appointments as directed. 8/5/19 Patient will attend all scheduled appointments through 10/5/19  COMPLETED: Knowledge and adherence medication (ie. action, side effects, missed dose, etc.)     
  8/5/19  Pt will take all medications prescribed to be evaluated on each outreach through 10/5/19 Pt has nurse navigator's contact information for any further questions, concerns, or needs. Patients upcoming visits:   
Future Appointments Date Time Provider Win Mukherjee 3/16/2020 12:00 PM MD [address]

## 2019-11-27 NOTE — TELEPHONE ENCOUNTER
Mancel Prom from [facility] called to get some recommendations for wound care for this patient. She stated that patient was discharged from home health and her son stated he would take care of the wound. She said that Dr. Sae Rowe is requesting home health wound care and stated for her to contact our office. I did let her know that we have not seen her since august and did not know if we could let her know treatment. She said she thought the same thing. I'm just checking

## 2020-01-01 ENCOUNTER — HOME CARE VISIT (OUTPATIENT)
Dept: HOME HEALTH SERVICES | Facility: HOME HEALTH | Age: 85
End: 2020-01-01
Payer: MEDICARE

## 2020-01-01 ENCOUNTER — HOME CARE VISIT (OUTPATIENT)
Dept: SCHEDULING | Facility: HOME HEALTH | Age: 85
End: 2020-01-01
Payer: MEDICARE

## 2020-01-01 ENCOUNTER — HOME CARE VISIT (OUTPATIENT)
Dept: HOSPICE | Facility: HOSPICE | Age: 85
End: 2020-01-01
Payer: MEDICARE

## 2020-01-01 ENCOUNTER — APPOINTMENT (OUTPATIENT)
Dept: GENERAL RADIOLOGY | Age: 85
End: 2020-01-01
Attending: EMERGENCY MEDICINE
Payer: MEDICARE

## 2020-01-01 ENCOUNTER — HOME HEALTH ADMISSION (OUTPATIENT)
Dept: HOME HEALTH SERVICES | Facility: HOME HEALTH | Age: 85
End: 2020-01-01
Payer: MEDICARE

## 2020-01-01 ENCOUNTER — HOSPITAL ENCOUNTER (OUTPATIENT)
Dept: LAB | Age: 85
Discharge: HOME | End: 2020-01-30
Payer: MEDICARE

## 2020-01-01 ENCOUNTER — HOSPICE CERTIFICATION ENCOUNTER (OUTPATIENT)
Dept: HOSPICE | Age: 85
End: 2020-01-01

## 2020-01-01 ENCOUNTER — APPOINTMENT (OUTPATIENT)
Dept: GENERAL RADIOLOGY | Age: 85
DRG: 853 | End: 2020-01-01
Attending: EMERGENCY MEDICINE
Payer: MEDICARE

## 2020-01-01 ENCOUNTER — HOSPITAL ENCOUNTER (EMERGENCY)
Age: 85
Discharge: HOME | End: 2020-02-10
Attending: EMERGENCY MEDICINE
Payer: MEDICARE

## 2020-01-01 ENCOUNTER — HOSPICE ADMISSION (OUTPATIENT)
Dept: HOSPICE | Facility: HOSPICE | Age: 85
End: 2020-01-01

## 2020-01-01 ENCOUNTER — HOME CARE VISIT (OUTPATIENT)
Dept: HOSPICE | Facility: HOSPICE | Age: 85
End: 2020-01-01

## 2020-01-01 ENCOUNTER — APPOINTMENT (OUTPATIENT)
Dept: CT IMAGING | Age: 85
End: 2020-01-01
Attending: EMERGENCY MEDICINE
Payer: MEDICARE

## 2020-01-01 ENCOUNTER — ANESTHESIA (OUTPATIENT)
Dept: SURGERY | Age: 85
DRG: 853 | End: 2020-01-01
Payer: MEDICARE

## 2020-01-01 ENCOUNTER — HOSPICE ADMISSION (OUTPATIENT)
Dept: HOSPICE | Facility: HOSPICE | Age: 85
End: 2020-01-01
Payer: MEDICARE

## 2020-01-01 ENCOUNTER — OFFICE VISIT (OUTPATIENT)
Dept: SURGERY | Age: 85
End: 2020-01-01

## 2020-01-01 ENCOUNTER — PATIENT OUTREACH (OUTPATIENT)
Dept: CASE MANAGEMENT | Age: 85
End: 2020-01-01

## 2020-01-01 ENCOUNTER — APPOINTMENT (OUTPATIENT)
Dept: CT IMAGING | Age: 85
DRG: 853 | End: 2020-01-01
Attending: EMERGENCY MEDICINE
Payer: MEDICARE

## 2020-01-01 ENCOUNTER — HOSPITAL ENCOUNTER (EMERGENCY)
Age: 85
Discharge: HOME | End: 2020-08-02
Attending: EMERGENCY MEDICINE
Payer: MEDICARE

## 2020-01-01 ENCOUNTER — HOME CARE VISIT (OUTPATIENT)
Dept: HOME HEALTH SERVICES | Facility: HOME HEALTH | Age: 85
End: 2020-01-01

## 2020-01-01 ENCOUNTER — HOSPITAL ENCOUNTER (EMERGENCY)
Age: 85
Discharge: HOME | End: 2020-01-09
Attending: EMERGENCY MEDICINE
Payer: MEDICARE

## 2020-01-01 ENCOUNTER — ANESTHESIA EVENT (OUTPATIENT)
Dept: SURGERY | Age: 85
DRG: 853 | End: 2020-01-01
Payer: MEDICARE

## 2020-01-01 ENCOUNTER — HOSPITAL ENCOUNTER (INPATIENT)
Age: 85
LOS: 4 days | Discharge: HOME HEALTH | DRG: 853 | End: 2020-06-22
Attending: EMERGENCY MEDICINE | Admitting: FAMILY MEDICINE
Payer: MEDICARE

## 2020-01-01 ENCOUNTER — OFFICE VISIT (OUTPATIENT)
Dept: CARDIOLOGY CLINIC | Age: 85
End: 2020-01-01

## 2020-01-01 ENCOUNTER — HOSPITAL ENCOUNTER (EMERGENCY)
Age: 85
Discharge: HOME | End: 2020-01-25
Attending: EMERGENCY MEDICINE
Payer: MEDICARE

## 2020-01-01 VITALS
HEART RATE: 80 BPM | SYSTOLIC BLOOD PRESSURE: 92 MMHG | TEMPERATURE: 96.4 F | DIASTOLIC BLOOD PRESSURE: 40 MMHG | RESPIRATION RATE: 16 BRPM

## 2020-01-01 VITALS
SYSTOLIC BLOOD PRESSURE: 80 MMHG | OXYGEN SATURATION: 81 % | HEART RATE: 75 BPM | RESPIRATION RATE: 10 BRPM | DIASTOLIC BLOOD PRESSURE: 42 MMHG

## 2020-01-01 VITALS
RESPIRATION RATE: 16 BRPM | SYSTOLIC BLOOD PRESSURE: 90 MMHG | HEART RATE: 64 BPM | DIASTOLIC BLOOD PRESSURE: 48 MMHG | TEMPERATURE: 98.4 F | OXYGEN SATURATION: 95 %

## 2020-01-01 VITALS
HEART RATE: 71 BPM | BODY MASS INDEX: 29.99 KG/M2 | HEIGHT: 65 IN | OXYGEN SATURATION: 95 % | WEIGHT: 180 LBS | TEMPERATURE: 97.8 F | DIASTOLIC BLOOD PRESSURE: 63 MMHG | RESPIRATION RATE: 19 BRPM | SYSTOLIC BLOOD PRESSURE: 121 MMHG

## 2020-01-01 VITALS
HEART RATE: 70 BPM | TEMPERATURE: 97.2 F | DIASTOLIC BLOOD PRESSURE: 60 MMHG | RESPIRATION RATE: 18 BRPM | OXYGEN SATURATION: 100 % | SYSTOLIC BLOOD PRESSURE: 112 MMHG

## 2020-01-01 VITALS
SYSTOLIC BLOOD PRESSURE: 132 MMHG | TEMPERATURE: 97.3 F | HEART RATE: 70 BPM | OXYGEN SATURATION: 98 % | RESPIRATION RATE: 18 BRPM | DIASTOLIC BLOOD PRESSURE: 78 MMHG

## 2020-01-01 VITALS
SYSTOLIC BLOOD PRESSURE: 78 MMHG | RESPIRATION RATE: 10 BRPM | OXYGEN SATURATION: 79 % | DIASTOLIC BLOOD PRESSURE: 42 MMHG | HEART RATE: 55 BPM

## 2020-01-01 VITALS
TEMPERATURE: 97 F | SYSTOLIC BLOOD PRESSURE: 129 MMHG | OXYGEN SATURATION: 95 % | RESPIRATION RATE: 17 BRPM | BODY MASS INDEX: 29.95 KG/M2 | WEIGHT: 180 LBS | DIASTOLIC BLOOD PRESSURE: 72 MMHG | HEART RATE: 74 BPM

## 2020-01-01 VITALS
RESPIRATION RATE: 16 BRPM | OXYGEN SATURATION: 98 % | TEMPERATURE: 98.9 F | DIASTOLIC BLOOD PRESSURE: 72 MMHG | HEART RATE: 72 BPM | SYSTOLIC BLOOD PRESSURE: 120 MMHG

## 2020-01-01 VITALS
HEART RATE: 49 BPM | RESPIRATION RATE: 26 BRPM | HEART RATE: 61 BPM | RESPIRATION RATE: 10 BRPM | SYSTOLIC BLOOD PRESSURE: 76 MMHG | OXYGEN SATURATION: 100 % | DIASTOLIC BLOOD PRESSURE: 46 MMHG | DIASTOLIC BLOOD PRESSURE: 40 MMHG | SYSTOLIC BLOOD PRESSURE: 70 MMHG | OXYGEN SATURATION: 77 %

## 2020-01-01 VITALS
HEART RATE: 74 BPM | RESPIRATION RATE: 20 BRPM | SYSTOLIC BLOOD PRESSURE: 128 MMHG | HEIGHT: 65 IN | DIASTOLIC BLOOD PRESSURE: 72 MMHG | BODY MASS INDEX: 29.95 KG/M2 | OXYGEN SATURATION: 97 %

## 2020-01-01 VITALS
OXYGEN SATURATION: 98 % | HEART RATE: 69 BPM | HEART RATE: 65 BPM | SYSTOLIC BLOOD PRESSURE: 121 MMHG | TEMPERATURE: 97.9 F | SYSTOLIC BLOOD PRESSURE: 121 MMHG | OXYGEN SATURATION: 98 % | DIASTOLIC BLOOD PRESSURE: 86 MMHG | DIASTOLIC BLOOD PRESSURE: 70 MMHG | TEMPERATURE: 98.4 F

## 2020-01-01 VITALS
HEART RATE: 78 BPM | DIASTOLIC BLOOD PRESSURE: 60 MMHG | OXYGEN SATURATION: 96 % | TEMPERATURE: 98.2 F | RESPIRATION RATE: 16 BRPM | SYSTOLIC BLOOD PRESSURE: 120 MMHG

## 2020-01-01 VITALS
TEMPERATURE: 97.8 F | SYSTOLIC BLOOD PRESSURE: 100 MMHG | OXYGEN SATURATION: 98 % | DIASTOLIC BLOOD PRESSURE: 45 MMHG | RESPIRATION RATE: 12 BRPM | HEART RATE: 84 BPM

## 2020-01-01 VITALS
SYSTOLIC BLOOD PRESSURE: 84 MMHG | OXYGEN SATURATION: 99 % | DIASTOLIC BLOOD PRESSURE: 40 MMHG | HEART RATE: 80 BPM | RESPIRATION RATE: 20 BRPM | TEMPERATURE: 98.2 F

## 2020-01-01 VITALS
HEART RATE: 68 BPM | TEMPERATURE: 97.1 F | DIASTOLIC BLOOD PRESSURE: 80 MMHG | RESPIRATION RATE: 16 BRPM | SYSTOLIC BLOOD PRESSURE: 130 MMHG

## 2020-01-01 VITALS
OXYGEN SATURATION: 95 % | WEIGHT: 170 LBS | SYSTOLIC BLOOD PRESSURE: 126 MMHG | BODY MASS INDEX: 28.32 KG/M2 | HEART RATE: 70 BPM | RESPIRATION RATE: 16 BRPM | TEMPERATURE: 97.2 F | HEIGHT: 65 IN | DIASTOLIC BLOOD PRESSURE: 60 MMHG

## 2020-01-01 VITALS
TEMPERATURE: 98 F | OXYGEN SATURATION: 98 % | SYSTOLIC BLOOD PRESSURE: 120 MMHG | DIASTOLIC BLOOD PRESSURE: 62 MMHG | RESPIRATION RATE: 16 BRPM

## 2020-01-01 VITALS
SYSTOLIC BLOOD PRESSURE: 120 MMHG | HEART RATE: 93 BPM | DIASTOLIC BLOOD PRESSURE: 62 MMHG | RESPIRATION RATE: 16 BRPM | OXYGEN SATURATION: 97 % | TEMPERATURE: 97.7 F

## 2020-01-01 VITALS
OXYGEN SATURATION: 98 % | HEART RATE: 77 BPM | DIASTOLIC BLOOD PRESSURE: 86 MMHG | SYSTOLIC BLOOD PRESSURE: 116 MMHG | TEMPERATURE: 97.5 F

## 2020-01-01 VITALS — SYSTOLIC BLOOD PRESSURE: 120 MMHG | HEART RATE: 60 BPM | DIASTOLIC BLOOD PRESSURE: 78 MMHG | TEMPERATURE: 98.7 F

## 2020-01-01 VITALS
RESPIRATION RATE: 14 BRPM | OXYGEN SATURATION: 95 % | WEIGHT: 170 LBS | DIASTOLIC BLOOD PRESSURE: 77 MMHG | HEART RATE: 84 BPM | TEMPERATURE: 97.4 F | BODY MASS INDEX: 29.18 KG/M2 | SYSTOLIC BLOOD PRESSURE: 146 MMHG

## 2020-01-01 VITALS
OXYGEN SATURATION: 93 % | HEART RATE: 78 BPM | TEMPERATURE: 98.3 F | DIASTOLIC BLOOD PRESSURE: 60 MMHG | RESPIRATION RATE: 16 BRPM | SYSTOLIC BLOOD PRESSURE: 100 MMHG

## 2020-01-01 VITALS
SYSTOLIC BLOOD PRESSURE: 97 MMHG | DIASTOLIC BLOOD PRESSURE: 65 MMHG | RESPIRATION RATE: 16 BRPM | OXYGEN SATURATION: 93 % | HEART RATE: 70 BPM

## 2020-01-01 VITALS
OXYGEN SATURATION: 99 % | SYSTOLIC BLOOD PRESSURE: 110 MMHG | SYSTOLIC BLOOD PRESSURE: 90 MMHG | RESPIRATION RATE: 16 BRPM | OXYGEN SATURATION: 95 % | RESPIRATION RATE: 16 BRPM | TEMPERATURE: 96.6 F | OXYGEN SATURATION: 100 % | HEART RATE: 64 BPM | TEMPERATURE: 98.4 F | SYSTOLIC BLOOD PRESSURE: 108 MMHG | TEMPERATURE: 99.1 F | RESPIRATION RATE: 16 BRPM | DIASTOLIC BLOOD PRESSURE: 48 MMHG | DIASTOLIC BLOOD PRESSURE: 65 MMHG | HEART RATE: 62 BPM | DIASTOLIC BLOOD PRESSURE: 60 MMHG | HEART RATE: 69 BPM

## 2020-01-01 VITALS
SYSTOLIC BLOOD PRESSURE: 115 MMHG | DIASTOLIC BLOOD PRESSURE: 61 MMHG | WEIGHT: 162.48 LBS | HEIGHT: 64 IN | HEART RATE: 71 BPM | RESPIRATION RATE: 20 BRPM | OXYGEN SATURATION: 97 % | TEMPERATURE: 97.8 F | BODY MASS INDEX: 27.74 KG/M2

## 2020-01-01 VITALS
DIASTOLIC BLOOD PRESSURE: 57 MMHG | RESPIRATION RATE: 16 BRPM | SYSTOLIC BLOOD PRESSURE: 96 MMHG | OXYGEN SATURATION: 90 % | HEART RATE: 61 BPM | HEART RATE: 69 BPM | DIASTOLIC BLOOD PRESSURE: 54 MMHG | SYSTOLIC BLOOD PRESSURE: 104 MMHG | RESPIRATION RATE: 16 BRPM | OXYGEN SATURATION: 92 %

## 2020-01-01 VITALS
TEMPERATURE: 97.9 F | RESPIRATION RATE: 16 BRPM | SYSTOLIC BLOOD PRESSURE: 118 MMHG | OXYGEN SATURATION: 100 % | DIASTOLIC BLOOD PRESSURE: 60 MMHG | HEART RATE: 52 BPM

## 2020-01-01 VITALS
SYSTOLIC BLOOD PRESSURE: 120 MMHG | RESPIRATION RATE: 16 BRPM | DIASTOLIC BLOOD PRESSURE: 68 MMHG | OXYGEN SATURATION: 94 % | HEART RATE: 77 BPM | TEMPERATURE: 98 F

## 2020-01-01 VITALS — TEMPERATURE: 97.8 F | HEART RATE: 62 BPM | OXYGEN SATURATION: 95 %

## 2020-01-01 VITALS
OXYGEN SATURATION: 97 % | BODY MASS INDEX: 29.99 KG/M2 | WEIGHT: 180 LBS | SYSTOLIC BLOOD PRESSURE: 89 MMHG | HEIGHT: 65 IN | HEART RATE: 71 BPM | DIASTOLIC BLOOD PRESSURE: 41 MMHG

## 2020-01-01 VITALS
HEART RATE: 106 BPM | OXYGEN SATURATION: 96 % | SYSTOLIC BLOOD PRESSURE: 110 MMHG | DIASTOLIC BLOOD PRESSURE: 80 MMHG | RESPIRATION RATE: 20 BRPM | TEMPERATURE: 99.3 F

## 2020-01-01 VITALS
DIASTOLIC BLOOD PRESSURE: 50 MMHG | RESPIRATION RATE: 10 BRPM | SYSTOLIC BLOOD PRESSURE: 84 MMHG | OXYGEN SATURATION: 100 % | HEART RATE: 54 BPM

## 2020-01-01 VITALS
HEART RATE: 77 BPM | OXYGEN SATURATION: 98 % | DIASTOLIC BLOOD PRESSURE: 76 MMHG | SYSTOLIC BLOOD PRESSURE: 127 MMHG | TEMPERATURE: 98.2 F

## 2020-01-01 DIAGNOSIS — M13.0 POLYARTHROPATHY: ICD-10-CM

## 2020-01-01 DIAGNOSIS — N18.4 CKD (CHRONIC KIDNEY DISEASE) STAGE 4, GFR 15-29 ML/MIN (HCC): ICD-10-CM

## 2020-01-01 DIAGNOSIS — M79.609 PAIN IN LIMB: ICD-10-CM

## 2020-01-01 DIAGNOSIS — R52 PAIN ASSOCIATED WITH WOUND: ICD-10-CM

## 2020-01-01 DIAGNOSIS — I50.22 CHRONIC SYSTOLIC CONGESTIVE HEART FAILURE (HCC): Primary | ICD-10-CM

## 2020-01-01 DIAGNOSIS — R41.0 CONFUSION: ICD-10-CM

## 2020-01-01 DIAGNOSIS — Z79.899 HIGH RISK MEDICATION USE: ICD-10-CM

## 2020-01-01 DIAGNOSIS — Z95.0 S/P PLACEMENT OF CARDIAC PACEMAKER: ICD-10-CM

## 2020-01-01 DIAGNOSIS — K59.00 CONSTIPATION, UNSPECIFIED CONSTIPATION TYPE: Primary | ICD-10-CM

## 2020-01-01 DIAGNOSIS — Z51.5 HOSPICE CARE: ICD-10-CM

## 2020-01-01 DIAGNOSIS — I10 ESSENTIAL HYPERTENSION: ICD-10-CM

## 2020-01-01 DIAGNOSIS — R41.82 ALTERED MENTAL STATUS, UNSPECIFIED ALTERED MENTAL STATUS TYPE: Primary | ICD-10-CM

## 2020-01-01 DIAGNOSIS — S31.000S WOUND OF SACRAL REGION, SEQUELA: Primary | ICD-10-CM

## 2020-01-01 DIAGNOSIS — N39.0 URINARY TRACT INFECTION WITHOUT HEMATURIA, SITE UNSPECIFIED: ICD-10-CM

## 2020-01-01 DIAGNOSIS — K43.9 VENTRAL HERNIA WITHOUT OBSTRUCTION OR GANGRENE: Primary | ICD-10-CM

## 2020-01-01 DIAGNOSIS — J18.9 COMMUNITY ACQUIRED PNEUMONIA OF LEFT LOWER LOBE OF LUNG: Primary | ICD-10-CM

## 2020-01-01 DIAGNOSIS — T14.8XXA PAIN ASSOCIATED WITH WOUND: ICD-10-CM

## 2020-01-01 DIAGNOSIS — N30.00 ACUTE CYSTITIS WITHOUT HEMATURIA: Primary | ICD-10-CM

## 2020-01-01 DIAGNOSIS — I48.92 PAROXYSMAL ATRIAL FLUTTER (HCC): ICD-10-CM

## 2020-01-01 LAB
ABO + RH BLD: NORMAL
ALBUMIN SERPL-MCNC: 2.7 G/DL (ref 3.4–5)
ALBUMIN SERPL-MCNC: 2.8 G/DL (ref 3.4–5)
ALBUMIN SERPL-MCNC: 3 G/DL (ref 3.4–5)
ALBUMIN SERPL-MCNC: 3.1 G/DL (ref 3.4–5)
ALBUMIN SERPL-MCNC: 3.2 G/DL (ref 3.4–5)
ALBUMIN/GLOB SERPL: 0.9 {RATIO} (ref 0.8–1.7)
ALBUMIN/GLOB SERPL: 1 {RATIO} (ref 0.8–1.7)
ALBUMIN/GLOB SERPL: 1.1 {RATIO} (ref 0.8–1.7)
ALP SERPL-CCNC: 54 U/L (ref 45–117)
ALP SERPL-CCNC: 58 U/L (ref 45–117)
ALP SERPL-CCNC: 60 U/L (ref 45–117)
ALT SERPL-CCNC: 10 U/L (ref 13–56)
ALT SERPL-CCNC: 11 U/L (ref 13–56)
ALT SERPL-CCNC: 15 U/L (ref 13–56)
ALT SERPL-CCNC: 8 U/L (ref 13–56)
ALT SERPL-CCNC: 9 U/L (ref 13–56)
ANA TITR SER IF: NEGATIVE {TITER}
ANION GAP SERPL CALC-SCNC: 3 MMOL/L (ref 3–18)
ANION GAP SERPL CALC-SCNC: 4 MMOL/L (ref 3–18)
ANION GAP SERPL CALC-SCNC: 5 MMOL/L (ref 3–18)
ANION GAP SERPL CALC-SCNC: 6 MMOL/L (ref 3–18)
ANION GAP SERPL CALC-SCNC: 7 MMOL/L (ref 3–18)
ANION GAP SERPL CALC-SCNC: 8 MMOL/L (ref 3–18)
ANION GAP SERPL CALC-SCNC: 9 MMOL/L (ref 3–18)
APPEARANCE UR: ABNORMAL
APPEARANCE UR: ABNORMAL
APTT PPP: 39.1 SEC (ref 23–36.4)
AST SERPL-CCNC: 14 U/L (ref 10–38)
AST SERPL-CCNC: 17 U/L (ref 10–38)
AST SERPL-CCNC: 22 U/L (ref 10–38)
ATRIAL RATE: 70 BPM
ATRIAL RATE: 72 BPM
ATRIAL RATE: 73 BPM
BACTERIA SPEC CULT: ABNORMAL
BACTERIA SPEC CULT: NORMAL
BACTERIA URNS QL MICRO: ABNORMAL /HPF
BACTERIA URNS QL MICRO: ABNORMAL /HPF
BASOPHILS # BLD: 0 K/UL (ref 0–0.1)
BASOPHILS NFR BLD: 0 % (ref 0–2)
BILIRUB SERPL-MCNC: 0.4 MG/DL (ref 0.2–1)
BILIRUB SERPL-MCNC: 0.5 MG/DL (ref 0.2–1)
BILIRUB SERPL-MCNC: 0.6 MG/DL (ref 0.2–1)
BILIRUB UR QL: NEGATIVE
BILIRUB UR QL: NEGATIVE
BLOOD GROUP ANTIBODIES SERPL: NORMAL
BUN SERPL-MCNC: 20 MG/DL (ref 7–18)
BUN SERPL-MCNC: 21 MG/DL (ref 7–18)
BUN SERPL-MCNC: 21 MG/DL (ref 7–18)
BUN SERPL-MCNC: 25 MG/DL (ref 7–18)
BUN SERPL-MCNC: 27 MG/DL (ref 7–18)
BUN SERPL-MCNC: 29 MG/DL (ref 7–18)
BUN SERPL-MCNC: 33 MG/DL (ref 7–18)
BUN/CREAT SERPL: 14 (ref 12–20)
BUN/CREAT SERPL: 15 (ref 12–20)
BUN/CREAT SERPL: 15 (ref 12–20)
BUN/CREAT SERPL: 20 (ref 12–20)
BUN/CREAT SERPL: 21 (ref 12–20)
BUN/CREAT SERPL: 23 (ref 12–20)
BUN/CREAT SERPL: 24 (ref 12–20)
BUN/CREAT SERPL: 25 (ref 12–20)
BUN/CREAT SERPL: 26 (ref 12–20)
CALCIUM SERPL-MCNC: 7.8 MG/DL (ref 8.5–10.1)
CALCIUM SERPL-MCNC: 8 MG/DL (ref 8.5–10.1)
CALCIUM SERPL-MCNC: 8.1 MG/DL (ref 8.5–10.1)
CALCIUM SERPL-MCNC: 8.2 MG/DL (ref 8.5–10.1)
CALCIUM SERPL-MCNC: 8.2 MG/DL (ref 8.5–10.1)
CALCIUM SERPL-MCNC: 8.4 MG/DL (ref 8.5–10.1)
CALCIUM SERPL-MCNC: 8.5 MG/DL (ref 8.5–10.1)
CALCIUM SERPL-MCNC: 8.7 MG/DL (ref 8.5–10.1)
CALCIUM SERPL-MCNC: 8.8 MG/DL (ref 8.5–10.1)
CALCULATED R AXIS, ECG10: -48 DEGREES
CALCULATED R AXIS, ECG10: -58 DEGREES
CALCULATED R AXIS, ECG10: -7 DEGREES
CALCULATED T AXIS, ECG11: 120 DEGREES
CALCULATED T AXIS, ECG11: 125 DEGREES
CALCULATED T AXIS, ECG11: 128 DEGREES
CC UR VC: ABNORMAL
CHLORIDE SERPL-SCNC: 100 MMOL/L (ref 100–111)
CHLORIDE SERPL-SCNC: 102 MMOL/L (ref 100–111)
CHLORIDE SERPL-SCNC: 102 MMOL/L (ref 100–111)
CHLORIDE SERPL-SCNC: 103 MMOL/L (ref 100–111)
CHLORIDE SERPL-SCNC: 103 MMOL/L (ref 100–111)
CHLORIDE SERPL-SCNC: 107 MMOL/L (ref 100–111)
CHLORIDE SERPL-SCNC: 108 MMOL/L (ref 100–111)
CHLORIDE SERPL-SCNC: 110 MMOL/L (ref 100–111)
CHLORIDE SERPL-SCNC: 99 MMOL/L (ref 100–111)
CK MB CFR SERPL CALC: 0.9 % (ref 0–4)
CK MB SERPL-MCNC: 1 NG/ML (ref 5–25)
CK SERPL-CCNC: 107 U/L (ref 26–192)
CK SERPL-CCNC: 75 U/L (ref 26–192)
CO2 SERPL-SCNC: 22 MMOL/L (ref 21–32)
CO2 SERPL-SCNC: 27 MMOL/L (ref 21–32)
CO2 SERPL-SCNC: 29 MMOL/L (ref 21–32)
CO2 SERPL-SCNC: 29 MMOL/L (ref 21–32)
CO2 SERPL-SCNC: 30 MMOL/L (ref 21–32)
CO2 SERPL-SCNC: 31 MMOL/L (ref 21–32)
CO2 SERPL-SCNC: 32 MMOL/L (ref 21–32)
COLOR UR: ABNORMAL
COLOR UR: ABNORMAL
COVID-19 RAPID TEST, COVR: NOT DETECTED
CREAT SERPL-MCNC: 1.09 MG/DL (ref 0.6–1.3)
CREAT SERPL-MCNC: 1.11 MG/DL (ref 0.6–1.3)
CREAT SERPL-MCNC: 1.18 MG/DL (ref 0.6–1.3)
CREAT SERPL-MCNC: 1.22 MG/DL (ref 0.6–1.3)
CREAT SERPL-MCNC: 1.28 MG/DL (ref 0.6–1.3)
CREAT SERPL-MCNC: 1.34 MG/DL (ref 0.6–1.3)
CREAT SERPL-MCNC: 1.4 MG/DL (ref 0.6–1.3)
CREAT SERPL-MCNC: 1.41 MG/DL (ref 0.6–1.3)
CREAT SERPL-MCNC: 1.48 MG/DL (ref 0.6–1.3)
CRP SERPL-MCNC: <0.3 MG/DL (ref 0–0.3)
DIAGNOSIS, 93000: NORMAL
DIFFERENTIAL METHOD BLD: ABNORMAL
EOSINOPHIL # BLD: 0 K/UL (ref 0–0.4)
EOSINOPHIL # BLD: 0.1 K/UL (ref 0–0.4)
EOSINOPHIL # BLD: 0.2 K/UL (ref 0–0.4)
EOSINOPHIL # BLD: 0.2 K/UL (ref 0–0.4)
EOSINOPHIL # BLD: 0.3 K/UL (ref 0–0.4)
EOSINOPHIL NFR BLD: 0 % (ref 0–5)
EOSINOPHIL NFR BLD: 1 % (ref 0–5)
EOSINOPHIL NFR BLD: 2 % (ref 0–5)
EOSINOPHIL NFR BLD: 3 % (ref 0–5)
EOSINOPHIL NFR BLD: 3 % (ref 0–5)
EOSINOPHIL NFR BLD: 4 % (ref 0–5)
EPITH CASTS URNS QL MICRO: ABNORMAL /LPF (ref 0–5)
EPITH CASTS URNS QL MICRO: ABNORMAL /LPF (ref 0–5)
ERYTHROCYTE [DISTWIDTH] IN BLOOD BY AUTOMATED COUNT: 14.6 % (ref 11.6–14.5)
ERYTHROCYTE [DISTWIDTH] IN BLOOD BY AUTOMATED COUNT: 14.7 % (ref 11.6–14.5)
ERYTHROCYTE [DISTWIDTH] IN BLOOD BY AUTOMATED COUNT: 14.9 % (ref 11.6–14.5)
ERYTHROCYTE [DISTWIDTH] IN BLOOD BY AUTOMATED COUNT: 15 % (ref 11.6–14.5)
ERYTHROCYTE [DISTWIDTH] IN BLOOD BY AUTOMATED COUNT: 15.1 % (ref 11.6–14.5)
ERYTHROCYTE [DISTWIDTH] IN BLOOD BY AUTOMATED COUNT: 15.3 % (ref 11.6–14.5)
ERYTHROCYTE [SEDIMENTATION RATE] IN BLOOD: 14 MM/HR (ref 0–30)
FERRITIN SERPL-MCNC: 640 NG/ML (ref 8–388)
FOLATE SERPL-MCNC: 16.6 NG/ML (ref 3.1–17.5)
GLOBULIN SER CALC-MCNC: 2.7 G/DL (ref 2–4)
GLOBULIN SER CALC-MCNC: 2.8 G/DL (ref 2–4)
GLOBULIN SER CALC-MCNC: 2.8 G/DL (ref 2–4)
GLOBULIN SER CALC-MCNC: 2.9 G/DL (ref 2–4)
GLOBULIN SER CALC-MCNC: 3 G/DL (ref 2–4)
GLUCOSE SERPL-MCNC: 107 MG/DL (ref 74–99)
GLUCOSE SERPL-MCNC: 57 MG/DL (ref 74–99)
GLUCOSE SERPL-MCNC: 61 MG/DL (ref 74–99)
GLUCOSE SERPL-MCNC: 75 MG/DL (ref 74–99)
GLUCOSE SERPL-MCNC: 77 MG/DL (ref 74–99)
GLUCOSE SERPL-MCNC: 87 MG/DL (ref 74–99)
GLUCOSE SERPL-MCNC: 92 MG/DL (ref 74–99)
GLUCOSE SERPL-MCNC: 92 MG/DL (ref 74–99)
GLUCOSE SERPL-MCNC: 93 MG/DL (ref 74–99)
GLUCOSE UR STRIP.AUTO-MCNC: NEGATIVE MG/DL
GLUCOSE UR STRIP.AUTO-MCNC: NEGATIVE MG/DL
GRAM STN SPEC: NORMAL
GRAM STN SPEC: NORMAL
HCT VFR BLD AUTO: 23.7 % (ref 35–45)
HCT VFR BLD AUTO: 24.7 % (ref 35–45)
HCT VFR BLD AUTO: 26.6 % (ref 35–45)
HCT VFR BLD AUTO: 27 % (ref 35–45)
HCT VFR BLD AUTO: 27.3 % (ref 35–45)
HCT VFR BLD AUTO: 27.6 % (ref 35–45)
HCT VFR BLD AUTO: 27.8 % (ref 35–45)
HCT VFR BLD AUTO: 29.3 % (ref 35–45)
HGB BLD-MCNC: 7.6 G/DL (ref 12–16)
HGB BLD-MCNC: 8 G/DL (ref 12–16)
HGB BLD-MCNC: 8.6 G/DL (ref 12–16)
HGB BLD-MCNC: 9 G/DL (ref 12–16)
HGB BLD-MCNC: 9 G/DL (ref 12–16)
HGB BLD-MCNC: 9.4 G/DL (ref 12–16)
HGB UR QL STRIP: ABNORMAL
HGB UR QL STRIP: NEGATIVE
INR PPP: 1.3 (ref 0.8–1.2)
IRON SATN MFR SERPL: 14 % (ref 20–50)
IRON SERPL-MCNC: 34 UG/DL (ref 50–175)
KETONES UR QL STRIP.AUTO: ABNORMAL MG/DL
KETONES UR QL STRIP.AUTO: NEGATIVE MG/DL
LACTATE BLD-SCNC: 0.56 MMOL/L (ref 0.4–2)
LACTATE BLD-SCNC: 0.86 MMOL/L (ref 0.4–2)
LACTATE BLD-SCNC: 1.57 MMOL/L (ref 0.4–2)
LEUKOCYTE ESTERASE UR QL STRIP.AUTO: ABNORMAL
LEUKOCYTE ESTERASE UR QL STRIP.AUTO: ABNORMAL
LIPASE SERPL-CCNC: 27 U/L (ref 73–393)
LIPASE SERPL-CCNC: 49 U/L (ref 73–393)
LYMPHOCYTES # BLD: 0.9 K/UL (ref 0.9–3.6)
LYMPHOCYTES # BLD: 1 K/UL (ref 0.9–3.6)
LYMPHOCYTES # BLD: 1.1 K/UL (ref 0.9–3.6)
LYMPHOCYTES # BLD: 1.2 K/UL (ref 0.9–3.6)
LYMPHOCYTES # BLD: 1.6 K/UL (ref 0.9–3.6)
LYMPHOCYTES # BLD: 1.6 K/UL (ref 0.9–3.6)
LYMPHOCYTES # BLD: 2.2 K/UL (ref 0.9–3.6)
LYMPHOCYTES # BLD: 2.5 K/UL (ref 0.9–3.6)
LYMPHOCYTES NFR BLD: 12 % (ref 21–52)
LYMPHOCYTES NFR BLD: 12 % (ref 21–52)
LYMPHOCYTES NFR BLD: 14 % (ref 21–52)
LYMPHOCYTES NFR BLD: 16 % (ref 21–52)
LYMPHOCYTES NFR BLD: 27 % (ref 21–52)
LYMPHOCYTES NFR BLD: 33 % (ref 21–52)
LYMPHOCYTES NFR BLD: 38 % (ref 21–52)
LYMPHOCYTES NFR BLD: 8 % (ref 21–52)
MAGNESIUM SERPL-MCNC: 2.2 MG/DL (ref 1.6–2.6)
MAGNESIUM SERPL-MCNC: 2.4 MG/DL (ref 1.6–2.6)
MCH RBC QN AUTO: 28.3 PG (ref 24–34)
MCH RBC QN AUTO: 28.9 PG (ref 24–34)
MCH RBC QN AUTO: 29.1 PG (ref 24–34)
MCH RBC QN AUTO: 29.2 PG (ref 24–34)
MCH RBC QN AUTO: 29.4 PG (ref 24–34)
MCH RBC QN AUTO: 29.6 PG (ref 24–34)
MCHC RBC AUTO-ENTMCNC: 31.2 G/DL (ref 31–37)
MCHC RBC AUTO-ENTMCNC: 31.9 G/DL (ref 31–37)
MCHC RBC AUTO-ENTMCNC: 32.1 G/DL (ref 31–37)
MCHC RBC AUTO-ENTMCNC: 32.1 G/DL (ref 31–37)
MCHC RBC AUTO-ENTMCNC: 32.3 G/DL (ref 31–37)
MCHC RBC AUTO-ENTMCNC: 32.4 G/DL (ref 31–37)
MCHC RBC AUTO-ENTMCNC: 32.4 G/DL (ref 31–37)
MCHC RBC AUTO-ENTMCNC: 33 G/DL (ref 31–37)
MCV RBC AUTO: 88.6 FL (ref 74–97)
MCV RBC AUTO: 90.2 FL (ref 74–97)
MCV RBC AUTO: 90.6 FL (ref 74–97)
MCV RBC AUTO: 90.7 FL (ref 74–97)
MCV RBC AUTO: 90.8 FL (ref 74–97)
MCV RBC AUTO: 90.8 FL (ref 74–97)
MCV RBC AUTO: 91.2 FL (ref 74–97)
MCV RBC AUTO: 91.5 FL (ref 74–97)
MONOCYTES # BLD: 0.6 K/UL (ref 0.05–1.2)
MONOCYTES # BLD: 0.8 K/UL (ref 0.05–1.2)
MONOCYTES # BLD: 0.9 K/UL (ref 0.05–1.2)
MONOCYTES # BLD: 0.9 K/UL (ref 0.05–1.2)
MONOCYTES # BLD: 1 K/UL (ref 0.05–1.2)
MONOCYTES # BLD: 1 K/UL (ref 0.05–1.2)
MONOCYTES # BLD: 1.4 K/UL (ref 0.05–1.2)
MONOCYTES # BLD: 1.4 K/UL (ref 0.05–1.2)
MONOCYTES NFR BLD: 10 % (ref 3–10)
MONOCYTES NFR BLD: 11 % (ref 3–10)
MONOCYTES NFR BLD: 12 % (ref 3–10)
MONOCYTES NFR BLD: 14 % (ref 3–10)
MONOCYTES NFR BLD: 15 % (ref 3–10)
MONOCYTES NFR BLD: 9 % (ref 3–10)
MUCOUS THREADS URNS QL MICRO: ABNORMAL /LPF
NEUTS SEG # BLD: 3.1 K/UL (ref 1.8–8)
NEUTS SEG # BLD: 3.3 K/UL (ref 1.8–8)
NEUTS SEG # BLD: 3.6 K/UL (ref 1.8–8)
NEUTS SEG # BLD: 5.6 K/UL (ref 1.8–8)
NEUTS SEG # BLD: 6.1 K/UL (ref 1.8–8)
NEUTS SEG # BLD: 6.7 K/UL (ref 1.8–8)
NEUTS SEG # BLD: 8.4 K/UL (ref 1.8–8)
NEUTS SEG # BLD: 9.3 K/UL (ref 1.8–8)
NEUTS SEG NFR BLD: 47 % (ref 40–73)
NEUTS SEG NFR BLD: 49 % (ref 40–73)
NEUTS SEG NFR BLD: 61 % (ref 40–73)
NEUTS SEG NFR BLD: 70 % (ref 40–73)
NEUTS SEG NFR BLD: 72 % (ref 40–73)
NEUTS SEG NFR BLD: 74 % (ref 40–73)
NEUTS SEG NFR BLD: 75 % (ref 40–73)
NEUTS SEG NFR BLD: 82 % (ref 40–73)
NITRITE UR QL STRIP.AUTO: NEGATIVE
NITRITE UR QL STRIP.AUTO: POSITIVE
P-R INTERVAL, ECG05: 214 MS
P-R INTERVAL, ECG05: 222 MS
PH UR STRIP: 5 [PH] (ref 5–8)
PH UR STRIP: 6.5 [PH] (ref 5–8)
PLATELET # BLD AUTO: 119 K/UL (ref 135–420)
PLATELET # BLD AUTO: 124 K/UL (ref 135–420)
PLATELET # BLD AUTO: 140 K/UL (ref 135–420)
PLATELET # BLD AUTO: 141 K/UL (ref 135–420)
PLATELET # BLD AUTO: 156 K/UL (ref 135–420)
PLATELET # BLD AUTO: 177 K/UL (ref 135–420)
PLATELET # BLD AUTO: 193 K/UL (ref 135–420)
PLATELET # BLD AUTO: 249 K/UL (ref 135–420)
PMV BLD AUTO: 8.7 FL (ref 9.2–11.8)
PMV BLD AUTO: 9.1 FL (ref 9.2–11.8)
PMV BLD AUTO: 9.1 FL (ref 9.2–11.8)
PMV BLD AUTO: 9.2 FL (ref 9.2–11.8)
PMV BLD AUTO: 9.4 FL (ref 9.2–11.8)
PMV BLD AUTO: 9.9 FL (ref 9.2–11.8)
POTASSIUM SERPL-SCNC: 4.2 MMOL/L (ref 3.5–5.5)
POTASSIUM SERPL-SCNC: 4.4 MMOL/L (ref 3.5–5.5)
POTASSIUM SERPL-SCNC: 4.6 MMOL/L (ref 3.5–5.5)
POTASSIUM SERPL-SCNC: 4.7 MMOL/L (ref 3.5–5.5)
POTASSIUM SERPL-SCNC: 4.7 MMOL/L (ref 3.5–5.5)
POTASSIUM SERPL-SCNC: 5 MMOL/L (ref 3.5–5.5)
POTASSIUM SERPL-SCNC: 5 MMOL/L (ref 3.5–5.5)
POTASSIUM SERPL-SCNC: 5.4 MMOL/L (ref 3.5–5.5)
POTASSIUM SERPL-SCNC: 5.7 MMOL/L (ref 3.5–5.5)
PROT SERPL-MCNC: 5.6 G/DL (ref 6.4–8.2)
PROT SERPL-MCNC: 5.6 G/DL (ref 6.4–8.2)
PROT SERPL-MCNC: 5.8 G/DL (ref 6.4–8.2)
PROT SERPL-MCNC: 5.8 G/DL (ref 6.4–8.2)
PROT SERPL-MCNC: 6.2 G/DL (ref 6.4–8.2)
PROT UR STRIP-MCNC: ABNORMAL MG/DL
PROT UR STRIP-MCNC: NEGATIVE MG/DL
PROTHROMBIN TIME: 16.1 SEC (ref 11.5–15.2)
Q-T INTERVAL, ECG07: 486 MS
Q-T INTERVAL, ECG07: 488 MS
Q-T INTERVAL, ECG07: 514 MS
QRS DURATION, ECG06: 202 MS
QRS DURATION, ECG06: 210 MS
QRS DURATION, ECG06: 228 MS
QTC CALCULATION (BEZET), ECG08: 524 MS
QTC CALCULATION (BEZET), ECG08: 544 MS
QTC CALCULATION (BEZET), ECG08: 562 MS
RBC # BLD AUTO: 2.6 M/UL (ref 4.2–5.3)
RBC # BLD AUTO: 2.7 M/UL (ref 4.2–5.3)
RBC # BLD AUTO: 2.95 M/UL (ref 4.2–5.3)
RBC # BLD AUTO: 2.98 M/UL (ref 4.2–5.3)
RBC # BLD AUTO: 3.04 M/UL (ref 4.2–5.3)
RBC # BLD AUTO: 3.06 M/UL (ref 4.2–5.3)
RBC # BLD AUTO: 3.08 M/UL (ref 4.2–5.3)
RBC # BLD AUTO: 3.23 M/UL (ref 4.2–5.3)
RBC #/AREA URNS HPF: ABNORMAL /HPF (ref 0–5)
SERVICE CMNT-IMP: ABNORMAL
SERVICE CMNT-IMP: ABNORMAL
SERVICE CMNT-IMP: NORMAL
SODIUM SERPL-SCNC: 133 MMOL/L (ref 136–145)
SODIUM SERPL-SCNC: 134 MMOL/L (ref 136–145)
SODIUM SERPL-SCNC: 136 MMOL/L (ref 136–145)
SODIUM SERPL-SCNC: 137 MMOL/L (ref 136–145)
SODIUM SERPL-SCNC: 138 MMOL/L (ref 136–145)
SODIUM SERPL-SCNC: 139 MMOL/L (ref 136–145)
SODIUM SERPL-SCNC: 143 MMOL/L (ref 136–145)
SOURCE, COVRS: NORMAL
SP GR UR REFRACTOMETRY: 1.01 (ref 1–1.03)
SP GR UR REFRACTOMETRY: 1.01 (ref 1–1.03)
SPECIMEN EXP DATE BLD: NORMAL
TIBC SERPL-MCNC: 237 UG/DL (ref 250–450)
TROPONIN I SERPL-MCNC: 0.05 NG/ML (ref 0–0.04)
TROPONIN I SERPL-MCNC: 0.06 NG/ML (ref 0–0.04)
TROPONIN I SERPL-MCNC: 0.07 NG/ML (ref 0–0.04)
TROPONIN I SERPL-MCNC: 0.07 NG/ML (ref 0–0.04)
TROPONIN I SERPL-MCNC: 0.09 NG/ML (ref 0–0.04)
URATE SERPL-MCNC: 5.5 MG/DL (ref 2.6–7.2)
UROBILINOGEN UR QL STRIP.AUTO: 0.2 EU/DL (ref 0.2–1)
UROBILINOGEN UR QL STRIP.AUTO: 1 EU/DL (ref 0.2–1)
VENTRICULAR RATE, ECG03: 70 BPM
VENTRICULAR RATE, ECG03: 72 BPM
VENTRICULAR RATE, ECG03: 75 BPM
VIT B12 SERPL-MCNC: 1814 PG/ML (ref 211–911)
WBC # BLD AUTO: 11.2 K/UL (ref 4.6–13.2)
WBC # BLD AUTO: 11.4 K/UL (ref 4.6–13.2)
WBC # BLD AUTO: 6 K/UL (ref 4.6–13.2)
WBC # BLD AUTO: 6.5 K/UL (ref 4.6–13.2)
WBC # BLD AUTO: 6.8 K/UL (ref 4.6–13.2)
WBC # BLD AUTO: 7.9 K/UL (ref 4.6–13.2)
WBC # BLD AUTO: 8.1 K/UL (ref 4.6–13.2)
WBC # BLD AUTO: 9.2 K/UL (ref 4.6–13.2)
WBC URNS QL MICRO: ABNORMAL /HPF (ref 0–4)
WBC URNS QL MICRO: ABNORMAL /HPF (ref 0–5)

## 2020-01-01 PROCEDURE — 74011250637 HC RX REV CODE- 250/637: Performed by: PHYSICIAN ASSISTANT

## 2020-01-01 PROCEDURE — G0151 HHCP-SERV OF PT,EA 15 MIN: HCPCS

## 2020-01-01 PROCEDURE — 3331090001 HH PPS REVENUE CREDIT

## 2020-01-01 PROCEDURE — 82728 ASSAY OF FERRITIN: CPT

## 2020-01-01 PROCEDURE — G0299 HHS/HOSPICE OF RN EA 15 MIN: HCPCS

## 2020-01-01 PROCEDURE — 86038 ANTINUCLEAR ANTIBODIES: CPT

## 2020-01-01 PROCEDURE — 3331090002 HH PPS REVENUE DEBIT

## 2020-01-01 PROCEDURE — A6253 ABSORPT DRG > 48 SQ IN W/O B: HCPCS

## 2020-01-01 PROCEDURE — 85025 COMPLETE CBC W/AUTO DIFF WBC: CPT

## 2020-01-01 PROCEDURE — A6260 WOUND CLEANSER ANY TYPE/SIZE: HCPCS

## 2020-01-01 PROCEDURE — 0651 HSPC ROUTINE HOME CARE

## 2020-01-01 PROCEDURE — 87186 SC STD MICRODIL/AGAR DIL: CPT

## 2020-01-01 PROCEDURE — 80048 BASIC METABOLIC PNL TOTAL CA: CPT

## 2020-01-01 PROCEDURE — 96366 THER/PROPH/DIAG IV INF ADDON: CPT

## 2020-01-01 PROCEDURE — 74011636320 HC RX REV CODE- 636/320: Performed by: EMERGENCY MEDICINE

## 2020-01-01 PROCEDURE — 77030040361 HC SLV COMPR DVT MDII -B: Performed by: COLON & RECTAL SURGERY

## 2020-01-01 PROCEDURE — 74011000258 HC RX REV CODE- 258: Performed by: EMERGENCY MEDICINE

## 2020-01-01 PROCEDURE — 83605 ASSAY OF LACTIC ACID: CPT

## 2020-01-01 PROCEDURE — A6454 SELF-ADHER BAND W>=3" <5"/YD: HCPCS

## 2020-01-01 PROCEDURE — A4333 URINARY CATH ANCHOR DEVICE: HCPCS

## 2020-01-01 PROCEDURE — 77010033678 HC OXYGEN DAILY

## 2020-01-01 PROCEDURE — 92610 EVALUATE SWALLOWING FUNCTION: CPT

## 2020-01-01 PROCEDURE — 74011250637 HC RX REV CODE- 250/637: Performed by: HOSPITALIST

## 2020-01-01 PROCEDURE — A4649 SURGICAL SUPPLIES: HCPCS

## 2020-01-01 PROCEDURE — 74022 RADEX COMPL AQT ABD SERIES: CPT

## 2020-01-01 PROCEDURE — 76060000032 HC ANESTHESIA 0.5 TO 1 HR: Performed by: COLON & RECTAL SURGERY

## 2020-01-01 PROCEDURE — A6216 NON-STERILE GAUZE<=16 SQ IN: HCPCS

## 2020-01-01 PROCEDURE — 77030041076 HC DRSG AG OPTICELL MDII -A

## 2020-01-01 PROCEDURE — 36415 COLL VENOUS BLD VENIPUNCTURE: CPT

## 2020-01-01 PROCEDURE — 82550 ASSAY OF CK (CPK): CPT

## 2020-01-01 PROCEDURE — A4338 INDWELLING CATHETER LATEX: HCPCS

## 2020-01-01 PROCEDURE — 71045 X-RAY EXAM CHEST 1 VIEW: CPT

## 2020-01-01 PROCEDURE — 80053 COMPREHEN METABOLIC PANEL: CPT

## 2020-01-01 PROCEDURE — 74011250637 HC RX REV CODE- 250/637: Performed by: INTERNAL MEDICINE

## 2020-01-01 PROCEDURE — A4927 NON-STERILE GLOVES: HCPCS

## 2020-01-01 PROCEDURE — 74011000258 HC RX REV CODE- 258: Performed by: FAMILY MEDICINE

## 2020-01-01 PROCEDURE — 83690 ASSAY OF LIPASE: CPT

## 2020-01-01 PROCEDURE — 74011250637 HC RX REV CODE- 250/637: Performed by: FAMILY MEDICINE

## 2020-01-01 PROCEDURE — 74011000258 HC RX REV CODE- 258: Performed by: NURSE ANESTHETIST, CERTIFIED REGISTERED

## 2020-01-01 PROCEDURE — 81001 URINALYSIS AUTO W/SCOPE: CPT

## 2020-01-01 PROCEDURE — 74011000250 HC RX REV CODE- 250: Performed by: NURSE ANESTHETIST, CERTIFIED REGISTERED

## 2020-01-01 PROCEDURE — G0300 HHS/HOSPICE OF LPN EA 15 MIN: HCPCS

## 2020-01-01 PROCEDURE — 74011250636 HC RX REV CODE- 250/636: Performed by: EMERGENCY MEDICINE

## 2020-01-01 PROCEDURE — 87635 SARS-COV-2 COVID-19 AMP PRB: CPT

## 2020-01-01 PROCEDURE — 70450 CT HEAD/BRAIN W/O DYE: CPT

## 2020-01-01 PROCEDURE — A6212 FOAM DRG <=16 SQ IN W/BORDER: HCPCS

## 2020-01-01 PROCEDURE — 51798 US URINE CAPACITY MEASURE: CPT

## 2020-01-01 PROCEDURE — G0155 HHCP-SVS OF CSW,EA 15 MIN: HCPCS

## 2020-01-01 PROCEDURE — 77030040393 HC DRSG OPTIFOAM GENT MDII -B

## 2020-01-01 PROCEDURE — 83540 ASSAY OF IRON: CPT

## 2020-01-01 PROCEDURE — 65660000000 HC RM CCU STEPDOWN

## 2020-01-01 PROCEDURE — A4452 WATERPROOF TAPE: HCPCS

## 2020-01-01 PROCEDURE — 77030038269 HC DRN EXT URIN PURWCK BARD -A

## 2020-01-01 PROCEDURE — 85730 THROMBOPLASTIN TIME PARTIAL: CPT

## 2020-01-01 PROCEDURE — A5120 SKIN BARRIER, WIPE OR SWAB: HCPCS

## 2020-01-01 PROCEDURE — 76450000000

## 2020-01-01 PROCEDURE — 86900 BLOOD TYPING SEROLOGIC ABO: CPT

## 2020-01-01 PROCEDURE — 84550 ASSAY OF BLOOD/URIC ACID: CPT

## 2020-01-01 PROCEDURE — 74011250637 HC RX REV CODE- 250/637: Performed by: EMERGENCY MEDICINE

## 2020-01-01 PROCEDURE — 96365 THER/PROPH/DIAG IV INF INIT: CPT

## 2020-01-01 PROCEDURE — 74011250636 HC RX REV CODE- 250/636: Performed by: FAMILY MEDICINE

## 2020-01-01 PROCEDURE — HOSPICE MEDICATION HC HH HOSPICE MEDICATION

## 2020-01-01 PROCEDURE — 77030038554 HC DRSG WND THERAHONEY MDII -Z

## 2020-01-01 PROCEDURE — 97161 PT EVAL LOW COMPLEX 20 MIN: CPT

## 2020-01-01 PROCEDURE — 84484 ASSAY OF TROPONIN QUANT: CPT

## 2020-01-01 PROCEDURE — A4314 CATH W/DRAINAGE 2-WAY LATEX: HCPCS

## 2020-01-01 PROCEDURE — A6250 SKIN SEAL PROTECT MOISTURIZR: HCPCS

## 2020-01-01 PROCEDURE — 77030018836 HC SOL IRR NACL ICUM -A

## 2020-01-01 PROCEDURE — 86140 C-REACTIVE PROTEIN: CPT

## 2020-01-01 PROCEDURE — A6199 ALGINATE DRSG WOUND FILLER: HCPCS

## 2020-01-01 PROCEDURE — 77030018836 HC SOL IRR NACL ICUM -A: Performed by: COLON & RECTAL SURGERY

## 2020-01-01 PROCEDURE — 0KBN0ZZ EXCISION OF RIGHT HIP MUSCLE, OPEN APPROACH: ICD-10-PCS | Performed by: COLON & RECTAL SURGERY

## 2020-01-01 PROCEDURE — 87040 BLOOD CULTURE FOR BACTERIA: CPT

## 2020-01-01 PROCEDURE — 93005 ELECTROCARDIOGRAM TRACING: CPT

## 2020-01-01 PROCEDURE — 92526 ORAL FUNCTION THERAPY: CPT

## 2020-01-01 PROCEDURE — 96375 TX/PRO/DX INJ NEW DRUG ADDON: CPT

## 2020-01-01 PROCEDURE — 74177 CT ABD & PELVIS W/CONTRAST: CPT

## 2020-01-01 PROCEDURE — 83735 ASSAY OF MAGNESIUM: CPT

## 2020-01-01 PROCEDURE — 74011250636 HC RX REV CODE- 250/636: Performed by: NURSE ANESTHETIST, CERTIFIED REGISTERED

## 2020-01-01 PROCEDURE — 82607 VITAMIN B-12: CPT

## 2020-01-01 PROCEDURE — 74011000250 HC RX REV CODE- 250: Performed by: EMERGENCY MEDICINE

## 2020-01-01 PROCEDURE — 99284 EMERGENCY DEPT VISIT MOD MDM: CPT

## 2020-01-01 PROCEDURE — A6213 FOAM DRG >16<=48 SQ IN W/BDR: HCPCS

## 2020-01-01 PROCEDURE — 3331090003 HH PPS REVENUE ADJ

## 2020-01-01 PROCEDURE — 77030041247 HC PROTECTOR HEEL HEELMEDIX MDII -B

## 2020-01-01 PROCEDURE — 400013 HH SOC

## 2020-01-01 PROCEDURE — 99283 EMERGENCY DEPT VISIT LOW MDM: CPT

## 2020-01-01 PROCEDURE — 97165 OT EVAL LOW COMPLEX 30 MIN: CPT

## 2020-01-01 PROCEDURE — 74176 CT ABD & PELVIS W/O CONTRAST: CPT

## 2020-01-01 PROCEDURE — 85652 RBC SED RATE AUTOMATED: CPT

## 2020-01-01 PROCEDURE — 77030040922 HC BLNKT HYPOTHRM STRY -A: Performed by: COLON & RECTAL SURGERY

## 2020-01-01 PROCEDURE — 87086 URINE CULTURE/COLONY COUNT: CPT

## 2020-01-01 PROCEDURE — 87205 SMEAR GRAM STAIN: CPT

## 2020-01-01 PROCEDURE — 96374 THER/PROPH/DIAG INJ IV PUSH: CPT

## 2020-01-01 PROCEDURE — 3331090004 HSPC SERVICE INTENSITY ADD-ON

## 2020-01-01 PROCEDURE — 99285 EMERGENCY DEPT VISIT HI MDM: CPT

## 2020-01-01 PROCEDURE — A4320 IRRIGATION TRAY: HCPCS

## 2020-01-01 PROCEDURE — 0KBP0ZZ EXCISION OF LEFT HIP MUSCLE, OPEN APPROACH: ICD-10-PCS | Performed by: COLON & RECTAL SURGERY

## 2020-01-01 PROCEDURE — 87077 CULTURE AEROBIC IDENTIFY: CPT

## 2020-01-01 PROCEDURE — 76010000138 HC OR TIME 0.5 TO 1 HR: Performed by: COLON & RECTAL SURGERY

## 2020-01-01 PROCEDURE — 96367 TX/PROPH/DG ADDL SEQ IV INF: CPT

## 2020-01-01 PROCEDURE — A4354 CATH INSERTION TRAY W/BAG: HCPCS

## 2020-01-01 PROCEDURE — G0152 HHCP-SERV OF OT,EA 15 MIN: HCPCS

## 2020-01-01 PROCEDURE — 3336500001 HSPC ELECTION

## 2020-01-01 PROCEDURE — 74011000250 HC RX REV CODE- 250: Performed by: COLON & RECTAL SURGERY

## 2020-01-01 PROCEDURE — 76210000006 HC OR PH I REC 0.5 TO 1 HR: Performed by: COLON & RECTAL SURGERY

## 2020-01-01 PROCEDURE — A4930 STERILE, GLOVES PER PAIR: HCPCS

## 2020-01-01 PROCEDURE — 85610 PROTHROMBIN TIME: CPT

## 2020-01-01 RX ORDER — SODIUM CHLORIDE, SODIUM LACTATE, POTASSIUM CHLORIDE, CALCIUM CHLORIDE 600; 310; 30; 20 MG/100ML; MG/100ML; MG/100ML; MG/100ML
100 INJECTION, SOLUTION INTRAVENOUS ONCE
Status: COMPLETED | OUTPATIENT
Start: 2020-01-01 | End: 2020-01-01

## 2020-01-01 RX ORDER — METOLAZONE 2.5 MG/1
TABLET ORAL DAILY
COMMUNITY

## 2020-01-01 RX ORDER — DEXTROSE 50 % IN WATER (D50W) INTRAVENOUS SYRINGE
25
Status: DISCONTINUED | OUTPATIENT
Start: 2020-01-01 | End: 2020-01-01

## 2020-01-01 RX ORDER — VANCOMYCIN HYDROCHLORIDE
1250
Status: DISCONTINUED | OUTPATIENT
Start: 2020-01-01 | End: 2020-01-01

## 2020-01-01 RX ORDER — POLYETHYLENE GLYCOL 3350 17 G/17G
0.4 POWDER, FOR SOLUTION ORAL DAILY
Qty: 20 PACKET | Refills: 0 | Status: SHIPPED | OUTPATIENT
Start: 2020-01-01 | End: 2020-01-01

## 2020-01-01 RX ORDER — ACETAMINOPHEN 650 MG/1
650 SUPPOSITORY RECTAL
Status: COMPLETED | OUTPATIENT
Start: 2020-01-01 | End: 2020-01-01

## 2020-01-01 RX ORDER — LEVOTHYROXINE SODIUM 25 UG/1
25 TABLET ORAL
Status: DISCONTINUED | OUTPATIENT
Start: 2020-01-01 | End: 2020-01-01

## 2020-01-01 RX ORDER — CEFDINIR 300 MG/1
300 CAPSULE ORAL 2 TIMES DAILY
Qty: 14 CAP | Refills: 0 | Status: SHIPPED | OUTPATIENT
Start: 2020-01-01 | End: 2020-01-01

## 2020-01-01 RX ORDER — SODIUM CHLORIDE 0.9 % (FLUSH) 0.9 %
5-10 SYRINGE (ML) INJECTION AS NEEDED
Status: DISCONTINUED | OUTPATIENT
Start: 2020-01-01 | End: 2020-01-01 | Stop reason: HOSPADM

## 2020-01-01 RX ORDER — LEVOTHYROXINE SODIUM 25 UG/1
TABLET ORAL
Qty: 30 TAB | Refills: 2 | Status: SHIPPED | OUTPATIENT
Start: 2020-01-01 | End: 2020-01-01

## 2020-01-01 RX ORDER — ASPIRIN 81 MG/1
81 TABLET ORAL DAILY
Status: DISCONTINUED | OUTPATIENT
Start: 2020-01-01 | End: 2020-01-01 | Stop reason: HOSPADM

## 2020-01-01 RX ORDER — GRANULES FOR ORAL 3 G/1
3 POWDER ORAL ONCE
Status: COMPLETED | OUTPATIENT
Start: 2020-01-01 | End: 2020-01-01

## 2020-01-01 RX ORDER — HEPARIN SODIUM 5000 [USP'U]/ML
5000 INJECTION, SOLUTION INTRAVENOUS; SUBCUTANEOUS EVERY 8 HOURS
Status: DISCONTINUED | OUTPATIENT
Start: 2020-01-01 | End: 2020-01-01 | Stop reason: HOSPADM

## 2020-01-01 RX ORDER — POLYETHYLENE GLYCOL 3350 17 G/17G
17 POWDER, FOR SOLUTION ORAL DAILY
Status: DISCONTINUED | OUTPATIENT
Start: 2020-01-01 | End: 2020-01-01 | Stop reason: HOSPADM

## 2020-01-01 RX ORDER — FACIAL-BODY WIPES
10 EACH TOPICAL DAILY PRN
Status: DISCONTINUED | OUTPATIENT
Start: 2020-01-01 | End: 2020-01-01 | Stop reason: HOSPADM

## 2020-01-01 RX ORDER — MIDAZOLAM HYDROCHLORIDE 1 MG/ML
INJECTION, SOLUTION INTRAMUSCULAR; INTRAVENOUS AS NEEDED
Status: DISCONTINUED | OUTPATIENT
Start: 2020-01-01 | End: 2020-01-01 | Stop reason: HOSPADM

## 2020-01-01 RX ORDER — LIDOCAINE HYDROCHLORIDE 20 MG/ML
INJECTION, SOLUTION EPIDURAL; INFILTRATION; INTRACAUDAL; PERINEURAL AS NEEDED
Status: DISCONTINUED | OUTPATIENT
Start: 2020-01-01 | End: 2020-01-01 | Stop reason: HOSPADM

## 2020-01-01 RX ORDER — CARVEDILOL 3.12 MG/1
3.12 TABLET ORAL EVERY 12 HOURS
Status: DISCONTINUED | OUTPATIENT
Start: 2020-01-01 | End: 2020-01-01 | Stop reason: HOSPADM

## 2020-01-01 RX ORDER — TORSEMIDE 20 MG/1
20 TABLET ORAL
Qty: 30 TAB | Refills: 3 | Status: SHIPPED | OUTPATIENT
Start: 2020-01-01

## 2020-01-01 RX ORDER — POTASSIUM CHLORIDE 750 MG/1
TABLET, FILM COATED, EXTENDED RELEASE ORAL
COMMUNITY

## 2020-01-01 RX ORDER — FAMOTIDINE 20 MG/1
20 TABLET, FILM COATED ORAL 2 TIMES DAILY
COMMUNITY

## 2020-01-01 RX ORDER — SODIUM CHLORIDE 9 MG/ML
75 INJECTION, SOLUTION INTRAVENOUS CONTINUOUS
Status: DISCONTINUED | OUTPATIENT
Start: 2020-01-01 | End: 2020-01-01

## 2020-01-01 RX ORDER — AMIODARONE HYDROCHLORIDE 200 MG/1
200 TABLET ORAL DAILY
Status: DISCONTINUED | OUTPATIENT
Start: 2020-01-01 | End: 2020-01-01 | Stop reason: HOSPADM

## 2020-01-01 RX ORDER — DOXYCYCLINE 100 MG/1
100 CAPSULE ORAL 2 TIMES DAILY
Qty: 20 CAP | Refills: 0 | Status: SHIPPED | OUTPATIENT
Start: 2020-01-01 | End: 2020-01-01

## 2020-01-01 RX ORDER — BUPIVACAINE HYDROCHLORIDE AND EPINEPHRINE 2.5; 5 MG/ML; UG/ML
INJECTION, SOLUTION EPIDURAL; INFILTRATION; INTRACAUDAL; PERINEURAL AS NEEDED
Status: DISCONTINUED | OUTPATIENT
Start: 2020-01-01 | End: 2020-01-01 | Stop reason: HOSPADM

## 2020-01-01 RX ORDER — ISOSORBIDE MONONITRATE 60 MG/1
60 TABLET, EXTENDED RELEASE ORAL DAILY
Status: DISCONTINUED | OUTPATIENT
Start: 2020-01-01 | End: 2020-01-01 | Stop reason: HOSPADM

## 2020-01-01 RX ORDER — VASOPRESSIN 20 U/ML
INJECTION PARENTERAL AS NEEDED
Status: DISCONTINUED | OUTPATIENT
Start: 2020-01-01 | End: 2020-01-01 | Stop reason: HOSPADM

## 2020-01-01 RX ORDER — OXYCODONE AND ACETAMINOPHEN 5; 325 MG/1; MG/1
1 TABLET ORAL
Status: DISCONTINUED | OUTPATIENT
Start: 2020-01-01 | End: 2020-01-01 | Stop reason: HOSPADM

## 2020-01-01 RX ORDER — VANCOMYCIN HYDROCHLORIDE
1250
Status: COMPLETED | OUTPATIENT
Start: 2020-01-01 | End: 2020-01-01

## 2020-01-01 RX ORDER — ACETAMINOPHEN 325 MG/1
650 TABLET ORAL
Status: DISCONTINUED | OUTPATIENT
Start: 2020-01-01 | End: 2020-01-01 | Stop reason: HOSPADM

## 2020-01-01 RX ORDER — LEVOTHYROXINE SODIUM 25 UG/1
TABLET ORAL
Qty: 30 TAB | Refills: 2 | OUTPATIENT
Start: 2020-01-01

## 2020-01-01 RX ORDER — LEVOTHYROXINE SODIUM 25 UG/1
25 TABLET ORAL
Status: DISCONTINUED | OUTPATIENT
Start: 2020-01-01 | End: 2020-01-01 | Stop reason: HOSPADM

## 2020-01-01 RX ORDER — CEFPODOXIME PROXETIL 100 MG/1
200 TABLET, FILM COATED ORAL EVERY 12 HOURS
Status: DISCONTINUED | OUTPATIENT
Start: 2020-01-01 | End: 2020-01-01

## 2020-01-01 RX ORDER — PROPOFOL 10 MG/ML
INJECTION, EMULSION INTRAVENOUS AS NEEDED
Status: DISCONTINUED | OUTPATIENT
Start: 2020-01-01 | End: 2020-01-01 | Stop reason: HOSPADM

## 2020-01-01 RX ORDER — MORPHINE SULFATE 2 MG/ML
1 INJECTION, SOLUTION INTRAMUSCULAR; INTRAVENOUS
Status: DISCONTINUED | OUTPATIENT
Start: 2020-01-01 | End: 2020-01-01 | Stop reason: HOSPADM

## 2020-01-01 RX ORDER — MAGNESIUM CITRATE
SOLUTION, ORAL ORAL
Qty: 1 BOTTLE | Refills: 0 | Status: SHIPPED | OUTPATIENT
Start: 2020-01-01

## 2020-01-01 RX ORDER — FENTANYL CITRATE 50 UG/ML
INJECTION, SOLUTION INTRAMUSCULAR; INTRAVENOUS AS NEEDED
Status: DISCONTINUED | OUTPATIENT
Start: 2020-01-01 | End: 2020-01-01 | Stop reason: HOSPADM

## 2020-01-01 RX ORDER — SODIUM POLYSTYRENE SULFONATE 15 G/60ML
30 SUSPENSION ORAL; RECTAL
Status: COMPLETED | OUTPATIENT
Start: 2020-01-01 | End: 2020-01-01

## 2020-01-01 RX ADMIN — PIPERACILLIN AND TAZOBACTAM 2.25 G: 2; .25 INJECTION, POWDER, LYOPHILIZED, FOR SOLUTION INTRAVENOUS; PARENTERAL at 15:58

## 2020-01-01 RX ADMIN — VASOPRESSIN 2 UNITS: 20 INJECTION INTRAVENOUS at 13:11

## 2020-01-01 RX ADMIN — MIDAZOLAM HYDROCHLORIDE 0.5 MG: 2 INJECTION, SOLUTION INTRAMUSCULAR; INTRAVENOUS at 12:30

## 2020-01-01 RX ADMIN — Medication 1 CAPSULE: at 09:53

## 2020-01-01 RX ADMIN — ACETAMINOPHEN 650 MG: 650 SUPPOSITORY RECTAL at 15:07

## 2020-01-01 RX ADMIN — PIPERACILLIN AND TAZOBACTAM 3.38 G: 3; .375 INJECTION, POWDER, LYOPHILIZED, FOR SOLUTION INTRAVENOUS at 09:25

## 2020-01-01 RX ADMIN — VANCOMYCIN HYDROCHLORIDE 1250 MG: 10 INJECTION, POWDER, LYOPHILIZED, FOR SOLUTION INTRAVENOUS at 18:48

## 2020-01-01 RX ADMIN — HEPARIN SODIUM 5000 UNITS: 5000 INJECTION INTRAVENOUS; SUBCUTANEOUS at 03:33

## 2020-01-01 RX ADMIN — PIPERACILLIN AND TAZOBACTAM 2.25 G: 2; .25 INJECTION, POWDER, LYOPHILIZED, FOR SOLUTION INTRAVENOUS; PARENTERAL at 22:09

## 2020-01-01 RX ADMIN — HEPARIN SODIUM 5000 UNITS: 5000 INJECTION INTRAVENOUS; SUBCUTANEOUS at 02:55

## 2020-01-01 RX ADMIN — CARVEDILOL 3.12 MG: 3.12 TABLET, FILM COATED ORAL at 09:26

## 2020-01-01 RX ADMIN — PIPERACILLIN AND TAZOBACTAM 2.25 G: 2; .25 INJECTION, POWDER, LYOPHILIZED, FOR SOLUTION INTRAVENOUS; PARENTERAL at 04:19

## 2020-01-01 RX ADMIN — ASPIRIN 81 MG: 81 TABLET, COATED ORAL at 09:52

## 2020-01-01 RX ADMIN — ASPIRIN 81 MG: 81 TABLET, COATED ORAL at 09:26

## 2020-01-01 RX ADMIN — SODIUM POLYSTYRENE SULFONATE 30 G: 15 SUSPENSION ORAL; RECTAL at 03:27

## 2020-01-01 RX ADMIN — PIPERACILLIN AND TAZOBACTAM 2.25 G: 2; .25 INJECTION, POWDER, LYOPHILIZED, FOR SOLUTION INTRAVENOUS; PARENTERAL at 17:28

## 2020-01-01 RX ADMIN — LIDOCAINE HYDROCHLORIDE 20 MG: 20 INJECTION, SOLUTION EPIDURAL; INFILTRATION; INTRACAUDAL; PERINEURAL at 13:00

## 2020-01-01 RX ADMIN — CARVEDILOL 3.12 MG: 3.12 TABLET, FILM COATED ORAL at 21:36

## 2020-01-01 RX ADMIN — CARVEDILOL 3.12 MG: 3.12 TABLET, FILM COATED ORAL at 20:45

## 2020-01-01 RX ADMIN — LEVOTHYROXINE SODIUM 25 MCG: 0.03 TABLET ORAL at 06:11

## 2020-01-01 RX ADMIN — VANCOMYCIN HYDROCHLORIDE 1250 MG: 10 INJECTION, POWDER, LYOPHILIZED, FOR SOLUTION INTRAVENOUS at 20:29

## 2020-01-01 RX ADMIN — HEPARIN SODIUM 5000 UNITS: 5000 INJECTION INTRAVENOUS; SUBCUTANEOUS at 19:02

## 2020-01-01 RX ADMIN — SODIUM CHLORIDE 600 MG: 9 INJECTION, SOLUTION INTRAVENOUS at 18:01

## 2020-01-01 RX ADMIN — CEFTRIAXONE SODIUM 1 G: 1 INJECTION, POWDER, FOR SOLUTION INTRAMUSCULAR; INTRAVENOUS at 18:48

## 2020-01-01 RX ADMIN — LEVOTHYROXINE SODIUM 25 MCG: 0.03 TABLET ORAL at 05:44

## 2020-01-01 RX ADMIN — PIPERACILLIN AND TAZOBACTAM 3.38 G: 3; .375 INJECTION, POWDER, LYOPHILIZED, FOR SOLUTION INTRAVENOUS at 04:12

## 2020-01-01 RX ADMIN — AZITHROMYCIN MONOHYDRATE 500 MG: 500 INJECTION, POWDER, LYOPHILIZED, FOR SOLUTION INTRAVENOUS at 15:49

## 2020-01-01 RX ADMIN — PHENYLEPHRINE HYDROCHLORIDE 100 MCG: 10 INJECTION INTRAVENOUS at 12:45

## 2020-01-01 RX ADMIN — SODIUM CHLORIDE, SODIUM LACTATE, POTASSIUM CHLORIDE, AND CALCIUM CHLORIDE 100 ML/HR: 600; 310; 30; 20 INJECTION, SOLUTION INTRAVENOUS at 15:53

## 2020-01-01 RX ADMIN — CEFTRIAXONE SODIUM 2 G: 2 INJECTION, POWDER, FOR SOLUTION INTRAMUSCULAR; INTRAVENOUS at 15:38

## 2020-01-01 RX ADMIN — ISOSORBIDE MONONITRATE 60 MG: 60 TABLET, EXTENDED RELEASE ORAL at 09:25

## 2020-01-01 RX ADMIN — POLYETHYLENE GLYCOL 3350 17 G: 17 POWDER, FOR SOLUTION ORAL at 09:26

## 2020-01-01 RX ADMIN — FENTANYL CITRATE 25 MCG: 50 INJECTION, SOLUTION INTRAMUSCULAR; INTRAVENOUS at 12:38

## 2020-01-01 RX ADMIN — PHENYLEPHRINE HYDROCHLORIDE 100 MCG: 10 INJECTION INTRAVENOUS at 12:40

## 2020-01-01 RX ADMIN — CARVEDILOL 3.12 MG: 3.12 TABLET, FILM COATED ORAL at 20:33

## 2020-01-01 RX ADMIN — HEPARIN SODIUM 5000 UNITS: 5000 INJECTION INTRAVENOUS; SUBCUTANEOUS at 11:11

## 2020-01-01 RX ADMIN — VASOPRESSIN 1 UNITS: 20 INJECTION INTRAVENOUS at 13:07

## 2020-01-01 RX ADMIN — HEPARIN SODIUM 5000 UNITS: 5000 INJECTION INTRAVENOUS; SUBCUTANEOUS at 03:20

## 2020-01-01 RX ADMIN — DIATRIZOATE MEGLUMINE AND DIATRIZOATE SODIUM 30 ML: 600; 100 SOLUTION ORAL; RECTAL at 18:52

## 2020-01-01 RX ADMIN — OXYCODONE HYDROCHLORIDE AND ACETAMINOPHEN 1 TABLET: 5; 325 TABLET ORAL at 17:28

## 2020-01-01 RX ADMIN — SODIUM CHLORIDE 75 ML/HR: 900 INJECTION, SOLUTION INTRAVENOUS at 10:46

## 2020-01-01 RX ADMIN — VASOPRESSIN 1 UNITS: 20 INJECTION INTRAVENOUS at 12:57

## 2020-01-01 RX ADMIN — HEPARIN SODIUM 5000 UNITS: 5000 INJECTION INTRAVENOUS; SUBCUTANEOUS at 11:46

## 2020-01-01 RX ADMIN — SODIUM CHLORIDE 75 ML/HR: 900 INJECTION, SOLUTION INTRAVENOUS at 00:49

## 2020-01-01 RX ADMIN — OXYCODONE HYDROCHLORIDE AND ACETAMINOPHEN 1 TABLET: 5; 325 TABLET ORAL at 09:46

## 2020-01-01 RX ADMIN — HEPARIN SODIUM 5000 UNITS: 5000 INJECTION INTRAVENOUS; SUBCUTANEOUS at 18:03

## 2020-01-01 RX ADMIN — LIDOCAINE HYDROCHLORIDE 20 MG: 20 INJECTION, SOLUTION EPIDURAL; INFILTRATION; INTRACAUDAL; PERINEURAL at 12:50

## 2020-01-01 RX ADMIN — PIPERACILLIN AND TAZOBACTAM 2.25 G: 2; .25 INJECTION, POWDER, LYOPHILIZED, FOR SOLUTION INTRAVENOUS; PARENTERAL at 03:20

## 2020-01-01 RX ADMIN — SODIUM CHLORIDE 75 ML/HR: 900 INJECTION, SOLUTION INTRAVENOUS at 09:25

## 2020-01-01 RX ADMIN — AMIODARONE HYDROCHLORIDE 200 MG: 200 TABLET ORAL at 09:46

## 2020-01-01 RX ADMIN — PIPERACILLIN AND TAZOBACTAM 2.25 G: 2; .25 INJECTION, POWDER, LYOPHILIZED, FOR SOLUTION INTRAVENOUS; PARENTERAL at 11:11

## 2020-01-01 RX ADMIN — PIPERACILLIN AND TAZOBACTAM 3.38 G: 3; .375 INJECTION, POWDER, LYOPHILIZED, FOR SOLUTION INTRAVENOUS at 16:04

## 2020-01-01 RX ADMIN — HEPARIN SODIUM 5000 UNITS: 5000 INJECTION INTRAVENOUS; SUBCUTANEOUS at 10:45

## 2020-01-01 RX ADMIN — PIPERACILLIN AND TAZOBACTAM 3.38 G: 3; .375 INJECTION, POWDER, LYOPHILIZED, FOR SOLUTION INTRAVENOUS at 23:17

## 2020-01-01 RX ADMIN — HEPARIN SODIUM 5000 UNITS: 5000 INJECTION INTRAVENOUS; SUBCUTANEOUS at 19:20

## 2020-01-01 RX ADMIN — ASPIRIN 81 MG: 81 TABLET, COATED ORAL at 09:46

## 2020-01-01 RX ADMIN — PROPOFOL 10 MG: 10 INJECTION, EMULSION INTRAVENOUS at 13:03

## 2020-01-01 RX ADMIN — MIDAZOLAM HYDROCHLORIDE 0.5 MG: 2 INJECTION, SOLUTION INTRAMUSCULAR; INTRAVENOUS at 12:43

## 2020-01-01 RX ADMIN — PHENYLEPHRINE HYDROCHLORIDE 200 MCG: 10 INJECTION INTRAVENOUS at 12:51

## 2020-01-01 RX ADMIN — LEVOTHYROXINE SODIUM 25 MCG: 0.03 TABLET ORAL at 05:06

## 2020-01-01 RX ADMIN — MIDAZOLAM HYDROCHLORIDE 0.5 MG: 2 INJECTION, SOLUTION INTRAMUSCULAR; INTRAVENOUS at 12:38

## 2020-01-01 RX ADMIN — IOPAMIDOL 70 ML: 612 INJECTION, SOLUTION INTRAVENOUS at 17:24

## 2020-01-01 RX ADMIN — CEFPODOXIME PROXETIL 200 MG: 100 TABLET, FILM COATED ORAL at 20:45

## 2020-01-01 RX ADMIN — ISOSORBIDE MONONITRATE 60 MG: 60 TABLET, EXTENDED RELEASE ORAL at 09:46

## 2020-01-01 RX ADMIN — PROPOFOL 10 MG: 10 INJECTION, EMULSION INTRAVENOUS at 12:59

## 2020-01-01 RX ADMIN — AMIODARONE HYDROCHLORIDE 200 MG: 200 TABLET ORAL at 09:25

## 2020-01-01 RX ADMIN — PROPOFOL 10 MG: 10 INJECTION, EMULSION INTRAVENOUS at 12:52

## 2020-01-01 RX ADMIN — PROPOFOL 10 MG: 10 INJECTION, EMULSION INTRAVENOUS at 12:47

## 2020-01-01 RX ADMIN — CARVEDILOL 3.12 MG: 3.12 TABLET, FILM COATED ORAL at 03:27

## 2020-01-01 RX ADMIN — PROPOFOL 10 MG: 10 INJECTION, EMULSION INTRAVENOUS at 12:50

## 2020-01-01 RX ADMIN — CARVEDILOL 3.12 MG: 3.12 TABLET, FILM COATED ORAL at 09:53

## 2020-01-01 RX ADMIN — Medication 1 CAPSULE: at 09:46

## 2020-01-01 RX ADMIN — PIPERACILLIN AND TAZOBACTAM 2.25 G: 2; .25 INJECTION, POWDER, LYOPHILIZED, FOR SOLUTION INTRAVENOUS; PARENTERAL at 21:36

## 2020-01-01 RX ADMIN — CARVEDILOL 3.12 MG: 3.12 TABLET, FILM COATED ORAL at 09:46

## 2020-01-01 RX ADMIN — SODIUM CHLORIDE 75 ML/HR: 900 INJECTION, SOLUTION INTRAVENOUS at 01:24

## 2020-01-01 RX ADMIN — PROPOFOL 10 MG: 10 INJECTION, EMULSION INTRAVENOUS at 12:54

## 2020-01-01 RX ADMIN — FOSFOMYCIN TROMETHAMINE 3 G: 3 POWDER ORAL at 11:45

## 2020-01-01 RX ADMIN — HEPARIN SODIUM 5000 UNITS: 5000 INJECTION INTRAVENOUS; SUBCUTANEOUS at 19:37

## 2020-01-01 RX ADMIN — SODIUM CHLORIDE 500 ML: 900 INJECTION, SOLUTION INTRAVENOUS at 14:58

## 2020-01-01 RX ADMIN — PROPOFOL 10 MG: 10 INJECTION, EMULSION INTRAVENOUS at 12:57

## 2020-01-01 RX ADMIN — PIPERACILLIN AND TAZOBACTAM 2.25 G: 2; .25 INJECTION, POWDER, LYOPHILIZED, FOR SOLUTION INTRAVENOUS; PARENTERAL at 09:52

## 2020-01-01 RX ADMIN — POLYETHYLENE GLYCOL 3350 17 G: 17 POWDER, FOR SOLUTION ORAL at 09:46

## 2020-01-01 RX ADMIN — SODIUM CHLORIDE 75 ML/HR: 900 INJECTION, SOLUTION INTRAVENOUS at 10:20

## 2020-01-01 RX ADMIN — MIDAZOLAM HYDROCHLORIDE 0.5 MG: 2 INJECTION, SOLUTION INTRAMUSCULAR; INTRAVENOUS at 12:33

## 2020-01-01 RX ADMIN — LEVOTHYROXINE SODIUM 25 MCG: 0.03 TABLET ORAL at 06:14

## 2020-01-08 NOTE — TELEPHONE ENCOUNTER
Called patient and let know to call PCP they will refill medication not us as they will be following her thyroid management.

## 2020-01-09 NOTE — ED PROVIDER NOTES
EMERGENCY DEPARTMENT HISTORY AND PHYSICAL EXAM    1:36 PM      Date: 1/9/2020  Patient Name: Madeline Belcher    History of Presenting Illness     Chief Complaint   Patient presents with    Altered mental status         History Provided By: Patient      Additional History (Context): Madeline Belcher is a 80 y.o. female who presents with altered mental status. Patient arrives via EMS from home with a chief complaint of altered mental status beginning today. The family told EMS that they noticed that she was more sleepy today than her normal and had a fever, she does have a baseline history of dementia but is functional.  The patient has not had any recent illnesses, recent changes in medications, known sick contacts. Per EMS the patient was more somnolent in the squad, she is now more alert and following all questions. Patient has no complaints at this time. PCP: Zhanna Julian MD      Current Outpatient Medications   Medication Sig Dispense Refill    cefdinir (OMNICEF) 300 mg capsule Take 1 Cap by mouth two (2) times a day for 7 days. 14 Cap 0    predniSONE (DELTASONE) 1 mg tablet Take 1 mg by mouth daily. 1 pill po daily      levothyroxine (SYNTHROID) 25 mcg tablet Take 1 Tab by mouth Daily (before breakfast). 30 Tab 3    spironolactone (ALDACTONE) 25 mg tablet Take 1 Tab by mouth daily. 90 Tab 3    hydroxychloroquine (PLAQUENIL) 200 mg tablet Take 200 mg by mouth two (2) times a day.  torsemide (DEMADEX) 20 mg tablet Take 1 Tab by mouth daily as needed (edema). 30 Tab 3    polyethylene glycol (MIRALAX) 17 gram/dose powder Take 17 g by mouth daily.  aspirin delayed-release 81 mg tablet Take 81 mg by mouth daily.  food supplemt, lactose-reduced (ENSURE ENLIVE) 0.08 gram-1.5 kcal/mL liqd Take 1 Bottle by mouth two (2) times a day. 60 Bottle 0    menthol-zinc oxide (CALMOSEPTINE) 0.44-20.6 % oint Apply 1 Each to affected area as needed for Pain.  apply to affected area after each pericare episode.  carvedilol (COREG) 3.125 mg tablet Take 1 Tab by mouth every twelve (12) hours. 60 Tab 0    acetaminophen (TYLENOL ARTHRITIS PAIN) 650 mg TbER Take 1 Tab by mouth every eight (8) hours. (Patient taking differently: Take 1 Tab by mouth daily. ) 15 Tab 0    amiodarone (CORDARONE) 200 mg tablet TAKE ONE TABLET EVERY DAY (MAKE AN APPT) (Patient taking differently: TAKE ONE TABLET EVERY DAY by mouth) 30 Tab 5    memantine (NAMENDA) 10 mg tablet Take 10 mg by mouth two (2) times a day.  amitriptyline (ELAVIL) 25 mg tablet Take 25 mg by mouth nightly.          Past History     Past Medical History:  Past Medical History:   Diagnosis Date    Acute on chronic diastolic heart failure (HCC)     Arthritis     Benign hypertensive heart disease without heart failure     Better controlled, stable    Chronic diastolic heart failure (HCC)     Breathing and edema is improving    Congestive heart failure (HCC)     HTN (hypertension)     Hypercholesteremia     Lupus (systemic lupus erythematosus) ([facility] [address] 6/18/2014    followed by dr Nan Osgood     Obesity, unspecified     Patient has weight loss Discussed diet ad fluid restriction    Other and unspecified hyperlipidemia     F/u per pmd    Tricuspid valve disorders, 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    tr with moderate pulmonary htn        Past Surgical History:  Past Surgical History:   Procedure Laterality Date    HX FREE SKIN GRAFT      right foot    HX HYSTERECTOMY      PACEMAKER PROCEDURE         Family History:  Family History   Problem Relation Age of Onset    Arrhythmia Neg Hx     Asthma Neg Hx     Clotting Disorder Neg Hx     Fainting Neg Hx     Heart Attack Neg Hx     High Cholesterol Neg Hx     Pacemaker Neg Hx     Stroke Neg Hx        Social History:  Social History     Tobacco Use    Smoking status: Never Smoker    Smokeless tobacco: Never Used   Substance Use Topics    Alcohol use: No    Drug use: No       Allergies:  No Known Allergies      Review of Systems       Review of Systems   Unable to perform ROS: Mental status change         Physical Exam     Visit Vitals  /63   Pulse 71   Temp 97.8 °F (36.6 °C)   Resp 19   Ht 5' 5\" (1.651 m)   Wt 81.6 kg (180 lb)   SpO2 95%   BMI 29.95 kg/m²       Physical Exam  Constitutional:       General: She is not in acute distress. Appearance: Normal appearance. She is not toxic-appearing. HENT:      Head: Normocephalic. Nose: No congestion or rhinorrhea. Mouth/Throat:      Mouth: Mucous membranes are dry. Pharynx: No oropharyngeal exudate or posterior oropharyngeal erythema. Eyes:      General: No scleral icterus. Pupils: Pupils are equal, round, and reactive to light. Neck:      Musculoskeletal: Normal range of motion. No neck rigidity. Cardiovascular:      Rate and Rhythm: Normal rate. Heart sounds: Murmur present. Systolic murmur present with a grade of 2/6. Comments: Grade 2/6 systolic murmur heard at the sternal border. Abdominal:      General: There is no distension. Palpations: Abdomen is soft. Tenderness: There is no tenderness. Hernia: A hernia is present. Hernia is present in the ventral area. Comments: Patient has a reducible ventral hernia present, no overlying skin changes. Abdomen is otherwise soft, nondistended. No guarding rigidity. Musculoskeletal: Normal range of motion. General: No swelling or deformity. Lymphadenopathy:      Cervical: No cervical adenopathy. Skin:     General: Skin is warm. Capillary Refill: Capillary refill takes less than 2 seconds. Coloration: Skin is not jaundiced. Findings: No bruising. Neurological:      Mental Status: She is alert. Cranial Nerves: No cranial nerve deficit. Motor: No weakness. Comments: Patient is awake and alert, oriented to place and self only. She does follow commands. No focal deficits seen.    Psychiatric:         Mood and Affect: Mood normal.         Thought Content: Thought content normal.           Diagnostic Study Results     Labs -  Recent Results (from the past 12 hour(s))   METABOLIC PANEL, COMPREHENSIVE    Collection Time: 01/09/20  1:28 PM   Result Value Ref Range    Sodium 143 136 - 145 mmol/L    Potassium 4.4 3.5 - 5.5 mmol/L    Chloride 107 100 - 111 mmol/L    CO2 32 21 - 32 mmol/L    Anion gap 4 3.0 - 18 mmol/L    Glucose 92 74 - 99 mg/dL    BUN 33 (H) 7.0 - 18 MG/DL    Creatinine 1.28 0.6 - 1.3 MG/DL    BUN/Creatinine ratio 26 (H) 12 - 20      GFR est AA 48 (L) >60 ml/min/1.73m2    GFR est non-AA 39 (L) >60 ml/min/1.73m2    Calcium 8.4 (L) 8.5 - 10.1 MG/DL    Bilirubin, total 0.4 0.2 - 1.0 MG/DL    ALT (SGPT) 10 (L) 13 - 56 U/L    AST (SGOT) 22 10 - 38 U/L    Alk.  phosphatase 60 45 - 117 U/L    Protein, total 5.6 (L) 6.4 - 8.2 g/dL    Albumin 2.7 (L) 3.4 - 5.0 g/dL    Globulin 2.9 2.0 - 4.0 g/dL    A-G Ratio 0.9 0.8 - 1.7     CARDIAC PANEL,(CK, CKMB & TROPONIN)    Collection Time: 01/09/20  1:28 PM   Result Value Ref Range     26 - 192 U/L    CK - MB 1.0 <3.6 ng/ml    CK-MB Index 0.9 0.0 - 4.0 %    Troponin-I, QT 0.09 (H) 0.0 - 0.045 NG/ML   POC LACTIC ACID    Collection Time: 01/09/20  1:42 PM   Result Value Ref Range    Lactic Acid (POC) 0.56 0.40 - 2.00 mmol/L   URINALYSIS W/ RFLX MICROSCOPIC    Collection Time: 01/09/20  5:11 PM   Result Value Ref Range    Color STRAW      Appearance HAZY      Specific gravity 1.010 1.003 - 1.030      pH (UA) 5.0 5.0 - 8.0      Protein NEGATIVE  NEG mg/dL    Glucose NEGATIVE  NEG mg/dL    Ketone NEGATIVE  NEG mg/dL    Bilirubin NEGATIVE  NEG      Blood TRACE (A) NEG      Urobilinogen 0.2 0.2 - 1.0 EU/dL    Nitrites POSITIVE (A) NEG      Leukocyte Esterase TRACE (A) NEG     URINE MICROSCOPIC ONLY    Collection Time: 01/09/20  5:11 PM   Result Value Ref Range    WBC 1 to 4 0 - 4 /hpf    RBC 1 to 4 0 - 5 /hpf    Epithelial cells FEW 0 - 5 /lpf    Bacteria 3+ (A) NEG /hpf Radiologic Studies -   CT HEAD WO CONT   Final Result   IMPRESSION:      1. No CT evidence for acute intracranial process. .   2. Mild white matter hypoattenuation, presumed chronic microvascular ischemic   changes. 3. Cerebral atrophy      XR CHEST PORT   Final Result   IMPRESSION: No interval change. Chronic cardiomegaly. No evidence of pulmonary   edema or consolidation            Medical Decision Making   I am the first provider for this patient. I reviewed the vital signs, available nursing notes, past medical history, past surgical history, family history and social history. Vital Signs-Reviewed the patient's vital signs. Records Reviewed: Nursing Notes (Time of Review: 1:36 PM)    ED Course: Progress Notes, Reevaluation, and Consults:  Time: 14:49. The patient's son and daughter are in the room. I updated them on the results thus far. We are currently waiting for urine sample. The son states that there is nurses around-the-clock that go to the patient's house to take care of her. If she does have a urinary tract infection, we will treat that with antibiotics. The son states that he feels comfortable getting her back home where she has caregivers. We are still waiting for urinalysis at this time, otherwise patient is stable and is resting comfortably. Vitals stable. Time: 16:24. The daughter is now at bedside. I updated her on the results thus far. Head CT is normal.  All other blood work is within normal limits. The patient was very combative with nursing staff to get a urinalysis. I discussed with the daughter if she is able to redirect and calm the patient down while getting a urine sample. Discussed this with the nurse, she will try again.     Provider Notes (Medical Decision Making):   MDM  Number of Diagnoses or Management Options  Acute cystitis without hematuria:   Confusion:   Diagnosis management comments: Patient is an 80-year-old female who arrives via EMS from home with a chief complaint of altered mental status. Per EMS and family members, the patient was more confused and had a fever earlier today. Patient was evaluated, she was talking normally to me. No acute distress. She had no focal neurological deficits. Patient's laboratory results reveal that she does have a urinary tract infection. No other acute abnormalities. The patient was given 1 dose of Rocephin in the emergency department, and then discharged home. Family is agreeable to this plan. No further questions. They state that there is home health nurses throughout the day that assist the patient. They are comfortable with her going home. Critical Care Time: 0      Diagnosis     Clinical Impression:   1. Acute cystitis without hematuria    2. Confusion        Disposition: Discharge    Follow-up Information     Follow up With Specialties Details Why Contact Info    Eb Tijerina MD Internal Medicine Call in 1 day  [address] [place] [address]  [phone]             Patient's Medications   Start Taking    CEFDINIR (OMNICEF) 300 MG CAPSULE    Take 1 Cap by mouth two (2) times a day for 7 days. Continue Taking    ACETAMINOPHEN (TYLENOL ARTHRITIS PAIN) 650 MG TBER    Take 1 Tab by mouth every eight (8) hours. AMIODARONE (CORDARONE) 200 MG TABLET    TAKE ONE TABLET EVERY DAY (MAKE AN APPT)    AMITRIPTYLINE (ELAVIL) 25 MG TABLET    Take 25 mg by mouth nightly. ASPIRIN DELAYED-RELEASE 81 MG TABLET    Take 81 mg by mouth daily. CARVEDILOL (COREG) 3.125 MG TABLET    Take 1 Tab by mouth every twelve (12) hours. FOOD SUPPLEMT, LACTOSE-REDUCED (ENSURE ENLIVE) 0.08 GRAM-1.5 KCAL/ML LIQD    Take 1 Bottle by mouth two (2) times a day. HYDROXYCHLOROQUINE (PLAQUENIL) 200 MG TABLET    Take 200 mg by mouth two (2) times a day. LEVOTHYROXINE (SYNTHROID) 25 MCG TABLET    Take 1 Tab by mouth Daily (before breakfast).     MEMANTINE (NAMENDA) 10 MG TABLET    Take 10 mg by mouth two (2) times a day. MENTHOL-ZINC OXIDE (CALMOSEPTINE) 0.44-20.6 % OINT    Apply 1 Each to affected area as needed for Pain. apply to affected area after each pericare episode. POLYETHYLENE GLYCOL (MIRALAX) 17 GRAM/DOSE POWDER    Take 17 g by mouth daily. PREDNISONE (DELTASONE) 1 MG TABLET    Take 1 mg by mouth daily. 1 pill po daily    SPIRONOLACTONE (ALDACTONE) 25 MG TABLET    Take 1 Tab by mouth daily. TORSEMIDE (DEMADEX) 20 MG TABLET    Take 1 Tab by mouth daily as needed (edema). These Medications have changed    No medications on file   Stop Taking    No medications on file     _______________________________    Please note that this dictation was completed with [employer], the computer voice recognition software. Quite often unanticipated grammatical, syntax, homophones, and other interpretive errors are inadvertently transcribed by the computer software. Please disregard these errors. Please excuse any errors that have escaped final proofreading.

## 2020-01-24 NOTE — ED TRIAGE NOTES
Per EMS, Patient is from home, c/o constipation x2 weeks. Family has not seen any stool in x2 weeks. She is incontinent. She possibly has a hernia. Patient abdomen is soft and non-tender.

## 2020-01-24 NOTE — ED PROVIDER NOTES
EMERGENCY DEPARTMENT HISTORY AND PHYSICAL EXAM 
 
4:27 PM 
Date: 1/24/2020 Patient Name: [facility] History of Presenting Illness Chief Complaint Patient presents with  Constipation History Provided By: Patient HPI: [place] is a 80 y.o. female with multiple medical problems as below. Patient was brought in by ambulance for constipation. She has been constipated for 2 weeks but her last bowel movement was this morning. Patient has no complaints but she is demented and per her son she has been having shaking movement since she was started on levothyroxine for the past 2 weeks. No history of fever, nausea or vomiting. No changes in the mental status. Location: 
Severity: 
Timing/course: Onset/Duration: PCP: Kenna Gosselin, MD 
 
Past History Past Medical History: 
Past Medical History:  
Diagnosis Date  Acute on chronic diastolic heart failure ([address]  Arthritis  Benign hypertensive heart disease without heart failure Better controlled, stable  Chronic diastolic heart failure ([address] Breathing and edema is improving  Congestive heart failure ([address]  HTN (hypertension)  Hypercholesteremia  Lupus (systemic lupus erythematosus) ([address] 6/18/2014  
 followed by dr Pankaj Valverde  Obesity, unspecified Patient has weight loss Discussed diet ad fluid restriction  Other and unspecified hyperlipidemia F/u per pmd  
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 
Past Surgical History:  
Procedure Laterality Date  HX FREE SKIN GRAFT    
 right foot  HX HYSTERECTOMY  PACEMAKER PROCEDURE Family History: 
Family History Problem Relation Age of Onset  Arrhythmia Neg Hx  Asthma Neg Hx  Clotting Disorder Neg Hx  Fainting Neg Hx   
 Heart Attack Neg Hx  High Cholesterol Neg Hx  Pacemaker Neg Hx  Stroke Neg Hx Social History: 
Social History Tobacco Use  Smoking status: Never Smoker  Smokeless tobacco: Never Used Substance Use Topics  Alcohol use: No  
 Drug use: No  
 
 
Allergies: 
No Known Allergies Review of Systems Review of Systems Unable to perform ROS: Dementia Physical Exam  
 
Patient Vitals for the past 12 hrs: 
 Temp Pulse Resp BP SpO2  
01/24/20 1524 97.4 °F (36.3 °C) 70 16 115/70 98 % Physical Exam 
Vitals signs and nursing note reviewed. Constitutional:   
   Appearance: Normal appearance. HENT:  
   Head: Normocephalic and atraumatic. Nose: Nose normal.  
   Mouth/Throat:  
   Mouth: Mucous membranes are moist.  
Eyes:  
   Extraocular Movements: Extraocular movements intact. Neck: Musculoskeletal: Neck supple. Cardiovascular:  
   Rate and Rhythm: Normal rate. Pulmonary:  
   Effort: Pulmonary effort is normal. No respiratory distress. Abdominal:  
   General: There is distension. Tenderness: There is no tenderness. Hernia: A hernia is present. Musculoskeletal:     
   General: No deformity. Skin: 
   General: Skin is warm and dry. Neurological:  
   Mental Status: She is alert. Mental status is at baseline. She is disoriented. Psychiatric:     
   Mood and Affect: Mood normal.     
   Behavior: Behavior normal.  
 
 
 
Diagnostic Study Results Labs - No results found for this or any previous visit (from the past 12 hour(s)). Radiologic Studies - Xr Abd Acute W 1 V Chest 
 
Result Date: 1/24/2020 IMPRESSION: 1. Significantly large stool burden throughout the colon, consistent with reported history of constipation. -Questionable few mildly distended loops of small bowel which could represent early small bowel obstruction or ileus. 2. Stable moderately enlarged cardiac silhouette. Mild interstitial edema versus infiltrate. 3. Suspect trace bilateral pleural effusions with overlying atelectasis.  Subtle infiltrate/edema not excluded at the lung bases. Ct Head Wo Cont Result Date: 1/24/2020 IMPRESSION: 1. No CT evidence for an acute intracranial process. Stable appearance of the brain Medical Decision Making ED Course: Progress Notes, Reevaluation, and Consults: 
 
4:27 PM Initial assessment performed. The patients presenting problems have been discussed, and they/their family are in agreement with the care plan formulated and outlined with them. I have encouraged them to ask questions as they arise throughout their visit. Provider Notes (Medical Decision Making): 49-year-old female presenting with abdominal distention and constipation for a week. Last bowel movement was today. Patient is a ventral hernia that is soft and easily reducible. Patient son is a poor historian and he does not live with her but he is reporting history from the sister that lives with her that is not currently here. Will get screening labs and head CT as well as an abdominal x-ray to start. Might need a CT of her abdomen to evaluate for SBO. Procedures:  
 
Critical Care Time:  
 
Vital Signs-Reviewed the patient's vital signs. Reviewed pt's pulse ox reading. EKG: Interpreted by the EP. Time Interpreted:  
 Rate:  
 Rhythm:  
 Interpretation: 
 Comparison:  
 
Records Reviewed: Nursing Notes (Time of Review: 4:27 PM) 
-I am the first provider for this patient. 
-I reviewed the vital signs, available nursing notes, past medical history, past surgical history, family history and social history. Current Facility-Administered Medications Medication Dose Route Frequency Provider Last Rate Last Dose  diatrizoate audrey-diatrizoat sod (MD-GASTROVIEW,GASTROGRAFIN) 66-10 % contrast solution 30 mL  30 mL Oral ONCE Kat Mcdaniels MD      
 
Current Outpatient Medications Medication Sig Dispense Refill  predniSONE (DELTASONE) 1 mg tablet Take 1 mg by mouth daily. 1 pill po daily  levothyroxine (SYNTHROID) 25 mcg tablet Take 1 Tab by mouth Daily (before breakfast). 30 Tab 3  
 spironolactone (ALDACTONE) 25 mg tablet Take 1 Tab by mouth daily. 90 Tab 3  
 hydroxychloroquine (PLAQUENIL) 200 mg tablet Take 200 mg by mouth two (2) times a day.  torsemide (DEMADEX) 20 mg tablet Take 1 Tab by mouth daily as needed (edema). 30 Tab 3  polyethylene glycol (MIRALAX) 17 gram/dose powder Take 17 g by mouth daily.  aspirin delayed-release 81 mg tablet Take 81 mg by mouth daily.  food supplemt, lactose-reduced (ENSURE ENLIVE) 0.08 gram-1.5 kcal/mL liqd Take 1 Bottle by mouth two (2) times a day. 60 Bottle 0  
 menthol-zinc oxide (CALMOSEPTINE) 0.44-20.6 % oint Apply 1 Each to affected area as needed for Pain. apply to affected area after each pericare episode.  carvedilol (COREG) 3.125 mg tablet Take 1 Tab by mouth every twelve (12) hours. 60 Tab 0  
 acetaminophen (TYLENOL ARTHRITIS PAIN) 650 mg TbER Take 1 Tab by mouth every eight (8) hours. (Patient taking differently: Take 1 Tab by mouth daily. ) 15 Tab 0  
 amiodarone (CORDARONE) 200 mg tablet TAKE ONE TABLET EVERY DAY (MAKE AN APPT) (Patient taking differently: TAKE ONE TABLET EVERY DAY by mouth) 30 Tab 5  
 memantine (NAMENDA) 10 mg tablet Take 10 mg by mouth two (2) times a day.  amitriptyline (ELAVIL) 25 mg tablet Take 25 mg by mouth nightly. Clinical Impression Clinical Impression: No diagnosis found. Disposition: Patient signed out to Dr. Valeria Gonzales pending CT results. This note was dictated utilizing voice recognition software which may lead to typographical errors. I apologize in advance if the situation occurs. If questions arise please do not hesitate to contact me or call our department.  
 
Sheryle Dials, MD 
4:27 PM

## 2020-01-25 NOTE — DISCHARGE INSTRUCTIONS
Patient Education        Constipation: Care Instructions  Your Care Instructions    Constipation means that you have a hard time passing stools (bowel movements). People pass stools from 3 times a day to once every 3 days. What is normal for you may be different. Constipation may occur with pain in the rectum and cramping. The pain may get worse when you try to pass stools. Sometimes there are small amounts of bright red blood on toilet paper or the surface of stools. This is because of enlarged veins near the rectum (hemorrhoids). A few changes in your diet and lifestyle may help you avoid ongoing constipation. Your doctor may also prescribe medicine to help loosen your stool. Some medicines can cause constipation. These include pain medicines and antidepressants. Tell your doctor about all the medicines you take. Your doctor may want to make a medicine change to ease your symptoms. Follow-up care is a key part of your treatment and safety. Be sure to make and go to all appointments, and call your doctor if you are having problems. It's also a good idea to know your test results and keep a list of the medicines you take. How can you care for yourself at home? · Drink plenty of fluids, enough so that your urine is light yellow or clear like water. If you have kidney, heart, or liver disease and have to limit fluids, talk with your doctor before you increase the amount of fluids you drink. · Include high-fiber foods in your diet each day. These include fruits, vegetables, beans, and whole grains. · Get at least 30 minutes of exercise on most days of the week. Walking is a good choice. You also may want to do other activities, such as running, swimming, cycling, or playing tennis or team sports. · Take a fiber supplement, such as Citrucel or Metamucil, every day. Read and follow all instructions on the label. · Schedule time each day for a bowel movement. A daily routine may help.  Take your time having your bowel movement. · Support your feet with a small step stool when you sit on the toilet. This helps flex your hips and places your pelvis in a squatting position. · Your doctor may recommend an over-the-counter laxative to relieve your constipation. Examples are Milk of Magnesia and MiraLax. Read and follow all instructions on the label. Do not use laxatives on a long-term basis. When should you call for help? Call your doctor now or seek immediate medical care if:    · You have new or worse belly pain.     · You have new or worse nausea or vomiting.     · You have blood in your stools.    Watch closely for changes in your health, and be sure to contact your doctor if:    · Your constipation is getting worse.     · You do not get better as expected. Where can you learn more? Go to http://renata-antonieta.info/. Enter 21 [phone] in the search box to learn more about \"Constipation: Care Instructions. \"  Current as of: June 26, 2019  Content Version: 12.2  © 5367-9988 [employer]. Care instructions adapted under license by [employer] (which disclaims liability or warranty for this information). If you have questions about a medical condition or this instruction, always ask your healthcare professional. [address] any warranty or liability for your use of this information.

## 2020-01-25 NOTE — ED NOTES
20:00 Assumed care by Dr. Zac Casillas Patient has constipation on CT. Removed some hard stool with disimpaction. Attempted an enema but patient was not able to keep it in  
GI regimen prescribed with miralax and magnesium citrate.   And follow-up with GI

## 2020-02-09 NOTE — PROGRESS NOTES
[facility] General Surgery Subjective: HPI:  Pt is an 79 yo female with multiple medical problems who is referred by Dr. Shakir Kenney regarding a ventral hernia. The hernia has been present for over 20 yrs. She denies any pain. She does have constipation and early satiety. Patient Active Problem List  
 Diagnosis Date Noted  Anasarca 06/08/2015 Priority: 1 - One  Anemia 07/17/2019  Respiratory distress 07/17/2019  COPD exacerbation ([address] 07/17/2019  CAP (community acquired pneumonia) 07/17/2019  Acute respiratory failure with hypoxia ([address] 07/17/2019  Advanced care planning/counseling discussion 05/02/2019  Debility 05/02/2019  Abscess 04/30/2019  Severe sepsis ([address] 04/30/2019  Diarrhea 03/17/2019  UTI (urinary tract infection) 03/17/2019  Fever 03/17/2019  Sepsis ([address] 03/17/2019  
 Skin lesions 02/06/2019  
 NSTEMI (non-ST elevated myocardial infarction) ([address] 01/30/2019  Dementia ([address] 01/30/2019  CKD (chronic kidney disease) stage 4, GFR 15-29 ml/min ([facility]) 01/21/2019  Uremia 01/05/2019  Left hip pain 01/05/2019  Acute exacerbation of CHF (congestive heart failure) ([address] 12/27/2018  ARIEL (acute kidney injury) ([address] 12/27/2018  Diastolic CHF, chronic ([address] 11/30/2016  High risk medication use 11/30/2016  Acute on chronic diastolic (congestive) heart failure ([address] 05/09/2016  Paroxysmal atrial flutter ([address] 01/11/2016  S/P placement of cardiac pacemaker 11/22/2015  Hypertensive heart disease with CHF (congestive heart failure) ([address] 11/17/2015  Tricuspid valve disorders, non-rheumatic 06/18/2014  Lupus (systemic lupus erythematosus) ([address] 06/18/2014  
 HTN (hypertension)  Hypercholesteremia Past Medical History:  
Diagnosis Date  Acute on chronic diastolic heart failure ([address]  Arthritis   
 rheumatoid arthritis  Benign hypertensive heart disease without heart failure Better controlled, stable  Chronic diastolic heart failure ([facility] Breathing and edema is improving  Congestive heart failure ([facility]  HTN (hypertension)  Hypercholesteremia  Lupus (systemic lupus erythematosus) ([facility] 6/18/2014  
 followed by dr Carolyn Anderson  Obesity, unspecified Patient has weight loss Discussed diet ad fluid restriction  Other and unspecified hyperlipidemia F/u per pmd  
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  
Procedure Laterality Date  CARDIAC SURG PROCEDURE UNLIST    
 pacemaker  HX FREE SKIN GRAFT    
 right foot  HX HYSTERECTOMY  HX ORTHOPAEDIC    
 PACEMAKER PROCEDURE Family History Problem Relation Age of Onset  Arrhythmia Neg Hx  Asthma Neg Hx  Clotting Disorder Neg Hx  Fainting Neg Hx   
 Heart Attack Neg Hx  High Cholesterol Neg Hx  Pacemaker Neg Hx  Stroke Neg Hx Social History Tobacco Use  Smoking status: Never Smoker  Smokeless tobacco: Never Used Substance Use Topics  Alcohol use: No  
  
No Known Allergies Prior to Admission medications Medication Sig Start Date End Date Taking? Authorizing Provider  
dextromethorphan-guaiFENesin (TUSSIN DM COUGH AND CHEST)  mg/5 mL liqd syrup Take 10 mL by mouth every six (6) hours as needed. Yes Provider, Historical  
isosorbide mononitrate (IMDUR PO) Take 60 mg by mouth daily. Yes Provider, Historical  
potassium chloride SR (KLOR-CON 10) 10 mEq tablet Take  by mouth. Yes Provider, Historical  
metOLazone (ZAROXOLYN) 2.5 mg tablet Take  by mouth daily. Yes Provider, Historical  
famotidine (PEPCID) 20 mg tablet Take 20 mg by mouth two (2) times a day. Yes Provider, Historical  
levothyroxine (SYNTHROID) 25 mcg tablet Take 1 Tab by mouth Daily (before breakfast).  8/29/19  Yes Christy Royal NP  
 hydroxychloroquine (PLAQUENIL) 200 mg tablet Take 200 mg by mouth two (2) times a day. Yes Provider, Historical  
torsemide (DEMADEX) 20 mg tablet Take 1 Tab by mouth daily as needed (edema). 7/17/19  Yes Maranda Soni MD  
aspirin delayed-release 81 mg tablet Take 81 mg by mouth daily. Yes Provider, Historical  
food supplemt, lactose-reduced (ENSURE ENLIVE) 0.08 gram-1.5 kcal/mL liqd Take 1 Bottle by mouth two (2) times a day. 2/10/19  Yes Phoebe Sosa MD  
carvedilol (COREG) 3.125 mg tablet Take 1 Tab by mouth every twelve (12) hours. 12/31/18  Yes Kasey Mesa MD  
amiodarone (CORDARONE) 200 mg tablet TAKE ONE TABLET EVERY DAY (MAKE AN APPT) Patient taking differently: TAKE ONE TABLET EVERY DAY by mouth 11/2/18  Yes Becki Roca NP  
memantine (NAMENDA) 10 mg tablet Take 10 mg by mouth two (2) times a day. 7/10/18  Yes Provider, Historical  
magnesium citrate solution Take 1/3 of bottle every 8 hours until finished or until you have a bowel movement. 1/24/20   Jac Hawkins MD  
predniSONE (DELTASONE) 1 mg tablet Take 1 mg by mouth daily. 1 pill po daily    Provider, Historical  
spironolactone (ALDACTONE) 25 mg tablet Take 1 Tab by mouth daily. 8/22/19   Maranda Soni MD  
polyethylene glycol (MIRALAX) 17 gram/dose powder Take 17 g by mouth daily. Provider, Historical  
menthol-zinc oxide (CALMOSEPTINE) 0.44-20.6 % oint Apply 1 Each to affected area as needed for Pain. apply to affected area after each pericare episode. Provider, Historical  
acetaminophen (TYLENOL ARTHRITIS PAIN) 650 mg TbER Take 1 Tab by mouth every eight (8) hours. Patient taking differently: Take 1 Tab by mouth daily. 12/4/18   GAGAN SharmaC  
amitriptyline (ELAVIL) 25 mg tablet Take 25 mg by mouth nightly. Provider, Historical  
 
 
Review of Systems:   
14 systems were reviewed. The results are as above in the HPI and otherwise negative. Objective:  
 
Vitals:  
 02/05/20 1434 BP: 128/72 Pulse: 74 Resp: 20 SpO2: 97% Height: 5' 5\" (1.651 m) Physical Exam: 
GENERAL: alert, cooperative, no distress, appears stated age, EYE: conjunctivae/corneas clear. PERRL, EOM's intact. THROAT & NECK: normal and no erythema or exudates noted. ,   
LYMPHATIC: Cervical, supraclavicular, and axillary nodes normal. ,  
LUNG: clear to auscultation bilaterally, HEART: regular rate and rhythm, S1, S2 normal, no murmur, click, rub or gallop, ABDOMEN: soft, non-tender. 3 cm hernia defect in the epigastrium. Bowel sounds normal. No masses,  no organomegaly, EXTREMITIES:  extremities normal, atraumatic, no cyanosis or edema, SKIN: Normal., NEUROLOGIC: AOx3. Cranial nerves 2-12 and sensation grossly intact. ,  
 
Data Review:  As in HPI and otherwise negative Ms. Joy Vanegas has a reminder for a \"due or due soon\" health maintenance. I have asked that she contact her primary care provider for follow-up on this health maintenance. Impression: · Pt with a ventral hernia without incarceration. Plan:  
 
· Eat more fruits and vegetables. Drink more water · Hernia repair only if its an emergency. · F/u prn Signed By: Jeremiah Busch MD   
 February 9, 2020

## 2020-02-10 NOTE — ED PROVIDER NOTES
EMERGENCY DEPARTMENT HISTORY AND PHYSICAL EXAM 
 
5:50 PM 
Date: 2/10/2020 Patient Name: [facility] History of Presenting Illness No chief complaint on file. History Provided By: Patient's Daughter HPI: [place] is a 80 y.o. female with history of multiple medical problems as below. Patient was brought in by the family for hypotension. Her blood pressure this morning was 70/30. Patient's been complaining of abdominal pain. No history of nausea, vomiting. No urinary symptoms or fever. No cough. They called her primary care doctor who recommended bringing her to the ER to be evaluated. Location: 
Severity: 
Timing/course: Onset/Duration: PCP: Prieto Belcher MD 
 
Past History Past Medical History: 
Past Medical History:  
Diagnosis Date  Acute on chronic diastolic heart failure ([address]  Arthritis   
 rheumatoid arthritis  Benign hypertensive heart disease without heart failure Better controlled, stable  Chronic diastolic heart failure ([address] Breathing and edema is improving  Congestive heart failure ([address]  HTN (hypertension)  Hypercholesteremia  Lupus (systemic lupus erythematosus) ([address] 6/18/2014  
 followed by dr Patricia Stack  Obesity, unspecified Patient has weight loss Discussed diet ad fluid restriction  Other and unspecified hyperlipidemia F/u per pmd  
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 
Past Surgical History:  
Procedure Laterality Date  CARDIAC SURG PROCEDURE UNLIST    
 pacemaker  HX FREE SKIN GRAFT    
 right foot  HX HYSTERECTOMY  HX ORTHOPAEDIC    
 PACEMAKER PROCEDURE Family History: 
Family History Problem Relation Age of Onset  Arrhythmia Neg Hx  Asthma Neg Hx  Clotting Disorder Neg Hx  Fainting Neg Hx   
 Heart Attack Neg Hx  High Cholesterol Neg Hx  Pacemaker Neg Hx  Stroke Neg Hx Social History: 
Social History Tobacco Use  Smoking status: Never Smoker  Smokeless tobacco: Never Used Substance Use Topics  Alcohol use: No  
 Drug use: No  
 
 
Allergies: 
No Known Allergies Review of Systems Review of Systems Unable to perform ROS: Dementia Physical Exam  
 
Patient Vitals for the past 12 hrs: 
 Temp Pulse Resp BP SpO2  
02/10/20 1626  76 16 95/61 100 % 02/10/20 1246 96.7 °F (35.9 °C) 70 16 113/66 99 % Physical Exam 
Vitals signs and nursing note reviewed. HENT:  
   Head: Normocephalic and atraumatic. Mouth/Throat:  
   Mouth: Mucous membranes are dry. Neck: Musculoskeletal: Neck supple. Cardiovascular:  
   Rate and Rhythm: Normal rate. Pulmonary:  
   Effort: Pulmonary effort is normal. No respiratory distress. Abdominal:  
   General: There is no distension. Palpations: Abdomen is soft. Tenderness: There is abdominal tenderness. Musculoskeletal:     
   General: No deformity. Skin: 
   General: Skin is warm and dry. Neurological:  
   Mental Status: She is alert. Mental status is at baseline. She is disoriented. Diagnostic Study Results Labs - Recent Results (from the past 12 hour(s)) EKG, 12 LEAD, INITIAL Collection Time: 02/10/20 12:57 PM  
Result Value Ref Range Ventricular Rate 72 BPM  
 Atrial Rate 72 BPM  
 P-R Interval 222 ms QRS Duration 228 ms Q-T Interval 514 ms QTC Calculation (Bezet) 562 ms Calculated R Axis -58 degrees Calculated T Axis 128 degrees Diagnosis AV dual-paced rhythm with prolonged AV conduction Abnormal ECG When compared with ECG of 17-JUL-2019 14:46, 
Vent. rate has increased BY   2 BPM 
Confirmed by Jaylin Gonzales MD, ----- ([id number]) on 2/10/2020 3:54:19 PM 
  
CBC WITH AUTOMATED DIFF Collection Time: 02/10/20  1:23 PM  
Result Value Ref Range WBC 6.0 4.6 - 13.2 K/uL  
 RBC 3.08 (L) 4.20 - 5.30 M/uL HGB 9.0 (L) 12.0 - 16.0 g/dL HCT 27.3 (L) 35.0 - 45.0 % MCV 88.6 74.0 - 97.0 FL  
 MCH 29.2 24.0 - 34.0 PG  
 MCHC 33.0 31.0 - 37.0 g/dL  
 RDW 14.6 (H) 11.6 - 14.5 % PLATELET 394 613 - 113 K/uL MPV 9.9 9.2 - 11.8 FL  
 NEUTROPHILS 61 40 - 73 % LYMPHOCYTES 27 21 - 52 % MONOCYTES 10 3 - 10 % EOSINOPHILS 2 0 - 5 % BASOPHILS 0 0 - 2 %  
 ABS. NEUTROPHILS 3.6 1.8 - 8.0 K/UL  
 ABS. LYMPHOCYTES 1.6 0.9 - 3.6 K/UL  
 ABS. MONOCYTES 0.6 0.05 - 1.2 K/UL  
 ABS. EOSINOPHILS 0.1 0.0 - 0.4 K/UL  
 ABS. BASOPHILS 0.0 0.0 - 0.1 K/UL  
 DF AUTOMATED METABOLIC PANEL, COMPREHENSIVE Collection Time: 02/10/20  1:23 PM  
Result Value Ref Range Sodium 134 (L) 136 - 145 mmol/L Potassium 5.0 3.5 - 5.5 mmol/L Chloride 99 (L) 100 - 111 mmol/L  
 CO2 29 21 - 32 mmol/L Anion gap 6 3.0 - 18 mmol/L Glucose 93 74 - 99 mg/dL BUN 21 (H) 7.0 - 18 MG/DL Creatinine 1.48 (H) 0.6 - 1.3 MG/DL  
 BUN/Creatinine ratio 14 12 - 20 GFR est AA 40 (L) >60 ml/min/1.73m2 GFR est non-AA 33 (L) >60 ml/min/1.73m2 Calcium 8.5 8.5 - 10.1 MG/DL Bilirubin, total 0.4 0.2 - 1.0 MG/DL  
 ALT (SGPT) 11 (L) 13 - 56 U/L  
 AST (SGOT) 17 10 - 38 U/L Alk. phosphatase 60 45 - 117 U/L Protein, total 5.8 (L) 6.4 - 8.2 g/dL Albumin 3.0 (L) 3.4 - 5.0 g/dL Globulin 2.8 2.0 - 4.0 g/dL A-G Ratio 1.1 0.8 - 1.7    
TROPONIN I Collection Time: 02/10/20  1:23 PM  
Result Value Ref Range Troponin-I, QT 0.06 (H) 0.0 - 0.045 NG/ML  
LIPASE Collection Time: 02/10/20  1:23 PM  
Result Value Ref Range Lipase 49 (L) 73 - 393 U/L MAGNESIUM Collection Time: 02/10/20  1:23 PM  
Result Value Ref Range Magnesium 2.4 1.6 - 2.6 mg/dL Radiologic Studies - Ct Abd Pelv Wo Cont Result Date: 2/10/2020 IMPRESSION: 1. Small ventral hernia containing a portion of transverse colon partially protruding into the hernia sac.   No acute GI tract abnormalities otherwise. 2.  Gallstones. 3.  Small pleural effusion with slight atelectatic changes. 4.  Pre-existing L1 compression deformity. Xr Chest [facility] Result Date: 2/10/2020 IMPRESSION: Stable prominent cardiac silhouette which may be a combination of pericardial effusion and/or cardiomegaly. Decreased pulmonary venous congestion since prior exam. Slight interval progression in left pleural-parenchymal opacity. Question new patchy right basilar opacity. Medical Decision Making ED Course: Progress Notes, Reevaluation, and Consults: 
 
5:50 PM Initial assessment performed. The patients presenting problems have been discussed, and they/their family are in agreement with the care plan formulated and outlined with them. I have encouraged them to ask questions as they arise throughout their visit. Questionable infiltrate on the chest x-ray. However the patient remained hemodynamically stable and satting 100% on room air. Labs are within baseline levels. No acute abnormalities. Will discharge home on doxy. Provider Notes (Medical Decision Making): 28-year-old bedridden female brought in by family for concerns of hypotension. Currently she is hemodynamically stable. She has a ventral hernia that is soft and reducible but she screams in her abdomen is touched. She is demented and unable to provide further history. Will get screening labs including septic work-up and an abdominal CT then treat accordingly. Procedures:  
 
Critical Care Time:  
 
Vital Signs-Reviewed the patient's vital signs. Reviewed pt's pulse ox reading. EKG: Interpreted by the EP. Time Interpreted:  
 Rate:  
 Rhythm:  
 Interpretation: 
 Comparison:  
 
Records Reviewed: Nursing Notes (Time of Review: 5:50 PM) 
-I am the first provider for this patient. 
-I reviewed the vital signs, available nursing notes, past medical history, past surgical history, family history and social history. Current Facility-Administered Medications Medication Dose Route Frequency Provider Last Rate Last Dose  clindamycin phosphate (CLEOCIN) 600 mg in 0.9% sodium chloride 100 mL IVPB  600 mg IntraVENous Q8H Kat Mcdaniels MD      
 cefTRIAXone (ROCEPHIN) 2 g in sterile water (preservative free) 20 mL IV syringe  2 g IntraVENous Q24H Kat Mcdaniels MD   2 g at 02/10/20 1538  
 azithromycin (ZITHROMAX) 500 mg in  mL  500 mg IntraVENous Q24H Kat Mcdaniels MD   500 mg at 02/10/20 1549 Current Outpatient Medications Medication Sig Dispense Refill  dextromethorphan-guaiFENesin (TUSSIN DM COUGH AND CHEST)  mg/5 mL liqd syrup Take 10 mL by mouth every six (6) hours as needed.  isosorbide mononitrate (IMDUR PO) Take 60 mg by mouth daily.  potassium chloride SR (KLOR-CON 10) 10 mEq tablet Take  by mouth.  metOLazone (ZAROXOLYN) 2.5 mg tablet Take  by mouth daily.  famotidine (PEPCID) 20 mg tablet Take 20 mg by mouth two (2) times a day.  magnesium citrate solution Take 1/3 of bottle every 8 hours until finished or until you have a bowel movement. 1 Bottle 0  
 predniSONE (DELTASONE) 1 mg tablet Take 1 mg by mouth daily. 1 pill po daily  levothyroxine (SYNTHROID) 25 mcg tablet Take 1 Tab by mouth Daily (before breakfast). 30 Tab 3  
 spironolactone (ALDACTONE) 25 mg tablet Take 1 Tab by mouth daily. 90 Tab 3  
 hydroxychloroquine (PLAQUENIL) 200 mg tablet Take 200 mg by mouth two (2) times a day.  torsemide (DEMADEX) 20 mg tablet Take 1 Tab by mouth daily as needed (edema). 30 Tab 3  polyethylene glycol (MIRALAX) 17 gram/dose powder Take 17 g by mouth daily.  aspirin delayed-release 81 mg tablet Take 81 mg by mouth daily.  food supplemt, lactose-reduced (ENSURE ENLIVE) 0.08 gram-1.5 kcal/mL liqd Take 1 Bottle by mouth two (2) times a day.  60 Bottle 0  
 menthol-zinc oxide (CALMOSEPTINE) 0.44-20.6 % oint Apply 1 Each to affected area as needed for Pain. apply to affected area after each pericare episode.  carvedilol (COREG) 3.125 mg tablet Take 1 Tab by mouth every twelve (12) hours. 60 Tab 0  
 acetaminophen (TYLENOL ARTHRITIS PAIN) 650 mg TbER Take 1 Tab by mouth every eight (8) hours. (Patient taking differently: Take 1 Tab by mouth daily. ) 15 Tab 0  
 amiodarone (CORDARONE) 200 mg tablet TAKE ONE TABLET EVERY DAY (MAKE AN APPT) (Patient taking differently: TAKE ONE TABLET EVERY DAY by mouth) 30 Tab 5  
 memantine (NAMENDA) 10 mg tablet Take 10 mg by mouth two (2) times a day.  amitriptyline (ELAVIL) 25 mg tablet Take 25 mg by mouth nightly. Clinical Impression Clinical Impression: No diagnosis found. Disposition: DC This note was dictated utilizing voice recognition software which may lead to typographical errors. I apologize in advance if the situation occurs. If questions arise please do not hesitate to contact me or call our department.  
 
Kat Calvert MD 
5:50 PM

## 2020-02-11 NOTE — DISCHARGE INSTRUCTIONS
Patient Education        Pneumonia: Care Instructions  Your Care Instructions    Pneumonia is an infection of the lungs. Most cases are caused by infections from bacteria or viruses. Pneumonia may be mild or very severe. If it is caused by bacteria, you will be treated with antibiotics. It may take a few weeks to a few months to recover fully from pneumonia, depending on how sick you were and whether your overall health is good. Follow-up care is a key part of your treatment and safety. Be sure to make and go to all appointments, and call your doctor if you are having problems. It's also a good idea to know your test results and keep a list of the medicines you take. How can you care for yourself at home? · Take your antibiotics exactly as directed. Do not stop taking the medicine just because you are feeling better. You need to take the full course of antibiotics. · Take your medicines exactly as prescribed. Call your doctor if you think you are having a problem with your medicine. · Get plenty of rest and sleep. You may feel weak and tired for a while, but your energy level will improve with time. · To prevent dehydration, drink plenty of fluids, enough so that your urine is light yellow or clear like water. Choose water and other caffeine-free clear liquids until you feel better. If you have kidney, heart, or liver disease and have to limit fluids, talk with your doctor before you increase the amount of fluids you drink. · Take care of your cough so you can rest. A cough that brings up mucus from your lungs is common with pneumonia. It is one way your body gets rid of the infection. But if coughing keeps you from resting or causes severe fatigue and chest-wall pain, talk to your doctor. He or she may suggest that you take a medicine to reduce the cough. · Use a vaporizer or humidifier to add moisture to your bedroom. Follow the directions for cleaning the machine.   · Do not smoke or allow others to smoke around you. Smoke will make your cough last longer. If you need help quitting, talk to your doctor about stop-smoking programs and medicines. These can increase your chances of quitting for good. · Take an over-the-counter pain medicine, such as acetaminophen (Tylenol), ibuprofen (Advil, Motrin), or naproxen (Aleve). Read and follow all instructions on the label. · Do not take two or more pain medicines at the same time unless the doctor told you to. Many pain medicines have acetaminophen, which is Tylenol. Too much acetaminophen (Tylenol) can be harmful. · If you were given a spirometer to measure how well your lungs are working, use it as instructed. This can help your doctor tell how your recovery is going. · To prevent pneumonia in the future, talk to your doctor about getting a flu vaccine (once a year) and a pneumococcal vaccine (one time only for most people). When should you call for help? Call 911 anytime you think you may need emergency care. For example, call if:    · You have severe trouble breathing.    Call your doctor now or seek immediate medical care if:    · You cough up dark brown or bloody mucus (sputum).     · You have new or worse trouble breathing.     · You are dizzy or lightheaded, or you feel like you may faint.    Watch closely for changes in your health, and be sure to contact your doctor if:    · You have a new or higher fever.     · You are coughing more deeply or more often.     · You are not getting better after 2 days (48 hours).     · You do not get better as expected. Where can you learn more? Go to http://renata-antonieta.info/. Enter [id number] in the search box to learn more about \"Pneumonia: Care Instructions. \"  Current as of: June 9, 2019  Content Version: 12.2  © 4562-0461 [employer]. Care instructions adapted under license by [employer] (which disclaims liability or warranty for this information).  If you have questions about a medical condition or this instruction, always ask your healthcare professional. [place] [address] any warranty or liability for your use of this information.

## 2020-02-11 NOTE — ED NOTES
Pt left facility via [facility]. Family member given discharge instructions.

## 2020-02-26 NOTE — PROGRESS NOTES
Complex Case Management Date/Time:  2/26/2020 3:42 PM 
 
Method of communication with patient:phone Ambulator Care Manager (ACM) attempted to contact the patient by telephone to perform Ambulatory Care Coordination. VM left identifying self. Direct contact information given with request for return call.

## 2020-03-10 NOTE — PROGRESS NOTES
Complex Case Management Date/Time:  3/10/2020 12:52 PM 
 
Method of communication with patient:phone Ambulator Care Manager [facility]) contacted the patient by telephone to perform Ambulatory Care Coordination. Verified name and  (PHI) with patient as identifiers. Provided introduction to self, and explanation of the Ambulatory Care Manager's role. Patient declines ACM.

## 2020-03-11 NOTE — PROGRESS NOTES
Patient didn't bring medications, verbally reviewed. 1. Have you been to the ER, urgent care clinic since your last visit? Hospitalized since your last visit? Yes When: 02/10/2020 Where: [facility] Reason for visit: Hypotension 2. Have you seen or consulted any other health care providers outside of the [address] [place] [address] since your last visit? Include any pap smears or colon screening.  No

## 2020-03-11 NOTE — PROGRESS NOTES
HISTORY OF PRESENT ILLNESS Cierra Diaz is a 80 y.o. female. Patient with  chf,had persistent junctional ryhtm ,atrial flutter , s/p pacemaker,feels better Sob better Admitted to hospital 1/2019 with acute CHF. Low ejection fraction with systolic heart failure. Feels less short of breath since discharge. 3/4/2019 - admitted to hospital 2/10/19 for NSTEMI , Anemia, CKD stage IV and chronic systolic CHF. Has not had diuretics since d/c and c/o increased edema to upper and lower extremties. 3/19/2019-admitted to hospital for acute metabolic encephalopathy, sepsis, UTI. 
7/2019 Patient was in emergency room with complaint of shortness of breath. Evaluation noted. Patient has shortness of breath at baseline. Limited activity. She has edema on and off. 
4/2/2019-was in emergency room with UTI and metabolic encephalopathy. Feels much better today. More alert. Denies any shortness of breath. Edema is better except for right upper extremity. 5/2019 Patient discharged 5/10/2019 with Discharge Diagnoses:                                          
RLE cellulitis, MRSA in wound - Staph Epidermidis bacteremia 
-ARIEL on ckd3 in the setting of severe sepsis - chronic mixed systolic and diastolic chf- compensated Recovering since discharge. Currently not on diuresis. Edema is significantly better. 8/2019 Discharge from hospital 7/2019 with 1. Congestive heart failure: Acute on chronic systolic dysfunction. Improving well. Continue Demadex po . metabolic alkalosis resolved CO2 normal.  
2. Status post permanent pacemaker: Normal function on 5/2/2019 3. Atrial fibrillation: Noted on pacemaker.  Sinus rhythm on amiodarone. Hospital Follow Up The history is provided by the patient. Associated symptoms include shortness of breath. CHF The history is provided by the patient. This is a chronic problem. The problem occurs constantly. The problem has been gradually improving. Associated symptoms include shortness of breath. The symptoms are aggravated by exertion. Palpitations The history is provided by the patient. This is a chronic problem. The problem occurs constantly. Associated symptoms include lower extremity edema and shortness of breath. Pertinent negatives include no fever, no claudication, no orthopnea, no PND, no nausea, no vomiting, no dizziness, no weakness, no cough, no hemoptysis and no sputum production. Her past medical history is significant for hypertension. Hypertension The history is provided by the patient. This is a chronic problem. The problem occurs constantly. The problem has not changed since onset. Associated symptoms include shortness of breath. Shortness of Breath The history is provided by the patient. This is a recurrent problem. The problem occurs intermittently. The problem has been gradually improving. Associated symptoms include leg swelling. Pertinent negatives include no fever, no cough, no sputum production, no hemoptysis, no wheezing, no PND, no orthopnea, no vomiting, no rash and no claudication. Precipitated by: exertion. Associated medical issues include heart failure. Leg Swelling The history is provided by the patient. This is a chronic problem. The problem occurs daily. The problem has been gradually improving. Associated symptoms include shortness of breath. Review of Systems Constitutional: Negative for chills and fever. HENT: Negative for nosebleeds. Eyes: Negative for blurred vision and double vision. Respiratory: Positive for shortness of breath. Negative for cough, hemoptysis, sputum production and wheezing. Cardiovascular: Positive for leg swelling. Negative for palpitations, orthopnea, claudication and PND. Gastrointestinal: Negative for heartburn, nausea and vomiting. Musculoskeletal: Negative for myalgias. Skin: Negative for rash. Neurological: Negative for dizziness and weakness. Endo/Heme/Allergies: Does not bruise/bleed easily. Family History Problem Relation Age of Onset  Arrhythmia Neg Hx  Asthma Neg Hx  Clotting Disorder Neg Hx  Fainting Neg Hx   
 Heart Attack Neg Hx  High Cholesterol Neg Hx  Pacemaker Neg Hx  Stroke Neg Hx Past Medical History:  
Diagnosis Date  Acute on chronic diastolic heart failure ([facility] [address]  Arthritis   
 rheumatoid arthritis  Benign hypertensive heart disease without heart failure Better controlled, stable  Chronic diastolic heart failure ([address] Breathing and edema is improving  Congestive heart failure ([facility] [address]  HTN (hypertension)  Hypercholesteremia  Lupus (systemic lupus erythematosus) ([facility] [address] 6/18/2014  
 followed by dr Bubba Gar  Obesity, unspecified Patient has weight loss Discussed diet ad fluid restriction  Other and unspecified hyperlipidemia F/u per pmd  
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  
Procedure Laterality Date  CARDIAC SURG PROCEDURE UNLIST    
 pacemaker  HX FREE SKIN GRAFT    
 right foot  HX HYSTERECTOMY  HX ORTHOPAEDIC    
 PACEMAKER PROCEDURE No Known Allergies Current Outpatient Medications Medication Sig  
 dextromethorphan-guaiFENesin (TUSSIN DM COUGH AND CHEST)  mg/5 mL liqd syrup Take 10 mL by mouth every six (6) hours as needed.  isosorbide mononitrate (IMDUR PO) Take 60 mg by mouth daily.  potassium chloride SR (KLOR-CON 10) 10 mEq tablet Take  by mouth.  metOLazone (ZAROXOLYN) 2.5 mg tablet Take  by mouth daily.  famotidine (PEPCID) 20 mg tablet Take 20 mg by mouth two (2) times a day.  magnesium citrate solution Take 1/3 of bottle every 8 hours until finished or until you have a bowel movement.  predniSONE (DELTASONE) 1 mg tablet Take 1 mg by mouth daily. 1 pill po daily  levothyroxine (SYNTHROID) 25 mcg tablet Take 1 Tab by mouth Daily (before breakfast).  spironolactone (ALDACTONE) 25 mg tablet Take 1 Tab by mouth daily.  hydroxychloroquine (PLAQUENIL) 200 mg tablet Take 200 mg by mouth two (2) times a day.  torsemide (DEMADEX) 20 mg tablet Take 1 Tab by mouth daily as needed (edema).  polyethylene glycol (MIRALAX) 17 gram/dose powder Take 17 g by mouth daily.  aspirin delayed-release 81 mg tablet Take 81 mg by mouth daily.  food supplemt, lactose-reduced (ENSURE ENLIVE) 0.08 gram-1.5 kcal/mL liqd Take 1 Bottle by mouth two (2) times a day.  menthol-zinc oxide (CALMOSEPTINE) 0.44-20.6 % oint Apply 1 Each to affected area as needed for Pain. apply to affected area after each pericare episode.  carvedilol (COREG) 3.125 mg tablet Take 1 Tab by mouth every twelve (12) hours.  acetaminophen (TYLENOL ARTHRITIS PAIN) 650 mg TbER Take 1 Tab by mouth every eight (8) hours. (Patient taking differently: Take 1 Tab by mouth daily.)  amiodarone (CORDARONE) 200 mg tablet TAKE ONE TABLET EVERY DAY (MAKE AN APPT) (Patient taking differently: TAKE ONE TABLET EVERY DAY by mouth)  memantine (NAMENDA) 10 mg tablet Take 10 mg by mouth two (2) times a day.  amitriptyline (ELAVIL) 25 mg tablet Take 25 mg by mouth nightly. No current facility-administered medications for this visit. Visit Vitals Ht 5' 5\" (1.651 m) Wt 81.6 kg (180 lb) Comment: verbal weight given by daughter BMI 29.95 kg/m² Physical Exam  
Constitutional: She is oriented to person, place, and time. She appears well-developed and well-nourished. HENT:  
Head: Normocephalic and atraumatic. Eyes: Conjunctivae are normal.  
Neck: Neck supple. No JVD present. No tracheal deviation present. No thyromegaly present. Cardiovascular: Normal rate and regular rhythm. PMI is not displaced. Exam reveals no gallop and no decreased pulses. Murmur heard. Holosystolic murmur is present at the lower left sternal border. Pulmonary/Chest: No respiratory distress. She has no wheezes. She has no rales. She exhibits no tenderness. Abdominal: Soft. There is no abdominal tenderness. Musculoskeletal:     
   General: Edema (3+ BLE edema - extending to thighs, BL upper ext edema) present. Neurological: She is alert and oriented to person, place, and time. Skin: Skin is warm. Psychiatric: She has a normal mood and affect. No flowsheet data found. Ms. Juleen Fabry has a reminder for a \"due or due soon\" health maintenance. I have asked that she contact her primary care provider for follow-up on this health maintenance. SUMMARY:4/2014 Left ventricle: Ejection fraction was estimated to be 60 %. No obvious 
wall motion abnormalities identified in the views obtained. There was mild 
concentric hypertrophy. Features were consistent with a pseudonormal left 
ventricular filling pattern, with concomitant abnormal relaxation and 
increased filling pressure (grade 2 diastolic dysfunction). Tricuspid valve: There was moderate regurgitation. Pulmonary artery 
systolic pressure: 50 mmHg. 11/2015:stress test 
1. Normal perfusion scan. 2. Normal wall motion and ejection fraction. 3. Gated images reveal normal wall motion and ejection fraction is 
calculated at 59%. 12/07/2015 Pacer check Noted with A Flutter. D/w daughter on phone 7/2019 Pacemaker check normal function. No A. fib. Atrial heart rate up to 159. Interpretation Summary 12/2018 · Technically difficult study due to patient compliance. Unable to obtain on-axis apical images. Good parasternals, poor subcostal images. · Left ventricular moderate-to-severely decreased global systolic function. Estimated left ventricular ejection fraction is 31 - 35%. Visually measured ejection fraction. Left ventricular severe sigmoid septum hypertrophy.  Abnormal left ventricular wall motion as described on the wall scoring diagram below. Abnormal left ventricular septal motion. Interventricular septal \"bounce\". Severe (grade 3) left ventricular diastolic dysfunction. · Moderate tricuspid valve regurgitation is present. Mild pulmonary hypertension is present. PASP 38mmHg · Mild aortic valve regurgitation is present. · Mild to moderate mitral valve regurgitation. Assessment ICD-10-CM ICD-9-CM 1. Chronic systolic congestive heart failure (HCC) I50.22 428.22   
  428.0 Stable continue to monitor limited activity 2. Paroxysmal atrial flutter (HCC) I48.92 427.32 Stable maintained on amiodarone monitor 3. Essential hypertension I10 401.9 Stable on treatment 4. S/P placement of cardiac pacemaker Z95.0 V45.01   
5. High risk medication use Z79.899 V58.69 Amiodarone for A. fib  
continue amiodarone to maintain sr,risk of anticoagulation high and would not anticoagulate-discussed with family I have discussed risk benefit and option of use of amiodarone for arrythmia. Risk of toxicity with medication are informed. Patient will require careful monitoring. Lasix and metolazone used as needed- 
1/2019 Recent admission with CHF. Continue to monitor. Due to dementia would not be a candidate for ICD upgrade. Patient has increased edema since discharge. I have increase Lasix from 20 mg to 40 mg 1-2 tablets a day. Patient currently nursing home 3/4/2019 - Recent admission for acute systolic CHF and CKD with hypotension. She has had increased edema since discharge and has not had diuretics in 3 weeks. Begin Demadex 20 mg and Metolazone 2.5 mg/ day. Have BMP drawn in 1 weeks. F/U in our office in 2 weeks - to ER for new or worsening symptoms. All discussed with daughter and son in room. 5/2019 CHF compensated. Recent admission with cellulitis. Edema is significantly better. Continue to hold torsemide and use it as needed 7/2019 Emergency room with shortness of breath and wheezing. Appears to be upper airway sounds. Mild edema. Will increase Demadex to 20 mg a day. Discussed that kidney function will likely get worse with increased dose of diuretic. Will check BMP in 1 week 8/2019 Stable after recent admission for CHF. Stable after discharge on Aldactone. Continue along with other medication on amiodarone. Check TSH 
11/2019 Cardiac status stable. Elevated TSH with normal free T4. Started on levothyroxine continue therapy follow-up with PCP. CHF compensated limited activity 3/2020 Stable cardiac status. Low blood pressure at times. Edema dependent. Nonambulatory There are no discontinued medications. No orders of the defined types were placed in this encounter. Iqra Shanks NP I have independently evaluated taken history and examined the patient. All relevant labs and testing data's are reviewed. Care plan discussed and updated after review.  
Bruno Negrete MD

## 2020-06-18 PROBLEM — R41.82 AMS (ALTERED MENTAL STATUS): Status: ACTIVE | Noted: 2020-01-01

## 2020-06-18 PROBLEM — L89.154 STAGE IV PRESSURE ULCER OF SACRAL REGION (HCC): Status: ACTIVE | Noted: 2020-01-01

## 2020-06-18 NOTE — H&P
History & Physical 
 
 
Patient: Mike Mccallum MRN: [id number]  [facility]: [id number] YOB: 1931  Age: 80 y.o. Sex: female DOA: 6/18/2020 Chief Complaint:  
Chief Complaint Patient presents with  Fever HPI:  
 
Mike Mccallum is a 80 y.o. female with PMHx of dementia who is confused at baseline, chronic diastolic and systolic CHF, HTN, HLD, RA and lupus who presented to the ED with fever and worsening confusion. HPI is obtained from ED physician, notes and from pt's son as pt only makes non-specific sounds when stimulated. Pt presented to the ED with fever to 101.2 this afternoon. She was reported to have been taking Keflex for a UTI for the previous 2 days. Discussed with pt's son on phone who reports she lives with her  and daughter and is confused and minimally verbal at baseline. Today she was noted to have increasing confusion and fever so she was taken to the ED. Pt was febrile to 101.2, with tachypnea with RR in the 20's, with other vitals reassuring in the ED. PE showed a somnolent woman who only made non-specific sounds and groans to painful stimuli. In addition, a stage IV sacral ulcer was noted. Labs were notable for Hgb 9.4, Na 147, K+ 5.7, BUN 29, and creatinine 1.41 (baseline 1.5) . A UA showed large leuk esterase, WBC's too numerous to count and 4+ bacteria. A CXR was negative and a CT Abd Pelv was notable for a stable small ventral hernia R of midline and a large volume of retained stool throughout the colon. Wound, blood and urine cultures were obtained and the pt was started on IV abx. She is now admitted with sepsis 2/2 infected Stage IV sacral ulcer and UTI. Past Medical History:  
Diagnosis Date  Acute on chronic diastolic heart failure ([address]  Arthritis   
 rheumatoid arthritis  Benign hypertensive heart disease without heart failure Better controlled, stable  Chronic diastolic heart failure ([address] Breathing and edema is improving  Congestive heart failure ([facility]  HTN (hypertension)  Hypercholesteremia  Lupus (systemic lupus erythematosus) ([facility] 6/18/2014  
 followed by dr Ayanna Enriquez  Obesity, unspecified Patient has weight loss Discussed diet ad fluid restriction  Other and unspecified hyperlipidemia F/u per pmd  
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  
Procedure Laterality Date  CARDIAC SURG PROCEDURE UNLIST    
 pacemaker  HX FREE SKIN GRAFT    
 right foot  HX HYSTERECTOMY  HX ORTHOPAEDIC    
 PACEMAKER PROCEDURE Family History Problem Relation Age of Onset  Arrhythmia Neg Hx  Asthma Neg Hx  Clotting Disorder Neg Hx  Fainting Neg Hx   
 Heart Attack Neg Hx  High Cholesterol Neg Hx  Pacemaker Neg Hx  Stroke Neg Hx Social History Socioeconomic History  Marital status:  Spouse name: Not on file  Number of children: Not on file  Years of education: Not on file  Highest education level: Not on file Tobacco Use  Smoking status: Never Smoker  Smokeless tobacco: Never Used Substance and Sexual Activity  Alcohol use: No  
 Drug use: No  
 Sexual activity: Never Prior to Admission medications Medication Sig Start Date End Date Taking? Authorizing Provider  
levothyroxine (SYNTHROID) 25 mcg tablet TAKE ONE TABLET BY MOUTH DAILY BEFORE BREAKFAST 5/21/20   Becki Roca E, NP  
dextromethorphan-guaiFENesin (TUSSIN DM COUGH AND CHEST)  mg/5 mL liqd syrup Take 10 mL by mouth every six (6) hours as needed. Provider, Historical  
isosorbide mononitrate (IMDUR PO) Take 60 mg by mouth daily. Provider, Historical  
potassium chloride SR (KLOR-CON 10) 10 mEq tablet Take  by mouth. Provider, Historical  
metOLazone (ZAROXOLYN) 2.5 mg tablet Take  by mouth daily.     Provider, Historical  
 famotidine (PEPCID) 20 mg tablet Take 20 mg by mouth two (2) times a day. Provider, Historical  
magnesium citrate solution Take 1/3 of bottle every 8 hours until finished or until you have a bowel movement. 1/24/20   Yolanda Fernandez MD  
predniSONE (DELTASONE) 1 mg tablet Take 1 mg by mouth daily. 1 pill po daily    Provider, Historical  
spironolactone (ALDACTONE) 25 mg tablet Take 1 Tab by mouth daily. 8/22/19   Bienvenido Tam MD  
hydroxychloroquine (PLAQUENIL) 200 mg tablet Take 200 mg by mouth two (2) times a day. Provider, Historical  
torsemide (DEMADEX) 20 mg tablet Take 1 Tab by mouth daily as needed (edema). 7/17/19   Bienvenido Tam MD  
polyethylene glycol (MIRALAX) 17 gram/dose powder Take 17 g by mouth daily. Provider, Historical  
aspirin delayed-release 81 mg tablet Take 81 mg by mouth daily. Provider, Historical  
food supplemt, lactose-reduced (ENSURE ENLIVE) 0.08 gram-1.5 kcal/mL liqd Take 1 Bottle by mouth two (2) times a day. 2/10/19   Didi Velasco MD  
menthol-zinc oxide (CALMOSEPTINE) 0.44-20.6 % oint Apply 1 Each to affected area as needed for Pain. apply to affected area after each pericare episode. Provider, Historical  
carvedilol (COREG) 3.125 mg tablet Take 1 Tab by mouth every twelve (12) hours. 12/31/18   Kerri Rhoades MD  
acetaminophen (TYLENOL ARTHRITIS PAIN) 650 mg TbER Take 1 Tab by mouth every eight (8) hours. Patient taking differently: Take 1 Tab by mouth daily. 12/4/18   Therman Counter D, PAJessicaC  
amiodarone (CORDARONE) 200 mg tablet TAKE ONE TABLET EVERY DAY (MAKE AN APPT) Patient taking differently: TAKE ONE TABLET EVERY DAY by mouth 11/2/18   Becki Roca NP  
memantine (NAMENDA) 10 mg tablet Take 10 mg by mouth two (2) times a day. 7/10/18   Provider, Historical  
amitriptyline (ELAVIL) 25 mg tablet Take 25 mg by mouth nightly. Provider, Historical  
 
 
No Known Allergies Review of Systems Not obtained as pt is non-verbal.  
 
 
 Physical Exam:  
 
Physical Exam: 
Visit Vitals /78 Pulse 71 Temp (!) 101.2 °F (38.4 °C) Resp 16 Ht 5' 4\" (1.626 m) Wt 54.7 kg (120 lb 8 oz) SpO2 98% BMI 20.68 kg/m² O2 Device: Room air Temp (24hrs), Av.2 °F (38.4 °C), Min:101.2 °F (38.4 °C), Max:101.2 °F (38.4 °C) No intake/output data recorded. No intake/output data recorded. General:   Somnolent, makes non-specific sounds to painful stimuli Head: Normocephalic, without obvious abnormality, atraumatic. Eyes:  Conjunctivae/corneas clear. EOMs intact. Nose: Nares normal. No drainage or sinus tenderness. Neck: Supple, symmetrical, trachea midline, no adenopathy, no JVD. Lungs:   Clear to auscultation bilaterally. Heart:  Regular rate and rhythm Abdomen: Soft, non-tender. Bowel sounds normal. R lateral rectus hernia that is reducible and non-tender Extremities: Extremities normal, atraumatic, no cyanosis or edema. Pulses: 2+ and symmetric all extremities. Skin:  Stage IV sacral ulcer approx 5 cm in diameter Neurologic:  Makes non-specific sounds to painful stimuli, otherwise unresponsive Labs Reviewed: 
 
CMP:  
Lab Results Component Value Date/Time  (L) 2020 03:00 PM  
 K 5.7 (H) 2020 03:00 PM  
  2020 03:00 PM  
 CO2 27 2020 03:00 PM  
 AGAP 5 2020 03:00 PM  
  (H) 2020 03:00 PM  
 BUN 29 (H) 2020 03:00 PM  
 CREA 1.41 (H) 2020 03:00 PM  
 GFRAA 43 (L) 2020 03:00 PM  
 GFRNA 35 (L) 2020 03:00 PM  
 CA 8.7 2020 03:00 PM  
 ALB 3.2 (L) 2020 03:00 PM  
 TP 6.2 (L) 2020 03:00 PM  
 GLOB 3.0 2020 03:00 PM  
 AGRAT 1.1 2020 03:00 PM  
 ALT 15 2020 03:00 PM  
 
CBC:  
Lab Results Component Value Date/Time  WBC 7.9 2020 03:00 PM  
 HGB 9.4 (L) 2020 03:00 PM  
 HCT 29.3 (L) 2020 03:00 PM  
  2020 03:00 PM  
 
 All Cardiac Markers in the last 24 hours: No results found for: CPK, CK, CKMMB, CKMB, RCK3, CKMBT, CKNDX, CKND1, YAN, TROPT, TROIQ, ASIA, TROPT, TNIPOC, BNP, BNPP Recent Glucose Results:  
Lab Results Component Value Date/Time  (H) 06/18/2020 03:00 PM  
 
COAGS: No results found for: APTT, PTP, INR, INREXT, INREXT 
 
CXR Results  (Last 48 hours) 06/18/20 1536  XR CHEST PORT Final result Impression:  IMPRESSION[de-identified]  
   
1.  No acute cardiopulmonary disease. Narrative:  EXAM: AP portable chest.  
   
Indications: Sepsis Time stamp: 1519 hours Comparison: February 2020 Findings:  
   
Lines/Tubes/Devices:  None LUNGS: Clear. MEDIASTINUM: Cardiomegaly again noted. Dual-lead pacemaker remains unremarkable. BONES/SOFT TISSUES: Unremarkable for age CT Results  (Last 48 hours) 06/18/20 1723  CT ABD PELV W CONT Final result Impression:  IMPRESSION:  
   
1. Stable small ventral hernia just to the right of midline containing a portion  
of transverse colon partially protruding into the hernia sac. No acute GI tract  
abnormalities otherwise. 2. Patient appears constipated, large volume of retained stool throughout. Gallstones. 3.  Small pleural effusions with slight atelectatic changes. 4.  Pre-existing L1 compression deformity. Narrative:  EXAM:  CT Abdomen-Pelvis without Contrast.  
   
CLINICAL INDICATION: High fever of 104 axillary. UTI for one week. Ulcer, left  
buttock. Disoriented. .  
   
COMPARISON:  February 10, 2020 TECHNIQUE:    
   
- Helical volumetric CT imaging of the abdomen and pelvis is performed without IV contrast administration. Coronal and sagittal multiplanar reconstruction  
images are generated for improved anatomic delineation.  
- All CT scans at this facility are performed using dose optimization technique as appropriate to the performed exam, to include automated exposure control,  
adjustment of the mA and/or kV according to patient's size (Including  
appropriate matching for site-specific examinations), or use of iterative  
reconstruction technique. FINDINGS:  
   
Lung Bases: The heart remains enlarged. Trace bibasilar atelectasis and small  
effusions Liver, Spleen, Adrenal Glands:  Unremarkable. Pancreas:  No acute abnormalities. Gallbladder:  Heterogeneous appearance with subtle calcific densities in the  
gallbladder. Suggestive of gallstones. Kidneys:  Hypodensity at the right kidney upper pole measuring about 1.7 x 1.3  
cm (axial #18). Tiny cortical hyperdensities in the left kidney measuring up to  
about 0.6 cm (axial #31). GI Tract:  Mild hiatal hernia. No acute small bowel abnormalities. The  
appendix is not confidently visualized. No convincing evidence of acute  
appendicitis is detected. No acute colitis or acute diverticulitis is detected. There is a ventral hernia in the right paramedian aspect with wid[place],  
estimated to be about 2.9 cm in width. The transverse colon partially protrudes  
into this hernia sac (axial #36). Moderate stool retention in the colon. Nodes:  No mesenteric or retroperitoneal adenopathy. Vascular: Moderate atheromatous plaque calcifications in the aorta. Bladder:  Unremarkable urinary bladder. Uterus:  Removed. Adnexa:  No adnexal mass. Bones:  Compression deformity at L1. Unchanged compared to 01/24/20. Assessment & Plan Joselin Quiñonez is a 80 y.o. female with PMHx of dementia who is confused at baseline, chronic diastolic and systolic CHF, HTN, HLD, RA and lupus who is admitted with sepsis 2/2 infected Stage IV sacral ulcer and UTI. 1. Sepsis- POA, with fever and tachypnea, 2/2 #2 and #3 2. Infected stage IV sacral ulcer 3. UTI 4. Hyperkalemia 5. End-stage dementia 6. Chronic diastolic and systolic CHF 7. HTN  
8. HLD 9. RA 10. Hx lupus 11. Reducible R lateral rectus hernia 12. Constipation Surgery consulted, will see in am  
ID consult in am- Dr. Flory Fraire. James Ahn has been added to tx team  
Check EKG Cont IV abx- Vanc and Zosyn Cont IVF- NS at 75 mL/hr  
FU wound, blood and urine cx's  
FU am labs PT/INR, PTT and Type and screen ordered Pain control Cont home meds: amidoarone, ASA, carvedilol, imdur, levothyroxine Hold home potassium, spironolactone Miralax daily, dulcolax suppository prn Mendiola catheter Wound care SLP eval  
Palliative consulted DVT prophylaxis: SQH Diet: NPO at midnight Contact: Kole Eaton (son)  [phone] Code Status: DNR per pt's son Disposition: Admit to telemetry; >2 nights Discussed with pt's son over phone about hospital admission and my plan care, who understood and agree with my plan care. Sunita Santos DO  
6/18/2020

## 2020-06-18 NOTE — ED TRIAGE NOTES
Pt arrived from home with fever starting at 1100. Pt had temperature 104 Axillary. Pt has UTI for one week and ulcer on left buttock has reopened. Blood glucose 104. Pt baseline is confused and disoriented X4 with no command following.

## 2020-06-18 NOTE — ED PROVIDER NOTES
EMERGENCY DEPARTMENT HISTORY AND PHYSICAL EXAM 
 
2:50 PM 
Date: 6/18/2020 Patient Name: [facility] History of Presenting Illness Chief Complaint Patient presents with  Fever History Provided By: family (son) on the phone HPI: [place] is a 80 y.o. female with PMHx of HTN, cholesterolemia, lupus, dementia presents with a fever that started today. Patient has been taking Keflex for UTI for 2 days. Patient had fever today. She is more confused than baseline according to son. PCP: Analy Mesa MD 
 
Past History Past Medical History: 
Past Medical History:  
Diagnosis Date  Acute on chronic diastolic heart failure ([address]  Arthritis   
 rheumatoid arthritis  Benign hypertensive heart disease without heart failure Better controlled, stable  Chronic diastolic heart failure ([address] Breathing and edema is improving  Congestive heart failure ([address]  HTN (hypertension)  Hypercholesteremia  Lupus (systemic lupus erythematosus) ([address] 6/18/2014  
 followed by dr Gera Osorio  Obesity, unspecified Patient has weight loss Discussed diet ad fluid restriction  Other and unspecified hyperlipidemia F/u per pmd  
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 
Past Surgical History:  
Procedure Laterality Date  CARDIAC SURG PROCEDURE UNLIST    
 pacemaker  HX FREE SKIN GRAFT    
 right foot  HX HYSTERECTOMY  HX ORTHOPAEDIC    
 PACEMAKER PROCEDURE Family History: 
Family History Problem Relation Age of Onset  Arrhythmia Neg Hx  Asthma Neg Hx  Clotting Disorder Neg Hx  Fainting Neg Hx   
 Heart Attack Neg Hx  High Cholesterol Neg Hx  Pacemaker Neg Hx  Stroke Neg Hx Social History: 
Social History Tobacco Use  Smoking status: Never Smoker  Smokeless tobacco: Never Used Substance Use Topics  Alcohol use: No  
 Drug use: No  
 
 
Allergies: 
No Known Allergies Review of Systems Unable to obtain due to dementia Physical Exam  
 
Patient Vitals for the past 12 hrs: 
 Temp Pulse Resp BP SpO2  
06/20/20 2043 98 °F (36.7 °C) 70 18 136/65 100 % 06/20/20 1534 97.3 °F (36.3 °C) 71 20 122/60 97 % 06/20/20 1056 97.6 °F (36.4 °C) 69 18 123/66 95 % Physical Exam 
Vitals signs and nursing note reviewed. Constitutional:   
   Appearance: She is well-developed. She is ill-appearing. HENT:  
   Head: Normocephalic and atraumatic. Eyes:  
   General:     
   Right eye: No discharge. Left eye: No discharge. Neck: Musculoskeletal: Normal range of motion and neck supple. Cardiovascular:  
   Rate and Rhythm: Normal rate and regular rhythm. Heart sounds: Normal heart sounds. No murmur. Pulmonary:  
   Effort: Pulmonary effort is normal. No respiratory distress. Breath sounds: Normal breath sounds. No stridor. No wheezing or rales. Chest:  
   Chest wall: No tenderness. Abdominal:  
   General: Bowel sounds are normal. There is no distension. Palpations: Abdomen is soft. Tenderness: There is no abdominal tenderness. There is no guarding or rebound. Comments: R lateral rectus hernia reducible, with some tenderness. Musculoskeletal: Normal range of motion. Skin: 
   General: Skin is warm and dry. Comments: Stage IV sacral ulcers Neurological:  
   Mental Status: She is alert and oriented to person, place, and time. Diagnostic Study Results Labs - No results found for this or any previous visit (from the past 12 hour(s)). Radiologic Studies - No results found. Medical Decision Making ED Course: Progress Notes, Reevaluation, and Consults: 
 
2:50 PM Initial assessment performed.  The patients presenting problems have been discussed, and they/their family are in agreement with the care plan formulated and outlined with them. I have encouraged them to ask questions as they arise throughout their visit. Provider Notes (Medical Decision Making):  
Patient presents with fever. Patient had has been currently being treated for UTI with Keflex No leukocytosis, no elevated lactate. Patient however appears confused. Going to check CT Abdo pelvis since patient seem to be grimacing when pressed on her lateral right rectus hernia on the abdominal wall and also has a states for sacral ulcers and wound to rule osteomyelitis or other pathology Received 500 ml of IV fluids. However she has a ejection fraction of 25 to 30% so not full 30 cc/kg was given. Discussed with son who agrees not to give full 30 cc/kg after discussion of risks and benefits Patient does not appear septic IV antibiotics were given Admitted to hospitalist for UTI with altered mental status Abdo pelvis was ordered to characterize lateral rectus hernia and sacral ulcers r/o osteomyelitis Hospitalist to follow CT Abdo pelvis Patient will be admitted for UTI an AMS Vital Signs-Reviewed the patient's vital signs. Reviewed pt's pulse ox reading. Records Reviewed:  old medical records(Time of Review: 2:50 PM) 
-I am the first provider for this patient. 
-I reviewed the vital signs, available nursing notes, past medical history, past surgical history, family history and social history. Current Facility-Administered Medications Medication Dose Route Frequency Provider Last Rate Last Dose  amiodarone (CORDARONE) tablet 200 mg  200 mg Oral DAILY Cisneros Cirri, DO   Stopped at 06/20/20 0507  aspirin delayed-release tablet 81 mg  81 mg Oral DAILY Amelia Cirri, DO   Stopped at 06/20/20 1016  carvediloL (COREG) tablet 3.125 mg  3.125 mg Oral Q12H Jey Hawk PA   3.125 mg at 06/20/20 2136  isosorbide mononitrate ER (IMDUR) tablet 60 mg  60 mg Oral DAILY Laurence Beverly CLARIBEL Weiss   Stopped at 06/20/20 8509  levothyroxine (SYNTHROID) tablet 25 mcg  25 mcg Oral Sparks Linton, DO   25 mcg at 06/20/20 7924  piperacillin-tazobactam (ZOSYN) 2.25 g in 0.9% sodium chloride (MBP/ADV) 50 mL MBP  2.25 g IntraVENous Q6H Jose Loveless,  mL/hr at 06/20/20 2136 2.25 g at 06/20/20 2136  
 vancomycin (VANCOCIN) 1250 mg in  ml infusion  1,250 mg IntraVENous Q36H McDermott Loveless, DO   Stopped at 06/19/20 2208  lactobacillus sp. 50 billion cpu (BIO-K PLUS) capsule 1 Cap  1 Cap Oral DAILY [place], [place]   Stopped at 06/20/20 6670  sodium chloride (NS) flush 5-10 mL  5-10 mL IntraVENous PRN Jose Maria Trejo MD      
 acetaminophen (TYLENOL) tablet 650 mg  650 mg Oral Q4H PRN McDermott Loveless, DO      
 oxyCODONE-acetaminophen (PERCOCET) 5-325 mg per tablet 1 Tab  1 Tab Oral Q4H PRN Jose Loveless, DO   1 Tab at 06/20/20 1728  morphine injection 1 mg  1 mg IntraVENous Q4H PRN McDermott Loveless, DO      
 heparin (porcine) injection 5,000 Units  5,000 Units SubCUTAneous Q8H McDermott Loveless, DO   5,000 Units at 06/20/20 2417  polyethylene glycol (MIRALAX) packet 17 g  17 g Oral DAILY Jose Loveless, DO   Stopped at 06/20/20 1917  bisacodyL (DULCOLAX) suppository 10 mg  10 mg Rectal DAILY PRN Jose Loveless, DO      
 0.9% sodium chloride infusion  75 mL/hr IntraVENous CONTINUOUS McDermott Loveless, DO 75 mL/hr at 06/20/20 1020 75 mL/hr at 06/20/20 1020 Clinical Impression Clinical Impression: No diagnosis found. Disposition: admit This note was dictated utilizing voice recognition software which may lead to typographical errors. I apologize in advance if the situation occurs. If questions arise please do not hesitate to contact me or call our department.  
 
Darline Mccartney MD 
2:50 PM

## 2020-06-19 NOTE — ROUTINE PROCESS
Bedside and Verbal shift change report given to Abby Abdi RN (oncoming nurse) by Matilde Dangelo RN (offgoing nurse). Report included the following information SBAR, Kardex, MAR and Recent Results. SITUATION: 
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  
   
Hospital Problems  Date Reviewed: 2/9/2020 Codes Class Noted POA AMS (altered mental status) ICD-10-CM: K76.03 
ICD-9-CM: 780.97  6/18/2020 Unknown * (Principal) Stage IV pressure ulcer of [facility]) ICD-10-CM: C73.568 ICD-9-CM: 707.03, 707.24  6/18/2020 Unknown UTI (urinary tract infection) ICD-10-CM: N39.0 ICD-9-CM: 599.0  3/17/2019 Unknown Sepsis ([facility] [address] ICD-10-CM: A41.9 ICD-9-CM: 038.9, 995.91  3/17/2019 Yes Dementia ([facility] [address] ICD-10-CM: F03.90 ICD-9-CM: 294.20  1/30/2019 Yes BACKGROUND: 
 Past Medical History:  
Past Medical History:  
Diagnosis Date  Acute on chronic diastolic heart failure ([address]  Arthritis   
 rheumatoid arthritis  Benign hypertensive heart disease without heart failure Better controlled, stable  Chronic diastolic heart failure ([address] Breathing and edema is improving  Congestive heart failure ([address]  HTN (hypertension)  Hypercholesteremia  Lupus (systemic lupus erythematosus) ([facility] [address] 6/18/2014  
 followed by dr Aleksandr Mccabe  Obesity, unspecified Patient has weight loss Discussed diet ad fluid restriction  Other and unspecified hyperlipidemia F/u per pmd  
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  
 tr with moderate pulmonary htn Patient taking anticoagulants yes Patient has a defibrillator: yes History of shots YES for example, flu, pneumonia, tetanus Isolation History YES for example, MRSA, CDiff ASSESSMENT: 
 Changes in Assessment Throughout Shift: NONE Significant Changes in 24 hours (for example, RR/code, fall) Patient has Central Line: no  
Patient has Mendiola Cath: no  
Mobility Issues PT 
IV Patency OR Checklist 
Pending Tests Last Vitals: 
Vitals w/ MEWS Score (last day) Date/Time MEWS Score Pulse Resp Temp BP Level of Consciousness SpO2  
 06/19/20 0708  1  70  15  98.7 °F (37.1 °C)  126/77  Alert  100 % 06/19/20 0423  1  81  16  97.1 °F (36.2 °C)  138/71  Alert  98 % 06/19/20 0143  1  72  16  96.8 °F (36 °C)  141/75  Alert  98 % 06/18/20 2345    70  14    127/67      
 06/18/20 2322  2  76  22  98.1 °F (36.7 °C)  (!) 126/91  Alert  99 % 06/18/20 2200    70  15    125/56      
 06/18/20 2145    71  19    117/58      
 06/18/20 2130    70  19    102/66      
 06/18/20 2100    73  20    119/50    96 % 06/18/20 2045    70  21    121/56    99 % 06/18/20 2030    71  25    109/52    91 % 06/18/20 2015    79  20    114/52    100 % 06/18/20 2000    72  23    95/80    99 % 06/18/20 1930  4  70  21  99 °F (37.2 °C)  120/44  (!) Confused or Agitated  92 % 06/18/20 1900    70  17    117/47    96 % 06/18/20 1845    73  22    114/59      
 06/18/20 1830    74  29    106/57      
 06/18/20 1815    73  19    112/66    97 % 06/18/20 1630    91  16    101/41    100 % 06/18/20 1615    79  12    94/46    100 % 06/18/20 1545    71  16    111/78    98 % 06/18/20 1530    73  24    102/71    98 % 06/18/20 1515    77  20    109/84    (!) 84 % 06/18/20 1500    78  19    105/81    100 % 06/18/20 1431  2  98  12  (!) 101.2 °F (38.4 °C)  102/71  Alert  99 % PAIN Pain Assessment Pain Intensity 1: 0 (06/19/20 0423) Patient Stated Pain Goal: Unable to verbalize/indicate pain Intervention effective: N/A Time of last intervention: N/A Reassessment Completed: yes Other actions taken for pain: Distraction Last 3 Weights: 
Last 3 Recorded Weights in this Encounter 06/18/20 1520 06/19/20 0143 Weight: 54.7 kg (120 lb 8 oz) 66.4 kg (146 lb 4.8 oz) Weight change: INTAKE/OUPUT Current Shift: No intake/output data recorded. Last three shifts: 06/17 1901 - 06/19 0700 In: 647.5 [P.O.:30; I.V.:617.5] Out: -  
 
RECOMMENDATIONS AND DISCHARGE PLANNING Patient needs and requests: Assistance with ADL's, Nutrition Pending tests/procedures: labs Discharge plan for patient: Home Discharge planning Needs or Barriers: None Estimated Discharge Date: 6/25/2020 Posted on Whiteboard in [facility] Room: yes \"HEALS\" SAFETY CHECK A safety check occurred in the patient's room between off going nurse and oncoming nurse listed above. The safety check included the below items: 
 
H High Alert Medications Verify all high alert medication drips (heparin, PCA, etc.) E Equipment Suction is set up for ALL patients (with vi) Red plugs utilized for all equipment (IV pumps, etc.) WOWs wiped down at end of shift. Room stocked with oxygen, suction, and other unit-specific supplies A Alarms Bed alarm is set for fall risk patients Ensure chair alarm is in place and activated if patient is up in a chair L Lines Check IV for any infiltration Mendiola bag is empty if patient has a Mendiola Tubing and IV bags are labeled Jodi Gentile Safety Room is clean, patient is clean, and equipment is clean. Hallways are clear from equipment besides carts. Fall bracelet on for fall risk patients Ensure room is clear and free of clutter Suction is set up for ALL patients (with vi) Hallways are clear from equipment besides carts. Isolation precautions followed, supplies available outside room, sign posted Odette Vallecillo RN

## 2020-06-19 NOTE — PROGRESS NOTES
TRANSFER - IN REPORT: 
 
Verbal report received from [facility]  - [facility]) on [place]  being received from [address] [place] [address](unit) for ordered procedure Report consisted of patients Situation, Background, Assessment and  
Recommendations(SBAR). Information from the following report(s) SBAR, Kardex, [facility] and Recent Results was reviewed with the receiving nurse. Opportunity for questions and clarification was provided. Assessment completed upon patients arrival to unit and care assumed.

## 2020-06-19 NOTE — ROUTINE PROCESS
Bedside shift change report given to Nicole Price RN (oncoming nurse) by Geo Johnson RN (offgoing nurse). Report included the following information SBAR, Kardex, Intake/Output, MAR, Recent Results and Quality Measures.

## 2020-06-19 NOTE — PROGRESS NOTES
Nurse paged to state that patient had an elevated potassium which had not been addressed in the emergency room or by the admitting physician, will give Kayexalate 30 g p.o. times once. Repeat BMP in 4 hours.

## 2020-06-19 NOTE — ANESTHESIA PREPROCEDURE EVALUATION
Relevant Problems No relevant active problems Anesthetic History No history of anesthetic complications Review of Systems / Medical History Patient summary reviewed, nursing notes reviewed and pertinent labs reviewed Pulmonary COPD: moderate Neuro/Psych Dementia Cardiovascular Hypertension: well controlled CHF 
 
CAD Comments: Chronic diastolic Heart failure Pacemaker GI/Hepatic/Renal 
Within defined limits Endo/Other Morbid obesity and arthritis Other Findings Physical Exam 
 
Airway Mallampati: II 
TM Distance: 4 - 6 cm Mouth opening: Normal 
 
Comments: Not cooperative for AW exam Cardiovascular Regular rate and rhythm,  S1 and S2 normal,  no murmur, click, rub, or gallop Dental 
 
 
  
Pulmonary Breath sounds clear to auscultation Abdominal 
GI exam deferred Other Findings Anesthetic Plan ASA: 3 Anesthesia type: general 
 
 
 
 
Induction: Intravenous

## 2020-06-19 NOTE — ANESTHESIA POSTPROCEDURE EVALUATION
Procedure(s): IRRIGATION AND DEBRIDEMENT SACRAL DECUBITUS ULCER/ LATERAL POSITION. MAC Anesthesia Post Evaluation Multimodal analgesia: multimodal analgesia used between 6 hours prior to anesthesia start to PACU discharge Patient location during evaluation: bedside Patient participation: complete - patient participated Level of consciousness: awake Pain management: adequate Airway patency: patent Anesthetic complications: no 
Cardiovascular status: stable Respiratory status: acceptable Hydration status: acceptable Post anesthesia nausea and vomiting:  controlled INITIAL Post-op Vital signs:  
Vitals Value Taken Time BP 98/53 6/19/2020  1:43 PM  
Temp 36.2 °C (97.1 °F) 6/19/2020  1:43 PM  
Pulse 70 6/19/2020  1:45 PM  
Resp 16 6/19/2020  1:45 PM  
SpO2 100 % 6/19/2020  1:45 PM  
Vitals shown include unvalidated device data.

## 2020-06-19 NOTE — CONSULTS
HPI: Gucci Hayes is a 80 y.o. female presented to ER yesterday with altered mental status. She has a history of dementia with confusion at baseline, systolic and diastolic heart failure, and HTN. She came to the ER with fever and worsening confusion. On admission she was fabrile to 101.2, tachypneic to the 20s, other vitals were reassuring. She was noted to have UTI and a stage IV sacral decubitus ulcer on admission, thought to be the sources of infection. Wound cultures were taken. She was started on Vanc and Zosyn. ID and palliative care both consulted. Past Medical History:  
Diagnosis Date  Acute on chronic diastolic heart failure ([address]  Arthritis   
 rheumatoid arthritis  Benign hypertensive heart disease without heart failure Better controlled, stable  Chronic diastolic heart failure ([address] Breathing and edema is improving  Congestive heart failure ([address]  HTN (hypertension)  Hypercholesteremia  Lupus (systemic lupus erythematosus) ([address] 6/18/2014  
 followed by dr Tapan Frederick  Obesity, unspecified Patient has weight loss Discussed diet ad fluid restriction  Other and unspecified hyperlipidemia F/u per pmd  
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  
Procedure Laterality Date  CARDIAC SURG PROCEDURE UNLIST    
 pacemaker  HX FREE SKIN GRAFT    
 right foot  HX HYSTERECTOMY  HX ORTHOPAEDIC    
 PACEMAKER PROCEDURE Family History Problem Relation Age of Onset  Arrhythmia Neg Hx  Asthma Neg Hx  Clotting Disorder Neg Hx  Fainting Neg Hx   
 Heart Attack Neg Hx  High Cholesterol Neg Hx  Pacemaker Neg Hx  Stroke Neg Hx Social History Socioeconomic History  Marital status:  Spouse name: Not on file  Number of children: Not on file  Years of education: Not on file  Highest education level: Not on file Tobacco Use  Smoking status: Never Smoker  Smokeless tobacco: Never Used Substance and Sexual Activity  Alcohol use: No  
 Drug use: No  
 Sexual activity: Never Review of Systems - Unable to obtain due to dementia. No Known Allergies Vitals:  
 06/19/20 0143 06/19/20 0423 06/19/20 0708 06/19/20 1058 BP: 141/75 138/71 126/77 98/60 Pulse: 72 81 70 70 Resp: 16 16 15 19 Temp: 96.8 °F (36 °C) 97.1 °F (36.2 °C) 98.7 °F (37.1 °C) 98.8 °F (37.1 °C) SpO2: 98% 98% 100% 100% Weight: 66.4 kg (146 lb 4.8 oz) Height:      
 
 
Physical Exam 
Constitutional:   
   General: She is not in acute distress. Appearance: She is not toxic-appearing. HENT:  
   Head: Normocephalic and atraumatic. Eyes:  
   General:     
   Right eye: No discharge. Left eye: No discharge. Neck: Musculoskeletal: No muscular tenderness. Cardiovascular:  
   Rate and Rhythm: Normal rate and regular rhythm. Heart sounds: No murmur. Pulmonary:  
   Effort: No respiratory distress. Breath sounds: No wheezing or rales. Abdominal:  
   General: Abdomen is flat. Bowel sounds are normal.  
   Palpations: Abdomen is soft. There is mass. Comments: Visible hernia Musculoskeletal:     
   General: No swelling or deformity. Lymphadenopathy:  
   Cervical: No cervical adenopathy. Skin: 
   General: Skin is warm and dry. Findings: No lesion. Comments: Sacral decubitus ulcer present with necrotic tissue, wound extends down to the tendon BMP:  
Lab Results Component Value Date/Time  (L) 06/19/2020 05:13 AM  
 K 4.7 06/19/2020 05:13 AM  
  06/19/2020 05:13 AM  
 CO2 22 06/19/2020 05:13 AM  
 AGAP 9 06/19/2020 05:13 AM  
 GLU 77 06/19/2020 05:13 AM  
 BUN 27 (H) 06/19/2020 05:13 AM  
 CREA 1.18 06/19/2020 05:13 AM  
 GFRAA 52 (L) 06/19/2020 05:13 AM  
 GFRNA 43 (L) 06/19/2020 05:13 AM  
 
CBC:  
Lab Results Component Value Date/Time WBC 11.2 06/19/2020 05:13 AM  
 HGB 9.0 (L) 06/19/2020 05:13 AM  
 HCT 27.8 (L) 06/19/2020 05:13 AM  
  06/19/2020 05:13 AM  
 
COAGS:  
Lab Results Component Value Date/Time APTT 39.1 (H) 06/19/2020 05:13 AM  
 PTP 16.1 (H) 06/19/2020 05:13 AM  
 INR 1.3 (H) 06/19/2020 05:13 AM  
 
  
Ct Abd Pelv W Cont Result Date: 6/18/2020 IMPRESSION: 1. Stable small ventral hernia just to the right of midline containing a portion of transverse colon partially protruding into the hernia sac. No acute GI tract abnormalities otherwise. 2. Patient appears constipated, large volume of retained stool throughout. Gallstones. 3.  Small pleural effusions with slight atelectatic changes. 4.  Pre-existing L1 compression deformity. Xr Chest [facility] Result Date: 6/18/2020 IMPRESSION[de-identified] 1.  No acute cardiopulmonary disease. Assessment / Plan 79yo female with dementia and CHF, admitted with sepsis 2/2 to stage IV sacral decub ulcer and UTI. VSS, on Vanc and Zosyn. Ventral hernia on CT scan is stable. 1. Continue NPO status 2. OR today for surgical debridement Janusz Haskins D.O. PGY-2 
[facility] Medicine The diagnoses and plan were discussed with the patient. All questions answered. Plan of care agreed to by all concerned.

## 2020-06-19 NOTE — PROGRESS NOTES
[facility] Hospitalist Group Progress Note Patient: Neyda Ness Age: 80 y.o. : 1931 MR#: [id number] SSN: [id number] Date: 2020 Subjective:  
 seen prior to OR- deep sleeping. Appears comfortable. Assessment/Plan: Ms. Neyda Ness is an 80 y.o. AA female with PMHx of dementia who is confused at baseline, chronic diastolic and systolic CHF, HTN, HLD, RA and lupus who is admitted with sepsis 2/2 infected Stage IV sacral ulcer and UTI. CONSULTS- SURGERY 1. Sepsis POA, with fever and tachypnea, 2/2 #2 and #3 2. Infected stage IV sacral ulcer s/p debridement with Dr. Tomas Sheikh on  
3. UTI 4. Hypotension 5. Reducible ventral hernia right of midline w/ transverse colon into the hernia sac 6. Acute Constipation 7. Mild hyponatremia 8. Hyperkalemia - resolved 9. Elevated Cr- resolved 10. End-stage dementia 11. Chronic diastolic and systolic CHF, compensated 12. HTN  
13. HLD 14. RA 15. Hx lupus - Surgery follows. S/p irrigation and debridement. Pain control as needed. Encourage incentive luciano - ID consulted. Continue vanc and zosyn for now. Lactobacillus. Follow cultures-  urine culture shows gram neg rods. Blood cultures neg to date. Wound culture gram + cocci . MRSA culture nares pending. 
- low BP- coreg, imdur with holding parameters. - bowel regimen. - continue IVFs - Palliative care consulted. - SLP pending, aspiration precautions - PT OT pending, fall precautions. - I have called the patient's daughter Ms. Brenda Roberts [phone] to update on assessment and plan. DNR per pt's son. DVT PPX- SQH Additional Notes:   
 
Case discussed with:  [x]Patient  [x]Family daughter  []Nursing  []Case Management DVT Prophylaxis:  []Lovenox  []Hep SQ  [x]SCDs  []Coumadin   []On Heparin gtt Objective:  
VS:  
Visit Vitals BP 98/53 Pulse 70 Temp 97.1 °F (36.2 °C) Resp 16 Ht 5' 4\" (1.626 m) Wt 66.4 kg (146 lb 4.8 oz) SpO2 100% BMI 25.11 kg/m² Tmax/24hrs: Temp (24hrs), Av.2 °F (36.8 °C), Min:96.8 °F (36 °C), Max:101.2 °F (38.4 °C) Intake/Output Summary (Last 24 hours) at 2020 1358 Last data filed at 2020 1304 Gross per 24 hour Intake 1047.5 ml Output  Net 1047.5 ml  
 
 
General: AA Woman sleeping in bed,   NAD Cardiovascular:  RRR Pulmonary: On nasal cannula, LSC throughout; respiratory effort WNL 
GI:  +BS in all four quadrants, soft, non-tender Hernia near the right upper quadrant, soft. Extremities:  No edema; 2+ dorsalis pedis pulses bilaterally Neuro: sleeping 
++ PPM in left chest wall. Bruise right knee. Pure wick. Labs:   
Recent Results (from the past 24 hour(s)) CULTURE, BLOOD Collection Time: 20  3:00 PM  
Result Value Ref Range Special Requests: NO SPECIAL REQUESTS Culture result: NO GROWTH AFTER 15 HOURS METABOLIC PANEL, COMPREHENSIVE Collection Time: 20  3:00 PM  
Result Value Ref Range Sodium 134 (L) 136 - 145 mmol/L Potassium 5.7 (H) 3.5 - 5.5 mmol/L Chloride 102 100 - 111 mmol/L  
 CO2 27 21 - 32 mmol/L Anion gap 5 3.0 - 18 mmol/L Glucose 107 (H) 74 - 99 mg/dL BUN 29 (H) 7.0 - 18 MG/DL Creatinine 1.41 (H) 0.6 - 1.3 MG/DL  
 BUN/Creatinine ratio 21 (H) 12 - 20 GFR est AA 43 (L) >60 ml/min/1.73m2 GFR est non-AA 35 (L) >60 ml/min/1.73m2 Calcium 8.7 8.5 - 10.1 MG/DL Bilirubin, total 0.4 0.2 - 1.0 MG/DL  
 ALT (SGPT) 15 13 - 56 U/L  
 AST (SGOT) 14 10 - 38 U/L Alk. phosphatase 60 45 - 117 U/L Protein, total 6.2 (L) 6.4 - 8.2 g/dL Albumin 3.2 (L) 3.4 - 5.0 g/dL Globulin 3.0 2.0 - 4.0 g/dL A-G Ratio 1.1 0.8 - 1.7    
CBC WITH AUTOMATED DIFF Collection Time: 20  3:00 PM  
Result Value Ref Range WBC 7.9 4.6 - 13.2 K/uL  
 RBC 3.23 (L) 4.20 - 5.30 M/uL HGB 9.4 (L) 12.0 - 16.0 g/dL HCT 29.3 (L) 35.0 - 45.0 %  MCV 90.7 74.0 - 97.0 FL  
 MCH 29.1 24.0 - 34.0 PG  
 MCHC 32.1 31.0 - 37.0 g/dL  
 RDW 15.3 (H) 11.6 - 14.5 % PLATELET 011 304 - 371 K/uL MPV 9.4 9.2 - 11.8 FL  
 NEUTROPHILS 70 40 - 73 % LYMPHOCYTES 16 (L) 21 - 52 % MONOCYTES 11 (H) 3 - 10 % EOSINOPHILS 3 0 - 5 % BASOPHILS 0 0 - 2 %  
 ABS. NEUTROPHILS 5.6 1.8 - 8.0 K/UL  
 ABS. LYMPHOCYTES 1.2 0.9 - 3.6 K/UL  
 ABS. MONOCYTES 0.9 0.05 - 1.2 K/UL  
 ABS. EOSINOPHILS 0.2 0.0 - 0.4 K/UL  
 ABS. BASOPHILS 0.0 0.0 - 0.1 K/UL  
 DF AUTOMATED    
CULTURE, WOUND W GRAM STAIN Collection Time: 06/18/20  3:06 PM  
Result Value Ref Range Special Requests: NO SPECIAL REQUESTS    
 GRAM STAIN RARE WBCS SEEN    
 GRAM STAIN RARE GRAM POSITIVE COCCI IN PAIRS Culture result: PENDING   
POC LACTIC ACID Collection Time: 06/18/20  3:11 PM  
Result Value Ref Range Lactic Acid (POC) 1.57 0.40 - 2.00 mmol/L  
CULTURE, BLOOD Collection Time: 06/18/20  3:29 PM  
Result Value Ref Range Special Requests: NO SPECIAL REQUESTS Culture result: NO GROWTH AFTER 15 HOURS    
URINALYSIS W/ RFLX MICROSCOPIC Collection Time: 06/18/20  3:51 PM  
Result Value Ref Range Color DARK YELLOW Appearance CLOUDY Specific gravity 1.015 1.005 - 1.030    
 pH (UA) 6.5 5.0 - 8.0 Protein TRACE (A) NEG mg/dL Glucose Negative NEG mg/dL Ketone TRACE (A) NEG mg/dL Bilirubin Negative NEG Blood Negative NEG Urobilinogen 1.0 0.2 - 1.0 EU/dL Nitrites Negative NEG Leukocyte Esterase LARGE (A) NEG    
CULTURE, URINE Collection Time: 06/18/20  3:51 PM  
Result Value Ref Range Special Requests: NO SPECIAL REQUESTS [place] Count >100,000 COLONIES/mL Culture result: GRAM NEGATIVE RODS (A) URINE MICROSCOPIC ONLY Collection Time: 06/18/20  3:51 PM  
Result Value Ref Range WBC TOO NUMEROUS TO COUNT 0 - 5 /hpf Epithelial cells FEW 0 - 5 /lpf Bacteria 4+ (A) NEG /hpf Mucus FEW (A) NEG /lpf SARS-COV-2  
 Collection Time: 06/18/20  8:09 PM  
Result Value Ref Range Specimen source Nasopharyngeal    
 COVID-19 rapid test Not detected NOTD METABOLIC PANEL, BASIC Collection Time: 06/19/20  5:13 AM  
Result Value Ref Range Sodium 134 (L) 136 - 145 mmol/L Potassium 4.7 3.5 - 5.5 mmol/L Chloride 103 100 - 111 mmol/L  
 CO2 22 21 - 32 mmol/L Anion gap 9 3.0 - 18 mmol/L Glucose 77 74 - 99 mg/dL BUN 27 (H) 7.0 - 18 MG/DL Creatinine 1.18 0.6 - 1.3 MG/DL  
 BUN/Creatinine ratio 23 (H) 12 - 20 GFR est AA 52 (L) >60 ml/min/1.73m2 GFR est non-AA 43 (L) >60 ml/min/1.73m2 Calcium 8.1 (L) 8.5 - 10.1 MG/DL  
CBC WITH AUTOMATED DIFF Collection Time: 06/19/20  5:13 AM  
Result Value Ref Range WBC 11.2 4.6 - 13.2 K/uL  
 RBC 3.06 (L) 4.20 - 5.30 M/uL HGB 9.0 (L) 12.0 - 16.0 g/dL HCT 27.8 (L) 35.0 - 45.0 % MCV 90.8 74.0 - 97.0 FL  
 MCH 29.4 24.0 - 34.0 PG  
 MCHC 32.4 31.0 - 37.0 g/dL  
 RDW 15.0 (H) 11.6 - 14.5 % PLATELET 526 224 - 369 K/uL MPV 9.1 (L) 9.2 - 11.8 FL  
 NEUTROPHILS 82 (H) 40 - 73 % LYMPHOCYTES 8 (L) 21 - 52 % MONOCYTES 9 3 - 10 % EOSINOPHILS 1 0 - 5 % BASOPHILS 0 0 - 2 %  
 ABS. NEUTROPHILS 9.3 (H) 1.8 - 8.0 K/UL  
 ABS. LYMPHOCYTES 0.9 0.9 - 3.6 K/UL  
 ABS. MONOCYTES 1.0 0.05 - 1.2 K/UL  
 ABS. EOSINOPHILS 0.1 0.0 - 0.4 K/UL  
 ABS. BASOPHILS 0.0 0.0 - 0.1 K/UL  
 DF AUTOMATED PROTHROMBIN TIME + INR Collection Time: 06/19/20  5:13 AM  
Result Value Ref Range Prothrombin time 16.1 (H) 11.5 - 15.2 sec INR 1.3 (H) 0.8 - 1.2 PTT Collection Time: 06/19/20  5:13 AM  
Result Value Ref Range aPTT 39.1 (H) 23.0 - 36.4 SEC  
TYPE & SCREEN Collection Time: 06/19/20  5:13 AM  
Result Value Ref Range Crossmatch Expiration 06/22/2020 ABO/Rh(D) O POSITIVE Antibody screen NEG   
EKG, 12 LEAD, INITIAL Collection Time: 06/19/20  6:55 AM  
Result Value Ref Range  Ventricular Rate 75 BPM  
 Atrial Rate 70 BPM  
 QRS Duration 210 ms  
 Q-T Interval 488 ms QTC Calculation (Bezet) 544 ms Calculated R Axis -7 degrees Calculated T Axis 125 degrees Diagnosis AV sequential or dual chamber electronic pacemaker Abnormal ECG When compared with ECG of 10-FEB-2020 12:57, No significant change was found Confirmed by Georgie Walton ([id number]) on 6/19/2020 7:56:55 AM 
  
 
 
Signed By: CLARIBEL Luna   
 June 19, 2020

## 2020-06-19 NOTE — BRIEF OP NOTE
Brief Postoperative Note Patient: Sofía Fernandez YOB: 1931 MRN: [id number] Date of Procedure: 6/19/2020 Pre-Op Diagnosis: DX Sacral decubitus ulcer Post-Op Diagnosis: Same as preoperative diagnosis. Procedure(s): IRRIGATION AND DEBRIDEMENT SACRAL DECUBITUS ULCER/ LATERAL POSITION, 3x4 cm Surgeon(s): 
La Moore MD 
 
Surgical Assistant: None Anesthesia: MAC Estimated Blood Loss (mL): less than 50 Complications: None Specimens: * No specimens in log * Implants: * No implants in log * Drains:  
External Female Catheter 06/19/20 (Active) Site Assessment Clean, dry, & intact 6/19/2020  4:24 AM  
Repositioned Yes 6/19/2020  4:24 AM  
Perineal Care Yes 6/19/2020  4:24 AM  
Wick Changed Yes 6/19/2020  4:24 AM  
Suction Canister/Tubing Changed Yes 6/19/2020  4:24 AM  
   
[REMOVED] External Female Catheter 05/07/19 (Removed) [REMOVED] External Female Catheter 07/25/19 (Removed) Findings: non-viable tissue down to fascia [id number] Electronically Signed by Jarrod Sanchez MD on 6/19/2020 at 1:06 PM

## 2020-06-19 NOTE — PROGRESS NOTES
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 Top Challenges facing patient (as identified by patient/family and CM):   NONE Finances/Medication cost?    NO Transportation   Will need stretcher. Support system or lack thereof? Family supports and personal care aides. Living arrangements? Lives with her  Mena Covarrubias and her son Dipak Bell. Self-care/ADLs/Cognition? Alert but very confused. Current Advanced Directive/Advance Care Plan:   no 
                       
Plan for utilizing home health:   Patient has personal care services, 7 days a week. Likelihood of readmission:   HIGH Transition of Care Plan:   Patient will return home with help from her family and have her personal care services restarted. She will need stretcher transport home. Initial assessment completed with patient's son Sean Segura. Cognitive status of patient: Alert but very confused. Face sheet information confirmed:  yes. Patient's sons Sean Martínez and Mireya Galvez, her daughter Ry Parikh will participate in her discharge plan and to receive any needed information. This patient lives in a house with her  Mena Covarrubias and her son Dipak Bell. Patient is not able to navigate steps as needed. Prior to hospitalization, patient was considered to be independent with ADLs/IADLS : no . If not independent,  patient needs assist with : ADLs and IADLs. Patient has a current ACP document on file: no  
 
Patient will need stretcher transport home upon discharge. The patient already has a wheelchair, a walker and a hospital bed, available in the home. Patient is not currently active with home health.  Patient is active with a personal care services. Patient has not stayed in a skilled nursing facility or rehab. This patient is on dialysis :no 
 
Currently, the discharge plan is for patient to return home with help from her family and resume her personal care aides. Patient will need stretcher transport, upon discharge. Patient's son is Isaac Toney, ND#[phone]). Patient's daughter is (Ike Iglesias, CP#[phone]). Patient's PCP is Dr. Kayleen Mott. Patient is insured through Medicare A&B and she also has [facility] Care Medicaid. The patient states that she can obtain her medications from the pharmacy, and take her medications as directed. This writer will continue to monitor for discharge planning and discharge recommendations to ensure a safe discharge from [facility]. Care Management Interventions PCP Verified by CM: Yes(Dr. Kayleen Mott) Palliative Care Criteria Met (RRAT>21 & CHF Dx)?: No 
Mode of Transport at Discharge: BLS Transition of Care Consult (CM Consult): Discharge Planning(Personal care aides) Current Support Network: Lives with Spouse, Family Lives [place], Has Personal Caregivers Confirm Follow Up Transport: Other (see comment)(Patient will need stretcher transport home.) Discharge Location Discharge Placement: Home with family assistance(and Personal Care aides.) Lamonte Mckee. MSW Care Manager Pager#: [phone]

## 2020-06-19 NOTE — WOUND CARE
Patient not seen by wound care at this time. Per bedside nurse patient being taken for I&D by Dr. Keith Morales. Wound care will follow if required by surgeon.

## 2020-06-19 NOTE — PROGRESS NOTES
Problem: Pressure Injury - Risk of 
Goal: *Prevention of pressure injury Description: Document Rodríguez Scale and appropriate interventions in the flowsheet. Outcome: Progressing Towards Goal 
Note: Pressure Injury Interventions: 
Sensory Interventions: Assess changes in LOC, Assess need for specialty bed, Check visual cues for pain, Discuss PT/OT consult with provider, Float heels, Keep linens dry and wrinkle-free, Minimize linen layers, Monitor skin under medical devices, Pressure redistribution bed/mattress (bed type), Turn and reposition approx. every two hours (pillows and wedges if needed) Moisture Interventions: Absorbent underpads, Check for incontinence Q2 hours and as needed, Internal/External urinary devices, Limit adult briefs, Minimize layers, Moisture barrier Activity Interventions: Assess need for specialty bed, Pressure redistribution bed/mattress(bed type) Mobility Interventions: Assess need for specialty bed, Float heels, HOB 30 degrees or less, Pressure redistribution bed/mattress (bed type), PT/OT evaluation, Turn and reposition approx. every two hours(pillow and wedges) Nutrition Interventions: Discuss nutritional consult with provider(Patient NPO) Friction and Shear Interventions: Apply protective barrier, creams and emollients, Foam dressings/transparent film/skin sealants, HOB 30 degrees or less, Lift sheet, Lift team/patient mobility team, Minimize layers, Transferring/repositioning devices Problem: Falls - Risk of 
Goal: *Absence of Falls Description: Document Melia Pinzon Fall Risk and appropriate interventions in the flowsheet. Outcome: Progressing Towards Goal 
Note: Fall Risk Interventions: 
  
 
Mentation Interventions: Adequate sleep, hydration, pain control, Bed/chair exit alarm, Door open when patient unattended, More frequent rounding, Reorient patient, Toileting rounds, Update white board Medication Interventions: Bed/chair exit alarm Elimination Interventions: Bed/chair exit alarm, Call light in reach, Toileting schedule/hourly rounds Problem: Urinary Tract Infection - Adult Goal: *Absence of infection signs and symptoms Outcome: Progressing Towards Goal 
  
Problem: General Wound Care Goal: *Non-infected wound: Improvement of existing wound, absence of infection, and maintenance of skin integrity Outcome: Progressing Towards Goal 
Goal: *Infected Wound: Prevention of further infection and promotion of healing Description: Infection control procedures (eg: clean dressings, clean gloves, hand washing, precautions to isolate wound from contamination, sterile instruments used for wound debridement) should be implemented. Outcome: Progressing Towards Goal 
  
Problem: Risk for Spread of Infection Goal: Prevent transmission of infectious organism to others Description: Prevent the transmission of infectious organisms to other patients, staff members, and visitors.  
Outcome: Progressing Towards Goal

## 2020-06-19 NOTE — CONSULTS
Palliative Medicine Consult [facility]: [phone] ([phone] [facility]: [phone] [facility]: [phone] Patient Name: [facility] [place] YOB: 1931 Date of Initial Consult: 6/19/2020 Reason for Consult: establish goals of care Requesting Provider: Vivien Toro DO 
Primary Care Physician: Chase Porras MD 
  
 SUMMARY:  
[place] Parkre is a 80 y.o. female with a past history of dementia, systolic and diastolic heart failure, and HTN, lupus, who was admitted on 6/18/2020 from home with a diagnosis of stage IV sacral wound with sepsis. Current medical issues leading to Palliative Medicine involvement include: establish goals of care. CHIEF COMPLAINT: non verbal  
 
HPI/SUBJECTIVE:   
Pt is an 80year old AAF that lives with her  Neal Vidal and son Haylie Nielsen. Pt has caregivers in the home and family support for care. The patient is:  
[] Verbal and participatory [x] Non-participatory due to: end stage dementia GOALS OF CARE: 
Patient/Health Care Proxy Stated Goals: Prolong life TREATMENT PREFERENCES:  
Code Status: DNR/DNI PALLIATIVE DIAGNOSES:  
1. Goals of Care/ACP 2. End stage dementia 3. Stage IV sacral ulcer 4. Sepsis PLAN:  
1. Goals of Care/ACP Palliative team including this NP in to see patient at the bedside. Pt is nonverbal and when asking the nurses about her interaction she stated that patient spit at her when trying to clean her up or touching her. Otherwise patient appears comfortable. Contacted patient's son Haylie Nielsen who says that he and his sister are now making decisions for his mother and they are both on board with making her a DNR/DNI and sending her home with hospice support. Hospice consult then placed. At this time patient is a DNR/DNI with plans for home with hospice. 2.   End stage dementia Pt is a FAST score 7F with loss of ability to hold up her head independently. 3.   Stage IV sacral Decubitus I&D done today by Dr Fidelia Olmos 4. Sepsis Fever and pyuria. Infection found down to the fascia. 5.   Initial consult note routed to primary continuity provider 6. Communicated plan of care with: Palliative IDT Advance Care Planning: 
[] [facility] Interdisciplinary Team has updated the ACP Navigator with [address] and Patient Capacity Primary Decision [address] (Health Care Agent):   Primary Decision Maker: Tolu Liu - [phone] Secondary Decision MakerTruzhanna Miranda - [phone] Medical Interventions: Limited additional interventions Other Instructions: Home with hospice As far as possible, the palliative care team has discussed with patient / health care proxy about goals of care / treatment preferences for patient. HISTORY:  
 
History obtained from: Chart review Principal Problem: 
  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 
  Dementia ([address] (1/30/2019) UTI (urinary tract infection) (3/17/2019) Sepsis ([address] (3/17/2019) AMS (altered mental status) (6/18/2020) Past Medical History:  
Diagnosis Date  Acute on chronic diastolic heart failure ([address]  Arthritis   
 rheumatoid arthritis  Benign hypertensive heart disease without heart failure Better controlled, stable  Chronic diastolic heart failure ([address] Breathing and edema is improving  Congestive heart failure ([address]  HTN (hypertension)  Hypercholesteremia  Lupus (systemic lupus erythematosus) ([address] 6/18/2014  
 followed by dr Vanessa Rust  Obesity, unspecified Patient has weight loss Discussed diet ad fluid restriction  Other and unspecified hyperlipidemia F/u per pmd  
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  
Procedure Laterality Date  CARDIAC SURG PROCEDURE UNLIST    
 pacemaker  HX FREE SKIN GRAFT    
 right foot  HX HYSTERECTOMY  HX ORTHOPAEDIC    
 PACEMAKER PROCEDURE Family History Problem Relation Age of Onset  Arrhythmia Neg Hx  Asthma Neg Hx  Clotting Disorder Neg Hx  Fainting Neg Hx   
 Heart Attack Neg Hx  High Cholesterol Neg Hx  Pacemaker Neg Hx  Stroke Neg Hx History reviewed, no pertinent family history. Social History Tobacco Use  Smoking status: Never Smoker  Smokeless tobacco: Never Used Substance Use Topics  Alcohol use: No  
 
No Known Allergies Current Facility-Administered Medications Medication Dose Route Frequency  amiodarone (CORDARONE) tablet 200 mg  200 mg Oral DAILY  aspirin delayed-release tablet 81 mg  81 mg Oral DAILY  carvediloL (COREG) tablet 3.125 mg  3.125 mg Oral Q12H  
 isosorbide mononitrate ER (IMDUR) tablet 60 mg  60 mg Oral DAILY  levothyroxine (SYNTHROID) tablet 25 mcg  25 mcg Oral 6am  
 piperacillin-tazobactam (ZOSYN) 2.25 g in 0.9% sodium chloride (MBP/ADV) 50 mL MBP  2.25 g IntraVENous Q6H  
 vancomycin (VANCOCIN) 1250 mg in  ml infusion  1,250 mg IntraVENous Q36H  
 [START ON 6/20/2020] lactobacillus sp. 50 billion cpu (BIO-K PLUS) capsule 1 Cap  1 Cap Oral DAILY  sodium chloride (NS) flush 5-10 mL  5-10 mL IntraVENous PRN  
 acetaminophen (TYLENOL) tablet 650 mg  650 mg Oral Q4H PRN  
 oxyCODONE-acetaminophen (PERCOCET) 5-325 mg per tablet 1 Tab  1 Tab Oral Q4H PRN  
 morphine injection 1 mg  1 mg IntraVENous Q4H PRN  
 heparin (porcine) injection 5,000 Units  5,000 Units SubCUTAneous Q8H  
 polyethylene glycol (MIRALAX) packet 17 g  17 g Oral DAILY  bisacodyL (DULCOLAX) suppository 10 mg  10 mg Rectal DAILY PRN  
 0.9% sodium chloride infusion  75 mL/hr IntraVENous CONTINUOUS Clinical Pain Assessment (nonverbal scale for nonverbal patients):      
 Activity (Movement): Restless, excessive activity and/or withdrawal reflexes Duration: for how long has pt been experiencing pain (e.g., 2 days, 1 month, years) Frequency: how often pain is an issue (e.g., several times per day, once every few days, constant) FUNCTIONAL ASSESSMENT:  
 
Palliative Performance Scale (PPS): PPS: 10 
 
ECOG 
ECOG Status : Completely disabled PSYCHOSOCIAL/SPIRITUAL SCREENING:  
  
Any spiritual / Synagogue concerns: 
[] Yes /  [x] No 
 
Caregiver Burnout: 
[] Yes /  [] No /  [x] No Caregiver Present Anticipatory grief assessment:  
[x] Normal  / [] Maladaptive REVIEW OF SYSTEMS:  
 
Positive and pertinent negative findings in ROS are noted above in HPI. The following systems were [x] reviewed / [] unable to be reviewed as noted in HPI Other findings are noted below. Constitutional: minimally responsive Eyes: not observed patient would not open eyes ENMT: no nasal discharge, moist mucous membranes Cardiovascular: regular rhythm, distal pulses intact Respiratory: breathing not labored, symmetric Gastrointestinal: soft non-tender, +bowel sounds Last bowel movement: not documented Musculoskeletal: contractures Skin: warm, dry Neurologic: not oriented non verbal  
Psychiatric: spits and hollers out when touched Other: 
 
Systems: constitutional, ears/nose/mouth/throat, respiratory, gastrointestinal, genitourinary, musculoskeletal, integumentary, neurologic, psychiatric, endocrine. Positive findings noted below. Modified ESAS Completed by: provider Fatigue: 9 Drowsiness: 9 Nausea: 0 Anorexia: 4 PHYSICAL EXAM:  
 
Wt Readings from Last 3 Encounters:  
06/19/20 66.4 kg (146 lb 4.8 oz) 03/11/20 81.6 kg (180 lb)  
01/24/20 81.6 kg (180 lb) Blood pressure 94/57, pulse 77, temperature 98.6 °F (37 °C), resp. rate 19, height 5' 4\" (1.626 m), weight 66.4 kg (146 lb 4.8 oz), SpO2 99 %. Pain: Pain Scale 1: Adult Nonverbal Pain Scale Pain Intensity 1: 0 LAB AND IMAGING FINDINGS:  
 
Lab Results Component Value Date/Time WBC 11.2 06/19/2020 05:13 AM  
 HGB 9.0 (L) 06/19/2020 05:13 AM  
 PLATELET 832 02/87/2682 05:13 AM  
 
Lab Results Component Value Date/Time Sodium 134 (L) 06/19/2020 05:13 AM  
 Potassium 4.7 06/19/2020 05:13 AM  
 Chloride 103 06/19/2020 05:13 AM  
 CO2 22 06/19/2020 05:13 AM  
 BUN 27 (H) 06/19/2020 05:13 AM  
 Creatinine 1.18 06/19/2020 05:13 AM  
 Calcium 8.1 (L) 06/19/2020 05:13 AM  
 Magnesium 2.4 02/10/2020 01:23 PM  
 Phosphorus 3.0 11/06/2019 09:57 AM  
  
Lab Results Component Value Date/Time Alk. phosphatase 60 06/18/2020 03:00 PM  
 Protein, total 6.2 (L) 06/18/2020 03:00 PM  
 Albumin 3.2 (L) 06/18/2020 03:00 PM  
 Globulin 3.0 06/18/2020 03:00 PM  
 
Lab Results Component Value Date/Time INR 1.3 (H) 06/19/2020 05:13 AM  
 Prothrombin time 16.1 (H) 06/19/2020 05:13 AM  
 aPTT 39.1 (H) 06/19/2020 05:13 AM  
  
Lab Results Component Value Date/Time Iron 51 02/10/2019 11:00 AM  
 TIBC 202 (L) 02/10/2019 11:00 AM  
 Iron % saturation 25 02/10/2019 11:00 AM  
  
No results found for: PH, PCO2, PO2 No components found for: [place] Lab Results Component Value Date/Time CK 75 01/30/2020 11:26 AM  
 CK - MB 1.0 01/09/2020 01:28 PM  
  
 
   
 
Total time: 30 minutes Counseling / coordination time, spent as noted above:  
> 50% counseling / coordination: time spent in direct consult with patient, family and medical team 
 
Prolonged service was provided for  []30 min   []75 min in face to face time in the presence of the patient, spent as noted above. Time Start:  
Time End:  
Note: this can only be billed with 91268 (initial) or 60145 (follow up). If multiple start / stop times, list each separately.

## 2020-06-19 NOTE — PROGRESS NOTES
attempted to visit patient and was not able to do a spiritual assessment. . Will follow up with patient as needed [address] [facility] Department [phone]

## 2020-06-19 NOTE — HOSPICE
Hospice referral received. Chart review in process. Thank you for the referral to [facility]. If we can be of further assistance please contact [phone] Neisah Marie RN [place] [address] [address] [place], [address] 
[phone] Email: [email or web address]

## 2020-06-19 NOTE — ED NOTES
TRANSFER - OUT REPORT: 
 
Verbal report given to Fabi Prabhakar RN(name) on [place]  being transferred to (unit) for routine progression of care Report consisted of patients Situation, Background, Assessment and  
Recommendations(SBAR). Information from the following report(s) SBAR, Kardex, ED Summary, Intake/Output, MAR, Accordion and Recent Results was reviewed with the receiving nurse. Lines:  
Peripheral IV 06/18/20 Left Forearm (Active) Peripheral IV 06/18/20 Right Antecubital (Active) Site Assessment Clean, dry, & intact 6/18/2020  5:03 PM  
Phlebitis Assessment 0 6/18/2020  5:03 PM  
Infiltration Assessment 0 6/18/2020  5:03 PM  
Dressing Status Clean, dry, & intact 6/18/2020  5:03 PM  
Dressing Type Transparent 6/18/2020  5:03 PM  
Hub Color/Line Status Pink 6/18/2020  5:03 PM  
  
 
Opportunity for questions and clarification was provided. Patient transported with: 
 Registered Nurse

## 2020-06-19 NOTE — PROGRESS NOTES
Speech Pathology:  
 
Pt is currently NPO for possible procedure. Will complete dysphagia evaluation at a later date/time or as medical condition permits. Thank you for this referral. 
 
Saad Worthy M.S. CCC-SLP/L Speech-Language Pathologist

## 2020-06-19 NOTE — CONSULTS
Infectious Disease Consultation Note Reason:fever Current abx Prior abx Vancomycin 6/18- Pip/tazo 6/18-   
 
 
Assessment : 
Fever Pyuria Stage IV sacral decubitus ulcer 
--s/p I&D 6/19 Dementia Recommendations: 
--Monitor blood cx x 2 
--Monitor urine cx 
--Wound care/Surgery eval for sacral ulcer 
--Continue broad spectrum abx of pip/tazo & IV Vancomycin, dosing per pharmacy with goal trough 15-20 until cx data returns & can be tailored 
--Additional recs to come pending return of data Thank you for consultation request. Please call me if any further questions or concerns. Will continue to participate in the care of this patient. HPI: 
HPI taken from chart as pt is nonverbal at baseline Was at home with a PMH pf dementia, RA/Lupus, chronic CHF, when she was taken to the ED for increased confusion & fever/  She was found in the ED to have a large stage IV sacral ulcer & some pyuria. She had a fever as well, no hypotension. She was noted to have tachypnea. For me now post-op she is breathing comfortably on RA. She was taken to the OR this morning for I&D and found to have infection down to the fascia. Past Medical History:  
Diagnosis Date  Acute on chronic diastolic heart failure ([address]  Arthritis   
 rheumatoid arthritis  Benign hypertensive heart disease without heart failure Better controlled, stable  Chronic diastolic heart failure ([address] Breathing and edema is improving  Congestive heart failure ([address]  HTN (hypertension)  Hypercholesteremia  Lupus (systemic lupus erythematosus) ([address] 6/18/2014  
 followed by dr Gera Osorio  Obesity, unspecified Patient has weight loss Discussed diet ad fluid restriction  Other and unspecified hyperlipidemia F/u per pmd  
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  
Procedure Laterality Date  CARDIAC SURG PROCEDURE UNLIST    
 pacemaker  HX FREE SKIN GRAFT    
 right foot  HX HYSTERECTOMY  HX ORTHOPAEDIC    
 PACEMAKER PROCEDURE Current Discharge Medication List  
  
CONTINUE these medications which have NOT CHANGED Details  
levothyroxine (SYNTHROID) 25 mcg tablet TAKE ONE TABLET BY MOUTH DAILY BEFORE BREAKFAST Qty: 30 Tab, Refills: 2 Associated Diagnoses: CKD (chronic kidney disease) stage 4, GFR 15-29 ml/min ([facility]) dextromethorphan-guaiFENesin (TUSSIN DM COUGH AND CHEST)  mg/5 mL liqd syrup Take 10 mL by mouth every six (6) hours as needed. isosorbide mononitrate (IMDUR PO) Take 60 mg by mouth daily. potassium chloride SR (KLOR-CON 10) 10 mEq tablet Take  by mouth.  
  
metOLazone (ZAROXOLYN) 2.5 mg tablet Take  by mouth daily. famotidine (PEPCID) 20 mg tablet Take 20 mg by mouth two (2) times a day. magnesium citrate solution Take 1/3 of bottle every 8 hours until finished or until you have a bowel movement. Qty: 1 Bottle, Refills: 0  
  
predniSONE (DELTASONE) 1 mg tablet Take 1 mg by mouth daily. 1 pill po daily  
  
spironolactone (ALDACTONE) 25 mg tablet Take 1 Tab by mouth daily. Qty: 90 Tab, Refills: 3  
  
hydroxychloroquine (PLAQUENIL) 200 mg tablet Take 200 mg by mouth two (2) times a day. torsemide (DEMADEX) 20 mg tablet Take 1 Tab by mouth daily as needed (edema). Qty: 30 Tab, Refills: 3  
  
polyethylene glycol (MIRALAX) 17 gram/dose powder Take 17 g by mouth daily. aspirin delayed-release 81 mg tablet Take 81 mg by mouth daily. food supplemt, lactose-reduced (ENSURE ENLIVE) 0.08 gram-1.5 kcal/mL liqd Take 1 Bottle by mouth two (2) times a day. Qty: 60 Bottle, Refills: 0  
  
menthol-zinc oxide (CALMOSEPTINE) 0.44-20.6 % oint Apply 1 Each to affected area as needed for Pain. apply to affected area after each pericare episode. carvedilol (COREG) 3.125 mg tablet Take 1 Tab by mouth every twelve (12) hours. Qty: 60 Tab, Refills: 0  
  
acetaminophen (TYLENOL ARTHRITIS PAIN) 650 mg TbER Take 1 Tab by mouth every eight (8) hours. Qty: 15 Tab, Refills: 0  
  
amiodarone (CORDARONE) 200 mg tablet TAKE ONE TABLET EVERY DAY (MAKE AN APPT) Qty: 30 Tab, Refills: 5  
  
memantine (NAMENDA) 10 mg tablet Take 10 mg by mouth two (2) times a day. amitriptyline (ELAVIL) 25 mg tablet Take 25 mg by mouth nightly. Current Facility-Administered Medications Medication Dose Route Frequency  amiodarone (CORDARONE) tablet 200 mg  200 mg Oral DAILY  aspirin delayed-release tablet 81 mg  81 mg Oral DAILY  carvediloL (COREG) tablet 3.125 mg  3.125 mg Oral Q12H  
 isosorbide mononitrate ER (IMDUR) tablet 60 mg  60 mg Oral DAILY  levothyroxine (SYNTHROID) tablet 25 mcg  25 mcg Oral 6am  
 sodium chloride (NS) flush 5-10 mL  5-10 mL IntraVENous PRN  
 VANCOMYCIN INFORMATION NOTE   Other Rx Dosing/Monitoring  acetaminophen (TYLENOL) tablet 650 mg  650 mg Oral Q4H PRN  
 oxyCODONE-acetaminophen (PERCOCET) 5-325 mg per tablet 1 Tab  1 Tab Oral Q4H PRN  
 morphine injection 1 mg  1 mg IntraVENous Q4H PRN  
 heparin (porcine) injection 5,000 Units  5,000 Units SubCUTAneous Q8H  
 polyethylene glycol (MIRALAX) packet 17 g  17 g Oral DAILY  bisacodyL (DULCOLAX) suppository 10 mg  10 mg Rectal DAILY PRN  
 0.9% sodium chloride infusion  75 mL/hr IntraVENous CONTINUOUS  piperacillin-tazobactam (ZOSYN) 3.375 g in 0.9% sodium chloride (MBP/ADV) 100 mL MBP  3.375 g IntraVENous Q6H Allergies: Patient has no known allergies. Family History Problem Relation Age of Onset  Arrhythmia Neg Hx  Asthma Neg Hx  Clotting Disorder Neg Hx  Fainting Neg Hx   
 Heart Attack Neg Hx  High Cholesterol Neg Hx  Pacemaker Neg Hx  Stroke Neg Hx Social History Socioeconomic History  Marital status:  Spouse name: Not on file  Number of children: Not on file  Years of education: Not on file  Highest education level: Not on file Occupational History  Not on file Social Needs  Financial resource strain: Not on file  Food insecurity Worry: Not on file Inability: Not on file  Transportation needs Medical: Not on file Non-medical: Not on file Tobacco Use  Smoking status: Never Smoker  Smokeless tobacco: Never Used Substance and Sexual Activity  Alcohol use: No  
 Drug use: No  
 Sexual activity: Never Lifestyle  Physical activity Days per week: Not on file Minutes per session: Not on file  Stress: Not on file Relationships  Social connections Talks on phone: Not on file Gets together: Not on file Attends Tenriism service: Not on file Active member of club or organization: Not on file Attends meetings of clubs or organizations: Not on file Relationship status: Not on file  Intimate partner violence Fear of current or ex partner: Not on file Emotionally abused: Not on file Physically abused: Not on file Forced sexual activity: Not on file Other Topics Concern  Not on file Social History Narrative  Not on file Social History Tobacco Use Smoking Status Never Smoker Smokeless Tobacco Never Used Temp (24hrs), Av.5 °F (36.9 °C), Min:96.8 °F (36 °C), Max:101.2 °F (38.4 °C) Visit Vitals /77 (BP 1 Location: Left arm, BP Patient Position: At rest) Pulse 70 Temp 98.7 °F (37.1 °C) Resp 15 Ht 5' 4\" (1.626 m) Wt 66.4 kg (146 lb 4.8 oz) SpO2 100% BMI 25.11 kg/m² ROS: 12 point ROS obtained in details. Pertinent positives as mentioned in HPI,  
otherwise negative Physical Exam: 
General:   lying in bed, NAD HEENT:  Normocephalic, atraumatic, eyes closed, nasal and oral mucous are dry and without evidence of lesions. No thrush. Neck supple, no bruits. Lymph Nodes:   no cervical, axillary or inguinal adenopathy Lungs:   non-labored, bilaterally clear to auscultation- no crackles wheezes rales or rhonchi anteriorly Heart:  RRR, s1 and s2; no murmurs rubs or gallops, no edema, + pedal pulses Abdomen:  soft, non-distended, active bowel sounds, no hepatomegaly, no splenomegaly. No apparent tenderness, ventral hernia which is soft Genitourinary:  deferred Extremities:   no edema b/l LE, heels in soft boots, muscle wasting Neurologic:  No verbal response which per chart is her baseline Skin:  No rash on limited skin exam  
Wound:  Per chart review-> just out from OR Back:  deferred Psychiatric:  Non verbal  
 
 
 
Labs: Results:  
Chemistry Recent Labs  
  06/19/20 
0513 06/18/20 
1500 GLU 77 107* * 134* K 4.7 5.7*  
 102 CO2 22 27 BUN 27* 29* CREA 1.18 1.41* CA 8.1* 8.7 AGAP 9 5 BUCR 23* 21* AP  --  60  
TP  --  6.2* ALB  --  3.2*  
GLOB  --  3.0 AGRAT  --  1.1  
  
CBC w/Diff Recent Labs  
  06/19/20 
0513 06/18/20 
1500 WBC 11.2 7.9  
RBC 3.06* 3.23* HGB 9.0* 9.4* HCT 27.8* 29.3*  
 141 GRANS 82* 70  
LYMPH 8* 16* EOS 1 3 Microbiology Recent Labs  
  06/18/20 
1529 06/18/20 
1506 06/18/20 
1500 CULT NO GROWTH AFTER 15 HOURS PENDING NO GROWTH AFTER 15 HOURS  
  
 
 
RADIOLOGY: 
CT A/P 6/18: 
1. Stable small ventral hernia just to the right of midline containing a portion 
of transverse colon partially protruding into the hernia sac. No acute GI tract 
abnormalities otherwise. 
  
2. Patient appears constipated, large volume of retained stool throughout. Gallstones.  
  
3.  Small pleural effusions with slight atelectatic changes. 4.  Pre-existing L1 compression deformity. pCXR 6/18: No acute cardiopulmonary disease.  
 
Dr. Wesley Paredes, Infectious Disease Specialist 
June 19, 2020 
10:46 AM

## 2020-06-19 NOTE — ROUTINE PROCESS
TRANSFER - IN REPORT: 
 
Verbal report received from [place], RN(name) on [place]  being received from ED(unit) for routine progression of care Report consisted of patients Situation, Background, Assessment and  
Recommendations(SBAR). Information from the following report(s) SBAR, Kardex, ED Summary, MAR, Recent Results and Cardiac Rhythm NSR, Elevated T-wave. was reviewed with the receiving nurse. Opportunity for questions and clarification was provided. Assessment completed upon patients arrival to unit and care assumed.

## 2020-06-19 NOTE — PROGRESS NOTES
NUTRITION Nursing Referral: Pressure Injury RECOMMENDATIONS / PLAN:  
 
- Add supplement: Ensure Enlive BID 
- Continue RD inpatient monitoring and evaluation. NUTRITION INTERVENTIONS & DIAGNOSIS:  
 
- Meals/snacks: modified composition - Medical food supplement therapy: initiate Nutrition Diagnosis: Increased nutrient needs (protein) related to promotion of wound healing as evidenced by pressure injury wound. Unintended weight loss related to predicted prolonged inadequate energy intake as evidenced by wt loss of 18.9% x 3 month PTA. ASSESSMENT:  
 
Pt off unit at time of visit; was NPO today for incision and debridement of sacral decubitus ulcer. Po diet started after procedure. Had dementia. Per nursing, pt makes noises, spits, and give dirty looks; this is baseline per her son report per RN. Palliative and Hospice following; plan is for hospice following discharge. Nutritional intake adequate to meet patients estimated nutritional needs:  Unable to determine at this time Diet: DIET DYSPHAGIA PUREED (NDD1) Food Allergies:  None known Current Appetite: Unknown Appetite/meal intake prior to admission: Unknown Feeding Limitations:  [x] Swallowing difficulty: SLP consulted    [] Chewing difficulty    [] Other: 
Current Meal Intake: No data found. BM:  6/16 (abdomen distended) Skin Integrity:  Stage IV sacral pressure injury; friction wound right thigh; Coccyx surgical incision Edema:   [x] No     [] Yes Pertinent Medications: Reviewed: NS at 75 mL/hr, bowel regimen, levothyroxine. Lactobacillus ordered Recent Labs  
  06/19/20 
0513 06/18/20 
1500 * 134* K 4.7 5.7*  
 102 CO2 22 27 GLU 77 107* BUN 27* 29* CREA 1.18 1.41* CA 8.1* 8.7 ALB  --  3.2* ALT  --  15 Intake/Output Summary (Last 24 hours) at 6/19/2020 1718 Last data filed at 6/19/2020 1304 Gross per 24 hour Intake 1047.5 ml Output  Net 1047.5 ml  
 
 
 Anthropometrics: 
Ht Readings from Last 1 Encounters:  
06/18/20 5' 4\" (1.626 m) Last 3 Recorded Weights in this Encounter 06/18/20 1520 06/19/20 0143 Weight: 54.7 kg (120 lb 8 oz) 66.4 kg (146 lb 4.8 oz) Body mass index is 25.11 kg/m². Weight History:   -34 lb, 18.9% x 3 month PTA per chart hx; noted weight fluctuations PTA Weight Metrics 6/19/2020 3/11/2020 2/5/2020 1/24/2020 1/9/2020 11/14/2019 10/18/2019 Weight 146 lb 4.8 oz 180 lb - 180 lb 180 lb 155 lb 140 lb BMI 25.11 kg/m2 29.95 kg/m2 29.95 kg/m2 29.95 kg/m2 29.95 kg/m2 24.28 kg/m2 21.93 kg/m2 Admitting Diagnosis: UTI (urinary tract infection) [N39.0] AMS (altered mental status) [R41.82] Stage IV pressure ulcer of sacral region [facility]) Morgan Meyer Pertinent PMHx: heart failure, hypertension, hypercholesterolemia, lupus, dementia, HLD Education Needs:        [x] None identified  [] Identified - Not appropriate at this time  []  Identified and addressed - refer to education log Learning Limitations:   [] None identified  [x] Identified: dementia Cultural, Zoroastrianism & ethnic food preferences:  [x] None identified    [] Identified and addressed ESTIMATED NUTRITION NEEDS:  
 
Calories: 6111-2933 kcal (30-35 kcal/kg) based on  [] Actual BW      [x] IBW: 55 kg Protein: 79-99 gm (1.2-1.5 gm/kg) based on  [x] Actual BW: 66 kg      [] IBW Fluid: 1 mL/kcal 
  
MONITORING & EVALUATION:  
 
Nutrition Goal(s):  
- PO nutrition intake will meet >75% of patient estimated nutritional needs within the next 7 days. Outcome: New/Initial goal  
 
Monitoring:   [x] Food and nutrient intake   [x] Food and nutrient administration  [x] Comparative standards   [x] Nutrition-focused physical findings   [x] Anthropometric Measurements   [x] Treatment/therapy   [x] Biochemical data, medical tests, and procedures Previous Recommendations (for follow-up assessments only):  Not Applicable Discharge Planning: No nutritional discharge needs at this time. Participated in care planning, discharge planning, & interdisciplinary rounds as appropriate. Jojo Schwartz, RD Pager: [phone]

## 2020-06-19 NOTE — PERIOP NOTES
TRANSFER - OUT REPORT: 
 
Verbal report given to [facility]  - [facility]) on [place]  being transferred to [address] [place], [place] [address]unit) for routine post - op Report consisted of patients Situation, Background, Assessment and  
Recommendations(SBAR). Information from the following report(s) SBAR, OR Summary, Procedure Summary, Intake/Output and MAR was reviewed with the receiving nurse. Lines:  
Peripheral IV 06/18/20 Right Antecubital (Active) Site Assessment Clean, dry, & intact 6/19/2020  1:19 PM  
Phlebitis Assessment 0 6/19/2020  1:19 PM  
Infiltration Assessment 0 6/19/2020  1:19 PM  
Dressing Status Clean, dry, & intact 6/19/2020  1:19 PM  
Dressing Type Transparent;Tape 6/19/2020  1:19 PM  
Hub Color/Line Status Pink; Infusing 6/19/2020  1:19 PM  
  
 
Opportunity for questions and clarification was provided. Patient transported with: 
 O2 @ 3 liters Tech

## 2020-06-20 NOTE — PROGRESS NOTES
Problem: Pressure Injury - Risk of 
Goal: *Prevention of pressure injury Description: Document Rodríguez Scale and appropriate interventions in the flowsheet. Outcome: Progressing Towards Goal 
Note: Pressure Injury Interventions: 
Sensory Interventions: Assess changes in LOC, Check visual cues for pain, Float heels, Keep linens dry and wrinkle-free, Pressure redistribution bed/mattress (bed type), Turn and reposition approx. every two hours (pillows and wedges if needed) Moisture Interventions: Absorbent underpads, Check for incontinence Q2 hours and as needed, Internal/External urinary devices, Minimize layers, Offer toileting Q_hr, Moisture barrier Activity Interventions: Pressure redistribution bed/mattress(bed type) Mobility Interventions: Float heels, HOB 30 degrees or less, Pressure redistribution bed/mattress (bed type), Turn and reposition approx. every two hours(pillow and wedges) Nutrition Interventions: Document food/fluid/supplement intake Friction and Shear Interventions: HOB 30 degrees or less, Lift sheet, Minimize layers, Foam dressings/transparent film/skin sealants Problem: Patient Education: Go to Patient Education Activity Goal: Patient/Family Education Outcome: Progressing Towards Goal 
  
Problem: Falls - Risk of 
Goal: *Absence of Falls Description: Document Nicholas Chisago Fall Risk and appropriate interventions in the flowsheet. Outcome: Progressing Towards Goal 
Note: Fall Risk Interventions: 
  
 
Mentation Interventions: Adequate sleep, hydration, pain control, Bed/chair exit alarm, Door open when patient unattended, More frequent rounding, Reorient patient, Room close to nurse's station, Toileting rounds, Update white board Medication Interventions: Bed/chair exit alarm Elimination Interventions: Bed/chair exit alarm, Call light in reach, Stay With Me (per policy), Toilet paper/wipes in reach, Toileting schedule/hourly rounds Problem: Patient Education: Go to Patient Education Activity Goal: Patient/Family Education Outcome: Progressing Towards Goal 
  
Problem: Urinary Tract Infection - Adult Goal: *Absence of infection signs and symptoms Outcome: Progressing Towards Goal 
  
Problem: Patient Education: Go to Patient Education Activity Goal: Patient/Family Education Outcome: Progressing Towards Goal 
  
Problem: General Wound Care Goal: *Non-infected wound: Improvement of existing wound, absence of infection, and maintenance of skin integrity Outcome: Progressing Towards Goal 
Goal: *Infected Wound: Prevention of further infection and promotion of healing Description: Infection control procedures (eg: clean dressings, clean gloves, hand washing, precautions to isolate wound from contamination, sterile instruments used for wound debridement) should be implemented. Outcome: Progressing Towards Goal 
Goal: Interventions Outcome: Progressing Towards Goal 
  
Problem: Patient Education: Go to Patient Education Activity Goal: Patient/Family Education Outcome: Progressing Towards Goal 
  
Problem: [address] (Adult/Pediatric) Goal: *STG: Participates in treatment plan Outcome: Progressing Towards Goal 
Goal: *STG: Seeks staff when feelings of anxiety and fear arise Outcome: Progressing Towards Goal 
Goal: *STG: Attends activities and groups Outcome: Progressing Towards Goal 
Goal: *STG: Absence of lethality Outcome: Progressing Towards Goal 
Goal: *STG: Demonstrates ability to understand and use improved judgment in daily activities and relationships Outcome: Progressing Towards Goal 
Goal: *STG: Remains safe in hospital 
Outcome: Progressing Towards Goal 
Goal: *LTG: Obtains optimum level of functioning Outcome: Progressing Towards Goal 
Goal: *STG/LTG: Maintain structure for daily activities Outcome: Progressing Towards Goal 
Goal: *STG/LTG: Complies with medication therapy Outcome: Progressing Towards Goal 
Goal: Interventions Outcome: Progressing Towards Goal 
  
Problem: Patient Education: Go to Patient Education Activity Goal: Patient/Family Education Outcome: Progressing Towards Goal 
  
Problem: Patient Education: Go to Patient Education Activity Goal: Patient/Family Education Outcome: Progressing Towards Goal 
  
Problem: Cognitive Deficits Goal: *LTG: Develop alternative coping strategies to compensate for cognitive limitations Outcome: Progressing Towards Goal 
Goal: *LTG: Improve capacity to fully complete simple tasks & maintain ADLs Outcome: Progressing Towards Goal 
Goal: *STG: Cooperate with and complete organicity testing Outcome: Progressing Towards Goal 
Goal: *STG: Increase basic understanding of cause & effect relationships Outcome: Progressing Towards Goal 
Goal: *STG: Implement memory enhancing mechanisms Outcome: Progressing Towards Goal 
Goal: *STG: Follow through to completion of simple tasks Outcome: Progressing Towards Goal 
Goal: *STG: Identify when it is appropriate to seek help with a task and when it is not Outcome: Progressing Towards Goal 
Goal: *STG: Understand & accept cognitive limitations & use alternative coping mechanisms Outcome: Progressing Towards Goal 
Goal: *Patient Specific Goal (EDIT GOAL, INSERT TEXT) Outcome: Progressing Towards Goal 
Goal: *Patient Specific Goal (EDIT GOAL, INSERT TEXT) Outcome: Progressing Towards Goal 
Goal: Cognitive Deficit Interventions Outcome: Progressing Towards Goal 
  
Problem: Risk for Spread of Infection Goal: Prevent transmission of infectious organism to others Description: Prevent the transmission of infectious organisms to other patients, staff members, and visitors. Outcome: Progressing Towards Goal 
  
Problem: Patient Education:  Go to Education Activity Goal: Patient/Family Education Outcome: Progressing Towards Goal 
  
Problem: Nutrition Deficit Goal: *Optimize nutritional status Outcome: Progressing Towards Goal

## 2020-06-20 NOTE — PROGRESS NOTES
Progress Note Patient: Jitendra De Los Santos MRN: [id number]  SSN: [id number] YOB: 1931  Age: 80 y.o. Sex: female Admit Date: 6/18/2020 LOS: 2 days Subjective: POD #1 status post debridement of sacral decubitus Nonverbal 
 
 
Objective:  
 
Vitals:  
 06/19/20 1929 06/20/20 0007 06/20/20 0321 06/20/20 8447 BP: 117/64 121/67 117/63 110/59 Pulse: 69 70 70 75 Resp: 20 20 18 16 Temp: 97.2 °F (36.2 °C) 97.3 °F (36.3 °C) 97.5 °F (36.4 °C) 96.8 °F (36 °C) SpO2: 100% 100% 100% 98% Weight:      
Height:      
  
 
Intake and Output: 
Current Shift: 06/20 0701 - 06/20 1900 In: 1811.3 [P.O.:50; I.V.:1761.3] Out: 575 [Urine:575] Last three shifts: 06/18 1901 - 06/20 0700 In: 1107.5 [P.O.:90; I.V.:1017.5] Out: 500 [Urine:500] Physical Exam:  
General: Nonverbal, nontoxic Lungs: CTA, EQual,  Cor: RRR without rub, gallop or murmur Abd: soft, NABS, nondistended, 
Back: Wound dressed without evidence of active hemorrhage Postoperative day 1 status post Ext: nontender calves Lab/Data Review: 
BMP:  
Lab Results Component Value Date/Time  06/20/2020 01:04 AM  
 K 4.7 06/20/2020 01:04 AM  
  06/20/2020 01:04 AM  
 CO2 22 06/20/2020 01:04 AM  
 AGAP 8 06/20/2020 01:04 AM  
 GLU 57 (L) 06/20/2020 01:04 AM  
 BUN 27 (H) 06/20/2020 01:04 AM  
 CREA 1.09 06/20/2020 01:04 AM  
 GFRAA 57 (L) 06/20/2020 01:04 AM  
 GFRNA 47 (L) 06/20/2020 01:04 AM  
 
CMP:  
Lab Results Component Value Date/Time  06/20/2020 01:04 AM  
 K 4.7 06/20/2020 01:04 AM  
  06/20/2020 01:04 AM  
 CO2 22 06/20/2020 01:04 AM  
 AGAP 8 06/20/2020 01:04 AM  
 GLU 57 (L) 06/20/2020 01:04 AM  
 BUN 27 (H) 06/20/2020 01:04 AM  
 CREA 1.09 06/20/2020 01:04 AM  
 GFRAA 57 (L) 06/20/2020 01:04 AM  
 GFRNA 47 (L) 06/20/2020 01:04 AM  
 CA 7.8 (L) 06/20/2020 01:04 AM  
 
CBC:  
Lab Results Component Value Date/Time  WBC 8.1 06/20/2020 01:04 AM  
 HGB 8.0 (L) 06/20/2020 01:04 AM  
 HCT 24.7 (L) 06/20/2020 01:04 AM  
  (L) 06/20/2020 01:04 AM  
 
Recent Glucose Results:  
Lab Results Component Value Date/Time GLU 57 (L) 06/20/2020 01:04 AM  
 
Liver Panel: No results found for: ALB, CBIL, TBIL, TP, GLOB, AGRAT, ASTPOC, ALTPOC, ALT, AP Pancreatic Markers: No results found for: AMYLPOCT, AML, LIPPOCT, LPSE Assessment:  
 
Principal Problem: 
  Stage IV pressure ulcer of sacral region ([facility] (6/18/2020) Active Problems: 
  Dementia ([address] (1/30/2019) UTI (urinary tract infection) (3/17/2019) Sepsis ([address] (3/17/2019) AMS (altered mental status) (6/18/2020) Goals of care, counseling/discussion () Debridement of sacral decubitus Plan:  
 
Continue care as per admitting service/consultants Initiate normal saline wet-to-dry dressing changes abraded sacral decubitus today Wound care consult for assistance in management Signed By: Florinda Willett DO   
 June 20, 2020

## 2020-06-20 NOTE — PROGRESS NOTES
Problem: Pressure Injury - Risk of 
Goal: *Prevention of pressure injury Description: Document Rodríguez Scale and appropriate interventions in the flowsheet. Outcome: Not Progressing Towards Goal 
Note: Pressure Injury Interventions: 
Sensory Interventions: Assess changes in LOC Moisture Interventions: Absorbent underpads, Internal/External urinary devices, Minimize layers, Apply protective barrier, creams and emollients Activity Interventions: Pressure redistribution bed/mattress(bed type) Mobility Interventions: Float heels, Pressure redistribution bed/mattress (bed type), Turn and reposition approx. every two hours(pillow and wedges) Nutrition Interventions: Document food/fluid/supplement intake Friction and Shear Interventions: Foam dressings/transparent film/skin sealants, Lift team/patient mobility team, Minimize layers Problem: Patient Education: Go to Patient Education Activity Goal: Patient/Family Education Outcome: Not Progressing Towards Goal 
  
Problem: Falls - Risk of 
Goal: *Absence of Falls Description: Document Tulio Cease Fall Risk and appropriate interventions in the flowsheet. Outcome: Not Progressing Towards Goal 
Note: Fall Risk Interventions: 
Mobility Interventions: Bed/chair exit alarm Mentation Interventions: Bed/chair exit alarm, Door open when patient unattended Medication Interventions: Bed/chair exit alarm Elimination Interventions: Bed/chair exit alarm Problem: Patient Education: Go to Patient Education Activity Goal: Patient/Family Education Outcome: Not Progressing Towards Goal 
  
Problem: Urinary Tract Infection - Adult Goal: *Absence of infection signs and symptoms Outcome: Not Progressing Towards Goal 
  
Problem: Patient Education: Go to Patient Education Activity Goal: Patient/Family Education Outcome: Not Progressing Towards Goal 
  
Problem: General Wound Care Goal: *Non-infected wound: Improvement of existing wound, absence of infection, and maintenance of skin integrity Outcome: Not Progressing Towards Goal 
Goal: *Infected Wound: Prevention of further infection and promotion of healing Description: Infection control procedures (eg: clean dressings, clean gloves, hand washing, precautions to isolate wound from contamination, sterile instruments used for wound debridement) should be implemented. Outcome: Not Progressing Towards Goal 
Goal: Interventions Outcome: Not Progressing Towards Goal 
  
Problem: Patient Education: Go to Patient Education Activity Goal: Patient/Family Education Outcome: Not Progressing Towards Goal 
  
Problem: [address] (Adult/Pediatric) Goal: *STG: Participates in treatment plan Outcome: Not Progressing Towards Goal 
Goal: *STG: Seeks staff when feelings of anxiety and fear arise Outcome: Not Progressing Towards Goal 
Goal: *STG: Attends activities and groups Outcome: Not Progressing Towards Goal 
Goal: *STG: Absence of lethality Outcome: Not Progressing Towards Goal 
Goal: *STG: Demonstrates ability to understand and use improved judgment in daily activities and relationships Outcome: Not Progressing Towards Goal 
Goal: *STG: Remains safe in hospital 
Outcome: Not Progressing Towards Goal 
Goal: *LTG: Obtains optimum level of functioning Outcome: Not Progressing Towards Goal 
Goal: *STG/LTG: Maintain structure for daily activities Outcome: Not Progressing Towards Goal 
Goal: *STG/LTG: Complies with medication therapy Outcome: Not Progressing Towards Goal 
Goal: Interventions Outcome: Not Progressing Towards Goal 
  
Problem: Patient Education: Go to Patient Education Activity Goal: Patient/Family Education Outcome: Not Progressing Towards Goal 
  
Problem: Patient Education: Go to Patient Education Activity Goal: Patient/Family Education Outcome: Not Progressing Towards Goal 
  
Problem: Cognitive Deficits Goal: *LTG: Develop alternative coping strategies to compensate for cognitive limitations Outcome: Not Progressing Towards Goal 
Goal: *LTG: Improve capacity to fully complete simple tasks & maintain ADLs Outcome: Not Progressing Towards Goal 
Goal: *STG: Cooperate with and complete organicity testing Outcome: Not Progressing Towards Goal 
Goal: *STG: Increase basic understanding of cause & effect relationships Outcome: Not Progressing Towards Goal 
Goal: *STG: Implement memory enhancing mechanisms Outcome: Not Progressing Towards Goal 
Goal: *STG: Follow through to completion of simple tasks Outcome: Not Progressing Towards Goal 
Goal: *STG: Identify when it is appropriate to seek help with a task and when it is not Outcome: Not Progressing Towards Goal 
Goal: *STG: Understand & accept cognitive limitations & use alternative coping mechanisms Outcome: Not Progressing Towards Goal 
Goal: *Patient Specific Goal (EDIT GOAL, INSERT TEXT) Outcome: Not Progressing Towards Goal 
Goal: *Patient Specific Goal (EDIT GOAL, INSERT TEXT) Outcome: Not Progressing Towards Goal 
Goal: Cognitive Deficit Interventions Outcome: Not Progressing Towards Goal 
  
Problem: Risk for Spread of Infection Goal: Prevent transmission of infectious organism to others Description: Prevent the transmission of infectious organisms to other patients, staff members, and visitors. Outcome: Not Progressing Towards Goal 
  
Problem: Patient Education:  Go to Education Activity Goal: Patient/Family Education Outcome: Not Progressing Towards Goal 
  
Problem: Nutrition Deficit Goal: *Optimize nutritional status Outcome: Not Progressing Towards Goal

## 2020-06-20 NOTE — PROGRESS NOTES
PHYSICAL THERAPY EVALUATION AND DISCHARGE Patient: Sue Forrest (80 y.o. 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 
IRRIGATION AND DEBRIDEMENT SACRAL DECUBITUS ULCER/ LATERAL POSITION (N/A) 1 Day Post-Op Precautions:   Skin, Fall PLOF: required total care and assistance at home. ASSESSMENT : 
Based on the objective data described below, the patient presents with end stage dementia, impaired skin integrity s/p I&D sacral ulcer, total assist for all mobility, unable to follow commands, decreased AROM UE and BLE, unable to tolerate PROM BLE due to pain. Nurse cleared patient for evaluation. Patient received from speech therapy, reclined in bed with HOB elevated, on 2 LPM O2 per NC, +purewick. Patient disoriented x 4, unable to follow any commands. Speaking mostly nonsensical words but occasionally coherent words were identified: \"piece of paper, oh lord, hi.\" Patient observed with nursing during changing to be total assist x 2 for rolling and bed mobility. Patient unable to tolerate LE assessment of ROM and started grimacing and in obvious discomfort when PROM was attempted. LLE knee appears with deformation and significant genu valgum. BLE in soft boots but patient became agitated when removed so boots put back on. LUE noted with tendency for first-clenched posture and was resistant to gentle PROM of left fingers into extension. Demonstrated partial active extension of fingers but contracted left ring finger. Patient is poor physical therapy candidate due lack of following commands or ability to tolerate PROM. Recommend home with 24-hr care, turning schedule. Appears in chart to has hospice referral. Will sign of from skilled PT. Patient does not require further skilled intervention at this level of care.  
   
 
PLAN : 
 Recommendations and Planned Interventions:  
No formal PT needs identified at this time. Discharge Recommendations: home with supportive care and family; personal care aides as was PLOF Further Equipment Recommendations for Discharge: has wheelchair, walker, and hospital bed, per care management note. SUBJECTIVE:  
Patient stated \"Piece of paper. .oh lord. ... hi... oh no.  OBJECTIVE DATA SUMMARY:  
 
Past Medical History:  
Diagnosis Date  Acute on chronic diastolic heart failure ([facility] [address]  Arthritis   
 rheumatoid arthritis  Benign hypertensive heart disease without heart failure Better controlled, stable  Chronic diastolic heart failure ([address] Breathing and edema is improving  Congestive heart failure ([address]  HTN (hypertension)  Hypercholesteremia  Lupus (systemic lupus erythematosus) ([facility] [address] 6/18/2014  
 followed by dr Nirmala Galindo  Obesity, unspecified Patient has weight loss Discussed diet ad fluid restriction  Other and unspecified hyperlipidemia F/u per pmd  
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  
Procedure Laterality Date  CARDIAC SURG PROCEDURE UNLIST    
 pacemaker  HX FREE SKIN GRAFT    
 right foot  HX HYSTERECTOMY  HX ORTHOPAEDIC    
 PACEMAKER PROCEDURE Barriers to Learning/Limitations: yes;  cognitive and altered mental status (i.e.Sedation, Confusion) Compensate with: Visual Cues, Verbal Cues, Tactile Cues, and Kinesthetic Cues Home Situation:  
Home Situation Home Environment: Private residence One/Two Story Residence: One story Living Alone: No 
Support Systems: Family member(s) Patient Expects to be Discharged to[de-identified] Private residence Current DME Used/Available at Home: None Critical Behavior: 
Neurologic State: Alert;Confused Orientation Level: Disoriented X4 Cognition: Decreased attention/concentration; No command following; Impaired decision making Safety/Judgement: Lack of insight into deficits Psychosocial 
Patient Behaviors: Altered mental status;Confused Purposeful Interaction: No (comment) Pt Identified Daily Priority: Clinical issues (comment) [employer] Process: Nurture loving kindness;Establish trust;Attend basic human needs Caring Interventions: Therapeutic modalities; Reassure Reassure: Caring rounds;Quiet presence; Informing Therapeutic Modalities: Intentional therapeutic touch Skin Condition/Temp: Dry Skin Integrity: Wound (add Wound LDA) Skin Integumentary Skin Color: Appropriate for ethnicity Skin Condition/Temp: Dry Skin Integrity: Wound (add Wound LDA) Turgor: Epidermis thin w/ loss of subcut tissue Hair Growth: Present Varicosities: Absent Nails: Exceptions to [facility] Exceptions to WDL: Thick; Discolored Strength:   
Strength: Grossly decreased, non-functional 
  
  
  
  
  
  
Tone & Sensation:  
Tone: Abnormal(LUE finger flexion contracture; rigid BLE tone) Sensation: (formal assessment limited due to cognition) Range Of Motion: 
AROM: Grossly decreased, non-functional 
  
  
  
PROM: Grossly decreased, non-functional(BLE unable to tolerate PROM; patient grimacing in pain) Posture: 
  
   
Functional Mobility: 
Bed Mobility: 
Rolling: Total assistance;Assist x2(observed with nursing) Transfers: Total assistance Pain: 
Pain level pre-treatment: unable to rate Pain level post-treatment: unable to rate Pain Intervention(s): Medication (see MAR); Rest, Repositioning Response to intervention: Nurse notified, See doc flow Activity Tolerance:  
98% on 2 LPM O2 throughout session Please refer to the flowsheet for vital signs taken during this treatment. After treatment:  
[]         Patient left in no apparent distress sitting up in chair 
[x]         Patient left in no apparent distress in bed 
[x]         Call bell left within reach [x]         Nursing notified 
[]         Caregiver present [x]         Bed alarm activated 
[]         SCDs applied COMMUNICATION/EDUCATION:  
[x]         Role of Physical Therapy in the acute care setting. []         Fall prevention education was provided and the patient/caregiver indicated understanding. []         Patient/family have participated as able in goal setting and plan of care. []         Patient/family agree to work toward stated goals and plan of care. []         Patient understands intent and goals of therapy, but is neutral about his/her participation. [x]         Patient is unable to participate in goal setting/plan of care: ongoing with therapy staff. [x]         Other: limited by advanced dementia Thank you for this referral. 
Edd Swan DPT Time Calculation: 13 mins Eval Complexity: History: MEDIUM  Complexity : 1-2 comorbidities / personal factors will impact the outcome/ POC Exam:LOW Complexity : 1-2 Standardized tests and measures addressing body structure, function, activity limitation and / or participation in recreation  Presentation: MEDIUM Complexity : Evolving with changing characteristics  Clinical Decision Making:Low Complexity bed mobility, ROM, cognition  Overall Complexity:LOW

## 2020-06-20 NOTE — PROGRESS NOTES
Problem: Dysphagia (Adult) Goal: *Acute Goals and Plan of Care (Insert Text) Description: Patient will: 1. Tolerate PO trials with 0 s/s overt distress in 4/5 trials 2. Utilize compensatory swallow strategies/maneuvers (decrease bite/sip, size/rate, alt. liq/sol) with min cues in 4/5 trials 3. Perform oral-motor/laryngeal exercises to increase oropharyngeal swallow function with min cues 4. Complete an objective swallow study (i.e., MBSS) to assess swallow integrity, r/o aspiration, and determine of safest LRD, min A Rec:    
Puree with Thin Liquids Pt appeared to favor room temp po Aspiration precautions HOB >45 during po intake, remain >30 for 30-45 minutes after po Small bites/sips; alternate liquid/solid with slow feeding rate Oral care TID Meds per prefrence MBS to further assess oropharyngeal swallow fxn Outcome: Progressing Towards Goal 
  
SPEECH LANGUAGE PATHOLOGY BEDSIDE SWALLOW  
EVALUATION & TREATMENT Patient: Loni Dent (80 y.o. 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 
IRRIGATION AND DEBRIDEMENT SACRAL DECUBITUS ULCER/ LATERAL POSITION (N/A) 1 Day Post-Op Precautions: Aspiration Risk Fall, Skin PLOF: Per H&P 
 
ASSESSMENT : 
Based on the objective data described below, the patient presents with moderate oral mild pharyngeal dysphagia. Chart Review and clearance obtained by RN, Ahmet Martinez. Today, SLP conducted a Clinical Beside Swallow Evaluation, per MD orders. Pt A&Ox self only, and exhibited difficulty following commands. Pt is a poor historian. Speech judged primarily incoherent, however, voice WFLs. Oral [facility] examination revealed structures functional for speech and deglutition with natural dentition and fair oral hygiene.   Pt observed with thin liquids via straw with single sips and successive swallows; timely swallow initiation, with weak laryngeal elevation to palpation assessed; no overt s/sx aspiration. Given max attempts, Pt demo'd delayed acceptance with min labial spillage of puree with prolonged, but functional oral prep; thorough oral clearance noted. No s/sx aspiration witnessed. Note: Pt refused liquids via cup sip, upon x2 trials of ice water, pt winced and refused additional trials. She was more willing to accept room temperature Ensure (consuming > 50% !! and small tsp bites of pudding that was also room temperature). SLP RECS: Continue puree diet with thin liquids via straw, meds crushed in puree with general safe swallow precautions. ST will continue to follow 2-3 visits, however, may consider comfort feeds if no improvement. Also consider full liquid diet if pt is refusing puree to optimize nutrition/hydration. Pt unable to dem evidence of learning at this time; reenforcement is warranted. Findings/ Recs  D/w RNZa TREATMENT : 
Skilled therapy initiated; Educated pt on aspiration precautions and importance of compensatory swallow techniques to decrease aspiration risk (decrease rate of intake & sip/bite size, upright @HOB for all po intake and ~30 minutes after po); verbalized comprehension. SLP to follow as indicated. Patient will benefit from skilled intervention to address the above impairments. Patient's rehabilitation potential is considered to be Fair Factors which may influence rehabilitation potential include: 
[]            None noted [x]            Mental ability/status [x]            Medical condition [x]            Home/family situation and support systems 
[]            Safety awareness 
[]            Pain tolerance/management []            Other: PLAN : 
Recommendations and Planned Interventions: 
Puree with Thin Liquids Pt appeared to favor room temp po Aspiration precautions HOB >45 during po intake, remain >30 for 30-45 minutes after po Small bites/sips; alternate liquid/solid with slow feeding rate Oral care TID Meds per prefrence Frequency/Duration: Patient will be followed by speech-language pathology 1-2 times per day/4-7 days per week to address goals. Discharge Recommendations: To Be Determined SUBJECTIVE:  
Patient stated Nirav Jackson. OBJECTIVE:  
 
Past Medical History:  
Diagnosis Date Acute on chronic diastolic heart failure ([address] Arthritis   
 rheumatoid arthritis Benign hypertensive heart disease without heart failure Better controlled, stable Chronic diastolic heart failure ([address] Breathing and edema is improving Congestive heart failure ([address] HTN (hypertension) Hypercholesteremia Lupus (systemic lupus erythematosus) ([facility] [address] 6/18/2014  
 followed by dr Delgado Segovia Obesity, unspecified Patient has weight loss Discussed diet ad fluid restriction Other and unspecified hyperlipidemia F/u per pmd  
 Tricuspid valve disorders, 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  
Procedure Laterality Date CARDIAC SURG PROCEDURE UNLIST    
 pacemaker HX FREE SKIN GRAFT    
 right foot HX HYSTERECTOMY HX ORTHOPAEDIC    
 PACEMAKER PROCEDURE Home Situation:  
Home Situation Home Environment: Private residence One/Two Story Residence: One story Living Alone: No 
Support Systems: Family member(s) Patient Expects to be Discharged to[de-identified] Private residence Current DME Used/Available at Home: None Diet prior to admission: Regular Current Diet: Puree with thin liquids via straw Cognitive and Communication Status: 
Neurologic State: Alert, Confused Orientation Level: Disoriented X4 Cognition: Decreased command following, Decreased attention/concentration Safety/Judgement: Lack of insight into deficits Oral Assessment: 
Oral Assessment Labial: No impairment Dentition: Intact; Natural 
Oral Hygiene: adequate Lingual: Decreased strength P.O. Trials: 
Patient Position: > 70 Vocal quality prior to P.O.: No impairment Consistency Presented: Puree; Thin liquid Bolus Acceptance: Impaired Bolus Formation/Control: No impairment Type of Impairment: Anterior;Delayed Propulsion: Delayed (# of seconds) Oral Residue: None Initiation of Swallow: Delayed (# of seconds) Laryngeal Elevation: Weak Aspiration Signs/Symptoms: None Pharyngeal Phase Characteristics: Double swallow;Easily fatigued ; Poor endurance Effective Modifications: Alternate liquids/solids; Other (comment) Cues for Modifications: Maximal 
Comments: Likes ROOM TEMP po PAIN: 
Pain level pre-treatment:  0/10 Pain level post-treatment: 0/10 Pain Intervention(s): Medication (see MAR); Rest, Ice, Repositioning Response to intervention: Nurse notified, See doc flow After treatment:  
[x]            Patient left in no apparent distress sitting up in chair 
[]            Patient left in no apparent distress in bed 
[x]            Call bell left within reach [x]            Nursing notified []            Family present 
[]            Caregiver present 
[]            Bed alarm activated COMMUNICATION/EDUCATION:  
[x]            Aspiration precautions; swallow safety; compensatory techniques. []            Patient/family have participated as able in goal setting and plan of care. [x]            Patient/family agree to work toward stated goals and plan of care. []            Patient understands intent and goals of therapy; neutral about participation. []            Patient unable to participate in goal setting/plan of care; educ ongoing with interdisciplinary staff [x]         Posted safety precautions in patient's room. Thank you for this referral. 
YAMILA Rausch Time Calculation: 22 mins Evaluation Time: 10 minutes Treatment Time: 12 minutes

## 2020-06-20 NOTE — OP NOTES
[facility] 
OPERATIVE REPORT Name:  Paco Smith 
MR#:   [id number] :  [id number] ACCOUNT #:  [de-identified] DATE OF SERVICE:  2020 PREOPERATIVE DIAGNOSIS:  Sacral decubitus ulcer. POSTOPERATIVE DIAGNOSIS:  Sacral decubitus ulcer. PROCEDURE PERFORMED:  Irrigation and debridement of sacral decubitus ulcer. SURGEON:  Ricky Alvarez MD 
 
ASSISTANT:  None. ANESTHESIA:  [facility] COMPLICATIONS:  None. SPECIMENS REMOVED:  None. IMPLANTS:  None. ESTIMATED BLOOD LOSS:  10 mL FINDINGS:  Necrotic tissue down to fascia, debridement included skin, subcutaneous fat, and muscle. Total wound size was 3 x 4 cm. INDICATIONS:  The patient is an 59-year-old woman recently admitted to the hospital with a sacral decubitus ulcer. She was brought to the operating room for debridement. I explained the risks to her son including bleeding and infection. He understood and wished for us to proceed. DESCRIPTION OF PROCEDURE:  The patient was properly identified in the holding area and brought to the operating room. She was placed in the lateral decubitus position. Sedation was administered by Anesthesia. The gluteal area was prepped and draped in the usual sterile fashion. We used Metzenbaum scissors, knife, and cautery to debride all necrotic-appearing tissues from the sacral decubitus ulcer. This included skin and subcutaneous fat and muscle. The fascia of the sacrum was exposed, but no bone was identified. Following this, hemostasis was assured with cautery. The total size of the wound was 3 x 4 cm. The wound was then dressed with half-strength Betadine-soaked gauze and dry sterile dressings. The patient tolerated the procedure well. All instrument, sponge, and needle counts were correct at the end of the case x2. The patient awoke from anesthesia and was transported to the PACU in stable condition.  
 
 
Danie Zhu MD 
 
 
[id number] 
 D:  06/19/2020 13:09 
T:  06/19/2020 22:42 JOB #:  [id number]

## 2020-06-20 NOTE — ROUTINE PROCESS
Bedside and Verbal shift change report given to Arleen Ndiaye, RN (oncoming nurse) by Juwan Aguilar RN (offgoing nurse). Report included the following information SBAR, Kardex, MAR and Recent Results. SITUATION: 
Code Status: DNR Reason for Admission: UTI (urinary tract infection) [N39.0] AMS (altered mental status) [R41.82] Stage IV pressure ulcer of [facility]) [facility] day: 2 Problem List:  
   
Hospital Problems  Date Reviewed: 2/9/2020 Codes Class Noted POA Goals of care, counseling/discussion ICD-10-CM: Z71.89 ICD-9-CM: V65.49  Unknown Unknown AMS (altered mental status) ICD-10-CM: J89.16 
ICD-9-CM: 780.97  6/18/2020 Unknown * (Principal) Stage IV pressure ulcer of [facility]) ICD-10-CM: O92.012 ICD-9-CM: 707.03, 707.24  6/18/2020 Unknown UTI (urinary tract infection) ICD-10-CM: N39.0 ICD-9-CM: 599.0  3/17/2019 Unknown Sepsis ([facility] [address] ICD-10-CM: A41.9 ICD-9-CM: 038.9, 995.91  3/17/2019 Yes Dementia ([address] ICD-10-CM: F03.90 ICD-9-CM: 294.20  1/30/2019 Yes BACKGROUND: 
 Past Medical History:  
Past Medical History:  
Diagnosis Date  Acute on chronic diastolic heart failure ([address]  Arthritis   
 rheumatoid arthritis  Benign hypertensive heart disease without heart failure Better controlled, stable  Chronic diastolic heart failure ([address] Breathing and edema is improving  Congestive heart failure ([address]  HTN (hypertension)  Hypercholesteremia  Lupus (systemic lupus erythematosus) ([address] 6/18/2014  
 followed by dr Tay Dey  Obesity, unspecified Patient has weight loss Discussed diet ad fluid restriction  Other and unspecified hyperlipidemia F/u per pmd  
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  
 tr with moderate pulmonary htn Patient taking anticoagulants yes Patient has a defibrillator: yes History of shots YES for example, flu, pneumonia, tetanus Isolation History YES for example, MRSA, CDiff ASSESSMENT: 
Changes in Assessment Throughout Shift: NONE Significant Changes in 24 hours (for example, RR/code, fall) Patient has Central Line: no  
Patient has Mendiola Cath: no  
Mobility Issues PT 
IV Patency OR Checklist 
Pending Tests Last Vitals: 
Vitals w/ MEWS Score (last day) Date/Time MEWS Score Pulse Resp Temp BP Level of Consciousness SpO2  
 06/20/20 0321  1  70  18  97.5 °F (36.4 °C)  117/63  Alert  100 % 06/20/20 0007  1  70  20  97.3 °F (36.3 °C)  121/67  Alert  100 % 06/19/20 1929  1  69  20  97.2 °F (36.2 °C)  117/64  Alert  100 % 06/19/20 1504  2  77  19  98.6 °F (37 °C)  94/57  Alert  99 % 06/19/20 1343    70  16  97.1 °F (36.2 °C)  98/53    100 % 06/19/20 1339    70  15    108/59    100 % 06/19/20 1329    71  16    96/51    100 % 06/19/20 1319    75  14  96.8 °F (36 °C)  93/52    100 % 06/19/20 1058  2  70  19  98.8 °F (37.1 °C)  98/60  Alert  100 % 06/19/20 0708  1  70  15  98.7 °F (37.1 °C)  126/77  Alert  100 % 06/19/20 0423  1  81  16  97.1 °F (36.2 °C)  138/71  Alert  98 % 06/19/20 0143  1  72  16  96.8 °F (36 °C)  141/75  Alert  98 % PAIN Pain Assessment Pain Intensity 1: 0 (06/20/20 0321) Patient Stated Pain Goal: Unable to verbalize/indicate pain Intervention effective: N/A Time of last intervention: N/A Reassessment Completed: yes Other actions taken for pain: Distraction Last 3 Weights: 
Last 3 Recorded Weights in this Encounter 06/18/20 1520 06/19/20 0143 Weight: 54.7 kg (120 lb 8 oz) 66.4 kg (146 lb 4.8 oz) Weight change: INTAKE/OUPUT Current Shift: 06/20 0701 - 06/20 1900 In: 1761.3 [I.V.:1761.3] Out: - Last three shifts: 06/18 1901 - 06/20 0700 In: 1107.5 [P.O.:90; I.V.:1017.5] Out: 500 [Urine:500] RECOMMENDATIONS AND DISCHARGE PLANNING 
 Patient needs and requests: Assistance with ADL's, Nutrition Pending tests/procedures: labs Discharge plan for patient: Home Discharge planning Needs or Barriers: None Estimated Discharge Date: 6/25/2020 Posted on Whiteboard in [facility] Room: yes \"HEALS\" SAFETY CHECK A safety check occurred in the patient's room between off going nurse and oncoming nurse listed above. The safety check included the below items: 
 
H High Alert Medications Verify all high alert medication drips (heparin, PCA, etc.) E Equipment Suction is set up for ALL patients (with vi) Red plugs utilized for all equipment (IV pumps, etc.) WOWs wiped down at end of shift. Room stocked with oxygen, suction, and other unit-specific supplies A Alarms Bed alarm is set for fall risk patients Ensure chair alarm is in place and activated if patient is up in a chair L Lines Check IV for any infiltration Mendiola bag is empty if patient has a Mendiola Tubing and IV bags are labeled Brenda Vega Safety Room is clean, patient is clean, and equipment is clean. Hallways are clear from equipment besides carts. Fall bracelet on for fall risk patients Ensure room is clear and free of clutter Suction is set up for ALL patients (with vi) Hallways are clear from equipment besides carts. Isolation precautions followed, supplies available outside room, sign posted Vidhi Botello RN

## 2020-06-20 NOTE — PROGRESS NOTES
Problem: Self Care Deficits Care Plan (Adult) Goal: *Acute Goals and Plan of Care (Insert Text) Outcome: Resolved/Met OCCUPATIONAL THERAPY EVALUATION/DISCHARGE Patient: Joselin Quiñonez (80 y.o. 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 
IRRIGATION AND DEBRIDEMENT SACRAL DECUBITUS ULCER/ LATERAL POSITION (N/A) 1 Day Post-Op Precautions: Fall, Skin PLOF: Per medical chart patient lives with her son who provides her care. Patient is unable to verbalize PLOF. ASSESSMENT AND RECOMMENDATIONS: 
Based on the objective data described below, the patient presents with non-functional AROM and strength of BUEs, inability to follow commands, and decreased bed mobility limiting participation in ADLs. Patient presented alert and confused in bed speaking nonsense words. Patient was unable to follow commands for AROM and strength testing of BUEs and was resistant to PROM(pulled away, grimace on face, said \"don't touch my hand\"). Patient requires Total assistance to roll in bed. Patient is dependent at baseline and is not able to participate in skilled occupational therapy at this time. Skilled occupational therapy is not indicated at this time. Discharge Recommendations: LTC vs. Home with 24/7 care Further Equipment Recommendations for Discharge: Hospital bed and positioning equipment if not already available. SUBJECTIVE:  
Patient stated Don't touch my hand.  OBJECTIVE DATA SUMMARY:  
 
Past Medical History:  
Diagnosis Date Acute on chronic diastolic heart failure ([address] Arthritis   
 rheumatoid arthritis Benign hypertensive heart disease without heart failure Better controlled, stable Chronic diastolic heart failure ([address] Breathing and edema is improving Congestive heart failure ([address] HTN (hypertension) Hypercholesteremia Lupus (systemic lupus erythematosus) ([facility] [address] 6/18/2014  
 followed by dr Gera Osorio Obesity, unspecified Patient has weight loss Discussed diet ad fluid restriction Other and unspecified hyperlipidemia F/u per pmd  
 Tricuspid valve disorders, 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  
Procedure Laterality Date CARDIAC SURG PROCEDURE UNLIST    
 pacemaker HX FREE SKIN GRAFT    
 right foot HX HYSTERECTOMY HX ORTHOPAEDIC    
 PACEMAKER PROCEDURE Barriers to Learning/Limitations: yes;  cognitive and altered mental status (i.e.Sedation, Confusion) Compensate with: visual, verbal, tactile, cues Home Situation:  
Home Situation Home Environment: Private residence One/Two Story Residence: One story Living Alone: No 
Support Systems: Family member(s) Patient Expects to be Discharged to[de-identified] Private residence Current DME Used/Available at Home: None 
[x]     Right hand dominant   []     Left hand dominant Cognitive/Behavioral Status: 
Neurologic State: Alert;Confused Orientation Level: Disoriented X4 Cognition: Decreased command following;Decreased attention/concentration Safety/Judgement: Lack of insight into deficits Skin: Intact in BUEs Edema: No edema noted in BUEs Vision/Perceptual:   
Tracking: Unable to test secondary due to decreased visual attention Coordination: BUE Coordination: Generally decreased, functional 
Balance: 
Unable to assess Strength: BUE Strength: Grossly decreased, non-functional 
 
Tone & Sensation: BUE Tone: Abnormal 
Sensation: (Unable to assess) Range of Motion: BUE 
AROM: Grossly decreased, non-functional 
PROM: Grossly decreased, non-functional 
 
Functional Mobility and Transfers for ADLs: 
Bed Mobility: 
Rolling: Total assistance(Assist X 2) ADL Assessment: 
Feeding: Maximum assistance Oral Facial Hygiene/Grooming: Total assistance Bathing: Total assistance Upper Body Dressing: Total assistance Lower Body Dressing: Total assistance Toileting: Total assistance ADL Intervention: 
Educated nursing regarding positioning and skin integrity Cognitive Retraining Safety/Judgement: Lack of insight into deficits Pain: 
Pain level pre-treatment: Unable to assess. No pain observed in facial expression. Activity Tolerance:  
Poor Please refer to the flowsheet for vital signs taken during this treatment. After treatment:  
[]  Patient left in no apparent distress sitting up in chair 
[x]  Patient left in no apparent distress in bed 
[x]  Call bell left within reach [x]  Nursing notified 
[]  Caregiver present 
[]  Bed alarm activated COMMUNICATION/EDUCATION:  
[x]      Role of Occupational Therapy in the acute care setting 
[]      Home safety education was provided and the patient/caregiver indicated understanding. []      Patient/family have participated as able and agree with findings and recommendations. []      Patient is unable to participate in plan of care at this time. Thank you for this referral. 
[facility], OTR/L Time Calculation: 8 mins Eval Complexity: History: LOW Complexity : Brief history review ; Examination: LOW Complexity : 1-3 performance deficits relating to physical, cognitive , or psychosocial skils that result in activity limitations and / or participation restrictions ; Decision Making:LOW Complexity : No comorbidities that affect functional and no verbal or physical assistance needed to complete eval tasks

## 2020-06-20 NOTE — PROGRESS NOTES
[facility] Hospitalist Group Progress Note Patient: Ebony Miller Age: 80 y.o. : 1931 MR#: [id number] SSN: [id number] Date: 2020 Subjective:  
Patient mumbles, I can not understand her. ROS not feasible. Appears comfortable. Per RN- poor appetite Assessment/Plan: Ms. Ebony Miller is an 80 y.o. AA female with PMHx of dementia who is confused at baseline, chronic diastolic and systolic CHF, HTN, HLD, RA and lupus who is admitted with sepsis 2/2 infected Stage IV sacral ulcer and UTI. CONSULTS- SURGERY 1. Sepsis POA, with fever and tachypnea, 2/2 #2 and #3 2. Infected stage IV sacral ulcer s/p debridement with Dr. Alesha Villalpando on  
3. UTI - urine cultures gram neg rods. 4. Hypotension - resolved 5. Reducible ventral hernia right of midline w/ transverse colon into the hernia sac 6. Acute Constipation 7. Acute thrombocytopenia - platelet count 683  
8. Acute on chronic anemia 9. Mild hyponatremia - resolved 10. Hyperkalemia - resolved 11. Elevated Cr with CKD stage 3- resolved 12. End-stage dementia 13. Chronic diastolic and systolic CHF, compensated 14. HTN  
15. HLD 16. RA 17. Hx lupus - Surgery follows. S/p irrigation and debridement on . POD #1 Pain control as needed. Encourage incentive luciano Monitor H/H and transfuse if  within goals of care. Consult wound care Continue wound care as rec by Dr Emmett Ordoñez- initiate normal saline wet to dry dressing - ID consulted. On vanc and zosyn Lactobacillus. Follow cultures-  urine culture shows gram neg rods. Blood cultures neg to date. Wound culture gram + cocci . MRSA culture nares negative. - BP better- coreg and  imdur with holding parameters. - bowel regimen.   
- Palliative care consulted. Referral to hospice made- plans for home with hospice.  
- SLP pending, aspiration precautions - PT OT recs home with supportive care, fall precautions. - nutrition follows. Ensure. - I have called the patient's daughter Ms. Win Carter [phone] to update on assessment and plan. Ms. Savana Powers does not agree with home with home hospice- she will discuss with her brother Sarbjit Hollis and her sister. (they are the ones who spoke with palliative yesterday) there is no designated MPOA 
 
DNR per pt's son. DVT PPX- SQH Pureed diet. Contact isolation for mrsa Additional Notes:   
 
Case discussed with:  [x]Patient  [x]Family daughter Ms. Win Carter   []Nursing  []Case Management DVT Prophylaxis:  []Lovenox  [x]Hep SQ  []SCDs  []Coumadin   []On Heparin gtt Objective:  
VS:  
Visit Vitals /63 (BP 1 Location: Left arm, BP Patient Position: At rest;Head of bed elevated (Comment degrees)) Pulse 70 Temp 97.5 °F (36.4 °C) Resp 18 Ht 5' 4\" (1.626 m) Wt 66.4 kg (146 lb 4.8 oz) SpO2 100% BMI 25.11 kg/m² Tmax/24hrs: Temp (24hrs), Av.6 °F (36.4 °C), Min:96.8 °F (36 °C), Max:98.8 °F (37.1 °C) Intake/Output Summary (Last 24 hours) at 2020 8946 Last data filed at 2020 1695 Gross per 24 hour Intake 2221.25 ml Output 500 ml Net 1721.25 ml General: AA Woman laying in bed,    NAD Cardiovascular:  RRR Pulmonary: On nasal cannula, LSC throughout; respiratory effort WNL 
GI:  +BS in all four quadrants, soft, non-tender Hernia near the right upper quadrant, soft. Extremities:  No edema; 2+ dorsalis pedis pulses bilaterally Neuro: awake, alert, moving the LUE- tremor. Does not follow commands,.  
++ PPM in left chest wall. Bruise right knee. Pure wick. Labs:   
Recent Results (from the past 24 hour(s)) METABOLIC PANEL, BASIC Collection Time: 20  1:04 AM  
Result Value Ref Range Sodium 138 136 - 145 mmol/L Potassium 4.7 3.5 - 5.5 mmol/L Chloride 108 100 - 111 mmol/L  
 CO2 22 21 - 32 mmol/L Anion gap 8 3.0 - 18 mmol/L Glucose 57 (L) 74 - 99 mg/dL  BUN 27 (H) 7.0 - 18 MG/DL  
 Creatinine 1.09 0.6 - 1.3 MG/DL  
 BUN/Creatinine ratio 25 (H) 12 - 20 GFR est AA 57 (L) >60 ml/min/1.73m2 GFR est non-AA 47 (L) >60 ml/min/1.73m2 Calcium 7.8 (L) 8.5 - 10.1 MG/DL  
CBC WITH AUTOMATED DIFF Collection Time: 06/20/20  1:04 AM  
Result Value Ref Range WBC 8.1 4.6 - 13.2 K/uL  
 RBC 2.70 (L) 4.20 - 5.30 M/uL HGB 8.0 (L) 12.0 - 16.0 g/dL HCT 24.7 (L) 35.0 - 45.0 % MCV 91.5 74.0 - 97.0 FL  
 MCH 29.6 24.0 - 34.0 PG  
 MCHC 32.4 31.0 - 37.0 g/dL  
 RDW 15.3 (H) 11.6 - 14.5 % PLATELET 805 (L) 181 - 420 K/uL MPV 9.2 9.2 - 11.8 FL  
 NEUTROPHILS 75 (H) 40 - 73 % LYMPHOCYTES 12 (L) 21 - 52 % MONOCYTES 12 (H) 3 - 10 % EOSINOPHILS 1 0 - 5 % BASOPHILS 0 0 - 2 %  
 ABS. NEUTROPHILS 6.1 1.8 - 8.0 K/UL  
 ABS. LYMPHOCYTES 1.0 0.9 - 3.6 K/UL  
 ABS. MONOCYTES 0.9 0.05 - 1.2 K/UL  
 ABS. EOSINOPHILS 0.1 0.0 - 0.4 K/UL  
 ABS. BASOPHILS 0.0 0.0 - 0.1 K/UL  
 DF AUTOMATED Signed By: CLARIBEL Barillas   
 June 20, 2020

## 2020-06-20 NOTE — HOSPICE
Spoke with Arelis Adan, pt's son via telephone Discussed [facility] philosophy, services, criteria, and IDT. Discussed caregiver need for round the clock care with Angel Porras 
primary caregiver identified as Angel Porras Caregiver concerns identified as n/a Answered all questions. No current discharge plans in place, pt already has own DME. Family agreeable with Hospice admission once discharged. Provided with 24/7 contact information. Thank you for the referral to [facility]. If we can be of further assistance please contact [phone]. Neisha Marie RN [place] [id number] [place], [address] [place] [address] Str. 
[phone] Email: [email or web address]

## 2020-06-21 NOTE — PROGRESS NOTES
Infectious Disease Progress Note Reason:fever Current abx Prior abx Vancomycin 6/18- Pip/tazo 6/18-   
 
 
Assessment : 
GNR UTI Stage IV sacral decubitus ulcer 
--s/p I&D 6/19 
--cx NGTD Dementia Recommendations: 
--Monitor urine cx, await speciation of the GNR & can tailor abx to that 
--Wound care/Surgery eval for sacral ulcer 
--Continue pip/tazo 
--d/c IV Vancomycin, no MRSA growth 
--Additional recs to come pending return of data Thank you for consultation request. Please call me if any further questions or concerns. Will continue to participate in the care of this patient. HPI: 
Unable to obtain ROS due to mental status Chart reviewed D/w RN at bedside Not taking much po Does not like to be moved or examined Current Facility-Administered Medications Medication Dose Route Frequency  amiodarone (CORDARONE) tablet 200 mg  200 mg Oral DAILY  aspirin delayed-release tablet 81 mg  81 mg Oral DAILY  carvediloL (COREG) tablet 3.125 mg  3.125 mg Oral Q12H  
 isosorbide mononitrate ER (IMDUR) tablet 60 mg  60 mg Oral DAILY  levothyroxine (SYNTHROID) tablet 25 mcg  25 mcg Oral 6am  
 piperacillin-tazobactam (ZOSYN) 2.25 g in 0.9% sodium chloride (MBP/ADV) 50 mL MBP  2.25 g IntraVENous Q6H  
 vancomycin (VANCOCIN) 1250 mg in  ml infusion  1,250 mg IntraVENous Q36H  
 lactobacillus sp. 50 billion cpu (BIO-K PLUS) capsule 1 Cap  1 Cap Oral DAILY  sodium chloride (NS) flush 5-10 mL  5-10 mL IntraVENous PRN  
 acetaminophen (TYLENOL) tablet 650 mg  650 mg Oral Q4H PRN  
 oxyCODONE-acetaminophen (PERCOCET) 5-325 mg per tablet 1 Tab  1 Tab Oral Q4H PRN  
 morphine injection 1 mg  1 mg IntraVENous Q4H PRN  
 heparin (porcine) injection 5,000 Units  5,000 Units SubCUTAneous Q8H  
 polyethylene glycol (MIRALAX) packet 17 g  17 g Oral DAILY  bisacodyL (DULCOLAX) suppository 10 mg  10 mg Rectal DAILY PRN  
  0.9% sodium chloride infusion  75 mL/hr IntraVENous CONTINUOUS Allergies: Patient has no known allergies. Temp (24hrs), Av.7 °F (36.5 °C), Min:97 °F (36.1 °C), Max:98.3 °F (36.8 °C) Visit Vitals /60 (BP 1 Location: Left arm, BP Patient Position: At rest) Pulse 67 Temp 97 °F (36.1 °C) Resp 17 Ht 5' 4\" (1.626 m) Wt 73.7 kg (162 lb 7.7 oz) SpO2 99% BMI 27.89 kg/m² Physical Exam: 
General:   lying in bed, NAD HEENT:  Normocephalic, atraumatic, eyes closed, nasal and oral mucous are dry and without evidence of lesions. No thrush. Lungs:   non-labored, bilaterally clear to auscultation- no crackles wheezes rales or rhonchi anteriorly Heart:  RRR, s1 and s2; no murmurs rubs or gallops, no edema, + pedal pulses Abdomen:  soft, non-distended, active bowel sounds. No apparent tenderness but does not like to be touched, ventral hernia which is soft Extremities:   no edema b/l LE, heels in soft boots, muscle wasting Neurologic:  No verbal response which per chart is her baseline Labs: Results:  
Chemistry Recent Labs  
  20 
0513 20 
1500 GLU 61* 57* 77 107*  138 134* 134* K 4.2 4.7 4.7 5.7*  
 108 103 102 CO2 22 22 22 27 BUN 27* 27* 27* 29* CREA 1.11 1.09 1.18 1.41* CA 8.2* 7.8* 8.1* 8.7 AGAP 7 8 9 5 BUCR 24* 25* 23* 21* AP  --   --   --  60  
TP  --   --   --  6.2* ALB  --   --   --  3.2*  
GLOB  --   --   --  3.0 AGRAT  --   --   --  1.1  
  
CBC w/Diff Recent Labs  
  20 
0430 20 
0104 20 
6838 WBC 9.2 8.1 11.2 RBC 2.60* 2.70* 3.06* HGB 7.6* 8.0* 9.0*  
HCT 23.7* 24.7* 27.8*  
* 119* 140 GRANS 72 75* 82* LYMPH 12* 12* 8* EOS 1 1 1 Microbiology Recent Labs  
  20 
0336 20 
1551 20 
1529 20 
1506 20 
1500 CULT MRSA NOT PRESENT      Screening of patient nares for MRSA is for surveillance purposes and, if positive, to facilitate isolation considerations in high risk settings. It is not intended for automatic decolonization interventions per se as regimens are not sufficiently effective to warrant routine use. GRAM NEGATIVE RODS* NO GROWTH 3 DAYS HEAVY MIXED COMMENSAL CATHRYN 
 NO GROWTH 3 DAYS  
  
 
 
RADIOLOGY: 
Reviewed, none new Dr. Lele Dukes, Infectious Disease Specialist 
June 21, 2020 
10:46 AM

## 2020-06-21 NOTE — ROUTINE PROCESS
Bedside and Verbal shift change report given to Margaret Wild RN (oncoming nurse) by Jigar Fan RN (offgoing nurse). Report included the following information SBAR, Kardex and MAR.

## 2020-06-21 NOTE — PROGRESS NOTES
Problem: Pressure Injury - Risk of 
Goal: *Prevention of pressure injury Description: Document Rodríguez Scale and appropriate interventions in the flowsheet. Outcome: Not Progressing Towards Goal 
Note: Pressure Injury Interventions: 
Sensory Interventions: Assess changes in LOC Moisture Interventions: Absorbent underpads, Internal/External urinary devices, Check for incontinence Q2 hours and as needed Activity Interventions: Pressure redistribution bed/mattress(bed type) Mobility Interventions: Pressure redistribution bed/mattress (bed type), Turn and reposition approx. every two hours(pillow and wedges) Nutrition Interventions: Document food/fluid/supplement intake Friction and Shear Interventions: Foam dressings/transparent film/skin sealants, Lift team/patient mobility team, Transferring/repositioning devices, Minimize layers Problem: Patient Education: Go to Patient Education Activity Goal: Patient/Family Education Outcome: Not Progressing Towards Goal 
  
Problem: Falls - Risk of 
Goal: *Absence of Falls Description: Document Marcie Kearneyraya Fall Risk and appropriate interventions in the flowsheet. Outcome: Not Progressing Towards Goal 
Note: Fall Risk Interventions: 
Mobility Interventions: Bed/chair exit alarm Mentation Interventions: Adequate sleep, hydration, pain control, Bed/chair exit alarm, Door open when patient unattended, Reorient patient Medication Interventions: Bed/chair exit alarm Elimination Interventions: Bed/chair exit alarm, Toileting schedule/hourly rounds Problem: Patient Education: Go to Patient Education Activity Goal: Patient/Family Education Outcome: Not Progressing Towards Goal 
  
Problem: Urinary Tract Infection - Adult Goal: *Absence of infection signs and symptoms Outcome: Not Progressing Towards Goal 
  
Problem: Patient Education: Go to Patient Education Activity Goal: Patient/Family Education Outcome: Not Progressing Towards Goal 
  
Problem: General Wound Care Goal: *Non-infected wound: Improvement of existing wound, absence of infection, and maintenance of skin integrity Outcome: Not Progressing Towards Goal 
Goal: *Infected Wound: Prevention of further infection and promotion of healing Description: Infection control procedures (eg: clean dressings, clean gloves, hand washing, precautions to isolate wound from contamination, sterile instruments used for wound debridement) should be implemented. Outcome: Not Progressing Towards Goal 
Goal: Interventions Outcome: Not Progressing Towards Goal 
  
Problem: Patient Education: Go to Patient Education Activity Goal: Patient/Family Education Outcome: Not Progressing Towards Goal 
  
Problem: [address] (Adult/Pediatric) Goal: *STG: Participates in treatment plan Outcome: Not Progressing Towards Goal 
Goal: *STG: Seeks staff when feelings of anxiety and fear arise Outcome: Not Progressing Towards Goal 
Goal: *STG: Attends activities and groups Outcome: Not Progressing Towards Goal 
Goal: *STG: Absence of lethality Outcome: Not Progressing Towards Goal 
Goal: *STG: Demonstrates ability to understand and use improved judgment in daily activities and relationships Outcome: Not Progressing Towards Goal 
Goal: *STG: Remains safe in hospital 
Outcome: Not Progressing Towards Goal 
Goal: *LTG: Obtains optimum level of functioning Outcome: Not Progressing Towards Goal 
Goal: *STG/LTG: Maintain structure for daily activities Outcome: Not Progressing Towards Goal 
Goal: *STG/LTG: Complies with medication therapy Outcome: Not Progressing Towards Goal 
Goal: Interventions Outcome: Not Progressing Towards Goal 
  
Problem: Patient Education: Go to Patient Education Activity Goal: Patient/Family Education Outcome: Not Progressing Towards Goal 
  
Problem: Patient Education: Go to Patient Education Activity Goal: Patient/Family Education Outcome: Not Progressing Towards Goal 
  
Problem: Cognitive Deficits Goal: *LTG: Develop alternative coping strategies to compensate for cognitive limitations Outcome: Not Progressing Towards Goal 
Goal: *LTG: Improve capacity to fully complete simple tasks & maintain ADLs Outcome: Not Progressing Towards Goal 
Goal: *STG: Cooperate with and complete organicity testing Outcome: Not Progressing Towards Goal 
Goal: *STG: Increase basic understanding of cause & effect relationships Outcome: Not Progressing Towards Goal 
Goal: *STG: Implement memory enhancing mechanisms Outcome: Not Progressing Towards Goal 
Goal: *STG: Follow through to completion of simple tasks Outcome: Not Progressing Towards Goal 
Goal: *STG: Identify when it is appropriate to seek help with a task and when it is not Outcome: Not Progressing Towards Goal 
Goal: *STG: Understand & accept cognitive limitations & use alternative coping mechanisms Outcome: Not Progressing Towards Goal 
Goal: *Patient Specific Goal (EDIT GOAL, INSERT TEXT) Outcome: Not Progressing Towards Goal 
Goal: *Patient Specific Goal (EDIT GOAL, INSERT TEXT) Outcome: Not Progressing Towards Goal 
Goal: Cognitive Deficit Interventions Outcome: Not Progressing Towards Goal 
  
Problem: Risk for Spread of Infection Goal: Prevent transmission of infectious organism to others Description: Prevent the transmission of infectious organisms to other patients, staff members, and visitors. Outcome: Not Progressing Towards Goal 
  
Problem: Patient Education:  Go to Education Activity Goal: Patient/Family Education Outcome: Not Progressing Towards Goal 
  
Problem: Nutrition Deficit Goal: *Optimize nutritional status Outcome: Not Progressing Towards Goal 
  
Problem: Patient Education: Go to Patient Education Activity Goal: Patient/Family Education Outcome: Not Progressing Towards Goal 
  
Problem: Patient Education: Go to Patient Education Activity Goal: Patient/Family Education Outcome: Not Progressing Towards Goal

## 2020-06-21 NOTE — PROGRESS NOTES
[facility] Hospitalist Group Progress Note Patient: Alessio Mak Age: 80 y.o. : 1931 MR#: [id number] SSN: [id number] Date: 2020 Subjective:  
Opens eyes to touch. Non verbal. Resting comfortably in bed. Family agreeable with HOSPICE. Continue comfort measures. Plan for discharge Monday Assessment/Plan: 1. Sepsis POA, with fever and tachypnea, 2/2 #2 and #3, Blood cultures neg to date. 2. Infected stage IV sacral ulcer s/p debridement with Dr. Fior Lujan on . Wound culture gram + cocci. 3. UTI - urine cultures gram neg rods. 4. Acute Constipation 5. Acute thrombocytopenia 6. Acute on chronic anemia 7. CKD stage 3 
8. End-stage dementia 9. Chronic diastolic and systolic CHF, compensated 10. HTN  
11. HLD 12. RA 13. Hx lupus 15. Hypotension - resolved 14. Mild hyponatremia - resolved 15. Hyperkalemia - resolved 16. Reducible ventral hernia right of midline w/ transverse colon into the hernia sac- baseline. Continue IV abx for now while awaiting hospice discharge, likely Monday. Comfort measures. This patient is DNR/DNI. HOSPICE. Additional Notes:   
 
Case discussed with:  [x]Patient  [x]Family daughter Ms. Libra Miller   []Nursing  []Case Management DVT Prophylaxis:  []Lovenox  [x]Hep SQ  []SCDs  []Coumadin   []On Heparin gtt Objective:  
VS:  
Visit Vitals /60 (BP 1 Location: Left arm, BP Patient Position: At rest) Pulse 67 Temp 97 °F (36.1 °C) Resp 17 Ht 5' 4\" (1.626 m) Wt 73.7 kg (162 lb 7.7 oz) SpO2 99% BMI 27.89 kg/m² Tmax/24hrs: Temp (24hrs), Av.7 °F (36.5 °C), Min:97 °F (36.1 °C), Max:98.3 °F (36.8 °C) Intake/Output Summary (Last 24 hours) at 2020 1012 Last data filed at 2020 1008 Gross per 24 hour Intake 1118.75 ml Output 650 ml Net 468.75 ml General: resting in bed, NAD. Cardiovascular:  RRR 
 Pulmonary: On nasal cannula, LSC diminished bll; respiratory effort WNL 
GI:  +BS in all four quadrants, soft, non-tender Hernia near the right upper quadrant, soft. Extremities:  trace edema; 2+ dorsalis pedis pulses bilaterally Neuro: opens eyes to touch. Non verbal. Does not follow commands,. Labs:   
Recent Results (from the past 24 hour(s)) METABOLIC PANEL, BASIC Collection Time: 06/21/20  4:30 AM  
Result Value Ref Range Sodium 139 136 - 145 mmol/L Potassium 4.2 3.5 - 5.5 mmol/L Chloride 110 100 - 111 mmol/L  
 CO2 22 21 - 32 mmol/L Anion gap 7 3.0 - 18 mmol/L Glucose 61 (L) 74 - 99 mg/dL BUN 27 (H) 7.0 - 18 MG/DL Creatinine 1.11 0.6 - 1.3 MG/DL  
 BUN/Creatinine ratio 24 (H) 12 - 20 GFR est AA 56 (L) >60 ml/min/1.73m2 GFR est non-AA 46 (L) >60 ml/min/1.73m2 Calcium 8.2 (L) 8.5 - 10.1 MG/DL  
CBC WITH AUTOMATED DIFF Collection Time: 06/21/20  4:30 AM  
Result Value Ref Range WBC 9.2 4.6 - 13.2 K/uL  
 RBC 2.60 (L) 4.20 - 5.30 M/uL HGB 7.6 (L) 12.0 - 16.0 g/dL HCT 23.7 (L) 35.0 - 45.0 % MCV 91.2 74.0 - 97.0 FL  
 MCH 29.2 24.0 - 34.0 PG  
 MCHC 32.1 31.0 - 37.0 g/dL  
 RDW 15.3 (H) 11.6 - 14.5 % PLATELET 888 (L) 205 - 420 K/uL MPV 9.1 (L) 9.2 - 11.8 FL  
 NEUTROPHILS 72 40 - 73 % LYMPHOCYTES 12 (L) 21 - 52 % MONOCYTES 15 (H) 3 - 10 % EOSINOPHILS 1 0 - 5 % BASOPHILS 0 0 - 2 %  
 ABS. NEUTROPHILS 6.7 1.8 - 8.0 K/UL  
 ABS. LYMPHOCYTES 1.1 0.9 - 3.6 K/UL  
 ABS. MONOCYTES 1.4 (H) 0.05 - 1.2 K/UL  
 ABS. EOSINOPHILS 0.1 0.0 - 0.4 K/UL  
 ABS. BASOPHILS 0.0 0.0 - 0.1 K/UL  
 DF AUTOMATED Signed By: Cari Ndiaye NP   
 June 21, 2020

## 2020-06-22 NOTE — PROGRESS NOTES
[address] [place] [address] called CM, and requesting Wound Care Orders. JANINE called and spoke to Dr. Tommie Dubois, he ordered Wet to Dry Dressing Change Every Day. JANINE put wound care orders in per Dr. Tommie Dubois. JANINE called Deisy back, and let her know. Taryn Michael, RN Case Management [phone]

## 2020-06-22 NOTE — PROGRESS NOTES
Infectious Disease Progress Note Reason:fever Current abx Prior abx Vancomycin 6/18-6/21 Pip/tazo 6/18-6/21 
cefpodoxime since 6/22 Assessment : 
 
Sepsis (POA) due to Morganella cystitis- susceptibilities reviewed, Stage IV sacral decubitus ulcer 
--s/p I&D 6/19 
--cx NGTD Dementia Plans for transition to comfort care noted. Recommendations: 
--d/c cefpodoxime. Morganella has high DEEPTI to ceftazidime. cefpodoxime wont be very efficacious. Unable to use quinolones since prolonged QTC, patient on amiodarone. Recommend single dose p.o. fosfomycin to complete treatment of cystitis. --Wound care for sacral ulcer Will sign off. Please call me if any new questions D/w dr Hany Borrego. HPI: 
Unable to obtain ROS due to mental status Chart reviewed D/w RN at bedside Not taking much po Does not like to be moved or examined Current Facility-Administered Medications Medication Dose Route Frequency  cefpodoxime (VANTIN) tablet 200 mg  200 mg Oral Q12H  
 amiodarone (CORDARONE) tablet 200 mg  200 mg Oral DAILY  aspirin delayed-release tablet 81 mg  81 mg Oral DAILY  carvediloL (COREG) tablet 3.125 mg  3.125 mg Oral Q12H  
 isosorbide mononitrate ER (IMDUR) tablet 60 mg  60 mg Oral DAILY  levothyroxine (SYNTHROID) tablet 25 mcg  25 mcg Oral 6am  
 lactobacillus sp. 50 billion cpu (BIO-K PLUS) capsule 1 Cap  1 Cap Oral DAILY  sodium chloride (NS) flush 5-10 mL  5-10 mL IntraVENous PRN  
 acetaminophen (TYLENOL) tablet 650 mg  650 mg Oral Q4H PRN  
 oxyCODONE-acetaminophen (PERCOCET) 5-325 mg per tablet 1 Tab  1 Tab Oral Q4H PRN  
 morphine injection 1 mg  1 mg IntraVENous Q4H PRN  
 heparin (porcine) injection 5,000 Units  5,000 Units SubCUTAneous Q8H  
 polyethylene glycol (MIRALAX) packet 17 g  17 g Oral DAILY  bisacodyL (DULCOLAX) suppository 10 mg  10 mg Rectal DAILY PRN Allergies: Patient has no known allergies. Temp (24hrs), Av.8 °F (36.6 °C), Min:97.6 °F (36.4 °C), Max:98.1 °F (36.7 °C) Visit Vitals /64 (BP 1 Location: Left arm, BP Patient Position: At rest) Pulse 71 Temp 98.1 °F (36.7 °C) Resp 20 Ht 5' 4\" (1.626 m) Wt 73.7 kg (162 lb 7.7 oz) SpO2 100% BMI 27.89 kg/m² Physical Exam: 
General:   lying in bed, NAD HEENT:  Normocephalic, atraumatic, eyes closed, nasal and oral mucous are dry and without evidence of lesions. No thrush. Lungs:   non-labored, bilaterally clear to auscultation- no crackles wheezes rales or rhonchi anteriorly Heart:  RRR, s1 and s2; no murmurs rubs or gallops, no edema, + pedal pulses Abdomen:  soft, non-distended, active bowel sounds. No apparent tenderness but does not like to be touched, ventral hernia which is soft Extremities:   no edema b/l LE, heels in soft boots, muscle wasting Neurologic:  No verbal response which per chart is her baseline Labs: Results:  
Chemistry Recent Labs  
  20 
0430 20 
0104 GLU 61* 57*  138  
K 4.2 4.7  108 CO2 22 22 BUN 27* 27* CREA 1.11 1.09  
CA 8.2* 7.8* AGAP 7 8 BUCR 24* 25* CBC w/Diff Recent Labs  
  20 
0430 20 
0104 WBC 9.2 8.1  
RBC 2.60* 2.70* HGB 7.6* 8.0*  
HCT 23.7* 24.7*  
* 119* GRANS 72 75* LYMPH 12* 12* EOS 1 1 Microbiology No results for input(s): CULT in the last 72 hours. RADIOLOGY: 
Reviewed, none new Dr. Nishi Mackenzie, Infectious Disease Specialist 
2020 
10:46 AM

## 2020-06-22 NOTE — DISCHARGE SUMMARY
Discharge Summary Patient ID: Ross Vega 
[id number] 
86 y.o. 
6/26/1931 Body mass index is 27.89 kg/m². PCP on record: Wolfgang Hendrickson MD 
 
Admit date: 6/18/2020 Discharge date and time: 6/22/2020 Discharge Diagnoses:                                          
1)  Sepsis POA , due to infected stage IV sacral ulcer and GNR UTI 2)  infected stage IV sacral ulceration s/p wound debridement 06/19/2020 3)  GNR UTI - POA 4)  Severe Dementia 5)  hypotension - Septic shock ruled out 6)  hypoNatremia- Resolved 7) CKD3 8)  hyperkalemia, resolved 9)  HTN  
10)  compensated diastolic and systolic CHF 11)  Rheumatoid arthritis 12)  H/O Lupus 13)  normocytic anemia of chronic disease. 14) Mild thrombocytopenia 15) functional Quadriplegia 16) stool impaction Consults: ID, Palliative Care and Colorectal surgery Hospital Course by problems: HPI per admitting MD \" Ross Vega is a 80 y.o. female with PMHx of dementia who is confused at baseline, chronic diastolic and systolic CHF, HTN, HLD, RA and lupus who presented to the ED with fever and worsening confusion.  
  
HPI is obtained from ED physician, notes and from pt's son as pt only makes non-specific sounds when stimulated. Pt presented to the ED with fever to 101.2 this afternoon. She was reported to have been taking Keflex for a UTI for the previous 2 days. Discussed with pt's son on phone who reports she lives with her  and daughter and is confused and minimally verbal at baseline. Today she was noted to have increasing confusion and fever so she was taken to the ED.   
  
Pt was febrile to 101.2, with tachypnea with RR in the 20's, with other vitals reassuring in the ED. PE showed a somnolent woman who only made non-specific sounds and groans to painful stimuli. In addition, a stage IV sacral ulcer was noted.  Labs were notable for Hgb 9.4, Na 147, K+ 5.7, BUN 29, and creatinine 1.41 (baseline 1.5) . A UA showed large leuk esterase, WBC's too numerous to count and 4+ bacteria. A CXR was negative and a CT Abd Pelv was notable for a stable small ventral hernia R of midline and a large volume of retained stool throughout the colon. Wound, blood and urine cultures were obtained and the pt was started on IV abx. She is now admitted with sepsis 2/2 infected Stage IV sacral ulcer and UTI. \"  
 
- Patient with PMH of multiple disorder- SLE /HTN / CKD / RA mainly bed bound - admitted with sepsis from grade 4 decubiti ulcer requiring surgical debridement , ID consulted for antibiotics , Now given Fosphomycin one dose to finish antibiotic course. - Palliative consult called - initially patient ;s family agreed for Hospice but later they refused and wants to take her home with hospice . - patient has finished her antibiotics - patient's overall condition is very poor , she is mainly bed bound and non verbal . Prognosis is very poor . DNR/DNI Family requesting discharge , patient currently is in stable condition to her baseline , no further medical management is going to improve her life expectancy and I do not see any acute event which requires postponing her discharge. Patient seen and examined by me on discharge day. Pertinent Findings: 
Non verbal  
No distress Room air O2 sats are acceptable RS - alyssa air + [facility]- reg Significant Diagnostic Studies: 
CT ABD PELV W CONT (Accession [id number]) (Order [id number]) Allergies     
No Known Allergies Exam Information Status Exam Begun  Exam Ended Final [99] 6/18/2020 17:18 6/18/2020 17:23 Result Information Status: Final result (Exam End: 6/18/2020 17:23) Provider Status: Open Study Result EXAM:  CT Abdomen-Pelvis without Contrast. 
  
CLINICAL INDICATION: High fever of 104 axillary. UTI for one week. Ulcer, left 
buttock. Disoriented. . 
  
COMPARISON:  February 10, 2020   TECHNIQUE:   
  
- Helical volumetric CT imaging of the abdomen and pelvis is performed without IV contrast administration. Coronal and sagittal multiplanar reconstruction 
images are generated for improved anatomic delineation. 
- All CT scans at this facility are performed using dose optimization technique 
as appropriate to the performed exam, to include automated exposure control, 
adjustment of the mA and/or kV according to patient's size (Including 
appropriate matching for site-specific examinations), or use of iterative 
reconstruction technique. 
  
FINDINGS: 
  
Lung Bases: The heart remains enlarged. Trace bibasilar atelectasis and small 
effusions 
  
Liver, Spleen, Adrenal Glands:  Unremarkable. 
  
Pancreas:  No acute abnormalities. 
  
Gallbladder:  Heterogeneous appearance with subtle calcific densities in the 
gallbladder. Suggestive of gallstones. 
  
Kidneys:  Hypodensity at the right kidney upper pole measuring about 1.7 x 1.3 
cm (axial #18). Tiny cortical hyperdensities in the left kidney measuring up to 
about 0.6 cm (axial #31). 
  
GI Tract:  Mild hiatal hernia. No acute small bowel abnormalities. The 
appendix is not confidently visualized. No convincing evidence of acute 
appendicitis is detected. No acute colitis or acute diverticulitis is detected. There is a ventral hernia in the right paramedian aspect with widemouth, 
estimated to be about 2.9 cm in width. The transverse colon partially protrudes 
into this hernia sac (axial #36). Moderate stool retention in the colon. 
  
Nodes:  No mesenteric or retroperitoneal adenopathy. 
  
Vascular: Moderate atheromatous plaque calcifications in the aorta. 
  
Bladder:  Unremarkable urinary bladder. 
  
Uterus:  Removed. 
  
Adnexa:  No adnexal mass. 
  
Bones:  Compression deformity at L1. Unchanged compared to 01/24/20. 
  
IMPRESSION IMPRESSION: 
  
1.  Stable small ventral hernia just to the right of midline containing a portion 
of transverse colon partially protruding into the hernia sac. No acute GI tract 
abnormalities otherwise. 
  
2. Patient appears constipated, large volume of retained stool throughout. Gallstones.  
  
3.  Small pleural effusions with slight atelectatic changes. 4.  Pre-existing L1 compression deformity. Pertinent Lab Data: 
Recent Labs  
  06/21/20 
0430 06/20/20 
0104 WBC 9.2 8.1 HGB 7.6* 8.0*  
HCT 23.7* 24.7*  
* 119* Recent Labs  
  06/21/20 
0430 06/20/20 
0104  138  
K 4.2 4.7  108 CO2 22 22 GLU 61* 57*  
BUN 27* 27* CREA 1.11 1.09  
CA 8.2* 7.8* DISCHARGE MEDICATIONS:  
@ Current Discharge Medication List  
  
CONTINUE these medications which have NOT CHANGED Details  
levothyroxine (SYNTHROID) 25 mcg tablet TAKE ONE TABLET BY MOUTH DAILY BEFORE BREAKFAST Qty: 30 Tab, Refills: 2 Associated Diagnoses: CKD (chronic kidney disease) stage 4, GFR 15-29 ml/min ([facility]) dextromethorphan-guaiFENesin (TUSSIN DM COUGH AND CHEST)  mg/5 mL liqd syrup Take 10 mL by mouth every six (6) hours as needed. isosorbide mononitrate (IMDUR PO) Take 60 mg by mouth daily. potassium chloride SR (KLOR-CON 10) 10 mEq tablet Take  by mouth.  
  
metOLazone (ZAROXOLYN) 2.5 mg tablet Take  by mouth daily. famotidine (PEPCID) 20 mg tablet Take 20 mg by mouth two (2) times a day. magnesium citrate solution Take 1/3 of bottle every 8 hours until finished or until you have a bowel movement. Qty: 1 Bottle, Refills: 0  
  
predniSONE (DELTASONE) 1 mg tablet Take 1 mg by mouth daily. 1 pill po daily  
  
spironolactone (ALDACTONE) 25 mg tablet Take 1 Tab by mouth daily. Qty: 90 Tab, Refills: 3  
  
hydroxychloroquine (PLAQUENIL) 200 mg tablet Take 200 mg by mouth two (2) times a day. torsemide (DEMADEX) 20 mg tablet Take 1 Tab by mouth daily as needed (edema). Qty: 30 Tab, Refills: 3 polyethylene glycol (MIRALAX) 17 gram/dose powder Take 17 g by mouth daily. aspirin delayed-release 81 mg tablet Take 81 mg by mouth daily. food supplemt, lactose-reduced (ENSURE ENLIVE) 0.08 gram-1.5 kcal/mL liqd Take 1 Bottle by mouth two (2) times a day. Qty: 60 Bottle, Refills: 0  
  
menthol-zinc oxide (CALMOSEPTINE) 0.44-20.6 % oint Apply 1 Each to affected area as needed for Pain. apply to affected area after each pericare episode. carvedilol (COREG) 3.125 mg tablet Take 1 Tab by mouth every twelve (12) hours. Qty: 60 Tab, Refills: 0  
  
acetaminophen (TYLENOL ARTHRITIS PAIN) 650 mg TbER Take 1 Tab by mouth every eight (8) hours. Qty: 15 Tab, Refills: 0  
  
amiodarone (CORDARONE) 200 mg tablet TAKE ONE TABLET EVERY DAY (MAKE AN APPT) Qty: 30 Tab, Refills: 5  
  
memantine (NAMENDA) 10 mg tablet Take 10 mg by mouth two (2) times a day. amitriptyline (ELAVIL) 25 mg tablet Take 25 mg by mouth nightly. My Recommended Diet, Activity, Wound Care, and follow-up labs are listed in the patient's Discharge Insturctions which I have personally completed and reviewed. Disposition:    
[x] Home with family     [] [place] PT/RN   [] SNF/NH   [] Inpatient Rehab/EDUARDO Condition at Discharge:  Stable Follow up with: PCP : Ender Nation MD 
 
 
Please follow-up tests/labs that are still pendin. None 2. 
 
>30 minutes spent coordinating this discharge (review instructions/follow-up, prescriptions, preparing report for sign off) Disclaimer: Sections of this note are dictated utilizing voice recognition software, which may have resulted in some phonetic based errors in grammar and contents. Even though attempts were made to correct all the mistakes, some may have been missed, and remained in the body of the document. If questions arise, please contact our department.  
 
Signed: 
Teodora Ascencio MD 
2020 
11:18 AM

## 2020-06-22 NOTE — HOME CARE
[facility] Did not round on this patient. [address] will be available if needed. Lee Diaz LPN  
[facility] Liaison [phone]

## 2020-06-22 NOTE — PROGRESS NOTES
Speech Therapy Note: Follow up attempted. Could not progress with ST intervention because patient:   
 
[x]  Lethargic, unable to be alerted enough for safe PO intake 
[]  Refused participation []  Off the unit []  NPO for procedure 
[]  Other: SLP will re-attempt treatment as schedule permits. Leon Jackson M.S., CCC-SLP Speech Language Pathologist

## 2020-06-22 NOTE — HOSPICE
Called and spoke with pt's son Goldie Sharp to discuss ordering DME and discharge plans. He stated that the family has decided they don't want hospice at this time but would like to continue with the private caregivers from [address] [place] [address] and [facility] care. He stated they have been providing 24 hour care and have a nurse scheduled to come out when she gets home to resume care. Attempted to provide further education on how hospice could assist with providing care, but he declined stating \"we don't want no hospice care\". He did accept our 24 hour number. Maylin Rey,  made aware of above and stated they would like her to come home today. Please contact [facility] [address] if further assistance is needed. Will continue to follow from a distance until discharge. Chapito March RN [place] [address] [place], [address] [address] [facility] [address] 
[phone] Email: [email or web address]

## 2020-06-22 NOTE — PROGRESS NOTES
Patient unable to understand and/or complete the \"Important Message from [id number]". Reviewed document with patient's representative Richard Borja at phone number [phone] telephonically and addressed questions. Mr. Louann Pop stated he did not need Livanta's number because the family wants her to come home today. Original, with verbal signature noted, placed in patient's chart.

## 2020-06-22 NOTE — PROGRESS NOTES
CM called and spoke to [address] [place] [address] at [facility], medical stretcher transport will be at [place] at 4:30pm to pick patient up and take her home with family. CM called and spoke to son Lisa Mckeon, and made him aware. CM spoke to Santino Dawson, and [employer] and made them aware. Dennis Berg, RN Case Management [phone]

## 2020-06-22 NOTE — PROGRESS NOTES
D/C order noted for today. Orders reviewed. [facility] medical stretcher transport will be here at 4:30pm to pick patient up to take her home. Family made aware, nurse and charge nurse made aware. No other needs identified at this time. CM remains available if needed. Hai Andre, [phone]

## 2020-06-22 NOTE — DISCHARGE INSTRUCTIONS
Patient Education        Altered Mental Status: Care Instructions  Your Care Instructions     Altered mental status is a change in how well your brain is working. As a result, you may be confused, be less alert than usual, or act in odd ways. This may include seeing or hearing things that aren't really there (hallucinations). A mental status change has many possible causes. For example, it may be the result of an infection, an imbalance of chemicals in the body, or a chronic disease such as diabetes or COPD. It can also be caused by things such as a head injury, taking certain medicines, or using alcohol or drugs. The doctor may do tests to look for the cause. These tests may include urine tests, blood tests, and imaging tests such as a CT scan. Sometimes a clear cause isn't found. But tests can help the doctor rule out a serious cause of your symptoms. A change in mental status can be scary. But mental status will often return to normal when the cause is treated. So it is important to get any follow-up testing or treatment the doctor has suggested. The doctor has checked you carefully, but problems can develop later. If you notice any problems or new symptoms, get medical treatment right away. Follow-up care is a key part of your treatment and safety. Be sure to make and go to all appointments, and call your doctor if you are having problems. It's also a good idea to know your test results and keep a list of the medicines you take. How can you care for yourself at home? · Be safe with medicines. Take your medicines exactly as prescribed. Call your doctor if you think you are having a problem with your medicine. · Have another adult stay with you until you are better. This can help keep you safe. Ask that person to watch for signs that your mental status is getting worse. When should you call for help? [id number] anytime you think you may need emergency care.  For example, call if:  · You passed out (lost consciousness). Call your doctor now or seek immediate medical care if:  · Your mental status is getting worse. · You have new symptoms, such as a fever, chills, or shortness of breath. · You do not feel safe. Watch closely for changes in your health, and be sure to contact your doctor if:  · You do not get better as expected. Where can you learn more? Go to http://renata-antonieta.info/  Enter J452 in the search box to learn more about \"Altered Mental Status: Care Instructions. \"  Current as of: November 20, 2019               Content Version: 12.5  © 9131-0699 [employer]. Care instructions adapted under license by [employer] (which disclaims liability or warranty for this information). If you have questions about a medical condition or this instruction, always ask your healthcare professional. [address] any warranty or liability for your use of this information. Patient Education        Dementia: Care Instructions  Your Care Instructions     Dementia is a loss of mental skills that affects your daily life. It is different than the occasional trouble with memory that is part of aging. You may find it hard to remember things that you feel you should be able to remember. Or you may feel that your mind is just not working as well as usual.  Finding out that you have dementia is a shock. You may be afraid and worried about how the condition will change your life. Although there is no cure at this time, medicine may slow memory loss and improve thinking for a while. Other medicines may be able to help you sleep or cope with depression and behavior changes. Dementia often gets worse slowly. But it can get worse quickly. As dementia gets worse, it may become harder to do common things that take planning, like making a list and going shopping. Over time, the disease may make it hard for you to take care of yourself.  Some people with dementia need others to help care for them. Dementia is different for everyone. You may be able to function well for a long time. In the early stage of the condition, you can do things at home to make life easier and safer. You also can keep doing your hobbies and other activities. Many people find comfort in planning now for their future needs. Follow-up care is a key part of your treatment and safety. Be sure to make and go to all appointments, and call your doctor if you are having problems. It's also a good idea to know your test results and keep a list of the medicines you take. How can you care for yourself at home? · Take your medicines exactly as prescribed. Call your doctor if you think you are having a problem with your medicine. · Eat healthy foods. Eat lots of whole grains, fruits, and vegetables every day. If you are not hungry, try snacks or nutritional drinks such as Boost, Ensure, or Sustacal.  · If you have problems sleeping:  ? Try not to nap too close to your bedtime. ? Exercise regularly. Walking is a good choice. ? Try a glass of warm milk or caffeine-free herbal tea before bed. · Do tasks and activities during the time of day when you feel your best. It may help to develop a daily routine. · Post labels, lists, and sticky notes to help you remember things. Write your activities on a calendar you can easily find. Put your clock where you can easily see it. · Stay active. Take walks in familiar places, or with friends or loved ones. Try to stay active mentally too. Read and work crossword puzzles if you enjoy these activities. · Do not drive unless you can pass an on-road driving test. If you are not sure if you are safe to drive, your state 's license bureau can test you. · Keep a cordless phone and a flashlight with new batteries by your bed. If possible, put a phone in each of the main rooms of your house, or carry a cell phone in case you fall and cannot reach a phone.  Or, you can wear a device around your neck or wrist. You push a button that sends a signal for help. Acknowledge your emotions and plan for the future  · Talk openly and honestly with your doctor. · Let yourself grieve. It is common to feel angry, scared, frustrated, anxious, or depressed. · Get emotional support from family, friends, a support group, or a counselor experienced in working with people who have dementia. · Ask for help if you need it. · Tell your doctor how you feel. You may feel upset, angry, or worried at times. Many things can cause this, including poor sleep, medicine side effects, confusion, and pain. Your doctor may be able to help you. · Plan for the future. ? Talk to your family and doctor about preparing a living will and other important papers while you can make decisions. These papers tell your doctors how to care for you at the end of your life. ? Consider naming a person to make decisions about your care if you are not able to. When should you call for help? [id number] anytime you think you may need emergency care. For example, call if:  · You are lost and do not know whom to call. · You are injured and do not know whom to call. Call your doctor now or seek immediate medical care if:  · You are more confused or upset than usual.  · You feel like you could hurt yourself because your mind is not working well. Watch closely for changes in your health, and be sure to contact your doctor if you have any problems. Where can you learn more? Go to http://renata-antonieta.info/  Enter K090 in the search box to learn more about \"Dementia: Care Instructions. \"  Current as of: January 31, 2020               Content Version: 12.5  © 1096-7216 [employer]. Care instructions adapted under license by [employer] (which disclaims liability or warranty for this information).  If you have questions about a medical condition or this instruction, always ask your healthcare professional. [place] [address] any warranty or liability for your use of this information. Patient Education        Advance Directives: Care Instructions  Overview  An advance directive is a legal way to state your wishes at the end of your life. It tells your family and your doctor what to do if you can't say what you want. There are two main types of advance directives. You can change them any time your wishes change. Living will. This form tells your family and your doctor your wishes about life support and other treatment. The form is also called a declaration. Medical power of . This form lets you name a person to make treatment decisions for you when you can't speak for yourself. This person is called a health care agent (health care proxy, health care surrogate). The form is also called a durable power of  for health care. If you do not have an advance directive, decisions about your medical care may be made by a family member, or by a doctor or a  who doesn't know you. It may help to think of an advance directive as a gift to the people who care for you. If you have one, they won't have to make tough decisions by themselves. Follow-up care is a key part of your treatment and safety. Be sure to make and go to all appointments, and call your doctor if you are having problems. It's also a good idea to know your test results and keep a list of the medicines you take. What should you include in an advance directive? Many states have a unique advance directive form. (It may ask you to address specific issues.) Or you might use a universal form that's approved by many states. If your form doesn't tell you what to address, it may be hard to know what to include in your advance directive. Use the questions below to help you get started. · Who do you want to make decisions about your medical care if you are not able to?   · What life-support measures do you want if you have a serious illness that gets worse over time or can't be cured? · What are you most afraid of that might happen? (Maybe you're afraid of having pain, losing your independence, or being kept alive by machines.)  · Where would you prefer to die? (Your home? A hospital? A nursing home?)  · Do you want to donate your organs when you die? · Do you want certain Shinto practices performed before you die? When should you call for help? Be sure to contact your doctor if you have any questions. Where can you learn more? Go to http://renata-antonieta.info/  Enter R264 in the search box to learn more about \"Advance Directives: Care Instructions. \"  Current as of: December 9, 2019               Content Version: 12.5  © 7638-9271 [employer]. Care instructions adapted under license by [employer] (which disclaims liability or warranty for this information). If you have questions about a medical condition or this instruction, always ask your healthcare professional. [place] [address] any warranty or liability for your use of this information. Patient Education        Learning About Preventing Pressure Injuries  What are pressure injuries? A pressure injury to the skin is caused by constant pressure over a period of time. The constant pressure blocks the blood supply to the skin. This causes skin cells to die and creates a sore. Pressure injuries are also called bedsores. Pressure injuries usually occur over bony areas, such as the hips, lower back, elbows, heels, and shoulders. Pressure injuries can also occur in places where the skin folds over on itself, or where medical equipment presses on the skin, such as when oxygen tubes press on the ears or cheeks. Pressure injuries can range from red areas on the surface of the skin to severe tissue damage that goes deep into muscle and bone.  Severe sores are hard to treat and slow to heal. When pressure injuries do not heal properly, problems such as bone, blood, and skin infections can develop. What causes pressure injuries? Things that cause pressure injuries include:  · Pressure on the skin and tissues. For example, the sores may happen when a person lies in bed or sits in a wheelchair for a long time. This is the most common cause of pressure injuries. · Sliding down in a bed or chair, forcing the skin to fold over itself (shear force). · Being pulled across bed sheets or other surfaces (friction burns). As we get older, our skin gets more thin and dry and less elastic, so it is easier to damage. Poor nutrition and not getting enough fluids make these natural changes in the skin worse. Skin in this condition may easily develop a pressure injury. Skin can also be damaged by sweat, feces, or urine, making pressure injuries more likely and harder to heal.  How can you help prevent pressure injuries? If you are not able to do these things yourself, ask a family member or friend for help. Change position often  · In a bed, change position every 2 hours. · In a wheelchair or other type of chair, shift your weight every 15 minutes, and give yourself a full relief of weight every hour. ? For a weight shift, lean forward and to the left and right. Push up out of the chair with your arms. If you have a chair that tilts, use the tilt control to help relieve pressure. ? For a full relief of weight, stand up or move to another chair or bed if you are able to. Personal care  · Check your skin every day, especially around bony areas. When a pressure injury is forming, skin temperature can be different than nearby skin. It might be warmer or cooler. The skin can feel either firmer or softer than the surrounding skin. · Keep your skin clean and free of sweat, wound drainage, urine, and feces. · Use skin lotions to keep your skin from drying out and cracking.  Barrier lotions or creams have ingredients that can act as a shield to help protect the skin from moisture or irritation. · Try not to slide or slump across sheets in a chair or bed. And try not to sleep in a recliner chair. Lifestyle choices  · Eat healthy foods with plenty of protein to help heal damaged skin and to help new skin grow. · Get plenty of fluids. · Stay at a healthy weight. Being either overweight or underweight can make pressure injuries more likely. · If you smoke, stop. Smoking dries the skin and reduces its blood supply. If you need help quitting, talk to your doctor about stop-smoking programs and medicines. These can increase your chances of quitting for good. Ask about using cushions or pads  · Overlays are special pads you put on top of a mattress. They provide a softer surface that will fit your body's shape better than a regular mattress. · Cushions or devices can be used to reduce pressure on certain areas of the body. For example, you can use a \"medical heel pillow\" to help prevent pressure injuries on heels. You can also get cushions for seating surfaces, such as wheelchair seats. Talk with your doctor about cushions and pads. Some products, such as doughnut-type devices, may actually cause pressure injuries or make them worse. Where can you learn more? Go to http://renata-antonieta.info/  Enter H403 in the search box to learn more about \"Learning About Preventing Pressure Injuries. \"  Current as of: March 4, 2020               Content Version: 12.5  © 2006-2020 [employer]. Care instructions adapted under license by [employer] (which disclaims liability or warranty for this information). If you have questions about a medical condition or this instruction, always ask your healthcare professional. [place] [address] any warranty or liability for your use of this information.          Patient Education        Pressure Injuries: Care Instructions  Your Care Instructions     A pressure injury on the skin is caused by constant pressure to that area. These injuries--also called decubitus ulcers or bedsores--may happen when you lie in bed or sit in a wheelchair for a long time. The constant pressure blocks the blood supply to the skin. This causes skin cells to die and creates a sore. Pressure injuries usually occur over bony areas, such as the hips, lower back, elbows, heels, and shoulders. They also can occur in places where the skin folds over on itself. You may have mild redness or open sores that are harder to heal.  Good care at home can help heal pressure injuries. You or your caregiver needs to check your skin every day for sores. You need good nutrition and plenty of fluids to keep your skin healthy and prevent new pressure injuries. Follow-up care is a key part of your treatment and safety. Be sure to make and go to all appointments, and call your doctor if you are having problems. It's also a good idea to know your test results and keep a list of the medicines you take. How can you care for yourself at home? · If your doctor prescribed a medicated ointment or cream, use it exactly as prescribed. Call your doctor if you think you are having a problem with your medicine. · Wash pressure injuries every day, or as often as your doctor recommends. Most tap water is safe, but follow the advice of your doctor or nurse. He or she may recommend that you use a saline solution. This is a salt and water solution that you can buy over the counter. · Put on bandages as your doctor or wound care specialist says. · Keep healthy tissue around the sore clean and dry. · Check your skin every day for sores (or have a caregiver do it). · If you know what is causing the pressure that caused the sore, find a way to remove that pressure. To prevent pressure injuries  · Change your position or have your caregiver help you change your position often.  You may need to do this every 2 hours if you are in bed or every 15 minutes if you are in a wheelchair. This lowers the chance of making sores worse and getting new sores. · Use special mattresses or other support. These may include low-pressure mattresses or cushions made of foam that can be filled with air, water, beads, or fiber. · Eat healthy foods with plenty of protein to help heal damaged skin and to help new skin grow. · Try to stay at a healthy weight. Being overweight can lead to more pressure on your skin. · Do not slide across sheets or slump in a chair or bed. · Do not smoke. Smoking dries the skin and reduces its blood supply. If you need help quitting, talk to your doctor about stop-smoking programs and medicines. These can increase your chances of quitting for good. When should you call for help? Call your doctor now or seek immediate medical care if:  · You have signs of infection, such as:  ? Increased pain, swelling, warmth, or redness. ? Red streaks leading from the sore. ? Pus draining from the sore. ? A fever. Watch closely for changes in your health, and be sure to contact your doctor if:  · Your pressure injuries are not healing. · You have new pressure injuries. · You need help changing positions in bed or in a chair. · Your caregiver needs help to move you. Where can you learn more? Go to http://renata-antonieta.info/  Enter F114 in the search box to learn more about \"Pressure Injuries: Care Instructions. \"  Current as of: March 4, 2020               Content Version: 12.5  © 9964-8169 [employer]. Care instructions adapted under license by [employer] (which disclaims liability or warranty for this information). If you have questions about a medical condition or this instruction, always ask your healthcare professional. [place] [address] any warranty or liability for your use of this information.          Patient Education        Sepsis: Care Instructions  Overview     Sepsis is an intense reaction to an infection. It can cause damage to the body and lead to dangerously low blood pressure. You may have inflammation across large areas of your body. It can damage tissue and even go deep into your organs. Infections that can lead to sepsis include:  · A skin infection such as from a cut. · A lung infection like pneumonia. · A kidney infection. · A gut infection such as E. coli. Sepsis is treated with antibiotics. Your doctor will try to find the infection that led to sepsis. Ata Diane also get fluids through a vein (IV). Machines will track your vital signs, including temperature, blood pressure, breathing rate, and pulse rate. The physical and mental effects of sepsis may not be seen for several weeks after treatment. And they may last long after the infection is gone. Physical problems may include:  · Feeling weak and tired. · Feeling out of breath. · Aches and pains. · Problems with getting around. · Trouble falling asleep or staying asleep. · Dry and itchy skin, brittle nails, and hair loss. Some of these effects can lead to problems with your organs or your feet, legs, hands, or arms. Sepsis can also affect your mind and emotions. Problems may include:  · Self-doubt. · Anxiety. · Nightmares. · Depression and mood problems. · Wanting to avoid other people. · Confusion. · Flashbacks and bad memories of your illness. It's important to care for yourself and try to avoid infections. This may lower your risk of getting sepsis again. Follow-up care is a key part of your treatment and safety. Be sure to make and go to all appointments, and call your doctor if you are having problems. It's also a good idea to know your test results and keep a list of the medicines you take. How can you care for yourself at home? · Be safe with medicines. Take your medicines exactly as prescribed.  Call your doctor if you think you are having a problem with your medicine. · If your doctor prescribed antibiotics, take them as directed. Do not stop taking them just because you feel better. You need to take the full course of antibiotics. · Help prevent infections that could again lead to sepsis. ? Try to avoid colds and flu. If you must be around people who have a cold or the flu, wash your hands often. And get a flu vaccine every year. ? Ask your doctor if you need a pneumococcal vaccine (to prevent pneumonia, meningitis, and other infections). If you have had one before, ask your doctor if you need another dose. ? Clean any wounds or scrapes. · Do not smoke or use other tobacco products. When you quit smoking, you are less likely to get a cold, the flu, bronchitis, and pneumonia. If you need help quitting, talk to your doctor about stop-smoking programs and medicines. These can increase your chances of quitting for good. · Drink plenty of fluids to prevent dehydration. Choose water and other caffeine-free clear liquids until you feel better. If you have kidney, heart, or liver disease and have to limit fluids, talk with your doctor before you increase the amount of fluids you drink. · Eat a healthy diet. Include fruits, vegetables, and whole grains in your diet every day. · If your doctor recommends it, try doing some physical activity. Walking is a good choice. Bit by bit, increase the amount you walk every day. · Talk with your family and friends about your challenges. Ask for help if you need it. · Keep a journal. Writing down your thoughts and feelings can help reduce your stress. · Ask family members to fill in gaps in your memory. · Set small goals for yourself that you can reach. Reward yourself for success. When should you call for help? Call  911 anytime you think you may need emergency care. For example, call if:  · You passed out (lost consciousness).   Call your doctor now or seek immediate medical care if:  · You have symptoms such as:  ? Shortness of breath. ? Feeling very sick. ? Severe pain. ? A fast heart rate. ? Cool, pale, or clammy skin. ? Feeling confused. ? Feeling very sleepy, or you are hard to wake up. · You are dizzy or lightheaded, or you feel like you may faint. · You have a fever or chills. Watch closely for changes in your health, and be sure to contact your doctor if:  · You do not get better as expected. Where can you learn more? Go to http://renata-antonieta.info/  Enter T383 in the search box to learn more about \"Sepsis: Care Instructions. \"  Current as of: February 11, 2020               Content Version: 12.5  © 2006-2020 [employer]. Care instructions adapted under license by [employer] (which disclaims liability or warranty for this information). If you have questions about a medical condition or this instruction, always ask your healthcare professional. [place] [address] any warranty or liability for your use of this information. Patient Education        Learning About Turning a Person in Bed  Why is it important to turn a person in bed? People sometimes have to stay in bed for long periods of time. They may be very sick, in pain, or very weak and not be able to move themselves into different positions. It's very important that your loved one changes positions. Lying in one position for a long time can cause pressure injuries (also called pressure sores). Pressure injuries are damage to the skin. They can range from red areas on the surface of the skin to severe tissue damage that goes deep into muscle and bone. These problems are hard to treat and slow to heal. When pressure injuries don't heal well, they can cause problems such as bone, blood, and skin infections. Pressure injuries usually occur over bony areas, such as the hips, lower back, elbows, heels, and shoulders.  They can also occur in places where the skin folds over on itself. You can help your loved one avoid pressure injuries by helping him or her turn and change position in bed. A drawsheet can help. What is a drawsheet? A drawsheet makes it easier to \"roll\" your loved one into another position. You can buy a drawsheet, or you can make one with a sheet. You then make the bed using the drawsheet. To make a drawsheet:  · Fold a sheet in half lengthwise. · Place the sheet on top of the fitted bottom sheet so that the top and bottom of the drawsheet go across the bed (perpendicular to the bed). Position the drawsheet so that it will be between your loved one's head and knees. · Tuck in the drawsheet tightly on both sides. Smooth out any wrinkles to reduce possible skin irritation. How do you turn a person in bed? Before getting started, tell your loved one that you want him or her to roll into another position. If your loved one has any drains, tubes, or other medical equipment, adjust them so they don't get in the way. If your loved one can help  · You may need to help your loved one scoot toward the opposite side of the bed so that he or she will have room to roll. · Go to the side of the bed you want your love one to roll toward. · Ask your loved one to lie on his or her back with the knees bent. Have your loved one place his or her arms across the body. · Ask your loved one to roll toward you while keeping the knees bent. If you have a rail on the bed, have your loved one reach toward the rail. · Help your loved one as needed. Gently place your hands on the shoulders and hips, and guide him or her toward you. If your loved one can't help  It is best to turn your loved one every 2 hours. If your loved one cannot move or finds it very hard, you can use your drawsheet (see \"What is a drawsheet? \"). Have a family member or friend help you. It is easier for two people to turn someone, and it can be dangerous for one person to do it.   Position your loved one  · One person stands on each side of the bed. If your loved one is in a hospital bed, lower the height of the bed. This will make it easier to turn the person. · Untuck the drawsheet on both sides of the bed. Each person gathers up one side so you both almost have a \"handle\" to grab. You may also need to make sure your loved one is high enough up in the bed. If not, lift him or her toward the head of the bed. · Agree on a count, and then lift and move your loved one to the side of the bed you're going to roll away from. · Tuck in the drawsheet on the side of the bed that your loved one will roll toward. Move your loved one  When you and your assistant are ready:  · Help your loved one lie on his or her back with the knees bent. If your loved one can't bend the knees, cross one ankle over the other in the direction of the turn. Position your loved one's arms across his or her body. · Stand on opposite sides of the bed. One person will pull and the other push. Be sure that you and your assistant have your feet shoulder-width apart. This will help you avoid straining your back. ? If you're pulling your loved one toward you, lean from your hips (don't bend your back), reach over your loved one, and grab the drawsheet at your loved one's hip and shoulder areas. Slowly pull the drawsheet toward you to roll your loved one over. ? If you're rolling your loved one away from you, slowly push at the hip and shoulder areas. Moving someone in bed is best as a two-person job. If your loved one can help, even a little, you may be able to do it yourself. But do all you can to find someone to help you. How do you turn or move a person in bed? Positioning a drawsheet   1. When you make someone's bed with a drawsheet, position it so that it is between the person's head and knees. Turning or moving a person in bed   1. The drawsheet can then be used to turn or move the person you're caring for.     What do you do after turning someone? You can use pillows to help your loved one get comfortable and avoid pressure injuries. If your loved one is on his or her side:  · Place pillows in front of your loved one, at chest level, with the top arm draped over a pillow. · If needed, tuck one edge of a pillow under the buttock, lengthwise. Then fold the pillow under and tuck the other edge under the first edge. That creates a \"roll\" that stays in place better and helps keep your loved one from rolling back. · Place a pillow between your loved one's knees, with the legs slightly bent. · Put the top leg a little in front of the bottom leg. This takes pressure off the bottom leg. · Put a pillow under the bottom leg so that the bottom ankle is off the bed. If your loved one is on his or her back:  · Put a pillow under your loved one's legs between the knees and ankles. · Do not put anything under the heels. · If you have a hospital bed, don't adjust the top end above 30 degrees. This helps prevent your loved one from sliding down. When you are finished, smooth out the drawsheet in its original position and tuck it in. Where can you learn more? Go to http://renata-antonieta.info/  Enter K236 in the search box to learn more about \"Learning About Turning a Person in Bed. \"  Current as of: December 9, 2019               Content Version: 12.5  © 4809-9383 [employer]. Care instructions adapted under license by [employer] (which disclaims liability or warranty for this information). If you have questions about a medical condition or this instruction, always ask your healthcare professional. [place] [address] any warranty or liability for your use of this information.          Patient Education        Urinary Tract Infection in Women: Care Instructions  Your Care Instructions     A urinary tract infection, or UTI, is a general term for an infection anywhere between the kidneys and the urethra (where urine comes out). Most UTIs are bladder infections. They often cause pain or burning when you urinate. UTIs are caused by bacteria and can be cured with antibiotics. Be sure to complete your treatment so that the infection goes away. Follow-up care is a key part of your treatment and safety. Be sure to make and go to all appointments, and call your doctor if you are having problems. It's also a good idea to know your test results and keep a list of the medicines you take. How can you care for yourself at home? · Take your antibiotics as directed. Do not stop taking them just because you feel better. You need to take the full course of antibiotics. · Drink extra water and other fluids for the next day or two. This may help wash out the bacteria that are causing the infection. (If you have kidney, heart, or liver disease and have to limit fluids, talk with your doctor before you increase your fluid intake.)  · Avoid drinks that are carbonated or have caffeine. They can irritate the bladder. · Urinate often. Try to empty your bladder each time. · To relieve pain, take a hot bath or lay a heating pad set on low over your lower belly or genital area. Never go to sleep with a heating pad in place. To prevent UTIs  · Drink plenty of water each day. This helps you urinate often, which clears bacteria from your system. (If you have kidney, heart, or liver disease and have to limit fluids, talk with your doctor before you increase your fluid intake.)  · Urinate when you need to. · Urinate right after you have sex. · Change sanitary pads often. · Avoid douches, bubble baths, feminine hygiene sprays, and other feminine hygiene products that have deodorants. · After going to the bathroom, wipe from front to back. When should you call for help? Call your doctor now or seek immediate medical care if:  · Symptoms such as fever, chills, nausea, or vomiting get worse or appear for the first time.   · You have new pain in your back just below your rib cage. This is called flank pain. · There is new blood or pus in your urine. · You have any problems with your antibiotic medicine. Watch closely for changes in your health, and be sure to contact your doctor if:  · You are not getting better after taking an antibiotic for 2 days. · Your symptoms go away but then come back. Where can you learn more? Go to http://renata-antonieta.info/  Enter J156 in the search box to learn more about \"Urinary Tract Infection in Women: Care Instructions. \"  Current as of: August 22, 2019               Content Version: 12.5  © 9266-5769 [employer]. Care instructions adapted under license by [employer] (which disclaims liability or warranty for this information). If you have questions about a medical condition or this instruction, always ask your healthcare professional. [address] any warranty or liability for your use of this information. Patient armband removed and shredded  DISCHARGE SUMMARY from Nurse    PATIENT INSTRUCTIONS:    After general anesthesia or intravenous sedation, for 24 hours or while taking prescription Narcotics:  · Limit your activities  · Do not drive and operate hazardous machinery  · Do not make important personal or business decisions  · Do  not drink alcoholic beverages  · If you have not urinated within 8 hours after discharge, please contact your surgeon on call.     Report the following to your surgeon:  · Excessive pain, swelling, redness or odor of or around the surgical area  · Temperature over 100.5  · Nausea and vomiting lasting longer than 4 hours or if unable to take medications  · Any signs of decreased circulation or nerve impairment to extremity: change in color, persistent  numbness, tingling, coldness or increase pain  · Any questions    What to do at Home:  Recommended activity: PT/OT per [address] for PT/OT/ Skilled Nursing and [address] Sacral Wound care    If you experience any of the following symptoms fever greater than 100.5, please follow up with primary care doctor. *  Please give a list of your current medications to your Primary Care Provider. *  Please update this list whenever your medications are discontinued, doses are      changed, or new medications (including over-the-counter products) are added. *  Please carry medication information at all times in case of emergency situations. These are general instructions for a healthy lifestyle:    No smoking/ No tobacco products/ Avoid exposure to second hand smoke  Surgeon General's Warning:  Quitting smoking now greatly reduces serious risk to your health. Obesity, smoking, and sedentary lifestyle greatly increases your risk for illness    A healthy diet, regular physical exercise & weight monitoring are important for maintaining a healthy lifestyle    You may be retaining fluid if you have a history of heart failure or if you experience any of the following symptoms:  Weight gain of 3 pounds or more overnight or 5 pounds in a week, increased swelling in our hands or feet or shortness of breath while lying flat in bed. Please call your doctor as soon as you notice any of these symptoms; do not wait until your next office visit. The discharge information has been reviewed with the caregiver. The caregiver verbalized understanding. Discharge medications reviewed with the caregiver and appropriate educational materials and side effects teaching were provided.   ___________________________________________________________________________________________________________________________________

## 2020-06-22 NOTE — PROGRESS NOTES
[facility] [address] [phone] Colon and Rectal Surgery Progress Note Patient: Rodell Meckel MRN: [id number]  SSN: [id number] YOB: 1931  Age: 80 y.o. Sex: female Admit Date: 6/18/2020 LOS: 4 days Subjective: Pt with dementia, no events over the weekend. Per primary team notes, likely home today with Hospice. Objective:  
 
Vitals:  
 06/21/20 1825 06/22/20 0007 06/22/20 4222 06/22/20 3090 BP: 117/66 122/60 128/64 133/64 Pulse: 70 70 72 71 Resp: 18 16 18 20 Temp: 97.8 °F (36.6 °C) 97.6 °F (36.4 °C) 97.8 °F (36.6 °C) 98.1 °F (36.7 °C) SpO2: 100% 98% 95% 100% Weight:      
Height:      
  
 
Intake and Output: 
Current Shift: 06/22 0701 - 06/22 1900 In: 360 [P.O.:360] Out: - Last three shifts: 06/20 1901 - 06/22 0700 In: 120 [P.O.:120] Out: 550 [Urine:550] Physical Exam:  
GEN: Awake, not oriented, does not answer questions ABD: soft, hernia noted SKIN: dressing covering sacral decub is clean and dry Lab/Data Review: 
 
BMP: No results found for: NA, K, CL, CO2, AGAP, GLU, BUN, CREA, GFRAA, GFRNA 
CBC: No results found for: WBC, HGB, HGBEXT, HCT, HCTEXT, PLT, PLTEXT, HGBEXT, HCTEXT, PLTEXT Assessment:  
 
81yo female with dementia and CHF, admitted with sepsis 2/2 to stage IV sacral decub ulcer and UTI. POD#3 s/p debridement of sacral decub. Has done well post-operatively. Plan: 1. Cleared for discharge from a surgical standpoint. 2. Will sign off Juan Carlos Peña D.O. PGY-2 
[facility] Medicine Signed By: Linda Bond DO 
 
  
 June 22, 2020

## 2020-06-22 NOTE — PROGRESS NOTES
CM spoke with Vero Angeles with [facility] about home health orders. CM called and spoke with patient's son Manjula Waddell, verbal Freedom of Choice given for [facility]. CM put patient in queue for [facility], and called ext# [phone] and spoke to Vero Angeles, and let her know. Keshia Dong, RN Case Management [phone]

## 2020-06-22 NOTE — PROGRESS NOTES
CM called and spoke with patient's son Peterson Estrada [phone]. Patient's son wants her to come home today without Hospice. CM confirmed address for medical stretcher transport. CM checked with Caitlyn Rodney, no home Oxygen needed. CM called Dr. Tino Johansen, and made him aware of son wanting patient home today. CM faxed facesheet and PCN sheet to [facility]. Marti Tyler RN Case Management [phone]

## 2020-06-22 NOTE — HOME CARE
Discharge noted for today. Received home health referral for [facility] for SN, PT, and OT. Spoke with patient's son Jarek Monterroso, explained services and answered all questions. Demographics verified. Referral processed and emailed to central office. Patient has the following DME: hospital bed, cane, and front wheeled walker. Charanjit Iniguez, [facility] Liaison

## 2020-08-02 NOTE — ED PROVIDER NOTES
EMERGENCY DEPARTMENT HISTORY AND PHYSICAL EXAM 
 
Date: 8/2/2020 Patient Name: [facility] History of Presenting Illness Chief Complaint Patient presents with  Wound Check History Provided By: Patient Chief complaint: Ms. Devang Warren is an 80year old female who has a complex past medical history who presented to the ED for evaluation of chronic sacral wound. Family called EMS because patient was showing non verbal signs of increased pain and increased wound drainage. They were concerned about sepsis. Of note, she was discharged from [facility] 6/22/20 s/p treatment for sepsis d/t infected stage IV sacral ulcer and GNR UTI. Her wound underwent debridement on 6/19/20. She was deemed to have a poor prognosis and hospice was recommended. Family initially agreed to hospice but later decided to take her home. PCP: Hershal Rubinstein, MD 
 
Current Outpatient Medications Medication Sig Dispense Refill  torsemide (DEMADEX) 20 mg tablet Take 1 Tab by mouth daily. 90 Tab 3  
 torsemide (DEMADEX) 20 mg tablet Take 1 Tab by mouth daily as needed (edema). 30 Tab 3  
 levothyroxine (SYNTHROID) 25 mcg tablet TAKE ONE TABLET BY MOUTH DAILY BEFORE BREAKFAST 30 Tab 2  
 dextromethorphan-guaiFENesin (TUSSIN DM COUGH AND CHEST)  mg/5 mL liqd syrup Take 10 mL by mouth every six (6) hours as needed.  isosorbide mononitrate (IMDUR PO) Take 60 mg by mouth daily.  potassium chloride SR (KLOR-CON 10) 10 mEq tablet Take  by mouth.  metOLazone (ZAROXOLYN) 2.5 mg tablet Take  by mouth daily.  famotidine (PEPCID) 20 mg tablet Take 20 mg by mouth two (2) times a day.  magnesium citrate solution Take 1/3 of bottle every 8 hours until finished or until you have a bowel movement. 1 Bottle 0  
 predniSONE (DELTASONE) 1 mg tablet Take 1 mg by mouth daily. 1 pill po daily  spironolactone (ALDACTONE) 25 mg tablet Take 1 Tab by mouth daily.  90 Tab 3  
  hydroxychloroquine (PLAQUENIL) 200 mg tablet Take 200 mg by mouth two (2) times a day.  polyethylene glycol (MIRALAX) 17 gram/dose powder Take 17 g by mouth daily.  aspirin delayed-release 81 mg tablet Take 81 mg by mouth daily.  food supplemt, lactose-reduced (ENSURE ENLIVE) 0.08 gram-1.5 kcal/mL liqd Take 1 Bottle by mouth two (2) times a day. 60 Bottle 0  
 menthol-zinc oxide (CALMOSEPTINE) 0.44-20.6 % oint Apply 1 Each to affected area as needed for Pain. apply to affected area after each pericare episode.  carvedilol (COREG) 3.125 mg tablet Take 1 Tab by mouth every twelve (12) hours. 60 Tab 0  
 acetaminophen (TYLENOL ARTHRITIS PAIN) 650 mg TbER Take 1 Tab by mouth every eight (8) hours. (Patient taking differently: Take 1 Tab by mouth daily. ) 15 Tab 0  
 amiodarone (CORDARONE) 200 mg tablet TAKE ONE TABLET EVERY DAY (MAKE AN APPT) (Patient taking differently: TAKE ONE TABLET EVERY DAY by mouth) 30 Tab 5  
 memantine (NAMENDA) 10 mg tablet Take 10 mg by mouth two (2) times a day.  amitriptyline (ELAVIL) 25 mg tablet Take 25 mg by mouth nightly. Past History Past Medical History: 
Past Medical History:  
Diagnosis Date  Acute on chronic diastolic heart failure ([address]  Arthritis   
 rheumatoid arthritis  Benign hypertensive heart disease without heart failure Better controlled, stable  Chronic diastolic heart failure ([address] Breathing and edema is improving  Congestive heart failure ([address]  HTN (hypertension)  Hypercholesteremia  Lupus (systemic lupus erythematosus) ([address] 6/18/2014  
 followed by dr Cathleen Cortes  Obesity, unspecified Patient has weight loss Discussed diet ad fluid restriction  Other and unspecified hyperlipidemia F/u per pmd  
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 
Past Surgical History:  
Procedure Laterality Date  CARDIAC SURG PROCEDURE UNLIST    
 pacemaker  HX FREE SKIN GRAFT    
 right foot  HX HYSTERECTOMY  HX ORTHOPAEDIC    
 PACEMAKER PROCEDURE Family History: 
Family History Problem Relation Age of Onset  Arrhythmia Neg Hx  Asthma Neg Hx  Clotting Disorder Neg Hx  Fainting Neg Hx   
 Heart Attack Neg Hx  High Cholesterol Neg Hx  Pacemaker Neg Hx  Stroke Neg Hx Social History: 
Social History Tobacco Use  Smoking status: Never Smoker  Smokeless tobacco: Never Used Substance Use Topics  Alcohol use: No  
 Drug use: No  
 
 
Allergies: 
No Known Allergies Review of Systems Review of Systems Unable to perform ROS: Dementia Physical Exam  
 
Visit Vitals /63 (BP 1 Location: Left arm, BP Patient Position: At rest) Pulse 69 Temp 100 °F (37.8 °C) Resp 15 Wt 77.1 kg (170 lb) SpO2 95% BMI 29.18 kg/m² Physical Exam 
Vitals signs and nursing note reviewed. Constitutional:   
   General: She is not in acute distress. Appearance: Normal appearance. She is not ill-appearing, toxic-appearing or diaphoretic. HENT:  
   Head: Normocephalic and atraumatic. Neck: Musculoskeletal: Neck supple. No neck rigidity. Cardiovascular:  
   Rate and Rhythm: Normal rate and regular rhythm. Pulses: Normal pulses. Heart sounds: Normal heart sounds. No murmur. Pulmonary:  
   Effort: Pulmonary effort is normal. No respiratory distress. Breath sounds: Decreased breath sounds present. No wheezing. Abdominal:  
   General: Bowel sounds are normal. There is no distension. Palpations: Abdomen is soft. Tenderness: There is no right CVA tenderness or left CVA tenderness. Musculoskeletal:  
   Comments: Upper and lower limbs contracted. She is bed-bound Skin: 
   General: Skin is warm and dry. Capillary Refill: Capillary refill takes less than 2 seconds. Findings: Wound (stage IV ulcer ) present. Neurological:  
   Mental Status: She is disoriented. Psychiatric:     
   Behavior: Behavior normal.  
 
 
 
 
Diagnostic Study Results Labs - Recent Results (from the past 12 hour(s)) EKG, 12 LEAD, INITIAL Collection Time: 08/02/20  1:57 PM  
Result Value Ref Range Ventricular Rate 70 BPM  
 Atrial Rate 73 BPM  
 P-R Interval 214 ms QRS Duration 202 ms Q-T Interval 486 ms QTC Calculation (Bezet) 524 ms Calculated R Axis -48 degrees Calculated T Axis 120 degrees Diagnosis AV dual-paced rhythm with prolonged AV conduction Abnormal ECG When compared with ECG of 19-JUN-2020 06:55, 
Vent. rate has decreased BY   5 BPM 
  
CBC WITH AUTOMATED DIFF Collection Time: 08/02/20  2:30 PM  
Result Value Ref Range WBC 11.4 4.6 - 13.2 K/uL  
 RBC 2.95 (L) 4.20 - 5.30 M/uL HGB 8.6 (L) 12.0 - 16.0 g/dL HCT 26.6 (L) 35.0 - 45.0 % MCV 90.2 74.0 - 97.0 FL  
 MCH 29.2 24.0 - 34.0 PG  
 MCHC 32.3 31.0 - 37.0 g/dL  
 RDW 14.7 (H) 11.6 - 14.5 % PLATELET 969 173 - 359 K/uL MPV 8.7 (L) 9.2 - 11.8 FL  
 NEUTROPHILS 74 (H) 40 - 73 % LYMPHOCYTES 14 (L) 21 - 52 % MONOCYTES 12 (H) 3 - 10 % EOSINOPHILS 0 0 - 5 % BASOPHILS 0 0 - 2 %  
 ABS. NEUTROPHILS 8.4 (H) 1.8 - 8.0 K/UL  
 ABS. LYMPHOCYTES 1.6 0.9 - 3.6 K/UL  
 ABS. MONOCYTES 1.4 (H) 0.05 - 1.2 K/UL  
 ABS. EOSINOPHILS 0.0 0.0 - 0.4 K/UL  
 ABS. BASOPHILS 0.0 0.0 - 0.1 K/UL  
 DF AUTOMATED METABOLIC PANEL, BASIC Collection Time: 08/02/20  2:30 PM  
Result Value Ref Range Sodium 137 136 - 145 mmol/L Potassium 5.4 3.5 - 5.5 mmol/L Chloride 103 100 - 111 mmol/L  
 CO2 30 21 - 32 mmol/L Anion gap 4 3.0 - 18 mmol/L Glucose 92 74 - 99 mg/dL BUN 25 (H) 7.0 - 18 MG/DL Creatinine 1.22 0.6 - 1.3 MG/DL  
 BUN/Creatinine ratio 20 12 - 20 GFR est AA 50 (L) >60 ml/min/1.73m2 GFR est non-AA 42 (L) >60 ml/min/1.73m2 Calcium 8.0 (L) 8.5 - 10.1 MG/DL POC LACTIC ACID Collection Time: 08/02/20  2:47 PM  
Result Value Ref Range Lactic Acid (POC) 0.86 0.40 - 2.00 mmol/L Radiologic Studies - No orders to display Medical Decision Making I am the first provider for this patient. I reviewed the vital signs, available nursing notes, past medical history, past surgical history, family history and social history. Vital Signs-Reviewed the patient's vital signs. Records Reviewed: Nursing Notes and Old Medical Records (Time of Review: 7:28 PM) ED Course: Progress Notes, Reevaluation, and Consults: 
 
 
Provider Notes (Medical Decision Making):  
 
 vitals stable. she is non toxic appearing. Preliminary labs are unremarkable. Wound does not appear infected. I had a long discussion with son Lidia Moore regarding his mother's care including her poor prognosis and the hospitalist team for Hospice. Mr. Lolis Odonnell states he will speak to the rest of his family prior to making a decision. 16:58: Mr. Lolis Odonnell calls back with decision for Hospice care Patient with be discharged to home with hospice, consult placed. I explained to Mr. Lolis Odonnell that hospice will call on Monday with further information. Extensive review of return precautions discussed with patient prior to discharge. Discussed importance of PCP follow up / reassessment and need to return to ED immediately should symptoms worsen. Diagnosis Clinical Impression:  
1. Wound of sacral region, sequela 2. Pain associated with wound 3. Hospice care Disposition: home Follow-up Information Follow up With Specialties Details Why Contact Info [facility] EMERGENCY DEPT Emergency Medicine  If symptoms worsen [address] [place] [id number] 
[phone] Monty Boo MD Internal Medicine In 2 days sacral wound [address] [address] [phone] Patient's Medications Start Taking No medications on file Continue Taking ACETAMINOPHEN (TYLENOL ARTHRITIS PAIN) 650 MG TBER    Take 1 Tab by mouth every eight (8) hours. AMIODARONE (CORDARONE) 200 MG TABLET    TAKE ONE TABLET EVERY DAY (MAKE AN APPT) AMITRIPTYLINE (ELAVIL) 25 MG TABLET    Take 25 mg by mouth nightly. ASPIRIN DELAYED-RELEASE 81 MG TABLET    Take 81 mg by mouth daily. CARVEDILOL (COREG) 3.125 MG TABLET    Take 1 Tab by mouth every twelve (12) hours. DEXTROMETHORPHAN-GUAIFENESIN (TUSSIN DM COUGH AND CHEST)  MG/5 ML LIQD SYRUP    Take 10 mL by mouth every six (6) hours as needed. FAMOTIDINE (PEPCID) 20 MG TABLET    Take 20 mg by mouth two (2) times a day. FOOD SUPPLEMT, LACTOSE-REDUCED (ENSURE ENLIVE) 0.08 GRAM-1.5 KCAL/ML LIQD    Take 1 Bottle by mouth two (2) times a day. HYDROXYCHLOROQUINE (PLAQUENIL) 200 MG TABLET    Take 200 mg by mouth two (2) times a day. ISOSORBIDE MONONITRATE (IMDUR PO)    Take 60 mg by mouth daily. LEVOTHYROXINE (SYNTHROID) 25 MCG TABLET    TAKE ONE TABLET BY MOUTH DAILY BEFORE BREAKFAST MAGNESIUM CITRATE SOLUTION    Take 1/3 of bottle every 8 hours until finished or until you have a bowel movement. MEMANTINE (NAMENDA) 10 MG TABLET    Take 10 mg by mouth two (2) times a day. MENTHOL-ZINC OXIDE (CALMOSEPTINE) 0.44-20.6 % OINT    Apply 1 Each to affected area as needed for Pain. apply to affected area after each pericare episode. METOLAZONE (ZAROXOLYN) 2.5 MG TABLET    Take  by mouth daily. POLYETHYLENE GLYCOL (MIRALAX) 17 GRAM/DOSE POWDER    Take 17 g by mouth daily. POTASSIUM CHLORIDE SR (KLOR-CON 10) 10 MEQ TABLET    Take  by mouth. PREDNISONE (DELTASONE) 1 MG TABLET    Take 1 mg by mouth daily. 1 pill po daily SPIRONOLACTONE (ALDACTONE) 25 MG TABLET    Take 1 Tab by mouth daily. TORSEMIDE (DEMADEX) 20 MG TABLET    Take 1 Tab by mouth daily as needed (edema). TORSEMIDE (DEMADEX) 20 MG TABLET    Take 1 Tab by mouth daily. These Medications have changed No medications on file Stop Taking No medications on file Dictation disclaimer:  Please note that this dictation was completed with [employer], the computer voice recognition software. Quite often unanticipated grammatical, syntax, homophones, and other interpretive errors are inadvertently transcribed by the computer software. Please disregard these errors. Please excuse any errors that have escaped final proofreading.

## 2020-08-02 NOTE — ED TRIAGE NOTES
Pt presents via EMS from home. Family reports that pt has been more \"shouty\" than usual which normally occurs when pt has an infection./ Family is concerned about possible wound infection to her left buttocks per EMS. Pt has home health that comes to see her. EMS report that pt may be a candidate for a wound vac.

## 2020-08-27 RX ORDER — TORSEMIDE 10 MG/1
TABLET ORAL
Qty: 30 TAB | Refills: 2 | OUTPATIENT
Start: 2020-08-27

## 2020-09-11 RX ORDER — SPIRONOLACTONE 25 MG/1
TABLET ORAL
Qty: 90 TAB | Refills: 2 | OUTPATIENT
Start: 2020-09-11

## 2022-07-07 NOTE — PROGRESS NOTES
Problem: Falls - Risk of  Goal: *Absence of Falls  Document Neeraj Fall Risk and appropriate interventions in the flowsheet. Outcome: Progressing Towards Goal  Fall Risk Interventions:  Mobility Interventions: Patient to call before getting OOB    Mentation Interventions: Adequate sleep, hydration, pain control, Bed/chair exit alarm, Update white board         Elimination Interventions: Call light in reach, Bed/chair exit alarm, Patient to call for help with toileting needs    History of Falls Interventions: Evaluate medications/consider consulting pharmacy, Investigate reason for fall, Room close to nurse's station, Door open when patient unattended        Problem: Pressure Injury - Risk of  Goal: *Prevention of pressure injury  Document Rodríguez Scale and appropriate interventions in the flowsheet.   Outcome: Progressing Towards Goal  Pressure Injury Interventions:       Moisture Interventions: Minimize layers, Absorbent underpads, Check for incontinence Q2 hours and as needed, Limit adult briefs    Activity Interventions: Pressure redistribution bed/mattress(bed type)    Mobility Interventions: Pressure redistribution bed/mattress (bed type)    Nutrition Interventions: Document food/fluid/supplement intake normal mood with appropriate affect

## 2023-06-01 NOTE — PROGRESS NOTES
Health Maintenance Due   Topic Date Due   • Shingles Vaccine (1 of 2) Never done   • COVID-19 Vaccine (5 - Booster for Moderna series) 09/21/2022       Patient is due for topics as listed above but is not proceeding with Immunization(s) COVID-19 and Shingles at this time. Education provided for Immunization(s) COVID-19 and Shingles.   NUTRITION Nursing Referral: [facility] Nutrition Consult: General Nutrition Management & Supplements RECOMMENDATIONS / PLAN:  
 
- Continue current nutrition interventions. - Assist with all meals and encourage intake as needed. - Continue RD inpatient monitoring and evaluation. NUTRITION INTERVENTIONS & DIAGNOSIS:  
 
[x] Meals/snacks: modified composition 
[x] Medical food supplement therapy: Ensure Enlive BID Nutrition Diagnosis: Predicted inadequate energy intake related to pt mentation as evidenced by pt only consuming a few bites during first few days of admission with weight loss PTA- improving. ASSESSMENT:  
 
5/7: Diet resumed and pt ate 100% of breakfast today; family assisting with meals. Per CNA, family present for lunch and pt ate but unsure of amount. Supplement intake unknown. Tolerating diet. 5/6: Pt NPO and out of room at time of visit- off the unit for procedure, debridement planned today. 5/3: NPO today for procedure that was moved to Monday. Diet resumed. Variable intake yesterday. 5/2: Pt evaluated by SLP and started on diet. Pt with poor intake so far. Family told nurse pt eats at home with a lot of encouragement. Weight loss noted. Noted pt consumes Ensure at home per RD evaluation at last visit. Average po intake adequate to meet patients estimated nutritional needs:   [] Yes     [] No   [x] Unable to determine at this time Diet: DIET NUTRITIONAL SUPPLEMENTS Dinner, Breakfast; ENSURE ENLIVE 
DIET DYSPHAGIA PUREED (NDD1) Food Allergies: NKFA Current Appetite:   [x] Good     [] Fair     [] Poor     [] Other: 
Appetite/meal intake prior to admission:   [x] Good per family     [] Fair     [] Poor     [] Other Feeding Limitations:  [x] Swallowing difficulty: SLP following    [] Chewing difficulty    [] Other: 
Current Meal Intake:  
Patient Vitals for the past 100 hrs: 
 % Diet Eaten 05/07/19 1011 100 % 05/06/19 1604 0 % 05/06/19 1313 0 % 05/06/19 0817 0 % 05/05/19 1249 100 % 05/04/19 1930 100 % 05/04/19 0820 100 % BM: 5/4 Skin Integrity: pressure injury to sacrum, cellulitis to right thigh, moisture associated skin damage to right groin, incision and vascular wound to right leg Edema:   [] No     [x] Yes Pertinent Medications: Reviewed: NS at 30 mL/hr, steroid, miralax Recent Labs 05/07/19 
2289 05/06/19 
7937 05/05/19 
8001  142 143  
K 4.6 4.1 3.6 * 108 109* CO2 28 29 28 * 227* 194* BUN 87* 88* 83* CREA 1.71* 1.95* 1.82* CA 8.4* 8.4* 8.4* Intake/Output Summary (Last 24 hours) at 5/7/2019 1350 Last data filed at 5/7/2019 1011 Gross per 24 hour Intake 1328.5 ml Output 850 ml Net 478.5 ml Anthropometrics: 
Ht Readings from Last 1 Encounters:  
05/01/19 5' 4\" (1.626 m) Last 3 Recorded Weights in this Encounter 05/05/19 0400 05/06/19 0735 05/07/19 0421 Weight: 73.6 kg (162 lb 4.1 oz) 80.1 kg (176 lb 9.6 oz) 80.4 kg (177 lb 3.2 oz) Body mass index is 30.42 kg/m². Weight History: -5.9% x 1 month Weight Metrics 5/7/2019 4/30/2019 4/4/2019 3/27/2019 3/19/2019 3/4/2019 2/13/2019 Weight 177 lb 3.2 oz - - 170 lb 170 lb 4.8 oz - 160 lb BMI - 30.42 kg/m2 29.18 kg/m2 29.18 kg/m2 29.23 kg/m2 27.46 kg/m2 27.46 kg/m2 Admitting Diagnosis: Severe sepsis ([address] [A41.9, R65.20] Abscess [L02.91] Sepsis ([address] [A41.9] Pertinent PMHx: HTN, hypercholesterolemia, CHF, ARIEL, CKD, NSTEMI, dementia Education Needs:        [x] None identified  [] Identified - Not appropriate at this time  []  Identified and addressed - refer to education log Learning Limitations:   [] None identified  [x] Identified: non-verbal, dementia Cultural, Christianity & ethnic food preferences:  [x] None identified    [] Identified and addressed ESTIMATED NUTRITION NEEDS:  
 
Calories: 8992-8345 kcal (MSJx1.2-1.4) based on  [x] Actual BW 73 kg     [] IBW Protein:  gm (1.2-1.5 gm/kg) based on  [x] Actual BW      [] IBW Fluid: 1 mL/kcal 
  
MONITORING & EVALUATION:  
 
Nutrition Goal(s): 1. Po intake of meals will meet >75% of patient estimated nutritional needs within the next 7 days. Outcome:  [] Met/Ongoing    [x] Progressing towards goal    [] Not progressing towards goal    [] New/Initial goal  
 
Monitoring:   [x] Food and beverage intake   [x] Diet order   [x] Nutrition-focused physical findings   [x] Treatment/therapy   [] Weight   [] Enteral nutrition intake Previous Recommendations (for follow-up assessments only):     [x]   Implemented       []   Not Implemented (RD to address)      [] No Longer Appropriate     [] No Recommendation Made Discharge Planning: cardiac diet; consistency per SLP [x] Participated in care planning, discharge planning, & interdisciplinary rounds as appropriate Nkechi Ashby RD Pager: [phone]

## 2023-11-02 NOTE — PROGRESS NOTES
Discharge order noted for today. Pt has been accepted to [facility] agency. Met with patient and daguther and son and are agreeable to the transition plan today. Transport has been arranged through [employer] at 3:30[m. Patient's discharge summary and home health  orders have been forwarded to [facility] home health  agency via que. Updated bedside RN, Edelmira Vanegas,  to the transition plan. Discharge information has been documented on the AVS.  Hospital bed has been delivered to the home through [facility]. Personal care set up through [employer] and . VANESSA Cohen Case Management [phone] Medication: omeprazole (PriLOSEC) 40 MG capsule  Medication refill denied. Refills are on file at pharmacy.

## (undated) DEVICE — DRAPE,TOP,102X53,STERILE: Brand: MEDLINE

## (undated) DEVICE — SHEET, T, LAPAROTOMY, STERILE: Brand: MEDLINE

## (undated) DEVICE — GAUZE,SPONGE,4"X4",16PLY,STRL,LF,10/TRAY: Brand: MEDLINE

## (undated) DEVICE — KERLIX BANDAGE ROLL: Brand: KERLIX

## (undated) DEVICE — TRAY PREP DRY W/ PREM GLV 2 APPL 6 SPNG 2 UNDPD 1 OVERWRAP

## (undated) DEVICE — Device

## (undated) DEVICE — (D)PREP SKN CHLRAPRP APPL 26ML -- CONVERT TO ITEM 371833

## (undated) DEVICE — REM POLYHESIVE ADULT PATIENT RETURN ELECTRODE: Brand: VALLEYLAB

## (undated) DEVICE — BANDAGE PSTE W4INXL10YD ZN OXIDE UNNA BOOT

## (undated) DEVICE — GLOVE SURG SZ 7.5 L11.73IN FNGR THK9.8MIL STRW LTX POLYMER

## (undated) DEVICE — STERILE POLYISOPRENE POWDER-FREE SURGICAL GLOVES: Brand: PROTEXIS

## (undated) DEVICE — DRSG GZ OIL EMUL CURAD 3X8 --

## (undated) DEVICE — BANDAGE,GAUZE,BULKEE II,4.5"X4.1YD,STRL: Brand: MEDLINE

## (undated) DEVICE — GARMENT,MEDLINE,DVT,INT,CALF,MED, GEN2: Brand: MEDLINE

## (undated) DEVICE — 3M™ COBAN™ NL STERILE NON-LATEX SELF-ADHERENT WRAP, 2084S, 4 IN X 5 YD (10 CM X 4,5 M), 18 ROLLS/CASE: Brand: 3M™ COBAN™

## (undated) DEVICE — INTENDED FOR TISSUE SEPARATION, AND OTHER PROCEDURES THAT REQUIRE A SHARP SURGICAL BLADE TO PUNCTURE OR CUT.: Brand: BARD-PARKER ® STAINLESS STEEL BLADES

## (undated) DEVICE — SOLUTION IV 1000ML 0.9% SOD CHL

## (undated) DEVICE — SHEET, DRAPE, SPLIT, STERILE: Brand: MEDLINE

## (undated) DEVICE — GLOVE SURG SZ 7 L11.33IN FNGR THK9.8MIL STRW LTX POLYMER

## (undated) DEVICE — INTENDED FOR TISSUE SEPARATION, AND OTHER PROCEDURES THAT REQUIRE A SHARP SURGICAL BLADE TO PUNCTURE OR CUT.: Brand: BARD-PARKER SAFETY BLADES SIZE 15, STERILE

## (undated) DEVICE — PACK PROCEDURE SURG MAJ W/ BASIN LF

## (undated) DEVICE — THREE-QUARTER SHEET: Brand: CONVERTORS

## (undated) DEVICE — KIT CLN UP BON SECOURS MARYV

## (undated) DEVICE — INTENDED FOR TISSUE SEPARATION, AND OTHER PROCEDURES THAT REQUIRE A SHARP SURGICAL BLADE TO PUNCTURE OR CUT.: Brand: BARD-PARKER SAFETY BLADES SIZE 10, STERILE

## (undated) DEVICE — GAUZE SPONGES,16 PLY: Brand: CURITY

## (undated) DEVICE — BLANKET WRM AD W50XL85.8IN PACU FULL BODY FORC AIR

## (undated) DEVICE — FLEX ADVANTAGE 3000CC: Brand: FLEX ADVANTAGE